# Patient Record
Sex: MALE | Race: WHITE | NOT HISPANIC OR LATINO | Employment: FULL TIME | URBAN - METROPOLITAN AREA
[De-identification: names, ages, dates, MRNs, and addresses within clinical notes are randomized per-mention and may not be internally consistent; named-entity substitution may affect disease eponyms.]

---

## 2017-03-01 ENCOUNTER — OFFICE VISIT (OUTPATIENT)
Dept: INTERNAL MEDICINE | Facility: CLINIC | Age: 62
End: 2017-03-01
Payer: COMMERCIAL

## 2017-03-01 ENCOUNTER — HOSPITAL ENCOUNTER (OUTPATIENT)
Dept: RADIOLOGY | Facility: HOSPITAL | Age: 62
Discharge: HOME | End: 2017-03-01
Attending: INTERNAL MEDICINE
Payer: COMMERCIAL

## 2017-03-01 VITALS
BODY MASS INDEX: 38.3 KG/M2 | DIASTOLIC BLOOD PRESSURE: 74 MMHG | HEIGHT: 73 IN | SYSTOLIC BLOOD PRESSURE: 130 MMHG | WEIGHT: 289 LBS | HEART RATE: 94 BPM

## 2017-03-01 DIAGNOSIS — T14.8XXA MUSCLE STRAIN: ICD-10-CM

## 2017-03-01 DIAGNOSIS — M25.471 RIGHT ANKLE SWELLING: ICD-10-CM

## 2017-03-01 DIAGNOSIS — M54.9 UPPER BACK PAIN: Primary | ICD-10-CM

## 2017-03-01 DIAGNOSIS — M25.571 ACUTE RIGHT ANKLE PAIN: ICD-10-CM

## 2017-03-01 PROCEDURE — 3075F SYST BP GE 130 - 139MM HG: CPT | Mod: S$GLB,,, | Performed by: INTERNAL MEDICINE

## 2017-03-01 PROCEDURE — 1160F RVW MEDS BY RX/DR IN RCRD: CPT | Mod: S$GLB,,, | Performed by: INTERNAL MEDICINE

## 2017-03-01 PROCEDURE — 3078F DIAST BP <80 MM HG: CPT | Mod: S$GLB,,, | Performed by: INTERNAL MEDICINE

## 2017-03-01 PROCEDURE — 99999 PR PBB SHADOW E&M-EST. PATIENT-LVL IV: CPT | Mod: PBBFAC,,, | Performed by: INTERNAL MEDICINE

## 2017-03-01 PROCEDURE — 73610 X-RAY EXAM OF ANKLE: CPT | Mod: TC,RT

## 2017-03-01 PROCEDURE — 99214 OFFICE O/P EST MOD 30 MIN: CPT | Mod: S$GLB,,, | Performed by: INTERNAL MEDICINE

## 2017-03-01 PROCEDURE — 73610 X-RAY EXAM OF ANKLE: CPT | Mod: 26,RT,, | Performed by: RADIOLOGY

## 2017-03-01 RX ORDER — HYDROCODONE BITARTRATE AND ACETAMINOPHEN 5; 325 MG/1; MG/1
1 TABLET ORAL EVERY 8 HOURS PRN
Qty: 30 TABLET | Refills: 0 | Status: SHIPPED | OUTPATIENT
Start: 2017-03-01 | End: 2017-04-03

## 2017-03-01 RX ORDER — MODAFINIL 200 MG/1
200 TABLET ORAL DAILY
Qty: 400 TABLET | Refills: 3 | Status: SHIPPED | OUTPATIENT
Start: 2017-03-01 | End: 2017-03-03 | Stop reason: SDUPTHER

## 2017-03-01 NOTE — PROGRESS NOTES
Subjective:       Patient ID: Rajan Cooper III is a 61 y.o. male.    Chief Complaint: Back Pain (x 4 days)    HPI Comments: History of present illness: Patient complains pain in the upper back and right ankle.  He is not sure if he injured himself at work but remembers pushing a heavy gate.  He then states the following day his upper back and lower neck was sore and then the following day the right ankle was swollen stiff and tender to bear weight.  He has significant multiple sclerosis and therefore doesn't always feel injuries.  He has no numbness or tingling in the upper extremities no weakness in either the upper or lower extremities but does have some discomfort when standing at the right ankle and tenderness across the upper back and lower neck especially with backward extension of the neck.  He takes baclofen, has used heat and anti-inflammatories.  He was wondering about a pain med for nighttime use.     Back Pain   Associated symptoms include weakness ( multiple sclerosis). Pertinent negatives include no abdominal pain, chest pain, fever or headaches.     Review of Systems   Constitutional: Negative for chills, fatigue, fever and unexpected weight change.   HENT: Negative for trouble swallowing.    Eyes: Negative for visual disturbance.   Respiratory: Negative for cough, shortness of breath and wheezing.    Cardiovascular: Negative for chest pain and palpitations.   Gastrointestinal: Negative for abdominal pain, constipation, diarrhea, nausea and vomiting.   Genitourinary: Negative for difficulty urinating.   Musculoskeletal: Positive for back pain, gait problem and joint swelling (right ankle). Negative for neck pain.   Skin: Negative for rash.   Neurological: Positive for weakness ( multiple sclerosis). Negative for dizziness and headaches.       Objective:      Physical Exam   Constitutional: He is oriented to person, place, and time. He appears well-developed and well-nourished. No distress.   HENT:    Head: Normocephalic and atraumatic.   Right Ear: External ear normal.   Left Ear: External ear normal.   Nose: Nose normal.   Mouth/Throat: Oropharynx is clear and moist. No oropharyngeal exudate.   TM's clear, pharynx clear   Eyes: Conjunctivae and EOM are normal. Pupils are equal, round, and reactive to light. No scleral icterus.   Neck: Normal range of motion. No thyromegaly present.   No supraclavicular nodes palpated.  Mild neck stiffness which may be chronic   Cardiovascular: Normal rate, regular rhythm and normal heart sounds.    No murmur heard.  Pulmonary/Chest: Effort normal and breath sounds normal. He has no wheezes.   Abdominal: Soft. Bowel sounds are normal. He exhibits no mass. There is no tenderness.   Musculoskeletal: He exhibits tenderness (mild muscular tenderness with palpation of the upper thoracic and lower cervical muscles.  Mild tenderness with backward extension of the neck but no radiation to the arms). He exhibits no edema.   Also swelling with lateral tenderness of the right ankle.  Mild decreased range of motion with some tenderness with bearing weight   Lymphadenopathy:     He has no cervical adenopathy.   Neurological: He is alert and oriented to person, place, and time.   Skin: No pallor.   Psychiatric: He has a normal mood and affect.       Assessment:       1. Upper back pain    2. Muscle strain    3. Acute right ankle pain    4. Right ankle swelling        Plan:       Rajan was seen today for back pain.    Diagnoses and all orders for this visit:    Upper back pain    Muscle strain    Acute right ankle pain  -     X-Ray Ankle Complete Right; Future  -     X-Ray Ankle Complete Right; Future    Right ankle swelling  -     X-Ray Ankle Complete Right; Future  -     X-Ray Ankle Complete Right; Future    Other orders  -     hydrocodone-acetaminophen 5-325mg (NORCO) 5-325 mg per tablet; Take 1 tablet by mouth every 8 (eight) hours as needed for Pain.        review x-ray when available.   Symptomatic treatment of muscle soreness with heat anti-inflammatory muscle relaxer and sparingly of pain medicine.  Side effects discussed.  Call if not improving    Addendum: No fracture on xray. Symptomatic treatment as we discussed. Let me know if not improving.

## 2017-03-01 NOTE — MR AVS SNAPSHOT
[facility]  [address]  [facility] [address]  Phone: [phone]  Fax: [phone]                  Rajan Cooper III   3/1/2017 10:45 AM   Office Visit    Description:  Male : 1955   Provider:  Tom Garcia MD   Department:  [facility]           Reason for Visit     Back Pain           Diagnoses this Visit        Comments    Upper back pain    -  Primary     Muscle strain         Acute right ankle pain         Right ankle swelling                To Do List           Future Appointments        Provider Department Dept Phone    3/1/2017 11:45 AM [facility] [id number] [facility] [phone]      Goals (5 Years of Data)     None       These Medications        Disp Refills Start End    hydrocodone-acetaminophen 5-325mg (NORCO) 5-325 mg per tablet 30 tablet 0 3/1/2017     Take 1 tablet by mouth every 8 (eight) hours as needed for Pain. - Oral    Pharmacy: [facility]/pharmacy #[id number] - [place] - [address] Ph #: [phone]         [facility]     [facility] Care [facility] -  Assistance  Registered nurses in the [facility] provide clinical advisement, health education, appointment booking, and other advisory services.  Call for this free service at [phone].             Medications           Message regarding Medications     Verify the changes and/or additions to your medication regime listed below are the same as discussed with your clinician today.  If any of these changes or additions are incorrect, please notify your healthcare provider.        START taking these NEW medications        Refills    hydrocodone-acetaminophen 5-325mg (NORCO) 5-325 mg per tablet 0    Sig: Take 1 tablet by mouth every 8 (eight) hours as needed for Pain.    Class: Print    Route: Oral           Verify that the below list of medications is an accurate representation of the medications you are currently taking.  If  none reported, the list may be blank. If incorrect, please contact your healthcare provider. Carry this list with you in case of emergency.           Current Medications     amlodipine (NORVASC) 10 MG tablet TAKE 1 TABLET BY MOUTH ONCE DAILY    AVONEX 30 mcg/0.5 mL injection INJECT 30 MCG (1 INJECTION) INTRAMUSCULARLY ONCE WEEKLY    baclofen (LIORESAL) 10 MG tablet TAKE 1 TABLET BY MOUTH 3 TIMES A DAY    baclofen (LIORESAL) 10 MG tablet TAKE 1 TABLET BY MOUTH 3 TIMES A DAY    furosemide (LASIX) 40 MG tablet TAKE 1 TABLET BY MOUTH 2 TIMES A DAY    INTERFERON BETA-1A (AVONEX IM) Inject into the muscle.      losartan (COZAAR) 25 MG tablet TAKE 1 TABLET BY MOUTH ONCE DAILY    modafinil (PROVIGIL) 200 MG Tab Take 1 tablet (200 mg total) by mouth once daily.    triamcinolone acetonide 0.1% (KENALOG) 0.1 % cream     hydrocodone-acetaminophen 5-325mg (NORCO) 5-325 mg per tablet Take 1 tablet by mouth every 8 (eight) hours as needed for Pain.    sildenafil (VIAGRA) 100 MG tablet Take 1 tablet (100 mg total) by mouth daily as needed for Erectile Dysfunction.    VASCULERA 630 mg Tab Take 1 tablet by mouth once daily.           Clinical Reference Information           Your Vitals Were     BP                   130/74 (BP Location: Right arm, Patient Position: Sitting, BP Method: Manual)           Blood Pressure          Most Recent Value    BP  130/74      Allergies as of 3/1/2017     No Known Allergies      Immunizations Administered on Date of Encounter - 3/1/2017     None      Orders Placed During Today's Visit     Future Labs/Procedures Expected by Expires    X-Ray Ankle Complete Right  3/1/2017 3/1/2018    X-Ray Ankle Complete Right  3/1/2017 3/1/2018      [facility] Sign-Up     Activating your [facility] account is as easy as 1-2-3!     1) Visit [email or web address], select Sign Up Now, enter this activation code and your date of birth, then select Next.  [email or web address]  Expires: 4/15/2017 11:40 AM      2) Create a username and  password to use when you visit [facility] in the future and select a security question in case you lose your password and select Next.    3) Enter your e-mail address and click Sign Up!    Additional Information  If you have questions, please e-mail [email or web address] or call [phone] to talk to our [employer] staff. Remember, [facility] is NOT to be used for urgent needs. For medical emergencies, dial 911.         Language Assistance Services     ATTENTION: Language assistance services are available, free of charge. Please call [phone].      ATENCIÓN: Si habla español, tiene a wang disposición servicios gratuitos de asistencia lingüística. Llame al [phone].     CHÚ Ý: N?u b?n nói Ti?ng Vi?t, có các d?ch v? h? tr? ngôn ng? mi?n phí dành cho b?n. G?i s? [phone].         [facility] complies with applicable Federal civil rights laws and does not discriminate on the basis of race, color, national origin, age, disability, or sex.

## 2017-03-02 ENCOUNTER — HOSPITAL ENCOUNTER (OUTPATIENT)
Dept: RADIOLOGY | Facility: HOSPITAL | Age: 62
Discharge: HOME | End: 2017-03-02
Attending: INTERNAL MEDICINE
Payer: COMMERCIAL

## 2017-03-02 ENCOUNTER — NURSE TRIAGE (OUTPATIENT)
Dept: ADMINISTRATIVE | Facility: CLINIC | Age: 62
End: 2017-03-02

## 2017-03-02 ENCOUNTER — OFFICE VISIT (OUTPATIENT)
Dept: INTERNAL MEDICINE | Facility: CLINIC | Age: 62
End: 2017-03-02
Payer: COMMERCIAL

## 2017-03-02 VITALS
BODY MASS INDEX: 40.06 KG/M2 | WEIGHT: 302.25 LBS | DIASTOLIC BLOOD PRESSURE: 82 MMHG | HEIGHT: 73 IN | SYSTOLIC BLOOD PRESSURE: 136 MMHG | HEART RATE: 72 BPM

## 2017-03-02 DIAGNOSIS — M25.532 WRIST PAIN, LEFT: Primary | ICD-10-CM

## 2017-03-02 DIAGNOSIS — M25.532 WRIST PAIN, LEFT: ICD-10-CM

## 2017-03-02 PROCEDURE — 99999 PR PBB SHADOW E&M-EST. PATIENT-LVL III: CPT | Mod: PBBFAC,,, | Performed by: INTERNAL MEDICINE

## 2017-03-02 PROCEDURE — 99213 OFFICE O/P EST LOW 20 MIN: CPT | Mod: S$GLB,,, | Performed by: INTERNAL MEDICINE

## 2017-03-02 PROCEDURE — 73110 X-RAY EXAM OF WRIST: CPT | Mod: 26,LT,, | Performed by: RADIOLOGY

## 2017-03-02 PROCEDURE — 3079F DIAST BP 80-89 MM HG: CPT | Mod: S$GLB,,, | Performed by: INTERNAL MEDICINE

## 2017-03-02 PROCEDURE — 1160F RVW MEDS BY RX/DR IN RCRD: CPT | Mod: S$GLB,,, | Performed by: INTERNAL MEDICINE

## 2017-03-02 PROCEDURE — 3075F SYST BP GE 130 - 139MM HG: CPT | Mod: S$GLB,,, | Performed by: INTERNAL MEDICINE

## 2017-03-02 PROCEDURE — 73110 X-RAY EXAM OF WRIST: CPT | Mod: TC,LT

## 2017-03-02 NOTE — TELEPHONE ENCOUNTER
Reason for Disposition   [1] Swallowing difficulty AND [2] cause unknown (Exception: difficulty swallowing is a chronic symptom)    Protocols used:  [facility]    Patients wife stating Rajan was prescribed Norco and last night after he took it he felt like his throat was tight and he was having a hard time swallowing. He did not take any more of the medication today and he states all of his symptoms are resolved. He is swallowing well, breathing normally, and not feeling weak dizzy. He does not have any facial or tongue swelling. His wife would like to see if something else could be prescribed for pain instead. She plans on discarding the Norco. She is asking for a call back from office today please. Please contact caller with any further care advice.

## 2017-03-02 NOTE — TELEPHONE ENCOUNTER
Spoke to pt, he agreed with below message, I have added Norco to his allergy list. He states his ankle is actually getting better, adv rest, head, symptomatic treatment as discussed yesterday.

## 2017-03-02 NOTE — PROGRESS NOTES
"Clinic Note  3/2/2017      Subjective:       Patient ID:  Rajan is a 61 y.o. male being seen for an established visit.    Chief Complaint: Joint Swelling (lt)    HPI Comments: New onset L wrist pain.  Worse over last 2 days. Has had small cyst on wrist but is larger today and more pain now on palmar side of wrist.        ROS    Medication List with Changes/Refills   New Medications    ARM BRACE (WRIST BRACE MEDIUM) MISC    1 each by Misc.(Non-Drug; Combo Route) route once.   Current Medications    AMLODIPINE (NORVASC) 10 MG TABLET    TAKE 1 TABLET BY MOUTH ONCE DAILY    AVONEX 30 MCG/0.5 ML INJECTION    INJECT 30 MCG (1 INJECTION) INTRAMUSCULARLY ONCE WEEKLY    BACLOFEN (LIORESAL) 10 MG TABLET    TAKE 1 TABLET BY MOUTH 3 TIMES A DAY    FUROSEMIDE (LASIX) 40 MG TABLET    TAKE 1 TABLET BY MOUTH 2 TIMES A DAY    HYDROCODONE-ACETAMINOPHEN 5-325MG (NORCO) 5-325 MG PER TABLET    Take 1 tablet by mouth every 8 (eight) hours as needed for Pain.    INTERFERON BETA-1A (AVONEX IM)    Inject into the muscle.      LOSARTAN (COZAAR) 25 MG TABLET    TAKE 1 TABLET BY MOUTH ONCE DAILY    MODAFINIL (PROVIGIL) 200 MG TAB    Take 1 tablet (200 mg total) by mouth once daily.    SILDENAFIL (VIAGRA) 100 MG TABLET    Take 1 tablet (100 mg total) by mouth daily as needed for Erectile Dysfunction.    TRIAMCINOLONE ACETONIDE 0.1% (KENALOG) 0.1 % CREAM        VASCULERA 630 MG TAB    Take 1 tablet by mouth once daily.   Discontinued Medications    BACLOFEN (LIORESAL) 10 MG TABLET    TAKE 1 TABLET BY MOUTH 3 TIMES A DAY       Patient Active Problem List   Diagnosis    MS (multiple sclerosis)    HTN (hypertension)    Gait disorder    Muscle cramps           Objective:      /82 (BP Location: Right arm, Patient Position: Sitting, BP Method: Manual)  Pulse 72  Ht 6' 1" (1.854 m)  Wt (!) 137.1 kg (302 lb 4 oz)  BMI 39.88 kg/m2  Estimated body mass index is 39.88 kg/(m^2) as calculated from the following:    Height as of this encounter: " "6' 1" (1.854 m).    Weight as of this encounter: 137.1 kg (302 lb 4 oz).  Physical Exam   Musculoskeletal:        Arms:        Assessment and Plan:         Problem List Items Addressed This Visit     None      Visit Diagnoses     Wrist pain, left    -  Primary    Relevant Orders    X-Ray Wrist Complete 3 views Left - flims with ? AVN in scaphoid bone.  Will send brace and make referral to ortho to consider MRI.          Follow Up:   No Follow-up on file.    Other Orders Placed This Visit:  Orders Placed This Encounter   Procedures    X-Ray Wrist Complete 3 views Left    Ambulatory consult to Orthopedics         Dom Olivia    "

## 2017-03-02 NOTE — LETTER
March 2, 2017        Tom Garcia MD  [address]  [facility] [address]             [facility]  [address]  [facility] [address]  Phone: [phone]  Fax: [phone]   Patient: Rajan Cooper III   MR Number: [id number]   YOB: 1955   Date of Visit: 3/2/2017       Dear Dr. Garcia:    Thank you for referring Rajan Cooper to me for evaluation. Below are the relevant portions of my assessment and plan of care.          Problem List Items Addressed This Visit     None      Visit Diagnoses     Wrist pain, left    -  Primary    Relevant Orders    X-Ray Wrist Complete 3 views Left - flims with ? AVN in scaphoid bone.  Will send brace and make referral to ortho to consider MRI.          Follow Up:   No Follow-up on file.    Other Orders Placed This Visit:  Orders Placed This Encounter   Procedures    X-Ray Wrist Complete 3 views Left    Ambulatory consult to Orthopedics         If you have questions, please do not hesitate to call me. I look forward to following Rajan along with you.    Sincerely,      Dom Olivia MD           CC  No Recipients

## 2017-03-02 NOTE — TELEPHONE ENCOUNTER
I am concerned that he should not take another narcotic at this time since he had a reaction causing throat closure. It is possible that all other similar narcotics may cause the same reaction and this close together it could be worse. Unless he thinks it is from something else we really should list Norco as a significant allergic reaction. Let me know his thoughts.

## 2017-03-03 RX ORDER — MODAFINIL 200 MG/1
200 TABLET ORAL DAILY
Qty: 400 TABLET | Refills: 3 | Status: SHIPPED | OUTPATIENT
Start: 2017-03-03 | End: 2017-03-16 | Stop reason: SDUPTHER

## 2017-03-04 ENCOUNTER — ANESTHESIA (OUTPATIENT)
Dept: SURGERY | Facility: HOSPITAL | Age: 62
DRG: 853 | End: 2017-03-04
Payer: COMMERCIAL

## 2017-03-04 ENCOUNTER — ANESTHESIA EVENT (OUTPATIENT)
Dept: SURGERY | Facility: HOSPITAL | Age: 62
DRG: 853 | End: 2017-03-04
Payer: COMMERCIAL

## 2017-03-04 ENCOUNTER — HOSPITAL ENCOUNTER (OUTPATIENT)
Dept: RADIOLOGY | Facility: HOSPITAL | Age: 62
Discharge: HOME | DRG: 853 | End: 2017-03-04
Payer: COMMERCIAL

## 2017-03-04 ENCOUNTER — HOSPITAL ENCOUNTER (INPATIENT)
Facility: HOSPITAL | Age: 62
LOS: 8 days | Discharge: HOME HEALTH | DRG: 853 | End: 2017-03-14
Attending: EMERGENCY MEDICINE | Admitting: ORTHOPAEDIC SURGERY
Payer: COMMERCIAL

## 2017-03-04 DIAGNOSIS — G35 MS (MULTIPLE SCLEROSIS): Primary | ICD-10-CM

## 2017-03-04 DIAGNOSIS — A41.01 STAPHYLOCOCCUS AUREUS SEPSIS: ICD-10-CM

## 2017-03-04 DIAGNOSIS — M00.9 PYOGENIC ARTHRITIS OF LEFT WRIST, DUE TO UNSPECIFIED ORGANISM: ICD-10-CM

## 2017-03-04 DIAGNOSIS — M00.032 STAPHYLOCOCCAL ARTHRITIS OF LEFT WRIST: ICD-10-CM

## 2017-03-04 DIAGNOSIS — L02.512 ABSCESS OF LEFT HAND: ICD-10-CM

## 2017-03-04 LAB
ALBUMIN SERPL BCP-MCNC: 2.2 G/DL
ALP SERPL-CCNC: 219 U/L
ALT SERPL W/O P-5'-P-CCNC: 44 U/L
ANION GAP SERPL CALC-SCNC: 15 MMOL/L
ANISOCYTOSIS BLD QL SMEAR: SLIGHT
APPEARANCE FLD: NORMAL
APTT BLDCRRT: 26.8 SEC
AST SERPL-CCNC: 26 U/L
BACTERIA #/AREA URNS AUTO: ABNORMAL /HPF
BASOPHILS # BLD AUTO: ABNORMAL K/UL
BASOPHILS NFR BLD: 0 %
BILIRUB SERPL-MCNC: 2.4 MG/DL
BILIRUB UR QL STRIP: ABNORMAL
BODY FLD TYPE: NORMAL
BODY FLUID COMMENTS: NORMAL
BUN SERPL-MCNC: 15 MG/DL
CALCIUM SERPL-MCNC: 9.6 MG/DL
CHLORIDE SERPL-SCNC: 104 MMOL/L
CLARITY UR REFRACT.AUTO: ABNORMAL
CO2 SERPL-SCNC: 24 MMOL/L
COLOR FLD: NORMAL
COLOR UR AUTO: ABNORMAL
CREAT SERPL-MCNC: 0.8 MG/DL
CRP SERPL-MCNC: 451.1 MG/L
DIFFERENTIAL METHOD: ABNORMAL
EOSINOPHIL # BLD AUTO: ABNORMAL K/UL
EOSINOPHIL NFR BLD: 4 %
ERYTHROCYTE [DISTWIDTH] IN BLOOD BY AUTOMATED COUNT: 14.7 %
ERYTHROCYTE [SEDIMENTATION RATE] IN BLOOD BY WESTERGREN METHOD: 118 MM/HR
EST. GFR  (AFRICAN AMERICAN): >60 ML/MIN/1.73 M^2
EST. GFR  (NON AFRICAN AMERICAN): >60 ML/MIN/1.73 M^2
GLUCOSE SERPL-MCNC: 99 MG/DL
GLUCOSE UR QL STRIP: NEGATIVE
GRAM STN SPEC: NORMAL
HCT VFR BLD AUTO: 37 %
HGB BLD-MCNC: 12.6 G/DL
HGB UR QL STRIP: ABNORMAL
HYALINE CASTS UR QL AUTO: 0 /LPF
INR PPP: 1.1
KETONES UR QL STRIP: ABNORMAL
LEUKOCYTE ESTERASE UR QL STRIP: ABNORMAL
LYMPHOCYTES # BLD AUTO: ABNORMAL K/UL
LYMPHOCYTES NFR BLD: 8 %
LYMPHOCYTES NFR FLD MANUAL: 5 %
MCH RBC QN AUTO: 30.4 PG
MCHC RBC AUTO-ENTMCNC: 34.1 %
MCV RBC AUTO: 89 FL
MICROSCOPIC COMMENT: ABNORMAL
MONOCYTES # BLD AUTO: ABNORMAL K/UL
MONOCYTES NFR BLD: 0 %
MONOS+MACROS NFR FLD MANUAL: 16 %
NEUTROPHILS NFR BLD: 2 %
NEUTROPHILS NFR FLD MANUAL: 79 %
NEUTS BAND NFR BLD MANUAL: 86 %
NITRITE UR QL STRIP: POSITIVE
PH UR STRIP: 7 [PH] (ref 5–8)
PLATELET # BLD AUTO: 254 K/UL
PLATELET BLD QL SMEAR: ABNORMAL
PMV BLD AUTO: 10.7 FL
POTASSIUM SERPL-SCNC: 3.5 MMOL/L
PREALB SERPL-MCNC: 4 MG/DL
PROCALCITONIN SERPL IA-MCNC: 1.08 NG/ML
PROT SERPL-MCNC: 7.4 G/DL
PROT UR QL STRIP: ABNORMAL
PROTHROMBIN TIME: 11.2 SEC
RBC # BLD AUTO: 4.15 M/UL
RBC #/AREA URNS AUTO: >100 /HPF (ref 0–4)
SODIUM SERPL-SCNC: 143 MMOL/L
SP GR UR STRIP: 1.02 (ref 1–1.03)
TRANSFERRIN SERPL-MCNC: 114 MG/DL
URATE SERPL-MCNC: 4.8 MG/DL
URN SPEC COLLECT METH UR: ABNORMAL
UROBILINOGEN UR STRIP-ACNC: >=8 EU/DL
WBC # BLD AUTO: 22.54 K/UL
WBC # FLD: NORMAL /CU MM
WBC #/AREA URNS AUTO: 31 /HPF (ref 0–5)

## 2017-03-04 PROCEDURE — D9220A PRA ANESTHESIA: Mod: ANES,,, | Performed by: ANESTHESIOLOGY

## 2017-03-04 PROCEDURE — 87102 FUNGUS ISOLATION CULTURE: CPT | Mod: 59

## 2017-03-04 PROCEDURE — 84134 ASSAY OF PREALBUMIN: CPT

## 2017-03-04 PROCEDURE — 63600175 PHARM REV CODE 636 W HCPCS: Performed by: EMERGENCY MEDICINE

## 2017-03-04 PROCEDURE — 25000003 PHARM REV CODE 250: Performed by: STUDENT IN AN ORGANIZED HEALTH CARE EDUCATION/TRAINING PROGRAM

## 2017-03-04 PROCEDURE — 87205 SMEAR GRAM STAIN: CPT | Mod: 91

## 2017-03-04 PROCEDURE — 99291 CRITICAL CARE FIRST HOUR: CPT | Mod: 25

## 2017-03-04 PROCEDURE — 37000009 HC ANESTHESIA EA ADD 15 MINS: Performed by: ANESTHESIOLOGY

## 2017-03-04 PROCEDURE — 87075 CULTR BACTERIA EXCEPT BLOOD: CPT | Mod: 59

## 2017-03-04 PROCEDURE — 96361 HYDRATE IV INFUSION ADD-ON: CPT

## 2017-03-04 PROCEDURE — 81001 URINALYSIS AUTO W/SCOPE: CPT

## 2017-03-04 PROCEDURE — 93010 ELECTROCARDIOGRAM REPORT: CPT | Mod: ,,, | Performed by: INTERNAL MEDICINE

## 2017-03-04 PROCEDURE — 87116 MYCOBACTERIA CULTURE: CPT | Mod: 59

## 2017-03-04 PROCEDURE — 71000039 HC RECOVERY, EACH ADD'L HOUR: Performed by: ORTHOPAEDIC SURGERY

## 2017-03-04 PROCEDURE — 85730 THROMBOPLASTIN TIME PARTIAL: CPT

## 2017-03-04 PROCEDURE — 86140 C-REACTIVE PROTEIN: CPT

## 2017-03-04 PROCEDURE — 37000009 HC ANESTHESIA EA ADD 15 MINS: Performed by: ORTHOPAEDIC SURGERY

## 2017-03-04 PROCEDURE — G0378 HOSPITAL OBSERVATION PER HR: HCPCS

## 2017-03-04 PROCEDURE — 11000001 HC ACUTE MED/SURG PRIVATE ROOM

## 2017-03-04 PROCEDURE — 20605 DRAIN/INJ JOINT/BURSA W/O US: CPT | Mod: 59,RT,, | Performed by: ORTHOPAEDIC SURGERY

## 2017-03-04 PROCEDURE — 63600175 PHARM REV CODE 636 W HCPCS: Performed by: NURSE ANESTHETIST, CERTIFIED REGISTERED

## 2017-03-04 PROCEDURE — 36000705 HC OR TIME LEV I EA ADD 15 MIN: Performed by: ORTHOPAEDIC SURGERY

## 2017-03-04 PROCEDURE — 63600175 PHARM REV CODE 636 W HCPCS: Performed by: STUDENT IN AN ORGANIZED HEALTH CARE EDUCATION/TRAINING PROGRAM

## 2017-03-04 PROCEDURE — 71010 XR CHEST 1 VIEW PRE-OP: CPT | Mod: 26,,, | Performed by: RADIOLOGY

## 2017-03-04 PROCEDURE — 93005 ELECTROCARDIOGRAM TRACING: CPT

## 2017-03-04 PROCEDURE — 85007 BL SMEAR W/DIFF WBC COUNT: CPT

## 2017-03-04 PROCEDURE — 99291 CRITICAL CARE FIRST HOUR: CPT | Mod: ,,, | Performed by: EMERGENCY MEDICINE

## 2017-03-04 PROCEDURE — 63600175 PHARM REV CODE 636 W HCPCS: Performed by: PAIN MEDICINE

## 2017-03-04 PROCEDURE — 85027 COMPLETE CBC AUTOMATED: CPT

## 2017-03-04 PROCEDURE — 84466 ASSAY OF TRANSFERRIN: CPT

## 2017-03-04 PROCEDURE — 0S9F3ZX DRAINAGE OF RIGHT ANKLE JOINT, PERCUTANEOUS APPROACH, DIAGNOSTIC: ICD-10-PCS | Performed by: ORTHOPAEDIC SURGERY

## 2017-03-04 PROCEDURE — 71010 XR CHEST 1 VIEW PRE-OP: CPT | Mod: TC

## 2017-03-04 PROCEDURE — 0R9P3ZZ DRAINAGE OF LEFT WRIST JOINT, PERCUTANEOUS APPROACH: ICD-10-PCS | Performed by: ORTHOPAEDIC SURGERY

## 2017-03-04 PROCEDURE — 89051 BODY FLUID CELL COUNT: CPT

## 2017-03-04 PROCEDURE — 85610 PROTHROMBIN TIME: CPT

## 2017-03-04 PROCEDURE — 87015 SPECIMEN INFECT AGNT CONCNTJ: CPT | Mod: 91

## 2017-03-04 PROCEDURE — 37000008 HC ANESTHESIA 1ST 15 MINUTES: Performed by: ANESTHESIOLOGY

## 2017-03-04 PROCEDURE — 85651 RBC SED RATE NONAUTOMATED: CPT

## 2017-03-04 PROCEDURE — 96374 THER/PROPH/DIAG INJ IV PUSH: CPT

## 2017-03-04 PROCEDURE — 25101 EXPLORE/TREAT WRIST JOINT: CPT | Mod: 51,LT,, | Performed by: ORTHOPAEDIC SURGERY

## 2017-03-04 PROCEDURE — 87186 SC STD MICRODIL/AGAR DIL: CPT

## 2017-03-04 PROCEDURE — 87070 CULTURE OTHR SPECIMN AEROBIC: CPT | Mod: 59

## 2017-03-04 PROCEDURE — D9220A PRA ANESTHESIA: Mod: CRNA,,, | Performed by: NURSE ANESTHETIST, CERTIFIED REGISTERED

## 2017-03-04 PROCEDURE — 96375 TX/PRO/DX INJ NEW DRUG ADDON: CPT

## 2017-03-04 PROCEDURE — 87176 TISSUE HOMOGENIZATION CULTR: CPT

## 2017-03-04 PROCEDURE — 84145 PROCALCITONIN (PCT): CPT

## 2017-03-04 PROCEDURE — 25000003 PHARM REV CODE 250: Performed by: NURSE ANESTHETIST, CERTIFIED REGISTERED

## 2017-03-04 PROCEDURE — 37000008 HC ANESTHESIA 1ST 15 MINUTES: Performed by: ORTHOPAEDIC SURGERY

## 2017-03-04 PROCEDURE — 36000704 HC OR TIME LEV I 1ST 15 MIN: Performed by: ORTHOPAEDIC SURGERY

## 2017-03-04 PROCEDURE — 87075 CULTR BACTERIA EXCEPT BLOOD: CPT

## 2017-03-04 PROCEDURE — 87077 CULTURE AEROBIC IDENTIFY: CPT | Mod: 59

## 2017-03-04 PROCEDURE — 87070 CULTURE OTHR SPECIMN AEROBIC: CPT

## 2017-03-04 PROCEDURE — 25028 I&D F/ARM&/WRST DP ABSC/HMTM: CPT | Mod: LT,,, | Performed by: ORTHOPAEDIC SURGERY

## 2017-03-04 PROCEDURE — 71000033 HC RECOVERY, INTIAL HOUR: Performed by: ORTHOPAEDIC SURGERY

## 2017-03-04 PROCEDURE — 96372 THER/PROPH/DIAG INJ SC/IM: CPT

## 2017-03-04 PROCEDURE — 84550 ASSAY OF BLOOD/URIC ACID: CPT

## 2017-03-04 PROCEDURE — 80053 COMPREHEN METABOLIC PANEL: CPT

## 2017-03-04 PROCEDURE — 87088 URINE BACTERIA CULTURE: CPT

## 2017-03-04 PROCEDURE — 87086 URINE CULTURE/COLONY COUNT: CPT

## 2017-03-04 PROCEDURE — 0R9P00Z DRAINAGE OF LEFT WRIST JOINT WITH DRAINAGE DEVICE, OPEN APPROACH: ICD-10-PCS | Performed by: ORTHOPAEDIC SURGERY

## 2017-03-04 RX ORDER — SODIUM CHLORIDE 9 MG/ML
INJECTION, SOLUTION INTRAVENOUS CONTINUOUS
Status: DISCONTINUED | OUTPATIENT
Start: 2017-03-04 | End: 2017-03-14 | Stop reason: HOSPADM

## 2017-03-04 RX ORDER — ONDANSETRON 2 MG/ML
INJECTION INTRAMUSCULAR; INTRAVENOUS
Status: DISCONTINUED | OUTPATIENT
Start: 2017-03-04 | End: 2017-03-04

## 2017-03-04 RX ORDER — CEFAZOLIN SODIUM 1 G/3ML
INJECTION, POWDER, FOR SOLUTION INTRAMUSCULAR; INTRAVENOUS
Status: DISCONTINUED | OUTPATIENT
Start: 2017-03-04 | End: 2017-03-04

## 2017-03-04 RX ORDER — HYDROMORPHONE HYDROCHLORIDE 1 MG/ML
0.5 INJECTION, SOLUTION INTRAMUSCULAR; INTRAVENOUS; SUBCUTANEOUS ONCE
Status: COMPLETED | OUTPATIENT
Start: 2017-03-04 | End: 2017-03-04

## 2017-03-04 RX ORDER — FENTANYL CITRATE 50 UG/ML
25 INJECTION, SOLUTION INTRAMUSCULAR; INTRAVENOUS EVERY 5 MIN PRN
Status: DISCONTINUED | OUTPATIENT
Start: 2017-03-04 | End: 2017-03-10 | Stop reason: HOSPADM

## 2017-03-04 RX ORDER — FAMOTIDINE 10 MG/ML
INJECTION INTRAVENOUS
Status: DISCONTINUED | OUTPATIENT
Start: 2017-03-04 | End: 2017-03-04

## 2017-03-04 RX ORDER — HYDROMORPHONE HYDROCHLORIDE 1 MG/ML
1 INJECTION, SOLUTION INTRAMUSCULAR; INTRAVENOUS; SUBCUTANEOUS
Status: DISCONTINUED | OUTPATIENT
Start: 2017-03-04 | End: 2017-03-14 | Stop reason: HOSPADM

## 2017-03-04 RX ORDER — ONDANSETRON 2 MG/ML
4 INJECTION INTRAMUSCULAR; INTRAVENOUS EVERY 12 HOURS PRN
Status: DISCONTINUED | OUTPATIENT
Start: 2017-03-04 | End: 2017-03-14 | Stop reason: HOSPADM

## 2017-03-04 RX ORDER — OXYCODONE HYDROCHLORIDE 5 MG/1
15 TABLET ORAL EVERY 4 HOURS PRN
Status: DISCONTINUED | OUTPATIENT
Start: 2017-03-04 | End: 2017-03-14 | Stop reason: HOSPADM

## 2017-03-04 RX ORDER — RAMELTEON 8 MG/1
8 TABLET ORAL NIGHTLY PRN
Status: DISCONTINUED | OUTPATIENT
Start: 2017-03-04 | End: 2017-03-14 | Stop reason: HOSPADM

## 2017-03-04 RX ORDER — PROPOFOL 10 MG/ML
VIAL (ML) INTRAVENOUS
Status: DISCONTINUED | OUTPATIENT
Start: 2017-03-04 | End: 2017-03-04

## 2017-03-04 RX ORDER — SODIUM CHLORIDE 0.9 % (FLUSH) 0.9 %
3 SYRINGE (ML) INJECTION EVERY 8 HOURS
Status: DISCONTINUED | OUTPATIENT
Start: 2017-03-04 | End: 2017-03-14 | Stop reason: HOSPADM

## 2017-03-04 RX ORDER — OXYCODONE HYDROCHLORIDE 5 MG/1
10 TABLET ORAL EVERY 4 HOURS PRN
Status: DISCONTINUED | OUTPATIENT
Start: 2017-03-04 | End: 2017-03-14 | Stop reason: HOSPADM

## 2017-03-04 RX ORDER — SODIUM CHLORIDE 0.9 % (FLUSH) 0.9 %
3 SYRINGE (ML) INJECTION
Status: DISCONTINUED | OUTPATIENT
Start: 2017-03-04 | End: 2017-03-14 | Stop reason: HOSPADM

## 2017-03-04 RX ORDER — DIPHENHYDRAMINE HYDROCHLORIDE 50 MG/ML
25 INJECTION INTRAMUSCULAR; INTRAVENOUS EVERY 4 HOURS PRN
Status: DISCONTINUED | OUTPATIENT
Start: 2017-03-04 | End: 2017-03-14 | Stop reason: HOSPADM

## 2017-03-04 RX ORDER — MIDAZOLAM HYDROCHLORIDE 1 MG/ML
INJECTION, SOLUTION INTRAMUSCULAR; INTRAVENOUS
Status: DISCONTINUED | OUTPATIENT
Start: 2017-03-04 | End: 2017-03-04

## 2017-03-04 RX ORDER — DEXAMETHASONE SODIUM PHOSPHATE 4 MG/ML
INJECTION, SOLUTION INTRA-ARTICULAR; INTRALESIONAL; INTRAMUSCULAR; INTRAVENOUS; SOFT TISSUE
Status: DISCONTINUED | OUTPATIENT
Start: 2017-03-04 | End: 2017-03-04

## 2017-03-04 RX ORDER — KETOROLAC TROMETHAMINE 30 MG/ML
15 INJECTION, SOLUTION INTRAMUSCULAR; INTRAVENOUS
Status: COMPLETED | OUTPATIENT
Start: 2017-03-04 | End: 2017-03-04

## 2017-03-04 RX ORDER — OXYCODONE HYDROCHLORIDE 5 MG/1
5 TABLET ORAL EVERY 4 HOURS PRN
Status: DISCONTINUED | OUTPATIENT
Start: 2017-03-04 | End: 2017-03-14 | Stop reason: HOSPADM

## 2017-03-04 RX ORDER — ALBUTEROL SULFATE 90 UG/1
AEROSOL, METERED RESPIRATORY (INHALATION)
Status: DISCONTINUED | OUTPATIENT
Start: 2017-03-04 | End: 2017-03-04

## 2017-03-04 RX ORDER — FENTANYL CITRATE 50 UG/ML
INJECTION, SOLUTION INTRAMUSCULAR; INTRAVENOUS
Status: DISCONTINUED | OUTPATIENT
Start: 2017-03-04 | End: 2017-03-04

## 2017-03-04 RX ORDER — LIDOCAINE HCL/PF 100 MG/5ML
SYRINGE (ML) INTRAVENOUS
Status: DISCONTINUED | OUTPATIENT
Start: 2017-03-04 | End: 2017-03-04

## 2017-03-04 RX ORDER — ONDANSETRON 2 MG/ML
4 INJECTION INTRAMUSCULAR; INTRAVENOUS DAILY PRN
Status: DISCONTINUED | OUTPATIENT
Start: 2017-03-04 | End: 2017-03-10 | Stop reason: HOSPADM

## 2017-03-04 RX ADMIN — CEFAZOLIN 2 G: 1 INJECTION, POWDER, FOR SOLUTION INTRAVENOUS at 07:03

## 2017-03-04 RX ADMIN — LIDOCAINE HYDROCHLORIDE 100 MG: 20 INJECTION, SOLUTION INTRAVENOUS at 07:03

## 2017-03-04 RX ADMIN — HYDROMORPHONE HYDROCHLORIDE 1 MG: 1 INJECTION, SOLUTION INTRAMUSCULAR; INTRAVENOUS; SUBCUTANEOUS at 06:03

## 2017-03-04 RX ADMIN — SODIUM CHLORIDE, SODIUM GLUCONATE, SODIUM ACETATE, POTASSIUM CHLORIDE, MAGNESIUM CHLORIDE, SODIUM PHOSPHATE, DIBASIC, AND POTASSIUM PHOSPHATE: .53; .5; .37; .037; .03; .012; .00082 INJECTION, SOLUTION INTRAVENOUS at 07:03

## 2017-03-04 RX ADMIN — ALBUTEROL SULFATE 6 PUFF: 90 AEROSOL, METERED RESPIRATORY (INHALATION) at 08:03

## 2017-03-04 RX ADMIN — FENTANYL CITRATE 50 MCG: 50 INJECTION, SOLUTION INTRAMUSCULAR; INTRAVENOUS at 08:03

## 2017-03-04 RX ADMIN — OXYCODONE HYDROCHLORIDE 15 MG: 5 TABLET ORAL at 03:03

## 2017-03-04 RX ADMIN — CEFTRIAXONE 1 G: 1 INJECTION, SOLUTION INTRAVENOUS at 08:03

## 2017-03-04 RX ADMIN — MIDAZOLAM HYDROCHLORIDE 2 MG: 1 INJECTION, SOLUTION INTRAMUSCULAR; INTRAVENOUS at 07:03

## 2017-03-04 RX ADMIN — PROPOFOL 110 MG: 10 INJECTION, EMULSION INTRAVENOUS at 07:03

## 2017-03-04 RX ADMIN — ONDANSETRON 4 MG: 2 INJECTION INTRAMUSCULAR; INTRAVENOUS at 07:03

## 2017-03-04 RX ADMIN — FENTANYL CITRATE 50 MCG: 50 INJECTION, SOLUTION INTRAMUSCULAR; INTRAVENOUS at 07:03

## 2017-03-04 RX ADMIN — KETOROLAC TROMETHAMINE 15 MG: 30 INJECTION, SOLUTION INTRAMUSCULAR; INTRAVENOUS at 08:03

## 2017-03-04 RX ADMIN — Medication 3 ML: at 02:03

## 2017-03-04 RX ADMIN — SODIUM CHLORIDE: 0.9 INJECTION, SOLUTION INTRAVENOUS at 01:03

## 2017-03-04 RX ADMIN — DEXAMETHASONE SODIUM PHOSPHATE 4 MG: 4 INJECTION, SOLUTION INTRAMUSCULAR; INTRAVENOUS at 08:03

## 2017-03-04 RX ADMIN — Medication 3 ML: at 10:03

## 2017-03-04 RX ADMIN — FAMOTIDINE 20 MG: 10 INJECTION, SOLUTION INTRAVENOUS at 08:03

## 2017-03-04 RX ADMIN — HYDROMORPHONE HYDROCHLORIDE 0.5 MG: 1 INJECTION, SOLUTION INTRAMUSCULAR; INTRAVENOUS; SUBCUTANEOUS at 01:03

## 2017-03-04 NOTE — PROGRESS NOTES
Pt arrived to unit via wheelchair. AA+O times 4. Follows commands. Rates pain as  9/10. . Denies nausea. Denies numbness and tingling. Oriented to room. Call bell in reach. Advised pt and family to call with any concerns. Will continue to monitor.

## 2017-03-04 NOTE — ED TRIAGE NOTES
Increased swelling in rt knee , lower leg is swollen   And left wrist is tight and swollen. Has been decreasing his lasix b/c it makes him urinate too frequently and  he is having pain in knees to walk.   Left hand is swollen.  Unable to sleep b/c of the pain . Taking aleve for pain . Has throat tightening  After taking Norco. Has similar  Swelling when he had a blood infection.

## 2017-03-04 NOTE — IP AVS SNAPSHOT
[facility]  [address]  [facility] [address]  Phone: [phone]           Patient Discharge Instructions     Our goal is to set you up for success. This packet includes information on your condition, medications, and your home care. It will help you to care for yourself so you don't get sicker and need to go back to the hospital.     Please ask your nurse if you have any questions.        There are many details to remember when preparing to leave the hospital. Here is what you will need to do:    1. Take your medicine. If you are prescribed medications, review your Medication List in the following pages. You may have new medications to  at the pharmacy and others that you'll need to stop taking. Review the instructions for how and when to take your medications. Talk with your doctor or nurses if you are unsure of what to do.     2. Go to your follow-up appointments. Specific follow-up information is listed in the following pages. Your may be contacted by a transition nurse or clinical provider about future appointments. Be sure we have all of the phone numbers to reach you, if needed. Please contact your provider's office if you are unable to make an appointment.     3. Watch for warning signs. Your doctor or nurse will give you detailed warning signs to watch for and when to call for assistance. These instructions may also include educational information about your condition. If you experience any of warning signs to your health, call your doctor.               [facility]  Unless otherwise directed by your provider, please contact [facility], our nurse care line that is available for 24/7 assistance.     [phone] (toll-free)    Registered nurses in the [facility] provide clinical advisement, health education, appointment booking, and other advisory services.                    ** Verify the list of medication(s) below is accurate and up  to date. Carry this with you in case of emergency. If your medications have changed, please notify your healthcare provider.             Medication List      START taking these medications        Additional Info                      albuterol 90 mcg/actuation inhaler   Quantity:  1 Inhaler   Refills:  12   Dose:  2 puff    Last time this was given:  6 puffs on 3/4/2017  8:39 PM   Instructions:  Inhale 2 puffs into the lungs every 8 (eight) hours.     Begin Date    AM    Noon    PM    Bedtime       dextrose 5 % SolP 500 mL with ceFAZolin 1 gram SolR 6 g   Refills:  0   Dose:  6 g   Indications:  Bone/Joint    Last time this was given:  6,000 mg on 3/13/2017  4:23 PM   Instructions:  Inject 6 g into the vein continuous.     Begin Date    AM    Noon    PM    Bedtime       senna 8.6 mg tablet   Commonly known as:  SENOKOT   Refills:  0   Dose:  1 tablet    Last time this was given:  8.6 mg on 3/14/2017 10:23 AM   Instructions:  Take 1 tablet by mouth daily as needed for Constipation.     Begin Date    AM    Noon    PM    Bedtime         CONTINUE taking these medications        Additional Info                      amlodipine 10 MG tablet   Commonly known as:  NORVASC   Quantity:  90 tablet   Refills:  3    Last time this was given:  10 mg on 3/14/2017 10:23 AM   Instructions:  TAKE 1 TABLET BY MOUTH ONCE DAILY     Begin Date    AM    Noon    PM    Bedtime       AVONEX IM   Refills:  0    Instructions:  Inject into the muscle.     Begin Date    AM    Noon    PM    Bedtime       baclofen 10 MG tablet   Commonly known as:  LIORESAL   Quantity:  90 tablet   Refills:  5    Last time this was given:  10 mg on 3/14/2017  5:20 AM   Instructions:  TAKE 1 TABLET BY MOUTH 3 TIMES A DAY     Begin Date    AM    Noon    PM    Bedtime       furosemide 40 MG tablet   Commonly known as:  LASIX   Quantity:  180 tablet   Refills:  3    Last time this was given:  40 mg on 3/6/2017 10:25 AM   Instructions:  TAKE 1 TABLET BY MOUTH 2 TIMES A  DAY     Begin Date    AM    Noon    PM    Bedtime       hydrocodone-acetaminophen 5-325mg 5-325 mg per tablet   Commonly known as:  NORCO   Quantity:  30 tablet   Refills:  0   Dose:  1 tablet    Instructions:  Take 1 tablet by mouth every 8 (eight) hours as needed for Pain.     Begin Date    AM    Noon    PM    Bedtime       losartan 25 MG tablet   Commonly known as:  COZAAR   Quantity:  90 tablet   Refills:  3    Last time this was given:  25 mg on 3/14/2017  9:00 AM   Instructions:  TAKE 1 TABLET BY MOUTH ONCE DAILY     Begin Date    AM    Noon    PM    Bedtime       modafinil 200 MG Tab   Commonly known as:  PROVIGIL   Quantity:  400 tablet   Refills:  3   Dose:  200 mg    Last time this was given:  200 mg on 3/14/2017 10:23 AM   Instructions:  Take 1 tablet (200 mg total) by mouth once daily.     Begin Date    AM    Noon    PM    Bedtime       sildenafil 100 MG tablet   Commonly known as:  VIAGRA   Quantity:  10 tablet   Refills:  11   Dose:  100 mg    Instructions:  Take 1 tablet (100 mg total) by mouth daily as needed for Erectile Dysfunction.     Begin Date    AM    Noon    PM    Bedtime       triamcinolone acetonide 0.1% 0.1 % cream   Commonly known as:  KENALOG   Refills:  0      Begin Date    AM    Noon    PM    Bedtime         STOP taking these medications     AVONEX 30 mcg/0.5 mL injection   Generic drug:  interferon beta-1a       VASCULERA 630 mg Tab   Generic drug:  diosmin complex no.1            Where to Get Your Medications      These medications were sent to [id number] - [place] [facility]ai - [address]  [address] [address]     Phone:  [phone]     albuterol 90 mcg/actuation inhaler         You can get these medications from any pharmacy     You don't need a prescription for these medications     senna 8.6 mg tablet         Information about where to get these medications is not yet available     ! Ask your nurse or doctor about these medications     dextrose 5 % SolP 500  mL with ceFAZolin 1 gram SolR 6 g                  Please bring to all follow up appointments:    1. A copy of your discharge instructions.  2. All medicines you are currently taking in their original bottles.  3. Identification and insurance card.    Please arrive 15 minutes ahead of scheduled appointment time.    Please call 24 hours in advance if you must reschedule your appointment and/or time.        Your Scheduled Appointments     Mar 20, 2017 10:30 AM CDT   Post OP with MARIA DOLORES Espinosa - Orthopedics ([facility] )    [address]  [place] [id number] [phone]            Mar 22, 2017  1:00 PM CDT   Established Patient Visit with TRUONG Stock, ANP   [facility] - Infectious Diseases ([facility] )    [address]  [place] [address] [phone]              Follow-up Information     Follow up with Tom Garcia MD.    Specialty:  Internal Medicine    Contact information:    [address]  [place] [address]  [phone]          Discharge Instructions     Future Orders    Activity as tolerated     Call MD for:  difficulty breathing or increased cough     Call MD for:  increased confusion or weakness     Call MD for:  persistent dizziness, light-headedness, or visual disturbances     Call MD for:  persistent nausea and vomiting or diarrhea     Call MD for:  redness, tenderness, or signs of infection (pain, swelling, redness, odor or green/yellow discharge around incision site)     Call MD for:  severe persistent headache     Call MD for:  severe uncontrolled pain     Call MD for:  temperature >100.4     Call MD for:  worsening rash     Diet general     Questions:    Total calories:      Fat restriction, if any:      Protein restriction, if any:      Na restriction, if any:      Fluid restriction:      Additional restrictions:      HOSPITAL BED FOR HOME USE     Questions:    Type:  Semi-electric    Length of need (1-99 months):  99    Does  "patient have medical equipment at home?:  wheelchair Comment - powerchair / powerchair / powerchair / powerchair    walker, rolling    shower chair    bedside commode    Height:  6' 1" (1.854 m)    Weight:  136.1 kg (300 lb)    Accessories:      Please check all that apply:  Patient requires positioning of the body in ways not feasible in an ordinary bed due to a medical condition which is expected to last at least one month.    WALKER FOR HOME USE     Comments:    Left platform rolling walker    Questions:    Type of Walker:  Adult (5'4"-6'6")    With wheels?:  Yes    Height:  6' 1" (1.854 m)    Weight:  136.1 kg (300 lb)    Length of need (1-99 months):  99    Platform attachment:      Accessories/Other:      Assistance needed:      Does patient have medical equipment at home?:  wheelchair Comment - powerchair / powerchair / powerchair / powerchair    walker, rolling    shower chair    bedside commode    Please check all that apply:  Patient's condition impairs ambulation.        Primary Diagnosis     Your primary diagnosis was:  Infection In Bloodstream      Admission Information     Date & Time Provider Department CSN    3/4/2017  7:32 AM Cira Wade MD [facility] [id number]      Care Providers     Provider Role Specialty Primary office phone    Cira Wade MD Attending Provider Hospitalist [phone]    Kunal Shipley MD Surgeon  Orthopedic Surgery [phone]    Daniel Gutierrez MD Team Attending  Hospitalist [phone]    Cira Wade MD Consulting Physician  Hospitalist  [phone]      Your Vitals Were     BP Pulse Temp Resp Height Weight    134/73 (BP Location: Left arm, Patient Position: Lying, BP Method: Automatic) 91 97.7 °F (36.5 °C) (Oral) 18 6' 1" (1.854 m) 136.1 kg (300 lb)    SpO2 BMI             95% 39.58 kg/m2         Recent Lab Values        12/22/2014                           9:40 AM           A1C 6.1                       Pending Labs     Order " Current Status    AFB Culture & Smear Preliminary result    AFB Culture & Smear Preliminary result    AFB Culture & Smear Preliminary result    AFB Culture & Smear Preliminary result    Fungus culture Preliminary result    Fungus culture Preliminary result    Fungus culture Preliminary result    Fungus culture Preliminary result      Allergies as of 3/14/2017        Reactions    Norco [Hydrocodone-acetaminophen] Anaphylaxis      Advance Directives     An advance directive is a document which, in the event you are no longer able to make decisions for yourself, tells your healthcare team what kind of treatment you do or do not want to receive, or who you would like to make those decisions for you.  If you do not currently have an advance directive, [facility] encourages you to create one.  For more information call:  [phone] ([phone]),  or log on to [email or web address].        Language Assistance Services     ATTENTION: Language assistance services are available, free of charge. Please call [phone].      ATENCIÓN: Si habla español, tiene a wang disposición servicios gratuitos de asistencia lingüística. Llame al [phone].     [facility] Ý: N?u b?n nói Ti?ng Vi?t, có các d?ch v? h? tr? ngôn ng? mi?n phí dành cho b?n. G?i s? [phone].         [facility] complies with applicable Federal civil rights laws and does not discriminate on the basis of race, color, national origin, age, disability, or sex.

## 2017-03-04 NOTE — ED PROVIDER NOTES
Encounter Date: 3/4/2017       History     Chief Complaint   Patient presents with    Joint Swelling     left wrist and right ankle pain/swelling began 2 days ago and has worsened/ denies injury---- states had x-rays on both hand and ankle this past week     Review of patient's allergies indicates:   Allergen Reactions    Norco [hydrocodone-acetaminophen] Anaphylaxis     The history is provided by the patient.      The pt is a 62 yo male who presents to the ED with complaints of left wrist swelling and pain and right ankle swelling and pain. The left wrist is more bothersome to him. This all started on February 26 when the pt was moving a heavy gate at work. He has a hx of MS (on avonex) and accordingly does not feel trauma or injuries. His PMHx is otherwise significant for HTN. The pt presented to his PCP x 2 last week with these same symptoms and had plain film imaging performed. L wrist xray with evidence of AVN, DJD, and soft tissue swelling and R ankle xray with evidence of soft tissue swelling but no fracture. PCP was preparing pt to be evaluated by orthopaedics as an outpt. The pt presents today as his left wrist pain has become worse. The L wrist is now more erythematous, swollen, tender, and his pain is a 10/10. He is currently taking aleve for pain control and discontinued norco as it caused swallowing abnormalities. Pt denies fever, chills, n/v, shortness of breath, chest pain, diarrhea, constipation.     Of note, no new trauma since last xray imaging ordered.     Past Medical History:   Diagnosis Date    Hypertension     MS (multiple sclerosis)      History reviewed. No pertinent surgical history.  Family History   Problem Relation Age of Onset    Diabetes Mother     Cancer Father     Hypertension Neg Hx      Social History   Substance Use Topics    Smoking status: Never Smoker    Smokeless tobacco: None    Alcohol use No     Review of Systems   Constitutional: Negative for activity change,  appetite change, chills, diaphoresis, fatigue and fever.   HENT: Positive for trouble swallowing. Negative for congestion, dental problem, postnasal drip, rhinorrhea, sneezing, sore throat and tinnitus.    Eyes: Negative for discharge and itching.   Respiratory: Negative for apnea, cough, choking, chest tightness, shortness of breath, wheezing and stridor.    Cardiovascular: Positive for leg swelling. Negative for chest pain and palpitations.   Gastrointestinal: Negative for abdominal distention, abdominal pain, anal bleeding, blood in stool, constipation, diarrhea, nausea and vomiting.   Endocrine: Negative for cold intolerance and heat intolerance.   Genitourinary: Negative for difficulty urinating, dysuria and flank pain.   Musculoskeletal: Positive for arthralgias, gait problem, joint swelling, myalgias and neck pain. Negative for back pain and neck stiffness.        Left wrist swelling, redness, tender to palpation, decreased ROM    Skin: Positive for color change, rash and wound. Negative for pallor.   Allergic/Immunologic: Negative for environmental allergies.   Neurological: Negative for dizziness, seizures, facial asymmetry, speech difficulty, light-headedness, numbness and headaches.   Hematological: Negative for adenopathy.   Psychiatric/Behavioral: Negative for agitation and behavioral problems.       Physical Exam   Initial Vitals   BP Pulse Resp Temp SpO2   03/04/17 0730 03/04/17 0730 03/04/17 0730 03/04/17 0730 03/04/17 0730   150/77 89 18 98.6 °F (37 °C) 98 %     Physical Exam    Nursing note and vitals reviewed.  Constitutional: He appears well-developed and well-nourished. He is not diaphoretic. No distress.   HENT:   Head: Normocephalic and atraumatic.   Right Ear: External ear normal.   Left Ear: External ear normal.   Mouth/Throat: Oropharynx is clear and moist. No oropharyngeal exudate.   Eyes: EOM are normal. Pupils are equal, round, and reactive to light. Right eye exhibits no discharge. Left  eye exhibits no discharge.   Neck: Normal range of motion. No thyromegaly present. No tracheal deviation present. No JVD present.   Cardiovascular: Normal rate, regular rhythm, normal heart sounds and intact distal pulses. Exam reveals no gallop and no friction rub.    No murmur heard.  Pulmonary/Chest: Breath sounds normal. No stridor. No respiratory distress. He has no wheezes. He has no rhonchi. He has no rales. He exhibits no tenderness.   Abdominal: Soft. Bowel sounds are normal. He exhibits no distension and no mass. There is no tenderness. There is no rebound and no guarding.   Musculoskeletal: He exhibits edema and tenderness.   Left wrist erythematous, swollen, tender to palpation,  Decreased ROM. R ankle swollen, decreased ROM.   Lymphadenopathy:     He has no cervical adenopathy.   Neurological: He is alert and oriented to person, place, and time. He displays normal reflexes. A sensory deficit is present. No cranial nerve deficit.   Decreased sensation to touch on bilateral lower extremities    Skin: Skin is warm and dry. No rash and no abscess noted. There is erythema.   Psychiatric: He has a normal mood and affect.         ED Course   Procedures  Labs Reviewed - No data to display       X-Rays:   Independently Interpreted Readings:   Other Readings:  L xray wrist 3 views   There is lunate sclerosis suggesting AVN with areas of DJD most severe at the radiocarpal joint.  There is soft tissue swelling.  3/2/17    R ankle xray  3 views of the right ankle were obtained, with AP, lateral, and oblique projections submitted.  Soft tissue swelling is identified about the ankle, particularly about the lateral malleolus.  The visualized osseous structures, however, appear intact, with no definite evidence of recent fracture or other significant abnormality identified.  3/1/17     Medical Decision Making:   Initial Assessment:   The pt is a 60 yo male who presents with left wrist and right ankle joint swelling. He  presented to PCP last week with similar symptoms and had plain films ordered. Pt presents to ED today as his symptoms have become worse and not improved significantly with aleve.   Differential Diagnosis:   Septic joint, inflammatory arthritis, degenerative joint disease, fracture ruled out on recent imaging and pt denies trauma since imaging performed.   Clinical Tests:   Lab Tests: Ordered  Radiological Study: Ordered  Medical Tests: Ordered       APC / Resident Notes:   8:37 AM  Case discussed with Dr. Florence. Ordered CBC, CMP, ESR, CRP. No new plain films ordered as plain films of joints ordered 3/1 and 3/2 and no new trauma since previous imaging. Ketorolac x 1 for pain control. Will f/u labs and pending results consult orthopaedic surgery for possible intra-articular aspirate.   Frank Bach MD     9:50 AM  ESR elevated at 118. CRP elevated at 451.1. WBC 22.5 k. Case discussed with orthopaedic consult service who will evaluate pt.   Frank Bach MD     11:25 AM  MRI upper extremity ordered per orthopaedics. Will f/u. No joint aspiration performed. Uric acid WNL. Procalcitonin 1.08.   Frank Bach MD     12:43 PM  Pt to go to OR for washout with ortho per resident. Ordered hydromorphone .5 mg IV x 1 given pain following manipulation. Case discussed with Dr. Florence.   Frank Bach MD               ED Course     Clinical Impression:   The encounter diagnosis was MS (multiple sclerosis).     possible joint/wrist infection      ATTENDING PHYSICIAN ATTESTATION  I have repeated the key portions of the resident's history and physical, reviewed and agree with the resident medical documentation, and supervised and managed the medical care of the patient.  Additionally, I was present for the critical portion of any procedure(s) performed.    Per ortho will need to go to OR    Praful Florence MD, [facility]  Department of Emergency Medicine    All systems were reviewed and were negative except as noted in the  HPI.    Medical Decision Making:    I reviewed and interpreted the ECG and any monitoring strips.  I reviewed radiology personally along with interpretations.  I reviewed and interpreted the laboratory results.    Praful Florence MD, WALKER, CPE, FACEP  Department of Emergency Medicine  , [facility]      Critical Care Time    Critical care time was provided for 35 minutes exclusive of other billable procedures and teaching time for the support of musculoskeletal/joint infection organ system where the potential for death, shock, or further decline was possible.  Critical care time can include documentation, discussion with consultants, developing a care plan, as well as direct patient care.    MD Jose Rivers MD  03/05/17 [id number]

## 2017-03-04 NOTE — ED NOTES
LOC: The patient is awake and alert; oriented x 3 and speaking appropriately.  APPEARANCE: Patient resting comfortably, patient is clean and well groomed  SKIN: warm and dry, normal skin turgor & moist mucus membranes, skin intact, no breakdown noted.swollen rt knee and left hand. Abrasion to rt great toe.   MUSCULOSKELETAL: Patient moving all extremities- decreased ROM to legs  with pain , swelling noted to legs and left hand.  RESPIRATORY: Airway is open and patent, breath sounds clear throughout all lung fields; respirations are spontaneous, normal effort and rate  CARDIAC: Patient has a normal rate, no peripheral edema noted, capillary refill < 3 seconds; No complaints of chest pain   ABDOMEN: Soft and non tender to palpation, no distention noted. Bowel sounds present x 4

## 2017-03-04 NOTE — H&P
Orthopaedic Surgery  Consult Note    Rajan Cooper III  03/04/2017    HPI:    CC: L wrist, R ankle pain    Patient is a 61 y.o. male with PMHx significant for MS on avonex presents with worsening L wrist > R ankle pain of 5 days duration.  Denies trauma or other precipitating event, h/o septic joint, h/o gout, fevers, chills, recent infection, CP, SOB, dysuria, new numbness/tingling/weakness.  Able to bear weight but painful.  Usually ambulates without assistive device at baseline.    Past Medical History:   Diagnosis Date    Hypertension     MS (multiple sclerosis)      History reviewed. No pertinent surgical history.  Family History   Problem Relation Age of Onset    Diabetes Mother     Cancer Father     Hypertension Neg Hx      Social History     Social History    Marital status:      Spouse name: N/A    Number of children: N/A    Years of education: N/A     Occupational History    Not on file.     Social History Main Topics    Smoking status: Never Smoker    Smokeless tobacco: Not on file    Alcohol use No    Drug use: No    Sexual activity: Not on file     Other Topics Concern    Not on file     Social History Narrative     No current facility-administered medications on file prior to encounter.      Current Outpatient Prescriptions on File Prior to Encounter   Medication Sig    amlodipine (NORVASC) 10 MG tablet TAKE 1 TABLET BY MOUTH ONCE DAILY    AVONEX 30 mcg/0.5 mL injection INJECT 30 MCG (1 INJECTION) INTRAMUSCULARLY ONCE WEEKLY    baclofen (LIORESAL) 10 MG tablet TAKE 1 TABLET BY MOUTH 3 TIMES A DAY    furosemide (LASIX) 40 MG tablet TAKE 1 TABLET BY MOUTH 2 TIMES A DAY    INTERFERON BETA-1A (AVONEX IM) Inject into the muscle.      losartan (COZAAR) 25 MG tablet TAKE 1 TABLET BY MOUTH ONCE DAILY    modafinil (PROVIGIL) 200 MG Tab Take 1 tablet (200 mg total) by mouth once daily.    hydrocodone-acetaminophen 5-325mg (NORCO) 5-325 mg per tablet Take 1 tablet by mouth every 8  (eight) hours as needed for Pain.    sildenafil (VIAGRA) 100 MG tablet Take 1 tablet (100 mg total) by mouth daily as needed for Erectile Dysfunction.    triamcinolone acetonide 0.1% (KENALOG) 0.1 % cream     VASCULERA 630 mg Tab Take 1 tablet by mouth once daily.       Review of Systems:    Patient denies constitutional symptoms, cardiac symptoms, respiratory symptoms, GI symptoms, psychiatric symptoms, endocrine related symptoms.  The remainder of the musculoskeletal ROS is included in the HPI.    Physical Exam:    Temp:  [97.4 °F (36.3 °C)-98.6 °F (37 °C)] 97.4 °F (36.3 °C)  Pulse:  [85-89] 85  Resp:  [18-20] 20  SpO2:  [97 %-98 %] 97 %  BP: (131-150)/(71-77) 131/71    PE:    AA&O x 4.  NAD  HEENT:  NCAT, sclera nonicteric  Lungs:  Respirations are equal and unlabored.  CV:  2+ bilateral upper and lower extremity pulses.  Skin:  Intact throughout.    MSK:  LUE: Skin intact, swelling/erythema distal forearm to fingertips, dorsal>volar, fluctuance over dorsal joint line over 3/4 dorsal compartments; TTP around wrist joint line/fluctuance; SILT M/R/U; grossly motor intact AIN/PIN/U; brisk cap refill, warm well perfused    RLE: Skin intact, BLE swelling and venous stasis skin changes; erythema and TTP around lateral ankle; No pain with passive ROM ankle; sensation out at baseline B T, sensation decreased at baseline B SP/DP; grossly motor intact SP/DP/T; brisk cap refill, warm well perfused    Diagnostic Results:  WBC 23, , , procalc 1.08    XR R ankle with soft tissue swelling, no fracture or dislocation.  XR L wrist with intercarpal and radiocarpal arthritis, lunate sclerosis    Abscess fluid: WBC 20k, 79% segs, gram stain and cultures pending    A/P:  61 y.o. male with L wrist abscess, R ankle pain  -admit to ortho  -pain control  -NPO, IVF  -hold abx  -SCDs  -f/u cultures  -currently able to range R ankle with no pain.  Given chronic BLE edema, no concern for septic R ankle at this time.  Will  continue to evaluate closely and aspirate if clinically indicated.    -Surgical and nonsurgical options discussed with patient.  Recommended surgery to eliminate source of infection.  Risks and benefits discussed.  Patient understands and wishes to proceed. To OR today for R wrist abscess I&D, R wrist arthrotomy with irrigation and debridement    PROCEDURE NOTE:  After verbal consent was obtained, patient's wrist prepped with chlorhexidine.  An 18 gauge needle was used to aspirate the abscess.  Approximately 1 mL of bloody, cloudy fluid was aspirated.  Fluid was sent to the lab for analysis.  Area was cleansed and dressed.  Patient tolerated procedure with no complications and minimal blood loss.    Attg Note:  Patient seen and examined.  I agree with the above assessment and plan.  Left dorsal wrist abscess.  H/O right ankle pain but not painful at present.  Serum WBC count 22K.  .  Afebrile.  To OR for I&D of left arm and re-evaluation of right ankle, aspiration if warranted.  The risks, benefits and alternatives to surgery were discussed with the patient at great length.  These include bleeding, infection, vessel/nerve damage, pain, numbness, tingling, complex regional pain syndrome, hardware/surgical failure, need for further surgery, continued/recurrent infection, DVT, PE, arthritis and death.  Patient states an understanding and wishes to proceed with surgery.   All questions were answered.  No guarantees were implied or stated.  Informed consent was obtained.      Kunal Shipley MD

## 2017-03-04 NOTE — ANESTHESIA PREPROCEDURE EVALUATION
03/04/2017     Pre-operative evaluation for Procedure(s) (LRB):  INCISION AND DRAINAGE (I&D) left wrist abscess, left wrist arthrotomy with irrigation and debridement, supine, hand table, hand set 1 and 2 (Left)    Rajan Cooper III is a 61 y.o. male with PMH of controlled/stable MS and moderately controlled HTN who is being evaluated for the above procedure for left wrist infection.      LDA: RAC 20G     Prev airway:  None on file     Drips:  None     Patient Active Problem List   Diagnosis    MS (multiple sclerosis)    HTN (hypertension)    Gait disorder    Muscle cramps       Review of patient's allergies indicates:   Allergen Reactions    Norco [hydrocodone-acetaminophen] Anaphylaxis        No current facility-administered medications on file prior to encounter.      Current Outpatient Prescriptions on File Prior to Encounter   Medication Sig Dispense Refill    amlodipine (NORVASC) 10 MG tablet TAKE 1 TABLET BY MOUTH ONCE DAILY 90 tablet 3    AVONEX 30 mcg/0.5 mL injection INJECT 30 MCG (1 INJECTION) INTRAMUSCULARLY ONCE WEEKLY 12 kit 11    baclofen (LIORESAL) 10 MG tablet TAKE 1 TABLET BY MOUTH 3 TIMES A DAY 90 tablet 5    furosemide (LASIX) 40 MG tablet TAKE 1 TABLET BY MOUTH 2 TIMES A  tablet 3    INTERFERON BETA-1A (AVONEX IM) Inject into the muscle.        losartan (COZAAR) 25 MG tablet TAKE 1 TABLET BY MOUTH ONCE DAILY 90 tablet 3    modafinil (PROVIGIL) 200 MG Tab Take 1 tablet (200 mg total) by mouth once daily. 400 tablet 3    hydrocodone-acetaminophen 5-325mg (NORCO) 5-325 mg per tablet Take 1 tablet by mouth every 8 (eight) hours as needed for Pain. 30 tablet 0    sildenafil (VIAGRA) 100 MG tablet Take 1 tablet (100 mg total) by mouth daily as needed for Erectile Dysfunction. 10 tablet 11    triamcinolone acetonide 0.1% (KENALOG) 0.1 % cream   0    VASCULERA 630 mg Tab  Take 1 tablet by mouth once daily.  4       History reviewed. No pertinent surgical history.    Social History     Social History    Marital status:      Spouse name: N/A    Number of children: N/A    Years of education: N/A     Occupational History    Not on file.     Social History Main Topics    Smoking status: Never Smoker    Smokeless tobacco: Not on file    Alcohol use No    Drug use: No    Sexual activity: Not on file     Other Topics Concern    Not on file     Social History Narrative         Vital Signs Range (Last 24H):  Temp:  [36.3 °C (97.4 °F)-37 °C (98.6 °F)]   Pulse:  [85-89]   Resp:  [18-20]   BP: (131-150)/(71-77)   SpO2:  [97 %-98 %]       CBC:   Recent Labs      03/04/17   0829   WBC  22.54*   RBC  4.15*   HGB  12.6*   HCT  37.0*   PLT  254   MCV  89   MCH  30.4   MCHC  34.1       CMP:   Recent Labs      03/04/17   0829   NA  143   K  3.5   CL  104   CO2  24   BUN  15   CREATININE  0.8   GLU  99   CALCIUM  9.6   ALBUMIN  2.2*   PROT  7.4   ALKPHOS  219*   ALT  44   AST  26   BILITOT  2.4*       INR  No results for input(s): INR, PROTIME, APTT in the last 72 hours.    Invalid input(s): PT        Diagnostic Studies:      EKG: none recent, last was 2012   Vent. Rate : 073 BPM     Atrial Rate : 073 BPM     P-R Int : 168 ms          QRS Dur : 084 ms      QT Int : 384 ms       P-R-T Axes : 048 038 041 degrees     QTc Int : 423 ms    Normal sinus rhythm  ST abnormality, possible digitalis effect  Abnormal ECG  No previous ECGs available  Confirmed by JOCE KNAPP MD ([id number]) on 11/30/2012 12:45:41 PM      Stress echo 2012:   Images taken at peak infusion showed left ventricular function augmenting. Left ventricular end systolic volume decreases.     No dobutamine induced wall motion abnormalities were identified.       CONCLUSIONS     1 - Normal left ventricular function (EF 65%).     2 - Mild left atrial enlargement.     3 - Diastolic dysfunction.     No evidence of stress induced  myocardial ischemia.     This document has been electronically    SIGNED BY: Asha Mosley M.D. On: 12/10/2012 15:16        [facility] Anesthesia Evaluation    I have reviewed the Patient Summary Reports.     I have reviewed the Medications.     Review of Systems  Anesthesia Hx:  No previous Anesthesia  Neg history of prior surgery. Denies Family Hx of Anesthesia complications.    Social:  Non-Smoker    Cardiovascular:   Hypertension, well controlled Denies MI.      Pulmonary:   Denies COPD.  Denies Asthma.    Renal/:   Denies Chronic Renal Disease.     Hepatic/GI:   Denies GERD.    Neurological:   Denies CVA.    Endocrine:   Denies Diabetes.        Physical Exam  General:  Obesity    Airway/Jaw/Neck:  Airway Findings: Mouth Opening: Normal Tongue: Normal  General Airway Assessment: Adult  Mallampati: II  Improves to II with phonation.  TM Distance: Normal, at least 6 cm  Jaw/Neck Findings:  Neck ROM: Extension Decreased, Mod.      Dental:  Dental Findings: In tact   Chest/Lungs:  Chest/Lungs Findings: Clear to auscultation, Normal Respiratory Rate     Heart/Vascular:  Heart Findings: Rate: Normal  Rhythm: Regular Rhythm  Sounds: Normal             Anesthesia Plan  Type of Anesthesia, risks & benefits discussed:  Anesthesia Type:  MAC, regional, general  Patient's Preference:   Intra-op Monitoring Plan: standard ASA monitors  Intra-op Monitoring Plan Comments:   Post Op Pain Control Plan:   Post Op Pain Control Plan Comments:   Induction:   IV  Beta Blocker:  Patient is not currently on a Beta-Blocker (No further documentation required).       Informed Consent: Patient understands risks and agrees with Anesthesia plan.  Questions answered. Anesthesia consent signed with patient.  ASA Score: 2     Day of Surgery Review of History & Physical:    H&P update referred to the surgeon.         Ready For Surgery From Anesthesia Perspective.

## 2017-03-05 ENCOUNTER — PATIENT MESSAGE (OUTPATIENT)
Dept: INTERNAL MEDICINE | Facility: CLINIC | Age: 62
End: 2017-03-05

## 2017-03-05 PROBLEM — M00.9 SEPTIC ARTHRITIS OF WRIST, LEFT: Status: ACTIVE | Noted: 2017-03-05

## 2017-03-05 LAB
ANION GAP SERPL CALC-SCNC: 13 MMOL/L
BUN SERPL-MCNC: 16 MG/DL
CALCIUM SERPL-MCNC: 9.2 MG/DL
CHLORIDE SERPL-SCNC: 105 MMOL/L
CO2 SERPL-SCNC: 25 MMOL/L
CREAT SERPL-MCNC: 0.8 MG/DL
EST. GFR  (AFRICAN AMERICAN): >60 ML/MIN/1.73 M^2
EST. GFR  (NON AFRICAN AMERICAN): >60 ML/MIN/1.73 M^2
GLUCOSE SERPL-MCNC: 86 MG/DL
GRAM STN SPEC: NORMAL
POTASSIUM SERPL-SCNC: 3.9 MMOL/L
SODIUM SERPL-SCNC: 143 MMOL/L

## 2017-03-05 PROCEDURE — 63600175 PHARM REV CODE 636 W HCPCS: Performed by: STUDENT IN AN ORGANIZED HEALTH CARE EDUCATION/TRAINING PROGRAM

## 2017-03-05 PROCEDURE — G8979 MOBILITY GOAL STATUS: HCPCS | Mod: CJ

## 2017-03-05 PROCEDURE — G8978 MOBILITY CURRENT STATUS: HCPCS | Mod: CK

## 2017-03-05 PROCEDURE — 97116 GAIT TRAINING THERAPY: CPT

## 2017-03-05 PROCEDURE — 25000003 PHARM REV CODE 250: Performed by: STUDENT IN AN ORGANIZED HEALTH CARE EDUCATION/TRAINING PROGRAM

## 2017-03-05 PROCEDURE — 80048 BASIC METABOLIC PNL TOTAL CA: CPT

## 2017-03-05 PROCEDURE — 97162 PT EVAL MOD COMPLEX 30 MIN: CPT

## 2017-03-05 PROCEDURE — G0378 HOSPITAL OBSERVATION PER HR: HCPCS

## 2017-03-05 PROCEDURE — 97530 THERAPEUTIC ACTIVITIES: CPT

## 2017-03-05 PROCEDURE — 87040 BLOOD CULTURE FOR BACTERIA: CPT | Mod: 59

## 2017-03-05 PROCEDURE — 11000001 HC ACUTE MED/SURG PRIVATE ROOM

## 2017-03-05 PROCEDURE — 36415 COLL VENOUS BLD VENIPUNCTURE: CPT

## 2017-03-05 PROCEDURE — 99254 IP/OBS CNSLTJ NEW/EST MOD 60: CPT | Mod: ,,, | Performed by: INTERNAL MEDICINE

## 2017-03-05 PROCEDURE — 97110 THERAPEUTIC EXERCISES: CPT

## 2017-03-05 RX ORDER — FUROSEMIDE 40 MG/1
40 TABLET ORAL 2 TIMES DAILY
Status: DISCONTINUED | OUTPATIENT
Start: 2017-03-05 | End: 2017-03-05

## 2017-03-05 RX ORDER — LOSARTAN POTASSIUM 25 MG/1
25 TABLET ORAL DAILY
Status: DISCONTINUED | OUTPATIENT
Start: 2017-03-05 | End: 2017-03-14 | Stop reason: HOSPADM

## 2017-03-05 RX ORDER — FUROSEMIDE 40 MG/1
40 TABLET ORAL 2 TIMES DAILY
Status: DISCONTINUED | OUTPATIENT
Start: 2017-03-05 | End: 2017-03-06

## 2017-03-05 RX ORDER — AMLODIPINE BESYLATE 10 MG/1
10 TABLET ORAL DAILY
Status: DISCONTINUED | OUTPATIENT
Start: 2017-03-05 | End: 2017-03-14 | Stop reason: HOSPADM

## 2017-03-05 RX ADMIN — Medication 3 ML: at 12:03

## 2017-03-05 RX ADMIN — OXYCODONE HYDROCHLORIDE 15 MG: 5 TABLET ORAL at 02:03

## 2017-03-05 RX ADMIN — CEFTRIAXONE 1 G: 1 INJECTION, SOLUTION INTRAVENOUS at 08:03

## 2017-03-05 RX ADMIN — VANCOMYCIN HYDROCHLORIDE 1000 MG: 1 INJECTION, POWDER, LYOPHILIZED, FOR SOLUTION INTRAVENOUS at 09:03

## 2017-03-05 RX ADMIN — Medication 3 ML: at 06:03

## 2017-03-05 RX ADMIN — VANCOMYCIN HYDROCHLORIDE 1000 MG: 1 INJECTION, POWDER, LYOPHILIZED, FOR SOLUTION INTRAVENOUS at 10:03

## 2017-03-05 RX ADMIN — AMLODIPINE BESYLATE 10 MG: 10 TABLET ORAL at 12:03

## 2017-03-05 RX ADMIN — Medication 3 ML: at 10:03

## 2017-03-05 RX ADMIN — OXYCODONE HYDROCHLORIDE 15 MG: 5 TABLET ORAL at 09:03

## 2017-03-05 RX ADMIN — LOSARTAN POTASSIUM 25 MG: 25 TABLET, FILM COATED ORAL at 12:03

## 2017-03-05 RX ADMIN — OXYCODONE HYDROCHLORIDE 15 MG: 5 TABLET ORAL at 05:03

## 2017-03-05 NOTE — PT/OT/SLP EVAL
Physical Therapy  Evaluation    Rajan Cooper III   MRN: [id number]   Admitting Diagnosis: multiple sclerosis exacerbation    PT Received On: 17  PT Start Time: 1520     PT Stop Time: 1558    PT Total Time (min): 38 min       Billable Minutes:  Evaluation 10, Gait [id number] and Therapeutic Activity 18, Therapeutic Exercise 10    Diagnosis: multiple sclerosis exacerbation      Past Medical History:   Diagnosis Date    Hypertension     MS (multiple sclerosis)       History reviewed. No pertinent surgical history.    Referring physician: Antoni  Date referred to PT: 3/5/2017      General Precautions: Standard, fall  Orthopedic Precautions: LUE non weight bearing   Braces: N/A       Do you have any cultural, spiritual, Hoahaoism conflicts, given your current situation?: none    Patient History:  Lives With: spouse  Living Arrangements: house  Home Accessibility: stairs to enter home  Home Layout: Able to live on 1st floor  Number of Stairs to Enter Home: 1  Stair Railings at Home: none  Transportation Available: family or friend will provide  Living Environment Comment: Pt was (I) PTA using no DME. Pt has a tub/shower combo with a seat. Pt also owns a rollator and SW. Pt has assistance from wife  upon d/c.  Equipment Currently Used at Home: none  DME owned (not currently used):     Previous Level of Function:  Ambulation Skills: independent  Transfer Skills: independent  ADL Skills: independent  Work/Leisure Activity: independent    Subjective:  Communicated with nsg prior to session.  -Pt agreeable to PT session  Chief Complaint: impaired functional mobility  Patient goals: return home to [facility]    Pain Ratin/10   Location - Side: Bilateral  Location - Orientation: generalized  Location: leg     Pain Rating Post-Intervention: 9/10    Objective:   Patient found with:  (no lines)     Cognitive Exam:  Oriented to: Person, Place, Time and Situation    Follows Commands/attention: Follows multistep   commands  Communication: clear/fluent  Safety awareness/insight to disability: intact    Physical Exam:  Postural examination/scapula alignment: Rounded shoulder    Skin integrity: Visible skin intact  Edema: Moderate (B) LE    Sensation:   Impaired  light/touch (B) LE    Lower Extremity Range of Motion:  Right Lower Extremity: WFL  Left Lower Extremity: WFL    Lower Extremity Strength:  Right Lower Extremity: Deficits: 3+/5 grossly  Left Lower Extremity: Deficits: 3+/5 grossly      Functional Mobility:  Bed Mobility:  Scooting/Bridging: Contact Guard Assistance  Supine to Sit: Minimum Assistance    Transfers:  Sit <> Stand Assistance: Minimum Assistance x3 trials from EOB  Sit <> Stand Assistive Device: Hemiwalker    -Pt stood for ~2 minutes on each trial prior to performing ambulation. Pt practiced shifting weight and moving (B) LE to get COG over [place].    Gait:   Gait Distance: 2 steps fwd/back x2 trials, 3 steps fwd/back x1 trial  Assistance 1: Moderate assistance  Gait Assistive Device: Lucas Walker  Gait Pattern: 3-point gait  Gait Deviation(s): decreased zahra, increased time in double stance, decreased velocity of limb motion, decreased step length, decreased stride length, decreased toe-to-floor clearance, decreased weight-shifting ability, foot flat, decreased swing-to-stance ratio     -Pt given frequent cueing for gait sequence when using lucas-walker. Pt given tactile cueing to advance (L) LE during ambulation and to maintain upright posture and not look down at feet.     Balance:   Static Sit: FAIR+: Able to take MINIMAL challenges from all directions  Dynamic Sit: FAIR+: Maintains balance through MINIMAL excursions of active trunk motion  Static Stand: FAIR+: Takes MINIMAL challenges from all directions  Dynamic stand: POOR: N/A    Therapeutic Activities and Exercises:  -Pt performed sitting exercises x20 reps of:  RYANNE KLINE    -Pt educated on:  A. PT POC and role of PT  B. Importance of OOB  activity to improve functional outcomes  C. NWB status of (L) UE  D. DME mgmt  E. Gait sequence    AM-PAC 6 CLICK MOBILITY  How much help from another person does this patient currently need?   1 = Unable, Total/Dependent Assistance  2 = A lot, Maximum/Moderate Assistance  3 = A little, Minimum/Contact Guard/Supervision  4 = None, Modified [place]/Independent    Turning over in bed (including adjusting bedclothes, sheets and blankets)?: 3  Sitting down on and standing up from a chair with arms (e.g., wheelchair, bedside commode, etc.): 3  Moving from lying on back to sitting on the side of the bed?: 3  Moving to and from a bed to a chair (including a wheelchair)?: 3  Need to walk in hospital room?: 2  Climbing 3-5 steps with a railing?: 1  Total Score: 15     AM-PAC Raw Score CMS G-Code Modifier Level of Impairment Assistance   6 % Total / Unable   7 - 9 CM 80 - 100% Maximal Assist   10 - 14 CL 60 - 80% Moderate Assist   15 - 19 CK 40 - 60% Moderate Assist   20 - 22 CJ 20 - 40% Minimal Assist   23 CI 1-20% SBA / CGA   24 CH 0% Independent/ Mod I     Patient left sitting EOB with all lines intact, call button in reach and spouse present.    Assessment:   Rajan Cooper III is a 61 y.o. male with a medical diagnosis of multiple sclerosis exacerbation and presents with impaired functional mobility. Pt tolerated evaluation well today but was limited by pain and fatigue. Pt able to perform sit<>stand with min (A) today. Pt ambulated x3 trials today with assistance from PT for sequencing and (L) foot advancement. Pt with instability during ambulation which limited his distance. Pt will cont to benefit from skilled therapy services and will likely be appropriate for Rehab placement 2/2 increased assistance required for mobility at this time. Pt may be HHPT appropriate pending further mobility progression.    Rehab identified problem list/impairments: Rehab identified problem list/impairments: weakness, impaired  endurance, impaired self care skills, impaired functional mobilty, impaired sensation, gait instability, impaired balance, pain, decreased coordination, decreased safety awareness, decreased lower extremity function, decreased ROM, decreased upper extremity function, impaired joint extensibility, orthopedic precautions    Rehab potential is good.    Activity tolerance: Good    Discharge recommendations: Discharge Facility/Level Of Care Needs: IP Rehab    Barriers to discharge:  none    Equipment recommendations: Equipment Needed After Discharge:  (TBD pending further mobility, possibly hemiwalker)     GOALS:   Physical Therapy Goals        Problem: Physical Therapy Goal    Goal Priority Disciplines Outcome Goal Variances Interventions   Physical Therapy Goal     PT/OT, PT Ongoing (interventions implemented as appropriate)     Description:  Goals to be met by: 3/12/17     Patient will increase functional independence with mobility by performin. Supine to sit with Set-up [place]  2. Sit to supine with Set-up [place]  3. Sit to stand transfer with CGA using appropriate AD  4. Bed to chair transfer with Minimal Assistance using appropriate AD  5. Gait  x 20 feet with Minimal Assistance using appropriate AD.   6. Lower extremity exercise program x20-30 reps per handout, with independence                PLAN:    Patient to be seen daily to address the above listed problems via gait training, therapeutic activities, therapeutic exercises, neuromuscular re-education  Plan of Care expires: 17  Plan of Care reviewed with: patient          Sagar Mcleod, PT  2017

## 2017-03-05 NOTE — PROGRESS NOTES
In route to surgery at present. Patient was asleep at this time, but easily arousable. No complaints of discomfort at present. Wife at bedside. Questions encouraged. In route per stretcher. No distress noted at present.

## 2017-03-05 NOTE — TRANSFER OF CARE
"Anesthesia Transfer of Care Note    Patient: Rajan Cooper III    Procedure(s) Performed: Procedure(s) (LRB):  INCISION AND DRAINAGE (I&D) left wrist abscess, left wrist arthrotomy with irrigation and debridement, supine, hand table, hand set 1 and 2 (Left)    Patient location: PACU    Anesthesia Type: general    Transport from OR: Transported from OR on 6-10 L/min O2 by face mask with adequate spontaneous ventilation    Post pain: adequate analgesia    Post assessment: no apparent anesthetic complications and tolerated procedure well    Post vital signs: stable    Level of consciousness: responds to stimulation    Nausea/Vomiting: no nausea/vomiting    Complications: none          Last vitals:   Visit Vitals    BP (!) 128/1 (BP Location: Right arm, Patient Position: Lying, BP Method: Automatic)    Pulse 87    Temp 35.8 °C (96.4 °F) (Axillary)    Resp 14    Ht 6' 1" (1.854 m)    Wt 136.1 kg (300 lb)    SpO2 96%    BMI 39.58 kg/m2     "

## 2017-03-05 NOTE — PROGRESS NOTES
Called Dr. Lux to come and assess pt.  Pt has been very drowsy and confused since entering PACU.  MD to come to bedside.  Will continue to monitor pt closely.

## 2017-03-05 NOTE — ASSESSMENT & PLAN NOTE
61 year old male with history of multiple sclerosis admitted with left septic wrist and right ankle pain now s/p left wrist washout and debridement and ankle arthrocentesis on 3/4/17.   - Blood cx NGTD  - wrist aspiration revealed 20K WBCs, 79% segs, cultures pending  - Intra-op cultures pending, gram stain + GPCs  - Patient started on Vancomycin and Ceftriaxone post-operatively  - , .1  - Patient afebrile with a leukocytosis (22.5), VSS     Plan  - Continue empiric Vancomycin and Ceftriaxone  - Vanc trough before 4th dose  - Recommend obtaining MRI of right ankle as it is very swollen, hot and tender  - Will make final recommendations pending culture results   - Anticipate minimum 4-6 weeks of IV abx therapy  - Patient seen and discussed with staff. ID will follow.

## 2017-03-05 NOTE — BRIEF OP NOTE
Preop Dx: Left dorsal wrist abscess with possible septic wrist arthritis    Right ankle pain    Postop Dx: Left dorsal wrist abscess with possible septic wrist arthritis    Right ankle pain    Procedure: 1.  Arthrocentesis right ankle    2.  Incision, drainage and irrigation of left dorsal wrist abscess    3.  Left wrist arthrotomy with irrigation    Surgeon: Kunal Shipley M.D.    Asst:  Bernardo Glasgow M.D, William Corrales MD    Anesthesia: GLMA    EBL:  10cc    IVF:  500cc crystalloid    Specimens: Cultures right ankle    Cultures left dorsal forearm    Findings: Pus in dorsal wrist.  Inflamed dorsal wrist capsule    Dispo:  To PACU awake/stable    Dict#  [id number]

## 2017-03-05 NOTE — CONSULTS
[facility]  Infectious Disease  Consult Note    Patient Name: Rajan Cooper III  MRN: [id number]  Admission Date: 3/4/2017  Hospital Length of Stay: 0 days  Attending Physician: No att. providers found  Primary Care Provider: Tom Garcia MD     Isolation Status: No active isolations    Patient information was obtained from patient and past medical records.      Inpatient consult to Infectious Diseases  Consult performed by: LARISSA PATTON  Consult ordered by: KARRI SIERRA        Assessment/Plan:     Septic arthritis of wrist, left  61 year old male with history of multiple sclerosis admitted with left septic wrist and right ankle pain now s/p left wrist washout and debridement and ankle arthrocentesis on 3/4/17.   - Blood cx NGTD  - wrist aspiration revealed 20K WBCs, 79% segs, cultures pending  - Intra-op cultures pending, gram stain + GPCs  - Patient started on Vancomycin and Ceftriaxone post-operatively  - , .1  - Patient afebrile with a leukocytosis (22.5), VSS     Plan  - Continue empiric Vancomycin and Ceftriaxone  - Vanc trough before 4th dose  - Recommend obtaining MRI of right ankle as it is very swollen, hot and tender  - Will make final recommendations pending culture results   - Anticipate minimum 4-6 weeks of IV abx therapy  - Patient seen and discussed with staff. ID will follow.     Thank you for the consult. Please call for any questions.  Larissa Patton PA-C  Phone: [phone]  Pager: [phone]    Subjective:     HPI: Mr. Cooper is a pleasant 61 y.o. male with a PMHx of MS on avonex presenting with worsening left wrist and right ankle pain and swelling of 5 days duration. Patient reports he was moving a heavy gate a work late February and twisted his ankle. He was evaluated by his PCP and left wrist xray revealed  evidence of AVN, DJD, and soft tissue swelling and right ankle xray showed evidence of soft tissue swelling but no fracture. He was suppose to  follow up in orthopedic clinic but came to the ED as his pain acutely worsened. Patient denies break in his skin. Denies fevers, chills, sweats, recent infection, CP, SOB, N/V, diarrhea. Labs upon admit were significant for ESR elevated at 118, CRP elevated at 451.1 and a leukocytosis of 22.5k. Orthopedic surgery performed an aspiration of his left wrist  Revealing 20K WBCs and 79% segs. He is now s/p left wrist washout and debridement on 3/4 along with ankle arthrocentesis. Cultures are pending. GS + GPCs. He was started on empiric Vanc/Ceftriaxone post operatively.     Left wrist x-ray: There is lunate sclerosis suggesting AVN with areas of DJD most severe at the radiocarpal joint. There is soft tissue swelling.     Right ankle x-ray: Soft tissue swelling is identified about the ankle, particularly about the lateral malleolus.  The visualized osseous structures, however, appear intact, with no definite evidence of recent fracture or other significant abnormality identified.         Past Medical History:   Diagnosis Date    Hypertension     MS (multiple sclerosis)        History reviewed. No pertinent surgical history.    Review of patient's allergies indicates:   Allergen Reactions    Norco [hydrocodone-acetaminophen] Anaphylaxis       Medications:  Prescriptions Prior to Admission   Medication Sig    amlodipine (NORVASC) 10 MG tablet TAKE 1 TABLET BY MOUTH ONCE DAILY    AVONEX 30 mcg/0.5 mL injection INJECT 30 MCG (1 INJECTION) INTRAMUSCULARLY ONCE WEEKLY    baclofen (LIORESAL) 10 MG tablet TAKE 1 TABLET BY MOUTH 3 TIMES A DAY    furosemide (LASIX) 40 MG tablet TAKE 1 TABLET BY MOUTH 2 TIMES A DAY    INTERFERON BETA-1A (AVONEX IM) Inject into the muscle.      losartan (COZAAR) 25 MG tablet TAKE 1 TABLET BY MOUTH ONCE DAILY    modafinil (PROVIGIL) 200 MG Tab Take 1 tablet (200 mg total) by mouth once daily.    hydrocodone-acetaminophen 5-325mg (NORCO) 5-325 mg per tablet Take 1 tablet by mouth every 8  (eight) hours as needed for Pain.    sildenafil (VIAGRA) 100 MG tablet Take 1 tablet (100 mg total) by mouth daily as needed for Erectile Dysfunction.    triamcinolone acetonide 0.1% (KENALOG) 0.1 % cream     VASCULERA 630 mg Tab Take 1 tablet by mouth once daily.     Antibiotics     Start     Stop Route Frequency Ordered    03/04/17 2230  vancomycin 1 g in dextrose 5 % 250 mL IVPB (ready to mix system)  (Vancomycin IVPB with levels panel)      -- IV Every 12 hours (non-standard times) 03/04/17 2105 03/05/17 0800  cefTRIAXone (ROCEPHIN) 1 g in dextrose 5 % 50 mL IVPB      -- IV Every 12 hours (non-standard times) 03/04/17 2105        Antifungals     None        Antivirals     None             There is no immunization history on file for this patient.    Family History     Problem Relation (Age of Onset)    Cancer Father    Diabetes Mother        Social History     Social History    Marital status:      Spouse name: N/A    Number of children: N/A    Years of education: N/A     Social History Main Topics    Smoking status: Never Smoker    Smokeless tobacco: None    Alcohol use No    Drug use: No    Sexual activity: Not Asked     Other Topics Concern    None     Social History Narrative     Review of Systems   Constitutional: Negative for chills, diaphoresis, fatigue and fever.   HENT: Negative for congestion, mouth sores, rhinorrhea and sore throat.    Eyes: Negative for pain, discharge and visual disturbance.   Respiratory: Negative for cough, chest tightness, shortness of breath and wheezing.    Cardiovascular: Negative for chest pain, palpitations and leg swelling.   Gastrointestinal: Negative for abdominal distention, abdominal pain, constipation, diarrhea, nausea and vomiting.   Genitourinary: Negative for difficulty urinating, dysuria, flank pain, frequency and hematuria.   Musculoskeletal: Positive for back pain. Negative for arthralgias, joint swelling, myalgias and neck pain.        Left  wrist pain, swelling, redness  Right ankle pain, redness, erythema   Skin: Negative for color change, pallor, rash and wound.   Neurological: Negative for dizziness, seizures, weakness, numbness and headaches.   Hematological: Negative for adenopathy. Does not bruise/bleed easily.   Psychiatric/Behavioral: Negative for confusion and sleep disturbance. The patient is not nervous/anxious.      Objective:     Vital Signs (Most Recent):  Temp: 98.1 °F (36.7 °C) (03/05/17 0730)  Pulse: 86 (03/05/17 0730)  Resp: 15 (03/05/17 0730)  BP: (!) 143/75 (03/05/17 0730)  SpO2: (!) 92 % (03/05/17 0730) Vital Signs (24h Range):  Temp:  [96.2 °F (35.7 °C)-98.1 °F (36.7 °C)] 98.1 °F (36.7 °C)  Pulse:  [69-95] 86  Resp:  [10-20] 15  SpO2:  [92 %-100 %] 92 %  BP: (105-168)/(1-82) 143/75     Weight: 136.1 kg (300 lb)  Body mass index is 39.58 kg/(m^2).    Estimated Creatinine Clearance: 142.2 mL/min (based on Cr of 0.8).    Physical Exam   Constitutional: He is oriented to person, place, and time. He appears well-developed and well-nourished. No distress.   HENT:   Head: Normocephalic and atraumatic.   Mouth/Throat: No oropharyngeal exudate.   Eyes: Conjunctivae and EOM are normal. Pupils are equal, round, and reactive to light. No scleral icterus.   Neck: Normal range of motion. Neck supple.   Cardiovascular: Normal rate, regular rhythm, normal heart sounds and intact distal pulses.    No murmur heard.  Pulmonary/Chest: Effort normal and breath sounds normal. No respiratory distress. He has no wheezes.   Abdominal: Soft. Bowel sounds are normal. He exhibits no distension. There is no tenderness. There is no guarding.   Musculoskeletal: Normal range of motion. He exhibits no deformity.   Left wrist edematous, erythematous and TTP. Incinsion dressed. Dressing c/d/i. Drain with serosanguinous output.     Lymphadenopathy:     He has no cervical adenopathy.   Neurological: He is alert and oriented to person, place, and time. No cranial  nerve deficit.   Skin: Skin is warm and dry. No rash noted. He is not diaphoretic.   Right ankle edema, erythema and tenderness. Decreased ROM.   Chronic venous stasis changes to bilateral lower extremities.    Psychiatric: He has a normal mood and affect. His behavior is normal. Judgment and thought content normal.   Vitals reviewed.      Significant Labs:   Blood Culture:   Recent Labs  Lab 03/05/17  0107   LABBLOO No Growth to date  No Growth to date     CBC:   Recent Labs  Lab 03/04/17  0829   WBC 22.54*   HGB 12.6*   HCT 37.0*        CMP:   Recent Labs  Lab 03/04/17  0829 03/05/17  0039    143   K 3.5 3.9    105   CO2 24 25   GLU 99 86   BUN 15 16   CREATININE 0.8 0.8   CALCIUM 9.6 9.2   PROT 7.4  --    ALBUMIN 2.2*  --    BILITOT 2.4*  --    ALKPHOS 219*  --    AST 26  --    ALT 44  --    ANIONGAP 15 13   EGFRNONAA >60.0 >60.0     Procalcitonin:   Recent Labs  Lab 03/04/17  0829   PROCAL 1.08*     Urine Culture: No results for input(s): LABURIN in the last 4320 hours.  Urine Studies:   Recent Labs  Lab 03/04/17  1548   COLORU Lizbeth   APPEARANCEUA Hazy*   PHUR 7.0   SPECGRAV 1.020   PROTEINUA 2+*   GLUCUA Negative   KETONESU 2+*   BILIRUBINUA Trace   OCCULTUA 3+*   NITRITE Positive*   UROBILINOGEN >=8.0*   LEUKOCYTESUR 1+*   RBCUA >100*   WBCUA 31*   BACTERIA Occasional   HYALINECASTS 0       Significant Imaging: I have reviewed all pertinent imaging results/findings within the past 24 hours.

## 2017-03-05 NOTE — ANESTHESIA RELEASE NOTE
"Anesthesia Release from PACU Note    Patient: Rajna Cooper III    Procedure(s) Performed: Procedure(s) (LRB):  INCISION AND DRAINAGE (I&D) left wrist abscess, left wrist arthrotomy with irrigation and debridement, supine, hand table, hand set 1 and 2 (Left)    Anesthesia type: general    Post pain: Adequate analgesia    Post assessment: no apparent anesthetic complications    Last Vitals:   Visit Vitals    /69    Pulse 74    Temp 36.1 °C (96.9 °F) (Axillary)    Resp 20    Ht 6' 1" (1.854 m)    Wt 136.1 kg (300 lb)    SpO2 95%    BMI 39.58 kg/m2       Post vital signs: stable    Level of consciousness: awake    Nausea/Vomiting: no nausea/no vomiting    Complications: none    Airway Patency: patent    Respiratory: unassisted    Cardiovascular: stable and blood pressure at baseline    Hydration: euvolemic  "

## 2017-03-05 NOTE — PROGRESS NOTES
Patient arrived via stretcher to floor from recovery. Patient is awake, alert, and oriented. Wife at bedside. Skin is warm and dry. Pulses are present in all extremities. Neurovascularly intact. Some crackles noted in lung fields. Ace wrap noted to hand dry, clean, and intact. Abdomen is large, round, with hypoactive bowel sounds. No immediate complaints of discomfort. Will continue to monitor.

## 2017-03-05 NOTE — ANESTHESIA POSTPROCEDURE EVALUATION
"Anesthesia Post Evaluation    Patient: Rajan Cooper III    Procedure(s) Performed: Procedure(s) (LRB):  INCISION AND DRAINAGE (I&D) left wrist abscess, left wrist arthrotomy with irrigation and debridement, supine, hand table, hand set 1 and 2 (Left)    Final Anesthesia Type: general  Patient location during evaluation: PACU  Patient participation: Yes- Able to Participate  Level of consciousness: confused and awake (Pt is oriented to self and time only, looks confused , VSS, ? baseline, could be drug related, infection, post op cognitive decline.)  Post-procedure vital signs: reviewed and stable  Pain management: adequate  Airway patency: patent  PONV status at discharge: No PONV  Anesthetic complications: no      Cardiovascular status: blood pressure returned to baseline  Respiratory status: unassisted  Hydration status: euvolemic          Visit Vitals    /69    Pulse 74    Temp 36.1 °C (96.9 °F) (Axillary)    Resp 20    Ht 6' 1" (1.854 m)    Wt 136.1 kg (300 lb)    SpO2 95%    BMI 39.58 kg/m2       Pain/Selene Score: Pain Assessment Performed: Yes (3/5/2017 12:00 AM)  Presence of Pain: non-verbal indicators absent (3/5/2017 12:00 AM)  Pain Rating Prior to Med Admin: 0 (3/5/2017 12:00 AM)  Selene Score: 9 (3/5/2017 12:00 AM)      "

## 2017-03-05 NOTE — PROGRESS NOTES
ORTHO PROGRESS NOTE:    Rajan Cooper III is a 61 y.o. male admitted on 3/4/2017  Hospital Day: 0  Post Op Day: 1 Day Post-Op      The patient was seen and examined this morning at the bedside. No acute issues overnight and adequate control of pain on current regimen.     PHYSICAL EXAM:  Awake/alert/oriented x 3  No acute distress  AFVSS  Good inspiratory effort with unlabored breathing  Dressings c/d/i  NVI distally    Vitals:    03/05/17 0000 03/05/17 0015 03/05/17 0230 03/05/17 0430   BP: 106/68 129/69 (!) 157/82 130/70   BP Location:    Right arm   Patient Position:    Lying   BP Method:    Automatic   Pulse: 74 74 95 86   Resp: 20 20 16 16   Temp:   97.1 °F (36.2 °C)    TempSrc:   Oral    SpO2: 95% 95% 95% 96%   Weight:       Height:         I/O last 3 completed shifts:  In: 970 [P.O.:320; I.V.:650]  Out: -     Recent Labs  Lab 03/04/17  0829 03/05/17  0039   CALCIUM 9.6 9.2   PROT 7.4  --     143   K 3.5 3.9   CO2 24 25    105   BUN 15 16   CREATININE 0.8 0.8       Recent Labs  Lab 03/04/17  0829   WBC 22.54*   RBC 4.15*   HGB 12.6*   HCT 37.0*          Recent Labs  Lab 03/04/17  1408   INR 1.1   APTT 26.8     GS with GPCs, cultures pending    A/P: 61 y.o. male 1 Day Post-Op s/p L wrist arthrotomy with irrigation and synovectomy, I&D abscess  -- Pain control  -- PT/OT: RUDDY CHO  -- DVT prophylaxis: SCDs  -- Antibiotics: vanc and rocephin; f/u ID recs and cultures    -- Dispo: f/u ID recs    Attg Note:  I agree with the above assessment and plan.    Kunal Shipley MD

## 2017-03-05 NOTE — PLAN OF CARE
Problem: Physical Therapy Goal  Goal: Physical Therapy Goal  Goals to be met by: 3/12/17     Patient will increase functional independence with mobility by performin. Supine to sit with Set-up Proctor  2. Sit to supine with Set-up Proctor  3. Sit to stand transfer with CGA using appropriate AD  4. Bed to chair transfer with Minimal Assistance using appropriate AD  5. Gait x 20 feet with Minimal Assistance using appropriate AD.   6. Lower extremity exercise program x20-30 reps per handout, with independence  Outcome: Ongoing (interventions implemented as appropriate)  Pt tolerated evaluation well today but was limited by pain and fatigue. Pt able to perform sit<>stand with min (A) today. Pt ambulated x3 trials today with assistance from PT for sequencing and (L) foot advancement. Pt with instability during ambulation which limited his distance. Pt will cont to benefit from skilled therapy services and will likely be appropriate for SNF placement 2/2 increased assistance required for mobility at this time. Pt may be HHPT appropriate pending further mobility progression.

## 2017-03-05 NOTE — OP NOTE
DATE OF PROCEDURE:  03/04/2017    PREOPERATIVE DIAGNOSES:  1.  Left dorsal wrist abscess with possible septic wrist arthritis.  2.  Right ankle pain.    POSTOPERATIVE DIAGNOSES:  1.  Left dorsal wrist abscess with possible septic wrist arthritis.  2.  Right ankle pain.    PROCEDURE PERFORMED:  1.  Arthrocentesis, right ankle.  2.  Incision, drainage and irrigation of left dorsal wrist abscess.  3.  Left wrist arthrotomy with irrigation.    SURGEON:  Kunal Shipley M.D.    ASSISTANTS:  Bernardo Glasgow M.D. (RES) and William Palumbo M.D. (RES).    ANESTHESIA:  General laryngeal mask.    ESTIMATED BLOOD LOSS:  10 mL.    IV FLUIDS:  500 mL crystalloid.    SPECIMENS:  Cultures, right ankle; and cultures, left dorsal wrist.    INDICATIONS FOR PROCEDURE:  The patient is a 61-year-old male who presented with   left wrist pain with swelling dorsally, as well as a history of right ankle   pain which was subsiding.  The patient also had a serum white blood cell count   of 22,000 and a CRP of 425.  The patient agreed and was brought to the Operating   Room for incision and drainage of his dorsal left wrist with possible   arthrotomy, as well as arthrocentesis and, if necessary, arthrotomy of his right   ankle.  The risks, benefits and alternatives of surgery were discussed with the   patient prior to going to the Operating Room.  Informed consent was obtained.    PROCEDURE IN DETAIL:  The patient was identified in the preoperative holding   area and the site was marked.  The patient was wheeled into the Operating Room   and placed on operating table in supine position.  General laryngeal mask   anesthesia was induced and timeout was undertaken to confirm the patient, site,   surgery and surgeon.  All agreed and we proceeded.    The right ankle was prepped sterile and needle was introduced into the joint,   returning a very little fluid without any purulence.  We had enough to send and   we sent this for culture.  There was not  enough for cell count, but my suspicion   for septic arthritis is extremely low at this point.    At this point, the left upper extremity was prepped and draped in sterile   fashion.  The patient had swelling of the dorsal aspect of the left wrist, right   around the area of the third and fourth dorsal compartments.  We incised over   the top of this and immediately encountered purulence.  This was milked from   within this incision.  The patient also had some significant dorsal hand   swelling, and we made a counterincision further up on the hand.  We did not like   pus from this area, but we were able to get a lot of watery inflammatory fluid.    After continuing to milk out pus from the dorsal left wrist incision, we   introduced a stat and look for loculations and found none.  At this point, there   was a rent in the dorsal retinaculum and some very inflamed dorsal wrist   capsule.  After thorough irrigation of the superficial abscess, we then made an   arthrotomy through the dorsal wrist capsule.  This was not purulent, but was   kind of cloudy joint fluid and we thoroughly irrigated the wrist joint.  At this   point, we closed the dorsal capsule with some 3-0 Monocryl suture, again   irrigated the superficial wound and then placed a TLS suction drain deep, closed   this with 3-0 Monocryl suture in the subcutaneous tissue and 3-0 nylon suture   in the skin.  Sterile dressings were applied.    All instrument and sponge counts were reported correct at the end of the case.    There were no complications.  The patient was awakened and taken to Recovery   Room in stable condition.    PLAN FOR THE PATIENT:  We will follow up cultures and treat him with antibiotics   appropriately.      AHSAN  dd: 03/04/2017 20:31:11 (CST)  td: 03/04/2017 20:51:27 (CST)  Doc ID   #[id number]  Job ID #[id number]    CC:

## 2017-03-05 NOTE — SUBJECTIVE & OBJECTIVE
Past Medical History:   Diagnosis Date    Hypertension     MS (multiple sclerosis)        History reviewed. No pertinent surgical history.    Review of patient's allergies indicates:   Allergen Reactions    Norco [hydrocodone-acetaminophen] Anaphylaxis       Medications:  Prescriptions Prior to Admission   Medication Sig    amlodipine (NORVASC) 10 MG tablet TAKE 1 TABLET BY MOUTH ONCE DAILY    AVONEX 30 mcg/0.5 mL injection INJECT 30 MCG (1 INJECTION) INTRAMUSCULARLY ONCE WEEKLY    baclofen (LIORESAL) 10 MG tablet TAKE 1 TABLET BY MOUTH 3 TIMES A DAY    furosemide (LASIX) 40 MG tablet TAKE 1 TABLET BY MOUTH 2 TIMES A DAY    INTERFERON BETA-1A (AVONEX IM) Inject into the muscle.      losartan (COZAAR) 25 MG tablet TAKE 1 TABLET BY MOUTH ONCE DAILY    modafinil (PROVIGIL) 200 MG Tab Take 1 tablet (200 mg total) by mouth once daily.    hydrocodone-acetaminophen 5-325mg (NORCO) 5-325 mg per tablet Take 1 tablet by mouth every 8 (eight) hours as needed for Pain.    sildenafil (VIAGRA) 100 MG tablet Take 1 tablet (100 mg total) by mouth daily as needed for Erectile Dysfunction.    triamcinolone acetonide 0.1% (KENALOG) 0.1 % cream     VASCULERA 630 mg Tab Take 1 tablet by mouth once daily.     Antibiotics     Start     Stop Route Frequency Ordered    03/04/17 2230  vancomycin 1 g in dextrose 5 % 250 mL IVPB (ready to mix system)  (Vancomycin IVPB with levels panel)      -- IV Every 12 hours (non-standard times) 03/04/17 2105 03/05/17 0800  cefTRIAXone (ROCEPHIN) 1 g in dextrose 5 % 50 mL IVPB      -- IV Every 12 hours (non-standard times) 03/04/17 2105        Antifungals     None        Antivirals     None             There is no immunization history on file for this patient.    Family History     Problem Relation (Age of Onset)    Cancer Father    Diabetes Mother        Social History     Social History    Marital status:      Spouse name: N/A    Number of children: N/A    Years of education:  N/A     Social History Main Topics    Smoking status: Never Smoker    Smokeless tobacco: None    Alcohol use No    Drug use: No    Sexual activity: Not Asked     Other Topics Concern    None     Social History Narrative     Review of Systems   Constitutional: Negative for chills, diaphoresis, fatigue and fever.   HENT: Negative for congestion, mouth sores, rhinorrhea and sore throat.    Eyes: Negative for pain, discharge and visual disturbance.   Respiratory: Negative for cough, chest tightness, shortness of breath and wheezing.    Cardiovascular: Negative for chest pain, palpitations and leg swelling.   Gastrointestinal: Negative for abdominal distention, abdominal pain, constipation, diarrhea, nausea and vomiting.   Genitourinary: Negative for difficulty urinating, dysuria, flank pain, frequency and hematuria.   Musculoskeletal: Positive for back pain. Negative for arthralgias, joint swelling, myalgias and neck pain.        Left wrist pain, swelling, redness  Right ankle pain, redness, erythema   Skin: Negative for color change, pallor, rash and wound.   Neurological: Negative for dizziness, seizures, weakness, numbness and headaches.   Hematological: Negative for adenopathy. Does not bruise/bleed easily.   Psychiatric/Behavioral: Negative for confusion and sleep disturbance. The patient is not nervous/anxious.      Objective:     Vital Signs (Most Recent):  Temp: 98.1 °F (36.7 °C) (03/05/17 0730)  Pulse: 86 (03/05/17 0730)  Resp: 15 (03/05/17 0730)  BP: (!) 143/75 (03/05/17 0730)  SpO2: (!) 92 % (03/05/17 0730) Vital Signs (24h Range):  Temp:  [96.2 °F (35.7 °C)-98.1 °F (36.7 °C)] 98.1 °F (36.7 °C)  Pulse:  [69-95] 86  Resp:  [10-20] 15  SpO2:  [92 %-100 %] 92 %  BP: (105-168)/(1-82) 143/75     Weight: 136.1 kg (300 lb)  Body mass index is 39.58 kg/(m^2).    Estimated Creatinine Clearance: 142.2 mL/min (based on Cr of 0.8).    Physical Exam   Constitutional: He is oriented to person, place, and time. He  appears well-developed and well-nourished. No distress.   HENT:   Head: Normocephalic and atraumatic.   Mouth/Throat: No oropharyngeal exudate.   Eyes: Conjunctivae and EOM are normal. Pupils are equal, round, and reactive to light. No scleral icterus.   Neck: Normal range of motion. Neck supple.   Cardiovascular: Normal rate, regular rhythm, normal heart sounds and intact distal pulses.    No murmur heard.  Pulmonary/Chest: Effort normal and breath sounds normal. No respiratory distress. He has no wheezes.   Abdominal: Soft. Bowel sounds are normal. He exhibits no distension. There is no tenderness. There is no guarding.   Musculoskeletal: Normal range of motion. He exhibits no deformity.   Left wrist edematous, erythematous and TTP. Incinsion dressed. Dressing c/d/i. Drain with serosanguinous output.     Lymphadenopathy:     He has no cervical adenopathy.   Neurological: He is alert and oriented to person, place, and time. No cranial nerve deficit.   Skin: Skin is warm and dry. No rash noted. He is not diaphoretic.   Right ankle edema, erythema and tenderness. Decreased ROM.   Chronic venous stasis changes to bilateral lower extremities.    Psychiatric: He has a normal mood and affect. His behavior is normal. Judgment and thought content normal.   Vitals reviewed.      Significant Labs:   Blood Culture:   Recent Labs  Lab 03/05/17  0107   LABBLOO No Growth to date  No Growth to date     CBC:   Recent Labs  Lab 03/04/17  0829   WBC 22.54*   HGB 12.6*   HCT 37.0*        CMP:   Recent Labs  Lab 03/04/17  0829 03/05/17  0039    143   K 3.5 3.9    105   CO2 24 25   GLU 99 86   BUN 15 16   CREATININE 0.8 0.8   CALCIUM 9.6 9.2   PROT 7.4  --    ALBUMIN 2.2*  --    BILITOT 2.4*  --    ALKPHOS 219*  --    AST 26  --    ALT 44  --    ANIONGAP 15 13   EGFRNONAA >60.0 >60.0     Procalcitonin:   Recent Labs  Lab 03/04/17  0829   PROCAL 1.08*     Urine Culture: No results for input(s): LABURIN in the last  4320 hours.  Urine Studies:   Recent Labs  Lab 03/04/17  1548   COLORU Lizbeth   APPEARANCEUA Hazy*   PHUR 7.0   SPECGRAV 1.020   PROTEINUA 2+*   GLUCUA Negative   KETONESU 2+*   BILIRUBINUA Trace   OCCULTUA 3+*   NITRITE Positive*   UROBILINOGEN >=8.0*   LEUKOCYTESUR 1+*   RBCUA >100*   WBCUA 31*   BACTERIA Occasional   HYALINECASTS 0       Significant Imaging: I have reviewed all pertinent imaging results/findings within the past 24 hours.

## 2017-03-06 PROBLEM — A41.01 STAPHYLOCOCCUS AUREUS SEPSIS: Status: ACTIVE | Noted: 2017-03-06

## 2017-03-06 PROBLEM — M00.9 SEPTIC ARTHRITIS OF WRIST, LEFT: Status: ACTIVE | Noted: 2017-03-06

## 2017-03-06 PROBLEM — J96.01 ACUTE RESPIRATORY FAILURE WITH HYPOXEMIA: Status: ACTIVE | Noted: 2017-03-06

## 2017-03-06 LAB
ALBUMIN SERPL BCP-MCNC: 1.8 G/DL
ALP SERPL-CCNC: 223 U/L
ALT SERPL W/O P-5'-P-CCNC: 37 U/L
ANION GAP SERPL CALC-SCNC: 11 MMOL/L
ANISOCYTOSIS BLD QL SMEAR: SLIGHT
AST SERPL-CCNC: 33 U/L
BACTERIA SPEC AEROBE CULT: NORMAL
BACTERIA SPEC AEROBE CULT: NORMAL
BASOPHILS # BLD AUTO: ABNORMAL K/UL
BASOPHILS NFR BLD: 0 %
BILIRUB SERPL-MCNC: 1.6 MG/DL
BNP SERPL-MCNC: 39 PG/ML
BUN SERPL-MCNC: 21 MG/DL
CALCIUM SERPL-MCNC: 9.2 MG/DL
CHLORIDE SERPL-SCNC: 101 MMOL/L
CO2 SERPL-SCNC: 27 MMOL/L
CREAT SERPL-MCNC: 0.9 MG/DL
DIFFERENTIAL METHOD: ABNORMAL
EOSINOPHIL # BLD AUTO: ABNORMAL K/UL
EOSINOPHIL NFR BLD: 0 %
ERYTHROCYTE [DISTWIDTH] IN BLOOD BY AUTOMATED COUNT: 15.6 %
EST. GFR  (AFRICAN AMERICAN): >60 ML/MIN/1.73 M^2
EST. GFR  (NON AFRICAN AMERICAN): >60 ML/MIN/1.73 M^2
GLUCOSE SERPL-MCNC: 94 MG/DL
HCT VFR BLD AUTO: 35.8 %
HGB BLD-MCNC: 11.8 G/DL
LACTATE SERPL-SCNC: 1.7 MMOL/L
LYMPHOCYTES # BLD AUTO: ABNORMAL K/UL
LYMPHOCYTES NFR BLD: 5 %
MCH RBC QN AUTO: 29.9 PG
MCHC RBC AUTO-ENTMCNC: 33 %
MCV RBC AUTO: 91 FL
METAMYELOCYTES NFR BLD MANUAL: 4 %
MONOCYTES # BLD AUTO: ABNORMAL K/UL
MONOCYTES NFR BLD: 4 %
MYELOCYTES NFR BLD MANUAL: 2 %
NEUTROPHILS NFR BLD: 78 %
NEUTS BAND NFR BLD MANUAL: 7 %
NRBC BLD-RTO: ABNORMAL /100 WBC
OVALOCYTES BLD QL SMEAR: ABNORMAL
PLATELET # BLD AUTO: 296 K/UL
PMV BLD AUTO: 11.1 FL
POIKILOCYTOSIS BLD QL SMEAR: SLIGHT
POLYCHROMASIA BLD QL SMEAR: ABNORMAL
POTASSIUM SERPL-SCNC: 4.4 MMOL/L
PROT SERPL-MCNC: 7.5 G/DL
RBC # BLD AUTO: 3.95 M/UL
SODIUM SERPL-SCNC: 139 MMOL/L
WBC # BLD AUTO: 23.12 K/UL

## 2017-03-06 PROCEDURE — 25000003 PHARM REV CODE 250: Performed by: STUDENT IN AN ORGANIZED HEALTH CARE EDUCATION/TRAINING PROGRAM

## 2017-03-06 PROCEDURE — 83605 ASSAY OF LACTIC ACID: CPT

## 2017-03-06 PROCEDURE — 87040 BLOOD CULTURE FOR BACTERIA: CPT | Mod: 59

## 2017-03-06 PROCEDURE — 93005 ELECTROCARDIOGRAM TRACING: CPT

## 2017-03-06 PROCEDURE — 83880 ASSAY OF NATRIURETIC PEPTIDE: CPT

## 2017-03-06 PROCEDURE — 97166 OT EVAL MOD COMPLEX 45 MIN: CPT

## 2017-03-06 PROCEDURE — G8987 SELF CARE CURRENT STATUS: HCPCS | Mod: CL

## 2017-03-06 PROCEDURE — 87186 SC STD MICRODIL/AGAR DIL: CPT

## 2017-03-06 PROCEDURE — 93010 ELECTROCARDIOGRAM REPORT: CPT | Mod: ,,, | Performed by: INTERNAL MEDICINE

## 2017-03-06 PROCEDURE — 97535 SELF CARE MNGMENT TRAINING: CPT

## 2017-03-06 PROCEDURE — 85027 COMPLETE CBC AUTOMATED: CPT

## 2017-03-06 PROCEDURE — 97116 GAIT TRAINING THERAPY: CPT

## 2017-03-06 PROCEDURE — 25000003 PHARM REV CODE 250: Performed by: INTERNAL MEDICINE

## 2017-03-06 PROCEDURE — 11000001 HC ACUTE MED/SURG PRIVATE ROOM

## 2017-03-06 PROCEDURE — 80053 COMPREHEN METABOLIC PANEL: CPT

## 2017-03-06 PROCEDURE — G8988 SELF CARE GOAL STATUS: HCPCS | Mod: CH

## 2017-03-06 PROCEDURE — 25000003 PHARM REV CODE 250: Performed by: NURSE PRACTITIONER

## 2017-03-06 PROCEDURE — 97110 THERAPEUTIC EXERCISES: CPT

## 2017-03-06 PROCEDURE — 63600175 PHARM REV CODE 636 W HCPCS: Performed by: STUDENT IN AN ORGANIZED HEALTH CARE EDUCATION/TRAINING PROGRAM

## 2017-03-06 PROCEDURE — 87077 CULTURE AEROBIC IDENTIFY: CPT

## 2017-03-06 PROCEDURE — 85007 BL SMEAR W/DIFF WBC COUNT: CPT

## 2017-03-06 PROCEDURE — 97530 THERAPEUTIC ACTIVITIES: CPT

## 2017-03-06 PROCEDURE — 63600175 PHARM REV CODE 636 W HCPCS: Performed by: NURSE PRACTITIONER

## 2017-03-06 PROCEDURE — 36415 COLL VENOUS BLD VENIPUNCTURE: CPT

## 2017-03-06 PROCEDURE — 99233 SBSQ HOSP IP/OBS HIGH 50: CPT | Mod: ,,, | Performed by: INTERNAL MEDICINE

## 2017-03-06 PROCEDURE — 99253 IP/OBS CNSLTJ NEW/EST LOW 45: CPT | Mod: ,,, | Performed by: HOSPITALIST

## 2017-03-06 PROCEDURE — G8989 SELF CARE D/C STATUS: HCPCS | Mod: CL

## 2017-03-06 RX ORDER — POLYETHYLENE GLYCOL 3350 17 G/17G
17 POWDER, FOR SOLUTION ORAL DAILY
Status: DISCONTINUED | OUTPATIENT
Start: 2017-03-07 | End: 2017-03-14 | Stop reason: HOSPADM

## 2017-03-06 RX ORDER — IBUPROFEN 400 MG/1
400 TABLET ORAL EVERY 6 HOURS PRN
Status: DISCONTINUED | OUTPATIENT
Start: 2017-03-06 | End: 2017-03-14 | Stop reason: HOSPADM

## 2017-03-06 RX ORDER — IPRATROPIUM BROMIDE AND ALBUTEROL SULFATE 2.5; .5 MG/3ML; MG/3ML
3 SOLUTION RESPIRATORY (INHALATION) EVERY 4 HOURS PRN
Status: DISCONTINUED | OUTPATIENT
Start: 2017-03-06 | End: 2017-03-14 | Stop reason: HOSPADM

## 2017-03-06 RX ORDER — SENNOSIDES 8.6 MG/1
8.6 TABLET ORAL DAILY PRN
Status: DISCONTINUED | OUTPATIENT
Start: 2017-03-06 | End: 2017-03-14 | Stop reason: HOSPADM

## 2017-03-06 RX ADMIN — IBUPROFEN 400 MG: 400 TABLET, FILM COATED ORAL at 09:03

## 2017-03-06 RX ADMIN — VANCOMYCIN HYDROCHLORIDE 1000 MG: 1 INJECTION, POWDER, LYOPHILIZED, FOR SOLUTION INTRAVENOUS at 10:03

## 2017-03-06 RX ADMIN — AMLODIPINE BESYLATE 10 MG: 10 TABLET ORAL at 10:03

## 2017-03-06 RX ADMIN — CEFTRIAXONE 1 G: 1 INJECTION, SOLUTION INTRAVENOUS at 10:03

## 2017-03-06 RX ADMIN — DEXTROSE 6 G: 5 SOLUTION INTRAVENOUS at 02:03

## 2017-03-06 RX ADMIN — Medication 3 ML: at 06:03

## 2017-03-06 RX ADMIN — Medication 3 ML: at 10:03

## 2017-03-06 RX ADMIN — FUROSEMIDE 40 MG: 40 TABLET ORAL at 10:03

## 2017-03-06 RX ADMIN — Medication 3 ML: at 02:03

## 2017-03-06 RX ADMIN — LOSARTAN POTASSIUM 25 MG: 25 TABLET, FILM COATED ORAL at 10:03

## 2017-03-06 RX ADMIN — SENNOSIDES 8.6 MG: 8.6 TABLET, FILM COATED ORAL at 09:03

## 2017-03-06 RX ADMIN — OXYCODONE HYDROCHLORIDE 15 MG: 5 TABLET ORAL at 06:03

## 2017-03-06 NOTE — PROGRESS NOTES
[facility]  Infectious Disease  Progress Note    Patient Name: Rajan Cooper III  MRN: [id number]  Admission Date: 3/4/2017  Length of Stay: 0 days  Attending Physician: Kunal Shipley MD  Primary Care Provider: Tom Garcia MD    Isolation Status: No active isolations  Assessment/Plan:      Septic arthritis of wrist, left  61 year old male with history of multiple sclerosis admitted with left septic wrist and right ankle pain now s/p left wrist washout and debridement and ankle arthrocentesis on 3/4/17.   Fluid cultures from wrist showing MSSA, tissue culture from ankle showing S. Aureus (sensitivities pending), likely MSSA.  Urine culture reportedly showing a Staph Species, likely MSSA.  Blood cultures of 3/5 NGTD.   Given that staph is in urine and in two different joints, suspect he had a transient bacteremia that seeded joints.  Source unclear, though he reports history of psoriasis and lower extremity cellulitis in past.  No stigmata of endocarditis.  Physiologic murmur on exam.       He had recurrent fever and chills today, with some dyspnea.  Persistent leukocytosis.   CXR shows atelectasis right lung base.  No cough and lungs are clear to auscultation.   Has been on IV vancomycin and ceftriaxone since surgery.      Plan  - Discontinue vancomycin and ceftriaxone (done)  - Start cefazolin 6 grams IV q 24 hours by continuous infusion for MSSA.   Recommend minimum of 4 weeks of IV antibiotics for suspected complicated bacteremia with septic arthritis of wrist and ankle.   - Blood cultures repeated this morning with fevers, chills.   - If repeat blood cultures positive, would recommend KEIKO.    - Will continue to follow with you.   - Patient seen and discussed with staff. ID will follow.     Staphylococcus aureus sepsis  See assessment and plan noted above.   Continue IV cefazolin continuous infusion.     Thank you.   Please call for any questions or concerns.  TRUONG Horner,  "ANP-C  [phone]    Subjective:     Principal Problem:<principal problem not specified>    Interval History:  Spiked temp this morning with some associated chills and SOB.  CXR shows atelectasis.  Denies cough.   Pain controlled.   Operative cultures showing MSSA.  Urine showing Staph "species", though likely MSSA.  Suspect he had a transient bacteremia that seeded joints.     HPI:  Mr. Cooper is a pleasant 61 y.o. male with a PMHx of MS on avonex presenting with worsening left wrist and right ankle pain and swelling of 5 days duration. Patient reports he was moving a heavy gate a work late February and twisted his ankle. He was evaluated by his PCP and left wrist xray revealed  evidence of AVN, DJD, and soft tissue swelling and right ankle xray showed evidence of soft tissue swelling but no fracture. He was suppose to follow up in orthopedic clinic but came to the ED as his pain acutely worsened. Patient denies break in his skin. Denies fevers, chills, sweats, recent infection, CP, SOB, N/V, diarrhea. Labs upon admit were significant for ESR elevated at 118, CRP elevated at 451.1 and a leukocytosis of 22.5k. Orthopedic surgery performed an aspiration of his left wrist  Revealing 20K WBCs and 79% segs. He is now s/p left wrist washout and debridement on 3/4 along with ankle arthrocentesis. Cultures are pending. GS + GPCs. He was started on empiric Vanc/Ceftriaxone post operatively.     Left wrist x-ray: There is lunate sclerosis suggesting AVN with areas of DJD most severe at the radiocarpal joint. There is soft tissue swelling.     Right ankle x-ray: Soft tissue swelling is identified about the ankle, particularly about the lateral malleolus.  The visualized osseous structures, however, appear intact, with no definite evidence of recent fracture or other significant abnormality identified.         Review of Systems   Constitutional: Positive for chills and fever. Negative for diaphoresis and fatigue.   HENT: Negative " for congestion, mouth sores, rhinorrhea and sore throat.    Eyes: Negative for pain, discharge and visual disturbance.   Respiratory: Negative for cough, chest tightness, shortness of breath and wheezing.    Cardiovascular: Negative for chest pain, palpitations and leg swelling.   Gastrointestinal: Negative for abdominal distention, abdominal pain, constipation, diarrhea, nausea and vomiting.   Genitourinary: Negative for difficulty urinating, dysuria, flank pain, frequency and hematuria.   Musculoskeletal: Positive for back pain (reports chronic back pain ). Negative for arthralgias, joint swelling, myalgias and neck pain.        Left wrist pain, swelling, redness  Right ankle pain, redness, erythema   Skin: Negative for color change, pallor, rash and wound.   Neurological: Negative for dizziness, seizures, weakness, numbness and headaches.   Hematological: Negative for adenopathy. Does not bruise/bleed easily.   Psychiatric/Behavioral: Negative for confusion and sleep disturbance. The patient is not nervous/anxious.      Objective:     Vital Signs (Most Recent):  Temp: 99.1 °F (37.3 °C) (03/06/17 0800)  Pulse: 93 (03/06/17 0800)  Resp: 16 (03/06/17 0800)  BP: 137/84 (03/06/17 0800)  SpO2: 99 % (03/06/17 0800) Vital Signs (24h Range):  Temp:  [96.4 °F (35.8 °C)-99.5 °F (37.5 °C)] 99.1 °F (37.3 °C)  Pulse:  [] 93  Resp:  [15-16] 16  SpO2:  [94 %-99 %] 99 %  BP: (137-155)/(80-85) 137/84     Weight: 136.1 kg (300 lb)  Body mass index is 39.58 kg/(m^2).    Estimated Creatinine Clearance: 142.2 mL/min (based on Cr of 0.8).    Physical Exam   Constitutional: He is oriented to person, place, and time. He appears well-developed and well-nourished. No distress.   HENT:   Head: Normocephalic and atraumatic.   Mouth/Throat: No oropharyngeal exudate.   Eyes: Conjunctivae and EOM are normal. Pupils are equal, round, and reactive to light. No scleral icterus.   Neck: Normal range of motion. Neck supple.   Cardiovascular:  Regular rhythm and intact distal pulses.  Tachycardia present.    Murmur (? physiologic murmur) heard.  Pulmonary/Chest: Effort normal and breath sounds normal. No respiratory distress. He has no wheezes.   Abdominal: Soft. Bowel sounds are normal. He exhibits no distension. There is no tenderness. There is no guarding.   Musculoskeletal: Normal range of motion. He exhibits no deformity.   Left wrist edematous, erythematous and mildly TTP.  Operative dressings with old serosanguinous drainage.       Right ankle edema, erythema and tenderness.  Per patient, some improvement.   Decreased ROM.    Lymphadenopathy:     He has no cervical adenopathy.   Neurological: He is alert and oriented to person, place, and time. No cranial nerve deficit.   Skin: Skin is warm and dry. No rash noted. He is not diaphoretic.   Chronic skin changes to bilateral lower extremities.    Psychiatric: He has a normal mood and affect. His behavior is normal. Judgment and thought content normal.   Vitals reviewed.      Significant Labs:   Blood Culture:   Recent Labs  Lab 03/05/17  0107   LABBLOO No Growth to date  No Growth to date  No Growth to date  No Growth to date     BMP:   Recent Labs  Lab 03/05/17  0039   GLU 86      K 3.9      CO2 25   BUN 16   CREATININE 0.8   CALCIUM 9.2     CBC: No results for input(s): WBC, HGB, HCT, PLT in the last 48 hours.  CMP:   Recent Labs  Lab 03/05/17  0039      K 3.9      CO2 25   GLU 86   BUN 16   CREATININE 0.8   CALCIUM 9.2   ANIONGAP 13   EGFRNONAA >60.0     Procalcitonin: No results for input(s): PROCAL in the last 48 hours.  Urine Culture:   Recent Labs  Lab 03/04/17  1548   LABURIN STAPHYLOCOCCUS SPECIES> 100,000 cfu/mlIdentification and susceptibility pending     Urine Studies:   Recent Labs  Lab 03/04/17  1548   COLORU Lizbeth   APPEARANCEUA Hazy*   PHUR 7.0   SPECGRAV 1.020   PROTEINUA 2+*   GLUCUA Negative   KETONESU 2+*   BILIRUBINUA Trace   OCCULTUA 3+*   NITRITE  Positive*   UROBILINOGEN >=8.0*   LEUKOCYTESUR 1+*   RBCUA >100*   WBCUA 31*   BACTERIA Occasional   HYALINECASTS 0       Significant Imaging: I have reviewed all pertinent imaging results/findings within the past 24 hours.

## 2017-03-06 NOTE — PLAN OF CARE
Problem: Physical Therapy Goal  Goal: Physical Therapy Goal  Goals to be met by: 3/12/17     Patient will increase functional independence with mobility by performin. Supine to sit with Set-up [place]  2. Sit to supine with Set-up [place]  3. Sit to stand transfer with CGA using appropriate AD  4. Bed to chair transfer with Minimal Assistance using appropriate AD  5. Gait x 20 feet with Minimal Assistance using appropriate AD.   6. Lower extremity exercise program x20-30 reps per handout, with independence   Patient goals remain appropriate.  Patient will continue to benefit from further PT services.  Chaim Etienne, PTA

## 2017-03-06 NOTE — PLAN OF CARE
Problem: Occupational Therapy Goal  Goal: Occupational Therapy Goal  Goals to be met by: 3/20/17     Patient will increase functional independence with ADLs by performing:    UE Dressing with Centre.  LE Dressing with Centre.  Grooming while standing at sink with Modified [place].  Toileting from toilet with Centre for hygiene and clothing management.   Bathing from edge of bed with Centre.  Toilet transfer to toilet with Centre.  Increased functional strength to WFL for L UE.  Upper extremity exercise program x15 reps per handout, with independence.  POC initiated.

## 2017-03-06 NOTE — ASSESSMENT & PLAN NOTE
· CXR and BNP unremarkable. Pulmonary edema or PNA unlikely. Instructed on using incentive spirometry.

## 2017-03-06 NOTE — PROGRESS NOTES
Pt s pulse ox 85%-88%on ra air. Pulse ox 88% on 2lnc, 90% -92% on 3lnc, labored bramatt, Dr. Reynoso notified , continue to monitor, while orders are being placed.

## 2017-03-06 NOTE — SUBJECTIVE & OBJECTIVE
Interval History:   Noted above in hospital course.     Review of Systems   Constitutional: Positive for fatigue and fever. Negative for chills and diaphoresis.   HENT: Negative for congestion.    Eyes: Negative for visual disturbance.   Respiratory: Negative for shortness of breath.    Cardiovascular: Positive for leg swelling. Negative for chest pain and palpitations.   Gastrointestinal: Negative for abdominal pain and diarrhea.   Genitourinary: Negative for dysuria.   Musculoskeletal: Positive for arthralgias. Negative for myalgias.   Skin: Positive for rash and wound.   Neurological: Negative for dizziness, weakness and headaches.   Psychiatric/Behavioral: Positive for confusion.     Objective:     Vital Signs (Most Recent):  Temp: 100 °F (37.8 °C) (03/06/17 1252)  Pulse: (!) 113 (03/06/17 1252)  Resp: 18 (03/06/17 1252)  BP: (!) 161/86 (03/06/17 1252)  SpO2: (!) 92 % (03/06/17 1100) Vital Signs (24h Range):  Temp:  [96.4 °F (35.8 °C)-101.2 °F (38.4 °C)] 100 °F (37.8 °C)  Pulse:  [] 113  Resp:  [15-18] 18  SpO2:  [92 %-99 %] 92 %  BP: (137-161)/(80-97) 161/86     Weight: 136.1 kg (300 lb)  Body mass index is 39.58 kg/(m^2).    Intake/Output Summary (Last 24 hours) at 03/06/17 1442  Last data filed at 03/06/17 1400   Gross per 24 hour   Intake             1090 ml   Output                0 ml   Net             1090 ml      Physical Exam   Constitutional: He is oriented to person, place, and time. He appears well-developed and well-nourished. No distress.   HENT:   Head: Normocephalic and atraumatic.   Eyes: EOM are normal. Pupils are equal, round, and reactive to light.   Neck: Normal range of motion. Neck supple.   Cardiovascular: Intact distal pulses.  Tachycardia present.  Exam reveals no gallop and no friction rub.    No murmur heard.  Pulmonary/Chest: Effort normal and breath sounds normal. No respiratory distress.   Abdominal: Soft. Bowel sounds are normal.   Musculoskeletal: Normal range of motion. He  exhibits edema and tenderness.   Neurological: He is alert and oriented to person, place, and time.   Skin: Rash noted. He is not diaphoretic. There is erythema.       Significant Labs:   CBC:   Recent Labs  Lab 03/06/17  1203   WBC 23.12*   HGB 11.8*   HCT 35.8*        CMP:   Recent Labs  Lab 03/05/17  0039 03/06/17  1203    139   K 3.9 4.4    101   CO2 25 27   GLU 86 94   BUN 16 21*   CREATININE 0.8 0.9   CALCIUM 9.2 9.2   PROT  --  7.5   ALBUMIN  --  1.8*   BILITOT  --  1.6*   ALKPHOS  --  223*   AST  --  33   ALT  --  37   ANIONGAP 13 11   EGFRNONAA >60.0 >60.0     Cardiac Markers:   Recent Labs  Lab 03/06/17  1203   BNP 39     Lactic Acid:   Recent Labs  Lab 03/06/17  1203   LACTATE 1.7     Urine Studies:   Recent Labs  Lab 03/04/17  1548   COLORU Lizbeth   APPEARANCEUA Hazy*   PHUR 7.0   SPECGRAV 1.020   PROTEINUA 2+*   GLUCUA Negative   KETONESU 2+*   BILIRUBINUA Trace   OCCULTUA 3+*   NITRITE Positive*   UROBILINOGEN >=8.0*   LEUKOCYTESUR 1+*   RBCUA >100*   WBCUA 31*   BACTERIA Occasional   HYALINECASTS 0       Significant Imaging: CXR: I have reviewed all pertinent results/findings within the past 24 hours and my personal findings are:  hemidiaphram with possible plueral effusion on the left.

## 2017-03-06 NOTE — ASSESSMENT & PLAN NOTE
· SIRS (4/4) 2/2 to MSSA.   · Follow-up with ID about changes with antibiotics. Currently on ceftriaxone and vancomycin. Awaiting blood culture results. Patient likely intermittently bacteriemic given positive urine cultures for staph aureus. If patient remains septic despite antibiotics, need to further investigate source control.

## 2017-03-06 NOTE — PLAN OF CARE
JUAN LUIS paged MD to discuss pt's d/c disposition. MD stated pt likely to d/c home with home health and home IV infusions. SW to follow up with home infusion company.    Addendum 10:38 AM   JUAN LUIS spoke with Melvi at [facility] ([phone]) to provide referral. JUAN LUIS added [facility] to pt's treatment team. Per Melvi, [facility] will coordinate home health.    Elmira Ivey LMSW  [phone]

## 2017-03-06 NOTE — CONSULTS
[facility] Hospital Medicine  Consult    Patient Name: Rajan Cooper III  MRN: [id number]  Patient Class: OP- Observation   Admission Date: 3/4/2017  Length of Stay: 0 days  Attending Physician: Kunal Shipley MD  Primary Care Provider: Tom Garcia MD    [facility] Medicine Team: Networked reference to record PCT  Mike Mancuso DO    Subjective:     Principal Problem:<principal problem not specified>    HPI:  Rajan Cooper III is a 61-year-old male w/ past medical history significant for multiple sclerosis and HTN who presented to the ED on 3/4/17 w/ worsening L wrist > R ankle pain of 5 days duration.  Denied trauma or other precipitating event, h/o septic joint, h/o gout, fevers, chills, recent infection, CP, SOB, dysuria, or new numbness/tingling/weakness. WBC 22K w/ a CRP of 425. Concern for left dorsal wrist abscess with possible septic wrist and right ankle arthritis.    Able to bear weight but painful.  Usually ambulates without assistive device at baseline. Takes baclofen and Avonex for MS.       Hospital Course:  3/4/17 - Arthrocentesis of the right ankle, incision, drainage and irrigation of left dorsal wrist abscess and left wrist arthrotomy with irrigation. GPC noted on gram stain along with 20K WBCS w/ 79% segs. Patient has remained on ceftriaxone and vancomycin.   3/7/17 - medicine consulted. Patient has developed worsening tachycardia with SOB required 3 liter of 02. Patient denies any SOB, but complains of pain. New fever at 101.2 F noted. MSSA grown from abscess of the left wrist and staph noted in the urine culture. Blood culture still pending.       Interval History:   Noted above in hospital course.     Review of Systems   Constitutional: Positive for fatigue and fever. Negative for chills and diaphoresis.   HENT: Negative for congestion.    Eyes: Negative for visual disturbance.   Respiratory: Negative for shortness of breath.    Cardiovascular: Positive for leg  swelling. Negative for chest pain and palpitations.   Gastrointestinal: Negative for abdominal pain and diarrhea.   Genitourinary: Negative for dysuria.   Musculoskeletal: Positive for arthralgias. Negative for myalgias.   Skin: Positive for rash and wound.   Neurological: Negative for dizziness, weakness and headaches.   Psychiatric/Behavioral: Positive for confusion.     Objective:     Vital Signs (Most Recent):  Temp: 100 °F (37.8 °C) (03/06/17 1252)  Pulse: (!) 113 (03/06/17 1252)  Resp: 18 (03/06/17 1252)  BP: (!) 161/86 (03/06/17 1252)  SpO2: (!) 92 % (03/06/17 1100) Vital Signs (24h Range):  Temp:  [96.4 °F (35.8 °C)-101.2 °F (38.4 °C)] 100 °F (37.8 °C)  Pulse:  [] 113  Resp:  [15-18] 18  SpO2:  [92 %-99 %] 92 %  BP: (137-161)/(80-97) 161/86     Weight: 136.1 kg (300 lb)  Body mass index is 39.58 kg/(m^2).    Intake/Output Summary (Last 24 hours) at 03/06/17 1442  Last data filed at 03/06/17 1400   Gross per 24 hour   Intake             1090 ml   Output                0 ml   Net             1090 ml      Physical Exam   Constitutional: He is oriented to person, place, and time. He appears well-developed and well-nourished. No distress.   HENT:   Head: Normocephalic and atraumatic.   Eyes: EOM are normal. Pupils are equal, round, and reactive to light.   Neck: Normal range of motion. Neck supple.   Cardiovascular: Intact distal pulses.  Tachycardia present.  Exam reveals no gallop and no friction rub.    No murmur heard.  Pulmonary/Chest: Effort normal and breath sounds normal. No respiratory distress.   Abdominal: Soft. Bowel sounds are normal.   Musculoskeletal: Normal range of motion. He exhibits edema and tenderness.   Neurological: He is alert and oriented to person, place, and time.   Skin: Rash noted. He is not diaphoretic. There is erythema.       Significant Labs:   CBC:   Recent Labs  Lab 03/06/17  1203   WBC 23.12*   HGB 11.8*   HCT 35.8*        CMP:   Recent Labs  Lab 03/05/17  0039  03/06/17  1203    139   K 3.9 4.4    101   CO2 25 27   GLU 86 94   BUN 16 21*   CREATININE 0.8 0.9   CALCIUM 9.2 9.2   PROT  --  7.5   ALBUMIN  --  1.8*   BILITOT  --  1.6*   ALKPHOS  --  223*   AST  --  33   ALT  --  37   ANIONGAP 13 11   EGFRNONAA >60.0 >60.0     Cardiac Markers:   Recent Labs  Lab 03/06/17  1203   BNP 39     Lactic Acid:   Recent Labs  Lab 03/06/17  1203   LACTATE 1.7     Urine Studies:   Recent Labs  Lab 03/04/17  1548   COLORU Lizbeth   APPEARANCEUA Hazy*   PHUR 7.0   SPECGRAV 1.020   PROTEINUA 2+*   GLUCUA Negative   KETONESU 2+*   BILIRUBINUA Trace   OCCULTUA 3+*   NITRITE Positive*   UROBILINOGEN >=8.0*   LEUKOCYTESUR 1+*   RBCUA >100*   WBCUA 31*   BACTERIA Occasional   HYALINECASTS 0       Significant Imaging: CXR: I have reviewed all pertinent results/findings within the past 24 hours and my personal findings are:  hemidiaphram with possible plueral effusion on the left.     Assessment/Plan:      MS (multiple sclerosis)  · Okay to restart baclofen 10 mg TID.     HTN (hypertension)  · Continue losartan 25 mg and amlodipine 10 mg daily.       Staphylococcus aureus sepsis  · SIRS (4/4) 2/2 to MSSA.   · Follow-up with ID about changes with antibiotics. Currently on ceftriaxone and vancomycin. Awaiting blood culture results. Patient likely intermittently bacteriemic given positive urine cultures for staph aureus. If patient remains septic despite antibiotics, need to further investigate source control.     Acute respiratory failure with hypoxemia  · CXR and BNP unremarkable. Pulmonary edema or PNA unlikely. Instructed on using incentive spirometry.       VTE Risk Mitigation         Ordered     Medium Risk of VTE  Once      03/04/17 1346     Place sequential compression device  Until discontinued      03/04/17 1346          Mike Mancuso DO  Department of Hospital Medicine   [facility]

## 2017-03-06 NOTE — PT/OT/SLP EVAL
Occupational Therapy  Evaluation    Rajan Cooper III   MRN: [id number]   Admitting Diagnosis: <principal problem not specified>    OT Date of Treatment: 17   OT Start Time: 1045  OT Stop Time: 1125  OT Total Time (min): 40 min    Billable Minutes:  Evaluation 20  Self Care/Home Management 10  Therapeutic Exercise 10    Diagnosis: <principal problem not specified>       Past Medical History:   Diagnosis Date    Hypertension     MS (multiple sclerosis)       History reviewed. No pertinent surgical history.        General Precautions: Standard, fall  Orthopedic Precautions: LUE non weight bearing  Braces: N/A    Do you have any cultural, spiritual, Lutheran conflicts, given your current situation?: None     Patient History:  Living Environment  Lives With: spouse  Living Arrangements: house  Living Environment Comment: Pt lives in a one story house c one SUSAN and has a tub/shower combo.  Equipment Currently Used at Home: cane, straight, bedside commode, rollator (scooter)    Prior level of function:   Bed Mobility/Transfers: independent  Grooming: independent  Bathing: independent  Upper Body Dressing: independent  Lower Body Dressing: independent  Toileting: independent  Home Management Skills: independent  Homemaking Responsibilities: No         Subjective:  Communicated with RN prior to session.    Chief Complaint: Pt is L wrist arthrotomy and I&D and R ankle I&D.  Patient/Family stated goals: To get better.    Pain Ratin/10              Pain Rating Post-Intervention: 0/10    Objective:  Patient found with: oxygen, peripheral IV    Cognitive Exam:  Oriented to: Person, Place, Time and Situation  Follows Commands/attention: Follows multistep  commands  Communication: clear/fluent  Memory:  No Deficits noted  Safety awareness/insight to disability: intact  Coping skills/emotional control: Appropriate to situation    Visual/perceptual:  Intact    Physical Exam:  Postural examination/scapula alignment: No  postural abnormalities identified  Skin integrity: Visible skin intact  Edema: Mild L hand    Sensation:   Impaired  light/touch L hand c numbness.    Upper Extremity Range of Motion:  Right Upper Extremity: WFL  Left Upper Extremity: Deficits: Pt has approx. 20* of AROM WE, 30* of AROM WF, 70* of AROM IP flexion, 20* of AROM IP abduction, 70* of AROM MCP flexion, 20* of AROM PIP flexion.  Rest of L UE is WFL.    Upper Extremity Strength:  Right Upper Extremity: WFL  Left Upper Extremity: Deficits: 2+/5 wrist and hand and 3-/5 rest of L UE.   Strength: 2+/5 L hand and R hand is WFL.    Fine motor coordination:   Impaired  Left hand thumb/finger opposition skills Poor    Gross motor coordination: WFL    Functional Mobility:  Bed Mobility:  Supine to Sit: Minimum Assistance (Pt's O2 was noted to be as low as 85 while sitting up and as high as 92 with oxygen applied.  Pt's HR was 118.  RN aware and MD notified.)             Activities of Daily Living:     UE Dressing Level of Assistance:  (Pt was dressed upon arrival and stated that he needed assistance to don clothes.)      Balance:   Static Sit: GOOD: Takes MODERATE challenges from all directions  Dynamic Sit: GOOD: Maintains balance through MODERATE excursions of active trunk movement      Therapeutic Activities and Exercises:  Pt was able to perform AAROM WF/WE 1x15 while sitting up on EOB and AROM six pack hand exercises 1x15 while sitting up on EOB.    AM-PAC 6 CLICK ADL  How much help from another person does this patient currently need?  1 = Unable, Total/Dependent Assistance  2 = A lot, Maximum/Moderate Assistance  3 = A little, Minimum/Contact Guard/Supervision  4 = None, Modified [place]/Independent    Putting on and taking off regular lower body clothing? : 2  Bathing (including washing, rinsing, drying)?: 2  Toileting, which includes using toilet, bedpan, or urinal? : 2  Putting on and taking off regular upper body clothing?: 2  Taking care of  "personal grooming such as brushing teeth?: 3  Eating meals?: 3  Total Score: 14    AM-PAC Raw Score CMS "G-Code Modifier Level of Impairment Assistance   6 % Total / Unable   7 - 9 CM 80 - 100% Maximal Assist   10 - 14 CL 60 - 80% Moderate Assist   15 - 19 CK 40 - 60% Moderate Assist   20 - 22 CJ 20 - 40% Minimal Assist   23 CI 1-20% SBA / CGA   24 CH 0% Independent/ Mod I       Patient left EOB with all lines intact, call button in reach, RN notified and family present    Assessment:  Rajan Cooper III is a 61 y.o. male with a medical diagnosis of <principal problem not specified> and presents with deficits in ADL's, functional T/F's, L UE weakness, and impaired sensation in L hand.  Pt was able to perform supine/sit T/F c min A.  Has 2+/5 strength in L hand and wrist c noted edema, redness, and numbness.  Pt was noted to have decrased O2 SATS and increased HR while sitting up on EOB.    Rehab identified problem list/impairments: Rehab identified problem list/impairments: weakness, impaired endurance, impaired self care skills, impaired functional mobilty, decreased upper extremity function, decreased coordination, impaired fine motor, orthopedic precautions, edema, decreased ROM    Rehab potential is good.    Activity tolerance: Good    Discharge recommendations: Discharge Facility/Level Of Care Needs: nursing facility, skilled     Barriers to discharge: Barriers to Discharge: Inaccessible home environment    Equipment recommendations:       GOALS:   Occupational Therapy Goals        Problem: Occupational Therapy Goal    Goal Priority Disciplines Outcome Interventions   Occupational Therapy Goal     OT, PT/OT     Description:  Goals to be met by: 3/20/17     Patient will increase functional independence with ADLs by performing:    UE Dressing with [place].  LE Dressing with [place].  Grooming while standing at sink with Modified [place].  Toileting from toilet with [place] for hygiene and " clothing management.   Bathing from  edge of bed with [place].  Toilet transfer to toilet with [place].  Increased functional strength to WFL for L UE.  Upper extremity exercise program x15 reps per handout, with independence.                PLAN:  Patient to be seen 6 x/week to address the above listed problems via self-care/home management  Plan of Care expires: 03/20/17  Plan of Care reviewed with: patient, spouse         ANGELINA Murray  03/06/2017

## 2017-03-06 NOTE — PT/OT/SLP PROGRESS
Physical Therapy  Treatment    Rajan Cooper III   MRN: [id number]   Admitting Diagnosis: <principal problem not specified>    PT Received On: 17  PT Start Time: 1450     PT Stop Time: 1530    PT Total Time (min): 40 min       Billable Minutes:  Gait [id number], Therapeutic Activity 20 and Therapeutic Exercise 10    Treatment Type: Treatment  PT/PTA: PTA     PTA Visit Number: 1       General Precautions: Standard, fall  Orthopedic Precautions: LUE non weight bearing   Braces:      Do you have any cultural, spiritual, Religion conflicts, given your current situation?: none    Subjective:  Communicated with NSG prior to session.  Patient agreeable to therapy.    Pain Ratin/10                   Objective:   Patient found with: oxygen, peripheral IV    Functional Mobility:  Bed Mobility:   Rolling/Turning to Left: Supervision  Rolling/Turning Right: Supervision  Supine to Sit: Stand by Assistance  Sit to Supine: Stand by Assistance    Transfers:  Sit <> Stand Assistance: Moderate Assistance  Sit <> Stand Assistive Device: Platform, Rolling Walker (L PRW)  Bed <> Chair Technique: Stand Pivot  Bed <> Chair Assistance: Minimum Assistance  Bed <> Chair Assistive Device: Rolling Walker, Platform    Gait:   Gait Distance: 20 feet  Gait Assistive Device: Platform walker left  Gait Pattern: 3-point gait  Gait Deviation(s): decreased zahra, increased time in double stance, decreased velocity of limb motion, decreased step length, decreased weight-shifting ability, decreased toe-to-floor clearance, forward lean, decreased stride length (no LOB, Vc's for walker mgmnt and sequence)          Balance:   Static Sit: GOOD+: Takes MAXIMAL challenges from all directions.    Dynamic Sit: GOOD-: Maintains balance through MODERATE excursions of active trunk movement,     Static Stand: POOR+: Needs MINIMAL assist to maintain  Dynamic stand: POOR: N/A     Therapeutic Activities and Exercises:  Patient performed seated exercises  GS,AP,LAQ,hip flexion and hip ABD/ADD x15-20 reps       AM-PAC 6 CLICK MOBILITY  How much help from another person does this patient currently need?   1 = Unable, Total/Dependent Assistance  2 = A lot, Maximum/Moderate Assistance  3 = A little, Minimum/Contact Guard/Supervision  4 = None, Modified [place]/Independent    Turning over in bed (including adjusting bedclothes, sheets and blankets)?: 4  Sitting down on and standing up from a chair with arms (e.g., wheelchair, bedside commode, etc.): 2  Moving from lying on back to sitting on the side of the bed?: 4  Moving to and from a bed to a chair (including a wheelchair)?: 3  Need to walk in hospital room?: 3  Climbing 3-5 steps with a railing?: 1  Total Score: 17    AM-PAC Raw Score CMS G-Code Modifier Level of Impairment Assistance   6 % Total / Unable   7 - 9 CM 80 - 100% Maximal Assist   10 - 14 CL 60 - 80% Moderate Assist   15 - 19 CK 40 - 60% Moderate Assist   20 - 22 CJ 20 - 40% Minimal Assist   23 CI 1-20% SBA / CGA   24 CH 0% Independent/ Mod I     Patient left up in chair with all lines intact, call button in reach and wife present.    Assessment:  Rajan Cooper III is a 61 y.o. male with a medical diagnosis of <principal problem not specified> and presents with decrease strength, mobility, balance and endurance.  Patient tolerated increase distance with gait training well. Patient required less assistance using Platform RW.  Noted decrease strength and coordination with B LE's. Patient progressing well toward goals.  Patient would benefit from further IP therapy.      Rehab identified problem list/impairments: Rehab identified problem list/impairments: weakness, impaired endurance, impaired self care skills, impaired functional mobilty, gait instability, impaired balance, decreased ROM, orthopedic precautions, decreased lower extremity function, decreased upper extremity function    Rehab potential is good.    Activity tolerance:  Good    Discharge recommendations: Discharge Facility/Level Of Care Needs: nursing facility, skilled     Barriers to discharge: Barriers to Discharge: Inaccessible home environment    Equipment recommendations: Equipment Needed After Discharge:  (TBD)     GOALS:   Physical Therapy Goals        Problem: Physical Therapy Goal    Goal Priority Disciplines Outcome Goal Variances Interventions   Physical Therapy Goal     PT/OT, PT Ongoing (interventions implemented as appropriate)     Description:  Goals to be met by: 3/12/17     Patient will increase functional independence with mobility by performin. Supine to sit with Set-up [place]  2. Sit to supine with Set-up [place]  3. Sit to stand transfer with CGA using appropriate AD  4. Bed to chair transfer with Minimal Assistance using appropriate AD  5. Gait  x 20 feet with Minimal Assistance using appropriate AD.   6. Lower extremity exercise program x20-30 reps per handout, with independence                PLAN:    Patient to be seen daily  to address the above listed problems via gait training, therapeutic activities, therapeutic exercises, neuromuscular re-education  Plan of Care expires: 17  Plan of Care reviewed with: patient, spouse         Chaim Etienne II, PTA  2017

## 2017-03-06 NOTE — PROGRESS NOTES
ORTHO PROGRESS NOTE:    Rajan Cooper III is a 61 y.o. male admitted on 3/4/2017  Hospital Day: 0  Post Op Day: 2 Day Post-Op      The patient was seen and examined this morning at the bedside. No acute issues overnight. Dressing removed at bedside, drain pulled. Still with right ankle swelling and pitting edema. Worked with PT yesterday, 2 steps forward/backward     PHYSICAL EXAM:  Awake/alert/oriented x 3  No acute distress  AFVSS  Good inspiratory effort with unlabored breathing  Dressings c/d/i  NVI distally  Able to range left wrist passively  Pitting edema to right ankle with minimal warmth and erythema     Vitals:    03/05/17 1100 03/05/17 1500 03/05/17 1930 03/05/17 2330   BP: (!) 147/81 (!) 141/84 (!) 155/85 (!) 140/80   BP Location: Right arm Right arm Right arm Right arm   Patient Position: Lying Lying Lying Lying   BP Method: Automatic Automatic Automatic Automatic   Pulse: 91 95 100 92   Resp: 16 15 16 16   Temp: 96.7 °F (35.9 °C) 99.5 °F (37.5 °C) 96.6 °F (35.9 °C) 97.2 °F (36.2 °C)   TempSrc: Oral Oral Oral Oral   SpO2: (!) 94% 95% 96% 95%   Weight:       Height:         I/O last 3 completed shifts:  In: 1750 [P.O.:800; I.V.:950]  Out: -     Recent Labs  Lab 03/04/17  0829 03/05/17  0039   CALCIUM 9.6 9.2   PROT 7.4  --     143   K 3.5 3.9   CO2 24 25    105   BUN 15 16   CREATININE 0.8 0.8       Recent Labs  Lab 03/04/17  0829   WBC 22.54*   RBC 4.15*   HGB 12.6*   HCT 37.0*          Recent Labs  Lab 03/04/17  1408   INR 1.1   APTT 26.8     GS with GPCs, cultures pending    A/P: 61 y.o. male 2 Day Post-Op s/p L wrist arthrotomy with irrigation and synovectomy, I&D abscess  -- Pain control  -- PT/OT: RUDDY CHO. ROM left wrist  -- DVT prophylaxis: SCDs  -- Antibiotics: vanc and rocephin; f/u ID recs and cultures. Cx growing staph aureus. susc pending     -- Dispo: f/u ID recs. Will likely need PICC and home IV abx     Attg Note:  I agree with the above assessment and plan.  WBC 22K at  presentation.  Likely sepsis or at least bacteremia.  Staph from cultures at wrist and urine.  Continue abx.  Some respiratory issues this morning with 1-2 SaO2 measurements in the low 90s.  Resp therapy.      Kunal Shipley MD

## 2017-03-06 NOTE — ASSESSMENT & PLAN NOTE
61 year old male with history of multiple sclerosis admitted with left septic wrist and right ankle pain now s/p left wrist washout and debridement and ankle arthrocentesis on 3/4/17.   Fluid cultures from wrist showing MSSA, tissue culture from ankle showing S. Aureus (sensitivities pending), likely MSSA.  Urine culture reportedly showing a Staph Species, likely MSSA.  Blood cultures of 3/5 NGTD.   Given that staph is in urine and in two different joints, suspect he had a transient bacteremia that seeded joints.  Source unclear, though he reports history of psoriasis and lower extremity cellulitis in past.  No stigmata of endocarditis.  Physiologic murmur on exam.       He had recurrent fever and chills today, with some dyspnea.  Persistent leukocytosis.   CXR shows atelectasis right lung base.  No cough and lungs are clear to auscultation.   Has been on IV vancomycin and ceftriaxone since surgery.      Plan  - Discontinue vancomycin and ceftriaxone (done)  - Start cefazolin 6 grams IV q 24 hours by continuous infusion for MSSA.   Recommend minimum of 4 weeks of IV antibiotics for suspected complicated bacteremia with septic arthritis of wrist and ankle.   - Blood cultures repeated this morning with fevers, chills.   - If repeat blood cultures positive, would recommend KEIKO.    - Will continue to follow with you.   - Patient seen and discussed with staff. ID will follow.

## 2017-03-06 NOTE — SUBJECTIVE & OBJECTIVE
"Interval History:  Spiked temp this morning with some associated chills and SOB.  CXR shows atelectasis.  Denies cough.   Pain controlled.   Operative cultures showing MSSA.  Urine showing Staph "species", though likely MSSA.  Suspect he had a transient bacteremia that seeded joints.     HPI:  Mr. Cooper is a pleasant 61 y.o. male with a PMHx of MS on avonex presenting with worsening left wrist and right ankle pain and swelling of 5 days duration. Patient reports he was moving a heavy gate a work late February and twisted his ankle. He was evaluated by his PCP and left wrist xray revealed  evidence of AVN, DJD, and soft tissue swelling and right ankle xray showed evidence of soft tissue swelling but no fracture. He was suppose to follow up in orthopedic clinic but came to the ED as his pain acutely worsened. Patient denies break in his skin. Denies fevers, chills, sweats, recent infection, CP, SOB, N/V, diarrhea. Labs upon admit were significant for ESR elevated at 118, CRP elevated at 451.1 and a leukocytosis of 22.5k. Orthopedic surgery performed an aspiration of his left wrist  Revealing 20K WBCs and 79% segs. He is now s/p left wrist washout and debridement on 3/4 along with ankle arthrocentesis. Cultures are pending. GS + GPCs. He was started on empiric Vanc/Ceftriaxone post operatively.     Left wrist x-ray: There is lunate sclerosis suggesting AVN with areas of DJD most severe at the radiocarpal joint. There is soft tissue swelling.     Right ankle x-ray: Soft tissue swelling is identified about the ankle, particularly about the lateral malleolus.  The visualized osseous structures, however, appear intact, with no definite evidence of recent fracture or other significant abnormality identified.         Review of Systems   Constitutional: Positive for chills and fever. Negative for diaphoresis and fatigue.   HENT: Negative for congestion, mouth sores, rhinorrhea and sore throat.    Eyes: Negative for pain, " discharge and visual disturbance.   Respiratory: Negative for cough, chest tightness, shortness of breath and wheezing.    Cardiovascular: Negative for chest pain, palpitations and leg swelling.   Gastrointestinal: Negative for abdominal distention, abdominal pain, constipation, diarrhea, nausea and vomiting.   Genitourinary: Negative for difficulty urinating, dysuria, flank pain, frequency and hematuria.   Musculoskeletal: Positive for back pain (reports chronic back pain ). Negative for arthralgias, joint swelling, myalgias and neck pain.        Left wrist pain, swelling, redness  Right ankle pain, redness, erythema   Skin: Negative for color change, pallor, rash and wound.   Neurological: Negative for dizziness, seizures, weakness, numbness and headaches.   Hematological: Negative for adenopathy. Does not bruise/bleed easily.   Psychiatric/Behavioral: Negative for confusion and sleep disturbance. The patient is not nervous/anxious.      Objective:     Vital Signs (Most Recent):  Temp: 99.1 °F (37.3 °C) (03/06/17 0800)  Pulse: 93 (03/06/17 0800)  Resp: 16 (03/06/17 0800)  BP: 137/84 (03/06/17 0800)  SpO2: 99 % (03/06/17 0800) Vital Signs (24h Range):  Temp:  [96.4 °F (35.8 °C)-99.5 °F (37.5 °C)] 99.1 °F (37.3 °C)  Pulse:  [] 93  Resp:  [15-16] 16  SpO2:  [94 %-99 %] 99 %  BP: (137-155)/(80-85) 137/84     Weight: 136.1 kg (300 lb)  Body mass index is 39.58 kg/(m^2).    Estimated Creatinine Clearance: 142.2 mL/min (based on Cr of 0.8).    Physical Exam   Constitutional: He is oriented to person, place, and time. He appears well-developed and well-nourished. No distress.   HENT:   Head: Normocephalic and atraumatic.   Mouth/Throat: No oropharyngeal exudate.   Eyes: Conjunctivae and EOM are normal. Pupils are equal, round, and reactive to light. No scleral icterus.   Neck: Normal range of motion. Neck supple.   Cardiovascular: Regular rhythm and intact distal pulses.  Tachycardia present.    Murmur (?  physiologic murmur) heard.  Pulmonary/Chest: Effort normal and breath sounds normal. No respiratory distress. He has no wheezes.   Abdominal: Soft. Bowel sounds are normal. He exhibits no distension. There is no tenderness. There is no guarding.   Musculoskeletal: Normal range of motion. He exhibits no deformity.   Left wrist edematous, erythematous and mildly TTP.  Operative dressings with old serosanguinous drainage.       Right ankle edema, erythema and tenderness.  Per patient, some improvement.   Decreased ROM.    Lymphadenopathy:     He has no cervical adenopathy.   Neurological: He is alert and oriented to person, place, and time. No cranial nerve deficit.   Skin: Skin is warm and dry. No rash noted. He is not diaphoretic.   Chronic skin changes to bilateral lower extremities.    Psychiatric: He has a normal mood and affect. His behavior is normal. Judgment and thought content normal.   Vitals reviewed.      Significant Labs:   Blood Culture:   Recent Labs  Lab 03/05/17  0107   LABBLOO No Growth to date  No Growth to date  No Growth to date  No Growth to date     BMP:   Recent Labs  Lab 03/05/17  0039   GLU 86      K 3.9      CO2 25   BUN 16   CREATININE 0.8   CALCIUM 9.2     CBC: No results for input(s): WBC, HGB, HCT, PLT in the last 48 hours.  CMP:   Recent Labs  Lab 03/05/17  0039      K 3.9      CO2 25   GLU 86   BUN 16   CREATININE 0.8   CALCIUM 9.2   ANIONGAP 13   EGFRNONAA >60.0     Procalcitonin: No results for input(s): PROCAL in the last 48 hours.  Urine Culture:   Recent Labs  Lab 03/04/17  1548   LABURIN STAPHYLOCOCCUS SPECIES> 100,000 cfu/mlIdentification and susceptibility pending     Urine Studies:   Recent Labs  Lab 03/04/17  1548   COLORU Lizbeth   APPEARANCEUA Hazy*   PHUR 7.0   SPECGRAV 1.020   PROTEINUA 2+*   GLUCUA Negative   KETONESU 2+*   BILIRUBINUA Trace   OCCULTUA 3+*   NITRITE Positive*   UROBILINOGEN >=8.0*   LEUKOCYTESUR 1+*   RBCUA >100*   WBCUA 31*    BACTERIA Occasional   HYALINECASTS 0       Significant Imaging: I have reviewed all pertinent imaging results/findings within the past 24 hours.

## 2017-03-06 NOTE — PLAN OF CARE
Tom Garcia MD   [phone]       [id number] - [place] - [address] [address]  Phone: [phone] Fax: [phone]    [address]  [address] [facility] [address]  Phone: [phone] Fax: [phone]      Extended Emergency Contact Information  Primary Emergency Contact: Dorothy Cooper  Address: [address] [place] [address]           [place] [address] [facility]  Home Phone: [phone]  Mobile Phone: [phone]  Relation: Spouse     Payor: BLUE CROSS BLUE SHIELD / Plan: BCBS ALL OUT OF STATE / Product Type: PPO /         03/06/17 1447   Discharge Assessment   Assessment Type Discharge Planning Assessment   Confirmed/corrected address and phone number on facesheet? Yes   Assessment information obtained from? Patient   Expected Length of Stay (days) 4   Communicated expected length of stay with patient/caregiver yes   Prior to hospitilization cognitive status: Alert/Oriented   Prior to hospitalization functional status: Assistive Equipment   Current cognitive status: Alert/Oriented   Current Functional Status: Assistive Equipment   Arrived From home or self-care   Lives With spouse   Able to Return to Prior Arrangements yes   Is patient able to care for self after discharge? Yes   How many people do you have in your home that can help with your care after discharge? 1   Who are your caregiver(s) and their phone number(s)? Dorothy (spouse) [phone]   Patient's perception of discharge disposition home or selfcare   Readmission Within The Last 30 Days no previous admission in last 30 days   Patient currently being followed by outpatient case management? No   Patient currently receives home health services? No   Does the patient currently use HME? Yes   Patient currently receives private duty nursing? No   Patient currently receives any other outside agency services? No   Equipment Currently Used at  Home wheelchair;walker, rolling;shower chair;bedside commode  (powerchair)   Do you have any problems affording any of your prescribed medications? No   Is the patient taking medications as prescribed? yes   Do you have any financial concerns preventing you from receiving the healthcare you need? No   Does the patient have transportation to healthcare appointments? Yes   Transportation Available family or friend will provide   On Dialysis? No   Does the patient receive services at the Coumadin Clinic? No   Are there any open cases? No   Discharge Plan A Home with family;Home Health   Discharge Plan B Rehab   Patient/Family In Agreement With Plan yes

## 2017-03-07 ENCOUNTER — CLINICAL SUPPORT (OUTPATIENT)
Dept: CARDIOLOGY | Facility: CLINIC | Age: 62
DRG: 853 | End: 2017-03-07
Payer: COMMERCIAL

## 2017-03-07 PROBLEM — M00.032: Status: ACTIVE | Noted: 2017-03-07

## 2017-03-07 LAB
ALBUMIN SERPL BCP-MCNC: 1.6 G/DL
ALP SERPL-CCNC: 285 U/L
ALT SERPL W/O P-5'-P-CCNC: 53 U/L
ANION GAP SERPL CALC-SCNC: 9 MMOL/L
ANISOCYTOSIS BLD QL SMEAR: SLIGHT
AST SERPL-CCNC: 61 U/L
BACTERIA SPEC AEROBE CULT: NORMAL
BACTERIA SPEC AEROBE CULT: NORMAL
BACTERIA UR CULT: NORMAL
BASOPHILS # BLD AUTO: ABNORMAL K/UL
BASOPHILS NFR BLD: 0 %
BILIRUB SERPL-MCNC: 1.8 MG/DL
BUN SERPL-MCNC: 15 MG/DL
CALCIUM SERPL-MCNC: 8.9 MG/DL
CHLORIDE SERPL-SCNC: 103 MMOL/L
CO2 SERPL-SCNC: 27 MMOL/L
CREAT SERPL-MCNC: 0.7 MG/DL
DIASTOLIC DYSFUNCTION: NO
DIFFERENTIAL METHOD: ABNORMAL
EOSINOPHIL # BLD AUTO: ABNORMAL K/UL
EOSINOPHIL NFR BLD: 0 %
ERYTHROCYTE [DISTWIDTH] IN BLOOD BY AUTOMATED COUNT: 15.4 %
EST. GFR  (AFRICAN AMERICAN): >60 ML/MIN/1.73 M^2
EST. GFR  (NON AFRICAN AMERICAN): >60 ML/MIN/1.73 M^2
GLOBAL PERICARDIAL EFFUSION: NORMAL
GLUCOSE SERPL-MCNC: 93 MG/DL
HCT VFR BLD AUTO: 34.3 %
HGB BLD-MCNC: 11.4 G/DL
LYMPHOCYTES # BLD AUTO: ABNORMAL K/UL
LYMPHOCYTES NFR BLD: 5 %
MCH RBC QN AUTO: 29.7 PG
MCHC RBC AUTO-ENTMCNC: 33.2 %
MCV RBC AUTO: 89 FL
MITRAL VALVE MOBILITY: NORMAL
MONOCYTES # BLD AUTO: ABNORMAL K/UL
MONOCYTES NFR BLD: 2 %
MYELOCYTES NFR BLD MANUAL: 2 %
NEUTROPHILS NFR BLD: 89 %
NEUTS BAND NFR BLD MANUAL: 2 %
OVALOCYTES BLD QL SMEAR: ABNORMAL
PLATELET # BLD AUTO: 282 K/UL
PLATELET BLD QL SMEAR: ABNORMAL
PMV BLD AUTO: 10.9 FL
POIKILOCYTOSIS BLD QL SMEAR: SLIGHT
POLYCHROMASIA BLD QL SMEAR: ABNORMAL
POTASSIUM SERPL-SCNC: 4.2 MMOL/L
PROT SERPL-MCNC: 7 G/DL
RBC # BLD AUTO: 3.84 M/UL
RETIRED EF AND QEF - SEE NOTES: 63 (ref 55–65)
SODIUM SERPL-SCNC: 139 MMOL/L
TRICUSPID VALVE REGURGITATION: NORMAL
WBC # BLD AUTO: 21.04 K/UL

## 2017-03-07 PROCEDURE — 25000003 PHARM REV CODE 250: Performed by: INTERNAL MEDICINE

## 2017-03-07 PROCEDURE — C8929 TTE W OR WO FOL WCON,DOPPLER: HCPCS

## 2017-03-07 PROCEDURE — 97110 THERAPEUTIC EXERCISES: CPT

## 2017-03-07 PROCEDURE — 25000003 PHARM REV CODE 250: Performed by: NURSE PRACTITIONER

## 2017-03-07 PROCEDURE — 87040 BLOOD CULTURE FOR BACTERIA: CPT | Mod: 59

## 2017-03-07 PROCEDURE — 36415 COLL VENOUS BLD VENIPUNCTURE: CPT

## 2017-03-07 PROCEDURE — 99233 SBSQ HOSP IP/OBS HIGH 50: CPT | Mod: ,,, | Performed by: PHYSICIAN ASSISTANT

## 2017-03-07 PROCEDURE — 93306 TTE W/DOPPLER COMPLETE: CPT | Mod: 26,,, | Performed by: INTERNAL MEDICINE

## 2017-03-07 PROCEDURE — 87040 BLOOD CULTURE FOR BACTERIA: CPT

## 2017-03-07 PROCEDURE — 97530 THERAPEUTIC ACTIVITIES: CPT

## 2017-03-07 PROCEDURE — 99233 SBSQ HOSP IP/OBS HIGH 50: CPT | Mod: ,,, | Performed by: HOSPITALIST

## 2017-03-07 PROCEDURE — 25000003 PHARM REV CODE 250: Performed by: STUDENT IN AN ORGANIZED HEALTH CARE EDUCATION/TRAINING PROGRAM

## 2017-03-07 PROCEDURE — 25000003 PHARM REV CODE 250: Performed by: HOSPITALIST

## 2017-03-07 PROCEDURE — 80053 COMPREHEN METABOLIC PANEL: CPT

## 2017-03-07 PROCEDURE — 85027 COMPLETE CBC AUTOMATED: CPT

## 2017-03-07 PROCEDURE — 97535 SELF CARE MNGMENT TRAINING: CPT

## 2017-03-07 PROCEDURE — 25000003 PHARM REV CODE 250: Performed by: PHYSICIAN ASSISTANT

## 2017-03-07 PROCEDURE — 63600175 PHARM REV CODE 636 W HCPCS: Performed by: NURSE PRACTITIONER

## 2017-03-07 PROCEDURE — 85007 BL SMEAR W/DIFF WBC COUNT: CPT

## 2017-03-07 PROCEDURE — 97116 GAIT TRAINING THERAPY: CPT

## 2017-03-07 PROCEDURE — 11000001 HC ACUTE MED/SURG PRIVATE ROOM

## 2017-03-07 RX ORDER — MODAFINIL 100 MG/1
200 TABLET ORAL DAILY
Status: DISCONTINUED | OUTPATIENT
Start: 2017-03-07 | End: 2017-03-14 | Stop reason: HOSPADM

## 2017-03-07 RX ORDER — BACLOFEN 10 MG/1
10 TABLET ORAL 3 TIMES DAILY
Status: DISCONTINUED | OUTPATIENT
Start: 2017-03-07 | End: 2017-03-14 | Stop reason: HOSPADM

## 2017-03-07 RX ADMIN — Medication 3 ML: at 02:03

## 2017-03-07 RX ADMIN — POLYETHYLENE GLYCOL 3350 17 G: 17 POWDER, FOR SOLUTION ORAL at 09:03

## 2017-03-07 RX ADMIN — SODIUM CHLORIDE: 0.9 INJECTION, SOLUTION INTRAVENOUS at 09:03

## 2017-03-07 RX ADMIN — DEXTROSE 6 G: 5 SOLUTION INTRAVENOUS at 01:03

## 2017-03-07 RX ADMIN — Medication 3 ML: at 06:03

## 2017-03-07 RX ADMIN — BACLOFEN 10 MG: 10 TABLET ORAL at 03:03

## 2017-03-07 RX ADMIN — MODAFINIL 200 MG: 100 TABLET ORAL at 09:03

## 2017-03-07 RX ADMIN — BACLOFEN 10 MG: 10 TABLET ORAL at 10:03

## 2017-03-07 RX ADMIN — AMLODIPINE BESYLATE 10 MG: 10 TABLET ORAL at 09:03

## 2017-03-07 RX ADMIN — OXYCODONE HYDROCHLORIDE 15 MG: 5 TABLET ORAL at 10:03

## 2017-03-07 RX ADMIN — Medication 3 ML: at 10:03

## 2017-03-07 RX ADMIN — LOSARTAN POTASSIUM 25 MG: 25 TABLET, FILM COATED ORAL at 09:03

## 2017-03-07 NOTE — PT/OT/SLP PROGRESS
Physical Therapy  Treatment    Rajan Cooper III   MRN: [id number]   Admitting Diagnosis: <principal problem not specified>    PT Received On: 17  PT Start Time: 1145     PT Stop Time: 1215    PT Total Time (min): 30 min       Billable Minutes:  Gait [id number], Therapeutic Activity 10 and Therapeutic Exercise 10    Treatment Type: Treatment  PT/PTA: PTA     PTA Visit Number: 2       General Precautions: Standard, fall  Orthopedic Precautions: LUE non weight bearing   Braces:      Do you have any cultural, spiritual, Tenriism conflicts, given your current situation?: none    Subjective:  Communicated with NSG prior to session.  Patient agreeable to therapy.    Pain Ratin/10  Location - Side: Bilateral     Location: foot          Objective:   Patient found with: oxygen    Functional Mobility:  Bed Mobility:   Supine to Sit: Minimum Assistance    Transfers:  Sit <> Stand Assistance: Minimum Assistance  Sit <> Stand Assistive Device: Platform, Rolling Walker  Bed <> Chair Technique: Stand Pivot  Bed <> Chair Assistance: Contact Guard Assistance  Bed <> Chair Assistive Device: Rolling Walker, Platform    Gait:   Gait Distance: 25 feet and 20 feet  Assistance 1: Minimum assistance  Gait Assistive Device: Platform walker left  Gait Pattern: 3-point gait  Gait Deviation(s): decreased zahra, decreased step length, forward lean, decreased stride length, increased time in double stance, decreased velocity of limb motion, decreased weight-shifting ability        Balance:   Static Sit: GOOD+: Takes MAXIMAL challenges from all directions.    Dynamic Sit: GOOD: Maintains balance through MODERATE excursions of active trunk movement  Static Stand: POOR+: Needs MINIMAL assist to maintain  Dynamic stand: POOR: N/A     Therapeutic Activities and Exercises:  Patient performed supine exercises AP,GS,QS, and HS x20 reps; seated exercises LAQ,hip flexion and hip ADD with pillow     AM-PAC 6 CLICK MOBILITY  How much help from  another person does this patient currently need?   1 = Unable, Total/Dependent Assistance  2 = A lot, Maximum/Moderate Assistance  3 = A little, Minimum/Contact Guard/Supervision  4 = None, Modified [place]/Independent    Turning over in bed (including adjusting bedclothes, sheets and blankets)?: 3  Sitting down on and standing up from a chair with arms (e.g., wheelchair, bedside commode, etc.): 3  Moving from lying on back to sitting on the side of the bed?: 3  Moving to and from a bed to a chair (including a wheelchair)?: 3  Need to walk in hospital room?: 3  Climbing 3-5 steps with a railing?: 1  Total Score: 16    AM-PAC Raw Score CMS G-Code Modifier Level of Impairment Assistance   6 % Total / Unable   7 - 9 CM 80 - 100% Maximal Assist   10 - 14 CL 60 - 80% Moderate Assist   15 - 19 CK 40 - 60% Moderate Assist   20 - 22 CJ 20 - 40% Minimal Assist   23 CI 1-20% SBA / CGA   24 CH 0% Independent/ Mod I     Patient left up in chair with all lines intact, call button in reach and wife present.    Assessment:  Rajan Cooper III is a 61 y.o. male with a medical diagnosis of <principal problem not specified> and presents with decrease strength, mobility, balance and endurance.  Patient required less assistance with mobility and transfers.  Continue to note decrease strength and ROM with B LE's. Patient would benefit from further IP therapy.    Rehab identified problem list/impairments: Rehab identified problem list/impairments: weakness, impaired endurance, impaired self care skills, impaired functional mobilty, decreased coordination, gait instability, impaired balance, decreased ROM, pain, decreased lower extremity function, decreased upper extremity function, orthopedic precautions    Rehab potential is good.    Activity tolerance: Good    Discharge recommendations: Discharge Facility/Level Of Care Needs: nursing facility, skilled     Barriers to discharge: Barriers to Discharge: Inaccessible home  environment    Equipment recommendations: Equipment Needed After Discharge: bedside commode, bath bench (Left Platform RW)     GOALS:   Physical Therapy Goals        Problem: Physical Therapy Goal    Goal Priority Disciplines Outcome Goal Variances Interventions   Physical Therapy Goal     PT/OT, PT Ongoing (interventions implemented as appropriate)     Description:  Goals to be met by: 3/12/17     Patient will increase functional independence with mobility by performin. Supine to sit with Set-up [place]  2. Sit to supine with Set-up [place]  3. Sit to stand transfer with CGA using appropriate AD  4. Bed to chair transfer with Minimal Assistance using appropriate AD  5. Gait  x 20 feet with Minimal Assistance using appropriate AD.   6. Lower extremity exercise program x20-30 reps per handout, with independence                PLAN:    Patient to be seen daily  to address the above listed problems via gait training, therapeutic activities, therapeutic exercises, neuromuscular re-education  Plan of Care expires: 17  Plan of Care reviewed with: patient         Chaim Etienne II, PTA  2017

## 2017-03-07 NOTE — PLAN OF CARE
Problem: Occupational Therapy Goal  Goal: Occupational Therapy Goal  Goals to be met by: 3/20/17     Patient will increase functional independence with ADLs by performing:    UE Dressing with [place].  LE Dressing with [place].  Grooming while standing at sink with Modified [place].  Toileting from toilet with [place] for hygiene and clothing management.   Bathing from edge of bed with [place].  Toilet transfer to toilet with [place].  Increased functional strength to WFL for L UE.  Upper extremity exercise program x15 reps per handout, with independence.   Continue POC.

## 2017-03-07 NOTE — PLAN OF CARE
Problem: Infection, Risk/Actual (Adult)  Goal: Infection Prevention/Resolution  Patient will demonstrate the desired outcomes by discharge/transition of care.   Outcome: Ongoing (interventions implemented as appropriate)  abx  Changed to appropriate theraphy

## 2017-03-07 NOTE — PROGRESS NOTES
ORTHO PROGRESS NOTE:    Rajan Cooper III is a 61 y.o. male admitted on 3/4/2017  Hospital Day: 3   Post Op Day: 3 Day Post-Op      The patient was seen and examined this morning at the bedside. Low O2 sats yesterday. CXR showed no acute lung process, atelectasis. Also spiked fever to 101 despite being on Abx. Urine cx +.     PHYSICAL EXAM:  Awake/alert/oriented x 3, ill appearing   AFVSS  Good inspiratory effort with unlabored breathing. Nasal cannula   Dressings changed at bedside.   NVI distally  Able to range left wrist passively  Pitting edema to right ankle with minimal warmth and erythema     Vitals:    03/06/17 1621 03/06/17 1930 03/06/17 2345 03/07/17 0345   BP: (!) 143/97 (!) 158/88 (!) 149/79 (!) 154/82   BP Location: Right arm Right arm Right arm Right arm   Patient Position: Sitting Lying Lying Lying   BP Method: Automatic Automatic Automatic Automatic   Pulse: (!) 117 103 89 82   Resp: 18 18 18 18   Temp: 99 °F (37.2 °C) 100 °F (37.8 °C) 99.7 °F (37.6 °C) 97.2 °F (36.2 °C)   TempSrc: Oral Oral Oral Oral   SpO2: 97% 96% 96% (!) 94%   Weight:       Height:         I/O last 3 completed shifts:  In: 1870 [P.O.:1220; I.V.:300; IV Piggyback:350]  Out: -     Recent Labs  Lab 03/04/17  0829 03/05/17  0039 03/06/17  1203   CALCIUM 9.6 9.2 9.2   PROT 7.4  --  7.5    143 139   K 3.5 3.9 4.4   CO2 24 25 27    105 101   BUN 15 16 21*   CREATININE 0.8 0.8 0.9       Recent Labs  Lab 03/04/17  0829 03/06/17  1203   WBC 22.54* 23.12*   RBC 4.15* 3.95*   HGB 12.6* 11.8*   HCT 37.0* 35.8*    296       Recent Labs  Lab 03/04/17  1408   INR 1.1   APTT 26.8         A/P: 61 y.o. male 3 Day Post-Op s/p L wrist arthrotomy with irrigation and synovectomy, I&D abscess  -- Pain control  -- PT/OT: RUDDY CHO. ROM left wrist  -- DVT prophylaxis: SCDs  -- Antibiotics: Ancef. Blood cx neg. Urine Cx Staph. susc pending. ID on board    -- Dispo: transfer to medicine service today due to WBC 22, spiking fevers despite  Abx.       Attg Note:  I agree with the above assessment and plan.  Wrist looks good.  WBC count down mildly.  Tmax 101.      Kunal Shipley MD

## 2017-03-07 NOTE — PLAN OF CARE
Problem: Physical Therapy Goal  Goal: Physical Therapy Goal  Goals to be met by: 3/12/17     Patient will increase functional independence with mobility by performin. Supine to sit with Set-up [place]  2. Sit to supine with Set-up [place]  3. Sit to stand transfer with CGA using appropriate AD  4. Bed to chair transfer with Minimal Assistance using appropriate AD  5. Gait x 20 feet with Minimal Assistance using appropriate AD.   6. Lower extremity exercise program x20-30 reps per handout, with independence   Patient goals remain appropriate.  Patient will continue to benefit from further PT services.  Chaim Etienne, PTA

## 2017-03-07 NOTE — PLAN OF CARE
CHRISTINE called [facility] customer number for list of SNFs in the [address] zip code:  Spoke with Denny who will have someone fax a list to the office or they will call me back with names of facilities covered under their plan.    Customer Service [phone]    Christine spoke with wife and pt; they want home health instead.

## 2017-03-07 NOTE — SUBJECTIVE & OBJECTIVE
Interval History:  NAEON. Patient still having chills and weakness. Tmax 100 overnight. Blood cultures obtained returned positive for staph, ID pending (1 set).  Operative cultures showing MSSA.  Urine cx growing MSSA as well.  Suspect he had a transient bacteremia that seeded joints. Otherwise, without new complaints.     HPI:  Mr. Cooper is a pleasant 61 y.o. male with a PMHx of MS on avonex presenting with worsening left wrist and right ankle pain and swelling of 5 days duration. Patient reports he was moving a heavy gate a work late February and twisted his ankle. He was evaluated by his PCP and left wrist xray revealed  evidence of AVN, DJD, and soft tissue swelling and right ankle xray showed evidence of soft tissue swelling but no fracture. He was suppose to follow up in orthopedic clinic but came to the ED as his pain acutely worsened. Patient denies break in his skin. Denies fevers, chills, sweats, recent infection, CP, SOB, N/V, diarrhea. Labs upon admit were significant for ESR elevated at 118, CRP elevated at 451.1 and a leukocytosis of 22.5k. Orthopedic surgery performed an aspiration of his left wrist  Revealing 20K WBCs and 79% segs. He is now s/p left wrist washout and debridement on 3/4 along with ankle arthrocentesis. Cultures are pending. GS + GPCs. He was started on empiric Vanc/Ceftriaxone post operatively.     Left wrist x-ray: There is lunate sclerosis suggesting AVN with areas of DJD most severe at the radiocarpal joint. There is soft tissue swelling.     Right ankle x-ray: Soft tissue swelling is identified about the ankle, particularly about the lateral malleolus.  The visualized osseous structures, however, appear intact, with no definite evidence of recent fracture or other significant abnormality identified.         Review of Systems   Constitutional: Positive for chills and fever. Negative for diaphoresis and fatigue.   HENT: Negative for congestion, mouth sores, rhinorrhea and sore  throat.    Eyes: Negative for pain, discharge and visual disturbance.   Respiratory: Negative for cough, chest tightness, shortness of breath and wheezing.    Cardiovascular: Negative for chest pain, palpitations and leg swelling.   Gastrointestinal: Negative for abdominal distention, abdominal pain, constipation, diarrhea, nausea and vomiting.   Genitourinary: Negative for difficulty urinating, dysuria, flank pain, frequency and hematuria.   Musculoskeletal: Positive for back pain (reports chronic back pain ). Negative for arthralgias, joint swelling, myalgias and neck pain.        Left wrist pain, swelling, redness  Right ankle pain, redness, erythema   Skin: Negative for color change, pallor, rash and wound.   Neurological: Negative for dizziness, seizures, weakness, numbness and headaches.   Hematological: Negative for adenopathy. Does not bruise/bleed easily.   Psychiatric/Behavioral: Negative for confusion and sleep disturbance. The patient is not nervous/anxious.      Objective:     Vital Signs (Most Recent):  Temp: 97.3 °F (36.3 °C) (03/07/17 0800)  Pulse: 97 (03/07/17 0800)  Resp: 18 (03/07/17 0800)  BP: (!) 143/85 (03/07/17 0800)  SpO2: 97 % (03/07/17 0800) Vital Signs (24h Range):  Temp:  [97.2 °F (36.2 °C)-100 °F (37.8 °C)] 97.3 °F (36.3 °C)  Pulse:  [] 97  Resp:  [18] 18  SpO2:  [94 %-97 %] 97 %  BP: (143-161)/(79-97) 143/85     Weight: 136.1 kg (300 lb)  Body mass index is 39.58 kg/(m^2).    Estimated Creatinine Clearance: 162.5 mL/min (based on Cr of 0.7).    Physical Exam   Constitutional: He is oriented to person, place, and time. He appears well-developed and well-nourished. No distress.   HENT:   Head: Normocephalic and atraumatic.   Mouth/Throat: No oropharyngeal exudate.   Eyes: Conjunctivae and EOM are normal. Pupils are equal, round, and reactive to light. No scleral icterus.   Neck: Normal range of motion. Neck supple.   Cardiovascular: Regular rhythm and intact distal pulses.   Tachycardia present.    Murmur (? physiologic murmur) heard.  Pulmonary/Chest: Effort normal and breath sounds normal. No respiratory distress. He has no wheezes.   Abdominal: Soft. Bowel sounds are normal. He exhibits no distension. There is no tenderness. There is no guarding.   Musculoskeletal: Normal range of motion. He exhibits no deformity.   Left wrist edematous, erythematous and mildly TTP. Dressed. Dressing c/d/i.   Right ankle warmth, edema, erythema and tenderness.    Lymphadenopathy:     He has no cervical adenopathy.   Neurological: He is alert and oriented to person, place, and time. No cranial nerve deficit.   Skin: Skin is warm and dry. No rash noted. He is not diaphoretic.   Chronic skin changes to bilateral lower extremities.    Psychiatric: He has a normal mood and affect. His behavior is normal. Judgment and thought content normal.   Vitals reviewed.      Significant Labs:   Blood Culture:     Recent Labs  Lab 03/05/17  0107 03/06/17  1202 03/06/17  1203 03/06/17  1403   LABBLOO No Growth to date  No Growth to date  No Growth to date  No Growth to date  No Growth to date  No Growth to date Gram stain cristiano bottle: Gram positive cocci in clusters resembling Staph   Results called to and read back by: Angelica Hurtado RN  03/07/2017    09:17 No Growth to date No Growth to date  No Growth to date     BMP:     Recent Labs  Lab 03/07/17  0904   GLU 93      K 4.2      CO2 27   BUN 15   CREATININE 0.7   CALCIUM 8.9     CBC:     Recent Labs  Lab 03/06/17  1203 03/07/17  0904   WBC 23.12* 21.04*   HGB 11.8* 11.4*   HCT 35.8* 34.3*    282     CMP:     Recent Labs  Lab 03/06/17  1203 03/07/17  0904    139   K 4.4 4.2    103   CO2 27 27   GLU 94 93   BUN 21* 15   CREATININE 0.9 0.7   CALCIUM 9.2 8.9   PROT 7.5 7.0   ALBUMIN 1.8* 1.6*   BILITOT 1.6* 1.8*   ALKPHOS 223* 285*   AST 33 61*   ALT 37 53*   ANIONGAP 11 9   EGFRNONAA >60.0 >60.0     Procalcitonin: No results for  input(s): PROCAL in the last 48 hours.  Urine Culture:     Recent Labs  Lab 03/04/17  1548   LABURIN STAPHYLOCOCCUS AUREUS> 100,000 cfu/ml     Urine Studies:     Recent Labs  Lab 03/04/17  1548   COLORU Lizbeth   APPEARANCEUA Hazy*   PHUR 7.0   SPECGRAV 1.020   PROTEINUA 2+*   GLUCUA Negative   KETONESU 2+*   BILIRUBINUA Trace   OCCULTUA 3+*   NITRITE Positive*   UROBILINOGEN >=8.0*   LEUKOCYTESUR 1+*   RBCUA >100*   WBCUA 31*   BACTERIA Occasional   HYALINECASTS 0       Significant Imaging: I have reviewed all pertinent imaging results/findings within the past 24 hours.

## 2017-03-07 NOTE — ASSESSMENT & PLAN NOTE
CXR and BNP unremarkable. Pulmonary edema or PNA unlikely. Instructed on using incentive spirometry.

## 2017-03-07 NOTE — PLAN OF CARE
Problem: Patient Care Overview  Goal: Plan of Care Review  Outcome: Ongoing (interventions implemented as appropriate)  No acute events throughout day, VS and assessment per flow sheet, patient progressing towards goals as tolerated, plan of care reviewed with Rajan Cooper III and family, all concerns addressed, will continue to monitor.

## 2017-03-07 NOTE — ASSESSMENT & PLAN NOTE
SIRS (4/4) 2/2 to MSSA.   On Ancef IV continuous infusion   Blood cs revealed staph   TTE ordered  MRI to rule out Osteomyelitis  .1

## 2017-03-07 NOTE — NURSING
MD Ellington was notified of temp of 100 and the pt hasn't had a bowel movement in a week. MD placed new orders.

## 2017-03-07 NOTE — PT/OT/SLP PROGRESS
Occupational Therapy  Treatment    Rajan Cooper III   MRN: [id number]   Admitting Diagnosis: Staphylococcus aureus sepsis    OT Date of Treatment: 17   OT Start Time: 1316  OT Stop Time: 1340  OT Total Time (min): 24 min    Billable Minutes:  Self Care/Home Management 8 and Therapeutic Activity 16    General Precautions: Standard, fall  Orthopedic Precautions: LUE non weight bearing  Braces: N/A    Do you have any cultural, spiritual, Oriental orthodox conflicts, given your current situation?: None    Subjective:  Communicated with RN prior to session.      Pain Ratin/10  Location - Side: Left     Location: hand  Pain Addressed: Distraction, Cessation of Activity, Reposition  Pain Rating Post-Intervention:  (Did not rate)    Objective:  Patient found with: oxygen, peripheral IV     Functional Mobility:  Bed Mobility:  Supine to Sit:  (Did not perform; Pt found seated in bedside chair. )    Transfers:   Sit <> Stand Assistance: Minimum Assistance (from bedside chair)  Sit <> Stand Assistive Device: Platform    Functional Ambulation: Pt walked to from bedside chair to stretcher in hallway for transport using platform walker and CGA.    Activities of Daily Living:           Toileting Where Assessed:  (Urinal while standing with platform walker for support)  Toileting Level of Assistance: Minimum assistance (pt required assistance to hold urinal and increased time to pull up pants)          Balance:   Static Sit: GOOD+: Takes MAXIMAL challenges from all directions.    Dynamic Sit: GOOD-: Maintains balance through MODERATE excursions of active trunk movement,     Static Stand: FAIR  Dynamic stand: FAIR: Needs CONTACT GUARD during gait    Therapeutic Activities and Exercises:  Pt educated on:  - AROM hand exercises; Pt given handout and able to demonstrate understanding of 6/6 exercies.   - Pt/family educated on importance of performing LUE AROM exercises twice a day.   - how to brad resting hand splint and wearing  schedule.    Pt performed 6 pack hand exercsies 1x10 reps while seated in bedside chair.     AM-PAC 6 CLICK ADL   How much help from another person does this patient currently need?   1 = Unable, Total/Dependent Assistance  2 = A lot, Maximum/Moderate Assistance  3 = A little, Minimum/Contact Guard/Supervision  4 = None, Modified [place]/Independent    Putting on and taking off regular lower body clothing? : 2  Bathing (including washing, rinsing, drying)?: 2  Toileting, which includes using toilet, bedpan, or urinal? : 3  Putting on and taking off regular upper body clothing?: 2  Taking care of personal grooming such as brushing teeth?: 2  Eating meals?: 3  Total Score: 14     AM-PAC Raw Score CMS G-Code Modifier Level of Impairment Assistance   6 % Total / Unable   7 - 9 CM 80 - 100% Maximal Assist   10 - 14 CL 60 - 80% Moderate Assist   15 - 19 CK 40 - 60% Moderate Assist   20 - 22 CJ 20 - 40% Minimal Assist   23 CI 1-20% SBA / CGA   24 CH 0% Independent/ Mod I     Patient left supine in stretcher to be transported to procedure following treatment session with all lines in tact and RN notified.      ASSESSMENT:  Rajan Cooper III is a 61 y.o. male with a medical diagnosis of Staphylococcus aureus sepsis and presents with decreased strength, LUE ROM, endurance, self care skills, and functional mobility. Pt tolerated treatment well this date. Pt educated on RUE AROM HEP, how to brad resting hand splint, and wearing schedule for splint and was able to verbalize and demonstrate understanding. Pt completed sit <> stand from bedside chair with min A and platform walker for support. He performed toileting using urinal while standing with min A to complete. Pt would benefit from continued skilled OT to address deficits and maximize return to PLOF. Pt appropriate for SNF however spouse states she wants him home with home health upon d/c.     Rehab identified problem list/impairments: Rehab identified problem  list/impairments: weakness, impaired endurance, impaired self care skills, impaired functional mobilty, pain, orthopedic precautions, edema, decreased ROM, decreased upper extremity function    Rehab potential is good.    Activity tolerance: Good    Discharge recommendations: Discharge Facility/Level Of Care Needs: nursing facility, skilled (Wife reports she wants to take care of him at home with home health)     Barriers to discharge: Barriers to Discharge: Inaccessible home environment    Equipment recommendations: bedside commode, bath bench     GOALS:   Occupational Therapy Goals        Problem: Occupational Therapy Goal    Goal Priority Disciplines Outcome Interventions   Occupational Therapy Goal     OT, PT/OT     Description:  Goals to be met by: 3/20/17     Patient will increase functional independence with ADLs by performing:    UE Dressing with [place].  LE Dressing with [place].  Grooming while standing at sink with Modified [place].  Toileting from toilet with [place] for hygiene and clothing management.   Bathing from  edge of bed with [place].  Toilet transfer to toilet with [place].  Increased functional strength to WFL for L UE.  Upper extremity exercise program x15 reps per handout, with independence.                Plan:  Patient to be seen 6 x/week to address the above listed problems via therapeutic activities, therapeutic exercises  Plan of Care expires: 03/20/17  Plan of Care reviewed with: patient, spouse         ESTEFANI Slater  03/07/2017

## 2017-03-07 NOTE — PROGRESS NOTES
[facility]  Infectious Disease  Progress Note    Patient Name: Rajan Cooper III  MRN: [id number]  Admission Date: 3/4/2017  Length of Stay: 1 days  Attending Physician: Baldev Gorman MD  Primary Care Provider: Tom Garcia MD    Isolation Status: No active isolations  Assessment/Plan:      Septic arthritis of wrist, left  61 year old male with history of multiple sclerosis admitted with left septic wrist and right ankle pain now s/p left wrist washout and debridement and ankle arthrocentesis on 3/4/17. Wrist and ankles cultures showing MSSA and urine culture grew MSSA.  Blood cultures of 3/5 NGTD.  Given that staph is in urine and in two different joints, suspect he had a transient bacteremia that seeded joints.  Source unclear, though he reports history of psoriasis and lower extremity cellulitis in past.  No stigmata of endocarditis.  Physiologic murmur heard on exam.       Patient had recurrent fever and chills yesterday with a persistent leukocytosis.  Blood cultures obtained 3/6 are now positive for GPC resembling staph (1 bottle, ID pending).  Persistent shaking chills today. Concerned there is an underlying source control issue.    Plan  - Continue cefazolin 6 grams IV q 24 hours by continuous infusion for MSSA.  Recommend minimum of 4 weeks of IV antibiotics for suspected complicated bacteremia with septic arthritis of wrist and ankle.   - Blood culture repeated today.  - If positive blood culture grows staph aureus, would recommend KEIKO.    - Discussed with staff. ID will continue to follow with you.     Please call for any questions. Thank you.  Larissa Patton PA-C  Phone: [phone]  Pager: [phone]    Subjective:     Interval History:  NAEON. Patient still having chills and weakness. Tmax 100 overnight. Blood cultures obtained returned positive for staph, ID pending (1 set).  Operative cultures showing MSSA.  Urine cx growing MSSA as well.  Suspect he had a transient bacteremia that  seeded joints. Otherwise, without new complaints.     HPI:  Mr. Cooper is a pleasant 61 y.o. male with a PMHx of MS on avonex presenting with worsening left wrist and right ankle pain and swelling of 5 days duration. Patient reports he was moving a heavy gate a work late February and twisted his ankle. He was evaluated by his PCP and left wrist xray revealed  evidence of AVN, DJD, and soft tissue swelling and right ankle xray showed evidence of soft tissue swelling but no fracture. He was suppose to follow up in orthopedic clinic but came to the ED as his pain acutely worsened. Patient denies break in his skin. Denies fevers, chills, sweats, recent infection, CP, SOB, N/V, diarrhea. Labs upon admit were significant for ESR elevated at 118, CRP elevated at 451.1 and a leukocytosis of 22.5k. Orthopedic surgery performed an aspiration of his left wrist  Revealing 20K WBCs and 79% segs. He is now s/p left wrist washout and debridement on 3/4 along with ankle arthrocentesis. Cultures are pending. GS + GPCs. He was started on empiric Vanc/Ceftriaxone post operatively.     Left wrist x-ray: There is lunate sclerosis suggesting AVN with areas of DJD most severe at the radiocarpal joint. There is soft tissue swelling.     Right ankle x-ray: Soft tissue swelling is identified about the ankle, particularly about the lateral malleolus.  The visualized osseous structures, however, appear intact, with no definite evidence of recent fracture or other significant abnormality identified.         Review of Systems   Constitutional: Positive for chills and fever. Negative for diaphoresis and fatigue.   HENT: Negative for congestion, mouth sores, rhinorrhea and sore throat.    Eyes: Negative for pain, discharge and visual disturbance.   Respiratory: Negative for cough, chest tightness, shortness of breath and wheezing.    Cardiovascular: Negative for chest pain, palpitations and leg swelling.   Gastrointestinal: Negative for abdominal  distention, abdominal pain, constipation, diarrhea, nausea and vomiting.   Genitourinary: Negative for difficulty urinating, dysuria, flank pain, frequency and hematuria.   Musculoskeletal: Positive for back pain (reports chronic back pain ). Negative for arthralgias, joint swelling, myalgias and neck pain.        Left wrist pain, swelling, redness  Right ankle pain, redness, erythema   Skin: Negative for color change, pallor, rash and wound.   Neurological: Negative for dizziness, seizures, weakness, numbness and headaches.   Hematological: Negative for adenopathy. Does not bruise/bleed easily.   Psychiatric/Behavioral: Negative for confusion and sleep disturbance. The patient is not nervous/anxious.      Objective:     Vital Signs (Most Recent):  Temp: 97.3 °F (36.3 °C) (03/07/17 0800)  Pulse: 97 (03/07/17 0800)  Resp: 18 (03/07/17 0800)  BP: (!) 143/85 (03/07/17 0800)  SpO2: 97 % (03/07/17 0800) Vital Signs (24h Range):  Temp:  [97.2 °F (36.2 °C)-100 °F (37.8 °C)] 97.3 °F (36.3 °C)  Pulse:  [] 97  Resp:  [18] 18  SpO2:  [94 %-97 %] 97 %  BP: (143-161)/(79-97) 143/85     Weight: 136.1 kg (300 lb)  Body mass index is 39.58 kg/(m^2).    Estimated Creatinine Clearance: 162.5 mL/min (based on Cr of 0.7).    Physical Exam   Constitutional: He is oriented to person, place, and time. He appears well-developed and well-nourished. No distress.   HENT:   Head: Normocephalic and atraumatic.   Mouth/Throat: No oropharyngeal exudate.   Eyes: Conjunctivae and EOM are normal. Pupils are equal, round, and reactive to light. No scleral icterus.   Neck: Normal range of motion. Neck supple.   Cardiovascular: Regular rhythm and intact distal pulses.  Tachycardia present.    Murmur (? physiologic murmur) heard.  Pulmonary/Chest: Effort normal and breath sounds normal. No respiratory distress. He has no wheezes.   Abdominal: Soft. Bowel sounds are normal. He exhibits no distension. There is no tenderness. There is no guarding.    Musculoskeletal: Normal range of motion. He exhibits no deformity.   Left wrist edematous, erythematous and mildly TTP. Dressed. Dressing c/d/i.   Right ankle warmth, edema, erythema and tenderness.    Lymphadenopathy:     He has no cervical adenopathy.   Neurological: He is alert and oriented to person, place, and time. No cranial nerve deficit.   Skin: Skin is warm and dry. No rash noted. He is not diaphoretic.   Chronic skin changes to bilateral lower extremities.    Psychiatric: He has a normal mood and affect. His behavior is normal. Judgment and thought content normal.   Vitals reviewed.      Significant Labs:   Blood Culture:     Recent Labs  Lab 03/05/17  0107 03/06/17  1202 03/06/17  1203 03/06/17  1403   LABBLOO No Growth to date  No Growth to date  No Growth to date  No Growth to date  No Growth to date  No Growth to date Gram stain cristiano bottle: Gram positive cocci in clusters resembling Staph   Results called to and read back by: Angelica Hurtado RN  03/07/2017    09:17 No Growth to date No Growth to date  No Growth to date     BMP:     Recent Labs  Lab 03/07/17  0904   GLU 93      K 4.2      CO2 27   BUN 15   CREATININE 0.7   CALCIUM 8.9     CBC:     Recent Labs  Lab 03/06/17  1203 03/07/17  0904   WBC 23.12* 21.04*   HGB 11.8* 11.4*   HCT 35.8* 34.3*    282     CMP:     Recent Labs  Lab 03/06/17  1203 03/07/17  0904    139   K 4.4 4.2    103   CO2 27 27   GLU 94 93   BUN 21* 15   CREATININE 0.9 0.7   CALCIUM 9.2 8.9   PROT 7.5 7.0   ALBUMIN 1.8* 1.6*   BILITOT 1.6* 1.8*   ALKPHOS 223* 285*   AST 33 61*   ALT 37 53*   ANIONGAP 11 9   EGFRNONAA >60.0 >60.0     Procalcitonin: No results for input(s): PROCAL in the last 48 hours.  Urine Culture:     Recent Labs  Lab 03/04/17  1548   LABURIN STAPHYLOCOCCUS AUREUS> 100,000 cfu/ml     Urine Studies:     Recent Labs  Lab 03/04/17  1548   COLORU Lizbeth   APPEARANCEUA Hazy*   PHUR 7.0   SPECGRAV 1.020   PROTEINUA 2+*    GLUCUA Negative   KETONESU 2+*   BILIRUBINUA Trace   OCCULTUA 3+*   NITRITE Positive*   UROBILINOGEN >=8.0*   LEUKOCYTESUR 1+*   RBCUA >100*   WBCUA 31*   BACTERIA Occasional   HYALINECASTS 0       Significant Imaging: I have reviewed all pertinent imaging results/findings within the past 24 hours.

## 2017-03-07 NOTE — SUBJECTIVE & OBJECTIVE
Interval History:   Patient seen and examined at bedside. Positive blood culture for staph aureus. TTE and MRI ordered.     Review of Systems   Constitutional: Positive for fatigue and fever. Negative for chills and diaphoresis.   HENT: Negative for congestion.    Eyes: Negative for visual disturbance.   Respiratory: Negative for shortness of breath.    Cardiovascular: Positive for leg swelling. Negative for chest pain and palpitations.   Gastrointestinal: Negative for abdominal pain and diarrhea.   Genitourinary: Negative for dysuria.   Musculoskeletal: Positive for arthralgias. Negative for myalgias.   Skin: Positive for rash and wound.   Neurological: Negative for dizziness, weakness and headaches.   Psychiatric/Behavioral: Positive for confusion.     Objective:     Vital Signs (Most Recent):  Temp: 97 °F (36.1 °C) (03/07/17 1300)  Pulse: 97 (03/07/17 1300)  Resp: 18 (03/07/17 1300)  BP: (!) 150/70 (03/07/17 1300)  SpO2: 95 % (03/07/17 1300) Vital Signs (24h Range):  Temp:  [97 °F (36.1 °C)-100 °F (37.8 °C)] 97 °F (36.1 °C)  Pulse:  [] 97  Resp:  [18] 18  SpO2:  [94 %-97 %] 95 %  BP: (143-158)/(70-97) 150/70     Weight: 136.1 kg (300 lb)  Body mass index is 39.58 kg/(m^2).    Intake/Output Summary (Last 24 hours) at 03/07/17 1547  Last data filed at 03/07/17 1316   Gross per 24 hour   Intake          1081.25 ml   Output              600 ml   Net           481.25 ml      Physical Exam   Constitutional: He is oriented to person, place, and time. He appears well-developed and well-nourished. No distress.   HENT:   Head: Normocephalic and atraumatic.   Eyes: EOM are normal. Pupils are equal, round, and reactive to light.   Neck: Normal range of motion. Neck supple.   Cardiovascular: Intact distal pulses.  Tachycardia present.  Exam reveals no gallop and no friction rub.    No murmur heard.  Pulmonary/Chest: Effort normal and breath sounds normal. No respiratory distress.   Abdominal: Soft. Bowel sounds are  normal.   Musculoskeletal: Normal range of motion. He exhibits edema and tenderness.   Neurological: He is alert and oriented to person, place, and time.   Skin: Rash noted. He is not diaphoretic. There is erythema.       Significant Labs:   CBC:     Recent Labs  Lab 03/06/17  1203 03/07/17  0904   WBC 23.12* 21.04*   HGB 11.8* 11.4*   HCT 35.8* 34.3*    282     CMP:     Recent Labs  Lab 03/06/17  1203 03/07/17  0904    139   K 4.4 4.2    103   CO2 27 27   GLU 94 93   BUN 21* 15   CREATININE 0.9 0.7   CALCIUM 9.2 8.9   PROT 7.5 7.0   ALBUMIN 1.8* 1.6*   BILITOT 1.6* 1.8*   ALKPHOS 223* 285*   AST 33 61*   ALT 37 53*   ANIONGAP 11 9   EGFRNONAA >60.0 >60.0     Cardiac Markers:     Recent Labs  Lab 03/06/17  1203   BNP 39     Lactic Acid:     Recent Labs  Lab 03/06/17  1203   LACTATE 1.7     Urine Studies: No results for input(s): COLORU, APPEARANCEUA, PHUR, SPECGRAV, PROTEINUA, GLUCUA, KETONESU, BILIRUBINUA, OCCULTUA, NITRITE, UROBILINOGEN, LEUKOCYTESUR, RBCUA, WBCUA, BACTERIA, SQUAMEPITHEL, HYALINECASTS in the last 48 hours.    Invalid input(s): WRIGHTSUR    Significant Imaging: CXR: I have reviewed all pertinent results/findings within the past 24 hours and my personal findings are:  hemidiaphram with possible plueral effusion on the left.

## 2017-03-07 NOTE — PROGRESS NOTES
Pt consented for contrast administration. No contraindications exist. 20g IV flsuhed in right AC. Optison administered. Pt tolerated well. IV flushed. Echo complete.

## 2017-03-07 NOTE — PROGRESS NOTES
[facility] Hospital Medicine  Progress Note    Patient Name: Rajan Cooper III  MRN: [id number]  Patient Class: IP- Inpatient   Admission Date: 3/4/2017  Length of Stay: 1 days  Attending Physician: Trenton Gore MD  Primary Care Provider: Tom Garcia MD    [facility] Medicine Team: Networked reference to record PCT  Trenton Brian MD    Subjective:     Principal Problem:<principal problem not specified>    HPI:  Rajan Cooper III is a 61-year-old male w/ past medical history significant for multiple sclerosis and HTN who presented to the ED on 3/4/17 w/ worsening L wrist > R ankle pain of 5 days duration.  Denied trauma or other precipitating event, h/o septic joint, h/o gout, fevers, chills, recent infection, CP, SOB, dysuria, or new numbness/tingling/weakness. WBC 22K w/ a CRP of 425. Concern for left dorsal wrist abscess with possible septic wrist and right ankle arthritis.    Able to bear weight but painful.  Usually ambulates without assistive device at baseline. Takes baclofen and Avonex for MS.       Hospital Course:  3/4/17 - Arthrocentesis of the right ankle, incision, drainage and irrigation of left dorsal wrist abscess and left wrist arthrotomy with irrigation. GPC noted on gram stain along with 20K WBCS w/ 79% segs. Patient has remained on ceftriaxone and vancomycin.   3/7/17 - medicine consulted. Patient has developed worsening tachycardia with SOB required 3 liter of 02. Patient denies any SOB, but complains of pain. New fever at 101.2 F noted. MSSA grown from abscess of the left wrist and staph noted in the urine culture. Blood culture positive for staph aureus. TTE ordered to rule out endocarditis and MRI ordered to rule out osteomyelitis. .       Interval History:   Patient seen and examined at bedside. Positive blood culture for staph aureus. TTE and MRI ordered.     Review of Systems   Constitutional: Positive for fatigue and fever. Negative for chills and diaphoresis.    HENT: Negative for congestion.    Eyes: Negative for visual disturbance.   Respiratory: Negative for shortness of breath.    Cardiovascular: Positive for leg swelling. Negative for chest pain and palpitations.   Gastrointestinal: Negative for abdominal pain and diarrhea.   Genitourinary: Negative for dysuria.   Musculoskeletal: Positive for arthralgias. Negative for myalgias.   Skin: Positive for rash and wound.   Neurological: Negative for dizziness, weakness and headaches.   Psychiatric/Behavioral: Positive for confusion.     Objective:     Vital Signs (Most Recent):  Temp: 97 °F (36.1 °C) (03/07/17 1300)  Pulse: 97 (03/07/17 1300)  Resp: 18 (03/07/17 1300)  BP: (!) 150/70 (03/07/17 1300)  SpO2: 95 % (03/07/17 1300) Vital Signs (24h Range):  Temp:  [97 °F (36.1 °C)-100 °F (37.8 °C)] 97 °F (36.1 °C)  Pulse:  [] 97  Resp:  [18] 18  SpO2:  [94 %-97 %] 95 %  BP: (143-158)/(70-97) 150/70     Weight: 136.1 kg (300 lb)  Body mass index is 39.58 kg/(m^2).    Intake/Output Summary (Last 24 hours) at 03/07/17 1547  Last data filed at 03/07/17 1316   Gross per 24 hour   Intake          1081.25 ml   Output              600 ml   Net           481.25 ml      Physical Exam   Constitutional: He is oriented to person, place, and time. He appears well-developed and well-nourished. No distress.   HENT:   Head: Normocephalic and atraumatic.   Eyes: EOM are normal. Pupils are equal, round, and reactive to light.   Neck: Normal range of motion. Neck supple.   Cardiovascular: Intact distal pulses.  Tachycardia present.  Exam reveals no gallop and no friction rub.    No murmur heard.  Pulmonary/Chest: Effort normal and breath sounds normal. No respiratory distress.   Abdominal: Soft. Bowel sounds are normal.   Musculoskeletal: Normal range of motion. He exhibits edema and tenderness.   Neurological: He is alert and oriented to person, place, and time.   Skin: Rash noted. He is not diaphoretic. There is erythema.       Significant  Labs:   CBC:     Recent Labs  Lab 03/06/17  1203 03/07/17  0904   WBC 23.12* 21.04*   HGB 11.8* 11.4*   HCT 35.8* 34.3*    282     CMP:     Recent Labs  Lab 03/06/17  1203 03/07/17  0904    139   K 4.4 4.2    103   CO2 27 27   GLU 94 93   BUN 21* 15   CREATININE 0.9 0.7   CALCIUM 9.2 8.9   PROT 7.5 7.0   ALBUMIN 1.8* 1.6*   BILITOT 1.6* 1.8*   ALKPHOS 223* 285*   AST 33 61*   ALT 37 53*   ANIONGAP 11 9   EGFRNONAA >60.0 >60.0     Cardiac Markers:     Recent Labs  Lab 03/06/17  1203   BNP 39     Lactic Acid:     Recent Labs  Lab 03/06/17  1203   LACTATE 1.7     Urine Studies: No results for input(s): COLORU, APPEARANCEUA, PHUR, SPECGRAV, PROTEINUA, GLUCUA, KETONESU, BILIRUBINUA, OCCULTUA, NITRITE, UROBILINOGEN, LEUKOCYTESUR, RBCUA, WBCUA, BACTERIA, SQUAMEPITHEL, HYALINECASTS in the last 48 hours.    Invalid input(s): WRIGHTSUR    Significant Imaging: CXR: I have reviewed all pertinent results/findings within the past 24 hours and my personal findings are:  hemidiaphram with possible plueral effusion on the left.     Assessment/Plan:      MS (multiple sclerosis)  Okay to restart baclofen 10 mg TID.     HTN (hypertension)  Continue losartan 25 mg and amlodipine 10 mg daily.       Staphylococcus aureus sepsis  SIRS (4/4) 2/2 to MSSA.   On Ancef IV continuous infusion   Blood cs revealed staph   TTE ordered  MRI to rule out Osteomyelitis  .1    Acute respiratory failure with hypoxemia  CXR and BNP unremarkable. Pulmonary edema or PNA unlikely. Instructed on using incentive spirometry.       Staphylococcal arthritis of left wrist  Blood cs: positive for Staph aureus  On Ancef IVPB  Synovial fluid positive for pansensitive Staph Aureus,       VTE Risk Mitigation         Ordered     Medium Risk of VTE  Once      03/04/17 1346     Place sequential compression device  Until discontinued      03/04/17 1346          Trenton Brian MD  Department of Hospital Medicine   [facility]

## 2017-03-07 NOTE — ASSESSMENT & PLAN NOTE
Blood cs: positive for Staph aureus  On Ancef IVPB  Synovial fluid positive for pansensitive Staph Aureus,

## 2017-03-08 LAB
ALBUMIN SERPL BCP-MCNC: 1.6 G/DL
ALP SERPL-CCNC: 405 U/L
ALT SERPL W/O P-5'-P-CCNC: 83 U/L
ANION GAP SERPL CALC-SCNC: 4 MMOL/L
ANISOCYTOSIS BLD QL SMEAR: SLIGHT
AST SERPL-CCNC: 89 U/L
BASOPHILS NFR BLD: 0 %
BILIRUB SERPL-MCNC: 1.2 MG/DL
BUN SERPL-MCNC: 16 MG/DL
CALCIUM SERPL-MCNC: 8.3 MG/DL
CHLORIDE SERPL-SCNC: 104 MMOL/L
CO2 SERPL-SCNC: 27 MMOL/L
CREAT SERPL-MCNC: 0.8 MG/DL
CRP SERPL-MCNC: 246.1 MG/L
DIFFERENTIAL METHOD: ABNORMAL
EOSINOPHIL NFR BLD: 0 %
ERYTHROCYTE [DISTWIDTH] IN BLOOD BY AUTOMATED COUNT: 15.4 %
ERYTHROCYTE [SEDIMENTATION RATE] IN BLOOD BY WESTERGREN METHOD: 134 MM/HR
EST. GFR  (AFRICAN AMERICAN): >60 ML/MIN/1.73 M^2
EST. GFR  (NON AFRICAN AMERICAN): >60 ML/MIN/1.73 M^2
GLUCOSE SERPL-MCNC: 96 MG/DL
HCT VFR BLD AUTO: 32 %
HGB BLD-MCNC: 10.5 G/DL
HYPOCHROMIA BLD QL SMEAR: ABNORMAL
LYMPHOCYTES NFR BLD: 9 %
MCH RBC QN AUTO: 30 PG
MCHC RBC AUTO-ENTMCNC: 32.8 %
MCV RBC AUTO: 91 FL
MONOCYTES NFR BLD: 4 %
NEUTROPHILS NFR BLD: 87 %
OVALOCYTES BLD QL SMEAR: ABNORMAL
PLATELET # BLD AUTO: 304 K/UL
PLATELET BLD QL SMEAR: ABNORMAL
PMV BLD AUTO: 11.4 FL
POIKILOCYTOSIS BLD QL SMEAR: SLIGHT
POLYCHROMASIA BLD QL SMEAR: ABNORMAL
POTASSIUM SERPL-SCNC: 4 MMOL/L
PROT SERPL-MCNC: 7 G/DL
RBC # BLD AUTO: 3.5 M/UL
SODIUM SERPL-SCNC: 135 MMOL/L
WBC # BLD AUTO: 18.5 K/UL

## 2017-03-08 PROCEDURE — 25000003 PHARM REV CODE 250: Performed by: STUDENT IN AN ORGANIZED HEALTH CARE EDUCATION/TRAINING PROGRAM

## 2017-03-08 PROCEDURE — 97535 SELF CARE MNGMENT TRAINING: CPT

## 2017-03-08 PROCEDURE — 97110 THERAPEUTIC EXERCISES: CPT

## 2017-03-08 PROCEDURE — 85651 RBC SED RATE NONAUTOMATED: CPT

## 2017-03-08 PROCEDURE — 25000003 PHARM REV CODE 250: Performed by: NURSE PRACTITIONER

## 2017-03-08 PROCEDURE — 80053 COMPREHEN METABOLIC PANEL: CPT

## 2017-03-08 PROCEDURE — 97530 THERAPEUTIC ACTIVITIES: CPT

## 2017-03-08 PROCEDURE — 36415 COLL VENOUS BLD VENIPUNCTURE: CPT

## 2017-03-08 PROCEDURE — 85027 COMPLETE CBC AUTOMATED: CPT

## 2017-03-08 PROCEDURE — 97116 GAIT TRAINING THERAPY: CPT

## 2017-03-08 PROCEDURE — 85007 BL SMEAR W/DIFF WBC COUNT: CPT

## 2017-03-08 PROCEDURE — 25000003 PHARM REV CODE 250: Performed by: PHYSICIAN ASSISTANT

## 2017-03-08 PROCEDURE — 63600175 PHARM REV CODE 636 W HCPCS: Performed by: NURSE PRACTITIONER

## 2017-03-08 PROCEDURE — 86140 C-REACTIVE PROTEIN: CPT

## 2017-03-08 PROCEDURE — 11000001 HC ACUTE MED/SURG PRIVATE ROOM

## 2017-03-08 PROCEDURE — 99233 SBSQ HOSP IP/OBS HIGH 50: CPT | Mod: ,,, | Performed by: PHYSICIAN ASSISTANT

## 2017-03-08 PROCEDURE — 99233 SBSQ HOSP IP/OBS HIGH 50: CPT | Mod: ,,, | Performed by: HOSPITALIST

## 2017-03-08 RX ADMIN — AMLODIPINE BESYLATE 10 MG: 10 TABLET ORAL at 08:03

## 2017-03-08 RX ADMIN — MODAFINIL 200 MG: 100 TABLET ORAL at 08:03

## 2017-03-08 RX ADMIN — OXYCODONE HYDROCHLORIDE 5 MG: 5 TABLET ORAL at 11:03

## 2017-03-08 RX ADMIN — DEXTROSE 6 G: 5 SOLUTION INTRAVENOUS at 01:03

## 2017-03-08 RX ADMIN — BACLOFEN 10 MG: 10 TABLET ORAL at 06:03

## 2017-03-08 RX ADMIN — Medication 3 ML: at 06:03

## 2017-03-08 RX ADMIN — LOSARTAN POTASSIUM 25 MG: 25 TABLET, FILM COATED ORAL at 08:03

## 2017-03-08 RX ADMIN — BACLOFEN 10 MG: 10 TABLET ORAL at 09:03

## 2017-03-08 RX ADMIN — BACLOFEN 10 MG: 10 TABLET ORAL at 01:03

## 2017-03-08 NOTE — PLAN OF CARE
Wife has used [employer] in the past and would like to use this company so Cm initiated referral to make sure they can accept pt's insurance since it's bcbs of roxanna Ogden from [place] here to teach pt.     03/08/17 8859   Right Care Assessment   Can the patient answer the patient profile reliably? Yes, cognitively intact   How often would a person be available to care for the patient? Whenever needed   Describe the patient's ability to walk at the present time. Walks with the help of equipment   How does the patient rate their overall health at the present time? Good   Number of comorbid conditions (as recorded on the chart) One   During the past month, has the patient often been bothered by feeling down, depressed or hopeless? No   During the past month, has the patient often been bothered by little interest or pleasure in doing things? No

## 2017-03-08 NOTE — PROGRESS NOTES
ORTHO PROGRESS NOTE:    Rajan Cooper III is a 61 y.o. male admitted on 3/4/2017  Hospital Day: 3   Post Op Day: 4 Day Post-Op      The patient was seen and examined this morning at the bedside.Feels better. Afebrile overnight. Medicine ordered MRI right ankle yesterday.     PHYSICAL EXAM:  Awake/alert/oriented x 3, ill appearing   AFVSS  Good inspiratory effort with unlabored breathing. Nasal cannula   Dressings changed at bedside.   NVI distally  Able to range left wrist passively  Pitting edema to right ankle with minimal warmth and erythema     Vitals:    03/07/17 1300 03/07/17 1633 03/07/17 1930 03/07/17 2330   BP: (!) 150/70 (!) 155/79 (!) 164/86 (!) 150/80   BP Location: Right arm  Right arm Right arm   Patient Position: Sitting  Lying Lying   BP Method: Automatic  Automatic Automatic   Pulse: 97 98 99 98   Resp: 18 18 18 18   Temp: 97 °F (36.1 °C) 98 °F (36.7 °C) 98.6 °F (37 °C) 97.9 °F (36.6 °C)   TempSrc: Oral Oral Oral Oral   SpO2: 95% 96% 97% 98%   Weight:       Height:         I/O last 3 completed shifts:  In: 1381.3 [P.O.:600; I.V.:781.3]  Out: 600 [Urine:600]    Recent Labs  Lab 03/06/17  1203 03/07/17  0904   CALCIUM 9.2 8.9   PROT 7.5 7.0    139   K 4.4 4.2   CO2 27 27    103   BUN 21* 15   CREATININE 0.9 0.7       Recent Labs  Lab 03/06/17  1203 03/07/17  0904   WBC 23.12* 21.04*   RBC 3.95* 3.84*   HGB 11.8* 11.4*   HCT 35.8* 34.3*    282     No results for input(s): INR, APTT in the last 72 hours.    Invalid input(s): PT      A/P: 61 y.o. male 4 Day Post-Op s/p L wrist arthrotomy with irrigation and synovectomy, I&D abscess  -- Pain control  -- PT/OT: [facility]B MARQUIS. ROM left wrist  -- DVT prophylaxis: SCDs  -- Antibiotics: Ancef. Blood cx GS Gram + cocci in clusters. Urine Cx Staph (pan sensitive) . ID on board  --f/u mri ankle     Franklin Reynoso MD  Orthopedic Surgery, PGY3  [phone] (p)       Attg Note:  I agree with the above assessment and plan.    Kunal Shipley MD

## 2017-03-08 NOTE — PT/OT/SLP PROGRESS
Physical Therapy  Treatment    Rajan Cooper III   MRN: [id number]   Admitting Diagnosis: Staphylococcus aureus sepsis    PT Received On: 17  PT Start Time: 1413     PT Stop Time: 1457    PT Total Time (min): 44 min       Billable Minutes:  Gait [id number], Therapeutic Activity 15 and Therapeutic Exercise 14    Treatment Type: Treatment  PT/PTA: PTA     PTA Visit Number: 3       General Precautions: Standard, fall  Orthopedic Precautions: LUE non weight bearing   Braces:  (splint on L hand/wrist)    Do you have any cultural, spiritual, Advent conflicts, given your current situation?: none    Subjective:  Communicated with NSg prior to session.  I will try    Pain Ratin/10  Location - Side: Bilateral     Location:  (feet)          Objective:   Patient found with: peripheral IV    Functional Mobility:  Bed Mobility:   Supine to Sit: Minimum Assistance (using HR)    Transfers:  Sit <> Stand Assistance: Minimum Assistance  Sit <> Stand Assistive Device: Rolling Walker, Platform (Left platform RW)  Bed <> Chair Technique: Stand Pivot  Bed <> Chair Assistance: Contact Guard Assistance  Bed <> Chair Assistive Device: Rolling Walker, Platform (Left platform RW)    Gait:   Gait Distance: 18 feet and 15 feet ( 1st trial limited secondary to patient needed to urinate and 2nd trial limited secondary to burning pain in B LE's)  Assistance 1: Minimum assistance  Gait Assistive Device: Platform walker left  Gait Pattern: 3-point gait  Gait Deviation(s): decreased zahra, increased time in double stance, decreased velocity of limb motion, decreased step length, decreased stride length, foot flat, decreased toe-to-floor clearance, decreased weight-shifting ability, forward lean (no LOB, knee flex gait with R LE,decrease stance time on R LE,, increase trunk flexion, and decrease heel strike with B LE's.  VC's for proper sequence, posture,increase right knee extension, sequence and walker mgmnt.)          Balance:   Static  Sit: GOOD+: Takes MAXIMAL challenges from all directions.    Dynamic Sit: GOOD-: Maintains balance through MODERATE excursions of active trunk movement,     Static Stand: FAIR: Maintains without assist but unable to take challenges  Dynamic stand: POOR: N/A     Therapeutic Activities and Exercises:  Patient performed supine exercises AP,GS,QS,hip ABD/ADD,HS,SLR and SAQ x20 reps with AROM > min assistance  Patient tolerated static standing 1-2 min with CGA using L PRW to urinate     AM-PAC 6 CLICK MOBILITY  How much help from another person does this patient currently need?   1 = Unable, Total/Dependent Assistance  2 = A lot, Maximum/Moderate Assistance  3 = A little, Minimum/Contact Guard/Supervision  4 = None, Modified [place]/Independent    Turning over in bed (including adjusting bedclothes, sheets and blankets)?: 3  Sitting down on and standing up from a chair with arms (e.g., wheelchair, bedside commode, etc.): 3  Moving from lying on back to sitting on the side of the bed?: 3  Moving to and from a bed to a chair (including a wheelchair)?: 3  Need to walk in hospital room?: 3  Climbing 3-5 steps with a railing?: 1  Total Score: 16    AM-PAC Raw Score CMS G-Code Modifier Level of Impairment Assistance   6 % Total / Unable   7 - 9 CM 80 - 100% Maximal Assist   10 - 14 CL 60 - 80% Moderate Assist   15 - 19 CK 40 - 60% Moderate Assist   20 - 22 CJ 20 - 40% Minimal Assist   23 CI 1-20% SBA / CGA   24 CH 0% Independent/ Mod I     Patient left up in chair with all lines intact, call button in reach and wife present.    Assessment:  Rajan Cooper III is a 61 y.o. male with a medical diagnosis of Staphylococcus aureus sepsis and presents with decrease strength, mobility, balance and endurance. Patient limited with gait secondary to pain.  Patient requires assistance with all mobility and transfers.  Continue to note decrease strength and ROM with B LE's.  Patient progressing toward goals.  Patient would  benefit from further IP therapy. .    Rehab identified problem list/impairments: Rehab identified problem list/impairments: weakness, impaired endurance, gait instability, impaired functional mobilty, impaired self care skills, impaired balance, decreased lower extremity function, decreased upper extremity function, pain, edema, orthopedic precautions, decreased ROM, impaired skin, impaired sensation    Rehab potential is good.    Activity tolerance: Good    Discharge recommendations: Discharge Facility/Level Of Care Needs: nursing facility, skilled     Barriers to discharge: Barriers to Discharge: Inaccessible home environment    Equipment recommendations: Equipment Needed After Discharge: bedside commode, bath bench, wheelchair (L platform RW)     GOALS:   Physical Therapy Goals        Problem: Physical Therapy Goal    Goal Priority Disciplines Outcome Goal Variances Interventions   Physical Therapy Goal     PT/OT, PT Ongoing (interventions implemented as appropriate)     Description:  Goals to be met by: 3/12/17     Patient will increase functional independence with mobility by performin. Supine to sit with Set-up [place]  2. Sit to supine with Set-up [place]  3. Sit to stand transfer with CGA using appropriate AD  4. Bed to chair transfer with Minimal Assistance using appropriate AD  5. Gait  x 20 feet with Minimal Assistance using appropriate AD.   6. Lower extremity exercise program x20-30 reps per handout, with independence                PLAN:    Patient to be seen daily  to address the above listed problems via gait training, therapeutic activities, therapeutic exercises, neuromuscular re-education  Plan of Care expires: 17  Plan of Care reviewed with: patient, spouse         Chaim Etienne II, PTA  2017

## 2017-03-08 NOTE — PROGRESS NOTES
[facility]  Infectious Disease  Progress Note    Patient Name: Rajan Cooper III  MRN: [id number]  Admission Date: 3/4/2017  Length of Stay: 2 days  Attending Physician: Trenton Brian MD  Primary Care Provider: Tom Garcia MD    Isolation Status: No active isolations  Assessment/Plan:      Pyogenic arthritis of left wrist  61 year old male with history of multiple sclerosis admitted with left septic wrist and right septic ankle with MSSA now s/p left wrist washout and debridement and ankle arthrocentesis on 3/4/17.   - Wrist and ankles cultures 3/4 + MSSA   - Urine culture 3/4 + MSSA  - Blood cultures 3/5 and 3/6 + Staph aureus (1 bottle).  Blood cultures 3/7 NGTD.   - 2D echo negative for endocarditis. MRI RLE pending.  - Patient afebrile over past 48 hours. Chills have resolved. Leukocytosis and inflammatory markers trending downward.  - Given that staph is in urine and in two different joints, very likely he had a transient bacteremia that seeded joints.  Source unclear, though he reports history of psoriasis and lower extremity cellulitis in past.     Plan  - Continue cefazolin 6 grams IV q 24 hours by continuous infusion for MSSA  - Recommend minimum of 6 weeks of IV antibiotics for complicated bacteremia with septic arthritis of wrist and ankle from day blood cultures clear  - Will follow up MRI to r/o RLE osteomyelitis  - Please hold off on PICC line placement until blood cultures are negative for 48 hours  - If possible, would obtain KEIKO  - Discussed with staff. ID will continue to follow with you.       Please call for any questions. Thank you.  Larissa Patton PA-C  Phone: [phone]  Pager: [phone]    Subjective:     Principal Problem:Staphylococcus aureus sepsis    Interval History:  NAEON. Patient reports feeling much better today. He has more energy and his chills have resolved. His right ankle swelling and erythema have much improved. He still cannot weight bear on right ankle.  Otherwise, without new complaints. Afebrile overnight. 2D echo negative. MRI RLE pending. Blood cultures obtained 2/5 and 2/6 returned positive for staph, ID pending (1 bottle). ID pending.     Wrist cx 3/4 + MSSA  Ankle cx 3/4 + MSSA  Urine cx 3/4 + MSSA  Blood cx 3/5 + GPCs (1 bottle)  Blood cx 3/6 + GPCs (1 bottle)  Blood cx 3/7 NGTD     HPI:  Mr. Cooper is a pleasant 61 y.o. male with a PMHx of MS on avonex presenting with worsening left wrist and right ankle pain and swelling of 5 days duration. Patient reports he was moving a heavy gate a work late February and twisted his ankle. He was evaluated by his PCP and left wrist xray revealed  evidence of AVN, DJD, and soft tissue swelling and right ankle xray showed evidence of soft tissue swelling but no fracture. He was suppose to follow up in orthopedic clinic but came to the ED as his pain acutely worsened. Patient denies break in his skin. Denies fevers, chills, sweats, recent infection, CP, SOB, N/V, diarrhea. Labs upon admit were significant for ESR elevated at 118, CRP elevated at 451.1 and a leukocytosis of 22.5k. Orthopedic surgery performed an aspiration of his left wrist  Revealing 20K WBCs and 79% segs. He is now s/p left wrist washout and debridement on 3/4 along with ankle arthrocentesis. Cultures are pending. GS + GPCs. He was started on empiric Vanc/Ceftriaxone post operatively.     Left wrist x-ray: There is lunate sclerosis suggesting AVN with areas of DJD most severe at the radiocarpal joint. There is soft tissue swelling.     Right ankle x-ray: Soft tissue swelling is identified about the ankle, particularly about the lateral malleolus.  The visualized osseous structures, however, appear intact, with no definite evidence of recent fracture or other significant abnormality identified.         Review of Systems   Constitutional: Negative for chills, diaphoresis, fatigue and fever.   HENT: Negative for congestion, mouth sores, rhinorrhea and sore  throat.    Eyes: Negative for pain, discharge and visual disturbance.   Respiratory: Negative for cough, chest tightness, shortness of breath and wheezing.    Cardiovascular: Negative for chest pain, palpitations and leg swelling.   Gastrointestinal: Negative for abdominal distention, abdominal pain, constipation, diarrhea, nausea and vomiting.   Genitourinary: Negative for difficulty urinating, dysuria, flank pain, frequency and hematuria.   Musculoskeletal: Positive for back pain (reports chronic back pain ). Negative for arthralgias, joint swelling, myalgias and neck pain.        Left wrist pain, swelling, redness  Right ankle pain, redness, erythema (improved), difficulty ambulating   Skin: Negative for color change, pallor, rash and wound.   Neurological: Negative for dizziness, seizures, weakness, numbness and headaches.   Hematological: Negative for adenopathy. Does not bruise/bleed easily.   Psychiatric/Behavioral: Negative for confusion and sleep disturbance. The patient is not nervous/anxious.      Objective:     Vital Signs (Most Recent):  Temp: 99 °F (37.2 °C) (03/08/17 0726)  Pulse: 95 (03/08/17 0726)  Resp: 17 (03/08/17 0726)  BP: (!) 147/82 (03/08/17 0726)  SpO2: 97 % (03/08/17 0726) Vital Signs (24h Range):  Temp:  [97 °F (36.1 °C)-99 °F (37.2 °C)] 99 °F (37.2 °C)  Pulse:  [95-99] 95  Resp:  [17-18] 17  SpO2:  [95 %-98 %] 97 %  BP: (147-164)/(70-86) 147/82     Weight: 136.1 kg (300 lb)  Body mass index is 39.58 kg/(m^2).    Estimated Creatinine Clearance: 142.2 mL/min (based on Cr of 0.8).    Physical Exam   Constitutional: He is oriented to person, place, and time. He appears well-developed and well-nourished. No distress.   HENT:   Head: Normocephalic and atraumatic.   Mouth/Throat: No oropharyngeal exudate.   Eyes: Conjunctivae and EOM are normal. Pupils are equal, round, and reactive to light. No scleral icterus.   Cardiovascular: Normal rate, regular rhythm and intact distal pulses.    Murmur (?  physiologic murmur) heard.  Pulmonary/Chest: Effort normal and breath sounds normal. No respiratory distress. He has no wheezes.   Abdominal: Soft. He exhibits no distension. There is no tenderness. There is no guarding.   Musculoskeletal: Normal range of motion. He exhibits no deformity.   Left wrist dressed. Dressing c/d/i. Good ROM. Erythema and swelling still to left thumb.   Right ankle/LE warmth, edema and erythema are much improved. + TTP.   Neurological: He is alert and oriented to person, place, and time. No cranial nerve deficit.   Skin: Skin is warm and dry. No rash noted. He is not diaphoretic.   Chronic skin changes to bilateral lower extremities.    Psychiatric: He has a normal mood and affect. His behavior is normal. Judgment and thought content normal.   Vitals reviewed.      Significant Labs:   Blood Culture:     Recent Labs  Lab 03/05/17  0107 03/06/17  1202 03/06/17  1203 03/06/17  1403 03/07/17  1251   LABBLOO No Growth to date  No Growth to date  No Growth to date  No Growth to date  Gram stain aer bottle: Gram positive cocci in clusters resembling Staph  Results called to and read back by:Angelica Lima RN 03/08/2017  04:57 Gram stain cristiano bottle: Gram positive cocci in clusters resembling Staph   Results called to and read back by: Angelica Hurtado RN  03/07/2017    09:17 No Growth to date  No Growth to date No Growth to date  No Growth to date  No Growth to date  No Growth to date No Growth to date  No Growth to date     BMP:     Recent Labs  Lab 03/08/17  0515   GLU 96   *   K 4.0      CO2 27   BUN 16   CREATININE 0.8   CALCIUM 8.3*     CBC:     Recent Labs  Lab 03/06/17  1203 03/07/17  0904 03/08/17  0515   WBC 23.12* 21.04* 18.50*   HGB 11.8* 11.4* 10.5*   HCT 35.8* 34.3* 32.0*    282 304     CMP:     Recent Labs  Lab 03/06/17  1203 03/07/17  0904 03/08/17  0515    139 135*   K 4.4 4.2 4.0    103 104   CO2 27 27 27   GLU 94 93 96   BUN 21* 15 16    CREATININE 0.9 0.7 0.8   CALCIUM 9.2 8.9 8.3*   PROT 7.5 7.0 7.0   ALBUMIN 1.8* 1.6* 1.6*   BILITOT 1.6* 1.8* 1.2*   ALKPHOS 223* 285* 405*   AST 33 61* 89*   ALT 37 53* 83*   ANIONGAP 11 9 4*   EGFRNONAA >60.0 >60.0 >60.0     Procalcitonin: No results for input(s): PROCAL in the last 48 hours.  Urine Culture:     Recent Labs  Lab 03/04/17  1548   LABURIN STAPHYLOCOCCUS AUREUS> 100,000 cfu/ml     Urine Studies:     Recent Labs  Lab 03/04/17  1548   COLORU Lizbeth   APPEARANCEUA Hazy*   PHUR 7.0   SPECGRAV 1.020   PROTEINUA 2+*   GLUCUA Negative   KETONESU 2+*   BILIRUBINUA Trace   OCCULTUA 3+*   NITRITE Positive*   UROBILINOGEN >=8.0*   LEUKOCYTESUR 1+*   RBCUA >100*   WBCUA 31*   BACTERIA Occasional   HYALINECASTS 0       Significant Imaging: I have reviewed all pertinent imaging results/findings within the past 24 hours.

## 2017-03-08 NOTE — SUBJECTIVE & OBJECTIVE
Interval History:  NAEON. Patient reports feeling much better today. He has more energy and his chills have resolved. His right ankle swelling and erythema have much improved. He still cannot weight bear on right ankle. Otherwise, without new complaints. Afebrile overnight. 2D echo negative. MRI RLE pending. Blood cultures obtained 2/5 and 2/6 returned positive for staph, ID pending (1 bottle). ID pending.     Wrist cx 3/4 + MSSA  Ankle cx 3/4 + MSSA  Urine cx 3/4 + MSSA  Blood cx 3/5 + GPCs (1 bottle)  Blood cx 3/6 + GPCs (1 bottle)  Blood cx 3/7 NGTD     HPI:  Mr. Cooper is a pleasant 61 y.o. male with a PMHx of MS on avonex presenting with worsening left wrist and right ankle pain and swelling of 5 days duration. Patient reports he was moving a heavy gate a work late February and twisted his ankle. He was evaluated by his PCP and left wrist xray revealed  evidence of AVN, DJD, and soft tissue swelling and right ankle xray showed evidence of soft tissue swelling but no fracture. He was suppose to follow up in orthopedic clinic but came to the ED as his pain acutely worsened. Patient denies break in his skin. Denies fevers, chills, sweats, recent infection, CP, SOB, N/V, diarrhea. Labs upon admit were significant for ESR elevated at 118, CRP elevated at 451.1 and a leukocytosis of 22.5k. Orthopedic surgery performed an aspiration of his left wrist  Revealing 20K WBCs and 79% segs. He is now s/p left wrist washout and debridement on 3/4 along with ankle arthrocentesis. Cultures are pending. GS + GPCs. He was started on empiric Vanc/Ceftriaxone post operatively.     Left wrist x-ray: There is lunate sclerosis suggesting AVN with areas of DJD most severe at the radiocarpal joint. There is soft tissue swelling.     Right ankle x-ray: Soft tissue swelling is identified about the ankle, particularly about the lateral malleolus.  The visualized osseous structures, however, appear intact, with no definite evidence of recent  fracture or other significant abnormality identified.         Review of Systems   Constitutional: Negative for chills, diaphoresis, fatigue and fever.   HENT: Negative for congestion, mouth sores, rhinorrhea and sore throat.    Eyes: Negative for pain, discharge and visual disturbance.   Respiratory: Negative for cough, chest tightness, shortness of breath and wheezing.    Cardiovascular: Negative for chest pain, palpitations and leg swelling.   Gastrointestinal: Negative for abdominal distention, abdominal pain, constipation, diarrhea, nausea and vomiting.   Genitourinary: Negative for difficulty urinating, dysuria, flank pain, frequency and hematuria.   Musculoskeletal: Positive for back pain (reports chronic back pain ). Negative for arthralgias, joint swelling, myalgias and neck pain.        Left wrist pain, swelling, redness  Right ankle pain, redness, erythema (improved), difficulty ambulating   Skin: Negative for color change, pallor, rash and wound.   Neurological: Negative for dizziness, seizures, weakness, numbness and headaches.   Hematological: Negative for adenopathy. Does not bruise/bleed easily.   Psychiatric/Behavioral: Negative for confusion and sleep disturbance. The patient is not nervous/anxious.      Objective:     Vital Signs (Most Recent):  Temp: 99 °F (37.2 °C) (03/08/17 0726)  Pulse: 95 (03/08/17 0726)  Resp: 17 (03/08/17 0726)  BP: (!) 147/82 (03/08/17 0726)  SpO2: 97 % (03/08/17 0726) Vital Signs (24h Range):  Temp:  [97 °F (36.1 °C)-99 °F (37.2 °C)] 99 °F (37.2 °C)  Pulse:  [95-99] 95  Resp:  [17-18] 17  SpO2:  [95 %-98 %] 97 %  BP: (147-164)/(70-86) 147/82     Weight: 136.1 kg (300 lb)  Body mass index is 39.58 kg/(m^2).    Estimated Creatinine Clearance: 142.2 mL/min (based on Cr of 0.8).    Physical Exam   Constitutional: He is oriented to person, place, and time. He appears well-developed and well-nourished. No distress.   HENT:   Head: Normocephalic and atraumatic.   Mouth/Throat: No  oropharyngeal exudate.   Eyes: Conjunctivae and EOM are normal. Pupils are equal, round, and reactive to light. No scleral icterus.   Cardiovascular: Normal rate, regular rhythm and intact distal pulses.    Murmur (? physiologic murmur) heard.  Pulmonary/Chest: Effort normal and breath sounds normal. No respiratory distress. He has no wheezes.   Abdominal: Soft. He exhibits no distension. There is no tenderness. There is no guarding.   Musculoskeletal: Normal range of motion. He exhibits no deformity.   Left wrist dressed. Dressing c/d/i. Good ROM. Erythema and swelling still to left thumb.   Right ankle/LE warmth, edema and erythema are much improved. + TTP.   Neurological: He is alert and oriented to person, place, and time. No cranial nerve deficit.   Skin: Skin is warm and dry. No rash noted. He is not diaphoretic.   Chronic skin changes to bilateral lower extremities.    Psychiatric: He has a normal mood and affect. His behavior is normal. Judgment and thought content normal.   Vitals reviewed.      Significant Labs:   Blood Culture:     Recent Labs  Lab 03/05/17  0107 03/06/17  1202 03/06/17  1203 03/06/17  1403 03/07/17  1251   LABBLOO No Growth to date  No Growth to date  No Growth to date  No Growth to date  Gram stain aer bottle: Gram positive cocci in clusters resembling Staph  Results called to and read back by:Angelica Lima RN 03/08/2017  04:57 Gram stain cristiano bottle: Gram positive cocci in clusters resembling Staph   Results called to and read back by: Angelica Hurtado RN  03/07/2017    09:17 No Growth to date  No Growth to date No Growth to date  No Growth to date  No Growth to date  No Growth to date No Growth to date  No Growth to date     BMP:     Recent Labs  Lab 03/08/17  0515   GLU 96   *   K 4.0      CO2 27   BUN 16   CREATININE 0.8   CALCIUM 8.3*     CBC:     Recent Labs  Lab 03/06/17  1203 03/07/17  0904 03/08/17  0515   WBC 23.12* 21.04* 18.50*   HGB 11.8* 11.4* 10.5*    HCT 35.8* 34.3* 32.0*    282 304     CMP:     Recent Labs  Lab 03/06/17  1203 03/07/17  0904 03/08/17  0515    139 135*   K 4.4 4.2 4.0    103 104   CO2 27 27 27   GLU 94 93 96   BUN 21* 15 16   CREATININE 0.9 0.7 0.8   CALCIUM 9.2 8.9 8.3*   PROT 7.5 7.0 7.0   ALBUMIN 1.8* 1.6* 1.6*   BILITOT 1.6* 1.8* 1.2*   ALKPHOS 223* 285* 405*   AST 33 61* 89*   ALT 37 53* 83*   ANIONGAP 11 9 4*   EGFRNONAA >60.0 >60.0 >60.0     Procalcitonin: No results for input(s): PROCAL in the last 48 hours.  Urine Culture:     Recent Labs  Lab 03/04/17  1548   LABURIN STAPHYLOCOCCUS AUREUS> 100,000 cfu/ml     Urine Studies:     Recent Labs  Lab 03/04/17  1548   COLORU Lizbeth   APPEARANCEUA Hazy*   PHUR 7.0   SPECGRAV 1.020   PROTEINUA 2+*   GLUCUA Negative   KETONESU 2+*   BILIRUBINUA Trace   OCCULTUA 3+*   NITRITE Positive*   UROBILINOGEN >=8.0*   LEUKOCYTESUR 1+*   RBCUA >100*   WBCUA 31*   BACTERIA Occasional   HYALINECASTS 0       Significant Imaging: I have reviewed all pertinent imaging results/findings within the past 24 hours.

## 2017-03-08 NOTE — ASSESSMENT & PLAN NOTE
61 year old male with history of multiple sclerosis admitted with left septic wrist and right septic ankle with MSSA now s/p left wrist washout and debridement and ankle arthrocentesis on 3/4/17.   - Wrist and ankles cultures 3/4 + MSSA   - Urine culture 3/4 + MSSA  - Blood cultures 3/5 and 3/6 + Staph aureus (1 bottle).  Blood cultures 3/7 NGTD.   - 2D echo negative for endocarditis. MRI RLE pending.  - Patient afebrile over past 48 hours. Chills have resolved. Leukocytosis and inflammatory markers trending downward.  - Given that staph is in urine and in two different joints, very likely he had a transient bacteremia that seeded joints.  Source unclear, though he reports history of psoriasis and lower extremity cellulitis in past.     Plan  - Continue cefazolin 6 grams IV q 24 hours by continuous infusion for MSSA  - Recommend minimum of 6 weeks of IV antibiotics for complicated bacteremia with septic arthritis of wrist and ankle from day blood cultures clear  - Will follow up MRI to r/o RLE osteomyelitis  - Please hold off on PICC line placement until blood cultures are negative for 48 hours  - If possible, would obtain KEIKO  - Discussed with staff. ID will continue to follow with you.

## 2017-03-08 NOTE — PLAN OF CARE
Problem: Occupational Therapy Goal  Goal: Occupational Therapy Goal  Goals to be met by: 3/20/17     Patient will increase functional independence with ADLs by performing:    UE Dressing with [place].  LE Dressing with [place].  Grooming while standing at sink with Modified [place].  Toileting from toilet with [place] for hygiene and clothing management.   Bathing from edge of bed with [place].  Toilet transfer to toilet with [place].  Increased functional strength to WFL for L UE.  Upper extremity exercise program x15 reps per handout, with independence. MET 3/8/17   Continue with POC.

## 2017-03-08 NOTE — PT/OT/SLP PROGRESS
Occupational Therapy  Treatment    Rajan Cooper III   MRN: [id number]   Admitting Diagnosis: Staphylococcus aureus sepsis    OT Date of Treatment: 03/08/17   OT Start Time: 1050  OT Stop Time: 1123  OT Total Time (min): 33 min    Billable Minutes:  Self Care/Home Management 11 and Therapeutic Exercise 22    General Precautions: Standard, fall  Orthopedic Precautions: LUE non weight bearing  Braces: N/A    Do you have any cultural, spiritual, Adventist conflicts, given your current situation?: None    Subjective:  Communicated with RN prior to session.    Pain Rating: 3/10  Location - Side: Left     Location: hand  Pain Addressed: Cessation of Activity, Distraction  Pain Rating Post-Intervention:  (Did not rate)    Objective:  Patient found with: peripheral IV, oxygen     Functional Mobility:  Bed Mobility:  Supine to Sit: Modified Independent  Sit to Supine:  (Did not perform; pt left seated up in bedside chair)    Transfers:   Sit <> Stand Assistance: Contact Guard Assistance (from EOB)  Sit <> Stand Assistive Device: Platform  Bed <> Chair Technique: Stand Pivot  Bed <> Chair Transfer Assistance: Contact Guard Assistance    Functional Ambulation: Pt walked from EOB to bathroom to perform grooming task and returned to bedside chair using platform walker and CGA.    Activities of Daily Living:     Grooming Position: Standing at sink   Grooming Level of Assistance: Stand by assistance with platform walker for support (to brush teeth)         Balance:   Static Sit: GOOD: Takes MODERATE challenges from all directions  Dynamic Sit: GOOD-: Maintains balance through MODERATE excursions of active trunk movement,     Static Stand: POOR+: Needs MINIMAL assist to maintain  Dynamic stand: FAIR: Needs CONTACT GUARD during gait    Therapeutic Activities and Exercises:  Pt peroformed L wrist AROM exercises including 1x15 reps: radial/ulnar deviation, flexion/extension, and supination/pronation    L AROM hand 6 pack exercises 1x15  reps  Pt/family educated on three L wrist AROM exercises and demonstrated understanding.  Pt family re-educated on how to properly brad resting hand splint.       AM-PAC 6 CLICK ADL   How much help from another person does this patient currently need?   1 = Unable, Total/Dependent Assistance  2 = A lot, Maximum/Moderate Assistance  3 = A little, Minimum/Contact Guard/Supervision  4 = None, Modified [place]/Independent    Putting on and taking off regular lower body clothing? : 3  Bathing (including washing, rinsing, drying)?: 3  Toileting, which includes using toilet, bedpan, or urinal? : 3  Putting on and taking off regular upper body clothing?: 3  Taking care of personal grooming such as brushing teeth?: 3  Eating meals?: 4  Total Score: 19     AM-PAC Raw Score CMS G-Code Modifier Level of Impairment Assistance   6 % Total / Unable   7 - 9 CM 80 - 100% Maximal Assist   10 - 14 CL 60 - 80% Moderate Assist   15 - 19 CK 40 - 60% Moderate Assist   20 - 22 CJ 20 - 40% Minimal Assist   23 CI 1-20% SBA / CGA   24 CH 0% Independent/ Mod I     Patient left up in chair with all lines intact, call button in reach and RN notified    ASSESSMENT:  Rajan Cooper III is a 61 y.o. male with a medical diagnosis of Staphylococcus aureus sepsis and presents with decreased UE function, self care skills, and overall functional mobility. Pt tolerated treatment session well this date. He completed multiple LUE AROM exercises while seated on EOB with good performance. Pt demonstrated increased AROM of L hand while performing exercises. He participated in functional mobility to bathroom with CGA and platform walker for support and completed grooming tasks with SBA while standing at sink. Pt would benefit from continued skilled OT to address deficits and maximize return to PLOF.     Rehab identified problem list/impairments: Rehab identified problem list/impairments: impaired self care skills, weakness, impaired endurance,  impaired functional mobilty, pain, edema, decreased upper extremity function, decreased ROM, impaired fine motor, decreased coordination, orthopedic precautions    Rehab potential is good.    Activity tolerance: Good    Discharge recommendations: Discharge Facility/Level Of Care Needs: nursing facility, skilled (wife states she would like home with HH)     Barriers to discharge: Barriers to Discharge: Inaccessible home environment    Equipment recommendations: bedside commode, bath bench     GOALS:   Occupational Therapy Goals        Problem: Occupational Therapy Goal    Goal Priority Disciplines Outcome Interventions   Occupational Therapy Goal     OT, PT/OT     Description:  Goals to be met by: 3/20/17     Patient will increase functional independence with ADLs by performing:    UE Dressing with [place].  LE Dressing with [place].  Grooming while standing at sink with Modified [place].  Toileting from toilet with [place] for hygiene and clothing management.   Bathing from  edge of bed with [place].  Toilet transfer to toilet with [place].  Increased functional strength to WFL for L UE.  Upper extremity exercise program x15 reps per handout, with independence. MET 3/8/17                 Plan:  Patient to be seen 6 x/week to address the above listed problems via therapeutic activities, therapeutic exercises, self-care/home management  Plan of Care expires: 03/20/17  Plan of Care reviewed with: patient         ESTEFANI Slater  03/08/2017

## 2017-03-09 ENCOUNTER — ANESTHESIA EVENT (OUTPATIENT)
Dept: SURGERY | Facility: HOSPITAL | Age: 62
DRG: 853 | End: 2017-03-09
Payer: COMMERCIAL

## 2017-03-09 LAB
ALBUMIN SERPL BCP-MCNC: 1.5 G/DL
ALP SERPL-CCNC: 389 U/L
ALT SERPL W/O P-5'-P-CCNC: 78 U/L
ANION GAP SERPL CALC-SCNC: 9 MMOL/L
AST SERPL-CCNC: 65 U/L
BACTERIA BLD CULT: NORMAL
BACTERIA SPEC ANAEROBE CULT: NORMAL
BASOPHILS # BLD AUTO: ABNORMAL K/UL
BASOPHILS NFR BLD: 0 %
BILIRUB SERPL-MCNC: 1.3 MG/DL
BUN SERPL-MCNC: 14 MG/DL
CALCIUM SERPL-MCNC: 8.3 MG/DL
CHLORIDE SERPL-SCNC: 104 MMOL/L
CO2 SERPL-SCNC: 25 MMOL/L
CREAT SERPL-MCNC: 0.7 MG/DL
DIFFERENTIAL METHOD: ABNORMAL
EOSINOPHIL # BLD AUTO: ABNORMAL K/UL
EOSINOPHIL NFR BLD: 0 %
ERYTHROCYTE [DISTWIDTH] IN BLOOD BY AUTOMATED COUNT: 15.3 %
EST. GFR  (AFRICAN AMERICAN): >60 ML/MIN/1.73 M^2
EST. GFR  (NON AFRICAN AMERICAN): >60 ML/MIN/1.73 M^2
GLUCOSE SERPL-MCNC: 115 MG/DL
HCT VFR BLD AUTO: 32.4 %
HGB BLD-MCNC: 10.8 G/DL
LYMPHOCYTES # BLD AUTO: ABNORMAL K/UL
LYMPHOCYTES NFR BLD: 5 %
MCH RBC QN AUTO: 29.6 PG
MCHC RBC AUTO-ENTMCNC: 33.3 %
MCV RBC AUTO: 89 FL
MONOCYTES # BLD AUTO: ABNORMAL K/UL
MONOCYTES NFR BLD: 7 %
NEUTROPHILS NFR BLD: 86 %
NEUTS BAND NFR BLD MANUAL: 2 %
PLATELET # BLD AUTO: 356 K/UL
PLATELET BLD QL SMEAR: ABNORMAL
PMV BLD AUTO: 11.2 FL
POTASSIUM SERPL-SCNC: 4 MMOL/L
PROT SERPL-MCNC: 7.1 G/DL
RBC # BLD AUTO: 3.65 M/UL
SODIUM SERPL-SCNC: 138 MMOL/L
WBC # BLD AUTO: 17.43 K/UL

## 2017-03-09 PROCEDURE — 99233 SBSQ HOSP IP/OBS HIGH 50: CPT | Mod: ,,, | Performed by: PHYSICIAN ASSISTANT

## 2017-03-09 PROCEDURE — 25000003 PHARM REV CODE 250: Performed by: INTERNAL MEDICINE

## 2017-03-09 PROCEDURE — 85007 BL SMEAR W/DIFF WBC COUNT: CPT

## 2017-03-09 PROCEDURE — 63600175 PHARM REV CODE 636 W HCPCS: Performed by: NURSE PRACTITIONER

## 2017-03-09 PROCEDURE — 25000003 PHARM REV CODE 250: Performed by: PHYSICIAN ASSISTANT

## 2017-03-09 PROCEDURE — 11000001 HC ACUTE MED/SURG PRIVATE ROOM

## 2017-03-09 PROCEDURE — 36415 COLL VENOUS BLD VENIPUNCTURE: CPT

## 2017-03-09 PROCEDURE — 25000003 PHARM REV CODE 250: Performed by: NURSE PRACTITIONER

## 2017-03-09 PROCEDURE — 85027 COMPLETE CBC AUTOMATED: CPT

## 2017-03-09 PROCEDURE — 80053 COMPREHEN METABOLIC PANEL: CPT

## 2017-03-09 PROCEDURE — 25000003 PHARM REV CODE 250: Performed by: STUDENT IN AN ORGANIZED HEALTH CARE EDUCATION/TRAINING PROGRAM

## 2017-03-09 RX ADMIN — Medication 3 ML: at 09:03

## 2017-03-09 RX ADMIN — LOSARTAN POTASSIUM 25 MG: 25 TABLET, FILM COATED ORAL at 09:03

## 2017-03-09 RX ADMIN — MODAFINIL 200 MG: 100 TABLET ORAL at 12:03

## 2017-03-09 RX ADMIN — DEXTROSE 6 G: 5 SOLUTION INTRAVENOUS at 01:03

## 2017-03-09 RX ADMIN — BACLOFEN 10 MG: 10 TABLET ORAL at 01:03

## 2017-03-09 RX ADMIN — Medication 3 ML: at 05:03

## 2017-03-09 RX ADMIN — Medication 3 ML: at 02:03

## 2017-03-09 RX ADMIN — BACLOFEN 10 MG: 10 TABLET ORAL at 09:03

## 2017-03-09 RX ADMIN — AMLODIPINE BESYLATE 10 MG: 10 TABLET ORAL at 09:03

## 2017-03-09 RX ADMIN — POLYETHYLENE GLYCOL 3350 17 G: 17 POWDER, FOR SOLUTION ORAL at 09:03

## 2017-03-09 RX ADMIN — BACLOFEN 10 MG: 10 TABLET ORAL at 05:03

## 2017-03-09 NOTE — SUBJECTIVE & OBJECTIVE
Interval History:   Patient seen and examined at bedside. WBC and CRP improving. MRI performed to confirm osteomyelitis.     Review of Systems   Constitutional: Positive for fatigue and fever. Negative for chills and diaphoresis.   HENT: Negative for congestion.    Eyes: Negative for visual disturbance.   Respiratory: Negative for shortness of breath.    Cardiovascular: Positive for leg swelling. Negative for chest pain and palpitations.   Gastrointestinal: Negative for abdominal pain and diarrhea.   Genitourinary: Negative for dysuria.   Musculoskeletal: Positive for arthralgias. Negative for myalgias.   Skin: Positive for rash and wound.   Neurological: Negative for dizziness, weakness and headaches.   Psychiatric/Behavioral: Positive for confusion.     Objective:     Vital Signs (Most Recent):  Temp: 97.8 °F (36.6 °C) (03/08/17 1545)  Pulse: 85 (03/08/17 1545)  Resp: 18 (03/08/17 1545)  BP: (!) 147/75 (03/08/17 1545)  SpO2: (!) 94 % (03/08/17 1545) Vital Signs (24h Range):  Temp:  [97.8 °F (36.6 °C)-99 °F (37.2 °C)] 97.8 °F (36.6 °C)  Pulse:  [77-99] 85  Resp:  [17-18] 18  SpO2:  [94 %-98 %] 94 %  BP: (147-164)/(72-86) 147/75     Weight: 136.1 kg (300 lb)  Body mass index is 39.58 kg/(m^2).    Intake/Output Summary (Last 24 hours) at 03/08/17 1830  Last data filed at 03/08/17 0600   Gross per 24 hour   Intake             1340 ml   Output              350 ml   Net              990 ml      Physical Exam   Constitutional: He is oriented to person, place, and time. He appears well-developed and well-nourished. No distress.   HENT:   Head: Normocephalic and atraumatic.   Eyes: EOM are normal. Pupils are equal, round, and reactive to light.   Neck: Normal range of motion. Neck supple.   Cardiovascular: Intact distal pulses.  Tachycardia present.  Exam reveals no gallop and no friction rub.    No murmur heard.  Pulmonary/Chest: Effort normal and breath sounds normal. No respiratory distress.   Abdominal: Soft. Bowel  sounds are normal.   Musculoskeletal: Normal range of motion. He exhibits edema and tenderness.   Neurological: He is alert and oriented to person, place, and time.   Skin: Rash noted. He is not diaphoretic. There is erythema.       Significant Labs:   CBC:     Recent Labs  Lab 03/07/17  0904 03/08/17  0515   WBC 21.04* 18.50*   HGB 11.4* 10.5*   HCT 34.3* 32.0*    304     CMP:     Recent Labs  Lab 03/07/17  0904 03/08/17 0515    135*   K 4.2 4.0    104   CO2 27 27   GLU 93 96   BUN 15 16   CREATININE 0.7 0.8   CALCIUM 8.9 8.3*   PROT 7.0 7.0   ALBUMIN 1.6* 1.6*   BILITOT 1.8* 1.2*   ALKPHOS 285* 405*   AST 61* 89*   ALT 53* 83*   ANIONGAP 9 4*   EGFRNONAA >60.0 >60.0     Cardiac Markers:   No results for input(s): CKMB, MYOGLOBIN, BNP, TROPISTAT in the last 48 hours.  Lactic Acid:   No results for input(s): LACTATE in the last 48 hours.  Urine Studies: No results for input(s): COLORU, APPEARANCEUA, PHUR, SPECGRAV, PROTEINUA, GLUCUA, KETONESU, BILIRUBINUA, OCCULTUA, NITRITE, UROBILINOGEN, LEUKOCYTESUR, RBCUA, WBCUA, BACTERIA, SQUAMEPITHEL, HYALINECASTS in the last 48 hours.    Invalid input(s): WRIGHTSUR    Significant Imaging: CXR: I have reviewed all pertinent results/findings within the past 24 hours and my personal findings are:  hemidiaphram with possible plueral effusion on the left.

## 2017-03-09 NOTE — PROGRESS NOTES
[facility] Hospital Medicine  Progress Note    Patient Name: Rajan Cooper III  MRN: [id number]  Patient Class: IP- Inpatient   Admission Date: 3/4/2017  Length of Stay: 2 days  Attending Physician: Trenton Brian MD  Primary Care Provider: Tom Garcia MD    [facility] Medicine Team: [facility] Trenton Brian MD    Subjective:     Principal Problem:Staphylococcus aureus sepsis    HPI:  Rajan Cooper III is a 61-year-old male w/ past medical history significant for multiple sclerosis and HTN who presented to the ED on 3/4/17 w/ worsening L wrist > R ankle pain of 5 days duration.  Denied trauma or other precipitating event, h/o septic joint, h/o gout, fevers, chills, recent infection, CP, SOB, dysuria, or new numbness/tingling/weakness. WBC 22K w/ a CRP of 425. Concern for left dorsal wrist abscess with possible septic wrist and right ankle arthritis.    Able to bear weight but painful.  Usually ambulates without assistive device at baseline. Takes baclofen and Avonex for MS.       Hospital Course:  3/4/17 - Arthrocentesis of the right ankle, incision, drainage and irrigation of left dorsal wrist abscess and left wrist arthrotomy with irrigation. GPC noted on gram stain along with 20K WBCS w/ 79% segs. Patient has remained on ceftriaxone and vancomycin.   3/7/17 - medicine consulted. Patient has developed worsening tachycardia with SOB required 3 liter of 02. Patient denies any SOB, but complains of pain. New fever at 101.2 F noted. MSSA grown from abscess of the left wrist and staph noted in the urine culture. Blood culture positive for staph aureus. TTE negative for endocarditis. MRI of the right ankle performed       Interval History:   Patient seen and examined at bedside. WBC and CRP improving. MRI performed to confirm osteomyelitis.     Review of Systems   Constitutional: Positive for fatigue and fever. Negative for chills and diaphoresis.   HENT: Negative for congestion.    Eyes:  Negative for visual disturbance.   Respiratory: Negative for shortness of breath.    Cardiovascular: Positive for leg swelling. Negative for chest pain and palpitations.   Gastrointestinal: Negative for abdominal pain and diarrhea.   Genitourinary: Negative for dysuria.   Musculoskeletal: Positive for arthralgias. Negative for myalgias.   Skin: Positive for rash and wound.   Neurological: Negative for dizziness, weakness and headaches.   Psychiatric/Behavioral: Positive for confusion.     Objective:     Vital Signs (Most Recent):  Temp: 97.8 °F (36.6 °C) (03/08/17 1545)  Pulse: 85 (03/08/17 1545)  Resp: 18 (03/08/17 1545)  BP: (!) 147/75 (03/08/17 1545)  SpO2: (!) 94 % (03/08/17 1545) Vital Signs (24h Range):  Temp:  [97.8 °F (36.6 °C)-99 °F (37.2 °C)] 97.8 °F (36.6 °C)  Pulse:  [77-99] 85  Resp:  [17-18] 18  SpO2:  [94 %-98 %] 94 %  BP: (147-164)/(72-86) 147/75     Weight: 136.1 kg (300 lb)  Body mass index is 39.58 kg/(m^2).    Intake/Output Summary (Last 24 hours) at 03/08/17 1830  Last data filed at 03/08/17 0600   Gross per 24 hour   Intake             1340 ml   Output              350 ml   Net              990 ml      Physical Exam   Constitutional: He is oriented to person, place, and time. He appears well-developed and well-nourished. No distress.   HENT:   Head: Normocephalic and atraumatic.   Eyes: EOM are normal. Pupils are equal, round, and reactive to light.   Neck: Normal range of motion. Neck supple.   Cardiovascular: Intact distal pulses.  Tachycardia present.  Exam reveals no gallop and no friction rub.    No murmur heard.  Pulmonary/Chest: Effort normal and breath sounds normal. No respiratory distress.   Abdominal: Soft. Bowel sounds are normal.   Musculoskeletal: Normal range of motion. He exhibits edema and tenderness.   Neurological: He is alert and oriented to person, place, and time.   Skin: Rash noted. He is not diaphoretic. There is erythema.       Significant Labs:   CBC:     Recent  Labs  Lab 03/07/17  0904 03/08/17  0515   WBC 21.04* 18.50*   HGB 11.4* 10.5*   HCT 34.3* 32.0*    304     CMP:     Recent Labs  Lab 03/07/17  0904 03/08/17  0515    135*   K 4.2 4.0    104   CO2 27 27   GLU 93 96   BUN 15 16   CREATININE 0.7 0.8   CALCIUM 8.9 8.3*   PROT 7.0 7.0   ALBUMIN 1.6* 1.6*   BILITOT 1.8* 1.2*   ALKPHOS 285* 405*   AST 61* 89*   ALT 53* 83*   ANIONGAP 9 4*   EGFRNONAA >60.0 >60.0     Cardiac Markers:   No results for input(s): CKMB, MYOGLOBIN, BNP, TROPISTAT in the last 48 hours.  Lactic Acid:   No results for input(s): LACTATE in the last 48 hours.  Urine Studies: No results for input(s): COLORU, APPEARANCEUA, PHUR, SPECGRAV, PROTEINUA, GLUCUA, KETONESU, BILIRUBINUA, OCCULTUA, NITRITE, UROBILINOGEN, LEUKOCYTESUR, RBCUA, WBCUA, BACTERIA, SQUAMEPITHEL, HYALINECASTS in the last 48 hours.    Invalid input(s): WRIGHTSUR    Significant Imaging: CXR: I have reviewed all pertinent results/findings within the past 24 hours and my personal findings are:  hemidiaphram with possible plueral effusion on the left.     Assessment/Plan:      * Staphylococcus aureus sepsis  SIRS (4/4) 2/2 to MSSA.   On Ancef IV continuous infusion   Blood cs revealed staph   TTE: -ve for endocarditis   MRI to rule out Osteomyelitis  .1-->244.1    MS (multiple sclerosis)  Okay to restart baclofen 10 mg TID.     HTN (hypertension)  Continue losartan 25 mg and amlodipine 10 mg daily.       Acute respiratory failure with hypoxemia  CXR and BNP unremarkable. Pulmonary edema or PNA unlikely. Instructed on using incentive spirometry.       Staphylococcal arthritis of left wrist  Blood cs 03/06/17: positive for Staph aureus  Blood cS 03/07/17: NGTD  On Ancef IVPB  Synovial fluid positive for pansensitive Staph Aureus        VTE Risk Mitigation         Ordered     Medium Risk of VTE  Once      03/04/17 1346     Place sequential compression device  Until discontinued      03/04/17 1346          Trenton  MD Roc  Department of Hospital Medicine   [facility]

## 2017-03-09 NOTE — SUBJECTIVE & OBJECTIVE
Interval History:  NAEON. Patient feeling better today. He has more energy and his chills have resolved. His right ankle swelling and erythema have much improved although he still cannot weight bear on right ankle. Undressed right wrist and right hand is very edematous and red. Incision draining serious fluid, however was able to expel a small amount of pus. Will touch base with ortho. Otherwise, without new complaints. Afebrile overnight. 2D echo negative. MRI RLE pending.     Wrist cx 3/4 + MSSA  Ankle cx 3/4 + MSSA  Urine cx 3/4 + MSSA  Blood cx 3/5 + MSSA  Blood cx 3/6 + MSSA  Blood cx 3/7 NGTD     HPI:  Mr. Cooper is a pleasant 61 y.o. male with a PMHx of MS on avonex presenting with worsening left wrist and right ankle pain and swelling of 5 days duration. Patient reports he was moving a heavy gate a work late February and twisted his ankle. He was evaluated by his PCP and left wrist xray revealed  evidence of AVN, DJD, and soft tissue swelling and right ankle xray showed evidence of soft tissue swelling but no fracture. He was suppose to follow up in orthopedic clinic but came to the ED as his pain acutely worsened. Patient denies break in his skin. Denies fevers, chills, sweats, recent infection, CP, SOB, N/V, diarrhea. Labs upon admit were significant for ESR elevated at 118, CRP elevated at 451.1 and a leukocytosis of 22.5k. Orthopedic surgery performed an aspiration of his left wrist  Revealing 20K WBCs and 79% segs. He is now s/p left wrist washout and debridement on 3/4 along with ankle arthrocentesis. Cultures are pending. GS + GPCs. He was started on empiric Vanc/Ceftriaxone post operatively.     Left wrist x-ray: There is lunate sclerosis suggesting AVN with areas of DJD most severe at the radiocarpal joint. There is soft tissue swelling.     Right ankle x-ray: Soft tissue swelling is identified about the ankle, particularly about the lateral malleolus.  The visualized osseous structures, however,  appear intact, with no definite evidence of recent fracture or other significant abnormality identified.         Review of Systems   Constitutional: Negative for chills, diaphoresis, fatigue and fever.   HENT: Negative for congestion, mouth sores, rhinorrhea and sore throat.    Eyes: Negative for pain, discharge and visual disturbance.   Respiratory: Negative for cough, chest tightness, shortness of breath and wheezing.    Cardiovascular: Negative for chest pain, palpitations and leg swelling.   Gastrointestinal: Negative for abdominal distention, abdominal pain, constipation, diarrhea, nausea and vomiting.   Genitourinary: Negative for difficulty urinating, dysuria, flank pain, frequency and hematuria.   Musculoskeletal: Positive for back pain (reports chronic back pain ). Negative for arthralgias, joint swelling, myalgias and neck pain.        Left wrist pain, swelling, redness  Right ankle pain, redness, erythema (improved), difficulty ambulating   Skin: Negative for color change, pallor, rash and wound.   Neurological: Negative for dizziness, seizures, weakness, numbness and headaches.   Hematological: Negative for adenopathy. Does not bruise/bleed easily.   Psychiatric/Behavioral: Negative for confusion and sleep disturbance. The patient is not nervous/anxious.      Objective:     Vital Signs (Most Recent):  Temp: 97.1 °F (36.2 °C) (03/09/17 0350)  Pulse: 89 (03/09/17 0350)  Resp: 18 (03/09/17 0350)  BP: (!) 144/81 (03/09/17 0350)  SpO2: (!) 93 % (03/09/17 0350) Vital Signs (24h Range):  Temp:  [97.1 °F (36.2 °C)-99 °F (37.2 °C)] 97.1 °F (36.2 °C)  Pulse:  [77-97] 89  Resp:  [18] 18  SpO2:  [93 %-95 %] 93 %  BP: (144-154)/(74-81) 144/81     Weight: 136.1 kg (300 lb)  Body mass index is 39.58 kg/(m^2).    Estimated Creatinine Clearance: 142.2 mL/min (based on Cr of 0.8).    Physical Exam   Constitutional: He is oriented to person, place, and time. He appears well-developed and well-nourished. No distress.    HENT:   Head: Normocephalic and atraumatic.   Mouth/Throat: No oropharyngeal exudate.   Eyes: Conjunctivae and EOM are normal. Pupils are equal, round, and reactive to light. No scleral icterus.   Cardiovascular: Normal rate, regular rhythm and intact distal pulses.    Murmur (physiologic murmur) heard.  Pulmonary/Chest: Effort normal and breath sounds normal. No respiratory distress. He has no wheezes.   Abdominal: Soft. He exhibits no distension. There is no tenderness. There is no guarding.   Musculoskeletal: Normal range of motion. He exhibits no deformity.   Left wrist and thumb very edematous and erythematous, incision draining serous fluid, able to expel small amount of pus.  Right ankle/LE warmth, edema and erythema (improving), + TTP.   Neurological: He is alert and oriented to person, place, and time. No cranial nerve deficit.   Skin: Skin is warm and dry. No rash noted. He is not diaphoretic.   Chronic skin changes to bilateral lower extremities.    Psychiatric: He has a normal mood and affect. His behavior is normal. Judgment and thought content normal.   Vitals reviewed.      Significant Labs:   Blood Culture:     Recent Labs  Lab 03/05/17  0107 03/06/17  1202 03/06/17  1203 03/06/17  1403 03/07/17  1251   LABBLOO Gram stain aer bottle: Gram positive cocci in clusters resembling Staph  Results called to and read back by:Angelica Lima RN 03/08/2017  04:57  STAPHYLOCOCCUS AUREUSFor susceptibility see order #[id number]  Gram stain aer bottle: Gram positive cocci in clusters resembling Staph   Results called to and read back by: Serena Mederos RN 03/09/2017    08:24 Gram stain cristiano bottle: Gram positive cocci in clusters resembling Staph   Results called to and read back by: Angelica Hurtado RN  03/07/2017    09:17  STAPHYLOCOCCUS AUREUS No Growth to date  No Growth to date  No Growth to date No Growth to date  No Growth to date  No Growth to date  No Growth to date  No Growth to date  No  Growth to date No Growth to date  No Growth to date  No Growth to date  No Growth to date     BMP:     Recent Labs  Lab 03/08/17  0515   GLU 96   *   K 4.0      CO2 27   BUN 16   CREATININE 0.8   CALCIUM 8.3*     CBC:     Recent Labs  Lab 03/08/17  0515 03/09/17  0922   WBC 18.50*  --    HGB 10.5* 10.8*   HCT 32.0* 32.4*    356*     CMP:     Recent Labs  Lab 03/08/17  0515   *   K 4.0      CO2 27   GLU 96   BUN 16   CREATININE 0.8   CALCIUM 8.3*   PROT 7.0   ALBUMIN 1.6*   BILITOT 1.2*   ALKPHOS 405*   AST 89*   ALT 83*   ANIONGAP 4*   EGFRNONAA >60.0     Procalcitonin: No results for input(s): PROCAL in the last 48 hours.  Urine Culture:     Recent Labs  Lab 03/04/17  1548   LABURIN STAPHYLOCOCCUS AUREUS> 100,000 cfu/ml     Urine Studies:     Recent Labs  Lab 03/04/17  1548   COLORU Lizbeth   APPEARANCEUA Hazy*   PHUR 7.0   SPECGRAV 1.020   PROTEINUA 2+*   GLUCUA Negative   KETONESU 2+*   BILIRUBINUA Trace   OCCULTUA 3+*   NITRITE Positive*   UROBILINOGEN >=8.0*   LEUKOCYTESUR 1+*   RBCUA >100*   WBCUA 31*   BACTERIA Occasional   HYALINECASTS 0       Significant Imaging: I have reviewed all pertinent imaging results/findings within the past 24 hours.

## 2017-03-09 NOTE — ASSESSMENT & PLAN NOTE
SIRS (4/4) 2/2 to MSSA.   On Ancef IV continuous infusion   Blood cs revealed Staph Aureus 03/05, 03/06, NGTD on 03/07   TTE: -ve for endocarditis   MRI positive for anterolateral abscess  .1-->244.1  NPO After MN for OR abscess drainage

## 2017-03-09 NOTE — PLAN OF CARE
Problem: Patient Care Overview  Goal: Plan of Care Review  Outcome: Ongoing (interventions implemented as appropriate)  Patient AAOX3, VSS. Family at bedside. Two side rails up, bed locked, call light within reach. No falls noted as these precautions remain. Patient is free of skin breakdown as patient moves well independently.. Pain controlled well with PRN meds. Hourly rounds made and no complaints at this time noted. Will resume with plan of care.

## 2017-03-09 NOTE — PROGRESS NOTES
ORTHO PROGRESS NOTE:    Rajan Cooper III is a 61 y.o. male admitted on 3/4/2017  Hospital Day: 3   Post Op Day: 5 Day Post-Op      The patient was seen and examined this morning at the bedside. Overall feels better. Still having pain in right leg but swelling much improved with elevation. Left arm feels better. Afebrile.     PHYSICAL EXAM:  Awake/alert/oriented x 3, ill appearing   AFVSS  Good inspiratory effort with unlabored breathing. Nasal cannula   Dressings changed at bedside. Serous fluid able to be expressed from proximal wound with some purulent material.   NVI distally  Able to range left wrist passively  Pitting edema to right ankle with minimal warmth and erythema     Vitals:    03/08/17 2345 03/09/17 0350 03/09/17 0805 03/09/17 1159   BP: (!) 144/79 (!) 144/81 (!) 148/78 (!) 152/80   BP Location: Right arm Right arm     Patient Position: Lying Lying     BP Method: Automatic Automatic     Pulse: 94 89 80 84   Resp: 18 18 17 18   Temp: 98.2 °F (36.8 °C) 97.1 °F (36.2 °C) 97.1 °F (36.2 °C) 98.3 °F (36.8 °C)   TempSrc: Oral Oral Oral Oral   SpO2: (!) 94% (!) 93% (!) 93% (!) 93%   Weight:       Height:         I/O last 3 completed shifts:  In: 1340 [P.O.:440; I.V.:900]  Out: 350 [Urine:350]    Recent Labs  Lab 03/07/17  0904 03/08/17 0515 03/09/17 0922   CALCIUM 8.9 8.3* 8.3*   PROT 7.0 7.0 7.1    135* 138   K 4.2 4.0 4.0   CO2 27 27 25    104 104   BUN 15 16 14   CREATININE 0.7 0.8 0.7       Recent Labs  Lab 03/07/17  0904 03/08/17  0515 03/09/17  0922   WBC 21.04* 18.50* 17.43*   RBC 3.84* 3.50* 3.65*   HGB 11.4* 10.5* 10.8*   HCT 34.3* 32.0* 32.4*    304 356*     No results for input(s): INR, APTT in the last 72 hours.    Invalid input(s): PT      A/P: 61 y.o. male 5 Day Post-Op s/p L wrist arthrotomy with irrigation and synovectomy, I&D abscess  -- Pain control  -- PT/OT: RUDDY CHO. ROM left wrist  -- DVT prophylaxis: SCDs  -- Antibiotics: Ancef. Blood cx Staph Aureus pan sensitive.  Urine Cx Staph (pan sensitive) . ID on board  --CRP trending down 451 --> 246  --WBC trending down 21 --> 17.4     MRI personally reviewed although official read not back. Concerning for posteromedial abscess. willl discuss with radiology. NPO at midnight.     Franklin Reynoso MD  Orthopedic Surgery, PGY3  [phone] (p)     Attg Note:  I agree with the above assessment and plan.  Fluid collection posteromedial could be small area of abscess.  OR tomorrow for exploration.      Kunal Shipley MD

## 2017-03-09 NOTE — ASSESSMENT & PLAN NOTE
SIRS (4/4) 2/2 to MSSA.   On Ancef IV continuous infusion   Blood cs revealed staph   TTE: -ve for endocarditis   MRI to rule out Osteomyelitis  .1-->244.1

## 2017-03-09 NOTE — ANESTHESIA PREPROCEDURE EVALUATION
Pre-operative evaluation for IRRIGATION AND DEBRIDEMENT LOWER EXTREMITY (Right)    Rajan Cooper III is a 61 y.o., male c PmHx for multiple sclerosis and HTN presented initially with left septic wrist s/p I&D  and right septic ankle with MSSA. Plan for procedure above.     Patient is numb from waist down but can ambulate with cane.      LDA:  - 20g PIV R AC    Drips:   sodium chloride 0.9% 75 mL/hr at 03/07/17 0948         Previous Airway:  Present Prior to Hospital Arrival?: No; Placement Date: 03/04/17; Placement Time: 1932; Method of Intubation: Other (Comment) (LMA); Inserted by: CRNA; Airway Device: LMA; Mask Ventilation: Not Attempted; Intubated: Postinduction; Blade: Other (see comments) (NA); Airway Device Size: 5.0; Inflation Amount: 25; Placement Verified By: Capnometry; Grade:  (na); Complicating Factors: None; Intubation Findings: Positive EtCO2, Atraumatic/Condition of teeth unchanged; Securment: Lips; Complications: None; Insertion Attempts: 2; Removal Date: 03/04/17;  Removal Time: 2047    History reviewed. No pertinent surgical history.      Vital Signs Range (Last 24H):  Temp:  [36.2 °C (97.1 °F)-37.2 °C (99 °F)]   Pulse:  [80-97]   Resp:  [17-18]   BP: (144-154)/(75-81)   SpO2:  [93 %-94 %]       CBC:     Recent Labs  Lab 03/04/17  0829 03/06/17  1203 03/07/17  0904 03/08/17  0515 03/09/17  0922   WBC 22.54* 23.12* 21.04* 18.50* 17.43*   RBC 4.15* 3.95* 3.84* 3.50* 3.65*   HGB 12.6* 11.8* 11.4* 10.5* 10.8*   HCT 37.0* 35.8* 34.3* 32.0* 32.4*    296 282 304 356*   MCV 89 91 89 91 89   MCH 30.4 29.9 29.7 30.0 29.6   MCHC 34.1 33.0 33.2 32.8 33.3       CMP:   Recent Labs  Lab 03/04/17  0829 03/05/17  0039 03/06/17  1203 03/07/17  0904 03/08/17  0515 03/09/17  0922    143 139 139 135* 138   K 3.5 3.9 4.4 4.2 4.0 4.0    105 101 103 104 104   CO2 24 25 27 27 27 25   BUN 15 16 21* 15 16 14   CREATININE 0.8 0.8 0.9 0.7 0.8 0.7   GLU 99 86 94 93 96 115*   CALCIUM 9.6 9.2 9.2 8.9 8.3* 8.3*    ALBUMIN 2.2*  --  1.8* 1.6* 1.6* 1.5*   PROT 7.4  --  7.5 7.0 7.0 7.1   ALKPHOS 219*  --  223* 285* 405* 389*   ALT 44  --  37 53* 83* 78*   AST 26  --  33 61* 89* 65*   BILITOT 2.4*  --  1.6* 1.8* 1.2* 1.3*       INR:    Recent Labs  Lab 03/04/17  1408   INR 1.1   APTT 26.8         [facility] Anesthesia Evaluation    I have reviewed the Patient Summary Reports.     I have reviewed the Medications.     Review of Systems  Anesthesia Hx:  No previous Anesthesia  History of prior surgery of interest to airway management or planning: Denies Family Hx of Anesthesia complications.   Denies Personal Hx of Anesthesia complications.   Social:  Non-Smoker    Cardiovascular:   Hypertension, well controlled Denies MI.      Pulmonary:   Denies COPD.  Denies Asthma.    Renal/:   Denies Chronic Renal Disease.     Hepatic/GI:   Denies GERD.    Neurological:   Denies CVA.    Endocrine:   Denies Diabetes.        Physical Exam  General:  Obesity    Airway/Jaw/Neck:  Airway Findings: Mouth Opening: Small, but > 3cm Tongue: Normal  General Airway Assessment: Adult, Possible difficult intubation  Mallampati: III  Improves to II with phonation.  TM Distance: Normal, at least 6 cm  Jaw/Neck Findings:     Neck ROM: Extension Decreased, Mod.      Dental:  Dental Findings: In tact   Chest/Lungs:  Chest/Lungs Findings: Clear to auscultation, Normal Respiratory Rate     Heart/Vascular:  Heart Findings: Rate: Normal  Rhythm: Regular Rhythm  Sounds: Normal             Anesthesia Plan  Type of Anesthesia, risks & benefits discussed:  Anesthesia Type:  general  Patient's Preference:   Intra-op Monitoring Plan: standard ASA monitors  Intra-op Monitoring Plan Comments:   Post Op Pain Control Plan: single-shot nerve block  Post Op Pain Control Plan Comments:   Induction:   IV  Beta Blocker:  Patient is not currently on a Beta-Blocker (No further documentation required).       Informed Consent: Patient understands risks and agrees with Anesthesia plan.   Questions answered. Anesthesia consent signed with patient.  ASA Score: 3     Day of Surgery Review of History & Physical:    H&P update referred to the surgeon.     Anesthesia Plan Notes: Risk of peripheral nerve block and MS discussed.  Patient understands theoretical risk and would like to proceed with nerve block.        Ready For Surgery From Anesthesia Perspective.

## 2017-03-09 NOTE — PROGRESS NOTES
[facility]  Infectious Disease  Progress Note    Patient Name: Rajan Cooper III  MRN: [id number]  Admission Date: 3/4/2017  Length of Stay: 3 days  Attending Physician: Trenton Brian MD  Primary Care Provider: Tom Garcia MD    Isolation Status: No active isolations  Assessment/Plan:      Pyogenic arthritis of left wrist  61 year old male with history of multiple sclerosis admitted with left septic wrist and right septic ankle with MSSA now s/p left wrist washout and debridement and ankle arthrocentesis on 3/4/17.   - Wrist and ankles cultures 3/4 + MSSA   - Urine culture 3/4 + MSSA  - Blood cultures 3/5 and 3/6 + MSSA. Blood cultures 3/7 NGTD.   - 2D echo negative for endocarditis. MRI RLE concerning for abscess.   - Patient afebrile over past 72 hours. Chills have resolved. Leukocytosis and inflammatory markers trending downward.    Plan  - RLE I&D scheduled for tomorrow with ortho surgery  - Continue cefazolin 6 grams IV q 24 hours by continuous infusion for MSSA  - Recommend minimum of 6 weeks of IV antibiotics for complicated bacteremia with septic arthritis of wrist and ankle from day blood cultures clear vs day of debridement   - Please hold off on PICC line placement until blood cultures are clear for 48 hours. If remain negative today, ok to place PICC tomorrow.   - Discussed with staff. ID will continue to follow with you.     Please call for any questions. Thank you.  Larissa Patton PA-C  Phone: [phone]  Pager: [phone]    Subjective:     Principal Problem:Staphylococcus aureus sepsis    Interval History:  NAEON. Patient feeling better today. He has more energy and his chills have resolved. His right ankle swelling and erythema have much improved although he still cannot weight bear on right ankle. Undressed right wrist and right hand is very edematous and red. Incision draining serious fluid, however was able to expel a small amount of pus. Will touch base with ortho. Otherwise,  without new complaints. Afebrile overnight. 2D echo negative. MRI RLE pending.     Wrist cx 3/4 + MSSA  Ankle cx 3/4 + MSSA  Urine cx 3/4 + MSSA  Blood cx 3/5 + MSSA  Blood cx 3/6 + MSSA  Blood cx 3/7 NGTD     HPI:  Mr. Cooper is a pleasant 61 y.o. male with a PMHx of MS on avonex presenting with worsening left wrist and right ankle pain and swelling of 5 days duration. Patient reports he was moving a heavy gate a work late February and twisted his ankle. He was evaluated by his PCP and left wrist xray revealed  evidence of AVN, DJD, and soft tissue swelling and right ankle xray showed evidence of soft tissue swelling but no fracture. He was suppose to follow up in orthopedic clinic but came to the ED as his pain acutely worsened. Patient denies break in his skin. Denies fevers, chills, sweats, recent infection, CP, SOB, N/V, diarrhea. Labs upon admit were significant for ESR elevated at 118, CRP elevated at 451.1 and a leukocytosis of 22.5k. Orthopedic surgery performed an aspiration of his left wrist  Revealing 20K WBCs and 79% segs. He is now s/p left wrist washout and debridement on 3/4 along with ankle arthrocentesis. Cultures are pending. GS + GPCs. He was started on empiric Vanc/Ceftriaxone post operatively.     Left wrist x-ray: There is lunate sclerosis suggesting AVN with areas of DJD most severe at the radiocarpal joint. There is soft tissue swelling.     Right ankle x-ray: Soft tissue swelling is identified about the ankle, particularly about the lateral malleolus.  The visualized osseous structures, however, appear intact, with no definite evidence of recent fracture or other significant abnormality identified.         Review of Systems   Constitutional: Negative for chills, diaphoresis, fatigue and fever.   HENT: Negative for congestion, mouth sores, rhinorrhea and sore throat.    Eyes: Negative for pain, discharge and visual disturbance.   Respiratory: Negative for cough, chest tightness, shortness of  breath and wheezing.    Cardiovascular: Negative for chest pain, palpitations and leg swelling.   Gastrointestinal: Negative for abdominal distention, abdominal pain, constipation, diarrhea, nausea and vomiting.   Genitourinary: Negative for difficulty urinating, dysuria, flank pain, frequency and hematuria.   Musculoskeletal: Positive for back pain (reports chronic back pain ). Negative for arthralgias, joint swelling, myalgias and neck pain.        Left wrist pain, swelling, redness  Right ankle pain, redness, erythema (improved), difficulty ambulating   Skin: Negative for color change, pallor, rash and wound.   Neurological: Negative for dizziness, seizures, weakness, numbness and headaches.   Hematological: Negative for adenopathy. Does not bruise/bleed easily.   Psychiatric/Behavioral: Negative for confusion and sleep disturbance. The patient is not nervous/anxious.      Objective:     Vital Signs (Most Recent):  Temp: 97.1 °F (36.2 °C) (03/09/17 0350)  Pulse: 89 (03/09/17 0350)  Resp: 18 (03/09/17 0350)  BP: (!) 144/81 (03/09/17 0350)  SpO2: (!) 93 % (03/09/17 0350) Vital Signs (24h Range):  Temp:  [97.1 °F (36.2 °C)-99 °F (37.2 °C)] 97.1 °F (36.2 °C)  Pulse:  [77-97] 89  Resp:  [18] 18  SpO2:  [93 %-95 %] 93 %  BP: (144-154)/(74-81) 144/81     Weight: 136.1 kg (300 lb)  Body mass index is 39.58 kg/(m^2).    Estimated Creatinine Clearance: 142.2 mL/min (based on Cr of 0.8).    Physical Exam   Constitutional: He is oriented to person, place, and time. He appears well-developed and well-nourished. No distress.   HENT:   Head: Normocephalic and atraumatic.   Mouth/Throat: No oropharyngeal exudate.   Eyes: Conjunctivae and EOM are normal. Pupils are equal, round, and reactive to light. No scleral icterus.   Cardiovascular: Normal rate, regular rhythm and intact distal pulses.    Murmur (physiologic murmur) heard.  Pulmonary/Chest: Effort normal and breath sounds normal. No respiratory distress. He has no wheezes.    Abdominal: Soft. He exhibits no distension. There is no tenderness. There is no guarding.   Musculoskeletal: Normal range of motion. He exhibits no deformity.   Left wrist and thumb very edematous and erythematous, incision draining serous fluid, able to expel small amount of pus.  Right ankle/LE warmth, edema and erythema (improving), + TTP.   Neurological: He is alert and oriented to person, place, and time. No cranial nerve deficit.   Skin: Skin is warm and dry. No rash noted. He is not diaphoretic.   Chronic skin changes to bilateral lower extremities.    Psychiatric: He has a normal mood and affect. His behavior is normal. Judgment and thought content normal.   Vitals reviewed.      Significant Labs:   Blood Culture:     Recent Labs  Lab 03/05/17  0107 03/06/17  1202 03/06/17  1203 03/06/17  1403 03/07/17  1251   LABBLOO Gram stain aer bottle: Gram positive cocci in clusters resembling Staph  Results called to and read back by:Angelica Lima RN 03/08/2017  04:57  STAPHYLOCOCCUS AUREUSFor susceptibility see order #[id number]  Gram stain aer bottle: Gram positive cocci in clusters resembling Staph   Results called to and read back by: Serena Mederos RN 03/09/2017    08:24 Gram stain cristiano bottle: Gram positive cocci in clusters resembling Staph   Results called to and read back by: Angelica Hurtado RN  03/07/2017    09:17  STAPHYLOCOCCUS AUREUS No Growth to date  No Growth to date  No Growth to date No Growth to date  No Growth to date  No Growth to date  No Growth to date  No Growth to date  No Growth to date No Growth to date  No Growth to date  No Growth to date  No Growth to date     BMP:     Recent Labs  Lab 03/08/17  0515   GLU 96   *   K 4.0      CO2 27   BUN 16   CREATININE 0.8   CALCIUM 8.3*     CBC:     Recent Labs  Lab 03/08/17 0515 03/09/17  0922   WBC 18.50*  --    HGB 10.5* 10.8*   HCT 32.0* 32.4*    356*     CMP:     Recent Labs  Lab 03/08/17 0515   *    K 4.0      CO2 27   GLU 96   BUN 16   CREATININE 0.8   CALCIUM 8.3*   PROT 7.0   ALBUMIN 1.6*   BILITOT 1.2*   ALKPHOS 405*   AST 89*   ALT 83*   ANIONGAP 4*   EGFRNONAA >60.0     Procalcitonin: No results for input(s): PROCAL in the last 48 hours.  Urine Culture:     Recent Labs  Lab 03/04/17  1548   LABURIN STAPHYLOCOCCUS AUREUS> 100,000 cfu/ml     Urine Studies:     Recent Labs  Lab 03/04/17  1548   COLORU Lizbeth   APPEARANCEUA Hazy*   PHUR 7.0   SPECGRAV 1.020   PROTEINUA 2+*   GLUCUA Negative   KETONESU 2+*   BILIRUBINUA Trace   OCCULTUA 3+*   NITRITE Positive*   UROBILINOGEN >=8.0*   LEUKOCYTESUR 1+*   RBCUA >100*   WBCUA 31*   BACTERIA Occasional   HYALINECASTS 0       Significant Imaging: I have reviewed all pertinent imaging results/findings within the past 24 hours.

## 2017-03-09 NOTE — ASSESSMENT & PLAN NOTE
Blood cs 03/06/17: positive for Staph aureus  Blood cS 03/07/17: NGTD  On Ancef IVPB  Synovial fluid positive for pansensitive Staph Aureus

## 2017-03-09 NOTE — ASSESSMENT & PLAN NOTE
61 year old male with history of multiple sclerosis admitted with left septic wrist and right septic ankle with MSSA now s/p left wrist washout and debridement and ankle arthrocentesis on 3/4/17.   - Wrist and ankles cultures 3/4 + MSSA   - Urine culture 3/4 + MSSA  - Blood cultures 3/5 and 3/6 + MSSA. Blood cultures 3/7 NGTD.   - 2D echo negative for endocarditis. MRI RLE concerning for abscess.   - Patient afebrile over past 72 hours. Chills have resolved. Leukocytosis and inflammatory markers trending downward.    Plan  - RLE I&D scheduled for tomorrow with ortho surgery  - Continue cefazolin 6 grams IV q 24 hours by continuous infusion for MSSA  - Recommend minimum of 6 weeks of IV antibiotics for complicated bacteremia with septic arthritis of wrist and ankle from day blood cultures clear vs day of debridement   - Please hold off on PICC line placement until blood cultures are clear for 48 hours. If remain negative today, ok to place PICC tomorrow.   - Discussed with staff. ID will continue to follow with you.

## 2017-03-09 NOTE — NURSING TRANSFER
Nursing Transfer Note      3/8/2017     Transfer To: 504 from [facility]    Transfer via stretcher    Transfer with none    Transported by [facility] RN x2    Medicines sent: nonne    Chart send with patient: Yes    Notified: spouse    Patient reassessed at: 0100 3/5/2017

## 2017-03-09 NOTE — SUBJECTIVE & OBJECTIVE
Interval History:   Patient seen and examined at bedside. MRI of ankle revealed possible ankle abscess, to OR tomorrow.      Review of Systems   Constitutional: Positive for fatigue and fever. Negative for chills and diaphoresis.   HENT: Negative for congestion.    Eyes: Negative for visual disturbance.   Respiratory: Negative for shortness of breath.    Cardiovascular: Positive for leg swelling. Negative for chest pain and palpitations.   Gastrointestinal: Negative for abdominal pain and diarrhea.   Genitourinary: Negative for dysuria.   Musculoskeletal: Positive for arthralgias. Negative for myalgias.   Skin: Positive for rash and wound.   Neurological: Negative for dizziness, weakness and headaches.   Psychiatric/Behavioral: Positive for confusion.     Objective:     Vital Signs (Most Recent):  Temp: 97.8 °F (36.6 °C) (03/09/17 1602)  Pulse: 82 (03/09/17 1602)  Resp: 16 (03/09/17 1602)  BP: (!) 141/74 (03/09/17 1602)  SpO2: (!) 94 % (03/09/17 1602) Vital Signs (24h Range):  Temp:  [97.1 °F (36.2 °C)-99 °F (37.2 °C)] 97.8 °F (36.6 °C)  Pulse:  [80-97] 82  Resp:  [16-18] 16  SpO2:  [93 %-94 %] 94 %  BP: (141-154)/(74-81) 141/74     Weight: 136.1 kg (300 lb)  Body mass index is 39.58 kg/(m^2).  No intake or output data in the 24 hours ending 03/09/17 1750   Physical Exam   Constitutional: He is oriented to person, place, and time. He appears well-developed and well-nourished. No distress.   HENT:   Head: Normocephalic and atraumatic.   Eyes: EOM are normal. Pupils are equal, round, and reactive to light.   Neck: Normal range of motion. Neck supple.   Cardiovascular: Intact distal pulses.  Tachycardia present.  Exam reveals no gallop and no friction rub.    No murmur heard.  Pulmonary/Chest: Effort normal and breath sounds normal. No respiratory distress.   Abdominal: Soft. Bowel sounds are normal.   Musculoskeletal: Normal range of motion. He exhibits edema and tenderness.   Neurological: He is alert and oriented to  person, place, and time.   Skin: Rash noted. He is not diaphoretic. There is erythema.       Significant Labs:   CBC:     Recent Labs  Lab 03/08/17  0515 03/09/17  0922   WBC 18.50* 17.43*   HGB 10.5* 10.8*   HCT 32.0* 32.4*    356*     CMP:     Recent Labs  Lab 03/08/17  0515 03/09/17  0922   * 138   K 4.0 4.0    104   CO2 27 25   GLU 96 115*   BUN 16 14   CREATININE 0.8 0.7   CALCIUM 8.3* 8.3*   PROT 7.0 7.1   ALBUMIN 1.6* 1.5*   BILITOT 1.2* 1.3*   ALKPHOS 405* 389*   AST 89* 65*   ALT 83* 78*   ANIONGAP 4* 9   EGFRNONAA >60.0 >60.0     Cardiac Markers:   No results for input(s): CKMB, MYOGLOBIN, BNP, TROPISTAT in the last 48 hours.  Lactic Acid:   No results for input(s): LACTATE in the last 48 hours.  Urine Studies: No results for input(s): COLORU, APPEARANCEUA, PHUR, SPECGRAV, PROTEINUA, GLUCUA, KETONESU, BILIRUBINUA, OCCULTUA, NITRITE, UROBILINOGEN, LEUKOCYTESUR, RBCUA, WBCUA, BACTERIA, SQUAMEPITHEL, HYALINECASTS in the last 48 hours.    Invalid input(s): WRIGHTSUR    Significant Imaging: CXR: I have reviewed all pertinent results/findings within the past 24 hours and my personal findings are:  hemidiaphram with possible plueral effusion on the left.

## 2017-03-10 ENCOUNTER — ANESTHESIA (OUTPATIENT)
Dept: SURGERY | Facility: HOSPITAL | Age: 62
DRG: 853 | End: 2017-03-10
Payer: COMMERCIAL

## 2017-03-10 PROBLEM — J96.01 ACUTE RESPIRATORY FAILURE WITH HYPOXEMIA: Status: RESOLVED | Noted: 2017-03-06 | Resolved: 2017-03-10

## 2017-03-10 LAB
ALBUMIN SERPL BCP-MCNC: 1.6 G/DL
ALP SERPL-CCNC: 387 U/L
ALT SERPL W/O P-5'-P-CCNC: 90 U/L
ANION GAP SERPL CALC-SCNC: 9 MMOL/L
ANISOCYTOSIS BLD QL SMEAR: SLIGHT
AST SERPL-CCNC: 85 U/L
BACTERIA BLD CULT: NORMAL
BASOPHILS # BLD AUTO: ABNORMAL K/UL
BASOPHILS NFR BLD: 0 %
BILIRUB SERPL-MCNC: 1 MG/DL
BILIRUB UR QL STRIP: NEGATIVE
BUN SERPL-MCNC: 15 MG/DL
CALCIUM SERPL-MCNC: 8.4 MG/DL
CHLORIDE SERPL-SCNC: 104 MMOL/L
CLARITY UR REFRACT.AUTO: CLEAR
CO2 SERPL-SCNC: 23 MMOL/L
COLOR UR AUTO: YELLOW
CREAT SERPL-MCNC: 0.7 MG/DL
DIFFERENTIAL METHOD: ABNORMAL
EOSINOPHIL # BLD AUTO: ABNORMAL K/UL
EOSINOPHIL NFR BLD: 1 %
ERYTHROCYTE [DISTWIDTH] IN BLOOD BY AUTOMATED COUNT: 15.2 %
EST. GFR  (AFRICAN AMERICAN): >60 ML/MIN/1.73 M^2
EST. GFR  (NON AFRICAN AMERICAN): >60 ML/MIN/1.73 M^2
GIANT PLATELETS BLD QL SMEAR: PRESENT
GLUCOSE SERPL-MCNC: 98 MG/DL
GLUCOSE UR QL STRIP: NEGATIVE
GRAM STN SPEC: NORMAL
HCT VFR BLD AUTO: 32.1 %
HGB BLD-MCNC: 10.8 G/DL
HGB UR QL STRIP: NEGATIVE
KETONES UR QL STRIP: NEGATIVE
LEUKOCYTE ESTERASE UR QL STRIP: NEGATIVE
LYMPHOCYTES # BLD AUTO: ABNORMAL K/UL
LYMPHOCYTES NFR BLD: 8 %
MAGNESIUM SERPL-MCNC: 2.1 MG/DL
MCH RBC QN AUTO: 29.7 PG
MCHC RBC AUTO-ENTMCNC: 33.6 %
MCV RBC AUTO: 88 FL
METAMYELOCYTES NFR BLD MANUAL: 2 %
MONOCYTES # BLD AUTO: ABNORMAL K/UL
MONOCYTES NFR BLD: 7 %
MYELOCYTES NFR BLD MANUAL: 1 %
NEUTROPHILS NFR BLD: 78 %
NEUTS BAND NFR BLD MANUAL: 3 %
NITRITE UR QL STRIP: NEGATIVE
OVALOCYTES BLD QL SMEAR: ABNORMAL
PH UR STRIP: 7 [PH] (ref 5–8)
PHOSPHATE SERPL-MCNC: 4 MG/DL
PLATELET # BLD AUTO: 366 K/UL
PMV BLD AUTO: 10.9 FL
POIKILOCYTOSIS BLD QL SMEAR: SLIGHT
POLYCHROMASIA BLD QL SMEAR: ABNORMAL
POTASSIUM SERPL-SCNC: 4.6 MMOL/L
PROT SERPL-MCNC: 7.2 G/DL
PROT UR QL STRIP: NEGATIVE
RBC # BLD AUTO: 3.64 M/UL
SODIUM SERPL-SCNC: 136 MMOL/L
SP GR UR STRIP: 1.01 (ref 1–1.03)
URN SPEC COLLECT METH UR: NORMAL
UROBILINOGEN UR STRIP-ACNC: NEGATIVE EU/DL
WBC # BLD AUTO: 15.3 K/UL

## 2017-03-10 PROCEDURE — 36000708 HC OR TIME LEV III 1ST 15 MIN: Performed by: ORTHOPAEDIC SURGERY

## 2017-03-10 PROCEDURE — 81003 URINALYSIS AUTO W/O SCOPE: CPT

## 2017-03-10 PROCEDURE — 97530 THERAPEUTIC ACTIVITIES: CPT

## 2017-03-10 PROCEDURE — 97535 SELF CARE MNGMENT TRAINING: CPT

## 2017-03-10 PROCEDURE — 25000003 PHARM REV CODE 250: Performed by: STUDENT IN AN ORGANIZED HEALTH CARE EDUCATION/TRAINING PROGRAM

## 2017-03-10 PROCEDURE — 87077 CULTURE AEROBIC IDENTIFY: CPT

## 2017-03-10 PROCEDURE — D9220A PRA ANESTHESIA: Mod: CRNA,,, | Performed by: NURSE ANESTHETIST, CERTIFIED REGISTERED

## 2017-03-10 PROCEDURE — 83735 ASSAY OF MAGNESIUM: CPT

## 2017-03-10 PROCEDURE — 27610 EXPLORE/TREAT ANKLE JOINT: CPT | Mod: 58,RT,, | Performed by: ORTHOPAEDIC SURGERY

## 2017-03-10 PROCEDURE — 37000008 HC ANESTHESIA 1ST 15 MINUTES: Performed by: ORTHOPAEDIC SURGERY

## 2017-03-10 PROCEDURE — 87075 CULTR BACTERIA EXCEPT BLOOD: CPT

## 2017-03-10 PROCEDURE — 64445 NJX AA&/STRD SCIATIC NRV IMG: CPT | Mod: 59,RT,, | Performed by: ANESTHESIOLOGY

## 2017-03-10 PROCEDURE — 97110 THERAPEUTIC EXERCISES: CPT

## 2017-03-10 PROCEDURE — 0S9F0ZZ DRAINAGE OF RIGHT ANKLE JOINT, OPEN APPROACH: ICD-10-PCS | Performed by: ORTHOPAEDIC SURGERY

## 2017-03-10 PROCEDURE — 84100 ASSAY OF PHOSPHORUS: CPT

## 2017-03-10 PROCEDURE — 63600175 PHARM REV CODE 636 W HCPCS: Performed by: NURSE ANESTHETIST, CERTIFIED REGISTERED

## 2017-03-10 PROCEDURE — D9220A PRA ANESTHESIA: Mod: ANES,,, | Performed by: ANESTHESIOLOGY

## 2017-03-10 PROCEDURE — 87070 CULTURE OTHR SPECIMN AEROBIC: CPT

## 2017-03-10 PROCEDURE — 36415 COLL VENOUS BLD VENIPUNCTURE: CPT

## 2017-03-10 PROCEDURE — 63600175 PHARM REV CODE 636 W HCPCS: Performed by: NURSE PRACTITIONER

## 2017-03-10 PROCEDURE — 87205 SMEAR GRAM STAIN: CPT

## 2017-03-10 PROCEDURE — 64447 NJX AA&/STRD FEMORAL NRV IMG: CPT | Mod: 59,RT,, | Performed by: ANESTHESIOLOGY

## 2017-03-10 PROCEDURE — 99233 SBSQ HOSP IP/OBS HIGH 50: CPT | Mod: ,,, | Performed by: HOSPITALIST

## 2017-03-10 PROCEDURE — 87205 SMEAR GRAM STAIN: CPT | Mod: 91

## 2017-03-10 PROCEDURE — 63600175 PHARM REV CODE 636 W HCPCS: Performed by: HOSPITALIST

## 2017-03-10 PROCEDURE — 71000044 HC DOSC ROUTINE RECOVERY FIRST HOUR: Performed by: ORTHOPAEDIC SURGERY

## 2017-03-10 PROCEDURE — 87116 MYCOBACTERIA CULTURE: CPT

## 2017-03-10 PROCEDURE — 85027 COMPLETE CBC AUTOMATED: CPT

## 2017-03-10 PROCEDURE — 63600175 PHARM REV CODE 636 W HCPCS: Performed by: ANESTHESIOLOGY

## 2017-03-10 PROCEDURE — 87102 FUNGUS ISOLATION CULTURE: CPT

## 2017-03-10 PROCEDURE — 27603 DRAIN LOWER LEG LESION: CPT | Mod: 58,51,RT, | Performed by: ORTHOPAEDIC SURGERY

## 2017-03-10 PROCEDURE — 11000001 HC ACUTE MED/SURG PRIVATE ROOM

## 2017-03-10 PROCEDURE — 80053 COMPREHEN METABOLIC PANEL: CPT

## 2017-03-10 PROCEDURE — 36000709 HC OR TIME LEV III EA ADD 15 MIN: Performed by: ORTHOPAEDIC SURGERY

## 2017-03-10 PROCEDURE — 71000015 HC POSTOP RECOV 1ST HR: Performed by: ORTHOPAEDIC SURGERY

## 2017-03-10 PROCEDURE — 85007 BL SMEAR W/DIFF WBC COUNT: CPT

## 2017-03-10 PROCEDURE — 87015 SPECIMEN INFECT AGNT CONCNTJ: CPT

## 2017-03-10 PROCEDURE — 97116 GAIT TRAINING THERAPY: CPT

## 2017-03-10 PROCEDURE — 25000003 PHARM REV CODE 250: Performed by: NURSE PRACTITIONER

## 2017-03-10 PROCEDURE — 76942 ECHO GUIDE FOR BIOPSY: CPT | Performed by: ANESTHESIOLOGY

## 2017-03-10 PROCEDURE — 87186 SC STD MICRODIL/AGAR DIL: CPT

## 2017-03-10 PROCEDURE — 25000003 PHARM REV CODE 250: Performed by: PHYSICIAN ASSISTANT

## 2017-03-10 PROCEDURE — 37000009 HC ANESTHESIA EA ADD 15 MINS: Performed by: ORTHOPAEDIC SURGERY

## 2017-03-10 PROCEDURE — 87086 URINE CULTURE/COLONY COUNT: CPT

## 2017-03-10 PROCEDURE — 25000003 PHARM REV CODE 250: Performed by: NURSE ANESTHETIST, CERTIFIED REGISTERED

## 2017-03-10 PROCEDURE — 27200671 HC STIMUCATH NEEDLE/ CATHETER: Performed by: ANESTHESIOLOGY

## 2017-03-10 RX ORDER — FENTANYL CITRATE 50 UG/ML
100 INJECTION, SOLUTION INTRAMUSCULAR; INTRAVENOUS
Status: DISCONTINUED | OUTPATIENT
Start: 2017-03-10 | End: 2017-03-10 | Stop reason: HOSPADM

## 2017-03-10 RX ORDER — LIDOCAINE HCL/PF 100 MG/5ML
SYRINGE (ML) INTRAVENOUS
Status: DISCONTINUED | OUTPATIENT
Start: 2017-03-10 | End: 2017-03-10

## 2017-03-10 RX ORDER — ENOXAPARIN SODIUM 100 MG/ML
40 INJECTION SUBCUTANEOUS EVERY 24 HOURS
Status: DISCONTINUED | OUTPATIENT
Start: 2017-03-10 | End: 2017-03-14 | Stop reason: HOSPADM

## 2017-03-10 RX ORDER — MIDAZOLAM HYDROCHLORIDE 1 MG/ML
2 INJECTION INTRAMUSCULAR; INTRAVENOUS
Status: DISCONTINUED | OUTPATIENT
Start: 2017-03-10 | End: 2017-03-14 | Stop reason: HOSPADM

## 2017-03-10 RX ORDER — PROPOFOL 10 MG/ML
VIAL (ML) INTRAVENOUS CONTINUOUS PRN
Status: DISCONTINUED | OUTPATIENT
Start: 2017-03-10 | End: 2017-03-10

## 2017-03-10 RX ORDER — SODIUM CHLORIDE 9 MG/ML
INJECTION, SOLUTION INTRAVENOUS CONTINUOUS PRN
Status: DISCONTINUED | OUTPATIENT
Start: 2017-03-10 | End: 2017-03-10

## 2017-03-10 RX ORDER — FENTANYL CITRATE 50 UG/ML
INJECTION, SOLUTION INTRAMUSCULAR; INTRAVENOUS
Status: DISCONTINUED | OUTPATIENT
Start: 2017-03-10 | End: 2017-03-10

## 2017-03-10 RX ORDER — MIDAZOLAM HYDROCHLORIDE 1 MG/ML
INJECTION, SOLUTION INTRAMUSCULAR; INTRAVENOUS
Status: DISCONTINUED | OUTPATIENT
Start: 2017-03-10 | End: 2017-03-10

## 2017-03-10 RX ADMIN — SODIUM CHLORIDE: 0.9 INJECTION, SOLUTION INTRAVENOUS at 02:03

## 2017-03-10 RX ADMIN — MODAFINIL 200 MG: 100 TABLET ORAL at 08:03

## 2017-03-10 RX ADMIN — BACLOFEN 10 MG: 10 TABLET ORAL at 05:03

## 2017-03-10 RX ADMIN — Medication 3 ML: at 10:03

## 2017-03-10 RX ADMIN — AMLODIPINE BESYLATE 10 MG: 10 TABLET ORAL at 08:03

## 2017-03-10 RX ADMIN — ENOXAPARIN SODIUM 40 MG: 100 INJECTION SUBCUTANEOUS at 06:03

## 2017-03-10 RX ADMIN — DEXTROSE 6 G: 5 SOLUTION INTRAVENOUS at 02:03

## 2017-03-10 RX ADMIN — CEFTRIAXONE 2 G: 2 INJECTION, SOLUTION INTRAVENOUS at 05:03

## 2017-03-10 RX ADMIN — LOSARTAN POTASSIUM 25 MG: 25 TABLET, FILM COATED ORAL at 08:03

## 2017-03-10 RX ADMIN — FENTANYL CITRATE 25 MCG: 50 INJECTION, SOLUTION INTRAMUSCULAR; INTRAVENOUS at 03:03

## 2017-03-10 RX ADMIN — PROPOFOL 100 MCG/KG/MIN: 10 INJECTION, EMULSION INTRAVENOUS at 02:03

## 2017-03-10 RX ADMIN — MIDAZOLAM HYDROCHLORIDE 2 MG: 1 INJECTION, SOLUTION INTRAMUSCULAR; INTRAVENOUS at 01:03

## 2017-03-10 RX ADMIN — MIDAZOLAM HYDROCHLORIDE 2 MG: 1 INJECTION, SOLUTION INTRAMUSCULAR; INTRAVENOUS at 02:03

## 2017-03-10 RX ADMIN — LIDOCAINE HYDROCHLORIDE 50 MG: 20 INJECTION, SOLUTION INTRAVENOUS at 02:03

## 2017-03-10 RX ADMIN — FENTANYL CITRATE 50 MCG: 50 INJECTION, SOLUTION INTRAMUSCULAR; INTRAVENOUS at 02:03

## 2017-03-10 RX ADMIN — BACLOFEN 10 MG: 10 TABLET ORAL at 10:03

## 2017-03-10 RX ADMIN — Medication 3 ML: at 06:03

## 2017-03-10 RX ADMIN — FENTANYL CITRATE 25 MCG: 50 INJECTION, SOLUTION INTRAMUSCULAR; INTRAVENOUS at 02:03

## 2017-03-10 NOTE — ANESTHESIA RELEASE NOTE
"Anesthesia Release from PACU Note    Patient: Rajan Cooper III    Procedure(s) Performed: Procedure(s) (LRB):  INCISION AND DRAINAGE (I&D), ABSCESS (Right)  ARTHROTOMY-ANKLE (Right)    Anesthesia type: general    Post pain: Adequate analgesia    Post assessment: no apparent anesthetic complications, tolerated procedure well and no evidence of recall    Last Vitals:   Visit Vitals    /77    Pulse 81    Temp 36.6 °C (97.9 °F) (Temporal)    Resp 18    Ht 6' 1" (1.854 m)    Wt 136.1 kg (300 lb)    SpO2 96%    BMI 39.58 kg/m2       Post vital signs: stable    Level of consciousness: awake, alert  and oriented    Nausea/Vomiting: no nausea/no vomiting    Complications: none    Airway Patency: patent    Respiratory: unassisted, spontaneous ventilation, room air    Cardiovascular: stable and blood pressure at baseline    Hydration: euvolemic  "

## 2017-03-10 NOTE — PROGRESS NOTES
Dr. Reynoso with ortho paged via . Notified that patient needs site rain to left wrist. MD to come and rain patient.

## 2017-03-10 NOTE — ANESTHESIA PROCEDURE NOTES
Adductor Canal Single Shot Block    Patient location during procedure: pre-op   Block not for primary anesthetic.  Reason for block: at surgeon's request and post-op pain management   Post-op Pain Location: right ankle pain  Start time: 3/10/2017 2:00 PM  Timeout: 3/10/2017 1:49 PM   End time: 3/10/2017 2:05 PM  Staffing  Anesthesiologist: ELVIRA NAIR  Resident/CRNA: MOY CONNORS  Performed by: resident/CRNA   Preanesthetic Checklist  Completed: patient identified, site marked, surgical consent, pre-op evaluation, timeout performed, IV checked, risks and benefits discussed and monitors and equipment checked  Peripheral Block  Patient position: supine  Prep: ChloraPrep  Patient monitoring: heart rate, cardiac monitor, continuous pulse ox, continuous capnometry and frequent blood pressure checks  Block type: adductor canal  Laterality: right  Injection technique: single shot  Needle  Needle type: Stimuplex   Needle gauge: 21 G  Needle length: 4 in  Needle localization: anatomical landmarks and ultrasound guidance   -ultrasound image captured on disc.  Assessment  Injection assessment: negative aspiration, negative parasthesia and local visualized surrounding nerve  Paresthesia pain: none  Heart rate change: no  Slow fractionated injection: yes  Medications:  Bolus administered: 15 mL of 0.25 ropivacaine  Epinephrine added: 3.75 mcg/mL (1/300,000)  Additional Notes  VSS.  [facility] RN monitoring vitals throughout procedure.  Patient tolerated procedure well.

## 2017-03-10 NOTE — PLAN OF CARE
Problem: Physical Therapy Goal  Goal: Physical Therapy Goal  Goals to be met by: 3/12/17     Patient will increase functional independence with mobility by performin. Supine to sit with Set-up [place]  2. Sit to supine with Set-up [place]  3. Sit to stand transfer with CGA using appropriate AD  4. Bed to chair transfer with Minimal Assistance using appropriate AD  5. Gait x 20 feet with Minimal Assistance using appropriate AD.   6. Lower extremity exercise program x20-30 reps per handout, with independence   Outcome: Ongoing (interventions implemented as appropriate)  Goals in progress

## 2017-03-10 NOTE — PLAN OF CARE
Problem: Occupational Therapy Goal  Goal: Occupational Therapy Goal  Goals to be met by: 3/20/17     Patient will increase functional independence with ADLs by performing:    UE Dressing with [place].  LE Dressing with [place].  Grooming while standing at sink with Modified [place].  Toileting from toilet with [place] for hygiene and clothing management.   Bathing from edge of bed with [place].  Toilet transfer to toilet with [place].  Increased functional strength to WFL for L UE.  Upper extremity exercise program x15 reps per handout, with independence. MET 3/8/17   Continue with POC.

## 2017-03-10 NOTE — PROGRESS NOTES
[facility] Hospital Medicine  Progress Note    Patient Name: Rajan Cooper III  MRN: [id number]  Patient Class: IP- Inpatient   Admission Date: 3/4/2017  Length of Stay: 4 days  Attending Physician: Trenton Brian MD  Primary Care Provider: Tom Garcia MD    [facility] Medicine Team: [facility] HOSP MED S Trenton Brian MD    Subjective:     Principal Problem:Staphylococcus aureus sepsis    HPI:  Rajan Cooper III is a 61-year-old male w/ past medical history significant for multiple sclerosis and HTN who presented to the ED on 3/4/17 w/ worsening L wrist > R ankle pain of 5 days duration.  Denied trauma or other precipitating event, h/o septic joint, h/o gout, fevers, chills, recent infection, CP, SOB, dysuria, or new numbness/tingling/weakness. WBC 22K w/ a CRP of 425. Concern for left dorsal wrist abscess with possible septic wrist and right ankle arthritis.    Able to bear weight but painful.  Usually ambulates without assistive device at baseline. Takes baclofen and Avonex for MS.       Hospital Course:  3/4/17 - Arthrocentesis of the right ankle, incision, drainage and irrigation of left dorsal wrist abscess and left wrist arthrotomy with irrigation. GPC noted on gram stain along with 20K WBCS w/ 79% segs. Patient has remained on ceftriaxone and vancomycin.   3/7/17 - medicine consulted. Patient has developed worsening tachycardia with SOB required 3 liter of 02. Patient denies any SOB, but complains of pain. New fever at 101.2 F noted. MSSA grown from abscess of the left wrist and staph noted in the urine culture. Blood culture positive for staph aureus. TTE negative for endocarditis. MRI of the right ankle performed, revealed possible anterolateral abscess. OR scheduled for 03/10/17 for abscess drainage.       Interval History:   Patient went to the OR today for I+D of the ankle. No complications, WBC trending down.     Review of Systems   Constitutional: Positive for fatigue and fever.  Negative for chills and diaphoresis.   HENT: Negative for congestion.    Eyes: Negative for visual disturbance.   Respiratory: Negative for shortness of breath.    Cardiovascular: Positive for leg swelling. Negative for chest pain and palpitations.   Gastrointestinal: Negative for abdominal pain and diarrhea.   Genitourinary: Negative for dysuria.   Musculoskeletal: Positive for arthralgias. Negative for myalgias.   Skin: Positive for rash and wound.   Neurological: Negative for dizziness, weakness and headaches.   Psychiatric/Behavioral: Positive for confusion.     Objective:     Vital Signs (Most Recent):  Temp: 98.5 °F (36.9 °C) (03/10/17 1744)  Pulse: 85 (03/10/17 1744)  Resp: 18 (03/10/17 1744)  BP: (!) 140/80 (03/10/17 1744)  SpO2: 98 % (03/10/17 1744) Vital Signs (24h Range):  Temp:  [97.9 °F (36.6 °C)-99.6 °F (37.6 °C)] 98.5 °F (36.9 °C)  Pulse:  [77-97] 85  Resp:  [16-18] 18  SpO2:  [94 %-100 %] 98 %  BP: (117-143)/(70-85) 140/80     Weight: 136.1 kg (300 lb)  Body mass index is 39.58 kg/(m^2).    Intake/Output Summary (Last 24 hours) at 03/10/17 1745  Last data filed at 03/10/17 1513   Gross per 24 hour   Intake              200 ml   Output                0 ml   Net              200 ml      Physical Exam   Constitutional: He is oriented to person, place, and time. He appears well-developed and well-nourished. No distress.   HENT:   Head: Normocephalic and atraumatic.   Eyes: EOM are normal. Pupils are equal, round, and reactive to light.   Neck: Normal range of motion. Neck supple.   Cardiovascular: Intact distal pulses.  Tachycardia present.  Exam reveals no gallop and no friction rub.    No murmur heard.  Pulmonary/Chest: Effort normal and breath sounds normal. No respiratory distress.   Abdominal: Soft. Bowel sounds are normal.   Musculoskeletal: Normal range of motion. He exhibits edema and tenderness.   Neurological: He is alert and oriented to person, place, and time.   Skin: Rash noted. He is not  diaphoretic. There is erythema.       Significant Labs:   CBC:     Recent Labs  Lab 03/09/17  0922 03/10/17  0400   WBC 17.43* 15.30*   HGB 10.8* 10.8*   HCT 32.4* 32.1*   * 366*     CMP:     Recent Labs  Lab 03/09/17  0922 03/10/17  0400    136   K 4.0 4.6    104   CO2 25 23   * 98   BUN 14 15   CREATININE 0.7 0.7   CALCIUM 8.3* 8.4*   PROT 7.1 7.2   ALBUMIN 1.5* 1.6*   BILITOT 1.3* 1.0   ALKPHOS 389* 387*   AST 65* 85*   ALT 78* 90*   ANIONGAP 9 9   EGFRNONAA >60.0 >60.0     Cardiac Markers:   No results for input(s): CKMB, MYOGLOBIN, BNP, TROPISTAT in the last 48 hours.  Lactic Acid:   No results for input(s): LACTATE in the last 48 hours.  Urine Studies:     Recent Labs  Lab 03/10/17  0442   COLORU Yellow   APPEARANCEUA Clear   PHUR 7.0   SPECGRAV 1.010   PROTEINUA Negative   GLUCUA Negative   KETONESU Negative   BILIRUBINUA Negative   OCCULTUA Negative   NITRITE Negative   UROBILINOGEN Negative   LEUKOCYTESUR Negative       Significant Imaging: CXR: I have reviewed all pertinent results/findings within the past 24 hours and my personal findings are:  hemidiaphram with possible plueral effusion on the left.     Assessment/Plan:      * Staphylococcus aureus sepsis  SIRS (4/4) 2/2 to MSSA.   On Ancef IV continuous infusion   Blood cs revealed Staph Aureus 03/05, 03/06, NGTD on 03/07   TTE: -ve for endocarditis   MRI positive for anterolateral abscess  .1-->244.1  Ankle I and D on 03/10/17    MS (multiple sclerosis)  Okay to restart baclofen 10 mg TID.     HTN (hypertension)  Continue losartan 25 mg and amlodipine 10 mg daily.       Abscess of left hand  S/p I and D  IV Ancef  Synovial fluid: MSSA           Staphylococcal arthritis of left wrist  Blood cs 03/06/17: positive for Staph aureus  Blood cS 03/07/17: NGTD  On Ancef IVPB  Synovial fluid positive for pansensitive Staph Aureus        Acute respiratory failure with hypoxemia, resolved as of 3/10/2017  Resolved       VTE Risk  Mitigation         Ordered     Medium Risk of VTE  Once      03/04/17 1346     Place sequential compression device  Until discontinued      03/04/17 1346          Trenton Brian MD  Department of Hospital Medicine   [facility]

## 2017-03-10 NOTE — ASSESSMENT & PLAN NOTE
SIRS (4/4) 2/2 to MSSA.   On Ancef IV continuous infusion   Blood cs revealed Staph Aureus 03/05, 03/06, NGTD on 03/07   TTE: -ve for endocarditis   MRI positive for anterolateral abscess  .1-->244.1  Ankle I and D on 03/10/17

## 2017-03-10 NOTE — PROGRESS NOTES
[facility] Hospital Medicine  Progress Note    Patient Name: Rajan Cooper III  MRN: [id number]  Patient Class: IP- Inpatient   Admission Date: 3/4/2017  Length of Stay: 3 days  Attending Physician: Trenton Brian MD  Primary Care Provider: Tom Garcia MD    [facility] Medicine Team: [facility] [place] [facility]OSP MED S Trenton Brian MD    Subjective:     Principal Problem:Staphylococcus aureus sepsis    HPI:  Rajan Cooper III is a 61-year-old male w/ past medical history significant for multiple sclerosis and HTN who presented to the ED on 3/4/17 w/ worsening L wrist > R ankle pain of 5 days duration.  Denied trauma or other precipitating event, h/o septic joint, h/o gout, fevers, chills, recent infection, CP, SOB, dysuria, or new numbness/tingling/weakness. WBC 22K w/ a CRP of 425. Concern for left dorsal wrist abscess with possible septic wrist and right ankle arthritis.    Able to bear weight but painful.  Usually ambulates without assistive device at baseline. Takes baclofen and Avonex for MS.       Hospital Course:  3/4/17 - Arthrocentesis of the right ankle, incision, drainage and irrigation of left dorsal wrist abscess and left wrist arthrotomy with irrigation. GPC noted on gram stain along with 20K WBCS w/ 79% segs. Patient has remained on ceftriaxone and vancomycin.   3/7/17 - medicine consulted. Patient has developed worsening tachycardia with SOB required 3 liter of 02. Patient denies any SOB, but complains of pain. New fever at 101.2 F noted. MSSA grown from abscess of the left wrist and staph noted in the urine culture. Blood culture positive for staph aureus. TTE negative for endocarditis. MRI of the right ankle performed, revealed possible anterolateral abscess. OR scheduled for 03/10/17 for abscess drainage.       Interval History:   Patient seen and examined at bedside. MRI of ankle revealed possible ankle abscess, to OR tomorrow.      Review of Systems   Constitutional: Positive for fatigue and  fever. Negative for chills and diaphoresis.   HENT: Negative for congestion.    Eyes: Negative for visual disturbance.   Respiratory: Negative for shortness of breath.    Cardiovascular: Positive for leg swelling. Negative for chest pain and palpitations.   Gastrointestinal: Negative for abdominal pain and diarrhea.   Genitourinary: Negative for dysuria.   Musculoskeletal: Positive for arthralgias. Negative for myalgias.   Skin: Positive for rash and wound.   Neurological: Negative for dizziness, weakness and headaches.   Psychiatric/Behavioral: Positive for confusion.     Objective:     Vital Signs (Most Recent):  Temp: 97.8 °F (36.6 °C) (03/09/17 1602)  Pulse: 82 (03/09/17 1602)  Resp: 16 (03/09/17 1602)  BP: (!) 141/74 (03/09/17 1602)  SpO2: (!) 94 % (03/09/17 1602) Vital Signs (24h Range):  Temp:  [97.1 °F (36.2 °C)-99 °F (37.2 °C)] 97.8 °F (36.6 °C)  Pulse:  [80-97] 82  Resp:  [16-18] 16  SpO2:  [93 %-94 %] 94 %  BP: (141-154)/(74-81) 141/74     Weight: 136.1 kg (300 lb)  Body mass index is 39.58 kg/(m^2).  No intake or output data in the 24 hours ending 03/09/17 1750   Physical Exam   Constitutional: He is oriented to person, place, and time. He appears well-developed and well-nourished. No distress.   HENT:   Head: Normocephalic and atraumatic.   Eyes: EOM are normal. Pupils are equal, round, and reactive to light.   Neck: Normal range of motion. Neck supple.   Cardiovascular: Intact distal pulses.  Tachycardia present.  Exam reveals no gallop and no friction rub.    No murmur heard.  Pulmonary/Chest: Effort normal and breath sounds normal. No respiratory distress.   Abdominal: Soft. Bowel sounds are normal.   Musculoskeletal: Normal range of motion. He exhibits edema and tenderness.   Neurological: He is alert and oriented to person, place, and time.   Skin: Rash noted. He is not diaphoretic. There is erythema.       Significant Labs:   CBC:     Recent Labs  Lab 03/08/17  0515 03/09/17  0922   WBC 18.50*  17.43*   HGB 10.5* 10.8*   HCT 32.0* 32.4*    356*     CMP:     Recent Labs  Lab 03/08/17  0515 03/09/17  0922   * 138   K 4.0 4.0    104   CO2 27 25   GLU 96 115*   BUN 16 14   CREATININE 0.8 0.7   CALCIUM 8.3* 8.3*   PROT 7.0 7.1   ALBUMIN 1.6* 1.5*   BILITOT 1.2* 1.3*   ALKPHOS 405* 389*   AST 89* 65*   ALT 83* 78*   ANIONGAP 4* 9   EGFRNONAA >60.0 >60.0     Cardiac Markers:   No results for input(s): CKMB, MYOGLOBIN, BNP, TROPISTAT in the last 48 hours.  Lactic Acid:   No results for input(s): LACTATE in the last 48 hours.  Urine Studies: No results for input(s): COLORU, APPEARANCEUA, PHUR, SPECGRAV, PROTEINUA, GLUCUA, KETONESU, BILIRUBINUA, OCCULTUA, NITRITE, UROBILINOGEN, LEUKOCYTESUR, RBCUA, WBCUA, BACTERIA, SQUAMEPITHEL, HYALINECASTS in the last 48 hours.    Invalid input(s): WRIGHTSUR    Significant Imaging: CXR: I have reviewed all pertinent results/findings within the past 24 hours and my personal findings are:  hemidiaphram with possible plueral effusion on the left.     Assessment/Plan:      * Staphylococcus aureus sepsis  SIRS (4/4) 2/2 to MSSA.   On Ancef IV continuous infusion   Blood cs revealed Staph Aureus 03/05, 03/06, NGTD on 03/07   TTE: -ve for endocarditis   MRI positive for anterolateral abscess  .1-->244.1  NPO After MN for OR abscess drainage    MS (multiple sclerosis)  Okay to restart baclofen 10 mg TID.     HTN (hypertension)  Continue losartan 25 mg and amlodipine 10 mg daily.       Acute respiratory failure with hypoxemia  CXR and BNP unremarkable. Pulmonary edema or PNA unlikely. Instructed on using incentive spirometry.       Staphylococcal arthritis of left wrist  Blood cs 03/06/17: positive for Staph aureus  Blood cS 03/07/17: NGTD  On Ancef IVPB  Synovial fluid positive for pansensitive Staph Aureus        VTE Risk Mitigation         Ordered     Medium Risk of VTE  Once      03/04/17 1346     Place sequential compression device  Until discontinued       03/04/17 1346          Trneton Brian MD  Department of Hospital Medicine   [facility]

## 2017-03-10 NOTE — PROGRESS NOTES
[facility]  Infectious Disease  Progress Note    Patient Name: Rajan Cooper III  MRN: [id number]  Admission Date: 3/4/2017  Length of Stay: 4 days  Attending Physician: Trenton Brian MD  Primary Care Provider: Tom Garcia MD    Isolation Status: No active isolations  Assessment/Plan:      MSSA bacteremia with pyogenic arthritis of left wrist, right ankle     61 year old male with history of multiple sclerosis who was admitted with left septic wrist and right septic ankle, now POD # 6  left wrist washout and debridement and ankle arthrocentesis.  Operative, blood and urine cultures all + for MSSA.  Last positive blood culture 3/6.  Blood cultures 3/7 NGTD.    2D echo negative for endocarditis.  3/7 MRI of RLE concerning for abscess and he is going OR today for right ankle I&D and exploration   Currently afebrile  Leukocytosis continues to trend down.  Complaining of dysuria.  U/A negative.     Plan  - Continue cefazolin 6 grams IV q 24 hours by continuous infusion for MSSA  - Recommend minimum of 6 weeks of IV antibiotics for complicated bacteremia with septic arthritis of wrist and ankle from date of first negative blood cultures or date of last I&D, whichever is later.   - Blood cultures have been clear 72 hours.  Okay to place PICC  -  Will follow new cultures.   -  Urinalysis negative.   If culture negative and pain and burning with urination persist, may possibly be drug effect of cefazolin.  Consider urinary analgesic such as pyridium.   -  Discussed with ID staff.     For any questions or concerns over the weekend, please page ID staff on call for our service, Dr. Peñaloza    Thank you.   Please call for any questions or concerns.  TRUONG Horner, ANP-C  [phone]    Subjective:     Principal Problem:Staphylococcus aureus sepsis    Interval History:   Afebrile.  WBC continues to trend down.  Blood cultures of 3/7 NGTD. Primary complaint was burning and hesitancy with urination  last night. Primary team gave dose of Ceftriaxone for suspected UTI.  U/A is negative.  Culture pending.  OR today for right ankle I&D and exploration.         HPI:  Mr. Cooper is a pleasant 61 y.o. male with a PMHx of MS on avonex presenting with worsening left wrist and right ankle pain and swelling of 5 days duration. Patient reports he was moving a heavy gate a work late February and twisted his ankle. He was evaluated by his PCP and left wrist xray revealed  evidence of AVN, DJD, and soft tissue swelling and right ankle xray showed evidence of soft tissue swelling but no fracture. He was suppose to follow up in orthopedic clinic but came to the ED as his pain acutely worsened. Patient denies break in his skin. Denies fevers, chills, sweats, recent infection, CP, SOB, N/V, diarrhea. Labs upon admit were significant for ESR elevated at 118, CRP elevated at 451.1 and a leukocytosis of 22.5k. Orthopedic surgery performed an aspiration of his left wrist  Revealing 20K WBCs and 79% segs. He is now s/p left wrist washout and debridement on 3/4 along with ankle arthrocentesis. Cultures are pending. GS + GPCs. He was started on empiric Vanc/Ceftriaxone post operatively.     Left wrist x-ray: There is lunate sclerosis suggesting AVN with areas of DJD most severe at the radiocarpal joint. There is soft tissue swelling.     Right ankle x-ray: Soft tissue swelling is identified about the ankle, particularly about the lateral malleolus.  The visualized osseous structures, however, appear intact, with no definite evidence of recent fracture or other significant abnormality identified.         Review of Systems   Constitutional: Negative for chills, diaphoresis, fatigue and fever.   HENT: Negative for congestion, mouth sores, rhinorrhea and sore throat.    Eyes: Negative for pain, discharge and visual disturbance.   Respiratory: Negative for cough, chest tightness, shortness of breath and wheezing.    Cardiovascular: Negative  for chest pain, palpitations and leg swelling.   Gastrointestinal: Negative for abdominal distention, abdominal pain, constipation, diarrhea, nausea and vomiting.   Genitourinary: Negative for difficulty urinating, dysuria, flank pain, frequency and hematuria.   Musculoskeletal: Positive for back pain (reports chronic back pain ). Negative for arthralgias, joint swelling, myalgias and neck pain.        Left wrist pain, swelling, redness  Right ankle pain, redness, erythema (improved), difficulty ambulating   Skin: Negative for color change, pallor, rash and wound.   Neurological: Negative for dizziness, seizures, weakness, numbness and headaches.   Hematological: Negative for adenopathy. Does not bruise/bleed easily.   Psychiatric/Behavioral: Negative for confusion and sleep disturbance. The patient is not nervous/anxious.      Objective:     Vital Signs (Most Recent):  Temp: 98.4 °F (36.9 °C) (03/10/17 0745)  Pulse: 90 (03/10/17 0745)  Resp: 18 (03/10/17 0745)  BP: 135/78 (03/10/17 0745)  SpO2: 95 % (03/10/17 0745) Vital Signs (24h Range):  Temp:  [97.8 °F (36.6 °C)-99.6 °F (37.6 °C)] 98.4 °F (36.9 °C)  Pulse:  [82-97] 90  Resp:  [16-18] 18  SpO2:  [93 %-97 %] 95 %  BP: (133-152)/(73-83) 135/78     Weight: 136.1 kg (300 lb)  Body mass index is 39.58 kg/(m^2).    Estimated Creatinine Clearance: 162.5 mL/min (based on Cr of 0.7).    Physical Exam   Constitutional: He is oriented to person, place, and time. He appears well-developed and well-nourished. No distress.   HENT:   Head: Normocephalic and atraumatic.   Mouth/Throat: No oropharyngeal exudate.   Eyes: Conjunctivae and EOM are normal. No scleral icterus.   Cardiovascular: Normal rate, regular rhythm and intact distal pulses.    Murmur (physiologic murmur) heard.  Pulmonary/Chest: Effort normal and breath sounds normal. No respiratory distress. He has no wheezes.   Abdominal: Soft. He exhibits no distension. There is no tenderness. There is no guarding.    Musculoskeletal: Normal range of motion. He exhibits no deformity.   Left wrist dressing c/d/i.  No ascending erythema.  Thumb edema appears to be improving.      Right ankle/LE warmth, edema and erythema, + TTP.   Neurological: He is alert and oriented to person, place, and time. No cranial nerve deficit.   Skin: Skin is warm and dry. No rash noted. He is not diaphoretic.   Chronic skin changes to bilateral lower extremities.    Psychiatric: He has a normal mood and affect. His behavior is normal.   Vitals reviewed.      Significant Labs:   Blood Culture:   Recent Labs  Lab 03/05/17  0107 03/06/17  1202 03/06/17  1203 03/06/17  1403 03/07/17  1251   LABBLOO Gram stain aer bottle: Gram positive cocci in clusters resembling Staph  Results called to and read back by:Angelica Lima RN 03/08/2017  04:57  STAPHYLOCOCCUS AUREUSFor susceptibility see order #[id number]  Gram stain aer bottle: Gram positive cocci in clusters resembling Staph   Results called to and read back by: Serena Mederos RN 03/09/2017    08:24 Gram stain cristiano bottle: Gram positive cocci in clusters resembling Staph   Results called to and read back by: Angelica Hurtado RN  03/07/2017    09:17  STAPHYLOCOCCUS AUREUS No Growth to date  No Growth to date  No Growth to date  No Growth to date No Growth to date  No Growth to date  No Growth to date  No Growth to date  No Growth to date  No Growth to date  No Growth to date  No Growth to date No Growth to date  No Growth to date  No Growth to date  No Growth to date  No Growth to date  No Growth to date     CBC:   Recent Labs  Lab 03/09/17 0922 03/10/17  0400   WBC 17.43* 15.30*   HGB 10.8* 10.8*   HCT 32.4* 32.1*   * 366*     CMP:   Recent Labs  Lab 03/09/17 0922 03/10/17  0400    136   K 4.0 4.6    104   CO2 25 23   * 98   BUN 14 15   CREATININE 0.7 0.7   CALCIUM 8.3* 8.4*   PROT 7.1 7.2   ALBUMIN 1.5* 1.6*   BILITOT 1.3* 1.0   ALKPHOS 389* 387*   AST  65* 85*   ALT 78* 90*   ANIONGAP 9 9   EGFRNONAA >60.0 >60.0     Urine Culture:   Recent Labs  Lab 03/04/17  1548   LABURIN STAPHYLOCOCCUS AUREUS> 100,000 cfu/ml     Urine Studies:   Recent Labs  Lab 03/04/17  1548 03/10/17  0442   COLORU Lizbeth Yellow   APPEARANCEUA Hazy* Clear   PHUR 7.0 7.0   SPECGRAV 1.020 1.010   PROTEINUA 2+* Negative   GLUCUA Negative Negative   KETONESU 2+* Negative   BILIRUBINUA Trace Negative   OCCULTUA 3+* Negative   NITRITE Positive* Negative   UROBILINOGEN >=8.0* Negative   LEUKOCYTESUR 1+* Negative   RBCUA >100*  --    WBCUA 31*  --    BACTERIA Occasional  --    HYALINECASTS 0  --        Significant Imaging: I have reviewed all pertinent imaging results/findings within the past 24 hours.

## 2017-03-10 NOTE — BRIEF OP NOTE
Preop Dx: Probable right ankle abscess    Probable right ankle septic arthritis    Postop Dx: Right ankle abscess    Right ankle septic arthritis    Procedure: Incision and drainage of right ankle abscess    Right ankle arthrotomy with irrigation for septic arthritis    Surgeon: Kunal Shipley M.D.    Asst:  Franklin Reynoso M.D    Anesthesia: MAC plus regional    EBL:  5cc    IVF:  300cc crystalloid    Specimens: Culture abscess    Culture ankle arthrotomy    Findings: Posteromedial fluid collection from MRI unimpressive, serous.  Anterolateral with some purulence.  Joint with murky fluid.  Irrigated    Dispo:  To PACU awake/stable.    Dict#  [id number]

## 2017-03-10 NOTE — TRANSFER OF CARE
"Anesthesia Transfer of Care Note    Patient: Rajan Cooper III    Procedure(s) Performed: Procedure(s) (LRB):  INCISION AND DRAINAGE (I&D), ABSCESS (Right)  ARTHROTOMY-ANKLE (Right)    Patient location: [facility]    Anesthesia Type: general    Transport from OR: Transported from OR on room air with adequate spontaneous ventilation    Post pain: adequate analgesia    Post assessment: no apparent anesthetic complications    Post vital signs: stable    Level of consciousness: awake, alert and oriented    Complications: none          Last vitals:   Visit Vitals    /70 (BP Location: Left arm, Patient Position: Lying, BP Method: Automatic)    Pulse 95    Temp 36.8 °C (98.3 °F) (Oral)    Resp 18    Ht 6' 1" (1.854 m)    Wt 136.1 kg (300 lb)    SpO2 100%    BMI 39.58 kg/m2     "

## 2017-03-10 NOTE — ANESTHESIA PROCEDURE NOTES
Popliteal Sciatic Single Shot Block    Patient location during procedure: pre-op   Block not for primary anesthetic.  Reason for block: at surgeon's request and post-op pain management   Post-op Pain Location: right ankle pain  Start time: 3/10/2017 1:50 PM  Timeout: 3/10/2017 1:49 PM   End time: 3/10/2017 2:00 PM  Staffing  Anesthesiologist: ELVIRA NAIR  Resident/CRNA: MOY CONNORS  Performed by: resident/CRNA   Preanesthetic Checklist  Completed: patient identified, site marked, surgical consent, pre-op evaluation, timeout performed, IV checked, risks and benefits discussed and monitors and equipment checked  Peripheral Block  Patient position: supine  Prep: ChloraPrep  Patient monitoring: heart rate, cardiac monitor, continuous pulse ox, continuous capnometry and frequent blood pressure checks  Block type: popliteal  Laterality: right  Injection technique: single shot  Needle  Needle type: Stimuplex   Needle gauge: 21 G  Needle length: 4 in  Needle localization: anatomical landmarks and ultrasound guidance   -ultrasound image captured on disc.  Assessment  Injection assessment: negative aspiration, negative parasthesia and local visualized surrounding nerve  Paresthesia pain: none  Heart rate change: no  Slow fractionated injection: yes  Medications:  Bolus administered: 25 mL of 0.25 ropivacaine  Epinephrine added: 3.75 mcg/mL (1/300,000)  Additional Notes  VSS.  [facility] RN monitoring vitals throughout procedure.  Patient tolerated procedure well.

## 2017-03-10 NOTE — SUBJECTIVE & OBJECTIVE
Interval History:   Patient went to the OR today for I+D of the ankle. No complications, WBC trending down.     Review of Systems   Constitutional: Positive for fatigue and fever. Negative for chills and diaphoresis.   HENT: Negative for congestion.    Eyes: Negative for visual disturbance.   Respiratory: Negative for shortness of breath.    Cardiovascular: Positive for leg swelling. Negative for chest pain and palpitations.   Gastrointestinal: Negative for abdominal pain and diarrhea.   Genitourinary: Negative for dysuria.   Musculoskeletal: Positive for arthralgias. Negative for myalgias.   Skin: Positive for rash and wound.   Neurological: Negative for dizziness, weakness and headaches.   Psychiatric/Behavioral: Positive for confusion.     Objective:     Vital Signs (Most Recent):  Temp: 98.5 °F (36.9 °C) (03/10/17 1744)  Pulse: 85 (03/10/17 1744)  Resp: 18 (03/10/17 1744)  BP: (!) 140/80 (03/10/17 1744)  SpO2: 98 % (03/10/17 1744) Vital Signs (24h Range):  Temp:  [97.9 °F (36.6 °C)-99.6 °F (37.6 °C)] 98.5 °F (36.9 °C)  Pulse:  [77-97] 85  Resp:  [16-18] 18  SpO2:  [94 %-100 %] 98 %  BP: (117-143)/(70-85) 140/80     Weight: 136.1 kg (300 lb)  Body mass index is 39.58 kg/(m^2).    Intake/Output Summary (Last 24 hours) at 03/10/17 1745  Last data filed at 03/10/17 1513   Gross per 24 hour   Intake              200 ml   Output                0 ml   Net              200 ml      Physical Exam   Constitutional: He is oriented to person, place, and time. He appears well-developed and well-nourished. No distress.   HENT:   Head: Normocephalic and atraumatic.   Eyes: EOM are normal. Pupils are equal, round, and reactive to light.   Neck: Normal range of motion. Neck supple.   Cardiovascular: Intact distal pulses.  Tachycardia present.  Exam reveals no gallop and no friction rub.    No murmur heard.  Pulmonary/Chest: Effort normal and breath sounds normal. No respiratory distress.   Abdominal: Soft. Bowel sounds are  normal.   Musculoskeletal: Normal range of motion. He exhibits edema and tenderness.   Neurological: He is alert and oriented to person, place, and time.   Skin: Rash noted. He is not diaphoretic. There is erythema.       Significant Labs:   CBC:     Recent Labs  Lab 03/09/17  0922 03/10/17  0400   WBC 17.43* 15.30*   HGB 10.8* 10.8*   HCT 32.4* 32.1*   * 366*     CMP:     Recent Labs  Lab 03/09/17  0922 03/10/17  0400    136   K 4.0 4.6    104   CO2 25 23   * 98   BUN 14 15   CREATININE 0.7 0.7   CALCIUM 8.3* 8.4*   PROT 7.1 7.2   ALBUMIN 1.5* 1.6*   BILITOT 1.3* 1.0   ALKPHOS 389* 387*   AST 65* 85*   ALT 78* 90*   ANIONGAP 9 9   EGFRNONAA >60.0 >60.0     Cardiac Markers:   No results for input(s): CKMB, MYOGLOBIN, BNP, TROPISTAT in the last 48 hours.  Lactic Acid:   No results for input(s): LACTATE in the last 48 hours.  Urine Studies:     Recent Labs  Lab 03/10/17  0442   COLORU Yellow   APPEARANCEUA Clear   PHUR 7.0   SPECGRAV 1.010   PROTEINUA Negative   GLUCUA Negative   KETONESU Negative   BILIRUBINUA Negative   OCCULTUA Negative   NITRITE Negative   UROBILINOGEN Negative   LEUKOCYTESUR Negative       Significant Imaging: CXR: I have reviewed all pertinent results/findings within the past 24 hours and my personal findings are:  hemidiaphram with possible plueral effusion on the left.

## 2017-03-10 NOTE — ASSESSMENT & PLAN NOTE
61 year old male with history of multiple sclerosis who was admitted with left septic wrist and right septic ankle with MSSA,  POD # 6  left wrist washout and debridement and ankle arthrocentesis.  Blood and urine cultures also + for MSSA.  Last positive blood culture 3/6.  Blood cultures 3/7 NGTD.    2D echo negative for endocarditis. MRI 3/9  RLE concerning for abscess and he is going OR today for right ankle I&D and exploration  Currently afebrile  Leukocytosis continues to trend down.    Plan  - Continue cefazolin 6 grams IV q 24 hours by continuous infusion for MSSA  - Recommend minimum of 6 weeks of IV antibiotics for complicated bacteremia with septic arthritis of wrist and ankle from date of first negative blood cultures or date of last I&D, whichever is later.   - Blood cultures have been clear 72 hours.  Okay to place PICC  -  Will follow new cultures  - Discussed with staff.     For any questions or concerns over the weekend, please page ID staff on call for our service, Dr. Peñaloza

## 2017-03-10 NOTE — NURSING TRANSFER
Nursing Transfer Note      3/10/2017     Transfer To: 504A From: [facility] 21     Transfer via bed    Transfer with IV pump    Transported by escort    Medicines sent: Ancef IV drip    Chart send with patient: Yes    Notified: Message left for wife on number provided/ Report called to SELINA Benoit ([id number])    Patient reassessed at: 03/10/17 3272 (date, time)    Upon arrival to floor: patient oriented to room, call bell in reach and bed in lowest position

## 2017-03-10 NOTE — PROGRESS NOTES
ORTHO PROGRESS NOTE:    Rajan Cooper III is a 61 y.o. male admitted on 3/4/2017  Hospital Day: 3   Post Op Day: 6 Day Post-Op, day of surgery     The patient was seen and examined this morning at the bedside. No changes since last night. NPO since midnight.     PHYSICAL EXAM:  Awake/alert/oriented x 3, ill appearing   AFVSS  Good inspiratory effort with unlabored breathing. Nasal cannula   Dressings changed at bedside. Serous fluid able to be expressed from proximal wound with some purulent material.   NVI distally  Able to range left wrist passively  Pitting edema to right ankle with minimal warmth and erythema   Area of possible fluctuance over lateral malleolus  Neuro exam difficult due to hx of MS     Vitals:    03/09/17 1159 03/09/17 1602 03/09/17 1945 03/10/17 0004   BP: (!) 152/80 (!) 141/74 136/77 133/73   BP Location:   Right arm Right arm   Patient Position:   Lying Lying   BP Method:   Automatic Automatic   Pulse: 84 82 96 97   Resp: 18 16 18 18   Temp: 98.3 °F (36.8 °C) 97.8 °F (36.6 °C) 99.1 °F (37.3 °C) 99 °F (37.2 °C)   TempSrc: Oral Oral Oral Oral   SpO2: (!) 93% (!) 94% 96% 97%   Weight:       Height:              Recent Labs  Lab 03/08/17  0515 03/09/17  0922 03/10/17  0400   CALCIUM 8.3* 8.3* 8.4*   PROT 7.0 7.1 7.2   * 138 136   K 4.0 4.0 4.6   CO2 27 25 23    104 104   BUN 16 14 15   CREATININE 0.8 0.7 0.7       Recent Labs  Lab 03/07/17  0904 03/08/17  0515 03/09/17  0922 03/10/17  0400   WBC 21.04* 18.50* 17.43*  --    RBC 3.84* 3.50* 3.65* 3.64*   HGB 11.4* 10.5* 10.8* 10.8*   HCT 34.3* 32.0* 32.4* 32.1*    304 356* 366*     No results for input(s): INR, APTT in the last 72 hours.    Invalid input(s): PT      A/P: 61 y.o. male 6 Day Post-Op s/p L wrist arthrotomy with irrigation and synovectomy, I&D abscess  -- Pain control  -- PT/OT: RUDDY CHO. ROM left wrist  -- DVT prophylaxis: SCDs  -- Antibiotics: Ancef. Blood cx Staph Aureus pan sensitive. Urine Cx Staph (pan sensitive)  . ID on board  --CRP trending down 451 --> 246  --WBC pending  --to OR today for right ankle I&D with exploration   --NPO     Franklin Reynoso MD  Orthopedic Surgery, PGY3  [phone] (p)       Attg Note:  I agree with the above assessment and plan.  Patient's exam is rather benign and WBC count trending down.  The is a fluid collection posteromedial and I cannot preform needle aspiration in that area safely around the posterior tibial artery, so we will explore operatively.  The risks, benefits and alternatives to surgery were discussed with the patient at great length.  These include bleeding, infection, vessel/nerve damage, pain, numbness, tingling, complex regional pain syndrome, surgical failure, need for further surgery, DVT, PE, arthritis and death.  Patient states an understanding and wishes to proceed with surgery.   All questions were answered.  No guarantees were implied or stated.  Informed consent was obtained.      Kunal Shipley MD

## 2017-03-10 NOTE — PT/OT/SLP PROGRESS
Physical Therapy  Treatment    Rajan Cooper III   MRN: [id number]   Admitting Diagnosis: Staphylococcus aureus sepsis    PT Received On: 17  PT Start Time: 1300     PT Stop Time: 1355    PT Total Time (min): 55 min       Billable Minutes:  Gait [id number], Therapeutic Activity 20 and Therapeutic Exercise 25    Treatment Type: Treatment  PT/PTA: PT     PTA Visit Number: 3       General Precautions: Standard, fall  Orthopedic Precautions: LUE non weight bearing   Braces:      Do you have any cultural, spiritual, Sabianist conflicts, given your current situation?: none    Subjective:  Communicated with nsg prior to session.  Pt agreeable to PT session    Pain Ratin/10  Location - Side: Bilateral     Location: foot ((R>L))          Objective:   Patient found with: peripheral IV    Functional Mobility:  Bed Mobility:   Supine to Sit: Minimum Assistance    Transfers:  Sit <> Stand Assistance: Moderate Assistance  Sit <> Stand Assistive Device: Platform, Rolling Walker    Gait:   Gait Distance: 20 ft  Assistance 1: Minimum assistance  Gait Assistive Device: Platform walker left  Gait Pattern: 3-point gait  Gait Deviation(s): decreased zahra, increased time in double stance, decreased velocity of limb motion, decreased step length, increased stride width, decreased swing-to-stance ratio, decreased toe-to-floor clearance, forward lean, lateral lean      Balance:   Static Sit: GOOD: Takes MODERATE challenges from all directions  Dynamic Sit: FAIR+: Maintains balance through MINIMAL excursions of active trunk motion  Static Stand: FAIR: Maintains without assist but unable to take challenges  Dynamic stand: POOR: N/A     Therapeutic Activities and Exercises:  White board updated    Supine therex: 20x each  AP  QS  GS  Hip ABD/ADD  SLR (AAROM)  Heel slides  Left wrist AROM (Flexion/extension) within pain free ROM 10x  Left hand tendon glides 2x10    Pt educated on importance of OOB mobility for improving strength and  endurance    Pt with urinary incontinent episode upon sitting in bedside chair.  PT performed underwear change, sunny-care, and room sanitation.    AM-PAC 6 CLICK MOBILITY  How much help from another person does this patient currently need?   1 = Unable, Total/Dependent Assistance  2 = A lot, Maximum/Moderate Assistance  3 = A little, Minimum/Contact Guard/Supervision  4 = None, Modified [place]/Independent    Turning over in bed (including adjusting bedclothes, sheets and blankets)?: 3  Sitting down on and standing up from a chair with arms (e.g., wheelchair, bedside commode, etc.): 3  Moving from lying on back to sitting on the side of the bed?: 3  Moving to and from a bed to a chair (including a wheelchair)?: 3  Need to walk in hospital room?: 2  Climbing 3-5 steps with a railing?: 1  Total Score: 15    AM-PAC Raw Score CMS G-Code Modifier Level of Impairment Assistance   6 % Total / Unable   7 - 9 CM 80 - 100% Maximal Assist   10 - 14 CL 60 - 80% Moderate Assist   15 - 19 CK 40 - 60% Moderate Assist   20 - 22 CJ 20 - 40% Minimal Assist   23 CI 1-20% SBA / CGA   24 CH 0% Independent/ Mod I     Patient left up in chair with all lines intact, call button in reach and nsg notified.    Assessment:  Rajan Cooper III is a 61 y.o. male with a medical diagnosis of Staphylococcus aureus sepsis.  Pt tolerated tx session well and was able to complete all ex's.  Pt with erratic foot placement during gait 2/2 decreased BLE proprioception due to sensory deficits.  Pt with Bilateral hip flexor tightness and glute weakness with subsequent difficulty maintaining an upright posture during standing.  Pt is a good candidate to benefit from skilled PT at this time.    Rehab identified problem list/impairments: Rehab identified problem list/impairments: weakness, impaired endurance, impaired self care skills, impaired functional mobilty, gait instability, impaired balance, impaired sensation, pain, impaired skin,  orthopedic precautions, edema, decreased ROM, impaired joint extensibility, impaired fine motor    Rehab potential is good.    Activity tolerance: Good    Discharge recommendations: Discharge Facility/Level Of Care Needs: nursing facility, skilled     Barriers to discharge: Barriers to Discharge: Inaccessible home environment    Equipment recommendations: Equipment Needed After Discharge: bedside commode, bath bench, wheelchair, walker, rolling     GOALS:   Physical Therapy Goals        Problem: Physical Therapy Goal    Goal Priority Disciplines Outcome Goal Variances Interventions   Physical Therapy Goal     PT/OT, PT Ongoing (interventions implemented as appropriate)     Description:  Goals to be met by: 3/12/17     Patient will increase functional independence with mobility by performin. Supine to sit with Set-up [place]  2. Sit to supine with Set-up [place]  3. Sit to stand transfer with CGA using appropriate AD  4. Bed to chair transfer with Minimal Assistance using appropriate AD  5. Gait  x 20 feet with Minimal Assistance using appropriate AD.   6. Lower extremity exercise program x20-30 reps per handout, with independence                PLAN:    Patient to be seen  (Discharge from PT)  to address the above listed problems via gait training, therapeutic activities, therapeutic exercises, neuromuscular re-education  Plan of Care expires: 17  Plan of Care reviewed with: patient         Ceasar Rodriguez, PT  2017

## 2017-03-10 NOTE — ANESTHESIA POSTPROCEDURE EVALUATION
"Anesthesia Post Evaluation    Patient: Rajan Cooper III    Procedure(s) Performed: Procedure(s) (LRB):  INCISION AND DRAINAGE (I&D), ABSCESS (Right)  ARTHROTOMY-ANKLE (Right)    Final Anesthesia Type: general  Patient location during evaluation: PACU  Patient participation: Yes- Able to Participate  Level of consciousness: awake and alert, awake and oriented  Post-procedure vital signs: reviewed and stable  Pain management: adequate  Airway patency: patent  PONV status at discharge: No PONV  Anesthetic complications: no      Cardiovascular status: blood pressure returned to baseline and hemodynamically stable  Respiratory status: unassisted, spontaneous ventilation and room air  Hydration status: euvolemic  Follow-up not needed.        Visit Vitals    /77    Pulse 81    Temp 36.6 °C (97.9 °F) (Temporal)    Resp 18    Ht 6' 1" (1.854 m)    Wt 136.1 kg (300 lb)    SpO2 96%    BMI 39.58 kg/m2       Pain/Selene Score: Pain Assessment Performed: Yes (3/10/2017  4:09 PM)  Presence of Pain: denies (3/10/2017  4:09 PM)  Selene Score: 10 (3/10/2017  4:09 PM)  Modified Selene Score: 19 (3/10/2017  8:30 AM)      "

## 2017-03-10 NOTE — NURSING TRANSFER
Nursing Transfer Note      3/10/2017     [facility]    [facility]    Transfer with iv pole    Transported by staff    Medicines sent: none    Chart send with patient: yes    Notified:family     Patient reassessed at: 3/10/17 at 1217

## 2017-03-10 NOTE — PLAN OF CARE
"Per ortho, "to OR today for right ankle I&D with exploration."  Possible d c next week (MON) with [place]     03/10/17 1121   Right Care Assessment   Can the patient answer the patient profile reliably? Yes, cognitively intact   How often would a person be available to care for the patient? Whenever needed   Describe the patient's ability to walk at the present time. Walks with the help of equipment   How does the patient rate their overall health at the present time? Good   Number of comorbid conditions (as recorded on the chart) One   During the past month, has the patient often been bothered by feeling down, depressed or hopeless? No   During the past month, has the patient often been bothered by little interest or pleasure in doing things? No     "

## 2017-03-10 NOTE — SUBJECTIVE & OBJECTIVE
Interval History:   Afebrile.  WBC continues to trend down.  Blood cultures of 3/7 NGTD. Primary complaint was burning and hesitancy with urination last night. Primary team gave dose of Ceftriaxone for suspected UTI.  U/A is negative.  Culture pending.  OR today for right ankle I&D and exploration.         HPI:  Mr. Cooper is a pleasant 61 y.o. male with a PMHx of MS on avonex presenting with worsening left wrist and right ankle pain and swelling of 5 days duration. Patient reports he was moving a heavy gate a work late February and twisted his ankle. He was evaluated by his PCP and left wrist xray revealed  evidence of AVN, DJD, and soft tissue swelling and right ankle xray showed evidence of soft tissue swelling but no fracture. He was suppose to follow up in orthopedic clinic but came to the ED as his pain acutely worsened. Patient denies break in his skin. Denies fevers, chills, sweats, recent infection, CP, SOB, N/V, diarrhea. Labs upon admit were significant for ESR elevated at 118, CRP elevated at 451.1 and a leukocytosis of 22.5k. Orthopedic surgery performed an aspiration of his left wrist  Revealing 20K WBCs and 79% segs. He is now s/p left wrist washout and debridement on 3/4 along with ankle arthrocentesis. Cultures are pending. GS + GPCs. He was started on empiric Vanc/Ceftriaxone post operatively.     Left wrist x-ray: There is lunate sclerosis suggesting AVN with areas of DJD most severe at the radiocarpal joint. There is soft tissue swelling.     Right ankle x-ray: Soft tissue swelling is identified about the ankle, particularly about the lateral malleolus.  The visualized osseous structures, however, appear intact, with no definite evidence of recent fracture or other significant abnormality identified.         Review of Systems   Constitutional: Negative for chills, diaphoresis, fatigue and fever.   HENT: Negative for congestion, mouth sores, rhinorrhea and sore throat.    Eyes: Negative for pain,  discharge and visual disturbance.   Respiratory: Negative for cough, chest tightness, shortness of breath and wheezing.    Cardiovascular: Negative for chest pain, palpitations and leg swelling.   Gastrointestinal: Negative for abdominal distention, abdominal pain, constipation, diarrhea, nausea and vomiting.   Genitourinary: Negative for difficulty urinating, dysuria, flank pain, frequency and hematuria.   Musculoskeletal: Positive for back pain (reports chronic back pain ). Negative for arthralgias, joint swelling, myalgias and neck pain.        Left wrist pain, swelling, redness  Right ankle pain, redness, erythema (improved), difficulty ambulating   Skin: Negative for color change, pallor, rash and wound.   Neurological: Negative for dizziness, seizures, weakness, numbness and headaches.   Hematological: Negative for adenopathy. Does not bruise/bleed easily.   Psychiatric/Behavioral: Negative for confusion and sleep disturbance. The patient is not nervous/anxious.      Objective:     Vital Signs (Most Recent):  Temp: 98.4 °F (36.9 °C) (03/10/17 0745)  Pulse: 90 (03/10/17 0745)  Resp: 18 (03/10/17 0745)  BP: 135/78 (03/10/17 0745)  SpO2: 95 % (03/10/17 0745) Vital Signs (24h Range):  Temp:  [97.8 °F (36.6 °C)-99.6 °F (37.6 °C)] 98.4 °F (36.9 °C)  Pulse:  [82-97] 90  Resp:  [16-18] 18  SpO2:  [93 %-97 %] 95 %  BP: (133-152)/(73-83) 135/78     Weight: 136.1 kg (300 lb)  Body mass index is 39.58 kg/(m^2).    Estimated Creatinine Clearance: 162.5 mL/min (based on Cr of 0.7).    Physical Exam   Constitutional: He is oriented to person, place, and time. He appears well-developed and well-nourished. No distress.   HENT:   Head: Normocephalic and atraumatic.   Mouth/Throat: No oropharyngeal exudate.   Eyes: Conjunctivae and EOM are normal. No scleral icterus.   Cardiovascular: Normal rate, regular rhythm and intact distal pulses.    Murmur (physiologic murmur) heard.  Pulmonary/Chest: Effort normal and breath sounds  normal. No respiratory distress. He has no wheezes.   Abdominal: Soft. He exhibits no distension. There is no tenderness. There is no guarding.   Musculoskeletal: Normal range of motion. He exhibits no deformity.   Left wrist dressing c/d/i.  No ascending erythema.  Thumb edema appears to be improving.      Right ankle/LE warmth, edema and erythema, + TTP.   Neurological: He is alert and oriented to person, place, and time. No cranial nerve deficit.   Skin: Skin is warm and dry. No rash noted. He is not diaphoretic.   Chronic skin changes to bilateral lower extremities.    Psychiatric: He has a normal mood and affect. His behavior is normal.   Vitals reviewed.      Significant Labs:   Blood Culture:   Recent Labs  Lab 03/05/17  0107 03/06/17  1202 03/06/17  1203 03/06/17  1403 03/07/17  1251   LABBLOO Gram stain aer bottle: Gram positive cocci in clusters resembling Staph  Results called to and read back by:Angelica Lima RN 03/08/2017  04:57  STAPHYLOCOCCUS AUREUSFor susceptibility see order #[id number]  Gram stain aer bottle: Gram positive cocci in clusters resembling Staph   Results called to and read back by: Serena Mederos RN 03/09/2017    08:24 Gram stain cristiano bottle: Gram positive cocci in clusters resembling Staph   Results called to and read back by: Angelica Hurtado RN  03/07/2017    09:17  STAPHYLOCOCCUS AUREUS No Growth to date  No Growth to date  No Growth to date  No Growth to date No Growth to date  No Growth to date  No Growth to date  No Growth to date  No Growth to date  No Growth to date  No Growth to date  No Growth to date No Growth to date  No Growth to date  No Growth to date  No Growth to date  No Growth to date  No Growth to date     CBC:   Recent Labs  Lab 03/09/17 0922 03/10/17  0400   WBC 17.43* 15.30*   HGB 10.8* 10.8*   HCT 32.4* 32.1*   * 366*     CMP:   Recent Labs  Lab 03/09/17  0922 03/10/17  0400    136   K 4.0 4.6    104   CO2 25 23   GLU  115* 98   BUN 14 15   CREATININE 0.7 0.7   CALCIUM 8.3* 8.4*   PROT 7.1 7.2   ALBUMIN 1.5* 1.6*   BILITOT 1.3* 1.0   ALKPHOS 389* 387*   AST 65* 85*   ALT 78* 90*   ANIONGAP 9 9   EGFRNONAA >60.0 >60.0     Urine Culture:   Recent Labs  Lab 03/04/17  1548   LABURIN STAPHYLOCOCCUS AUREUS> 100,000 cfu/ml     Urine Studies:   Recent Labs  Lab 03/04/17  1548 03/10/17  0442   COLORU [place] Yellow   APPEARANCEUA Hazy* Clear   PHUR 7.0 7.0   SPECGRAV 1.020 1.010   PROTEINUA 2+* Negative   GLUCUA Negative Negative   KETONESU 2+* Negative   BILIRUBINUA Trace Negative   OCCULTUA 3+* Negative   NITRITE Positive* Negative   UROBILINOGEN >=8.0* Negative   LEUKOCYTESUR 1+* Negative   RBCUA >100*  --    WBCUA 31*  --    BACTERIA Occasional  --    HYALINECASTS 0  --        Significant Imaging: I have reviewed all pertinent imaging results/findings within the past 24 hours.

## 2017-03-10 NOTE — PT/OT/SLP PROGRESS
Occupational Therapy  Treatment    Rajan Cooper III   MRN: [id number]   Admitting Diagnosis: Staphylococcus aureus sepsis    OT Date of Treatment: 03/10/17   OT Start Time: 1045  OT Stop Time: 1115  OT Total Time (min): 30 min    Billable Minutes:  Self Care/Home Management 10 and Therapeutic Exercise 20    General Precautions: Standard, fall, NPO  Orthopedic Precautions: LUE non weight bearing  Braces:  (resting hand splint on L hand/wrist (night time wear))    Do you have any cultural, spiritual, Mu-ism conflicts, given your current situation?: None    Subjective:  Communicated with RN prior to session.   Pt agreeable to participate in today's treatment session.     Pain Ratin10  Location - Side: Left  Location - Orientation: generalized  Location: hand  Pain Addressed: Cessation of Activity  Pain Rating Post-Intervention: 2/10    Objective:  Patient found with: peripheral IV     Functional Mobility:  Bed Mobility:  Supine to Sit: Stand by Assistance  Sit to Supine: Minimum Assistance (Assist LLE)      Activities of Daily Living:          Toileting Where Assessed: Bed level (in supine)  Toileting Level of Assistance: Moderate assistance (required assistance to hold urinal )       Balance:   Static Sit: GOOD+: Takes MAXIMAL challenges from all directions.    Dynamic Sit: GOOD: Maintains balance through MODERATE excursions of active trunk movement    Therapeutic Activities and Exercises:  Pt completed therapeutic LUE AROM exercises including: 1x15 reps 6 pack hand exercises; wrist flexion/extension, radial/ulnar deviation, and supination/pronation; prayer stretch. All exercises performed seated on EOB with LUE supported by table.       AM-PAC 6 CLICK ADL   How much help from another person does this patient currently need?   1 = Unable, Total/Dependent Assistance  2 = A lot, Maximum/Moderate Assistance  3 = A little, Minimum/Contact Guard/Supervision  4 = None, Modified [place]/Independent    Putting on and  taking off regular lower body clothing? : 3  Bathing (including washing, rinsing, drying)?: 3  Toileting, which includes using toilet, bedpan, or urinal? : 2  Putting on and taking off regular upper body clothing?: 3  Taking care of personal grooming such as brushing teeth?: 3  Eating meals?: 4  Total Score: 18     AM-PAC Raw Score CMS G-Code Modifier Level of Impairment Assistance   6 % Total / Unable   7 - 9 CM 80 - 100% Maximal Assist   10 - 14 CL 60 - 80% Moderate Assist   15 - 19 CK 40 - 60% Moderate Assist   20 - 22 CJ 20 - 40% Minimal Assist   23 CI 1-20% SBA / CGA   24 CH 0% Independent/ Mod I     Patient left supine with all lines intact, call button in reach and RN notified    ASSESSMENT:  Rajan Cooper III is a 61 y.o. male with a medical diagnosis of Staphylococcus aureus sepsis and presents with decreased strength, endurance, LUE functional, independence in self care skills and functional mobility. Pt tolerated treatment session well this date. Pt performed toileting supine in bed with min A to hold urinal in position. He participated in multiple LUE therapeutic exercises while seated on EOB with good tolerance, requiring min rest breaks between exercises. Pt would benefit from continued skilled OT to address goals and maximize return to PLOF.     Rehab identified problem list/impairments: Rehab identified problem list/impairments: weakness, impaired endurance, impaired self care skills, impaired functional mobilty, pain, orthopedic precautions, impaired fine motor, decreased upper extremity function, decreased coordination, edema    Rehab potential is good.    Activity tolerance: Good    Discharge recommendations: Discharge Facility/Level Of Care Needs: nursing facility, skilled     Barriers to discharge: Barriers to Discharge: Inaccessible home environment    Equipment recommendations: bedside commode, bath bench, wheelchair, walker, rolling     GOALS:   Occupational Therapy Goals         Problem: Occupational Therapy Goal    Goal Priority Disciplines Outcome Interventions   Occupational Therapy Goal     OT, PT/OT     Description:  Goals to be met by: 3/20/17     Patient will increase functional independence with ADLs by performing:    UE Dressing with [place].  LE Dressing with [place].  Grooming while standing at sink with Modified [place].  Toileting from toilet with [place] for hygiene and clothing management.   Bathing from  edge of bed with [place].  Toilet transfer to toilet with [place].  Increased functional strength to WFL for L UE.  Upper extremity exercise program x15 reps per handout, with independence. MET 3/8/17                 Plan:  Patient to be seen 6 x/week to address the above listed problems via self-care/home management, therapeutic activities, therapeutic exercises  Plan of Care expires: 03/20/17  Plan of Care reviewed with: patient         ESTEFANI Slater  03/10/2017

## 2017-03-11 LAB
ALBUMIN SERPL BCP-MCNC: 1.6 G/DL
ALP SERPL-CCNC: 385 U/L
ALT SERPL W/O P-5'-P-CCNC: 71 U/L
ANION GAP SERPL CALC-SCNC: 10 MMOL/L
ANISOCYTOSIS BLD QL SMEAR: SLIGHT
AST SERPL-CCNC: 60 U/L
BACTERIA BLD CULT: NORMAL
BACTERIA UR CULT: NO GROWTH
BASOPHILS # BLD AUTO: ABNORMAL K/UL
BASOPHILS NFR BLD: 0 %
BILIRUB SERPL-MCNC: 1.1 MG/DL
BILIRUB UR QL STRIP: NEGATIVE
BUN SERPL-MCNC: 16 MG/DL
CALCIUM SERPL-MCNC: 8.7 MG/DL
CHLORIDE SERPL-SCNC: 102 MMOL/L
CLARITY UR REFRACT.AUTO: CLEAR
CO2 SERPL-SCNC: 24 MMOL/L
COLOR UR AUTO: YELLOW
CREAT SERPL-MCNC: 0.8 MG/DL
DIFFERENTIAL METHOD: ABNORMAL
EOSINOPHIL # BLD AUTO: ABNORMAL K/UL
EOSINOPHIL NFR BLD: 0 %
ERYTHROCYTE [DISTWIDTH] IN BLOOD BY AUTOMATED COUNT: 15.2 %
EST. GFR  (AFRICAN AMERICAN): >60 ML/MIN/1.73 M^2
EST. GFR  (NON AFRICAN AMERICAN): >60 ML/MIN/1.73 M^2
GLUCOSE SERPL-MCNC: 99 MG/DL
GLUCOSE UR QL STRIP: NEGATIVE
HCT VFR BLD AUTO: 34.4 %
HGB BLD-MCNC: 11.1 G/DL
HGB UR QL STRIP: NEGATIVE
HYPOCHROMIA BLD QL SMEAR: ABNORMAL
KETONES UR QL STRIP: NEGATIVE
LEUKOCYTE ESTERASE UR QL STRIP: NEGATIVE
LYMPHOCYTES # BLD AUTO: ABNORMAL K/UL
LYMPHOCYTES NFR BLD: 8 %
MAGNESIUM SERPL-MCNC: 2 MG/DL
MCH RBC QN AUTO: 29.5 PG
MCHC RBC AUTO-ENTMCNC: 32.3 %
MCV RBC AUTO: 92 FL
METAMYELOCYTES NFR BLD MANUAL: 1 %
MONOCYTES # BLD AUTO: ABNORMAL K/UL
MONOCYTES NFR BLD: 1 %
MYELOCYTES NFR BLD MANUAL: 2 %
NEUTROPHILS NFR BLD: 88 %
NITRITE UR QL STRIP: NEGATIVE
OVALOCYTES BLD QL SMEAR: ABNORMAL
PH UR STRIP: 6 [PH] (ref 5–8)
PHOSPHATE SERPL-MCNC: 4.4 MG/DL
PLATELET # BLD AUTO: 418 K/UL
PMV BLD AUTO: 11.1 FL
POIKILOCYTOSIS BLD QL SMEAR: SLIGHT
POLYCHROMASIA BLD QL SMEAR: ABNORMAL
POTASSIUM SERPL-SCNC: 4.5 MMOL/L
PROT SERPL-MCNC: 7.5 G/DL
PROT UR QL STRIP: NEGATIVE
RBC # BLD AUTO: 3.76 M/UL
SODIUM SERPL-SCNC: 136 MMOL/L
SP GR UR STRIP: 1.01 (ref 1–1.03)
URN SPEC COLLECT METH UR: NORMAL
UROBILINOGEN UR STRIP-ACNC: NEGATIVE EU/DL
WBC # BLD AUTO: 13.07 K/UL

## 2017-03-11 PROCEDURE — 84100 ASSAY OF PHOSPHORUS: CPT

## 2017-03-11 PROCEDURE — 81003 URINALYSIS AUTO W/O SCOPE: CPT

## 2017-03-11 PROCEDURE — 25000003 PHARM REV CODE 250: Performed by: STUDENT IN AN ORGANIZED HEALTH CARE EDUCATION/TRAINING PROGRAM

## 2017-03-11 PROCEDURE — 97530 THERAPEUTIC ACTIVITIES: CPT

## 2017-03-11 PROCEDURE — 11000001 HC ACUTE MED/SURG PRIVATE ROOM

## 2017-03-11 PROCEDURE — 85027 COMPLETE CBC AUTOMATED: CPT

## 2017-03-11 PROCEDURE — 83735 ASSAY OF MAGNESIUM: CPT

## 2017-03-11 PROCEDURE — 97110 THERAPEUTIC EXERCISES: CPT

## 2017-03-11 PROCEDURE — 99233 SBSQ HOSP IP/OBS HIGH 50: CPT | Mod: ,,, | Performed by: HOSPITALIST

## 2017-03-11 PROCEDURE — 63600175 PHARM REV CODE 636 W HCPCS: Performed by: NURSE PRACTITIONER

## 2017-03-11 PROCEDURE — 80053 COMPREHEN METABOLIC PANEL: CPT

## 2017-03-11 PROCEDURE — 25000003 PHARM REV CODE 250: Performed by: PHYSICIAN ASSISTANT

## 2017-03-11 PROCEDURE — 87086 URINE CULTURE/COLONY COUNT: CPT

## 2017-03-11 PROCEDURE — 25000003 PHARM REV CODE 250: Performed by: NURSE PRACTITIONER

## 2017-03-11 PROCEDURE — 85007 BL SMEAR W/DIFF WBC COUNT: CPT

## 2017-03-11 PROCEDURE — 36415 COLL VENOUS BLD VENIPUNCTURE: CPT

## 2017-03-11 PROCEDURE — 63600175 PHARM REV CODE 636 W HCPCS: Performed by: HOSPITALIST

## 2017-03-11 PROCEDURE — 97164 PT RE-EVAL EST PLAN CARE: CPT

## 2017-03-11 RX ORDER — PYRAZINAMIDE TABLET 500 MG/1
1500 TABLET ORAL DAILY
Status: DISCONTINUED | OUTPATIENT
Start: 2017-03-11 | End: 2017-03-12

## 2017-03-11 RX ADMIN — MODAFINIL 200 MG: 100 TABLET ORAL at 09:03

## 2017-03-11 RX ADMIN — Medication 3 ML: at 02:03

## 2017-03-11 RX ADMIN — PYRAZINAMIDE 1500 MG: 500 TABLET ORAL at 09:03

## 2017-03-11 RX ADMIN — AMLODIPINE BESYLATE 10 MG: 10 TABLET ORAL at 09:03

## 2017-03-11 RX ADMIN — Medication 3 ML: at 09:03

## 2017-03-11 RX ADMIN — LOSARTAN POTASSIUM 25 MG: 25 TABLET, FILM COATED ORAL at 09:03

## 2017-03-11 RX ADMIN — DEXTROSE 6 G: 5 SOLUTION INTRAVENOUS at 02:03

## 2017-03-11 RX ADMIN — ENOXAPARIN SODIUM 40 MG: 100 INJECTION SUBCUTANEOUS at 12:03

## 2017-03-11 RX ADMIN — BACLOFEN 10 MG: 10 TABLET ORAL at 05:03

## 2017-03-11 RX ADMIN — Medication 3 ML: at 05:03

## 2017-03-11 RX ADMIN — BACLOFEN 10 MG: 10 TABLET ORAL at 09:03

## 2017-03-11 RX ADMIN — BACLOFEN 10 MG: 10 TABLET ORAL at 02:03

## 2017-03-11 NOTE — PROGRESS NOTES
ORTHO PROGRESS NOTE:    Rajan Cooper III is a 61 y.o. male admitted on 3/4/2017  Hospital Day: 3   Post Op Day: 1/7 Day Post-Op,     The patient was seen and examined this morning at the bedside. Pain controlled this am, some urinary pain this am.    PHYSICAL EXAM:  Awake/alert/oriented x 3,   AFVSS  Good inspiratory effort with unlabored breathing. Nasal cannula   Dressings c/d/i  NVI distally LUE, RLE  Able to range left wrist passively  Neuro exam difficult due to hx of MS     Vitals:    03/10/17 1700 03/10/17 1744 03/10/17 2155 03/11/17 0535   BP: 135/84 (!) 140/80 (!) 143/81 (!) 143/85   BP Location:  Left arm     Patient Position:  Lying     BP Method:  Automatic     Pulse: 82 85 101 95   Resp: 18 18 18 18   Temp: 98.8 °F (37.1 °C) 97.8 °F (36.6 °C) 98.2 °F (36.8 °C) 99.8 °F (37.7 °C)   TempSrc:  Oral Oral Oral   SpO2: 97% 98% (!) 92%    Weight:       Height:         I/O last 3 completed shifts:  In: 200 [I.V.:200]  Out: 350 [Urine:350]    Recent Labs  Lab 03/09/17  0922 03/10/17  0400 03/11/17  0515   CALCIUM 8.3* 8.4* 8.7   PROT 7.1 7.2 7.5    136 136   K 4.0 4.6 4.5   CO2 25 23 24    104 102   BUN 14 15 16   CREATININE 0.7 0.7 0.8       Recent Labs  Lab 03/09/17  0922 03/10/17  0400 03/11/17  0515   WBC 17.43* 15.30*  --    RBC 3.65* 3.64* 3.76*   HGB 10.8* 10.8* 11.1*   HCT 32.4* 32.1* 34.4*   * 366* 418*     No results for input(s): INR, APTT in the last 72 hours.    Invalid input(s): PT      A/P: 61 y.o. male 1/7 Day Post-Op s/pright ankle I7D and L wrist arthrotomy with irrigation and synovectomy, I&D abscess  -- Pain control  -- PT/OT: PACHECO HAZEL. ROM left wrist  -- DVT prophylaxis: SCDs  -- Antibiotics: Ancef. Blood cx Staph Aureus pan sensitive. Urine Cx Staph (pan sensitive) . ID on board  --WBC pending

## 2017-03-11 NOTE — PROGRESS NOTES
[facility] Hospital Medicine  Progress Note    Patient Name: Rajan Cooper III  MRN: [id number]  Patient Class: IP- Inpatient   Admission Date: 3/4/2017  Length of Stay: 5 days  Attending Physician: Trenton Brian MD  Primary Care Provider: Tom Garcia MD    [place] [facility] Medicine Team: [facility]P MED S Trenton Brian MD    Subjective:     Principal Problem:Staphylococcus aureus sepsis    HPI:  Rajan Cooper III is a 61-year-old male w/ past medical history significant for multiple sclerosis and HTN who presented to the ED on 3/4/17 w/ worsening L wrist > R ankle pain of 5 days duration.  Denied trauma or other precipitating event, h/o septic joint, h/o gout, fevers, chills, recent infection, CP, SOB, dysuria, or new numbness/tingling/weakness. WBC 22K w/ a CRP of 425. Concern for left dorsal wrist abscess with possible septic wrist and right ankle arthritis.    Able to bear weight but painful.  Usually ambulates without assistive device at baseline. Takes baclofen and Avonex for MS.       Hospital Course:  3/4/17 - Arthrocentesis of the right ankle, incision, drainage and irrigation of left dorsal wrist abscess and left wrist arthrotomy with irrigation. GPC noted on gram stain along with 20K WBCS w/ 79% segs. Patient has remained on ceftriaxone and vancomycin.   3/7/17 - medicine consulted. Patient has developed worsening tachycardia with SOB required 3 liter of 02. Patient denies any SOB, but complains of pain. New fever at 101.2 F noted. MSSA grown from abscess of the left wrist and staph noted in the urine culture. Blood culture positive for staph aureus. TTE negative for endocarditis. MRI of the right ankle performed, revealed possible anterolateral abscess. OR scheduled for 03/10/17 for abscess drainage.       Interval History: Patient seen and examined at bedside. In good spirits, tolerated surgery well, will need SNF placement prior to discharge.     Review of Systems   Constitutional:  Positive for fatigue and fever. Negative for chills and diaphoresis.   HENT: Negative for congestion.    Eyes: Negative for visual disturbance.   Respiratory: Negative for shortness of breath.    Cardiovascular: Positive for leg swelling. Negative for chest pain and palpitations.   Gastrointestinal: Negative for abdominal pain and diarrhea.   Genitourinary: Negative for dysuria.   Musculoskeletal: Positive for arthralgias. Negative for myalgias.   Skin: Positive for rash and wound.   Neurological: Negative for dizziness, weakness and headaches.   Psychiatric/Behavioral: Positive for confusion.     Objective:     Vital Signs (Most Recent):  Temp: 98 °F (36.7 °C) (03/11/17 1100)  Pulse: 91 (03/11/17 1100)  Resp: 18 (03/11/17 0730)  BP: 139/82 (03/11/17 1100)  SpO2: (!) 94 % (03/11/17 1100) Vital Signs (24h Range):  Temp:  [97.8 °F (36.6 °C)-99.8 °F (37.7 °C)] 98 °F (36.7 °C)  Pulse:  [] 91  Resp:  [18] 18  SpO2:  [92 %-100 %] 94 %  BP: (117-143)/(77-85) 139/82     Weight: 136.1 kg (300 lb)  Body mass index is 39.58 kg/(m^2).    Intake/Output Summary (Last 24 hours) at 03/11/17 1607  Last data filed at 03/11/17 0500   Gross per 24 hour   Intake              640 ml   Output              350 ml   Net              290 ml      Physical Exam   Constitutional: He is oriented to person, place, and time. He appears well-developed and well-nourished. No distress.   HENT:   Head: Normocephalic and atraumatic.   Eyes: EOM are normal. Pupils are equal, round, and reactive to light.   Neck: Normal range of motion. Neck supple.   Cardiovascular: Intact distal pulses.  Tachycardia present.  Exam reveals no gallop and no friction rub.    No murmur heard.  Pulmonary/Chest: Effort normal and breath sounds normal. No respiratory distress.   Abdominal: Soft. Bowel sounds are normal.   Musculoskeletal: Normal range of motion. He exhibits edema and tenderness.   Neurological: He is alert and oriented to person, place, and time.    Skin: Rash noted. He is not diaphoretic. There is erythema.       Significant Labs:   CBC:   Recent Labs  Lab 03/10/17  0400 03/11/17  0515   WBC 15.30* 13.07*   HGB 10.8* 11.1*   HCT 32.1* 34.4*   * 418*     CMP:   Recent Labs  Lab 03/10/17  0400 03/11/17  0515    136   K 4.6 4.5    102   CO2 23 24   GLU 98 99   BUN 15 16   CREATININE 0.7 0.8   CALCIUM 8.4* 8.7   PROT 7.2 7.5   ALBUMIN 1.6* 1.6*   BILITOT 1.0 1.1*   ALKPHOS 387* 385*   AST 85* 60*   ALT 90* 71*   ANIONGAP 9 10   EGFRNONAA >60.0 >60.0     Coagulation: No results for input(s): INR, APTT in the last 48 hours.    Invalid input(s): PT    Significant Imaging: I have reviewed and interpreted all pertinent imaging results/findings within the past 24 hours.    Assessment/Plan:      * Staphylococcus aureus sepsis  SIRS (4/4) 2/2 to MSSA.   On Ancef IV continuous infusion   Blood cs revealed Staph Aureus 03/05, 03/06, NGTD on 03/07   TTE: -ve for endocarditis   MRI positive for anterolateral abscess  .1-->244.1  Ankle I and D on 03/10/17    MS (multiple sclerosis)  Okay to restart baclofen 10 mg TID.     HTN (hypertension)  Continue losartan 25 mg and amlodipine 10 mg daily.       Abscess of left hand  S/p I and D  IV Ancef  Synovial fluid: MSSA           Staphylococcal arthritis of left wrist  Blood cs 03/06/17: positive for Staph aureus  Blood cS 03/07/17: NGTD  On Ancef IVPB  Synovial fluid positive for pansensitive Staph Aureus        VTE Risk Mitigation         Ordered     enoxaparin injection 40 mg  Daily     Route:  Subcutaneous        03/10/17 1755     Medium Risk of VTE  Once      03/04/17 1346     Place sequential compression device  Until discontinued      03/04/17 1346          Trenton Brian MD  Department of Hospital Medicine   [facility]

## 2017-03-11 NOTE — SUBJECTIVE & OBJECTIVE
Interval History: Patient seen and examined at bedside. In good spirits, tolerated surgery well, will need SNF placement prior to discharge.     Review of Systems   Constitutional: Positive for fatigue and fever. Negative for chills and diaphoresis.   HENT: Negative for congestion.    Eyes: Negative for visual disturbance.   Respiratory: Negative for shortness of breath.    Cardiovascular: Positive for leg swelling. Negative for chest pain and palpitations.   Gastrointestinal: Negative for abdominal pain and diarrhea.   Genitourinary: Negative for dysuria.   Musculoskeletal: Positive for arthralgias. Negative for myalgias.   Skin: Positive for rash and wound.   Neurological: Negative for dizziness, weakness and headaches.   Psychiatric/Behavioral: Positive for confusion.     Objective:     Vital Signs (Most Recent):  Temp: 98 °F (36.7 °C) (03/11/17 1100)  Pulse: 91 (03/11/17 1100)  Resp: 18 (03/11/17 0730)  BP: 139/82 (03/11/17 1100)  SpO2: (!) 94 % (03/11/17 1100) Vital Signs (24h Range):  Temp:  [97.8 °F (36.6 °C)-99.8 °F (37.7 °C)] 98 °F (36.7 °C)  Pulse:  [] 91  Resp:  [18] 18  SpO2:  [92 %-100 %] 94 %  BP: (117-143)/(77-85) 139/82     Weight: 136.1 kg (300 lb)  Body mass index is 39.58 kg/(m^2).    Intake/Output Summary (Last 24 hours) at 03/11/17 1607  Last data filed at 03/11/17 0500   Gross per 24 hour   Intake              640 ml   Output              350 ml   Net              290 ml      Physical Exam   Constitutional: He is oriented to person, place, and time. He appears well-developed and well-nourished. No distress.   HENT:   Head: Normocephalic and atraumatic.   Eyes: EOM are normal. Pupils are equal, round, and reactive to light.   Neck: Normal range of motion. Neck supple.   Cardiovascular: Intact distal pulses.  Tachycardia present.  Exam reveals no gallop and no friction rub.    No murmur heard.  Pulmonary/Chest: Effort normal and breath sounds normal. No respiratory distress.   Abdominal:  Soft. Bowel sounds are normal.   Musculoskeletal: Normal range of motion. He exhibits edema and tenderness.   Neurological: He is alert and oriented to person, place, and time.   Skin: Rash noted. He is not diaphoretic. There is erythema.       Significant Labs:   CBC:   Recent Labs  Lab 03/10/17  0400 03/11/17  0515   WBC 15.30* 13.07*   HGB 10.8* 11.1*   HCT 32.1* 34.4*   * 418*     CMP:   Recent Labs  Lab 03/10/17  0400 03/11/17  0515    136   K 4.6 4.5    102   CO2 23 24   GLU 98 99   BUN 15 16   CREATININE 0.7 0.8   CALCIUM 8.4* 8.7   PROT 7.2 7.5   ALBUMIN 1.6* 1.6*   BILITOT 1.0 1.1*   ALKPHOS 387* 385*   AST 85* 60*   ALT 90* 71*   ANIONGAP 9 10   EGFRNONAA >60.0 >60.0     Coagulation: No results for input(s): INR, APTT in the last 48 hours.    Invalid input(s): PT    Significant Imaging: I have reviewed and interpreted all pertinent imaging results/findings within the past 24 hours.

## 2017-03-11 NOTE — ADDENDUM NOTE
Addendum  created 03/11/17 0825 by Jalil Chakraborty MD    Anesthesia Event edited, Procedure Event Log accessed, Visit Navigator SmartForm Flowsheet section accepted

## 2017-03-11 NOTE — PLAN OF CARE
Problem: Patient Care Overview  Goal: Plan of Care Review  Outcome: Ongoing (interventions implemented as appropriate)  Patient progressing towards goals. Pain controlled  With  medsNeurovascularly  Intact. Family at bedside. Bed in low position and call bell in reach.

## 2017-03-11 NOTE — OP NOTE
DATE OF PROCEDURE:  03/10/2017    PREOPERATIVE DIAGNOSES:  1.  Probable right ankle abscesses.  2.  Probable right ankle septic arthritis.    POSTOPERATIVE DIAGNOSES:  1. Right ankle abscess.  2. Right ankle septic arthritis.    PROCEDURE PERFORMED:  1. Incision and drainage, right ankle abscess.  2. Right ankle arthrotomy with irrigation of septic arthritis.    SURGEON:  Kunal Shipley M.D.    ASSISTANT:  LEIGH ANN ANNE M.D. (RES)    ANESTHESIA:  Monitored anesthesia care plus regional.    ESTIMATED BLOOD LOSS:  5 mL    IV FLUIDS:  300 mL crystalloid.    SPECIMENS:  Cultures of the abscess and culture of the ankle arthrotomy.    INDICATIONS FOR PROCEDURE:  The patient is a 61-year-old male who we had taken   back to the Operating Room prior for left wrist incision and drainage of abscess   and left wrist arthrotomy.  The patient subsequently grown out bacteria and has   been treated for this.  He had a white count of 22,000 when he entered the   hospital.  At his prior surgery, we aspirated all around the ankle.  At that   point did not find any significant fluid.  We were able to get some fluid from   the joint, which appeared to be synovial fluid in small amount given his   preexisting ankle arthritis.  He subsequently developed some more swelling on   the anterolateral aspect of his ankle with continued pain and an MRI does show   fluid collection largely in the posteromedial aspect of the ankle and a little   bit on the anterolateral aspect where there was a small joint effusion.  At this   point, we are going to take him back to the Operating Room to explore this.    The risks, benefits, and alternatives of surgery were discussed with the patient   prior to going to the Operating Room.  Informed consent was obtained.    PROCEDURE IN DETAIL:  The patient was identified in the preoperative holding   area and the site was marked.  The patient was wheeled into the Operating Room,   placed on the operating table  in supine position.  Monitored anesthesia care was   induced and regional anesthesia had been performed prior we rolled him to the   operating room.  The right lower extremity was placed in a nonsterile tourniquet   and prepped and draped in sterile fashion.  Timeout was undertaken to confirm   patient, site, surgeon.  All agreed and we proceeded.    The leg was elevated, but not exsanguinated and tourniquet was raised.  We   marked out a posteromedial incision, which is where the bulk of the fluid   collection on MRI was and then an anterolateral incision.  We began   anterolaterally and made a small incision overlying the area just to the skin   and subcutaneous tissue being careful to protect the superficial peroneal nerve.    We then entered with a stat and were able to liberate a few mL of purulent   fluid.  We explored the area for loculations and there were none.  We irrigated   this copiously.  I then brought in the incision about 1 cm on either side and we   entered the joint capsule.  At this point, we could see the dome of the talus.    The patient did have some murky fluid within his joint and we irrigated this as   well.    Moving around the posteromedial aspect I made a posteromedial incision went down   about the tibialis posterior tendon, which was where a lot of the fluid was on   the MRI and going back to the [facility], I incised the tissue in the posteromedial   aspect of the tibia in this area and did not really find anything impressive to   match the patient's MRI.  He had a little bit of serous fluid back here, but no   pus.  We irrigated this copiously as well and then the joint again.  The wounds   were closed then with 3-0 nylon suture in interrupted fashion on the   posteromedial border of the tibia and then on the anterolateral aspect, leaving   a small hole open, in case there was any further drainage.  Sterile dressings   were applied throughout.    At this point, I just removed one of the  sutures from the dorsal aspect of the   wrist to see if there was any more drainage, which we wanted to come from this   area after the suction drain was removed and he had some watery edematous fluid,   but no pus in this area.  Sterile dressing was again applied on this.    All instrument and sponge counts were reported correct at the end of the case.    There were no complications.  The patient was turned to supine position on the   hospital bed, awakened and taken to Recovery Room in stable condition.    PLAN FOR THE PATIENT:  His white blood cell count has been draining down and he   is on appropriate antibiotics.  We will follow his new cultures as well.      NATALIYA/CASSI  dd: 03/10/2017 15:49:48 (CST)  td: 03/10/2017 23:06:19 (CST)  Doc ID   #[id number]  Job ID #[id number]    CC:

## 2017-03-11 NOTE — PLAN OF CARE
Problem: Patient Care Overview  Goal: Plan of Care Review  Outcome: Ongoing (interventions implemented as appropriate)  Plan of care reviewed and updated. Pt AA+O. Pt's pain is managed with the medication ordered at this time. Pt's VS are as charted.  No falls this shift. Pt is oriented to room and call system. Will continue to [facility].

## 2017-03-11 NOTE — PLAN OF CARE
Problem: Patient Care Overview  Goal: Plan of Care Review  Rajan Cooper III is a 62 yo male admitted to [facility] on 3/4 for (L) wrist I&D 2* staph. Was being followed by PT but discharged on 3/10 when patient returned to OR for (R) ankle I&D, arthrotomy. Spoke with MD Casale this AM and clarified that patient is WBAT on (L)UE and (R)LE. Pt motivated for therapy session. Requires mod (A) to stand pivot to/from BS commode. Demos urinary incontinence, pt stating due to previous MS diagnosis. Gave HEP working on (R) ankle and (L) wrist AROM as well as additional LE HEP. Will need SNF admit upon discharge. Will progress mobility as tolerated.     Venkat Varela, PT  3/11/2017

## 2017-03-12 LAB
ALBUMIN SERPL BCP-MCNC: 1.6 G/DL
ALP SERPL-CCNC: 347 U/L
ALT SERPL W/O P-5'-P-CCNC: 55 U/L
ANION GAP SERPL CALC-SCNC: 13 MMOL/L
ANISOCYTOSIS BLD QL SMEAR: SLIGHT
AST SERPL-CCNC: 45 U/L
BACTERIA BLD CULT: NORMAL
BACTERIA BLD CULT: NORMAL
BACTERIA UR CULT: NO GROWTH
BASOPHILS # BLD AUTO: ABNORMAL K/UL
BASOPHILS NFR BLD: 1 %
BILIRUB SERPL-MCNC: 1 MG/DL
BUN SERPL-MCNC: 17 MG/DL
CALCIUM SERPL-MCNC: 8.8 MG/DL
CHLORIDE SERPL-SCNC: 100 MMOL/L
CO2 SERPL-SCNC: 22 MMOL/L
CREAT SERPL-MCNC: 0.8 MG/DL
DIFFERENTIAL METHOD: ABNORMAL
EOSINOPHIL # BLD AUTO: ABNORMAL K/UL
EOSINOPHIL NFR BLD: 0 %
ERYTHROCYTE [DISTWIDTH] IN BLOOD BY AUTOMATED COUNT: 15 %
EST. GFR  (AFRICAN AMERICAN): >60 ML/MIN/1.73 M^2
EST. GFR  (NON AFRICAN AMERICAN): >60 ML/MIN/1.73 M^2
GLUCOSE SERPL-MCNC: 88 MG/DL
HCT VFR BLD AUTO: 33.3 %
HGB BLD-MCNC: 10.8 G/DL
HYPOCHROMIA BLD QL SMEAR: ABNORMAL
LYMPHOCYTES # BLD AUTO: ABNORMAL K/UL
LYMPHOCYTES NFR BLD: 11 %
MAGNESIUM SERPL-MCNC: 2 MG/DL
MCH RBC QN AUTO: 29.3 PG
MCHC RBC AUTO-ENTMCNC: 32.4 %
MCV RBC AUTO: 91 FL
MONOCYTES # BLD AUTO: ABNORMAL K/UL
MONOCYTES NFR BLD: 3 %
MYELOCYTES NFR BLD MANUAL: 1 %
NEUTROPHILS NFR BLD: 84 %
OVALOCYTES BLD QL SMEAR: ABNORMAL
PHOSPHATE SERPL-MCNC: 4.7 MG/DL
PLATELET # BLD AUTO: 511 K/UL
PMV BLD AUTO: 10.6 FL
POIKILOCYTOSIS BLD QL SMEAR: SLIGHT
POLYCHROMASIA BLD QL SMEAR: ABNORMAL
POTASSIUM SERPL-SCNC: 4.7 MMOL/L
PROT SERPL-MCNC: 7.5 G/DL
RBC # BLD AUTO: 3.68 M/UL
SODIUM SERPL-SCNC: 135 MMOL/L
WBC # BLD AUTO: 12.43 K/UL

## 2017-03-12 PROCEDURE — 97110 THERAPEUTIC EXERCISES: CPT

## 2017-03-12 PROCEDURE — 97168 OT RE-EVAL EST PLAN CARE: CPT

## 2017-03-12 PROCEDURE — 80053 COMPREHEN METABOLIC PANEL: CPT

## 2017-03-12 PROCEDURE — 63600175 PHARM REV CODE 636 W HCPCS: Performed by: HOSPITALIST

## 2017-03-12 PROCEDURE — 97530 THERAPEUTIC ACTIVITIES: CPT

## 2017-03-12 PROCEDURE — 63600175 PHARM REV CODE 636 W HCPCS: Performed by: NURSE PRACTITIONER

## 2017-03-12 PROCEDURE — 83735 ASSAY OF MAGNESIUM: CPT

## 2017-03-12 PROCEDURE — 85027 COMPLETE CBC AUTOMATED: CPT

## 2017-03-12 PROCEDURE — 25000003 PHARM REV CODE 250: Performed by: STUDENT IN AN ORGANIZED HEALTH CARE EDUCATION/TRAINING PROGRAM

## 2017-03-12 PROCEDURE — 25000003 PHARM REV CODE 250: Performed by: NURSE PRACTITIONER

## 2017-03-12 PROCEDURE — 97116 GAIT TRAINING THERAPY: CPT

## 2017-03-12 PROCEDURE — 36415 COLL VENOUS BLD VENIPUNCTURE: CPT

## 2017-03-12 PROCEDURE — 84100 ASSAY OF PHOSPHORUS: CPT

## 2017-03-12 PROCEDURE — 25000003 PHARM REV CODE 250: Performed by: PHYSICIAN ASSISTANT

## 2017-03-12 PROCEDURE — 11000001 HC ACUTE MED/SURG PRIVATE ROOM

## 2017-03-12 PROCEDURE — 85007 BL SMEAR W/DIFF WBC COUNT: CPT

## 2017-03-12 PROCEDURE — 99233 SBSQ HOSP IP/OBS HIGH 50: CPT | Mod: ,,, | Performed by: HOSPITALIST

## 2017-03-12 RX ADMIN — DEXTROSE 6 G: 5 SOLUTION INTRAVENOUS at 02:03

## 2017-03-12 RX ADMIN — Medication 3 ML: at 02:03

## 2017-03-12 RX ADMIN — AMLODIPINE BESYLATE 10 MG: 10 TABLET ORAL at 09:03

## 2017-03-12 RX ADMIN — OXYCODONE HYDROCHLORIDE 10 MG: 5 TABLET ORAL at 05:03

## 2017-03-12 RX ADMIN — Medication 3 ML: at 05:03

## 2017-03-12 RX ADMIN — MODAFINIL 200 MG: 100 TABLET ORAL at 09:03

## 2017-03-12 RX ADMIN — Medication 3 ML: at 09:03

## 2017-03-12 RX ADMIN — OXYCODONE HYDROCHLORIDE 10 MG: 5 TABLET ORAL at 11:03

## 2017-03-12 RX ADMIN — LOSARTAN POTASSIUM 25 MG: 25 TABLET, FILM COATED ORAL at 09:03

## 2017-03-12 RX ADMIN — BACLOFEN 10 MG: 10 TABLET ORAL at 05:03

## 2017-03-12 RX ADMIN — ENOXAPARIN SODIUM 40 MG: 100 INJECTION SUBCUTANEOUS at 11:03

## 2017-03-12 RX ADMIN — BACLOFEN 10 MG: 10 TABLET ORAL at 09:03

## 2017-03-12 RX ADMIN — BACLOFEN 10 MG: 10 TABLET ORAL at 02:03

## 2017-03-12 NOTE — PROGRESS NOTES
[facility] Hospital Medicine  Progress Note    Patient Name: Rajan Cooper III  MRN: [id number]  Patient Class: IP- Inpatient   Admission Date: 3/4/2017  Length of Stay: 6 days  Attending Physician: Trenton Brian MD  Primary Care Provider: Tom Garcia MD    [facility] Medicine Team: [facility] HOSP MED S Trenton Brian MD    Subjective:     Principal Problem:Staphylococcus aureus sepsis    HPI:  Rajan Cooper III is a 61-year-old male w/ past medical history significant for multiple sclerosis and HTN who presented to the ED on 3/4/17 w/ worsening L wrist > R ankle pain of 5 days duration.  Denied trauma or other precipitating event, h/o septic joint, h/o gout, fevers, chills, recent infection, CP, SOB, dysuria, or new numbness/tingling/weakness. WBC 22K w/ a CRP of 425. Concern for left dorsal wrist abscess with possible septic wrist and right ankle arthritis.    Able to bear weight but painful.  Usually ambulates without assistive device at baseline. Takes baclofen and Avonex for MS.       Hospital Course:  3/4/17 - Arthrocentesis of the right ankle, incision, drainage and irrigation of left dorsal wrist abscess and left wrist arthrotomy with irrigation. GPC noted on gram stain along with 20K WBCS w/ 79% segs. Patient has remained on ceftriaxone and vancomycin.   3/7/17 - medicine consulted. Patient has developed worsening tachycardia with SOB required 3 liter of 02. Patient denies any SOB, but complains of pain. New fever at 101.2 F noted. MSSA grown from abscess of the left wrist and staph noted in the urine culture. Blood culture positive for staph aureus. TTE negative for endocarditis. MRI of the right ankle performed, revealed possible anterolateral abscess. OR scheduled for 03/10/17 for abscess drainage.       Interval History:     Review of Systems   Constitutional: Negative for chills and diaphoresis.   HENT: Negative for congestion.    Eyes: Negative for visual disturbance.   Respiratory:  Negative for shortness of breath.    Cardiovascular: Positive for leg swelling. Negative for chest pain and palpitations.   Gastrointestinal: Negative for abdominal pain and diarrhea.   Genitourinary: Negative for dysuria.   Musculoskeletal: Positive for arthralgias. Negative for myalgias.   Skin: Positive for rash and wound.   Neurological: Negative for dizziness, weakness and headaches.     Objective:     Vital Signs (Most Recent):  Temp: 97.8 °F (36.6 °C) (03/12/17 1636)  Pulse: 98 (03/12/17 1636)  Resp: 18 (03/12/17 1636)  BP: 137/76 (03/12/17 1636)  SpO2: 95 % (03/12/17 1100) Vital Signs (24h Range):  Temp:  [97.3 °F (36.3 °C)-99.3 °F (37.4 °C)] 97.8 °F (36.6 °C)  Pulse:  [] 98  Resp:  [18] 18  SpO2:  [92 %-95 %] 95 %  BP: (121-137)/(71-84) 137/76     Weight: 136.1 kg (300 lb)  Body mass index is 39.58 kg/(m^2).  No intake or output data in the 24 hours ending 03/12/17 1644   Physical Exam   Constitutional: He is oriented to person, place, and time. He appears well-developed and well-nourished. No distress.   HENT:   Head: Normocephalic and atraumatic.   Eyes: EOM are normal. Pupils are equal, round, and reactive to light.   Neck: Normal range of motion. Neck supple.   Cardiovascular: Intact distal pulses.  Exam reveals no gallop and no friction rub.    No murmur heard.  Pulmonary/Chest: Effort normal and breath sounds normal. No respiratory distress.   Abdominal: Soft. Bowel sounds are normal.   Musculoskeletal: Normal range of motion. He exhibits edema and tenderness.   Neurological: He is alert and oriented to person, place, and time.   Skin: Rash noted. He is not diaphoretic. There is erythema.       Significant Labs:   CBC:   Recent Labs  Lab 03/11/17  0515 03/12/17  0622   WBC 13.07* 12.43   HGB 11.1* 10.8*   HCT 34.4* 33.3*   * 511*     CMP:   Recent Labs  Lab 03/11/17  0515 03/12/17  0622    135*   K 4.5 4.7    100   CO2 24 22*   GLU 99 88   BUN 16 17   CREATININE 0.8 0.8    CALCIUM 8.7 8.8   PROT 7.5 7.5   ALBUMIN 1.6* 1.6*   BILITOT 1.1* 1.0   ALKPHOS 385* 347*   AST 60* 45*   ALT 71* 55*   ANIONGAP 10 13   EGFRNONAA >60.0 >60.0     Coagulation: No results for input(s): INR, APTT in the last 48 hours.    Invalid input(s): PT    Significant Imaging: I have reviewed and interpreted all pertinent imaging results/findings within the past 24 hours.    Assessment/Plan:      * Staphylococcus aureus sepsis  SIRS (4/4) 2/2 to MSSA.   On Ancef IV continuous infusion   Blood cs revealed Staph Aureus 03/05, 03/06, NGTD on 03/07   TTE: -ve for endocarditis   MRI positive for anterolateral abscess  .1-->244.1  Ankle I and D on 03/10/17    MS (multiple sclerosis)  Okay to restart baclofen 10 mg TID.     HTN (hypertension)  Continue losartan 25 mg and amlodipine 10 mg daily.       Abscess of left hand  S/p I and D  IV Ancef  Synovial fluid: MSSA           Staphylococcal arthritis of left wrist  Blood cs 03/06/17: positive for Staph aureus  Blood cS 03/07/17: NGTD  On Ancef IVPB  Synovial fluid positive for pansensitive Staph Aureus  PICC Line placement since blood cultures negative for >48 hrs        VTE Risk Mitigation         Ordered     enoxaparin injection 40 mg  Daily     Route:  Subcutaneous        03/10/17 1755     Medium Risk of VTE  Once      03/04/17 1346     Place sequential compression device  Until discontinued      03/04/17 1346          Trenton Brian MD  Department of Hospital Medicine   [facility]

## 2017-03-12 NOTE — PLAN OF CARE
Problem: Patient Care Overview  Goal: Plan of Care Review  Outcome: Ongoing (interventions implemented as appropriate)  Plan of care reviewed and updated. Pt AA+O. Pt's pain is managed with the medication ordered at this time. Pt's VS are as charted.  No falls this shift. Pt is oriented to room and call system. Will continue to [facility].

## 2017-03-12 NOTE — PLAN OF CARE
Problem: Occupational Therapy Goal  Goal: Occupational Therapy Goal  Goals to be met by: 3/26/17     Patient will increase functional independence with ADLs by performing:    UE Dressing with Stand-by Assistance.  LE Dressing with Modified [place].  Grooming while seated at sink with Stand-by Assistance.  Toileting from bedside commode with Modified [place] for hygiene and clothing management.   Bathing from edge of bed with Modified [place].  Toilet transfer to bedside commode with Modified [place].  Increased functional strength to 4/5 for L UE.  Upper extremity exercise program x20 reps per handout, with independence.     Goals updated 3/12/17

## 2017-03-12 NOTE — PLAN OF CARE
Problem: Patient Care Overview  Goal: Plan of Care Review  Outcome: Ongoing (interventions implemented as appropriate)  PT is AAOX3, no acute changes noted during shift  pt has on bedrest during shift and is voiding per urinal  VSS. No skin breakdown noted  No falls noted. Fall precautions remain Pain assessed. No pain noted. Pt resting comfortable in bed. Call light in reach. Will continue to monitor.

## 2017-03-12 NOTE — PROGRESS NOTES
ORTHO PROGRESS NOTE:    Rajan Cooper III is a 61 y.o. male admitted on 3/4/2017  Hospital Day: 3   Post Op Day: 2/8 Day Post-Op,     The patient was seen and examined this morning at the bedside. Pain controlled with medication, unchanged from yesterday.    PHYSICAL EXAM:  Awake/alert/oriented x 3,   AFVSS  Good inspiratory effort with unlabored breathing. Nasal cannula   Dressings c/d/i  NVI distally LUE, RLE  Able to range left wrist passively  Neuro exam difficult due to hx of MS     Vitals:    03/11/17 1651 03/11/17 2027 03/12/17 0025 03/12/17 0439   BP: 130/80 131/75 121/71 134/84   BP Location:  Right arm Right arm Right arm   Patient Position:  Lying Lying Lying   BP Method:  Automatic Automatic Automatic   Pulse: 94 85 86 80   Resp: 18 18 18 18   Temp: 97.8 °F (36.6 °C) 99.3 °F (37.4 °C) 98 °F (36.7 °C) 97.3 °F (36.3 °C)   TempSrc: Oral Oral Temporal Temporal   SpO2: (!) 94% (!) 92% (!) 92% (!) 93%   Weight:       Height:         I/O last 3 completed shifts:  In: 640 [P.O.:400; IV Piggyback:240]  Out: -     Recent Labs  Lab 03/09/17  0922 03/10/17  0400 03/11/17  0515   CALCIUM 8.3* 8.4* 8.7   PROT 7.1 7.2 7.5    136 136   K 4.0 4.6 4.5   CO2 25 23 24    104 102   BUN 14 15 16   CREATININE 0.7 0.7 0.8       Recent Labs  Lab 03/09/17  0922 03/10/17  0400 03/11/17  0515   WBC 17.43* 15.30* 13.07*   RBC 3.65* 3.64* 3.76*   HGB 10.8* 10.8* 11.1*   HCT 32.4* 32.1* 34.4*   * 366* 418*     No results for input(s): INR, APTT in the last 72 hours.    Invalid input(s): PT      A/P: 61 y.o. male 2/8 Day Post-Op s/p right ankle I&D and L wrist arthrotomy with irrigation and synovectomy, I&D abscess  -- Pain control  -- PT/OT: PACHECO HAZEL. ROM left wrist  -- DVT prophylaxis: SCDs  -- Antibiotics: Ancef. Blood cx Staph Aureus pan sensitive. Urine Cx Staph (pan sensitive) . ID on board  --WBC improvinf

## 2017-03-12 NOTE — SUBJECTIVE & OBJECTIVE
Interval History:     Review of Systems   Constitutional: Negative for chills and diaphoresis.   HENT: Negative for congestion.    Eyes: Negative for visual disturbance.   Respiratory: Negative for shortness of breath.    Cardiovascular: Positive for leg swelling. Negative for chest pain and palpitations.   Gastrointestinal: Negative for abdominal pain and diarrhea.   Genitourinary: Negative for dysuria.   Musculoskeletal: Positive for arthralgias. Negative for myalgias.   Skin: Positive for rash and wound.   Neurological: Negative for dizziness, weakness and headaches.     Objective:     Vital Signs (Most Recent):  Temp: 97.8 °F (36.6 °C) (03/12/17 1636)  Pulse: 98 (03/12/17 1636)  Resp: 18 (03/12/17 1636)  BP: 137/76 (03/12/17 1636)  SpO2: 95 % (03/12/17 1100) Vital Signs (24h Range):  Temp:  [97.3 °F (36.3 °C)-99.3 °F (37.4 °C)] 97.8 °F (36.6 °C)  Pulse:  [] 98  Resp:  [18] 18  SpO2:  [92 %-95 %] 95 %  BP: (121-137)/(71-84) 137/76     Weight: 136.1 kg (300 lb)  Body mass index is 39.58 kg/(m^2).  No intake or output data in the 24 hours ending 03/12/17 1644   Physical Exam   Constitutional: He is oriented to person, place, and time. He appears well-developed and well-nourished. No distress.   HENT:   Head: Normocephalic and atraumatic.   Eyes: EOM are normal. Pupils are equal, round, and reactive to light.   Neck: Normal range of motion. Neck supple.   Cardiovascular: Intact distal pulses.  Exam reveals no gallop and no friction rub.    No murmur heard.  Pulmonary/Chest: Effort normal and breath sounds normal. No respiratory distress.   Abdominal: Soft. Bowel sounds are normal.   Musculoskeletal: Normal range of motion. He exhibits edema and tenderness.   Neurological: He is alert and oriented to person, place, and time.   Skin: Rash noted. He is not diaphoretic. There is erythema.       Significant Labs:   CBC:   Recent Labs  Lab 03/11/17  0515 03/12/17  0622   WBC 13.07* 12.43   HGB 11.1* 10.8*   HCT  34.4* 33.3*   * 511*     CMP:   Recent Labs  Lab 03/11/17  0515 03/12/17  0622    135*   K 4.5 4.7    100   CO2 24 22*   GLU 99 88   BUN 16 17   CREATININE 0.8 0.8   CALCIUM 8.7 8.8   PROT 7.5 7.5   ALBUMIN 1.6* 1.6*   BILITOT 1.1* 1.0   ALKPHOS 385* 347*   AST 60* 45*   ALT 71* 55*   ANIONGAP 10 13   EGFRNONAA >60.0 >60.0     Coagulation: No results for input(s): INR, APTT in the last 48 hours.    Invalid input(s): PT    Significant Imaging: I have reviewed and interpreted all pertinent imaging results/findings within the past 24 hours.

## 2017-03-12 NOTE — PLAN OF CARE
Problem: Physical Therapy Goal  Goal: Physical Therapy Goal  Goals to be met by: 3/18/17     Patient will increase functional independence with mobility by performin. Supine to sit with SBA - Not met  2. Sit to supine with SBA - Not met  3. Sit to stand transfer with CGA using appropriate AD - Not met  4. Bed to chair transfer with Minimal Assistance using appropriate AD - Not met  5. Gait x 20 feet with Minimal Assistance using appropriate AD -met  5a. Gait x 50 using platform walker with min (A)-not met  6. Lower extremity exercise program x20-30 reps per handout, with independence - Not met   Outcome: Ongoing (interventions implemented as appropriate)  Pt tolerated session well today with improved activity tolerance. Pt performed ambulation x2 trials today of 20' and 35' respectively. Pt continues to demonstrate forward flexed trunk during ambulation with short steps. Pt with no LOB during ambulation and transfers using platform rolling walker. Pt will cont to benefit from skilled therapy services and remains appropriate for SNF placement upon d/c.

## 2017-03-12 NOTE — ASSESSMENT & PLAN NOTE
Blood cs 03/06/17: positive for Staph aureus  Blood cS 03/07/17: NGTD  On Ancef IVPB  Synovial fluid positive for pansensitive Staph Aureus  PICC Line placement since blood cultures negative for >48 hrs

## 2017-03-12 NOTE — PT/OT/SLP RE-EVAL
Occupational Therapy  Re-evaluation    Rajan Cooper III   MRN: [id number]   Admitting Diagnosis: Staphylococcus aureus sepsis    OT Date of Treatment: 03/12/17   OT Start Time: 1041  OT Stop Time: 1111  OT Total Time (min): 30 min    Billable Minutes:  Re-eval 15  Therapeutic Exercise 15    Diagnosis: Staphylococcus aureus sepsis       Past Medical History:   Diagnosis Date    Hypertension     MS (multiple sclerosis)       History reviewed. No pertinent surgical history.        General Precautions: Standard, fall  Orthopedic Precautions: RLE weight bearing as tolerated, LUE weight bearing as tolerated  Braces:  (Pt has a resting hand splint for night use.)    Do you have any cultural, spiritual, Restoration conflicts, given your current situation?: None     Patient History:  Living Environment  Lives With: spouse  Living Arrangements: house  Living Environment Comment: Pt lives in a one story house c one SUSAN and has a tub/shower combo.  Equipment Currently Used at Home: cane, straight, bedside commode, rollator (scooter)    Prior level of function:   Bed Mobility/Transfers:  (See initial eval.)  Grooming: independent  Bathing: independent  Upper Body Dressing: independent  Lower Body Dressing: independent  Toileting: independent  Home Management Skills: independent  Homemaking Responsibilities: No     Dominant hand: right    Subjective:  Communicated with RN prior to session.    Chief Complaint: Pt is s/p I&D and arthrotomy of L wrist and I&D of R ankle.  Pt is WBAT R LE and L UE.  Patient/Family stated goals: To get better.                        Objective:  Patient found with: peripheral IV    Cognitive Exam:  Oriented to: Person, Place, Time and Situation  Follows Commands/attention: Follows multistep  commands  Communication: clear/fluent  Memory:  No Deficits noted  Safety awareness/insight to disability: intact  Coping skills/emotional control: Appropriate to situation    Visual/perceptual:  Intact    Physical  Exam:  Postural examination/scapula alignment: No postural abnormalities identified  Skin integrity: Visible skin intact  Edema: Mild L hand    Sensation:   Impaired  light/touch L hand    Upper Extremity Range of Motion:  Right Upper Extremity: WFL except R shoulder which is approx. 100* and at baseline for pt.  Left Upper Extremity: WFL except L wrist and hand.  Pt has 45* of AROM pro/sup, 300* of IP flex and 0* of IP extension, and 30* of WE and 30* of WF.    Upper Extremity Strength:  Right Upper Extremity: WFL except R shoulder which is 3-/5   Left Upper Extremity: WFL except wrist which is 3-/5   Strength: 2+/5    Fine motor coordination:   Impaired  Left hand thumb/finger opposition skills poor    Gross motor coordination: WFL    Functional Mobility:  Bed Mobility:  Supine to Sit: Stand by Assistance    Transfers:  Sit <> Stand Assistance: Minimum Assistance  Sit <> Stand Assistive Device: Rolling Walker  Bed <> Chair Technique: Stand Pivot  Bed <> Chair Transfer Assistance: Minimum Assistance  Bed <> Chair Assistive Device: Platform, Rolling Walker        Activities of Daily Living:       LE Dressing Level of Assistance: Minimum assistance (To don/doff sock to L LE.  Pt and wife state that he requires assistance to don pants.)      Balance:   Static Sit: GOOD: Takes MODERATE challenges from all directions  Dynamic Sit: GOOD: Maintains balance through MODERATE excursions of active trunk movement  Static Stand: POOR+: Needs MINIMAL assist to maintain  Dynamic stand: POOR: N/A    Therapeutic Activities and Exercises:  Pt was able to perform L AROM exercises for pro/sup, WF/WE, and six pack hand exercises while sitting up on EOB.      AM-PAC 6 CLICK ADL  How much help from another person does this patient currently need?  1 = Unable, Total/Dependent Assistance  2 = A lot, Maximum/Moderate Assistance  3 = A little, Minimum/Contact Guard/Supervision  4 = None, Modified [place]/Independent    Putting on  "and taking off regular lower body clothing? : 2  Bathing (including washing, rinsing, drying)?: 3  Toileting, which includes using toilet, bedpan, or urinal? : 3  Putting on and taking off regular upper body clothing?: 3  Taking care of personal grooming such as brushing teeth?: 3  Eating meals?: 4  Total Score: 18    AM-PAC Raw Score CMS "G-Code Modifier Level of Impairment Assistance   6 % Total / Unable   7 - 9 CM 80 - 100% Maximal Assist   10 - 14 CL 60 - 80% Moderate Assist   15 - 19 CK 40 - 60% Moderate Assist   20 - 22 CJ 20 - 40% Minimal Assist   23 CI 1-20% SBA / CGA   24 CH 0% Independent/ Mod I       Patient left up in chair with all lines intact, call button in reach, RN notified and family present    Assessment:  Rajan Cooper III is a 61 y.o. male with a medical diagnosis of Staphylococcus aureus sepsis and presents with deficits in ADL's, T/F's, and L UE weakness.  Was able to perform supine/sit T/F c SBA and sit/stand and bed/chair T/F c min A and platform walker.  Has 3-/5 WF/WE and 2+/5 L  strength.  Able to perform LB dressing c min A and set-up and family states that pt requires a lot of assistance to don pants.  Pt is I in splint wear/care schedule.    Rehab identified problem list/impairments: Rehab identified problem list/impairments: impaired self care skills, impaired endurance, impaired functional mobilty, weakness, decreased upper extremity function, orthopedic precautions, decreased ROM, impaired fine motor, edema    Rehab potential is good.    Activity tolerance: Good    Discharge recommendations: Discharge Facility/Level Of Care Needs: nursing facility, skilled (Family wishes to D/C home c HH.)     Barriers to discharge: Barriers to Discharge: None    Equipment recommendations: bedside commode, walker, rolling, bath bench (Wife and pt requesting hospital bed for home.)     GOALS:   Occupational Therapy Goals        Problem: Occupational Therapy Goal    Goal Priority " Disciplines Outcome Interventions   Occupational Therapy Goal     OT, PT/OT     Description:  Goals to be met by: 3/26/17     Patient will increase functional independence with ADLs by performing:    UE Dressing with Stand-by Assistance.  LE Dressing with Modified [place].  Grooming while seated at sink with Stand-by Assistance.  Toileting from bedside commode with Modified [place] for hygiene and clothing management.   Bathing from  edge of bed with Modified [place].  Toilet transfer to bedside commode with Modified [place].  Increased functional strength to 4/5 for L UE.  Upper extremity exercise program x20 reps per handout, with independence.                    PLAN:  Patient to be seen 6 x/week to address the above listed problems via self-care/home management, therapeutic activities, therapeutic exercises, splinting  Plan of Care expires: 03/26/17  Plan of Care reviewed with: patient, spouse         ANGELINA Murray  03/12/2017

## 2017-03-12 NOTE — PT/OT/SLP PROGRESS
Physical Therapy  Treatment    Rajan Cooper III   MRN: [id number]   Admitting Diagnosis: Staphylococcus aureus sepsis    PT Received On: 17  PT Start Time: 1508     PT Stop Time: 1546    PT Total Time (min): 38 min       Billable Minutes:  Gait [id number], Therapeutic Activity 11 and Therapeutic Exercise 12    Treatment Type: Treatment  PT/PTA: PT     PTA Visit Number: 3       General Precautions: Standard, fall  Orthopedic Precautions: RLE weight bearing as tolerated, LUE weight bearing as tolerated   Braces: N/A    Do you have any cultural, spiritual, Yarsani conflicts, given your current situation?: none    Subjective:  Communicated with nsg prior to session.  -Pt agreeable to PT session    Pain Ratin/10  Location - Side: Right  Location - Orientation: generalized  Location: ankle  Pain Addressed: Reposition, Distraction  Pain Rating Post-Intervention: 2/10    Objective:   Patient found with: peripheral IV    Functional Mobility:  Bed Mobility:        Transfers:  Sit <> Stand Assistance: Moderate Assistance x2 trials from chair  Sit <> Stand Assistive Device: Platform, Rolling Walker    -Pt given lifting assistance and steadying assistance to place hand on walker .    Gait:   Gait Distance: 35' x1 trial, 20' x1 trial  Assistance 1: Minimum assistance  Gait Assistive Device: Platform walker left  Gait Pattern: 3-point gait  Gait Deviation(s): decreased zahra, increased time in double stance, decreased velocity of limb motion, decreased step length, decreased stride length, decreased toe-to-floor clearance, decreased weight-shifting ability, foot flat, forward lean    Stairs: not appropriate at this time    Balance:   Static Sit: FAIR+: Able to take MINIMAL challenges from all directions  Dynamic Sit: FAIR+: Maintains balance through MINIMAL excursions of active trunk motion  Static Stand: FAIR+: Takes MINIMAL challenges from all directions  Dynamic stand: FAIR: Needs CONTACT GUARD during gait      Therapeutic Activities and Exercises:  -Pt performed sitting exercises x30 reps of:  A. GS  B. QS  C. SAQ  D. Heel slides with towel for resistance  E. SLR- 15 reps with AAROM    -Pt educated on:  A. DME mgmt  B. Stretches/exercises for anterior/posterior neck musculature   C. Importance of OOB activity to improve functional outcomes  D. WBing status of (R) LE and (L) UE  E. Performing HEP to reduce risk of blood clots       AM-PAC 6 CLICK MOBILITY  How much help from another person does this patient currently need?   1 = Unable, Total/Dependent Assistance  2 = A lot, Maximum/Moderate Assistance  3 = A little, Minimum/Contact Guard/Supervision  4 = None, Modified [place]/Independent    Turning over in bed (including adjusting bedclothes, sheets and blankets)?: 3  Sitting down on and standing up from a chair with arms (e.g., wheelchair, bedside commode, etc.): 2  Moving from lying on back to sitting on the side of the bed?: 2  Moving to and from a bed to a chair (including a wheelchair)?: 2  Need to walk in hospital room?: 2  Climbing 3-5 steps with a railing?: 1  Total Score: 12    AM-PAC Raw Score CMS G-Code Modifier Level of Impairment Assistance   6 % Total / Unable   7 - 9 CM 80 - 100% Maximal Assist   10 - 14 CL 60 - 80% Moderate Assist   15 - 19 CK 40 - 60% Moderate Assist   20 - 22 CJ 20 - 40% Minimal Assist   23 CI 1-20% SBA / CGA   24 CH 0% Independent/ Mod I     Patient left up in chair with all lines intact, call button in reach and nsg notified.    Assessment:  Rajan Cooper III is a 61 y.o. male with a medical diagnosis of Staphylococcus aureus sepsis and presents with improved activity tolerance. Pt tolerated session well today with improved activity tolerance. Pt performed ambulation x2 trials today of 20' and 35' respectively. Pt continues to demonstrate forward flexed trunk during ambulation with short steps. Pt with no LOB during ambulation and transfers using platform rolling  walker. Pt will cont to benefit from skilled therapy services and remains appropriate for SNF placement upon d/c.    Rehab identified problem list/impairments: Rehab identified problem list/impairments: weakness, impaired endurance, impaired self care skills, impaired functional mobilty, gait instability, impaired balance, decreased lower extremity function, decreased upper extremity function, decreased ROM, impaired joint extensibility, impaired muscle length, edema, pain    Rehab potential is good.    Activity tolerance: Good    Discharge recommendations: Discharge Facility/Level Of Care Needs: nursing facility, skilled     Barriers to discharge:  increased assistance required for mobility    Equipment recommendations: Equipment Needed After Discharge: bedside commode, walker, rolling, bath bench (L platform)     GOALS:   Physical Therapy Goals        Problem: Physical Therapy Goal    Goal Priority Disciplines Outcome Goal Variances Interventions   Physical Therapy Goal     PT/OT, PT Ongoing (interventions implemented as appropriate)     Description:  Goals to be met by: 3/18/17     Patient will increase functional independence with mobility by performin. Supine to sit with SBA - Not met  2. Sit to supine with SBA - Not met  3. Sit to stand transfer with CGA using appropriate AD - Not met  4. Bed to chair transfer with Minimal Assistance using appropriate AD - Not met  5. Gait  x 20 feet with Minimal Assistance using appropriate AD -met   5a. Gait x 50' using platform walker with min (A)-not met  6. Lower extremity exercise program x20-30 reps per handout, with independence - Not met                  PLAN:    Patient to be seen daily  to address the above listed problems via gait training, therapeutic activities, therapeutic exercises, neuromuscular re-education  Plan of Care expires: 04/10/17  Plan of Care reviewed with: patient, spouse         Sagar Mcleod, PT  2017

## 2017-03-13 LAB
ALBUMIN SERPL BCP-MCNC: 1.7 G/DL
ALP SERPL-CCNC: 328 U/L
ALT SERPL W/O P-5'-P-CCNC: 45 U/L
ANION GAP SERPL CALC-SCNC: 10 MMOL/L
ANISOCYTOSIS BLD QL SMEAR: SLIGHT
AST SERPL-CCNC: 46 U/L
BASOPHILS NFR BLD: 1 %
BILIRUB SERPL-MCNC: 0.9 MG/DL
BUN SERPL-MCNC: 22 MG/DL
CALCIUM SERPL-MCNC: 9 MG/DL
CHLORIDE SERPL-SCNC: 98 MMOL/L
CO2 SERPL-SCNC: 25 MMOL/L
CREAT SERPL-MCNC: 0.8 MG/DL
DIFFERENTIAL METHOD: ABNORMAL
EOSINOPHIL NFR BLD: 0 %
ERYTHROCYTE [DISTWIDTH] IN BLOOD BY AUTOMATED COUNT: 15 %
EST. GFR  (AFRICAN AMERICAN): >60 ML/MIN/1.73 M^2
EST. GFR  (NON AFRICAN AMERICAN): >60 ML/MIN/1.73 M^2
GLUCOSE SERPL-MCNC: 95 MG/DL
HCT VFR BLD AUTO: 31.5 %
HGB BLD-MCNC: 10.5 G/DL
HYPOCHROMIA BLD QL SMEAR: ABNORMAL
LYMPHOCYTES NFR BLD: 7 %
MAGNESIUM SERPL-MCNC: 1.8 MG/DL
MCH RBC QN AUTO: 29.7 PG
MCHC RBC AUTO-ENTMCNC: 33.3 %
MCV RBC AUTO: 89 FL
MONOCYTES NFR BLD: 5 %
NEUTROPHILS NFR BLD: 87 %
OVALOCYTES BLD QL SMEAR: ABNORMAL
PHOSPHATE SERPL-MCNC: 4.7 MG/DL
PLATELET # BLD AUTO: 560 K/UL
PMV BLD AUTO: 10.5 FL
POIKILOCYTOSIS BLD QL SMEAR: SLIGHT
POLYCHROMASIA BLD QL SMEAR: ABNORMAL
POTASSIUM SERPL-SCNC: 4.3 MMOL/L
PROT SERPL-MCNC: 7.8 G/DL
RBC # BLD AUTO: 3.54 M/UL
SODIUM SERPL-SCNC: 133 MMOL/L
WBC # BLD AUTO: 11.88 K/UL

## 2017-03-13 PROCEDURE — 63600175 PHARM REV CODE 636 W HCPCS: Performed by: HOSPITALIST

## 2017-03-13 PROCEDURE — 80053 COMPREHEN METABOLIC PANEL: CPT

## 2017-03-13 PROCEDURE — 25000003 PHARM REV CODE 250: Performed by: HOSPITALIST

## 2017-03-13 PROCEDURE — 97530 THERAPEUTIC ACTIVITIES: CPT

## 2017-03-13 PROCEDURE — 02HV33Z INSERTION OF INFUSION DEVICE INTO SUPERIOR VENA CAVA, PERCUTANEOUS APPROACH: ICD-10-PCS | Performed by: ORTHOPAEDIC SURGERY

## 2017-03-13 PROCEDURE — 99233 SBSQ HOSP IP/OBS HIGH 50: CPT | Mod: ,,, | Performed by: HOSPITALIST

## 2017-03-13 PROCEDURE — 85007 BL SMEAR W/DIFF WBC COUNT: CPT

## 2017-03-13 PROCEDURE — 97116 GAIT TRAINING THERAPY: CPT

## 2017-03-13 PROCEDURE — 25000003 PHARM REV CODE 250: Performed by: STUDENT IN AN ORGANIZED HEALTH CARE EDUCATION/TRAINING PROGRAM

## 2017-03-13 PROCEDURE — 85027 COMPLETE CBC AUTOMATED: CPT

## 2017-03-13 PROCEDURE — 84100 ASSAY OF PHOSPHORUS: CPT

## 2017-03-13 PROCEDURE — 25000003 PHARM REV CODE 250: Performed by: PHYSICIAN ASSISTANT

## 2017-03-13 PROCEDURE — 36569 INSJ PICC 5 YR+ W/O IMAGING: CPT

## 2017-03-13 PROCEDURE — 36415 COLL VENOUS BLD VENIPUNCTURE: CPT

## 2017-03-13 PROCEDURE — 76937 US GUIDE VASCULAR ACCESS: CPT

## 2017-03-13 PROCEDURE — 97110 THERAPEUTIC EXERCISES: CPT

## 2017-03-13 PROCEDURE — 83735 ASSAY OF MAGNESIUM: CPT

## 2017-03-13 PROCEDURE — C1751 CATH, INF, PER/CENT/MIDLINE: HCPCS

## 2017-03-13 PROCEDURE — 11000001 HC ACUTE MED/SURG PRIVATE ROOM

## 2017-03-13 PROCEDURE — 25000003 PHARM REV CODE 250: Performed by: NURSE PRACTITIONER

## 2017-03-13 PROCEDURE — 63600175 PHARM REV CODE 636 W HCPCS: Performed by: NURSE PRACTITIONER

## 2017-03-13 RX ORDER — SENNOSIDES 8.6 MG/1
1 TABLET ORAL DAILY PRN
COMMUNITY
Start: 2017-03-13 | End: 2017-04-03

## 2017-03-13 RX ORDER — SODIUM CHLORIDE 0.9 % (FLUSH) 0.9 %
10 SYRINGE (ML) INJECTION
Status: DISCONTINUED | OUTPATIENT
Start: 2017-03-13 | End: 2017-03-14 | Stop reason: HOSPADM

## 2017-03-13 RX ORDER — ALBUTEROL SULFATE 90 UG/1
2 AEROSOL, METERED RESPIRATORY (INHALATION) EVERY 8 HOURS
Qty: 1 INHALER | Refills: 12 | Status: SHIPPED | OUTPATIENT
Start: 2017-03-13 | End: 2017-07-12

## 2017-03-13 RX ORDER — SODIUM CHLORIDE 0.9 % (FLUSH) 0.9 %
10 SYRINGE (ML) INJECTION EVERY 6 HOURS
Status: DISCONTINUED | OUTPATIENT
Start: 2017-03-13 | End: 2017-03-14 | Stop reason: HOSPADM

## 2017-03-13 RX ADMIN — BACLOFEN 10 MG: 10 TABLET ORAL at 04:03

## 2017-03-13 RX ADMIN — Medication 3 ML: at 02:03

## 2017-03-13 RX ADMIN — ENOXAPARIN SODIUM 40 MG: 100 INJECTION SUBCUTANEOUS at 12:03

## 2017-03-13 RX ADMIN — BACLOFEN 10 MG: 10 TABLET ORAL at 10:03

## 2017-03-13 RX ADMIN — LOSARTAN POTASSIUM 25 MG: 25 TABLET, FILM COATED ORAL at 08:03

## 2017-03-13 RX ADMIN — SODIUM CHLORIDE: 0.9 INJECTION, SOLUTION INTRAVENOUS at 07:03

## 2017-03-13 RX ADMIN — MODAFINIL 200 MG: 100 TABLET ORAL at 08:03

## 2017-03-13 RX ADMIN — AMLODIPINE BESYLATE 10 MG: 10 TABLET ORAL at 08:03

## 2017-03-13 RX ADMIN — BACLOFEN 10 MG: 10 TABLET ORAL at 05:03

## 2017-03-13 RX ADMIN — Medication 10 ML: at 04:03

## 2017-03-13 RX ADMIN — Medication 3 ML: at 10:03

## 2017-03-13 RX ADMIN — DEXTROSE 6 G: 5 SOLUTION INTRAVENOUS at 04:03

## 2017-03-13 NOTE — PROGRESS NOTES
ORTHO PROGRESS NOTE:    Rajan Cooper III is a 61 y.o. male admitted on 3/4/2017  Hospital Day: 3   Post Op Day: 3/9 Day Post-Op,     The patient was seen and examined this morning at the bedside. Pain controlled. Wrist feels better. R ankle feels better, able to get around better. Afebrile.     PHYSICAL EXAM:  Awake/alert/oriented x 3,   AFVSS  Good inspiratory effort with unlabored breathing. Nasal cannula   Dressings c/d/i  NVI distally LUE, RLE  Able to range left wrist passively  Neuro exam difficult due to hx of MS     Vitals:    03/12/17 1636 03/12/17 2000 03/13/17 0020 03/13/17 0408   BP: 137/76 118/74 127/80 (!) 140/78   BP Location: Right arm Right arm Right arm Right arm   Patient Position: Sitting Sitting Lying Lying   BP Method: Automatic Automatic Automatic Automatic   Pulse: 98 95 86 79   Resp: 18 18 18 18   Temp: 97.8 °F (36.6 °C) 99.5 °F (37.5 °C) 97.5 °F (36.4 °C) 97.5 °F (36.4 °C)   TempSrc: Oral Oral Temporal Temporal   SpO2:  (!) 93% 96% (!) 93%   Weight:       Height:         I/O last 3 completed shifts:  In: 1500 [P.O.:1200; I.V.:300]  Out: -     Recent Labs  Lab 03/11/17 0515 03/12/17 0622 03/13/17  0543   CALCIUM 8.7 8.8 9.0   PROT 7.5 7.5 7.8    135* 133*   K 4.5 4.7 4.3   CO2 24 22* 25    100 98   BUN 16 17 22*   CREATININE 0.8 0.8 0.8       Recent Labs  Lab 03/11/17  0515 03/12/17 0622 03/13/17  0543   WBC 13.07* 12.43  --    RBC 3.76* 3.68* 3.54*   HGB 11.1* 10.8* 10.5*   HCT 34.4* 33.3* 31.5*   * 511* 560*     No results for input(s): INR, APTT in the last 72 hours.    Invalid input(s): PT      A/P: 61 y.o. male POD 3 right ankle I&D, POD 9 left wrist I&D for septic arthritis   -- Pain control  -- PT/OT: PACHECO HAZEL. ROM left wrist  -- DVT prophylaxis: SCDs  -- Antibiotics: Ancef. Blood cx Staph Aureus pan sensitive. Urine Cx Staph (pan sensitive) . ID on board  --WBC improving --> 12     Dispo: needs PICC line and IV Abx     Franklin Reynoso MD  Orthopedic Surgery,  PGY3  [phone] (p)           Attg Note:  I agree with the above assessment and plan.    Kunal Shipley MD

## 2017-03-13 NOTE — PLAN OF CARE
MD to write for hospital bed-[facility]ielectric  [facility] faxed hh orders to [facility] and [facility] and called [facility] to review in anticipation of dc tomorrow, 3/14    Future Appointments  Date Time Provider Department Center   3/20/2017 10:30 AM Estee Kuo PA-C [facility] ORTHO [address]   3/22/2017 1:00 PM TRUONG Stock, ANP [facility] ID [address]          03/13/17 2568   Right Care Assessment   Can the patient answer the patient profile reliably? Yes, cognitively intact   How often would a person be available to care for the patient? Whenever needed   Describe the patient's ability to walk at the present time. Walks with the help of equipment   How does the patient rate their overall health at the present time? Good   Number of comorbid conditions (as recorded on the chart) One   During the past month, has the patient often been bothered by feeling down, depressed or hopeless? No   During the past month, has the patient often been bothered by little interest or pleasure in doing things? No

## 2017-03-13 NOTE — PT/OT/SLP PROGRESS
Occupational Therapy  Treatment    Rajan Cooper III   MRN: [id number]   Admitting Diagnosis: Staphylococcus aureus sepsis    OT Date of Treatment: 03/13/17   OT Start Time: 1318  OT Stop Time: 1356  OT Total Time (min): 38 min    Billable Minutes:  Therapeutic Activity 10 and Therapeutic Exercise 28    General Precautions: Standard, fall  Orthopedic Precautions: RLE weight bearing as tolerated, LUE weight bearing as tolerated  Braces: N/A    Do you have any cultural, spiritual, Orthodoxy conflicts, given your current situation?: None    Subjective:  Communicated with RN prior to session.  Pt agreeable to participate in today's treatment session.    Pain Rating: 3/10  Location - Side: Right  Location - Orientation: generalized  Location: hand  Pain Addressed: Pre-medicate for activity, Cessation of Activity  Pain Rating Post-Intervention: 3/10    Objective:  Patient found with: peripheral IV     Functional Mobility:  Bed Mobility:  Supine to Sit: Stand by Assist  Sit to Supine:  (Did not perform; pt left seated in bedside chair)    Transfers:   Sit <> Stand Assistance: Minimum Assistance (from EOB)  Sit <> Stand Assistive Device: Platform  Bed <> Chair Technique: Stand Pivot  Bed <> Chair Transfer Assistance: Contact Guard Assistance  Bed <> Chair Assistive Device: Platform      Activities of Daily Living:       LE Dressing Level of Assistance: Minimum assistance (to brad shorts alternating from sitting to standing from EOB)    Balance:   Static Sit: NORMAL: No deviations seen in posture held statically  Dynamic Sit: GOOD+: Maintains balance through MAXIMAL excursions of active trunk motion  Static Stand: FAIR: Maintains without assist but unable to take challenges  Dynamic stand: FAIR: Needs CONTACT GUARD during gait    Therapeutic Activities and Exercises:  Pt performed 6 pack hand exercises;wrist flexion/extension, radial/ulnar deviation 1x15 reps while seated on EOB.   Pt educated and performed neck stretches  including: 1x10 reps (5 sec hold) neck forward and side flexion/extension and rotation clockwise/counterclockwise.  Pt educated on importance of performing ADL task independently as possible.     AM-PAC 6 CLICK ADL   How much help from another person does this patient currently need?   1 = Unable, Total/Dependent Assistance  2 = A lot, Maximum/Moderate Assistance  3 = A little, Minimum/Contact Guard/Supervision  4 = None, Modified [place]/Independent    Putting on and taking off regular lower body clothing? : 3  Bathing (including washing, rinsing, drying)?: 3  Toileting, which includes using toilet, bedpan, or urinal? : 3  Putting on and taking off regular upper body clothing?: 3  Taking care of personal grooming such as brushing teeth?: 3  Eating meals?: 4  Total Score: 19     AM-PAC Raw Score CMS G-Code Modifier Level of Impairment Assistance   6 % Total / Unable   7 - 9 CM 80 - 100% Maximal Assist   10 - 14 CL 60 - 80% Moderate Assist   15 - 19 CK 40 - 60% Moderate Assist   20 - 22 CJ 20 - 40% Minimal Assist   23 CI 1-20% SBA / CGA   24 CH 0% Independent/ Mod I     Patient left up in chair with all lines intact, call button in reach and Rn notified    ASSESSMENT:  Rajan Cooper III is a 61 y.o. male with a medical diagnosis of Staphylococcus aureus sepsis and presents with decreased LUE function, self care skills, and functional mobility. He completed bed mobility with SBA and sit <> stand with min A and platform walker for support. Pt completed multiple LUE AROM hand/wrist exercises and neck stretches while seated on EOB. Pt educated on HEP and demonstrated/verbalized understanding. Pt performed t/f to bedside chair with with CGA and no LOB. Pt would benefit from continued skilled OT to address deficits and maximize return to PLOF.     Rehab identified problem list/impairments: Rehab identified problem list/impairments: weakness, impaired endurance, impaired self care skills, impaired functional  mobilty, pain, impaired fine motor, decreased ROM, gait instability, decreased upper extremity function    Rehab potential is good.    Activity tolerance: Good    Discharge recommendations: Discharge Facility/Level Of Care Needs: nursing facility, skilled     Barriers to discharge: Barriers to Discharge: None    Equipment recommendations: bedside commode, walker, rolling, bath bench     GOALS:   Occupational Therapy Goals        Problem: Occupational Therapy Goal    Goal Priority Disciplines Outcome Interventions   Occupational Therapy Goal     OT, PT/OT     Description:  Goals to be met by: 3/26/17     Patient will increase functional independence with ADLs by performing:    UE Dressing with Stand-by Assistance.  LE Dressing with Modified [place].  Grooming while seated at sink with Stand-by Assistance.  Toileting from bedside commode with Modified [place] for hygiene and clothing management.   Bathing from  edge of bed with Modified [place].  Toilet transfer to bedside commode with Modified [place].  Increased functional strength to 4/5 for L UE.  Upper extremity exercise program x20 reps per handout, with independence.                    Plan:  Patient to be seen 6 x/week to address the above listed problems via self-care/home management, therapeutic activities, therapeutic exercises  Plan of Care expires: 03/26/17  Plan of Care reviewed with: patient, spouse, family         ESTEFANI Slater  03/13/2017

## 2017-03-13 NOTE — PROGRESS NOTES
This morning I had a discussion with Mr. Cooper and his wife regarding a medication error.  Yesterday pt was complaining of burning with urination.  I intended to order pyridium for the patient's symptoms, but instead pyrazinamide was ordered and at 0957 pt received 1500mg of this medication.  This morning the error was noticed by myself and the drug was discontinued without any further doses being administered.  I had a discussion with the pt regarding the error and he understood what had occurred.  Pt asked if he could continue the medication as he felt it had made his symptoms better.  I discussed with the pt that the relief was likely coincidental and that the medication is not indicated for his current symptoms and diagnoses.

## 2017-03-13 NOTE — PROCEDURES
"Rajan Cooper III is a 61 y.o. male patient.    Temp: 98.4 °F (36.9 °C) (03/13/17 0848)  Pulse: 86 (03/13/17 0848)  Resp: 18 (03/13/17 0848)  BP: 136/80 (03/13/17 0848)  SpO2: (!) 86 % (03/13/17 0848)  Weight: 136.1 kg (300 lb) (03/04/17 0730)  Height: 6' 1" (185.4 cm) (03/04/17 0730)    PICC  Date/Time: 3/13/2017 11:05 AM  Performed by: SRI NIXON  Consent Done: Yes  Time out: Immediately prior to procedure a time out was called to verify the correct patient, procedure, equipment, support staff and site/side marked as required  Indications: med administration and vascular access  Anesthesia: local infiltration  Local anesthetic: lidocaine 1% without epinephrine  Anesthetic Total (mL): 3  Preparation: skin prepped with ChloraPrep  Skin prep agent dried: skin prep agent completely dried prior to procedure  Sterile barriers: all five maximum sterile barriers used - cap, mask, sterile gown, sterile gloves, and large sterile sheet  Hand hygiene: hand hygiene performed prior to central venous catheter insertion  Location details: right basilic  Catheter type: double lumen  Catheter size: 5 Fr  Catheter Length: 43cm    Ultrasound guidance: yes  Vessel Caliber: medium and patent, compressibility normal  Needle advanced into vessel with real time Ultrasound guidance.  Guidewire confirmed in vessel.  Image recorded and saved.  Sterile sheath used.  Number of attempts: 1  Post-procedure: blood return through all ports, chlorhexidine patch and sterile dressing applied  Technical procedures used: 3CG  Specimens: No  Implants: No  Assessment: placement verified by x-ray        Carol Casarez  3/13/2017  "

## 2017-03-13 NOTE — CONSULTS
Double lumen PICC placed to RIGHT BASILIC vein.  43cm in length, 34.5cm arm circumference, 1cm exposed.  Lot#[id number].

## 2017-03-13 NOTE — PLAN OF CARE
Problem: Physical Therapy Goal  Goal: Physical Therapy Goal  Goals to be met by: 3/18/17     Patient will increase functional independence with mobility by performin. Supine to sit with SBA - Not met  2. Sit to supine with SBA - Not met  3. Sit to stand transfer with CGA using appropriate AD - Not met  4. Bed to chair transfer with Minimal Assistance using appropriate AD - Not met  5. Gait x 20 feet with Minimal Assistance using appropriate AD -met  5a. Gait x 50 using platform walker with min (A)-not met  6. Lower extremity exercise program x20-30 reps per handout, with independence - Not met   Outcome: Ongoing (interventions implemented as appropriate)  Goals in progress

## 2017-03-13 NOTE — PROGRESS NOTES
[facility]  Infectious Disease  Progress Note    Patient Name: Rajan Cooper III  MRN: [id number]  Admission Date: 3/4/2017  Length of Stay: 7 days  Attending Physician: Trenton Brian MD  Primary Care Provider: Tom Garcia MD    Isolation Status: No active isolations  Assessment/Plan:      Pyogenic arthritis of left wrist   61 year old male with history of multiple sclerosis who was admitted with left septic wrist and right septic ankle with MSSA, blood and urine cultures also + for MSSA.  Now POD # 9  left wrist washout and debridement and ankle arthrocentesis, and POD #3 repeat I&D and exploration of ankle and wrist.    Last positive blood culture 3/6. Two sets blood cultures 3/7 NGTD.    2D echo negative for endocarditis.  Repeat operative cultures showing rare S. Aureus from ankle.    Currently afebrile.   Leukocytosis continues to trend down and is now WNL.  Still having some burning with urination.  On pyridium.       Plan  - Continue cefazolin 6 grams IV q 24 hours by continuous infusion for MSSA  - Recommend minimum of 6 weeks of IV antibiotics for complicated bacteremia with septic arthritis of wrist and ankle from date of  date of last I&D -  Estimated end date 4/15/17.   - Discussed with staff.   - Will sign off.  Please re-consult as needed.   - Discharge recommendations below.           * Staphylococcus aureus sepsis  See assessment and plan noted above.   Continue IV cefazolin continuous infusion.       Discharge recommendations:     1.  Cefazolin 6 grams IV q 24 hours by continuous infusion.   2.  Estimated duration 6 weeks - estimated end date 4/15/17  3.  Weekly cbc, cmp, crp, and esr and fax results to ID at [phone]  4.  ID follow up in 10-14 days.       Thank you.   Please call for any questions or concerns.  Xi Jameson, TRUONG, ANP-C  [phone]  Subjective:     Principal Problem:Staphylococcus aureus sepsis    HPI: Mr. Cooper is a pleasant 61 y.o. male with a PMHx of MS  on avonex presenting with worsening left wrist and right ankle pain and swelling of 5 days duration. Patient reports he was moving a heavy gate a work late February and twisted his ankle. He was evaluated by his PCP and left wrist xray revealed  evidence of AVN, DJD, and soft tissue swelling and right ankle xray showed evidence of soft tissue swelling but no fracture. He was suppose to follow up in orthopedic clinic but came to the ED as his pain acutely worsened. Patient denies break in his skin. Denies fevers, chills, sweats, recent infection, CP, SOB, N/V, diarrhea. Labs upon admit were significant for ESR elevated at 118, CRP elevated at 451.1 and a leukocytosis of 22.5k. Orthopedic surgery performed an aspiration of his left wrist  Revealing 20K WBCs and 79% segs. He is now s/p left wrist washout and debridement on 3/4 along with ankle arthrocentesis. Cultures are pending. GS + GPCs. He was started on empiric Vanc/Ceftriaxone post operatively.     Left wrist x-ray: There is lunate sclerosis suggesting AVN with areas of DJD most severe at the radiocarpal joint. There is soft tissue swelling.     Right ankle x-ray: Soft tissue swelling is identified about the ankle, particularly about the lateral malleolus.  The visualized osseous structures, however, appear intact, with no definite evidence of recent fracture or other significant abnormality identified.       Interval History:  No acute events overnight. Afebrile.  Leukocytosis resolved.   Blood cultures cleared.  PICC placed.  For possible discharge today.   No complaints.     Review of Systems   Constitutional: Negative for chills, diaphoresis, fatigue and fever.   HENT: Negative for congestion, mouth sores, rhinorrhea and sore throat.    Eyes: Negative for pain, discharge and visual disturbance.   Respiratory: Negative for cough, chest tightness, shortness of breath and wheezing.    Cardiovascular: Negative for chest pain, palpitations and leg swelling.    Gastrointestinal: Negative for abdominal distention, abdominal pain, constipation, diarrhea, nausea and vomiting.   Genitourinary: Negative for difficulty urinating, dysuria, flank pain, frequency and hematuria.   Musculoskeletal: Positive for back pain (reports chronic back pain ). Negative for arthralgias, joint swelling, myalgias and neck pain.        Left wrist pain, swelling, redness - improving   Right ankle pain, redness, erythema (improved), difficulty ambulating - overall improved.  Still mild warmth   Skin: Negative for color change, pallor, rash and wound.   Neurological: Negative for dizziness, seizures, weakness, numbness and headaches.   Hematological: Negative for adenopathy. Does not bruise/bleed easily.   Psychiatric/Behavioral: Negative for confusion and sleep disturbance. The patient is not nervous/anxious.      Objective:     Vital Signs (Most Recent):  Temp: 98.4 °F (36.9 °C) (03/13/17 0848)  Pulse: 86 (03/13/17 0848)  Resp: 18 (03/13/17 0848)  BP: 136/80 (03/13/17 0848)  SpO2: (!) 86 % (03/13/17 0848) Vital Signs (24h Range):  Temp:  [97.5 °F (36.4 °C)-99.5 °F (37.5 °C)] 98.4 °F (36.9 °C)  Pulse:  [] 86  Resp:  [18] 18  SpO2:  [86 %-96 %] 86 %  BP: (118-140)/(74-84) 136/80     Weight: 136.1 kg (300 lb)  Body mass index is 39.58 kg/(m^2).    Estimated Creatinine Clearance: 142.2 mL/min (based on Cr of 0.8).    Physical Exam   Constitutional: He is oriented to person, place, and time. He appears well-developed and well-nourished. No distress.   HENT:   Head: Normocephalic and atraumatic.   Mouth/Throat: No oropharyngeal exudate.   Eyes: Conjunctivae and EOM are normal. No scleral icterus.   Cardiovascular: Normal rate, regular rhythm and intact distal pulses.    Murmur (physiologic murmur) heard.  Pulmonary/Chest: Effort normal and breath sounds normal. No respiratory distress. He has no wheezes.   Abdominal: Soft. He exhibits no distension. There is no tenderness. There is no guarding.    Musculoskeletal: Normal range of motion. He exhibits no deformity.   Left wrist dressing c/d/i.  No ascending erythema.  Thumb edema appears improved    Right ankle/LE warmth, edema and erythema.  Dressing in place, c/d/i.  Edema improving.    Neurological: He is alert and oriented to person, place, and time. No cranial nerve deficit.   Skin: Skin is warm and dry. No rash noted. He is not diaphoretic.   Chronic skin changes to bilateral lower extremities.    Psychiatric: He has a normal mood and affect. His behavior is normal.   Vitals reviewed.      Significant Labs:   Blood Culture:   Recent Labs  Lab 03/05/17  0107 03/06/17  1202 03/06/17  1203 03/06/17  1403 03/07/17  1251   LABBLOO Gram stain aer bottle: Gram positive cocci in clusters resembling Staph   Results called to and read back by: Serena Mederos RN 03/09/2017    08:24  STAPHYLOCOCCUS AUREUSFor susceptibility see order #[id number]  Gram stain aer bottle: Gram positive cocci in clusters resembling Staph  Results called to and read back by:Angelica Lima RN 03/08/2017  04:57  STAPHYLOCOCCUS AUREUSFor susceptibility see order #[id number] Gram stain cristiano bottle: Gram positive cocci in clusters resembling Staph   Results called to and read back by: Angelica Hurtado RN  03/07/2017    09:17  STAPHYLOCOCCUS AUREUS No growth after 5 days. No growth after 5 days.  No growth after 5 days. No growth after 5 days.  No growth after 5 days.     CBC:   Recent Labs  Lab 03/12/17  0622 03/13/17  0543   WBC 12.43 11.88   HGB 10.8* 10.5*   HCT 33.3* 31.5*   * 560*     CMP:   Recent Labs  Lab 03/12/17  0622 03/13/17  0543   * 133*   K 4.7 4.3    98   CO2 22* 25   GLU 88 95   BUN 17 22*   CREATININE 0.8 0.8   CALCIUM 8.8 9.0   PROT 7.5 7.8   ALBUMIN 1.6* 1.7*   BILITOT 1.0 0.9   ALKPHOS 347* 328*   AST 45* 46*   ALT 55* 45*   ANIONGAP 13 10   EGFRNONAA >60.0 >60.0       Significant Imaging: I have reviewed all pertinent imaging  results/findings within the past 24 hours.

## 2017-03-13 NOTE — PLAN OF CARE
[facility]    HOME HEALTH ORDERS  FACE TO FACE ENCOUNTER    Patient Name: Rajan Cooper III  YOB: 1955    PCP: Tom Garcia MD   PCP Address: [address] / [place] [address]  PCP Phone Number: [phone]  PCP Fax: [phone]    Encounter Date: 03/13/2017    Admit to Home Health    Diagnoses:  Active Hospital Problems    Diagnosis  POA    *Staphylococcus aureus sepsis [A41.01]  Yes    Staphylococcal arthritis of left wrist [M00.032]  Yes    Abscess of left hand [L02.512]  Yes    HTN (hypertension) [I10]  Yes    MS (multiple sclerosis) [G35]  Yes      Resolved Hospital Problems    Diagnosis Date Resolved POA    Acute respiratory failure with hypoxemia [J96.01] 03/10/2017 No       Future Appointments  Date Time Provider Department Center   3/20/2017 10:30 AM Estee Kuo PA-C [facility]     Follow-up Information     Follow up with Tom Garcia MD.    Specialty:  Internal Medicine    Contact information:    [address]  [facility] [id number]  [phone]              I have seen and examined this patient face to face today. My clinical findings that support the need for the home health skilled services and home bound status are the following:  Weakness/numbness causing balance and gait disturbance due to Infection making it taxing to leave home.    Allergies:  Review of patient's allergies indicates:   Allergen Reactions    Norco [hydrocodone-acetaminophen] Anaphylaxis       Diet: regular diet    Activities: activity as tolerated    Nursing:   SN to complete comprehensive assessment including routine vital signs. Instruct on disease process and s/s of complications to report to MD. Review/verify medication list sent home with the patient at time of discharge  and instruct patient/caregiver as needed. Frequency may be adjusted depending on start of care date.    Notify MD if SBP > 160 or < 90; DBP > 90 or < 50; HR > 120 or < 50; Temp >  101;      CONSULTS:    Physical Therapy to evaluate and treat. Evaluate for home safety and equipment needs; Establish/upgrade home exercise program. Perform / instruct on therapeutic exercises, gait training, transfer training, and Range of Motion.    MISCELLANEOUS CARE:  Home Infusion Therapy:   SN to perform Infusion Therapy/Central Line Care.  Review Central Line Care & Central Line Flush with patient.    Administer (drug and dose): Ancef 6g continuous infusion       Last dose given: 03/13/17                       Home dose due: 03/14/17    Scrub the Hub: Prior to accessing the line, always perform a 30 second alcohol scrub  Each lumen of the central line is to be flushed at least daily with 10 mL Normal Saline and 3 mL Heparin flush (100 units/mL)  Skilled Nurse (SN) may draw blood from IV access  Blood Draw Procedure:   - Aspirate at least 5 mL of blood   - Discard   - Obtain specimen   - Change posiflow cap   - Flush with 20 mL Normal Saline followed by a                 3-5 mL Heparin flush (100 units/mL)  Central :   - Sterile dressing changes are done weekly and as needed.   - Use chlor-hexadine scrub to cleanse site, apply Biopatch to insertion site,       apply securement device dressing   - Posi-flow caps are changed weekly and after EVERY lab draw.   - If sterile gauze is under dressing to control oozing,                 dressing change must be performed every 24 hours until gauze is not needed.    LABS: Please send CBC, CMP, CRP and ESR to [phone] [facility] Stephan Jameson    WOUND CARE ORDERS  n/a      Medications: Review discharge medications with patient and family and provide education.      Current Discharge Medication List      START taking these medications    Details   albuterol 90 mcg/actuation inhaler Inhale 2 puffs into the lungs every 8 (eight) hours.  Qty: 1 Inhaler, Refills: 12      dextrose 5 % SolP 500 mL with ceFAZolin 1 gram SolR 6 g Inject 6 g into the vein continuous.       senna (SENOKOT) 8.6 mg tablet Take 1 tablet by mouth daily as needed for Constipation.         CONTINUE these medications which have NOT CHANGED    Details   amlodipine (NORVASC) 10 MG tablet TAKE 1 TABLET BY MOUTH ONCE DAILY  Qty: 90 tablet, Refills: 3      baclofen (LIORESAL) 10 MG tablet TAKE 1 TABLET BY MOUTH 3 TIMES A DAY  Qty: 90 tablet, Refills: 5      furosemide (LASIX) 40 MG tablet TAKE 1 TABLET BY MOUTH 2 TIMES A DAY  Qty: 180 tablet, Refills: 3      INTERFERON BETA-1A (AVONEX IM) Inject into the muscle.        losartan (COZAAR) 25 MG tablet TAKE 1 TABLET BY MOUTH ONCE DAILY  Qty: 90 tablet, Refills: 3      modafinil (PROVIGIL) 200 MG Tab Take 1 tablet (200 mg total) by mouth once daily.  Qty: 400 tablet, Refills: 3      hydrocodone-acetaminophen 5-325mg (NORCO) 5-325 mg per tablet Take 1 tablet by mouth every 8 (eight) hours as needed for Pain.  Qty: 30 tablet, Refills: 0      sildenafil (VIAGRA) 100 MG tablet Take 1 tablet (100 mg total) by mouth daily as needed for Erectile Dysfunction.  Qty: 10 tablet, Refills: 11      triamcinolone acetonide 0.1% (KENALOG) 0.1 % cream Refills: 0         STOP taking these medications       VASCULERA 630 mg Tab Comments:   Reason for Stopping:               I certify that this patient is confined to his home and needs intermittent skilled nursing care, physical therapy and occupational therapy.

## 2017-03-13 NOTE — PLAN OF CARE
Problem: Patient Care Overview  Goal: Plan of Care Review  Outcome: Ongoing (interventions implemented as appropriate)  PT is AAOX3, no acute changes noted during shift pt is up ad abraham but has been on bedrest most of shift and is voiding per urinal.  VSS. No skin breakdown noted No falls noted. Fall precautions remain Pain assessed. No pain noted. Pt resting comfortable in bed. Call light in reach. Will continue to monitor.

## 2017-03-13 NOTE — SUBJECTIVE & OBJECTIVE
Interval History:  No acute events overnight. Afebrile.  Leukocytosis resolved.   Blood cultures cleared.  PICC placed.  For possible discharge today.   No complaints.     Review of Systems   Constitutional: Negative for chills, diaphoresis, fatigue and fever.   HENT: Negative for congestion, mouth sores, rhinorrhea and sore throat.    Eyes: Negative for pain, discharge and visual disturbance.   Respiratory: Negative for cough, chest tightness, shortness of breath and wheezing.    Cardiovascular: Negative for chest pain, palpitations and leg swelling.   Gastrointestinal: Negative for abdominal distention, abdominal pain, constipation, diarrhea, nausea and vomiting.   Genitourinary: Negative for difficulty urinating, dysuria, flank pain, frequency and hematuria.   Musculoskeletal: Positive for back pain (reports chronic back pain ). Negative for arthralgias, joint swelling, myalgias and neck pain.        Left wrist pain, swelling, redness - improving   Right ankle pain, redness, erythema (improved), difficulty ambulating - overall improved.  Still mild warmth   Skin: Negative for color change, pallor, rash and wound.   Neurological: Negative for dizziness, seizures, weakness, numbness and headaches.   Hematological: Negative for adenopathy. Does not bruise/bleed easily.   Psychiatric/Behavioral: Negative for confusion and sleep disturbance. The patient is not nervous/anxious.      Objective:     Vital Signs (Most Recent):  Temp: 98.4 °F (36.9 °C) (03/13/17 0848)  Pulse: 86 (03/13/17 0848)  Resp: 18 (03/13/17 0848)  BP: 136/80 (03/13/17 0848)  SpO2: (!) 86 % (03/13/17 0848) Vital Signs (24h Range):  Temp:  [97.5 °F (36.4 °C)-99.5 °F (37.5 °C)] 98.4 °F (36.9 °C)  Pulse:  [] 86  Resp:  [18] 18  SpO2:  [86 %-96 %] 86 %  BP: (118-140)/(74-84) 136/80     Weight: 136.1 kg (300 lb)  Body mass index is 39.58 kg/(m^2).    Estimated Creatinine Clearance: 142.2 mL/min (based on Cr of 0.8).    Physical Exam    Constitutional: He is oriented to person, place, and time. He appears well-developed and well-nourished. No distress.   HENT:   Head: Normocephalic and atraumatic.   Mouth/Throat: No oropharyngeal exudate.   Eyes: Conjunctivae and EOM are normal. No scleral icterus.   Cardiovascular: Normal rate, regular rhythm and intact distal pulses.    Murmur (physiologic murmur) heard.  Pulmonary/Chest: Effort normal and breath sounds normal. No respiratory distress. He has no wheezes.   Abdominal: Soft. He exhibits no distension. There is no tenderness. There is no guarding.   Musculoskeletal: Normal range of motion. He exhibits no deformity.   Left wrist dressing c/d/i.  No ascending erythema.  Thumb edema appears improved    Right ankle/LE warmth, edema and erythema.  Dressing in place, c/d/i.  Edema improving.    Neurological: He is alert and oriented to person, place, and time. No cranial nerve deficit.   Skin: Skin is warm and dry. No rash noted. He is not diaphoretic.   Chronic skin changes to bilateral lower extremities.    Psychiatric: He has a normal mood and affect. His behavior is normal.   Vitals reviewed.      Significant Labs:   Blood Culture:   Recent Labs  Lab 03/05/17  0107 03/06/17  1202 03/06/17  1203 03/06/17  1403 03/07/17  1251   LABBLOO Gram stain aer bottle: Gram positive cocci in clusters resembling Staph   Results called to and read back by: Serena Mederos RN 03/09/2017    08:24  STAPHYLOCOCCUS AUREUSFor susceptibility see order #[id number]  Gram stain aer bottle: Gram positive cocci in clusters resembling Staph  Results called to and read back by:Angelica Lima RN 03/08/2017  04:57  STAPHYLOCOCCUS AUREUSFor susceptibility see order #[id number] Gram stain cristiano bottle: Gram positive cocci in clusters resembling Staph   Results called to and read back by: Angelica Hurtado RN  03/07/2017    09:17  STAPHYLOCOCCUS AUREUS No growth after 5 days. No growth after 5 days.  No growth after 5 days.  No growth after 5 days.  No growth after 5 days.     CBC:   Recent Labs  Lab 03/12/17 0622 03/13/17  0543   WBC 12.43 11.88   HGB 10.8* 10.5*   HCT 33.3* 31.5*   * 560*     CMP:   Recent Labs  Lab 03/12/17 0622 03/13/17  0543   * 133*   K 4.7 4.3    98   CO2 22* 25   GLU 88 95   BUN 17 22*   CREATININE 0.8 0.8   CALCIUM 8.8 9.0   PROT 7.5 7.8   ALBUMIN 1.6* 1.7*   BILITOT 1.0 0.9   ALKPHOS 347* 328*   AST 45* 46*   ALT 55* 45*   ANIONGAP 13 10   EGFRNONAA >60.0 >60.0       Significant Imaging: I have reviewed all pertinent imaging results/findings within the past 24 hours.

## 2017-03-13 NOTE — SUBJECTIVE & OBJECTIVE
Interval History: Patient seen and examined at bedside, reports much improvement in ambulating. PICC line placed today and confirmed, possible discharge tomorrow.     Review of Systems   Constitutional: Negative for chills and diaphoresis.   HENT: Negative for congestion.    Eyes: Negative for visual disturbance.   Respiratory: Negative for shortness of breath.    Cardiovascular: Positive for leg swelling. Negative for chest pain and palpitations.   Gastrointestinal: Negative for abdominal pain and diarrhea.   Genitourinary: Negative for dysuria.   Musculoskeletal: Positive for arthralgias. Negative for myalgias.   Skin: Positive for rash and wound.   Neurological: Negative for dizziness, weakness and headaches.     Objective:     Vital Signs (Most Recent):  Temp: 97.1 °F (36.2 °C) (03/13/17 1224)  Pulse: 84 (03/13/17 1224)  Resp: 18 (03/13/17 1224)  BP: 132/75 (03/13/17 1224)  SpO2: 97 % (03/13/17 1224) Vital Signs (24h Range):  Temp:  [97.1 °F (36.2 °C)-99.5 °F (37.5 °C)] 97.1 °F (36.2 °C)  Pulse:  [79-98] 84  Resp:  [18] 18  SpO2:  [86 %-97 %] 97 %  BP: (118-140)/(74-80) 132/75     Weight: 136.1 kg (300 lb)  Body mass index is 39.58 kg/(m^2).    Intake/Output Summary (Last 24 hours) at 03/13/17 1614  Last data filed at 03/12/17 1714   Gross per 24 hour   Intake             1500 ml   Output                0 ml   Net             1500 ml      Physical Exam   Constitutional: He is oriented to person, place, and time. He appears well-developed and well-nourished. No distress.   HENT:   Head: Normocephalic and atraumatic.   Eyes: EOM are normal. Pupils are equal, round, and reactive to light.   Neck: Normal range of motion. Neck supple.   Cardiovascular: Intact distal pulses.  Exam reveals no gallop and no friction rub.    No murmur heard.  Pulmonary/Chest: Effort normal and breath sounds normal. No respiratory distress.   Abdominal: Soft. Bowel sounds are normal.   Musculoskeletal: Normal range of motion. He exhibits  edema and tenderness.   Neurological: He is alert and oriented to person, place, and time.   Skin: Rash noted. He is not diaphoretic. There is erythema.       Significant Labs:   CBC:     Recent Labs  Lab 03/12/17 0622 03/13/17  0543   WBC 12.43 11.88   HGB 10.8* 10.5*   HCT 33.3* 31.5*   * 560*     CMP:     Recent Labs  Lab 03/12/17 0622 03/13/17  0543   * 133*   K 4.7 4.3    98   CO2 22* 25   GLU 88 95   BUN 17 22*   CREATININE 0.8 0.8   CALCIUM 8.8 9.0   PROT 7.5 7.8   ALBUMIN 1.6* 1.7*   BILITOT 1.0 0.9   ALKPHOS 347* 328*   AST 45* 46*   ALT 55* 45*   ANIONGAP 13 10   EGFRNONAA >60.0 >60.0     Coagulation: No results for input(s): INR, APTT in the last 48 hours.    Invalid input(s): PT    Significant Imaging: I have reviewed and interpreted all pertinent imaging results/findings within the past 24 hours.   CXR: PICC Line in the SVC

## 2017-03-13 NOTE — PT/OT/SLP PROGRESS
Physical Therapy  Treatment    Rajan Cooper III   MRN: [id number]   Admitting Diagnosis: Staphylococcus aureus sepsis    PT Received On: 03/13/17  PT Start Time: 1520     PT Stop Time: 1558    PT Total Time (min): 38 min       Billable Minutes:  Gait [id number], Therapeutic Activity 15 and Therapeutic Exercise 13    Treatment Type: Treatment  PT/PTA: PT     PTA Visit Number: 3       General Precautions: Standard, fall  Orthopedic Precautions: RLE weight bearing as tolerated, LUE weight bearing as tolerated   Braces:      Do you have any cultural, spiritual, Denominational conflicts, given your current situation?: none    Subjective:  Communicated with nsg prior to session.  Pt agreeable to PT session    Pain Rating: 3/10  Location - Side: Right     Location: hand (Right foot)          Objective:   Patient found with: PICC line    Functional Mobility:  Bed Mobility:   Sit to Supine: Minimum Assistance, With leg lift    Transfers:  Sit <> Stand Assistance: Minimum Assistance  Sit <> Stand Assistive Device: Platform, Rolling Walker    Gait:   Gait Distance: 35 ft  Assistance 1: Minimum assistance  Gait Assistive Device: Platform walker left  Gait Pattern: 3-point gait  Gait Deviation(s): decreased zahra, increased time in double stance, decreased velocity of limb motion, decreased stride length, increased stride width, decreased swing-to-stance ratio, decreased toe-to-floor clearance, decreased weight-shifting ability, forward lean      Balance:   Static Sit: GOOD: Takes MODERATE challenges from all directions  Dynamic Sit: GOOD-: Maintains balance through MODERATE excursions of active trunk movement,     Static Stand: FAIR: Maintains without assist but unable to take challenges  Dynamic stand: FAIR: Needs CONTACT GUARD during gait     Therapeutic Activities and Exercises:  White board updated    Therex, 15x each  · Ankle pumps  · Quad sets  · Glute squeezes  · Heel slides  · Supine hip abduction    Pt educated on importance  of OOB mobility for improving strength and endurance         AM-PAC 6 CLICK MOBILITY  How much help from another person does this patient currently need?   1 = Unable, Total/Dependent Assistance  2 = A lot, Maximum/Moderate Assistance  3 = A little, Minimum/Contact Guard/Supervision  4 = None, Modified [place]/Independent    Turning over in bed (including adjusting bedclothes, sheets and blankets)?: 3  Sitting down on and standing up from a chair with arms (e.g., wheelchair, bedside commode, etc.): 3  Moving from lying on back to sitting on the side of the bed?: 3  Moving to and from a bed to a chair (including a wheelchair)?: 3  Need to walk in hospital room?: 3  Climbing 3-5 steps with a railing?: 2  Total Score: 17    AM-PAC Raw Score CMS G-Code Modifier Level of Impairment Assistance   6 % Total / Unable   7 - 9 CM 80 - 100% Maximal Assist   10 - 14 CL 60 - 80% Moderate Assist   15 - 19 CK 40 - 60% Moderate Assist   20 - 22 CJ 20 - 40% Minimal Assist   23 CI 1-20% SBA / CGA   24 CH 0% Independent/ Mod I     Patient left supine with all lines intact, call button in reach and nsg notified.    Assessment:  Rajan Cooper III is a 61 y.o. male with a medical diagnosis of Staphylococcus aureus sepsis.  Pt tolerated tx session well and was able to complete all ex's.  Pt is progressing according to plan at this time.    Rehab identified problem list/impairments: Rehab identified problem list/impairments: weakness, impaired endurance, impaired self care skills, impaired functional mobilty, gait instability, impaired balance, pain, impaired skin, orthopedic precautions, edema, decreased ROM    Rehab potential is good.    Activity tolerance: Good    Discharge recommendations: Discharge Facility/Level Of Care Needs: nursing facility, skilled     Barriers to discharge: Barriers to Discharge: None    Equipment recommendations: Equipment Needed After Discharge: bedside commode, walker, rolling, bath bench      GOALS:   Physical Therapy Goals        Problem: Physical Therapy Goal    Goal Priority Disciplines Outcome Goal Variances Interventions   Physical Therapy Goal     PT/OT, PT Ongoing (interventions implemented as appropriate)     Description:  Goals to be met by: 3/18/17     Patient will increase functional independence with mobility by performin. Supine to sit with SBA - Not met  2. Sit to supine with SBA - Not met  3. Sit to stand transfer with CGA using appropriate AD - Not met  4. Bed to chair transfer with Minimal Assistance using appropriate AD - Not met  5. Gait  x 20 feet with Minimal Assistance using appropriate AD -met   5a. Gait x 50' using platform walker with min (A)-not met  6. Lower extremity exercise program x20-30 reps per handout, with independence - Not met                  PLAN:    Patient to be seen daily  to address the above listed problems via gait training, therapeutic activities, therapeutic exercises, neuromuscular re-education  Plan of Care expires: 04/10/17  Plan of Care reviewed with: patient, spouse         Ceasar Rodriguez, PT  2017

## 2017-03-13 NOTE — PLAN OF CARE
Problem: Occupational Therapy Goal  Goal: Occupational Therapy Goal  Goals to be met by: 3/26/17     Patient will increase functional independence with ADLs by performing:    UE Dressing with Stand-by Assistance.  LE Dressing with Modified [place].  Grooming while seated at sink with Stand-by Assistance.  Toileting from bedside commode with Modified [place] for hygiene and clothing management.   Bathing from edge of bed with Modified [place].  Toilet transfer to bedside commode with Modified [place].  Increased functional strength to 4/5 for L UE.  Upper extremity exercise program x20 reps per handout, with independence.     Continue POC.

## 2017-03-13 NOTE — ASSESSMENT & PLAN NOTE
61 year old male with history of multiple sclerosis who was admitted with left septic wrist and right septic ankle with MSSA, blood and urine cultures also + for MSSA.  Now POD # 9  left wrist washout and debridement and ankle arthrocentesis, and POD #3 repeat I&D and exploration of ankle and wrist.    Last positive blood culture 3/6. Two sets blood cultures 3/7 NGTD.    2D echo negative for endocarditis.  Repeat operative cultures showing rare S. Aureus from ankle.    Currently afebrile.   Leukocytosis continues to trend down and is now WNL.  Still having some burning with urination.  On pyridium.       Plan  - Continue cefazolin 6 grams IV q 24 hours by continuous infusion for MSSA  - Recommend minimum of 6 weeks of IV antibiotics for complicated bacteremia with septic arthritis of wrist and ankle from date of  date of last I&D -  Estimated end date 4/15/17.   - Discussed with staff.   - Will sign off.  Please re-consult as needed.   - Discharge recommendations below.

## 2017-03-13 NOTE — PROGRESS NOTES
[facility] Hospital Medicine  Progress Note    Patient Name: Rajan Cooper III  MRN: [id number]  Patient Class: IP- Inpatient   Admission Date: 3/4/2017  Length of Stay: 7 days  Attending Physician: Trenton Brian MD  Primary Care Provider: Tom Garcia MD    [facility] Medicine Team: [facility] HOSP MED S Trenton Brian MD    Subjective:     Principal Problem:Staphylococcus aureus sepsis    HPI:  Rajan Cooper III is a 61-year-old male w/ past medical history significant for multiple sclerosis and HTN who presented to the ED on 3/4/17 w/ worsening L wrist > R ankle pain of 5 days duration.  Denied trauma or other precipitating event, h/o septic joint, h/o gout, fevers, chills, recent infection, CP, SOB, dysuria, or new numbness/tingling/weakness. WBC 22K w/ a CRP of 425. Concern for left dorsal wrist abscess with possible septic wrist and right ankle arthritis.    Able to bear weight but painful.  Usually ambulates without assistive device at baseline. Takes baclofen and Avonex for MS.       Hospital Course:  3/4/17 - Arthrocentesis of the right ankle, incision, drainage and irrigation of left dorsal wrist abscess and left wrist arthrotomy with irrigation. GPC noted on gram stain along with 20K WBCS w/ 79% segs. Patient has remained on ceftriaxone and vancomycin.   3/7/17 - medicine consulted. Patient has developed worsening tachycardia with SOB required 3 liter of 02. Patient denies any SOB, but complains of pain. New fever at 101.2 F noted. MSSA grown from abscess of the left wrist and staph noted in the urine culture. Blood culture positive for staph aureus. TTE negative for endocarditis. MRI of the right ankle performed, revealed possible anterolateral abscess. OR scheduled for 03/10/17 for abscess drainage. WBC normalized subsequently, PICC Line placed and confirmed.       Interval History: Patient seen and examined at bedside, reports much improvement in ambulating. PICC line placed today and  confirmed, possible discharge tomorrow.     Review of Systems   Constitutional: Negative for chills and diaphoresis.   HENT: Negative for congestion.    Eyes: Negative for visual disturbance.   Respiratory: Negative for shortness of breath.    Cardiovascular: Positive for leg swelling. Negative for chest pain and palpitations.   Gastrointestinal: Negative for abdominal pain and diarrhea.   Genitourinary: Negative for dysuria.   Musculoskeletal: Positive for arthralgias. Negative for myalgias.   Skin: Positive for rash and wound.   Neurological: Negative for dizziness, weakness and headaches.     Objective:     Vital Signs (Most Recent):  Temp: 97.1 °F (36.2 °C) (03/13/17 1224)  Pulse: 84 (03/13/17 1224)  Resp: 18 (03/13/17 1224)  BP: 132/75 (03/13/17 1224)  SpO2: 97 % (03/13/17 1224) Vital Signs (24h Range):  Temp:  [97.1 °F (36.2 °C)-99.5 °F (37.5 °C)] 97.1 °F (36.2 °C)  Pulse:  [79-98] 84  Resp:  [18] 18  SpO2:  [86 %-97 %] 97 %  BP: (118-140)/(74-80) 132/75     Weight: 136.1 kg (300 lb)  Body mass index is 39.58 kg/(m^2).    Intake/Output Summary (Last 24 hours) at 03/13/17 1614  Last data filed at 03/12/17 1714   Gross per 24 hour   Intake             1500 ml   Output                0 ml   Net             1500 ml      Physical Exam   Constitutional: He is oriented to person, place, and time. He appears well-developed and well-nourished. No distress.   HENT:   Head: Normocephalic and atraumatic.   Eyes: EOM are normal. Pupils are equal, round, and reactive to light.   Neck: Normal range of motion. Neck supple.   Cardiovascular: Intact distal pulses.  Exam reveals no gallop and no friction rub.    No murmur heard.  Pulmonary/Chest: Effort normal and breath sounds normal. No respiratory distress.   Abdominal: Soft. Bowel sounds are normal.   Musculoskeletal: Normal range of motion. He exhibits edema and tenderness.   Neurological: He is alert and oriented to person, place, and time.   Skin: Rash noted. He is not  diaphoretic. There is erythema.       Significant Labs:   CBC:     Recent Labs  Lab 03/12/17  0622 03/13/17  0543   WBC 12.43 11.88   HGB 10.8* 10.5*   HCT 33.3* 31.5*   * 560*     CMP:     Recent Labs  Lab 03/12/17  0622 03/13/17  0543   * 133*   K 4.7 4.3    98   CO2 22* 25   GLU 88 95   BUN 17 22*   CREATININE 0.8 0.8   CALCIUM 8.8 9.0   PROT 7.5 7.8   ALBUMIN 1.6* 1.7*   BILITOT 1.0 0.9   ALKPHOS 347* 328*   AST 45* 46*   ALT 55* 45*   ANIONGAP 13 10   EGFRNONAA >60.0 >60.0     Coagulation: No results for input(s): INR, APTT in the last 48 hours.    Invalid input(s): PT    Significant Imaging: I have reviewed and interpreted all pertinent imaging results/findings within the past 24 hours.   CXR: PICC Line in the SVC     Assessment/Plan:      * Staphylococcus aureus sepsis  SIRS (4/4) 2/2 to MSSA.   On Ancef IV continuous infusion   Blood cs revealed Staph Aureus 03/05, 03/06, NGTD on 03/07   TTE: -ve for endocarditis   MRI positive for anterolateral abscess  .1-->244.1  Ankle I and D on 03/10/17    MS (multiple sclerosis)  Cont to hold interferon given active infection  Ok to use baclofen     HTN (hypertension)  Continue losartan 25 mg and amlodipine 10 mg daily.       Abscess of left hand  S/p I and D  IV Ancef  Synovial fluid: MSSA           Staphylococcal arthritis of left wrist  Blood cs 03/06/17: positive for Staph aureus  Blood cS 03/07/17: NGTD  On Ancef IVPB  Synovial fluid positive for pansensitive Staph Aureus  PICC Line placement since blood cultures negative for >48 hrs        VTE Risk Mitigation         Ordered     enoxaparin injection 40 mg  Daily     Route:  Subcutaneous        03/10/17 5295     Medium Risk of VTE  Once      03/04/17 1346     Place sequential compression device  Until discontinued      03/04/17 1346          Trenton Brian MD  Department of Hospital Medicine   [facility]

## 2017-03-14 VITALS
RESPIRATION RATE: 18 BRPM | SYSTOLIC BLOOD PRESSURE: 134 MMHG | BODY MASS INDEX: 39.76 KG/M2 | HEART RATE: 91 BPM | DIASTOLIC BLOOD PRESSURE: 73 MMHG | OXYGEN SATURATION: 95 % | HEIGHT: 73 IN | TEMPERATURE: 98 F | WEIGHT: 300 LBS

## 2017-03-14 PROBLEM — L02.512 ABSCESS OF LEFT HAND: Status: RESOLVED | Noted: 2017-03-04 | Resolved: 2017-03-14

## 2017-03-14 PROBLEM — A41.01 STAPHYLOCOCCUS AUREUS SEPSIS: Status: RESOLVED | Noted: 2017-03-06 | Resolved: 2017-03-14

## 2017-03-14 LAB
ALBUMIN SERPL BCP-MCNC: 1.7 G/DL
ALP SERPL-CCNC: 309 U/L
ALT SERPL W/O P-5'-P-CCNC: 41 U/L
ANION GAP SERPL CALC-SCNC: 9 MMOL/L
AST SERPL-CCNC: 51 U/L
BACTERIA SPEC AEROBE CULT: NORMAL
BACTERIA SPEC ANAEROBE CULT: NORMAL
BASOPHILS # BLD AUTO: 0.01 K/UL
BASOPHILS NFR BLD: 0.1 %
BILIRUB SERPL-MCNC: 0.6 MG/DL
BUN SERPL-MCNC: 20 MG/DL
CALCIUM SERPL-MCNC: 8.5 MG/DL
CHLORIDE SERPL-SCNC: 100 MMOL/L
CO2 SERPL-SCNC: 25 MMOL/L
CREAT SERPL-MCNC: 0.7 MG/DL
DIFFERENTIAL METHOD: ABNORMAL
EOSINOPHIL # BLD AUTO: 0.1 K/UL
EOSINOPHIL NFR BLD: 0.5 %
ERYTHROCYTE [DISTWIDTH] IN BLOOD BY AUTOMATED COUNT: 14.9 %
EST. GFR  (AFRICAN AMERICAN): >60 ML/MIN/1.73 M^2
EST. GFR  (NON AFRICAN AMERICAN): >60 ML/MIN/1.73 M^2
GLUCOSE SERPL-MCNC: 96 MG/DL
HCT VFR BLD AUTO: 31.2 %
HGB BLD-MCNC: 10.1 G/DL
LYMPHOCYTES # BLD AUTO: 1.5 K/UL
LYMPHOCYTES NFR BLD: 16.4 %
MAGNESIUM SERPL-MCNC: 1.8 MG/DL
MCH RBC QN AUTO: 29.1 PG
MCHC RBC AUTO-ENTMCNC: 32.4 %
MCV RBC AUTO: 90 FL
MONOCYTES # BLD AUTO: 1 K/UL
MONOCYTES NFR BLD: 10.8 %
NEUTROPHILS # BLD AUTO: 6.5 K/UL
NEUTROPHILS NFR BLD: 70.6 %
PHOSPHATE SERPL-MCNC: 3.9 MG/DL
PLATELET # BLD AUTO: 544 K/UL
PMV BLD AUTO: 10.3 FL
POTASSIUM SERPL-SCNC: 4.4 MMOL/L
PROT SERPL-MCNC: 7.4 G/DL
RBC # BLD AUTO: 3.47 M/UL
SODIUM SERPL-SCNC: 134 MMOL/L
WBC # BLD AUTO: 9.15 K/UL

## 2017-03-14 PROCEDURE — 25000003 PHARM REV CODE 250: Performed by: INTERNAL MEDICINE

## 2017-03-14 PROCEDURE — 97116 GAIT TRAINING THERAPY: CPT

## 2017-03-14 PROCEDURE — 25000003 PHARM REV CODE 250: Performed by: PHYSICIAN ASSISTANT

## 2017-03-14 PROCEDURE — 83735 ASSAY OF MAGNESIUM: CPT

## 2017-03-14 PROCEDURE — 25000003 PHARM REV CODE 250: Performed by: STUDENT IN AN ORGANIZED HEALTH CARE EDUCATION/TRAINING PROGRAM

## 2017-03-14 PROCEDURE — 99239 HOSP IP/OBS DSCHRG MGMT >30: CPT | Mod: ,,, | Performed by: HOSPITALIST

## 2017-03-14 PROCEDURE — 85025 COMPLETE CBC W/AUTO DIFF WBC: CPT

## 2017-03-14 PROCEDURE — 80053 COMPREHEN METABOLIC PANEL: CPT

## 2017-03-14 PROCEDURE — 97530 THERAPEUTIC ACTIVITIES: CPT

## 2017-03-14 PROCEDURE — 36415 COLL VENOUS BLD VENIPUNCTURE: CPT

## 2017-03-14 PROCEDURE — 25000003 PHARM REV CODE 250: Performed by: HOSPITALIST

## 2017-03-14 PROCEDURE — 84100 ASSAY OF PHOSPHORUS: CPT

## 2017-03-14 RX ADMIN — MODAFINIL 200 MG: 100 TABLET ORAL at 10:03

## 2017-03-14 RX ADMIN — Medication 10 ML: at 06:03

## 2017-03-14 RX ADMIN — LOSARTAN POTASSIUM 25 MG: 25 TABLET, FILM COATED ORAL at 09:03

## 2017-03-14 RX ADMIN — BACLOFEN 10 MG: 10 TABLET ORAL at 05:03

## 2017-03-14 RX ADMIN — Medication 10 ML: at 05:03

## 2017-03-14 RX ADMIN — SENNOSIDES 8.6 MG: 8.6 TABLET, FILM COATED ORAL at 10:03

## 2017-03-14 RX ADMIN — POLYETHYLENE GLYCOL 3350 17 G: 17 POWDER, FOR SOLUTION ORAL at 10:03

## 2017-03-14 RX ADMIN — AMLODIPINE BESYLATE 10 MG: 10 TABLET ORAL at 10:03

## 2017-03-14 RX ADMIN — Medication 3 ML: at 05:03

## 2017-03-14 NOTE — PT/OT/SLP PROGRESS
Physical Therapy  Treatment    Rajan Cooper III   MRN: [id number]   Admitting Diagnosis: Staphylococcus aureus sepsis    PT Received On: 17  PT Start Time: 1150     PT Stop Time: 1222    PT Total Time (min): 32 min       Billable Minutes:  Gait [id number], Therapeutic Activity 10 and Therapeutic Exercise 10    Treatment Type: Treatment  PT/PTA: PTA     PTA Visit Number: 1       General Precautions: Standard, fall  Orthopedic Precautions: RLE weight bearing as tolerated, LUE weight bearing as tolerated   Braces:      Do you have any cultural, spiritual, Islam conflicts, given your current situation?: none    Subjective:  Communicated with NSG prior to session.  Patient stated he was supposed to be discharged home today    Pain Ratin/10  Location - Side: Right     Location: foot          Objective:   Patient found with: PICC line    Functional Mobility:  Bed Mobility:   Supine to Sit: Minimum Assistance (using HR)    Transfers:  Sit <> Stand Assistance: Minimum Assistance  Sit <> Stand Assistive Device: Rolling Walker, Platform (2 trials with RW and 1 trial with L PRW)  Bed <> Chair Technique: Stand Pivot  Bed <> Chair Assistance: Contact Guard Assistance  Bed <> Chair Assistive Device: Rolling Walker    Gait:   Gait Distance: 11 feet with RW and 20 feet with L PRW  Assistance 1: Minimum assistance  Gait Assistive Device: Platform walker left, Rolling walker  Gait Pattern: 3-point gait  Gait Deviation(s): decreased zahra, increased time in double stance, decreased velocity of limb motion, decreased step length, decreased stride length, forward lean (noted better balance and increase step length with L PRW) . Patient had difficulty gripping and WB'ing through L UE using RW.            Balance:   Static Sit: NORMAL: No deviations seen in posture held statically  Dynamic Sit: GOOD: Maintains balance through MODERATE excursions of active trunk movement  Static Stand: FAIR: Maintains without assist but unable  to take challenges  Dynamic stand: POOR: N/A     Therapeutic Activities and Exercises:  Patient performed supine exercises AP, GS,QS,hip ABD/ADD and HS x15 reps  Patient and patient's Daughter educated on car transfers  Patient educated on using w/c to enter home.     AM-PAC 6 CLICK MOBILITY  How much help from another person does this patient currently need?   1 = Unable, Total/Dependent Assistance  2 = A lot, Maximum/Moderate Assistance  3 = A little, Minimum/Contact Guard/Supervision  4 = None, Modified [place]/Independent    Turning over in bed (including adjusting bedclothes, sheets and blankets)?: 3  Sitting down on and standing up from a chair with arms (e.g., wheelchair, bedside commode, etc.): 3  Moving from lying on back to sitting on the side of the bed?: 3  Moving to and from a bed to a chair (including a wheelchair)?: 3  Need to walk in hospital room?: 3  Climbing 3-5 steps with a railing?: 1  Total Score: 16    AM-PAC Raw Score CMS G-Code Modifier Level of Impairment Assistance   6 % Total / Unable   7 - 9 CM 80 - 100% Maximal Assist   10 - 14 CL 60 - 80% Moderate Assist   15 - 19 CK 40 - 60% Moderate Assist   20 - 22 CJ 20 - 40% Minimal Assist   23 CI 1-20% SBA / CGA   24 CH 0% Independent/ Mod I     Patient left up in chair with all lines intact, call button in reach and daughter present.    Assessment:  Rajan Cooper III is a 61 y.o. male with a medical diagnosis of Staphylococcus aureus sepsis and presents with decrease strength, mobility, balance and ROM.  Patient demonstrated better balance and control using L PRW.  Patient requires assistance with all mobility and transfers.  Continue to note decrease strength and ROM with B LE's.  Patient will require 24 hour assistance upon discharge from hospital.    Rehab identified problem list/impairments: Rehab identified problem list/impairments: weakness, impaired endurance, gait instability, impaired functional mobilty, impaired self care  skills, impaired balance, pain, decreased lower extremity function, decreased upper extremity function, orthopedic precautions, edema, decreased ROM    Rehab potential is good.    Activity tolerance: Good    Discharge recommendations: Discharge Facility/Level Of Care Needs: nursing facility, skilled     Barriers to discharge: Barriers to Discharge: None    Equipment recommendations: Equipment Needed After Discharge: bedside commode (L platform RW)     GOALS:   Physical Therapy Goals        Problem: Physical Therapy Goal    Goal Priority Disciplines Outcome Goal Variances Interventions   Physical Therapy Goal     PT/OT, PT Ongoing (interventions implemented as appropriate)     Description:  Goals to be met by: 3/18/17     Patient will increase functional independence with mobility by performin. Supine to sit with SBA - Not met  2. Sit to supine with SBA - Not met  3. Sit to stand transfer with CGA using appropriate AD - Not met  4. Bed to chair transfer with Minimal Assistance using appropriate AD - Not met  5. Gait  x 20 feet with Minimal Assistance using appropriate AD -met   5a. Gait x 50' using platform walker with min (A)-not met  6. Lower extremity exercise program x20-30 reps per handout, with independence - Not met                  PLAN:    Patient to be seen daily  to address the above listed problems via gait training, therapeutic activities, therapeutic exercises, neuromuscular re-education  Plan of Care expires: 04/10/17  Plan of Care reviewed with: patient, daughter         Chaim Etienne MUSHTAQ, PTA  2017

## 2017-03-14 NOTE — PLAN OF CARE
Aurora Singh on way to hook up pt to iv abx.  See final note  Left msg for Ami with [facility] to coordinate delivery of l platform walker w hospital bed.  Provided wife Dorothy's phone number [phone]  Future Appointments  Date Time Provider Department Center   3/20/2017 10:30 AM Estee Kuo PA-C [facility]   3/22/2017 1:00 PM TRUONG Stock, ANP [facility]

## 2017-03-14 NOTE — PLAN OF CARE
Problem: Fall Risk (Adult)  Goal: Identify Related Risk Factors and Signs and Symptoms  Related risk factors and signs and symptoms are identified upon initiation of [employer] Clinical Practice Guideline (CPG)   Patient progressing towards goals. Patient progressing towards goals. Patient progressing towards goals. Patient progressing towards goals. Patient progressing towards goals. Patient progressing towards goals. Patient progressing towards goals. Patient progressing towards goals.

## 2017-03-14 NOTE — PLAN OF CARE
Revised home health orders w ancef dosage faxed to [facility]. Left msg for Sarah at [facility] to review.  CM spoke with Lucia at [facility] and she stated that she has revised  orders and everything is good on her end.   Spoke with therapist Chaim and he requests dme : Left platform rolling walker  MD wrote order.

## 2017-03-14 NOTE — PLAN OF CARE
Dc home today. Final hh orders faxed to [facility]. Wife and pt in agreement.  MD ordered hospital bed at 1530 yesterday. CM called Zuly/Ami with [facility] at [phone] to provide wife's name/contact number for delivery of bed to be coordinated.  Dorothy can be reached at [phone].   Cm called [facility] and informed them that pt is dc today     03/14/17 0823   Final Note   Assessment Type Final Discharge Note   Discharge Disposition Home-Health   Discharge planning education complete? Yes   Hospital Follow Up  Appt(s) scheduled? Yes   Discharge plans and expectations educations in teach back method with documentation complete? Yes   Offered [facility] -- Bedside Delivery? n/a   Discharge/Hospital Encounter Summary to ([facility]) PCP n/a   Referral to Outpatient Case Management complete? n/a   Referral to / orders for Home Health Complete? Yes   30 day supply of medicines given at discharge, if documented non-compliance / non-adherence? No   Any social issues identified prior to discharge? No   Did you assess the readiness or willingness of the family or caregiver to support self management of care? Yes   Right Care Referral Info   Post Acute Recommendation Home-care     Future Appointments  Date Time Provider Department Center   3/20/2017 10:30 AM Estee Kuo PA-C [facility]   3/22/2017 1:00 PM TRUONG Stock, ANP [facility]

## 2017-03-14 NOTE — NURSING
Discharged to home. Condition stable. IV removed. No redness or swelling noted to site. Verbalizes understanding of discharge instrutions

## 2017-03-14 NOTE — PROGRESS NOTES
ORTHO PROGRESS NOTE:    Rajan Cooper III is a 61 y.o. male admitted on 3/4/2017  Hospital Day: 3      The patient was seen and examined this morning at the bedside. No acute events overnight.  Pain to right wrist and left ankle is improved.  Ready for discharge to home today.    PHYSICAL EXAM:  Awake/alert/oriented x 3,   AFVSS  Good inspiratory effort with unlabored breathing.   Dressings c/d/i  NVI distally LUE, RLE  Able to range left wrist passively  Neuro exam difficult due to hx of MS     Vitals:    03/13/17 1224 03/13/17 1600 03/13/17 2030 03/14/17 0202   BP: 132/75 138/78 137/78 122/64   BP Location: Right arm Right arm Left arm Left arm   Patient Position: Lying Lying Lying    BP Method: Automatic Automatic Automatic    Pulse: 84 84 91 92   Resp: 18 18 18 18   Temp: 97.1 °F (36.2 °C) 98.3 °F (36.8 °C) 98.3 °F (36.8 °C) 98.8 °F (37.1 °C)   TempSrc: Oral Oral Oral Oral   SpO2: 97% 96% (!) 92% (!) 92%   Weight:       Height:         I/O last 3 completed shifts:  In: 1500 [P.O.:1200; I.V.:300]  Out: -     Recent Labs  Lab 03/12/17  0622 03/13/17  0543 03/14/17  0430   CALCIUM 8.8 9.0 8.5*   PROT 7.5 7.8 7.4   * 133* 134*   K 4.7 4.3 4.4   CO2 22* 25 25    98 100   BUN 17 22* 20   CREATININE 0.8 0.8 0.7       Recent Labs  Lab 03/12/17  0622 03/13/17  0543 03/14/17  0430   WBC 12.43 11.88 9.15   RBC 3.68* 3.54* 3.47*   HGB 10.8* 10.5* 10.1*   HCT 33.3* 31.5* 31.2*   * 560* 544*     No results for input(s): INR, APTT in the last 72 hours.    Invalid input(s): PT      A/P: 61 y.o. male POD 4 right ankle I&D, POD 10 left wrist I&D for septic arthritis   -- Pain control  -- PT/OT: PACHECO HAZEL. ROM left wrist  -- DVT prophylaxis: SCDs  -- Antibiotics: Ancef. Blood cx Staph Aureus pan sensitive. Urine Cx Staph (pan sensitive) . ID on board  --WBC improving --> 9       Michael J. Casale, MD PGY-3  Department of Orthopaedic Surgery    Attg Note:  I agree with the above assessment and plan.    Kunal  MD [place]

## 2017-03-14 NOTE — PLAN OF CARE
Problem: Physical Therapy Goal  Goal: Physical Therapy Goal  Goals to be met by: 3/18/17     Patient will increase functional independence with mobility by performin. Supine to sit with SBA - Not met  2. Sit to supine with SBA - Not met  3. Sit to stand transfer with CGA using appropriate AD - Not met  4. Bed to chair transfer with Minimal Assistance using appropriate AD - Not met  5. Gait x 20 feet with Minimal Assistance using appropriate AD -met  5a. Gait x 50 using platform walker with min (A)-not met  6. Lower extremity exercise program x20-30 reps per handout, with independence - Not met   Patient goals remain appropriate.  Patient will continue to benefit from further PT services.  Chaim Etienne, PTA

## 2017-03-16 ENCOUNTER — TELEPHONE (OUTPATIENT)
Dept: INFECTIOUS DISEASES | Facility: CLINIC | Age: 62
End: 2017-03-16

## 2017-03-16 ENCOUNTER — PATIENT OUTREACH (OUTPATIENT)
Dept: ADMINISTRATIVE | Facility: CLINIC | Age: 62
End: 2017-03-16
Payer: COMMERCIAL

## 2017-03-16 DIAGNOSIS — B95.8 STAPH INFECTION: Primary | ICD-10-CM

## 2017-03-16 DIAGNOSIS — T82.898A OCCLUDED PICC LINE, INITIAL ENCOUNTER: Primary | ICD-10-CM

## 2017-03-16 NOTE — PATIENT INSTRUCTIONS
Abscess (Incision & Drainage)  An abscess (sometimes called a boil) occurs when bacteria get trapped under the skin and start to grow. Pus forms inside the abscess as the body responds to the bacteria. An abscess can happen with an insect bite, ingrown hair, blocked oil gland, pimple, cyst, or puncture wound.  Your healthcare provider has drained the pus from your abscess. If the abscess pocket was large, your healthcare provider may have inserted gauze packing. Your provider will need to remove and possibly replace it on your next visit. You may not need antibiotics to treat a simple abscess, unless the infection is spreading into the skin around the wound (cellulitis).  Healing of the wound will take about 1 to 2 weeks, depending on the size of the abscess. Healthy tissue will grow from the bottom and sides of the opening until it seals over.  Home care  These tips can help your wound heal:  · The wound may drain for the first 2 days. Cover the wound with a clean dry dressing. If the dressing becomes soaked with blood or pus, change it.  · If a gauze packing was placed inside the abscess cavity, you may be told to remove it yourself. You may do this in the shower. Once the packing is removed, you should wash the area in the shower or bath 3 to 4 times a day, until the skin opening has closed. Make sure you wash your hands after changing the packing or cleaning the wound.  · If you were prescribed antibiotics, take them as directed until they are all gone.  · You may use acetaminophen or ibuprofen to control pain, unless another pain medicine was prescribed. If you have liver disease or ever had a stomach ulcer, talk with your doctor before using these medicines.  Follow-up care  Follow up with your healthcare provider, or as advised. If a gauze packing was inserted in your wound, it should be removed in 1 to 2 days. Check your wound every day for the signs of worsening infection listed below.  When to seek  medical advice  Call your healthcare provider right away if any of these occur:  · Increasing redness or swelling  · Red streaks in the skin leading away from the wound  · Increasing local pain or swelling  · Continued pus draining from the wound 2 days after treatment  · Fever of 100.4ºF (38ºC) or higher, or as directed by your healthcare provider  · Boil returns when you are at home  Date Last Reviewed: 9/1/2016  © 6728-6097 [employer], [employer]. [address] [place] [address]. All rights reserved. This information is not intended as a substitute for professional medical care. Always follow your healthcare professional's instructions.

## 2017-03-16 NOTE — TELEPHONE ENCOUNTER
Per [facility] patients PICC line still is his arm but when tried to flush it came out another port. The PICC is faulty Call [phone].

## 2017-03-17 ENCOUNTER — HOSPITAL ENCOUNTER (OUTPATIENT)
Dept: INTERVENTIONAL RADIOLOGY/VASCULAR | Facility: HOSPITAL | Age: 62
Discharge: HOME | End: 2017-03-17
Attending: INTERNAL MEDICINE
Payer: COMMERCIAL

## 2017-03-17 ENCOUNTER — TELEPHONE (OUTPATIENT)
Dept: INFECTIOUS DISEASES | Facility: CLINIC | Age: 62
End: 2017-03-17

## 2017-03-17 VITALS
SYSTOLIC BLOOD PRESSURE: 137 MMHG | OXYGEN SATURATION: 97 % | DIASTOLIC BLOOD PRESSURE: 82 MMHG | RESPIRATION RATE: 20 BRPM | HEART RATE: 76 BPM

## 2017-03-17 DIAGNOSIS — B95.8 STAPH INFECTION: ICD-10-CM

## 2017-03-17 PROCEDURE — 36584 COMPL RPLCMT PICC RS&I: CPT

## 2017-03-17 PROCEDURE — 36584 COMPL RPLCMT PICC RS&I: CPT | Mod: ,,, | Performed by: FAMILY MEDICINE

## 2017-03-17 PROCEDURE — 77001 FLUOROGUIDE FOR VEIN DEVICE: CPT | Mod: 26,,, | Performed by: FAMILY MEDICINE

## 2017-03-17 PROCEDURE — 77001 FLUOROGUIDE FOR VEIN DEVICE: CPT

## 2017-03-17 NOTE — TELEPHONE ENCOUNTER
----- Message from Blossom Butler LPN sent at 3/16/2017  3:10 PM CDT -----  Contact: Xi Taylor  and Boris Berman pt   Done will come tomorrow to intervental it's leaking  ----- Message -----     From: TRUONG Stock, ANP     Sent: 3/16/2017   3:05 PM       To: Blossom Butler LPN        ----- Message -----     From: Blossom Butler LPN     Sent: 3/16/2017   2:49 PM       To: TRUONG Stock, ANP        ----- Message -----     From: Alexa Toledo     Sent: 3/16/2017  12:36 PM       To: ESTRELLITA Galdamez  and Boris Berman pt --           [facility]    Ph [phone]    Problem  Pik line defective   And leaking       Please call    Thanks

## 2017-03-17 NOTE — PROGRESS NOTES
Pt transported via wheelchair to IR room 190 for PICC line exchange (PICC partially pulled out). Pt awake, alert, and oriented. Dr. Berumen currently at bedside for consent.

## 2017-03-17 NOTE — PROCEDURES
"Rajan Cooper III is a 61 y.o. male patient.    Pulse: 76 (03/17/17 1045)  Resp: 20 (03/17/17 1045)  BP: 137/82 (03/17/17 1045)  SpO2: 97 % (03/17/17 1045)       Central Line  Date/Time: 3/17/2017 11:15 AM  Performed by: AURORA FOWLER  Supervising provider: Danilo Bueno MD  Consent Done: Yes  Time out: Immediately prior to procedure a "time out" was called to verify the correct patient, procedure, equipment, support staff and site/side marked as required.  Indications: med administration  Anesthesia: local infiltration    Anesthesia:  Anesthesia: local infiltration  Local Anesthetic: lidocaine 1% without epinephrine   Preparation: skin prepped with ChloraPrep  Location details: right basilic  Number of attempts: 1  Assessment: verified by fluoroscopy  Complications: none  Post-procedure: chlorhexidine patch,  sterile dressing applied and blood return through all ports          Aurora Fowler  3/17/2017    "

## 2017-03-17 NOTE — IP AVS SNAPSHOT
[facility]  [address]  [facility] [address]  Phone: [phone]           Patient Discharge Instructions     Our goal is to set you up for success. This packet includes information on your condition, medications, and your home care. It will help you to care for yourself so you don't get sicker and need to go back to the hospital.     Please ask your nurse if you have any questions.        There are many details to remember when preparing to leave the hospital. Here is what you will need to do:    1. Take your medicine. If you are prescribed medications, review your Medication List in the following pages. You may have new medications to  at the pharmacy and others that you'll need to stop taking. Review the instructions for how and when to take your medications. Talk with your doctor or nurses if you are unsure of what to do.     2. Go to your follow-up appointments. Specific follow-up information is listed in the following pages. Your may be contacted by a transition nurse or clinical provider about future appointments. Be sure we have all of the phone numbers to reach you, if needed. Please contact your provider's office if you are unable to make an appointment.     3. Watch for warning signs. Your doctor or nurse will give you detailed warning signs to watch for and when to call for assistance. These instructions may also include educational information about your condition. If you experience any of warning signs to your health, call your doctor.               [facility]  Unless otherwise directed by your provider, please contact [facility], our nurse care line that is available for 24/7 assistance.     [phone] (toll-free)    Registered nurses in the [facility] provide clinical advisement, health education, appointment booking, and other advisory services.                    ** Verify the list of medication(s) below is accurate and up  to date. Carry this with you in case of emergency. If your medications have changed, please notify your healthcare provider.             Medication List      TAKE these medications        Additional Info                      albuterol 90 mcg/actuation inhaler   Quantity:  1 Inhaler   Refills:  12   Dose:  2 puff    Instructions:  Inhale 2 puffs into the lungs every 8 (eight) hours.     Begin Date    AM    Noon    PM    Bedtime       amlodipine 10 MG tablet   Commonly known as:  NORVASC   Quantity:  90 tablet   Refills:  3    Instructions:  TAKE 1 TABLET BY MOUTH ONCE DAILY     Begin Date    AM    Noon    PM    Bedtime       AVONEX IM   Refills:  0    Instructions:  Inject into the muscle.     Begin Date    AM    Noon    PM    Bedtime       baclofen 10 MG tablet   Commonly known as:  LIORESAL   Quantity:  90 tablet   Refills:  5    Instructions:  TAKE 1 TABLET BY MOUTH 3 TIMES A DAY     Begin Date    AM    Noon    PM    Bedtime       dextrose 5 % SolP 500 mL with ceFAZolin 1 gram SolR 6 g   Refills:  0   Dose:  6 g   Indications:  Bone/Joint    Instructions:  Inject 6 g into the vein continuous.     Begin Date    AM    Noon    PM    Bedtime       furosemide 40 MG tablet   Commonly known as:  LASIX   Quantity:  180 tablet   Refills:  3    Instructions:  TAKE 1 TABLET BY MOUTH 2 TIMES A DAY     Begin Date    AM    Noon    PM    Bedtime       hydrocodone-acetaminophen 5-325mg 5-325 mg per tablet   Commonly known as:  NORCO   Quantity:  30 tablet   Refills:  0   Dose:  1 tablet    Instructions:  Take 1 tablet by mouth every 8 (eight) hours as needed for Pain.     Begin Date    AM    Noon    PM    Bedtime       losartan 25 MG tablet   Commonly known as:  COZAAR   Quantity:  90 tablet   Refills:  3    Instructions:  TAKE 1 TABLET BY MOUTH ONCE DAILY     Begin Date    AM    Noon    PM    Bedtime       modafinil 200 MG Tab   Commonly known as:  PROVIGIL   Quantity:  400 tablet   Refills:  3   Dose:  200 mg    Instructions:   Take 1 tablet (200 mg total) by mouth once daily.     Begin Date    AM    Noon    PM    Bedtime       senna 8.6 mg tablet   Commonly known as:  SENOKOT   Refills:  0   Dose:  1 tablet    Instructions:  Take 1 tablet by mouth daily as needed for Constipation.     Begin Date    AM    Noon    PM    Bedtime       sildenafil 100 MG tablet   Commonly known as:  VIAGRA   Quantity:  10 tablet   Refills:  11   Dose:  100 mg    Instructions:  Take 1 tablet (100 mg total) by mouth daily as needed for Erectile Dysfunction.     Begin Date    AM    Noon    PM    Bedtime       triamcinolone acetonide 0.1% 0.1 % cream   Commonly known as:  KENALOG   Refills:  0      Begin Date    AM    Noon    PM    Bedtime                  Please bring to all follow up appointments:    1. A copy of your discharge instructions.  2. All medicines you are currently taking in their original bottles.  3. Identification and insurance card.    Please arrive 15 minutes ahead of scheduled appointment time.    Please call 24 hours in advance if you must reschedule your appointment and/or time.        Your Scheduled Appointments     Mar 20, 2017 10:30 AM CDT   Post OP with MARIA DOLORES Espinosa - Orthopedics ([facility] )    [address]  [place] [id number] [phone]            Mar 22, 2017  1:00 PM CDT   Established Patient Visit with MD Brandon Lees - Infectious Diseases ([facility] )    [address]  [place] [id number] [phone]                Discharge References/Attachments     CARING FOR YOUR PERIPHERALLY INSERTED CENTRAL CATHETER (PICC), DISCHARGE INSTRUCTIONS FOR  (ENGLISH)        Admission Information     Date & Time Provider Department CSN    3/17/2017  9:00 AM Olaf Berman MD [facility] [id number]      Care Providers     Provider Role Specialty Primary office phone    Olaf Berman MD Attending Provider Infectious Diseases [phone]      Your Vitals  Were     BP Pulse Resp SpO2          137/82 (BP Location: Left arm, Patient Position: Lying, BP Method: Automatic) 76 20 97%        Recent Lab Values        12/22/2014                           9:40 AM           A1C 6.1                       Allergies as of 3/17/2017        Reactions    Norco [Hydrocodone-acetaminophen] Anaphylaxis      Advance Directives     An advance directive is a document which, in the event you are no longer able to make decisions for yourself, tells your healthcare team what kind of treatment you do or do not want to receive, or who you would like to make those decisions for you.  If you do not currently have an advance directive, [facility] encourages you to create one.  For more information call:  [phone]), [phone] ([phone]),  or log on to [email or web address].        Language Assistance Services     ATTENTION: Language assistance services are available, free of charge. Please call [phone].      ATENCIÓN: Si habla español, tiene a wang disposición servicios gratuitos de asistencia lingüística. Llame al [phone].     TUCKER Ý: N?u b?n nói Ti?ng Vi?t, có các d?ch v? h? tr? ngôn ng? mi?n phí dành cho b?n. G?i s? [phone].         [facility] complies with applicable Federal civil rights laws and does not discriminate on the basis of race, color, national origin, age, disability, or sex.

## 2017-03-17 NOTE — H&P
Radiology History & Physical      SUBJECTIVE:     Chief Complaint:   Partially pulled and leaking PICC.     History of Present Illness:  Rajan Cooper III is a 61 y.o. male who presents for image guided PICC check and possible replacement.  Past Medical History:   Diagnosis Date    Hypertension     MS (multiple sclerosis)      No past surgical history on file.    Home Meds:   Prior to Admission medications    Medication Sig Start Date End Date Taking? Authorizing Provider   albuterol 90 mcg/actuation inhaler Inhale 2 puffs into the lungs every 8 (eight) hours. 3/13/17 4/12/17  Trenton Brian MD   amlodipine (NORVASC) 10 MG tablet TAKE 1 TABLET BY MOUTH ONCE DAILY 2/10/16   Tom Garcia MD   baclofen (LIORESAL) 10 MG tablet TAKE 1 TABLET BY MOUTH 3 TIMES A DAY 1/19/15   Jose Shi MD   dextrose 5 % SolP 500 mL with ceFAZolin 1 gram SolR 6 g Inject 6 g into the vein continuous. 3/13/17 4/15/17  Trenton Brian MD   furosemide (LASIX) 40 MG tablet TAKE 1 TABLET BY MOUTH 2 TIMES A DAY 2/10/16   Tom Garcia MD   hydrocodone-acetaminophen 5-325mg (NORCO) 5-325 mg per tablet Take 1 tablet by mouth every 8 (eight) hours as needed for Pain. 3/1/17   Tom Garcia MD   INTERFERON BETA-1A (AVONEX IM) Inject into the muscle.      Historical Provider, MD   losartan (COZAAR) 25 MG tablet TAKE 1 TABLET BY MOUTH ONCE DAILY 2/10/16   Tom Garcia MD   modafinil (PROVIGIL) 200 MG Tab Take 1 tablet (200 mg total) by mouth once daily. 3/3/17   Jose Shi MD   senna (SENOKOT) 8.6 mg tablet Take 1 tablet by mouth daily as needed for Constipation. 3/13/17   Trenton Brian MD   sildenafil (VIAGRA) 100 MG tablet Take 1 tablet (100 mg total) by mouth daily as needed for Erectile Dysfunction. 1/20/14   Tom Garcia MD   triamcinolone acetonide 0.1% (KENALOG) 0.1 % cream  3/5/15   Historical Provider, MD     Anticoagulants/Antiplatelets: no anticoagulation    Allergies:   Review of  patient's allergies indicates:   Allergen Reactions    Norco [hydrocodone-acetaminophen] Anaphylaxis     Sedation History:  no adverse reactions    Review of Systems:   Hematological: no known coagulopathies  Respiratory: no shortness of breath  Cardiovascular: no chest pain  Gastrointestinal: no abdominal pain  Genito-Urinary: no dysuria  Musculoskeletal: negative  Neurological: no TIA or stroke symptoms         OBJECTIVE:     Vital Signs (Most Recent)       Physical Exam:  General: no acute distress  Mental Status: alert and oriented to person, place and time  HEENT: normocephalic, atraumatic  Chest: unlabored breathing  Heart: regular heart rate  Abdomen: nondistended  Extremity: moves all extremities    Laboratory  Lab Results   Component Value Date    INR 1.1 03/04/2017       Lab Results   Component Value Date    WBC 9.15 03/14/2017    HGB 10.1 (L) 03/14/2017    HCT 31.2 (L) 03/14/2017    MCV 90 03/14/2017     (H) 03/14/2017      Lab Results   Component Value Date    GLU 96 03/14/2017     (L) 03/14/2017    K 4.4 03/14/2017     03/14/2017    CO2 25 03/14/2017    BUN 20 03/14/2017    CREATININE 0.7 03/14/2017    CALCIUM 8.5 (L) 03/14/2017    MG 1.8 03/14/2017    ALT 41 03/14/2017    AST 51 (H) 03/14/2017    ALBUMIN 1.7 (L) 03/14/2017    BILITOT 0.6 03/14/2017       ASSESSMENT/PLAN:     Sedation Plan: Local.  Patient will undergo image guided PICC check and possible replacement.    Pavel Berumen  Radiology  PGY-3

## 2017-03-17 NOTE — PROGRESS NOTES
Right upper arm PICC exchange completed. Pt tolerated well. Discharge instructions reviewed with pt. Handout given for s/p PICC line care. Pt has home health set up for dressing changes. Pt verbalized understanding of instructions. Pt to d/c home via wheelchair with spouse at side.

## 2017-03-17 NOTE — TELEPHONE ENCOUNTER
Late entry:  Notified of PICC malfunction yesterday. Arranged to have replaced in IR today, which was done.

## 2017-03-19 RX ORDER — AMLODIPINE BESYLATE 10 MG/1
TABLET ORAL
Qty: 90 TABLET | Refills: 3 | Status: SHIPPED | OUTPATIENT
Start: 2017-03-19 | End: 2018-03-27 | Stop reason: SDUPTHER

## 2017-03-20 ENCOUNTER — CLINICAL SUPPORT (OUTPATIENT)
Dept: CARDIOLOGY | Facility: CLINIC | Age: 62
End: 2017-03-20
Payer: COMMERCIAL

## 2017-03-20 ENCOUNTER — HOSPITAL ENCOUNTER (EMERGENCY)
Facility: HOSPITAL | Age: 62
Discharge: HOME | End: 2017-03-20
Attending: EMERGENCY MEDICINE
Payer: COMMERCIAL

## 2017-03-20 ENCOUNTER — OFFICE VISIT (OUTPATIENT)
Dept: ORTHOPEDICS | Facility: CLINIC | Age: 62
End: 2017-03-20
Payer: COMMERCIAL

## 2017-03-20 VITALS
WEIGHT: 290 LBS | TEMPERATURE: 98 F | SYSTOLIC BLOOD PRESSURE: 134 MMHG | DIASTOLIC BLOOD PRESSURE: 87 MMHG | HEIGHT: 73 IN | HEART RATE: 68 BPM | BODY MASS INDEX: 38.43 KG/M2 | OXYGEN SATURATION: 99 % | RESPIRATION RATE: 16 BRPM

## 2017-03-20 VITALS
SYSTOLIC BLOOD PRESSURE: 124 MMHG | DIASTOLIC BLOOD PRESSURE: 76 MMHG | BODY MASS INDEX: 39.77 KG/M2 | WEIGHT: 300.06 LBS | HEART RATE: 84 BPM | RESPIRATION RATE: 16 BRPM | HEIGHT: 73 IN | TEMPERATURE: 97 F

## 2017-03-20 DIAGNOSIS — R22.40 LOCALIZED SWELLING OF LOWER LEG: Primary | ICD-10-CM

## 2017-03-20 DIAGNOSIS — I82.4Y1 ACUTE DEEP VEIN THROMBOSIS (DVT) OF PROXIMAL VEIN OF RIGHT LOWER EXTREMITY: Primary | ICD-10-CM

## 2017-03-20 DIAGNOSIS — R22.40 LOCALIZED SWELLING OF LOWER LEG: ICD-10-CM

## 2017-03-20 PROCEDURE — 99283 EMERGENCY DEPT VISIT LOW MDM: CPT

## 2017-03-20 PROCEDURE — 93971 EXTREMITY STUDY: CPT | Mod: S$GLB,,, | Performed by: INTERNAL MEDICINE

## 2017-03-20 PROCEDURE — 99284 EMERGENCY DEPT VISIT MOD MDM: CPT | Mod: ,,, | Performed by: PHYSICIAN ASSISTANT

## 2017-03-20 PROCEDURE — 99024 POSTOP FOLLOW-UP VISIT: CPT | Mod: S$GLB,,, | Performed by: PHYSICIAN ASSISTANT

## 2017-03-20 PROCEDURE — 99999 PR PBB SHADOW E&M-EST. PATIENT-LVL IV: CPT | Mod: PBBFAC,,, | Performed by: PHYSICIAN ASSISTANT

## 2017-03-20 RX ORDER — MODAFINIL 200 MG/1
200 TABLET ORAL DAILY
Qty: 400 TABLET | Refills: 3 | Status: SHIPPED | OUTPATIENT
Start: 2017-03-20 | End: 2019-11-18 | Stop reason: SDUPTHER

## 2017-03-20 NOTE — DISCHARGE INSTRUCTIONS
Deep Vein Thrombosis (DVT)    Deep vein thrombosis (DVT) occurs when a blood clot (thrombus) forms in a deep vein. This happens most often in the leg. It can also happen in the arms or other parts of the body. A part of the clot called an embolus can break off and travel to the lungs. When this happens, its called a pulmonary embolism (PE). PE is a medical emergency. It can cut off blood flow and lead to death. Both DVT and PE are closely related. Together, they are often referred to by the term venous thromboembolism (VTE).  Risk factors for DVT  Anything that slows blood flow, injures the lining of a vein, or increases blood clotting can make you more prone to having DVT. This includes the following:  · Long periods without movement (such as when sitting for many hours at a time or when recovering from major surgery or illness)  · Estrogen (female hormone) therapy, such as hormone replacement therapy (HRT) or oral contraceptives  · Fractured hip or leg  · Major surgery or joint replacement  · Major trauma or spinal cord injury  · Cancer  · Family history  · Excess weight or obesity  · Smoking  · Older age  Symptoms  DVT does not always cause symptoms. When symptoms do occur, they may appear around the site of the DVT, such as in the leg. Possible symptoms include:  · Swelling  · Pain  · Warmth  · Redness  · Tenderness  Home care  · You were likely prescribed blood thinners (anticoagulants). They may be given as pills (oral) or shots (injections). Follow all instructions when using these medicines. Note: Do not take blood thinners with other medicines, herbal remedies, or supplements without talking to your provider first. Certain medicines or products can affect how blood thinners work.  · Follow your providers instructions about activity and rest.  · If support or compression stockings are prescribed, wear them as directed. These may help improve blood flow in the legs.  · When sitting or lying down, move  your ankles, toes and knees often. This may also help improve blood flow in the legs.  Follow-up care  Follow up with your healthcare provider, or as advised. If imaging tests were done, they may need further review by a doctor. You will be told of any new findings that may affect your care.  When to seek medical advice  Call your healthcare provider right away if any of these occur:  · New or increased swelling, pain, tenderness, warmth, or redness, in the leg, arm, or other area  · Blood in the urine  · Bleeding with bowel movements  Call 911  Call 911 right away if any of these occur:  · Bleeding from the nose, gums, a cut, or vagina  · Heavy or uncontrolled bleeding  · Trouble breathing  · Chest pain or discomfort that worsens with deep breathing or coughing  · Coughing (may cough up blood)  · Fast heartbeat  · Sweating  · Anxiety  · Lightheadedness, dizziness, or fainting  Date Last Reviewed: 9/21/2015  © 7365-0334 [employer], [employer]. [address] [place] [address], [place] [address]. All rights reserved. This information is not intended as a substitute for professional medical care. Always follow your healthcare professional's instructions.

## 2017-03-20 NOTE — ED AVS SNAPSHOT
[facility]  [address]  [facility] [address]               Rajan Cooper III   3/20/2017  5:49 PM   ED    Description:  Male : 1955   Department:  [facility]           Your Care was Coordinated By:     Provider Role From To    Uriel Arevalo MD Attending Provider [phone] --    Ele Meyer PA-C Physician Assistant [phone] --      Reason for Visit     Leg Swelling           Diagnoses this Visit        Comments    Acute deep vein thrombosis (DVT) of proximal vein of right lower extremity    -  Primary       ED Disposition     None           To Do List           Follow-up Information     Follow up with Tom Garcia MD. Go in 1 week.    Specialty:  Internal Medicine    Contact information:    [address]  [facility] [address]  [phone]        [facility]     [facility] -  Assistance  Registered nurses in the [facility] provide clinical advisement, health education, appointment booking, and other advisory services.  Call for this free service at [phone].             Medications           Message regarding Medications     Verify the changes and/or additions to your medication regime listed below are the same as discussed with your clinician today.  If any of these changes or additions are incorrect, please notify your healthcare provider.             Verify that the below list of medications is an accurate representation of the medications you are currently taking.  If none reported, the list may be blank. If incorrect, please contact your healthcare provider. Carry this list with you in case of emergency.           Current Medications     albuterol 90 mcg/actuation inhaler Inhale 2 puffs into the lungs every 8 (eight) hours.    amlodipine (NORVASC) 10 MG tablet TAKE 1 TABLET BY MOUTH ONCE DAILY    baclofen (LIORESAL) 10 MG tablet TAKE 1 TABLET BY MOUTH 3 TIMES A DAY     "dextrose 5 % SolP 500 mL with ceFAZolin 1 gram SolR 6 g Inject 6 g into the vein continuous.    furosemide (LASIX) 40 MG tablet TAKE 1 TABLET BY MOUTH 2 TIMES A DAY    losartan (COZAAR) 25 MG tablet TAKE 1 TABLET BY MOUTH ONCE DAILY    modafinil (PROVIGIL) 200 MG Tab Take 1 tablet (200 mg total) by mouth once daily.    hydrocodone-acetaminophen 5-325mg (NORCO) 5-325 mg per tablet Take 1 tablet by mouth every 8 (eight) hours as needed for Pain.    INTERFERON BETA-1A (AVONEX IM) Inject into the muscle.      senna (SENOKOT) 8.6 mg tablet Take 1 tablet by mouth daily as needed for Constipation.    sildenafil (VIAGRA) 100 MG tablet Take 1 tablet (100 mg total) by mouth daily as needed for Erectile Dysfunction.    triamcinolone acetonide 0.1% (KENALOG) 0.1 % cream            Clinical Reference Information           Your Vitals Were     BP Pulse Temp Resp Height Weight    134/87 (BP Location: Right arm, Patient Position: Sitting, BP Method: Automatic) 93 98.2 °F (36.8 °C) (Oral) 16 6' 1" (1.854 m) 131.5 kg (290 lb)    SpO2 BMI             98% 38.26 kg/m2         Allergies as of 3/20/2017        Reactions    Norco [Hydrocodone-acetaminophen] Anaphylaxis      Immunizations Administered on Date of Encounter - 3/20/2017     None      ED Micro, Lab, POCT     None      ED Imaging Orders     None        Discharge Instructions         Deep Vein Thrombosis (DVT)    Deep vein thrombosis (DVT) occurs when a blood clot (thrombus) forms in a deep vein. This happens most often in the leg. It can also happen in the arms or other parts of the body. A part of the clot called an embolus can break off and travel to the lungs. When this happens, its called a pulmonary embolism (PE). PE is a medical emergency. It can cut off blood flow and lead to death. Both DVT and PE are closely related. Together, they are often referred to by the term venous thromboembolism (VTE).  Risk factors for DVT  Anything that slows blood flow, injures the lining " of a vein, or increases blood clotting can make you more prone to having DVT. This includes the following:  · Long periods without movement (such as when sitting for many hours at a time or when recovering from major surgery or illness)  · Estrogen (female hormone) therapy, such as hormone replacement therapy (HRT) or oral contraceptives  · Fractured hip or leg  · Major surgery or joint replacement  · Major trauma or spinal cord injury  · Cancer  · Family history  · Excess weight or obesity  · Smoking  · Older age  Symptoms  DVT does not always cause symptoms. When symptoms do occur, they may appear around the site of the DVT, such as in the leg. Possible symptoms include:  · Swelling  · Pain  · Warmth  · Redness  · Tenderness  Home care  · You were likely prescribed blood thinners (anticoagulants). They may be given as pills (oral) or shots (injections). Follow all instructions when using these medicines. Note: Do not take blood thinners with other medicines, herbal remedies, or supplements without talking to your provider first. Certain medicines or products can affect how blood thinners work.  · Follow your providers instructions about activity and rest.  · If support or compression stockings are prescribed, wear them as directed. These may help improve blood flow in the legs.  · When sitting or lying down, move your ankles, toes and knees often. This may also help improve blood flow in the legs.  Follow-up care  Follow up with your healthcare provider, or as advised. If imaging tests were done, they may need further review by a doctor. You will be told of any new findings that may affect your care.  When to seek medical advice  Call your healthcare provider right away if any of these occur:  · New or increased swelling, pain, tenderness, warmth, or redness, in the leg, arm, or other area  · Blood in the urine  · Bleeding with bowel movements  Call 911  Call 911 right away if any of these occur:  · Bleeding from  the nose, gums, a cut, or vagina  · Heavy or uncontrolled bleeding  · Trouble breathing  · Chest pain or discomfort that worsens with deep breathing or coughing  · Coughing (may cough up blood)  · Fast heartbeat  · Sweating  · Anxiety  · Lightheadedness, dizziness, or fainting  Date Last Reviewed: 9/21/2015 © 2000-2016 [employer]. [address] [place] [address]. All rights reserved. This information is not intended as a substitute for professional medical care. Always follow your healthcare professional's instructions.          Your Scheduled Appointments     Mar 22, 2017  1:00 PM CDT   Established Patient Visit with MD Brandon Lees shannon - Infectious Diseases ([facility] )    [address]  [place] [id number] [phone]            Mar 27, 2017 10:30 AM CDT   Post OP with MARIA DOLORES Espinosa - Orthopedics ([facility] )    [address]  [place] [id number] [phone]               [facility] complies with applicable Federal civil rights laws and does not discriminate on the basis of race, color, national origin, age, disability, or sex.        Language Assistance Services     ATTENTION: Language assistance services are available, free of charge. Please call [phone].      ATENCIÓN: Si habla español, tiene a wang disposición servicios gratuitos de asistencia lingüística. Llame al [phone].     CHÚ Ý: N?u b?n nói Ti?ng Vi?t, có các d?ch v? h? tr? ngôn ng? mi?n phí dành cho b?n. G?i s? [phone].

## 2017-03-20 NOTE — ED NOTES
"Patient went to [facility] for [facility] follow, ortho sent patient to ultrasound because of swelling in leg, called to come to ER after u/s revealed blood clot in right leg. Patient states he has MS and doesn't feel "a whole lot below waist."  Denies SOB, denies CP. Denies fevers. Patient had recent discharge from hospital last Tuesday.   "

## 2017-03-20 NOTE — ED NOTES
Patient identifiers verified and correct for Rajan Cooper III.    LOC: The patient is awake, alert and aware of environment with an appropriate affect, the patient is oriented x 3 and speaking appropriately.  APPEARANCE: Patient resting comfortably and in no acute distress, patient is clean and well groomed, patient's clothing is properly fastened.  SKIN: The skin is warm and dry, color consistent with ethnicity, patient has normal skin turgor and moist mucus membranes, PICC in place to right upper arm with antibiotics infusing, blanchable redness to right 4th toe.   MUSCULOSKELETAL: Patient with MS, limited ROM to BLE, no obvious swelling or deformities noted.  RESPIRATORY: Airway is open and patent, respirations are spontaneous, patient has a normal effort and rate, no accessory muscle use noted, bilateral breath sounds clear.  CARDIAC: Patient has a normal rate and regular rhythm, 2+ pitting edema to right leg noted, capillary refill < 3 seconds.  ABDOMEN: Soft and non tender to palpation, no distention noted, normoactive bowel sounds present in all four quadrants.  NEUROLOGIC: PERRL, 3mm bilaterally, eyes open spontaneously, behavior appropriate to situation, follows commands, facial expression symmetrical, bilateral hand grasp equal and even, purposeful motor response noted, normal sensation in all extremities when touched with a finger.

## 2017-03-20 NOTE — ED PROVIDER NOTES
Encounter Date: 3/20/2017       History     Chief Complaint   Patient presents with    Leg Swelling     sent here after ultrasound has blood clot in R leg     Review of patient's allergies indicates:   Allergen Reactions    Norco [hydrocodone-acetaminophen] Anaphylaxis     HPI Comments: Patient is a 61-year-old male with past medical history of multiple sclerosis, hypertension, and recent hospitalization for bacteremia who presents to the emergency department due to DVT found on outpatient ultrasound.  Patient states that he went orthopedics this morning to get sutures removed and was noted to have an enlarged right calf.  Orthopedics sent the patient for an outpatient lower extremity ultrasound which showed acute DVT of the Anterior Tibial and Posterior Tibial Vein.  Patient is asymptomatic and denies any pain in the right lower extremity.  Patient denies any shortness of breath, no chest pain, no fevers, no chills, or any other complaints at time of exam.  Due to DVT patient did present to the emergency department.    The history is provided by the patient.     Past Medical History:   Diagnosis Date    Hypertension     MS (multiple sclerosis)      No past surgical history on file.  Family History   Problem Relation Age of Onset    Diabetes Mother     Cancer Father     Hypertension Neg Hx      Social History   Substance Use Topics    Smoking status: Never Smoker    Smokeless tobacco: Not on file    Alcohol use No     Review of Systems   Constitutional: Negative for activity change, appetite change, chills, fatigue, fever and unexpected weight change.   HENT: Negative for congestion, dental problem, ear discharge, facial swelling, nosebleeds, rhinorrhea and sneezing.    Eyes: Negative for pain, discharge, redness and visual disturbance.   Respiratory: Negative for apnea, cough, chest tightness, shortness of breath and wheezing.    Cardiovascular: Negative for chest pain and palpitations.    Gastrointestinal: Negative for abdominal distention, anal bleeding, constipation, diarrhea, nausea and vomiting.   Endocrine: Negative for cold intolerance, polydipsia and polyuria.   Genitourinary: Negative for difficulty urinating, enuresis, frequency, hematuria and penile swelling.   Musculoskeletal: Negative for arthralgias, gait problem, myalgias and neck stiffness.   Allergic/Immunologic: Negative for environmental allergies.   Neurological: Negative for dizziness, numbness and headaches.   Hematological: Negative for adenopathy.   Psychiatric/Behavioral: Negative for agitation.       Physical Exam   Initial Vitals   BP Pulse Resp Temp SpO2   03/20/17 1549 03/20/17 1549 03/20/17 1549 03/20/17 1549 03/20/17 1549   131/67 88 18 98.6 °F (37 °C) 98 %     Physical Exam    Nursing note and vitals reviewed.  Constitutional: He appears well-developed and well-nourished.   HENT:   Head: Normocephalic and atraumatic.   Eyes: EOM are normal. Pupils are equal, round, and reactive to light.   Neck: Normal range of motion. Neck supple. No thyromegaly present. No tracheal deviation present. No JVD present.   Cardiovascular: Normal rate and regular rhythm. Exam reveals no gallop and no friction rub.    No murmur heard.  Pulmonary/Chest: Breath sounds normal. No stridor. No respiratory distress. He has no wheezes. He has no rhonchi. He has no rales. He exhibits no tenderness.   Abdominal: Soft. Bowel sounds are normal. He exhibits no distension and no mass. There is no tenderness. There is no rebound and no guarding.   Musculoskeletal: Normal range of motion. He exhibits edema.   Right calf swelling   Lymphadenopathy:     He has no cervical adenopathy.   Neurological: He is alert and oriented to person, place, and time.   Skin:   PICC line in place on right upper extremity          ED Course   Procedures  Labs Reviewed - No data to display          Medical Decision Making:   History:   Old Medical Records: I decided to  obtain old medical records.       APC / Resident Notes:   Patient is a 61-year-old male with past medical history of multiple sclerosis, hypertension, and recent hospitalization for bacteremia who presents to the emergency department due to DVT found on outpatient ultrasound.  Patient is asymptomatic and has no acute complaints.  Patient will be given a dose of Eliquis and will be discharged on Eliquis BID.  Patient is to return if symptoms appear.  Plan of treatment discussed with the attending physician and he is agreeable to above plan.              ED Course     Clinical Impression:   The encounter diagnosis was Acute deep vein thrombosis (DVT) of proximal vein of right lower extremity.    Disposition:   Disposition: Discharged  Condition: Stable       Ele Meyer PA-C  03/20/17 1954

## 2017-03-22 ENCOUNTER — TELEPHONE (OUTPATIENT)
Dept: INFECTIOUS DISEASES | Facility: CLINIC | Age: 62
End: 2017-03-22

## 2017-03-22 ENCOUNTER — OFFICE VISIT (OUTPATIENT)
Dept: INFECTIOUS DISEASES | Facility: CLINIC | Age: 62
End: 2017-03-22
Payer: COMMERCIAL

## 2017-03-22 VITALS
DIASTOLIC BLOOD PRESSURE: 74 MMHG | WEIGHT: 271.63 LBS | BODY MASS INDEX: 36 KG/M2 | HEIGHT: 73 IN | TEMPERATURE: 98 F | HEART RATE: 84 BPM | SYSTOLIC BLOOD PRESSURE: 124 MMHG

## 2017-03-22 DIAGNOSIS — G35 MS (MULTIPLE SCLEROSIS): ICD-10-CM

## 2017-03-22 DIAGNOSIS — M00.032 STAPHYLOCOCCAL ARTHRITIS OF LEFT WRIST: Primary | ICD-10-CM

## 2017-03-22 PROCEDURE — 99999 PR PBB SHADOW E&M-EST. PATIENT-LVL IV: CPT | Mod: PBBFAC,,, | Performed by: INTERNAL MEDICINE

## 2017-03-22 PROCEDURE — 99214 OFFICE O/P EST MOD 30 MIN: CPT | Mod: S$GLB,,, | Performed by: INTERNAL MEDICINE

## 2017-03-22 PROCEDURE — 1160F RVW MEDS BY RX/DR IN RCRD: CPT | Mod: S$GLB,,, | Performed by: INTERNAL MEDICINE

## 2017-03-22 PROCEDURE — 3078F DIAST BP <80 MM HG: CPT | Mod: S$GLB,,, | Performed by: INTERNAL MEDICINE

## 2017-03-22 PROCEDURE — 3074F SYST BP LT 130 MM HG: CPT | Mod: S$GLB,,, | Performed by: INTERNAL MEDICINE

## 2017-03-22 NOTE — PROGRESS NOTES
Mr. Cooper  is  here today for a post-operative visit after a   1. Incision and drainage, right ankle abscess.  2. Right ankle arthrotomy with irrigation of septic arthritis.   by  on 03/10/2017  AND  1. Arthrocentesis, right ankle.  2. Incision, drainage and irrigation of left dorsal wrist abscess.  3. Left wrist arthrotomy with irrigation.  by Dr. Shipley  on 03/04/2017.    he reports that he is doing ok.  Pain is controlled.  he is not taking pain medication.  He is at home. He is recieving home health.   Reports swelling in right ankle since surgery. He reports that if he elevates the swelling will decrease but some always stays around.    - culture 03/04/2017 grew:STAPHYLOCOCCUS AUREUS treating with Ancef is followed by infectious disease    he denies fever, chills, and sweats since the time of the surgery.     Physical exam:  Post op dressing taken down.  Incision is clean, dry and intact.  Sutures removed without difficulty from wrist only. ankle wound appears macerated, moderate swelling to right lower extremity, mild calf pain with palpation, negative Heidi    Assessment:  Post-op visit ( 2 weeks)    Plan:  -  Keep area covered.   - work on range of motion.   - continue antibiotic as prescribed by infectious disease.   - FOR WRIST ONLY: The patient was advised to keep the incision clean and dry for the next 24 hours after which he may wash the area with antibacterial soap in the shower. Will not submerge until the incision is completely healed  - return to clinic in 1 week at time consider removal of remaining sutures.     - Order placed for DVT ultrasound. If positive he is to got to ED

## 2017-03-22 NOTE — PROGRESS NOTES
Infectious Diseases Clinic Note    Subjective:       Patient ID: Rajan Cooper III is a 61 y.o. male.    Chief Complaint: Hospital Follow Up    HPI    62 y/o M h/o MS admitted 3/4 with L septic wrist and R septic ankle both with MSSA (also in blood and urine cultures) s/p L wrist washout and debridemnt and ankle arthrocentsis cleared bcx 3/7, TTE negative sent donald with cefazolin x 6 weeks from last I&D with estimated end date 4/15/17 with PICC malfunction 3/17 replaced by IR also recently found to have DVT on apixaban    ESR >130, .5  WBC 7.2, Cr 0.6      Past Medical History:   Diagnosis Date    Hypertension     MS (multiple sclerosis)        Social History     Social History    Marital status:      Spouse name: N/A    Number of children: N/A    Years of education: N/A     Occupational History    Not on file.     Social History Main Topics    Smoking status: Never Smoker    Smokeless tobacco: Not on file    Alcohol use No    Drug use: No    Sexual activity: Not on file     Other Topics Concern    Not on file     Social History Narrative         Current Outpatient Prescriptions:     albuterol 90 mcg/actuation inhaler, Inhale 2 puffs into the lungs every 8 (eight) hours., Disp: 1 Inhaler, Rfl: 12    amlodipine (NORVASC) 10 MG tablet, TAKE 1 TABLET BY MOUTH ONCE DAILY, Disp: 90 tablet, Rfl: 3    apixaban 5 mg Tab, Take 1 tablet (5 mg total) by mouth 2 (two) times daily., Disp: 30 tablet, Rfl: 0    baclofen (LIORESAL) 10 MG tablet, TAKE 1 TABLET BY MOUTH 3 TIMES A DAY, Disp: 90 tablet, Rfl: 5    dextrose 5 % SolP 500 mL with ceFAZolin 1 gram SolR 6 g, Inject 6 g into the vein continuous., Disp: , Rfl:     furosemide (LASIX) 40 MG tablet, TAKE 1 TABLET BY MOUTH 2 TIMES A DAY, Disp: 180 tablet, Rfl: 3    hydrocodone-acetaminophen 5-325mg (NORCO) 5-325 mg per tablet, Take 1 tablet by mouth every 8 (eight) hours as needed for Pain., Disp: 30 tablet, Rfl: 0    INTERFERON BETA-1A (AVONEX IM),  Inject into the muscle.  , Disp: , Rfl:     losartan (COZAAR) 25 MG tablet, TAKE 1 TABLET BY MOUTH ONCE DAILY, Disp: 90 tablet, Rfl: 3    modafinil (PROVIGIL) 200 MG Tab, Take 1 tablet (200 mg total) by mouth once daily., Disp: 400 tablet, Rfl: 3    senna (SENOKOT) 8.6 mg tablet, Take 1 tablet by mouth daily as needed for Constipation., Disp: , Rfl:     sildenafil (VIAGRA) 100 MG tablet, Take 1 tablet (100 mg total) by mouth daily as needed for Erectile Dysfunction., Disp: 10 tablet, Rfl: 11    triamcinolone acetonide 0.1% (KENALOG) 0.1 % cream, , Disp: , Rfl: 0    Review of Systems   Constitutional: Negative for activity change, appetite change, chills, fatigue and fever.   HENT: Negative for congestion, dental problem, mouth sores and sinus pressure.    Eyes: Negative for pain, redness and visual disturbance.   Respiratory: Negative for cough, shortness of breath and wheezing.    Cardiovascular: Negative for chest pain and leg swelling.   Gastrointestinal: Negative for abdominal distention, abdominal pain, diarrhea, nausea and vomiting.   Endocrine: Negative for polyuria.   Genitourinary: Negative for decreased urine volume, dysuria and frequency.   Musculoskeletal: Negative for joint swelling.   Skin: Negative for color change.   Allergic/Immunologic: Negative for food allergies.   Neurological: Negative for dizziness, weakness and headaches.   Hematological: Negative for adenopathy.   Psychiatric/Behavioral: Negative for agitation and confusion. The patient is not nervous/anxious.            Objective:      Vitals:    03/22/17 1242   BP: 124/74   Pulse: 84   Temp: 98.3 °F (36.8 °C)     Physical Exam   Constitutional: He is oriented to person, place, and time. He appears well-developed and well-nourished.   HENT:   Head: Normocephalic and atraumatic.   Mouth/Throat: Oropharynx is clear and moist.   Eyes: Conjunctivae are normal.   Neck: Neck supple.   Cardiovascular: Normal rate, regular rhythm and normal  heart sounds.    No murmur heard.  Pulmonary/Chest: Effort normal and breath sounds normal. No respiratory distress. He has no wheezes.   Abdominal: Soft. Bowel sounds are normal. He exhibits no distension. There is no tenderness.   Musculoskeletal: Normal range of motion. He exhibits no edema or tenderness.   L wrist well healed scar with peeling skin, mild edema   Lymphadenopathy:     He has no cervical adenopathy.   Neurological: He is alert and oriented to person, place, and time. Coordination normal.   Skin: Skin is warm and dry. No rash noted.   Psychiatric: He has a normal mood and affect. His behavior is normal.     dressing in place RLE      Assessment/Plan:       No diagnosis found.    60 y/o M h/o MS admitted 3/4 with L septic wrist and R septic ankle both with MSSA (also in blood and urine cultures) s/p L wrist washout and debridemnt and ankle arthrocentsis cleared bcx 3/7, TTE negative sent donald with cefazolin x 6 weeks from last I&D with estimated end date 4/15/17 with PICC malfunction 3/17 replaced by IR also recently found to have DVT on apixaban still with very high inflammatory markers  - for now continue cefazolin as wounds appear to be healing well  - trend inflammatory markers  -RTC around 4/15

## 2017-03-26 NOTE — DISCHARGE SUMMARY
[facility] Hospital Medicine  Discharge Summary      Patient Name: Rajan Cooper III  MRN: [id number]  Admission Date: 3/4/2017  Hospital Length of Stay: 8 days  Discharge Date and Time: 3/14/2017  4:49 PM  Attending Physician: No att. providers found   Discharging Provider: Cira Wade MD  Primary Care Provider: Tom Garcia MD    [facility] Medicine Team: [facility] MED S Cira Wade MD    Principal Problem:Staphylococcus aureus sepsis     HPI:  Rajan Cooper III is a 61-year-old male w/ past medical history significant for multiple sclerosis and HTN who presented to the ED on 3/4/17 w/ worsening L wrist > R ankle pain of 5 days duration. Denied trauma or other precipitating event, h/o septic joint, h/o gout, fevers, chills, recent infection, CP, SOB, dysuria, or new numbness/tingling/weakness. WBC 22K w/ a CRP of 425. Concern for left dorsal wrist abscess with possible septic wrist and right ankle arthritis.     Able to bear weight but painful. Usually ambulates without assistive device at baseline. Takes baclofen and Avonex for MS.     Procedure(s) (LRB):  INCISION AND DRAINAGE (I&D), ABSCESS (Right)  ARTHROTOMY-ANKLE (Right)      Indwelling Lines/Drains at time of discharge:   Lines/Drains/Airways     Peripherally Inserted Central Catheter Line                 PICC Double Lumen 03/17/17 1112 right basilic 9 days          Epidural Line                 Perineural Analgesia/Anesthesia Assessment (using dermatomes) Epidural 03/10/17 1400 16 days              Hospital Course:     MSSA  Bacteremia and septic arthritis   Pt was originally admitted to the Orthopedic service. He had an Arthrocentesis of the right ankle, incision and drainage w/irrigation of left dorsal wrist abscess, and left wrist arthrotomy with irrigation on 3/4/17. Patient was started on abx with ceftriaxone and vancomycin post operatively. ID was consulted for management recs.    On 3/7/17 - pt was transferred to medicine  as patient developed worsening tachycardia and fever t 101.2 F.  MSSA grew from left wrist cultures, urine culture, and blood cultures.  Pt was switched to continuos infusion of Ancef per ID recs.  TTE was done and was negative for endocarditis. MRI of the right ankle was done to r/o osteo as pt had continued tenderness and warmth on exam. Septic arthritis, tenosynovitis, and possible anterolateral abscess was noted. Pt subsequently underwent abscess drainage on 03/10/17 of the right ankle by ortho. Pt subsequently improved as WBC normalized and symptoms improved. Blood cultures cleared. PICC line was placed and pt was discharged to complete 6 weeks of IV abx (ancef). Pt to f/u with ortho and ID upon discharge.      MS (multiple sclerosis)  Pts interferon was held interferon given active infection. Pt was put on baclofen. Restarted upon dc.     Consults:   Consults         Status Ordering Provider     Inpatient consult to Hospital Medicine-Ortho Comanagement Only  Once     Provider:  (Not yet assigned)    Completed LEIGH ANN ANNE     Inpatient consult to Infectious Diseases  Once     Provider:  (Not yet assigned)    Completed KARRI SIERRA     Inpatient consult to Orthopedic Surgery  Once     Provider:  (Not yet assigned)    Completed MEDARDO RAGSDALE     Inpatient consult to [facility] team ([facility])  Once     Provider:  (Not yet assigned)    Completed KARRI SIERRA     Inpatient consult to PICC team ([facility])  Once     Provider:  (Not yet assigned)    Completed LEIGH ANN ANNE     Inpatient consult to PICC team ([facility])  Once     Provider:  (Not yet assigned)    Completed CORIE GORDILLO          Significant Diagnostic Studies: Labs: CBC No results for input(s): WBC, HGB, HCT, PLT in the last 48 hours.  Microbiology:   Blood Culture   Microbiology Results (last 7 days)     Procedure Component Value Units Date/Time    Aerobic culture [[id number]]  (Susceptibility) Collected:  03/10/17 1522    Order Status:   Completed Specimen:  Body Fluid from Ankle, Right Updated:  03/14/17 1104     Aerobic Bacterial Culture --     STAPHYLOCOCCUS AUREUS  Rare      Culture, Anaerobe [[id number]] Collected:  03/10/17 1522    Order Status:  Completed Specimen:  Body Fluid from Ankle, Right Updated:  03/14/17 1058     Anaerobic Culture No anaerobes isolated    Fungus culture [[id number]] Collected:  03/10/17 1522    Order Status:  Completed Specimen:  Body Fluid from Ankle, Right Updated:  03/14/17 0757     Fungus (Mycology) Culture Culture in progress    AFB Culture & Smear [[id number]] Collected:  03/10/17 1522    Order Status:  Completed Specimen:  Body Fluid from Ankle, Right Updated:  03/13/17 1454     AFB Culture & Smear Culture in progress     AFB CULTURE STAIN No acid fast bacilli seen.    Urine culture [[id number]] Collected:  03/11/17 1240    Order Status:  Completed Specimen:  Urine from Urine, Clean Catch Updated:  03/12/17 2213     Urine Culture, Routine No growth    Blood culture [[id number]] Collected:  03/07/17 1251    Order Status:  Completed Specimen:  Blood Updated:  03/12/17 1612     Blood Culture, Routine No growth after 5 days.    Blood culture [[id number]] Collected:  03/07/17 1251    Order Status:  Completed Specimen:  Blood Updated:  03/12/17 1612     Blood Culture, Routine No growth after 5 days.    Blood culture [[id number]] Collected:  03/06/17 1403    Order Status:  Completed Specimen:  Blood Updated:  03/11/17 1812     Blood Culture, Routine No growth after 5 days.    Narrative:       From 2 different sites 30 minutes apart    Blood culture [[id number]] Collected:  03/06/17 1403    Order Status:  Completed Specimen:  Blood Updated:  03/11/17 1812     Blood Culture, Routine No growth after 5 days.    Narrative:       From 2 different sites 30 minutes apart    Blood culture [[id number]] Collected:  03/06/17 1203    Order Status:  Completed Specimen:  Blood Updated:  03/11/17 1412     Blood Culture, Routine No growth  after 5 days.    Urine culture [[id number]] Collected:  03/10/17 0442    Order Status:  Completed Specimen:  Urine from Urine, Clean Catch Updated:  03/11/17 1243     Urine Culture, Routine No growth    Blood culture [[id number]] Collected:  03/05/17 0107    Order Status:  Completed Specimen:  Blood Updated:  03/11/17 0824     Blood Culture, Routine Gram stain aer bottle: Gram positive cocci in clusters resembling Staph      Blood Culture, Routine Results called to and read back by: Serena Mederos RN 03/09/2017       Blood Culture, Routine 08:24     Blood Culture, Routine --     STAPHYLOCOCCUS AUREUS  For susceptibility see order #[id number]      Narrative:       Collection has been rescheduled by [facility] at 3/5/2017 01:07 Reason: pt   still in recovery. per nurse Angelica  Collection has been rescheduled by [facility] at 3/5/2017 01:07 Reason: pt   still in recovery. per nurse Angelica    Gram stain [[id number]] Collected:  03/10/17 1522    Order Status:  Completed Specimen:  Body Fluid from Ankle, Right Updated:  03/10/17 1958     Gram Stain Result Rare WBC's      No organisms seen    Blood culture [[id number]] Collected:  03/05/17 0107    Order Status:  Completed Specimen:  Blood from Wrist, Right Updated:  03/10/17 0945     Blood Culture, Routine Gram stain aer bottle: Gram positive cocci in clusters resembling Staph     Blood Culture, Routine Results called to and read back by:Angelica Lima RN 03/08/2017  04:57     Blood Culture, Routine --     STAPHYLOCOCCUS AUREUS  For susceptibility see order #[id number]      Narrative:       Collection has been rescheduled by [facility] at 3/5/2017 01:07 Reason: pt   still in recovery. per nurse Angelica  Collection has been rescheduled by F at 3/5/2017 01:07 Reason: pt   still in recovery. per nurse Angelica    Gram stain [[id number]] Collected:  03/10/17 0442    Order Status:  Completed Specimen:  Urine from Urine Updated:  03/10/17 0937     Gram Stain Result No WBC's      No organisms seen     Culture, Anaerobe [[id number]] Collected:  03/04/17 2032    Order Status:  Completed Specimen:  Tissue from Wrist, Left Updated:  03/09/17 1250     Anaerobic Culture No anaerobes isolated    Culture, Anaerobe [[id number]] Collected:  03/04/17 2032    Order Status:  Completed Specimen:  Tissue from Ankle, Right Updated:  03/09/17 1250     Anaerobic Culture No anaerobes isolated    Culture, Anaerobe [[id number]] Collected:  03/04/17 1303    Order Status:  Completed Specimen:  Abscess from Wrist, Left Updated:  03/09/17 1110     Anaerobic Culture No anaerobes isolated    Narrative:       Second set    Culture, Anaerobe [[id number]] Collected:  03/04/17 1259    Order Status:  Completed Specimen:  Abscess from Wrist, Left Updated:  03/09/17 1110     Anaerobic Culture No anaerobes isolated    Blood culture [[id number]]  (Susceptibility) Collected:  03/06/17 1202    Order Status:  Completed Specimen:  Blood Updated:  03/09/17 0840     Blood Culture, Routine Gram stain cristiano bottle: Gram positive cocci in clusters resembling Staph      Blood Culture, Routine Results called to and read back by: Angelica Hurtado RN  03/07/2017       Blood Culture, Routine 09:17     Blood Culture, Routine STAPHYLOCOCCUS AUREUS          Radiology: MRI 3/7/2017      R ankle and midfoot.   Impression     1.  Large joint effusion with complex appearing fluid and synovitis.  There are associated extensive degenerative changes of the tibiotalar joint with loss of articular cartilage and septic arthritis cannot be excluded.  Clinical correlation with aspiration of fluid is recommended.  2.  Extensive tenosynovitis of the flexor hallucis longus tendon, posterior tibialis tendon, and peroneal tendons.  3.  Multiple small serpiginous linear oriented low T1 and high T2 signal geographic foci within the distal tibial metaphysis which can be seen in the setting of chronic osteomyelitis, Sher's abscess, septic arthritis, or AVN.  4.  Diffuse subcutaneous/soft  "tissue edema throughout the visualized hindfoot and midfoot with a focal fluid collection within the anterolateral soft tissues at the level of the lateral malleolus which may represent abscess; however, evaluation is limited given lack of IV contrast.               Cardiac Graphics: Echocardiogram:   2D echo with color flow doppler:   Results for orders placed or performed during the hospital encounter of 03/04/17   2D echo with color flow doppler   Result Value Ref Range    EF 63 55 - 65    Diastolic Dysfunction No     Pericardial Effusion NONE     Mitral Valve Mobility NORMAL     Tricuspid Valve Regurgitation TRIVIAL        Pending Diagnostic Studies:     None        Final Active Diagnoses:    Diagnosis Date Noted POA    Staphylococcal arthritis of left wrist [M00.032] 03/07/2017 Yes    HTN (hypertension) [I10] 11/30/2012 Yes    MS (multiple sclerosis) [G35]  Yes      Problems Resolved During this Admission:    Diagnosis Date Noted Date Resolved POA    PRINCIPAL PROBLEM:  Staphylococcus aureus sepsis [A41.01] 03/06/2017 03/14/2017 Yes    Acute respiratory failure with hypoxemia [J96.01] 03/06/2017 03/10/2017 No    Abscess of left hand [L02.512] 03/04/2017 03/14/2017 Yes      Discharged Condition: good    Disposition: Home or Self Care    Follow Up:  Follow-up Information     Follow up with Tom Garcia MD.    Specialty:  Internal Medicine    Contact information:    [address]  [place] [id number] [phone]          Patient Instructions:     HOSPITAL BED FOR HOME USE   Order Specific Question Answer Comments   Type: Semi-electric    Length of need (1-99 months): 99    Does patient have medical equipment at home? wheelchair powerchair / powerchair / powerchair / powerchair   Does patient have medical equipment at home? walker, rolling    Does patient have medical equipment at home? shower chair    Does patient have medical equipment at home? bedside commode    Height: 6' 1" (1.854 m)  " "  Weight: 136.1 kg (300 lb)    Please check all that apply: Patient requires positioning of the body in ways not feasible in an ordinary bed due to a medical condition which is expected to last at least one month.    Vendor: [facility]    Expected Date of Delivery: 3/14/2017      WALKER FOR HOME USE   Order Comments: Left platform rolling walker   Order Specific Question Answer Comments   Type of Walker: Adult (5'4"-6'6")    With wheels? Yes    Height: 6' 1" (1.854 m)    Weight: 136.1 kg (300 lb)    Length of need (1-99 months): 99    Does patient have medical equipment at home? wheelchair powerchair / powerchair / powerchair / powerchair   Does patient have medical equipment at home? walker, rolling    Does patient have medical equipment at home? shower chair    Does patient have medical equipment at home? bedside commode    Please check all that apply: Patient's condition impairs ambulation.    Vendor: [facility]    Expected Date of Delivery: 3/14/2017      Diet general     Activity as tolerated     Call MD for:  temperature >100.4     Call MD for:  severe uncontrolled pain     Call MD for:  persistent nausea and vomiting or diarrhea     Call MD for:  redness, tenderness, or signs of infection (pain, swelling, redness, odor or green/yellow discharge around incision site)     Call MD for:  difficulty breathing or increased cough     Call MD for:  severe persistent headache     Call MD for:  worsening rash     Call MD for:  persistent dizziness, light-headedness, or visual disturbances     Call MD for:  increased confusion or weakness       Medications:  Reconciled Home Medications:   Discharge Medication List as of 3/14/2017 12:26 PM      START taking these medications    Details   albuterol 90 mcg/actuation inhaler Inhale 2 puffs into the lungs every 8 (eight) hours., Starting 3/13/2017, Until Wed 4/12/17, Normal      dextrose 5 % SolP 500 mL with ceFAZolin 1 gram SolR 6 g Inject 6 g into the vein continuous., " Starting 3/13/2017, Until Sat 4/15/17, No Print      senna (SENOKOT) 8.6 mg tablet Take 1 tablet by mouth daily as needed for Constipation., Starting 3/13/2017, Until Discontinued, OTC         CONTINUE these medications which have NOT CHANGED    Details   baclofen (LIORESAL) 10 MG tablet TAKE 1 TABLET BY MOUTH 3 TIMES A DAY, Normal      furosemide (LASIX) 40 MG tablet TAKE 1 TABLET BY MOUTH 2 TIMES A DAY, Normal      hydrocodone-acetaminophen 5-325mg (NORCO) 5-325 mg per tablet Take 1 tablet by mouth every 8 (eight) hours as needed for Pain., Starting 3/1/2017, Until Discontinued, Print      INTERFERON BETA-1A (AVONEX IM) Inject into the muscle.  , Until Discontinued, Historical Med      losartan (COZAAR) 25 MG tablet TAKE 1 TABLET BY MOUTH ONCE DAILY, Normal      sildenafil (VIAGRA) 100 MG tablet Take 1 tablet (100 mg total) by mouth daily as needed for Erectile Dysfunction., Starting 1/20/2014, Until Discontinued, Normal      triamcinolone acetonide 0.1% (KENALOG) 0.1 % cream Starting 3/5/2015, Until Discontinued, Historical Med      amlodipine (NORVASC) 10 MG tablet TAKE 1 TABLET BY MOUTH ONCE DAILY, Normal      modafinil (PROVIGIL) 200 MG Tab Take 1 tablet (200 mg total) by mouth once daily., Starting 3/3/2017, Until Discontinued, Print         STOP taking these medications       VASCULERA 630 mg Tab Comments:   Reason for Stopping:             Time spent on the discharge of patient: >30 minutes    Cira Wade MD  Department of Hospital Medicine  [facility]

## 2017-03-27 ENCOUNTER — OFFICE VISIT (OUTPATIENT)
Dept: ORTHOPEDICS | Facility: CLINIC | Age: 62
End: 2017-03-27
Payer: COMMERCIAL

## 2017-03-27 VITALS — WEIGHT: 271.63 LBS | HEIGHT: 73 IN | BODY MASS INDEX: 36 KG/M2

## 2017-03-27 DIAGNOSIS — M00.9 INFECTION OF JOINT OF ANKLE: ICD-10-CM

## 2017-03-27 DIAGNOSIS — T80.219D PICC LINE INFECTION, SUBSEQUENT ENCOUNTER: Primary | ICD-10-CM

## 2017-03-27 DIAGNOSIS — M00.032 STAPHYLOCOCCAL ARTHRITIS OF LEFT WRIST: Primary | ICD-10-CM

## 2017-03-27 DIAGNOSIS — H59.89 LEAKING OF CONJUNCTIVAL DRAINAGE BLEB: Primary | ICD-10-CM

## 2017-03-27 DIAGNOSIS — Z09 S/P ORTHOPEDIC SURGERY, FOLLOW-UP EXAM: ICD-10-CM

## 2017-03-27 PROCEDURE — 99024 POSTOP FOLLOW-UP VISIT: CPT | Mod: S$GLB,,, | Performed by: PHYSICIAN ASSISTANT

## 2017-03-27 PROCEDURE — 99999 PR PBB SHADOW E&M-EST. PATIENT-LVL III: CPT | Mod: PBBFAC,,, | Performed by: PHYSICIAN ASSISTANT

## 2017-03-28 ENCOUNTER — TELEPHONE (OUTPATIENT)
Dept: ORTHOPEDICS | Facility: CLINIC | Age: 62
End: 2017-03-28

## 2017-03-28 ENCOUNTER — HOSPITAL ENCOUNTER (OUTPATIENT)
Dept: INTERVENTIONAL RADIOLOGY/VASCULAR | Facility: HOSPITAL | Age: 62
Discharge: HOME | End: 2017-03-28
Attending: INTERNAL MEDICINE
Payer: COMMERCIAL

## 2017-03-28 DIAGNOSIS — H59.89 LEAKING OF CONJUNCTIVAL DRAINAGE BLEB: ICD-10-CM

## 2017-03-28 DIAGNOSIS — T80.219D PICC LINE INFECTION, SUBSEQUENT ENCOUNTER: ICD-10-CM

## 2017-03-28 PROCEDURE — 76937 US GUIDE VASCULAR ACCESS: CPT | Mod: 26,,, | Performed by: RADIOLOGY

## 2017-03-28 PROCEDURE — 77001 FLUOROGUIDE FOR VEIN DEVICE: CPT | Mod: 26,,, | Performed by: RADIOLOGY

## 2017-03-28 PROCEDURE — 36569 INSJ PICC 5 YR+ W/O IMAGING: CPT | Mod: LT,,, | Performed by: RADIOLOGY

## 2017-03-28 PROCEDURE — 36561 INSERT TUNNELED CV CATH: CPT

## 2017-03-28 NOTE — PROGRESS NOTES
Mr. Cooper  is  here today for a post-operative visit after a   1. Incision and drainage, right ankle abscess.  2. Right ankle arthrotomy with irrigation of septic arthritis.   by  on 03/10/2017  AND  1. Arthrocentesis, right ankle.  2. Incision, drainage and irrigation of left dorsal wrist abscess.  3. Left wrist arthrotomy with irrigation.  by Dr. Shipley  on 03/04/2017.    he reports that he is doing ok.  Pain is controlled.  he is not taking pain medication.  He is at home. He is recieving home health.   Reports swelling in right ankle since surgery. Which has decreased since treatment for DVT in leg started after last visit. Patient does not have follow up for DVT.     - culture 03/04/2017 grew:STAPHYLOCOCCUS AUREUS treating with Ancef is followed by infectious disease. He reports that his PICC line has been leaking a clear sticky fluid. He stated that it will leave his shirt wet and a puddle in his bed.     he denies fever, chills, and sweats since the time of the surgery.     Physical exam:  dressing taken down.  WRIST: decreased range of motion in all planes, mild increased warmth and erythema  ANKLE:  Incision is clean, dry and intact.  Sutures in place, mild swelling to right lower extremity, decreased sensation.     Assessment:  Post-op visit ( 3/2 weeks)    Plan:  -  Keep area covered.   - work on range of motion.   - continue antibiotic as prescribed by infectious disease., he is to go to ID for review of PICC line.    - FOR WRIST ONLY: The patient was advised to keep the incision clean and dry for the next 24 hours after which he may wash the area with antibacterial soap in the shower. Will not submerge until the incision is completely healed  - return to clinic in 1 week at time consider removal of remaining sutures.

## 2017-03-28 NOTE — H&P
Radiology History & Physical      SUBJECTIVE:     Chief Complaint: right arm PICC leaking    History of Present Illness:  Rajan Cooper III is a 61 y.o. male who presents for right arm PICC removal and left arm PICC placement. Has had problems with persistent leaking around the PICC despite exchange. Has cellulitis in right hand but upper arm appears clear of any redness/inflammation.     Past Medical History:   Diagnosis Date    Hypertension     MS (multiple sclerosis)      No past surgical history on file.    Home Meds:   Prior to Admission medications    Medication Sig Start Date End Date Taking? Authorizing Provider   albuterol 90 mcg/actuation inhaler Inhale 2 puffs into the lungs every 8 (eight) hours. 3/13/17 4/12/17  Trenton Brian MD   amlodipine (NORVASC) 10 MG tablet TAKE 1 TABLET BY MOUTH ONCE DAILY 3/19/17   Tom Garcia MD   apixaban 5 mg Tab Take 1 tablet (5 mg total) by mouth 2 (two) times daily. 3/20/17   Ele Meyer PA-C   baclofen (LIORESAL) 10 MG tablet TAKE 1 TABLET BY MOUTH 3 TIMES A DAY 1/19/15   Jose Shi MD   dextrose 5 % SolP 500 mL with ceFAZolin 1 gram SolR 6 g Inject 6 g into the vein continuous. 3/13/17 4/15/17  Trenton Brian MD   furosemide (LASIX) 40 MG tablet TAKE 1 TABLET BY MOUTH 2 TIMES A DAY 2/10/16   Tom Garcia MD   hydrocodone-acetaminophen 5-325mg (NORCO) 5-325 mg per tablet Take 1 tablet by mouth every 8 (eight) hours as needed for Pain. 3/1/17   Tom Garcia MD   INTERFERON BETA-1A (AVONEX IM) Inject into the muscle.      Historical Provider, MD   losartan (COZAAR) 25 MG tablet TAKE 1 TABLET BY MOUTH ONCE DAILY 2/10/16   Tom Garcia MD   modafinil (PROVIGIL) 200 MG Tab Take 1 tablet (200 mg total) by mouth once daily. 3/20/17   Jose Shi MD   senna (SENOKOT) 8.6 mg tablet Take 1 tablet by mouth daily as needed for Constipation. 3/13/17   Trenton Brian MD   sildenafil (VIAGRA) 100 MG tablet Take 1 tablet (100 mg  total) by mouth daily as needed for Erectile Dysfunction. 1/20/14   Tom Garcia MD   triamcinolone acetonide 0.1% (KENALOG) 0.1 % cream  3/5/15   Historical Provider, MD     Anticoagulants/Antiplatelets: no anticoagulation    Allergies:   Review of patient's allergies indicates:   Allergen Reactions    Norco [hydrocodone-acetaminophen] Anaphylaxis     Sedation History:  no adverse reactions    Review of Systems:   Hematological: no known coagulopathies  Respiratory: no shortness of breath  Cardiovascular: no chest pain  Gastrointestinal: no abdominal pain  Genito-Urinary: no dysuria  Musculoskeletal: negative  Neurological: no TIA or stroke symptoms         OBJECTIVE:     Vital Signs (Most Recent)       Physical Exam:  ASA: NA  Mallampati: NA    General: no acute distress  Mental Status: alert and oriented to person, place and time  HEENT: normocephalic, atraumatic  Chest: unlabored breathing  Heart: regular heart rate  Abdomen: nondistended  Extremity: moves all extremities. Left hand redness/edema.    Laboratory  Lab Results   Component Value Date    INR 1.1 03/04/2017       Lab Results   Component Value Date    WBC 9.15 03/14/2017    HGB 10.1 (L) 03/14/2017    HCT 31.2 (L) 03/14/2017    MCV 90 03/14/2017     (H) 03/14/2017      Lab Results   Component Value Date    GLU 96 03/14/2017     (L) 03/14/2017    K 4.4 03/14/2017     03/14/2017    CO2 25 03/14/2017    BUN 20 03/14/2017    CREATININE 0.7 03/14/2017    CALCIUM 8.5 (L) 03/14/2017    MG 1.8 03/14/2017    ALT 41 03/14/2017    AST 51 (H) 03/14/2017    ALBUMIN 1.7 (L) 03/14/2017    BILITOT 0.6 03/14/2017       ASSESSMENT/PLAN:     Sedation Plan: local anesthesia only  Patient will undergo right arm PICC removal; left arm PICC placement.    Alena Figueroa MD   PGY-5, Radiology   [phone]

## 2017-03-28 NOTE — IP AVS SNAPSHOT
[facility]  [address]  [facility] [address]  Phone: [phone]           Patient Discharge Instructions   Our goal is to set you up for success. This packet includes information on your condition, medications, and your home care.  It will help you care for yourself to prevent having to return to the hospital.     Please ask your nurse if you have any questions.      There are many details to remember when preparing to leave the hospital. Here is what you will need to do:    1. Take your medicine. If you are prescribed medications, review your Medication List on the following pages. You may have new medications to  at the pharmacy and others that you'll need to stop taking. Review the instructions for how and when to take your medications. Talk with your doctor or nurses if you are unsure of what to do.     2. Go to your follow-up appointments. Specific follow-up information is listed in the following pages. Your may be contacted by a nurse or clinical provider about future appointments. Be sure we have all of the phone numbers to reach you. Please contact your provider's office if you are unable to make an appointment.     3. Watch for warning signs. Your doctor or nurse will give you detailed warning signs to watch for and when to call for assistance. These instructions may also include educational information about your condition. If you experience any of warning signs to your health, call your doctor.               [facility]  Unless otherwise directed by your provider, please contact [facility], our nurse care line that is available for 24/7 assistance.     [phone] (toll-free)    Registered nurses in the [facility] provide clinical advisement, health education, appointment booking, and other advisory services.                    ** Verify the list of medication(s) below is accurate and up to date. Carry this with you in case of  emergency. If your medications have changed, please notify your healthcare provider.             Medication List      TAKE these medications        Additional Info                      albuterol 90 mcg/actuation inhaler   Quantity:  1 Inhaler   Refills:  12   Dose:  2 puff    Instructions:  Inhale 2 puffs into the lungs every 8 (eight) hours.     Begin Date    AM    Noon    PM    Bedtime       amlodipine 10 MG tablet   Commonly known as:  NORVASC   Quantity:  90 tablet   Refills:  3    Instructions:  TAKE 1 TABLET BY MOUTH ONCE DAILY     Begin Date    AM    Noon    PM    Bedtime       apixaban 5 mg Tab   Quantity:  30 tablet   Refills:  0   Dose:  5 mg    Instructions:  Take 1 tablet (5 mg total) by mouth 2 (two) times daily.     Begin Date    AM    Noon    PM    Bedtime       AVONEX IM   Refills:  0    Instructions:  Inject into the muscle.     Begin Date    AM    Noon    PM    Bedtime       baclofen 10 MG tablet   Commonly known as:  LIORESAL   Quantity:  90 tablet   Refills:  5    Instructions:  TAKE 1 TABLET BY MOUTH 3 TIMES A DAY     Begin Date    AM    Noon    PM    Bedtime       dextrose 5 % SolP 500 mL with ceFAZolin 1 gram SolR 6 g   Refills:  0   Dose:  6 g   Indications:  Bone/Joint    Instructions:  Inject 6 g into the vein continuous.     Begin Date    AM    Noon    PM    Bedtime       furosemide 40 MG tablet   Commonly known as:  LASIX   Quantity:  180 tablet   Refills:  3    Instructions:  TAKE 1 TABLET BY MOUTH 2 TIMES A DAY     Begin Date    AM    Noon    PM    Bedtime       hydrocodone-acetaminophen 5-325mg 5-325 mg per tablet   Commonly known as:  NORCO   Quantity:  30 tablet   Refills:  0   Dose:  1 tablet    Instructions:  Take 1 tablet by mouth every 8 (eight) hours as needed for Pain.     Begin Date    AM    Noon    PM    Bedtime       losartan 25 MG tablet   Commonly known as:  COZAAR   Quantity:  90 tablet   Refills:  3    Instructions:  TAKE 1 TABLET BY MOUTH ONCE DAILY     Begin Date    AM     Noon    PM    Bedtime       modafinil 200 MG Tab   Commonly known as:  PROVIGIL   Quantity:  400 tablet   Refills:  3   Dose:  200 mg    Instructions:  Take 1 tablet (200 mg total) by mouth once daily.     Begin Date    AM    Noon    PM    Bedtime       senna 8.6 mg tablet   Commonly known as:  SENOKOT   Refills:  0   Dose:  1 tablet    Instructions:  Take 1 tablet by mouth daily as needed for Constipation.     Begin Date    AM    Noon    PM    Bedtime       sildenafil 100 MG tablet   Commonly known as:  VIAGRA   Quantity:  10 tablet   Refills:  11   Dose:  100 mg    Instructions:  Take 1 tablet (100 mg total) by mouth daily as needed for Erectile Dysfunction.     Begin Date    AM    Noon    PM    Bedtime       triamcinolone acetonide 0.1% 0.1 % cream   Commonly known as:  KENALOG   Refills:  0      Begin Date    AM    Noon    PM    Bedtime                  Please bring to all follow up appointments:    1. A copy of your discharge instructions.  2. All medicines you are currently taking in their original bottles.  3. Identification and insurance card.    Please arrive 15 minutes ahead of scheduled appointment time.    Please call 24 hours in advance if you must reschedule your appointment and/or time.        Your Scheduled Appointments     Apr 17, 2017  1:00 PM CDT   Established Patient Visit with Olaf Berman MD   [facility] - Infectious Diseases ([facility] )    [address]  [place] [address] [phone]                  Discharge Instructions       For scheduling: Call Peggy at [phone]    For questions or concerns call: ROCU MON-FRI 8 AM- 5PM [phone]. Radiology resident on call [phone].    For immediate concerns that are not emergent, you may call our radiology clinic at: [phone]        Admission Information     Date & Time Provider Department CSN    3/28/2017 10:30 AM Olaf Berman MD [facility] [id number]      Care Providers     Provider Role  Specialty Primary office phone    Olaf Berman MD Attending Provider Infectious Diseases [phone]      Recent Lab Values        12/22/2014                           9:40 AM           A1C 6.1                       Allergies as of 3/28/2017        Reactions    Norco [Hydrocodone-acetaminophen] Anaphylaxis      Advance Directives     An advance directive is a document which, in the event you are no longer able to make decisions for yourself, tells your healthcare team what kind of treatment you do or do not want to receive, or who you would like to make those decisions for you.  If you do not currently have an advance directive, [facility] encourages you to create one.  For more information call:  [phone]), [phone] ([phone]),  or log on to [email or web address].        Language Assistance Services     ATTENTION: Language assistance services are available, free of charge. Please call [phone].      ATENCIÓN: Si habla español, tiene a wang disposición servicios gratuitos de asistencia lingüística. Llame al [phone].     CHÚ Ý: N?u b?n nói Ti?ng Vi?t, có các d?ch v? h? tr? ngôn ng? mi?n phí dành cho b?n. G?i s? [phone].        [employer] [facility] complies with applicable Federal civil rights laws and does not discriminate on the basis of race, color, national origin, age, disability, or sex.

## 2017-03-28 NOTE — PROGRESS NOTES
Right arm PICC removed. New PICC placed to the left arm. PICC ok for use.  Pt left via scooter with wife.

## 2017-03-28 NOTE — DISCHARGE INSTRUCTIONS
For scheduling: Call Peggy at [phone]    For questions or concerns call: LAUREN MON-FRI 8 AM- 5PM [phone]. Radiology resident on call [phone].    For immediate concerns that are not emergent, you may call our radiology clinic at: [phone]

## 2017-03-28 NOTE — PROCEDURES
"Rajan Cooper III is a 61 y.o. male patient.            Central Line  Date/Time: 3/28/2017 11:07 AM  Performed by: RADHA FOWLER  Supervising provider: Bernardo Blood MD  Consent Done: Yes  Time out: Immediately prior to procedure a "time out" was called to verify the correct patient, procedure, equipment, support staff and site/side marked as required.  Indications: med administration  Anesthesia: local infiltration    Anesthesia:  Anesthesia: local infiltration  Local Anesthetic: lidocaine 1% without epinephrine   Preparation: skin prepped with ChloraPrep  Location details: left basilic  Number of attempts: 1  Assessment: verified by fluoroscopy  Complications: none  Post-procedure: chlorhexidine patch,  sterile dressing applied and blood return through all ports          Radha Fowler  3/28/2017    "

## 2017-03-28 NOTE — TELEPHONE ENCOUNTER
----- Message from Celena Pérez sent at 3/28/2017  3:16 PM CDT -----  Contact: Pt's wife   Pt's wife is calling to schedule a post-op appt and can be reached at [phone].  Thank you

## 2017-03-29 NOTE — TELEPHONE ENCOUNTER
----- Message from Sang Hanley MA sent at 3/29/2017  8:56 AM CDT -----  Contact: Dorothy - [phone]   Type: Rx    Name of medication(s):apixaban 5 mg Tab    Is this a refill? New rx? Refill     Who prescribed medication?  [facility]     Pharmacy Name, Phone, & Location: [facility]/pharmacy #[id number] - [place], [place] - [address]    Comments: Does not have enough to last until appt with Dr Garcia on 4/3/17. Please call. Thanks!

## 2017-03-29 NOTE — TELEPHONE ENCOUNTER
Pt was prescribed apaxiban in ED on 3/20/17- he is seeing Dr Garcia on 4/3/17 for hosp f/u but doesn't have enough medication to last until then. Are you able to refill?

## 2017-03-31 ENCOUNTER — TELEPHONE (OUTPATIENT)
Dept: INTERNAL MEDICINE | Facility: CLINIC | Age: 62
End: 2017-03-31

## 2017-04-03 ENCOUNTER — OFFICE VISIT (OUTPATIENT)
Dept: ORTHOPEDICS | Facility: CLINIC | Age: 62
End: 2017-04-03
Payer: COMMERCIAL

## 2017-04-03 ENCOUNTER — OFFICE VISIT (OUTPATIENT)
Dept: INTERNAL MEDICINE | Facility: CLINIC | Age: 62
End: 2017-04-03
Payer: COMMERCIAL

## 2017-04-03 VITALS
RESPIRATION RATE: 16 BRPM | TEMPERATURE: 98 F | HEIGHT: 73 IN | HEART RATE: 90 BPM | BODY MASS INDEX: 36 KG/M2 | WEIGHT: 271.63 LBS | SYSTOLIC BLOOD PRESSURE: 142 MMHG | DIASTOLIC BLOOD PRESSURE: 85 MMHG

## 2017-04-03 VITALS — HEART RATE: 86 BPM | DIASTOLIC BLOOD PRESSURE: 80 MMHG | SYSTOLIC BLOOD PRESSURE: 138 MMHG

## 2017-04-03 DIAGNOSIS — Z09 S/P ORTHOPEDIC SURGERY, FOLLOW-UP EXAM: ICD-10-CM

## 2017-04-03 DIAGNOSIS — M00.032 STAPHYLOCOCCAL ARTHRITIS OF LEFT WRIST: Primary | ICD-10-CM

## 2017-04-03 DIAGNOSIS — M00.9 INFECTION OF JOINT OF ANKLE: ICD-10-CM

## 2017-04-03 DIAGNOSIS — I10 ESSENTIAL HYPERTENSION: ICD-10-CM

## 2017-04-03 DIAGNOSIS — R29.898 SHOULDER WEAKNESS: ICD-10-CM

## 2017-04-03 DIAGNOSIS — S91.109A OPEN TOE WOUND, INITIAL ENCOUNTER: ICD-10-CM

## 2017-04-03 DIAGNOSIS — I73.9 PVD (PERIPHERAL VASCULAR DISEASE): ICD-10-CM

## 2017-04-03 DIAGNOSIS — I82.491 ACUTE DEEP VEIN THROMBOSIS (DVT) OF OTHER SPECIFIED VEIN OF RIGHT LOWER EXTREMITY: ICD-10-CM

## 2017-04-03 DIAGNOSIS — M75.101 ROTATOR CUFF SYNDROME OF RIGHT SHOULDER: ICD-10-CM

## 2017-04-03 DIAGNOSIS — G35 MS (MULTIPLE SCLEROSIS): ICD-10-CM

## 2017-04-03 LAB
FUNGUS SPEC CULT: NORMAL
FUNGUS SPEC CULT: NORMAL

## 2017-04-03 PROCEDURE — 99024 POSTOP FOLLOW-UP VISIT: CPT | Mod: S$GLB,,, | Performed by: PHYSICIAN ASSISTANT

## 2017-04-03 PROCEDURE — 99999 PR PBB SHADOW E&M-EST. PATIENT-LVL III: CPT | Mod: PBBFAC,,, | Performed by: INTERNAL MEDICINE

## 2017-04-03 PROCEDURE — 99999 PR PBB SHADOW E&M-EST. PATIENT-LVL IV: CPT | Mod: PBBFAC,,, | Performed by: PHYSICIAN ASSISTANT

## 2017-04-03 NOTE — PROGRESS NOTES
Mr. Cooper  is  here today for a post-operative visit after a   1. Incision and drainage, right ankle abscess.  2. Right ankle arthrotomy with irrigation of septic arthritis.   by  on 03/10/2017  AND  1. Arthrocentesis, right ankle.  2. Incision, drainage and irrigation of left dorsal wrist abscess.  3. Left wrist arthrotomy with irrigation.  by Dr. Shipley  on 03/04/2017.    he reports that he is doing ok.  Pain is controlled.  he is not taking pain medication.  He is at home. He is recieving home health.     - culture 03/04/2017 grew:STAPHYLOCOCCUS AUREUS treating with Ancef is followed by infectious disease.     he denies fever, chills, and sweats since the time of the surgery.     Patient stated that he has decreased range of motion of his shoulder since his time in the hospital. Patient reports having trouble with lifting his arm. Patient has been recieving home health therapy.     Physical exam:  dressing taken down.  WRIST: decreased range of motion in all planes, no increased warmth and erythema  ANKLE:  Incision is clean, dry and intact.  Sutures removed without difficulty, mild swelling to right lower extremity, decreased sensation.   SHOULDER, right: flexion 30 degrees, abduction 20 degree, positive drop arm. No TTP.     Assessment:  Post-op visit ( 4/3 weeks)    Plan:  - work on range of motion.   - continue antibiotic as prescribed by infectious disease., he has follow up with ID    -The patient was advised to keep the incision clean and dry for the next 24 hours after which he may wash the area with antibacterial soap in the shower. Will not submerge until the incision is completely healed  - shoulder, consider MRI once infection in his ankle and wrist has cleared.  - DVT; he has follow up today with PCP.    - return to clinic in 2 week for would and range of motion check..

## 2017-04-03 NOTE — PROGRESS NOTES
Subjective:       Patient ID: Rajan Cooper III is a 61 y.o. male.    Chief Complaint: Hospital Follow Up (hosp f/u)    HPI Comments: Transitional Care Note    Family and/or Caretaker present at visit?  Yes.  Diagnostic tests reviewed/disposition: No diagnosic tests pending after this hospitalization.  Disease/illness education: Discussed  Home health/community services discussion/referrals: Patient has home health established at Home.   Establishment or re-establishment of referral orders for community resources: No other necessary community resources.   Discussion with other health care providers: No discussion with other health care providers necessary.     HPI: TCC follow up.  Patient was hospitalized for significant infections in the left wrist, right ankle, DVT and is still receiving intravenous medication through a PICC line as well as Eliquis for DVT.  He also discovered a possible right rotator cuff tear as he Did not actively raise the right shoulder and if it is raised he cannot keep it upward.  He remembers a possible injury pushing a heavy gait prior to these hospitalizations.  His wife says she thinks he was having pain in the hospital but does not recall this because of his illnesses.  There is no pain there now but there was weakness.  He did not get an MRI because of the PICC line.  He was told that he had decreased circulation in the right leg and his right great toe is somewhat darker in color.  It was suggested that he have a vascular evaluation and we will assist with that    Review of Systems   Constitutional: Negative for chills, fatigue, fever and unexpected weight change.   HENT: Negative for trouble swallowing.    Eyes: Negative for visual disturbance.   Respiratory: Negative for cough, shortness of breath and wheezing.    Cardiovascular: Positive for leg swelling. Negative for chest pain and palpitations.   Gastrointestinal: Negative for abdominal pain, constipation, diarrhea, nausea and  vomiting.   Genitourinary: Negative for difficulty urinating.   Musculoskeletal: Positive for arthralgias, gait problem, joint swelling and myalgias. Negative for neck pain.   Skin: Negative for rash.   Neurological: Negative for dizziness and headaches.       Objective:      Physical Exam   Constitutional: He is oriented to person, place, and time. He appears well-developed and well-nourished. No distress.   HENT:   Head: Normocephalic and atraumatic.   Mouth/Throat: No oropharyngeal exudate.   TM's clear, pharynx clear   Eyes: Conjunctivae and EOM are normal. Pupils are equal, round, and reactive to light. No scleral icterus.   Neck: Normal range of motion. Neck supple. No thyromegaly present.   No supraclavicular nodes palpated   Cardiovascular: Normal rate, regular rhythm and normal heart sounds.    No murmur heard.  Pulmonary/Chest: Effort normal and breath sounds normal. He has no wheezes.   Abdominal: Soft. Bowel sounds are normal. He exhibits no mass. There is no tenderness.   Musculoskeletal: He exhibits edema (mild erythema right leg) and tenderness.   Right great toe is somewhat dusky with decreased capillary refill.  Left wrist remains somewhat swollen but nontender.  There is some peripheral edema   Lymphadenopathy:     He has no cervical adenopathy.   Neurological: He is alert and oriented to person, place, and time.   Skin: No pallor.   Psychiatric: He has a normal mood and affect.       Assessment:       1. Staphylococcal arthritis of left wrist    2. MS (multiple sclerosis)    3. Essential hypertension    4. Shoulder weakness    5. PVD (peripheral vascular disease)    6. Open toe wound, initial encounter    7. Acute deep vein thrombosis (DVT) of other specified vein of right lower extremity        Plan:       Rajan was seen today for hospital follow up.    Diagnoses and all orders for this visit:    Staphylococcal arthritis of left wrist    MS (multiple sclerosis)    Essential  hypertension    Shoulder weakness  Comments:  Right shoulder weakness with abduction.     PVD (peripheral vascular disease)  -     Ambulatory referral to Vascular Surgery    Open toe wound, initial encounter  -     Ambulatory referral to Vascular Surgery    Acute deep vein thrombosis (DVT) of other specified vein of right lower extremity  -     Ambulatory referral to Vascular Surgery    Other orders  -     Discontinue: apixaban 5 mg Tab; Take 1 tablet (5 mg total) by mouth 2 (two) times daily.  -     apixaban 5 mg Tab; Take 1 tablet (5 mg total) by mouth 2 (two) times daily.        follow-up in about 6-8 weeks, sooner when necessary

## 2017-04-03 NOTE — MR AVS SNAPSHOT
[facility] - Internal Medicine  [address]  [facility] [address]  Phone: [phone]  Fax: [phone]                  Rajan Cooper III   4/3/2017 1:00 PM   Office Visit    Description:  Male : 1955   Provider:  Tom Garcia MD   Department:  [facility]           Reason for Visit     Hospital Follow Up           Diagnoses this Visit        Comments    Staphylococcal arthritis of left wrist    -  Primary     MS (multiple sclerosis)         Essential hypertension         Shoulder weakness     Right shoulder weakness with abduction.     PVD (peripheral vascular disease)         Open toe wound, initial encounter         Acute deep vein thrombosis (DVT) of other specified vein of right lower extremity                To Do List           Future Appointments        Provider Department Dept Phone    2017 8:30 AM MYRTLE Almanzar III, MD [facility] - Vascular Surgery [phone]    4/10/2017 10:15 AM Estee Kuo PA-C [facility] [phone]    2017 1:00 PM Olaf Berman MD [facility] - Infectious Diseases [phone]    2017 7:30 AM Tom Garcia MD [facility] [phone]      Goals (5 Years of Data)     None      Follow-Up and Disposition     Return in about 2 months (around 6/3/2017) for EP.    Follow-up and Disposition History       These Medications        Disp Refills Start End    apixaban 5 mg Tab 60 tablet 5 4/3/2017     Take 1 tablet (5 mg total) by mouth 2 (two) times daily. - Oral    Pharmacy: [facility]/pharmacy #[id number] - [place] - [address] Ph #: [phone]         [facility]     [facility] On Call Nurse Care Line -  Assistance  Unless otherwise directed by your provider, please contact [facility], our nurse care line that is available for  assistance.     Registered nurses in the [facility] provide: appointment scheduling, clinical advisement, health education, and other  advisory services.  Call: [phone] (toll free)               Medications           Message regarding Medications     Verify the changes and/or additions to your medication regime listed below are the same as discussed with your clinician today.  If any of these changes or additions are incorrect, please notify your healthcare provider.        STOP taking these medications     senna (SENOKOT) 8.6 mg tablet Take 1 tablet by mouth daily as needed for Constipation.    hydrocodone-acetaminophen 5-325mg (NORCO) 5-325 mg per tablet Take 1 tablet by mouth every 8 (eight) hours as needed for Pain.           Verify that the below list of medications is an accurate representation of the medications you are currently taking.  If none reported, the list may be blank. If incorrect, please contact your healthcare provider. Carry this list with you in case of emergency.           Current Medications     albuterol 90 mcg/actuation inhaler Inhale 2 puffs into the lungs every 8 (eight) hours.    amlodipine (NORVASC) 10 MG tablet TAKE 1 TABLET BY MOUTH ONCE DAILY    baclofen (LIORESAL) 10 MG tablet TAKE 1 TABLET BY MOUTH 3 TIMES A DAY    dextrose 5 % SolP 500 mL with ceFAZolin 1 gram SolR 6 g Inject 6 g into the vein continuous.    furosemide (LASIX) 40 MG tablet TAKE 1 TABLET BY MOUTH 2 TIMES A DAY    INTERFERON BETA-1A (AVONEX IM) Inject into the muscle.      losartan (COZAAR) 25 MG tablet TAKE 1 TABLET BY MOUTH ONCE DAILY    modafinil (PROVIGIL) 200 MG Tab Take 1 tablet (200 mg total) by mouth once daily.    sildenafil (VIAGRA) 100 MG tablet Take 1 tablet (100 mg total) by mouth daily as needed for Erectile Dysfunction.    triamcinolone acetonide 0.1% (KENALOG) 0.1 % cream     apixaban 5 mg Tab Take 1 tablet (5 mg total) by mouth 2 (two) times daily.           Clinical Reference Information           Your Vitals Were     BP Pulse                138/80 86          Blood Pressure          Most Recent Value    BP  138/80       Allergies as of 4/3/2017     Norco [Hydrocodone-acetaminophen]      Immunizations Administered on Date of Encounter - 4/3/2017     None      Orders Placed During Today's Visit      Normal Orders This Visit    Ambulatory referral to Vascular Surgery       Language Assistance Services     ATTENTION: Language assistance services are available, free of charge. Please call [phone].      ATENCIÓN: Si habla español, tiene a wang disposición servicios gratuitos de asistencia lingüística. Llame al [phone].     CHÚ Ý: N?u b?n nói Ti?ng Vi?t, có các d?ch v? h? tr? ngôn ng? mi?n phí dành cho b?n. G?i s? [phone].         [facility] complies with applicable Federal civil rights laws and does not discriminate on the basis of race, color, national origin, age, disability, or sex.

## 2017-04-04 ENCOUNTER — INITIAL CONSULT (OUTPATIENT)
Dept: VASCULAR SURGERY | Facility: CLINIC | Age: 62
End: 2017-04-04
Payer: COMMERCIAL

## 2017-04-04 VITALS
WEIGHT: 277 LBS | BODY MASS INDEX: 38.78 KG/M2 | DIASTOLIC BLOOD PRESSURE: 86 MMHG | HEART RATE: 86 BPM | TEMPERATURE: 98 F | SYSTOLIC BLOOD PRESSURE: 147 MMHG | HEIGHT: 71 IN

## 2017-04-04 DIAGNOSIS — I99.8 ISCHEMIA OF TOE: ICD-10-CM

## 2017-04-04 DIAGNOSIS — I73.9 PVD (PERIPHERAL VASCULAR DISEASE): Primary | ICD-10-CM

## 2017-04-04 PROCEDURE — 99999 PR PBB SHADOW E&M-EST. PATIENT-LVL III: CPT | Mod: PBBFAC,,, | Performed by: SURGERY

## 2017-04-04 PROCEDURE — 99245 OFF/OP CONSLTJ NEW/EST HI 55: CPT | Mod: S$GLB,,, | Performed by: SURGERY

## 2017-04-04 NOTE — LETTER
April 4, 2017      Tom Garcia MD  [address]  [facility] [address]           [facility] - Vascular Surgery  [address]  [facility] [address]  Phone: [phone]  Fax: [phone]          Patient: Rajan Cooper III   MR Number: [id number]   YOB: 1955   Date of Visit: 4/4/2017       Dear Dr. Tom Garcia:    Thank you for referring Rajan Cooper to me for evaluation. Attached you will find relevant portions of my assessment and plan of care.    If you have questions, please do not hesitate to call me. I look forward to following Rajan Cooper along with you.    Sincerely,    MYRTLE Almanzar III, MD    Enclosure  CC:  No Recipients    If you would like to receive this communication electronically, please contact [email or web address] or [phone] to request more information on [facility] [employer] [facility] access.    For providers and/or their staff who would like to refer a patient to [facility], please contact us through our one-stop-shop provider referral line, [facility], at [phone].    If you feel you have received this communication in error or would no longer like to receive these types of communications, please e-mail [email or web address]

## 2017-04-04 NOTE — PROGRESS NOTES
REFERRING PHYSICIAN:  Tom Garcia M.D.    HISTORY OF PRESENT ILLNESS:  A 61-year-old male recently discharged from the   hospital after a staph infection of the right ankle requiring an open I and D,   as well as left wrist I and D, complicated by a tibial DVT in the right, who is   referred for evaluation of a blue right first toe.  The patient and wife state   that this has been waxing and waning over the last two weeks, essentially ever   since he was admitted for this event.    He is currently on Eliquis for the DVT.    PAST MEDICAL HISTORY:  1.  Multiple sclerosis.  2.  Hypertension.    PAST SURGICAL HISTORY:  As above.    FAMILY HISTORY:  Positive for diabetes and cancer.    SOCIAL HISTORY:  He is a nonsmoker.    MEDICATIONS:  Include Eliquis.  See Epic for full list.    ALLERGIES:  Norco.    REVIEW OF SYSTEMS:  CARDIOVASCULAR:  Denies postprandial pain or DVT.  All other systems including   eyes, ENT, respiratory, musculoskeletal, breast, psychiatric, lymph, allergy and   immune are negative.    PHYSICAL EXAMINATION:  VITAL SIGNS:  See nursing notes.  GENERAL:  No acute distress.  RESPIRATORY:  Normal effort.  Clear to auscultation.  CARDIOVASCULAR:  Regular rhythm, nondisplaced PMI, no murmur.  VASCULAR:  2+ radial and brachial femoral pulses.  The right femoral pulse is 3+   and feels expanded.  DP pulses are 2+ bilaterally.  EXTREMITIES:  Show Steri-Strips on medial and lateral incisions over the right   ankle.  Right first toe has some slight purple discoloration.  There is no   petechial element and no petechia or livedo reticularis on the foot or other   toes.  Incidentally, the patient's wife shows a picture of the toe earlier today   where there was no discoloration whatsoever.  ABDOMEN:  No masses or tenderness.  No hepatosplenomegaly.  Aorta cannot be   palpated.  EYE:  Normal conjunctivae and lids.  ENT:  Fair dentition.  NECK:  No JVD or thyromegaly.  MUSCULOSKELETAL:  No kyphosis or  scoliosis.  EXTREMITIES:  Without clubbing or cyanosis.  SKIN:  Warm and dry.  NEUROLOGIC:  Alert, oriented x3.  Normal mood and affect.  Midline tongue.  No   speech difficulty or hoarseness, 5/5 motor strength in all extremities.    ASSESSMENT:  1.  Right modesty cyanotic first toe.  On my clinical exam, he has good arterial   flow with an easily palpable DP pulse on this side.  The nature of the toe does   not look really like an embolic event.  2.  Enlarged right femoral pulse.  This potentially is suspicious for aneurysmal   disease and should be evaluated.  If this were aneurysm really, he could have   distal embolization from it, although the right first toe does not look   particularly embolic in etiology.    PLAN:  1.  Continue current medical therapy.  2.  Follow up with aortic ultrasound, ultrasound of the right femoral artery,   both to rule out aneurysms and ABIs.      ALEXANDREA  dd: 04/04/2017 09:08:54 (CDT)  td: 04/04/2017 21:43:29 (CDT)  Doc ID   #[id number]  Job ID #[id number]    CC:

## 2017-04-04 NOTE — MR AVS SNAPSHOT
[facility]  [address]  [facility] [address]  Phone: [phone]  Fax: [phone]                  Rajan Copoer III   2017 8:30 AM   Initial consult    Description:  Male : 1955   Provider:  MYRTLE Almanzar III, MD   Department:  [facility]           Reason for Visit     Consult           Diagnoses this Visit        Comments    Ischemia of toe                To Do List           Future Appointments        Provider Department Dept Phone    4/10/2017 10:15 AM Estee Kuo PA-C [facility] Orth[facility] [phone]    2017 1:00 PM Olaf Berman MD [facility] Infectious [facility] [phone]    2017 7:30 AM Tom Garcia MD [facility] Internal Medicine [phone]      Goals (5 Years of Data)     None      [facility]     [facility] On Call Nurse Care Line -  Assistance  Unless otherwise directed by your provider, please contact [facility], our nurse care line that is available for  assistance.     Registered nurses in the [facility] On Call Center provide: appointment scheduling, clinical advisement, health education, and other advisory services.  Call: [phone] (toll free)               Medications           Message regarding Medications     Verify the changes and/or additions to your medication regime listed below are the same as discussed with your clinician today.  If any of these changes or additions are incorrect, please notify your healthcare provider.             Verify that the below list of medications is an accurate representation of the medications you are currently taking.  If none reported, the list may be blank. If incorrect, please contact your healthcare provider. Carry this list with you in case of emergency.           Current Medications     albuterol 90 mcg/actuation inhaler Inhale 2 puffs into the lungs every 8 (eight) hours.    amlodipine (NORVASC) 10 MG tablet TAKE 1 TABLET BY MOUTH ONCE DAILY  "   apixaban 5 mg Tab Take 1 tablet (5 mg total) by mouth 2 (two) times daily.    baclofen (LIORESAL) 10 MG tablet TAKE 1 TABLET BY MOUTH 3 TIMES A DAY    dextrose 5 % SolP 500 mL with ceFAZolin 1 gram SolR 6 g Inject 6 g into the vein continuous.    furosemide (LASIX) 40 MG tablet TAKE 1 TABLET BY MOUTH 2 TIMES A DAY    INTERFERON BETA-1A (AVONEX IM) Inject into the muscle.      losartan (COZAAR) 25 MG tablet TAKE 1 TABLET BY MOUTH ONCE DAILY    modafinil (PROVIGIL) 200 MG Tab Take 1 tablet (200 mg total) by mouth once daily.    sildenafil (VIAGRA) 100 MG tablet Take 1 tablet (100 mg total) by mouth daily as needed for Erectile Dysfunction.    triamcinolone acetonide 0.1% (KENALOG) 0.1 % cream            Clinical Reference Information           Your Vitals Were     BP Pulse Temp Height Weight BMI    147/86 (BP Location: Right arm, Patient Position: Sitting, BP Method: Automatic) 86 97.6 °F (36.4 °C) (Oral) 5' 11" (1.803 m) 125.6 kg (277 lb) 38.63 kg/m2      Blood Pressure          Most Recent Value    BP  (!)  147/86      Allergies as of 4/4/2017     Norco [Hydrocodone-acetaminophen]      Immunizations Administered on Date of Encounter - 4/4/2017     None      Language Assistance Services     ATTENTION: Language assistance services are available, free of charge. Please call [phone].      ATENCIÓN: Si habla español, tiene a wang disposición servicios gratuitos de asistencia lingüística. Llame al [phone].     CHÚ Ý: N?u b?n nói Ti?ng Vi?t, có các d?ch v? h? tr? ngôn ng? mi?n phí dành cho b?n. G?i s? [phone].         [facility] complies with applicable Federal civil rights laws and does not discriminate on the basis of race, color, national origin, age, disability, or sex.        "

## 2017-04-06 ENCOUNTER — TELEPHONE (OUTPATIENT)
Dept: INFECTIOUS DISEASES | Facility: CLINIC | Age: 62
End: 2017-04-06

## 2017-04-06 RX ORDER — LOSARTAN POTASSIUM 25 MG/1
TABLET ORAL
Qty: 90 TABLET | Refills: 3 | Status: SHIPPED | OUTPATIENT
Start: 2017-04-06 | End: 2017-06-12 | Stop reason: SDUPTHER

## 2017-04-06 NOTE — TELEPHONE ENCOUNTER
----- Message from Valeria Farris sent at 4/6/2017 10:37 AM CDT -----  Contact: pt   [phone]  Hand   -   Patient  Calling to speak with the nurse in regards to papers being fax from [employer] in regards to filing for the pt disability   Thanks,

## 2017-04-06 NOTE — TELEPHONE ENCOUNTER
Called patient back but didn't answer left a message on voicemail to let patient know that the paperwork is handled by medical records and disability will call when they are done with the paperwork. Was told to call back if had any questions to [phone].

## 2017-04-07 LAB — FUNGUS SPEC CULT: NORMAL

## 2017-04-10 ENCOUNTER — HOSPITAL ENCOUNTER (OUTPATIENT)
Dept: VASCULAR SURGERY | Facility: CLINIC | Age: 62
Discharge: HOME | End: 2017-04-10
Attending: SURGERY
Payer: COMMERCIAL

## 2017-04-10 ENCOUNTER — OFFICE VISIT (OUTPATIENT)
Dept: ORTHOPEDICS | Facility: CLINIC | Age: 62
End: 2017-04-10
Payer: COMMERCIAL

## 2017-04-10 DIAGNOSIS — I73.9 PVD (PERIPHERAL VASCULAR DISEASE): ICD-10-CM

## 2017-04-10 DIAGNOSIS — I71.40 AAA (ABDOMINAL AORTIC ANEURYSM): ICD-10-CM

## 2017-04-10 DIAGNOSIS — M00.032 STAPHYLOCOCCAL ARTHRITIS OF LEFT WRIST: Primary | ICD-10-CM

## 2017-04-10 PROCEDURE — 93926 LOWER EXTREMITY STUDY: CPT | Mod: S$GLB,,, | Performed by: SURGERY

## 2017-04-10 PROCEDURE — 99024 POSTOP FOLLOW-UP VISIT: CPT | Mod: S$GLB,,, | Performed by: PHYSICIAN ASSISTANT

## 2017-04-10 PROCEDURE — 99999 PR PBB SHADOW E&M-EST. PATIENT-LVL II: CPT | Mod: PBBFAC,,, | Performed by: PHYSICIAN ASSISTANT

## 2017-04-10 PROCEDURE — 93978 VASCULAR STUDY: CPT | Mod: S$GLB,,, | Performed by: SURGERY

## 2017-04-10 PROCEDURE — 93923 UPR/LXTR ART STDY 3+ LVLS: CPT | Mod: S$GLB,,, | Performed by: SURGERY

## 2017-04-11 LAB — FUNGUS SPEC CULT: NORMAL

## 2017-04-12 PROCEDURE — 99495 TRANSJ CARE MGMT MOD F2F 14D: CPT | Mod: S$GLB,,, | Performed by: INTERNAL MEDICINE

## 2017-04-13 ENCOUNTER — TELEPHONE (OUTPATIENT)
Dept: INTERNAL MEDICINE | Facility: CLINIC | Age: 62
End: 2017-04-13

## 2017-04-13 NOTE — PROGRESS NOTES
Mr. Cooper  is  here today for a post-operative visit/ wound check after a   1. Incision and drainage, right ankle abscess.  2. Right ankle arthrotomy with irrigation of septic arthritis.   by  on 03/10/2017  AND  1. Arthrocentesis, right ankle.  2. Incision, drainage and irrigation of left dorsal wrist abscess.  3. Left wrist arthrotomy with irrigation.  by Dr. Shipley  on 03/04/2017.    he reports that he is doing ok.  Pain is controlled.  he is not taking pain medication.  He is at home. He is recieving home health.     - culture 03/04/2017 grew:STAPHYLOCOCCUS AUREUS treating with Ancef is followed by infectious disease.     he denies fever, chills, and sweats since the time of the surgery.     Physical exam:  dressing taken down.  WRIST: decreased range of motion in all planes, no increased warmth and erythema  ANKLE:  Incision is clean, dry and intact healing well, scabbing along incision,  mild swelling to right lower extremity, decreased sensation. No signes of infection    Assessment:  Post-op visit ( 5/4 weeks)    Plan:  - work on range of motion.   - continue antibiotic as prescribed by infectious disease., he has follow up with ID    -The patient was advised to keep the incision clean and dry for the next 24 hours after which he may wash the area with antibacterial soap in the shower. Will not submerge until the incision is completely healed  - DVT; he has follow up with PCP.    - return to clinic as needed

## 2017-04-13 NOTE — TELEPHONE ENCOUNTER
----- Message from Christian Jensen sent at 4/13/2017 11:43 AM CDT -----  Contact: self [phone]  Patient states he has a rash all over body and would like to know can he take benadryl . He been having rash for two weeks on and off , and he do have a picc line in left arm  .  Please advise , Thanks  !

## 2017-04-17 ENCOUNTER — OFFICE VISIT (OUTPATIENT)
Dept: INFECTIOUS DISEASES | Facility: CLINIC | Age: 62
End: 2017-04-17
Payer: COMMERCIAL

## 2017-04-17 VITALS
HEIGHT: 73 IN | TEMPERATURE: 98 F | WEIGHT: 285.06 LBS | BODY MASS INDEX: 37.78 KG/M2 | HEART RATE: 80 BPM | SYSTOLIC BLOOD PRESSURE: 150 MMHG | DIASTOLIC BLOOD PRESSURE: 95 MMHG

## 2017-04-17 DIAGNOSIS — M00.032 STAPHYLOCOCCAL ARTHRITIS OF LEFT WRIST: Primary | ICD-10-CM

## 2017-04-17 PROCEDURE — 99999 PR PBB SHADOW E&M-EST. PATIENT-LVL III: CPT | Mod: PBBFAC,,, | Performed by: INTERNAL MEDICINE

## 2017-04-17 PROCEDURE — 3077F SYST BP >= 140 MM HG: CPT | Mod: S$GLB,,, | Performed by: INTERNAL MEDICINE

## 2017-04-17 PROCEDURE — 1160F RVW MEDS BY RX/DR IN RCRD: CPT | Mod: S$GLB,,, | Performed by: INTERNAL MEDICINE

## 2017-04-17 PROCEDURE — 3080F DIAST BP >= 90 MM HG: CPT | Mod: S$GLB,,, | Performed by: INTERNAL MEDICINE

## 2017-04-17 PROCEDURE — 99214 OFFICE O/P EST MOD 30 MIN: CPT | Mod: S$GLB,,, | Performed by: INTERNAL MEDICINE

## 2017-04-17 RX ORDER — IBUPROFEN 200 MG
200 TABLET ORAL EVERY 6 HOURS PRN
Status: ON HOLD | COMMUNITY
End: 2019-06-12 | Stop reason: HOSPADM

## 2017-04-17 NOTE — PROGRESS NOTES
Infectious Diseases Clinic Note    Subjective:       Patient ID: Rajan Cooper III is a 61 y.o. male.    Chief Complaint: Follow-up    HPI    Feeling well, wrist doing well tolerating PT activities  RLE wounds healing very well  No issues with PICC after remoed and placed in alternate (L) arm      Past Medical History:   Diagnosis Date    Hypertension     MS (multiple sclerosis)        Social History     Social History    Marital status:      Spouse name: N/A    Number of children: N/A    Years of education: N/A     Occupational History    Not on file.     Social History Main Topics    Smoking status: Never Smoker    Smokeless tobacco: Not on file    Alcohol use No    Drug use: No    Sexual activity: Not on file     Other Topics Concern    Not on file     Social History Narrative         Current Outpatient Prescriptions:     amlodipine (NORVASC) 10 MG tablet, TAKE 1 TABLET BY MOUTH ONCE DAILY, Disp: 90 tablet, Rfl: 3    apixaban 5 mg Tab, Take 1 tablet (5 mg total) by mouth 2 (two) times daily., Disp: 60 tablet, Rfl: 5    baclofen (LIORESAL) 10 MG tablet, TAKE 1 TABLET BY MOUTH 3 TIMES A DAY, Disp: 90 tablet, Rfl: 5    ibuprofen (ADVIL,MOTRIN) 200 MG tablet, Take 200 mg by mouth every 6 (six) hours as needed for Pain., Disp: , Rfl:     INTERFERON BETA-1A (AVONEX IM), Inject into the muscle.  , Disp: , Rfl:     losartan (COZAAR) 25 MG tablet, TAKE 1 TABLET BY MOUTH ONCE DAILY, Disp: 90 tablet, Rfl: 3    modafinil (PROVIGIL) 200 MG Tab, Take 1 tablet (200 mg total) by mouth once daily., Disp: 400 tablet, Rfl: 3    triamcinolone acetonide 0.1% (KENALOG) 0.1 % cream, , Disp: , Rfl: 0    albuterol 90 mcg/actuation inhaler, Inhale 2 puffs into the lungs every 8 (eight) hours., Disp: 1 Inhaler, Rfl: 12    furosemide (LASIX) 40 MG tablet, TAKE 1 TABLET BY MOUTH 2 TIMES A DAY, Disp: 180 tablet, Rfl: 3    sildenafil (VIAGRA) 100 MG tablet, Take 1 tablet (100 mg total) by mouth daily as needed for  Erectile Dysfunction., Disp: 10 tablet, Rfl: 11    Review of Systems   Constitutional: Negative for activity change, appetite change, chills, fatigue and fever.   HENT: Negative for congestion, dental problem, mouth sores and sinus pressure.    Eyes: Negative for pain, redness and visual disturbance.   Respiratory: Negative for cough, shortness of breath and wheezing.    Cardiovascular: Negative for chest pain and leg swelling.   Gastrointestinal: Negative for abdominal distention, abdominal pain, diarrhea, nausea and vomiting.   Endocrine: Negative for polyuria.   Genitourinary: Negative for decreased urine volume, dysuria and frequency.   Musculoskeletal: Negative for joint swelling.   Skin: Negative for color change.   Allergic/Immunologic: Negative for food allergies.   Neurological: Negative for dizziness, weakness and headaches.   Hematological: Negative for adenopathy.   Psychiatric/Behavioral: Negative for agitation and confusion. The patient is not nervous/anxious.            Objective:      Vitals:    04/17/17 1252   BP: (!) 150/95   Pulse: 80   Temp: 98 °F (36.7 °C)     Physical Exam   Constitutional: He is oriented to person, place, and time. He appears well-developed and well-nourished.   HENT:   Head: Normocephalic and atraumatic.   Mouth/Throat: Oropharynx is clear and moist.   Eyes: Conjunctivae are normal.   Neck: Neck supple.   Cardiovascular: Normal rate, regular rhythm and normal heart sounds.    No murmur heard.  Pulmonary/Chest: Effort normal and breath sounds normal. No respiratory distress. He has no wheezes.   Abdominal: Soft. Bowel sounds are normal. He exhibits no distension. There is no tenderness.   Musculoskeletal: Normal range of motion. He exhibits no edema or tenderness.   Lymphadenopathy:     He has no cervical adenopathy.   Neurological: He is alert and oriented to person, place, and time. Coordination normal.   Skin: Skin is warm and dry. No rash noted.   Psychiatric: He has a  normal mood and affect. His behavior is normal.           Assessment/Plan:       No diagnosis found.    62 y/o M h/o MS admitted 3/4 with L septic wrist and R septic ankle both with MSSA (also in blood and urine cultures) s/p L wrist washout and debridemnt and ankle arthrocentsis cleared bcx 3/7, TTE negative sent donald with cefazolin x 6 weeks from last I&D with estimated end date 4/15/17 with PICC malfunction 3/17 replaced by IR then requiring replacement in L arm also recently found to have DVT on apixaban  - pt clinically much improved and inflammatory markers trending down s/p 6 weeks of IV abx  - stop IV abx and pull PICC today  - f/u PRN

## 2017-04-18 ENCOUNTER — TELEPHONE (OUTPATIENT)
Dept: INTERNAL MEDICINE | Facility: CLINIC | Age: 62
End: 2017-04-18

## 2017-04-18 ENCOUNTER — OFFICE VISIT (OUTPATIENT)
Dept: VASCULAR SURGERY | Facility: CLINIC | Age: 62
End: 2017-04-18
Payer: COMMERCIAL

## 2017-04-18 VITALS
HEART RATE: 85 BPM | WEIGHT: 285 LBS | HEIGHT: 73 IN | DIASTOLIC BLOOD PRESSURE: 90 MMHG | SYSTOLIC BLOOD PRESSURE: 147 MMHG | BODY MASS INDEX: 37.77 KG/M2 | TEMPERATURE: 98 F

## 2017-04-18 DIAGNOSIS — I71.40 ABDOMINAL AORTIC ANEURYSM (AAA) WITHOUT RUPTURE: Primary | ICD-10-CM

## 2017-04-18 DIAGNOSIS — I73.9 PVD (PERIPHERAL VASCULAR DISEASE): ICD-10-CM

## 2017-04-18 PROCEDURE — 99999 PR PBB SHADOW E&M-EST. PATIENT-LVL III: CPT | Mod: PBBFAC,,, | Performed by: SURGERY

## 2017-04-18 PROCEDURE — 3077F SYST BP >= 140 MM HG: CPT | Mod: S$GLB,,, | Performed by: SURGERY

## 2017-04-18 PROCEDURE — 1160F RVW MEDS BY RX/DR IN RCRD: CPT | Mod: S$GLB,,, | Performed by: SURGERY

## 2017-04-18 PROCEDURE — 3080F DIAST BP >= 90 MM HG: CPT | Mod: S$GLB,,, | Performed by: SURGERY

## 2017-04-18 PROCEDURE — 99213 OFFICE O/P EST LOW 20 MIN: CPT | Mod: S$GLB,,, | Performed by: SURGERY

## 2017-04-18 NOTE — PROGRESS NOTES
See my prior note; review of systems, family history and social history are   unchanged.    HISTORY OF PRESENT ILLNESS:  A 61-year-old male with recent tibial DVT on the   right.  After being discharged from the hospital after a staph infection of the   right ankle requiring open I and D was originally seen for blue toe.    Clinically, he had excellent arterial flow and this toe did not appear to be   atheroembolic event.    He now returns with a duplex of his femoral artery and ABIs, he states the   femoral vessels felt somewhat enlarged.    MEDICATIONS:  Include Eliquis.    PHYSICAL EXAMINATION:  VITAL SIGNS:  See nursing note.  Physical exam is unchanged from prior.  He   continues to have easily palpable DP pulses, minimal toe cyanosis.    IMAGING:  ABIs are completely normal.  Duplex of his aorta as well as his common   femoral artery showed no evidence of aneurysm.    ASSESSMENT:  No vascular abnormality, stenotic or aneurysmal.      ALEXANDREA  dd: 04/18/2017 09:35:49 (CDT)  td: 04/19/2017 00:33:19 (CDT)  Doc ID   #[id number]  Job ID #[id number]    CC:

## 2017-04-19 ENCOUNTER — TELEPHONE (OUTPATIENT)
Dept: ORTHOPEDICS | Facility: CLINIC | Age: 62
End: 2017-04-19

## 2017-04-19 ENCOUNTER — TELEPHONE (OUTPATIENT)
Dept: INFECTIOUS DISEASES | Facility: CLINIC | Age: 62
End: 2017-04-19

## 2017-04-19 DIAGNOSIS — M75.101 ROTATOR CUFF SYNDROME OF RIGHT SHOULDER: Primary | ICD-10-CM

## 2017-04-19 NOTE — TELEPHONE ENCOUNTER
----- Message from Jeannette Fishman sent at 4/18/2017 10:03 AM CDT -----  Contact: wife  Pt's wife would like a call back regarding possibly being scheduled sooner than 4/26th. Please call [phone]

## 2017-04-19 NOTE — TELEPHONE ENCOUNTER
Spoke with pt's wife.   States ID physician said that pt can have MRI in 2 wks.   Scheduled MRI and follow up appointment with MARCIA Pinto as requested.

## 2017-04-19 NOTE — TELEPHONE ENCOUNTER
----- Message from Thaddeus Sales sent at 4/19/2017 11:01 AM CDT -----  Contact: spouse  Spouse requesting call regarding scheduling the MRI

## 2017-04-19 NOTE — TELEPHONE ENCOUNTER
Received a return call from pt's wife, Luciana.   Advised her that orders for MRI have been placed, however, MARCIA Pinto would like to wait until pt is completely cleared of infection before getting MRI.      Luciana will discuss this time frame with ID and call back to schedule MRI of shoulder

## 2017-04-19 NOTE — TELEPHONE ENCOUNTER
Spoke with Dorothy, pt's wife, yesterday.  States pt is off of IV antibiotics and picc line has been removed.  Pt requesting MRI of shoulder.       Left voice mail this morning for Dorothy to return my call.  Orders have been placed for MRI.   Will schedule pt upon return call.

## 2017-04-27 ENCOUNTER — TELEPHONE (OUTPATIENT)
Dept: ORTHOPEDICS | Facility: CLINIC | Age: 62
End: 2017-04-27

## 2017-04-27 NOTE — TELEPHONE ENCOUNTER
----- Message from Jennifer Jensen sent at 4/27/2017 10:55 AM CDT -----  Contact: Spouse-Dorothy   Spouse ask for a call in regard to a return to work order.

## 2017-05-01 ENCOUNTER — TELEPHONE (OUTPATIENT)
Dept: ORTHOPEDICS | Facility: CLINIC | Age: 62
End: 2017-05-01

## 2017-05-01 NOTE — TELEPHONE ENCOUNTER
----- Message from Thaddeus Sales sent at 5/1/2017  9:57 AM CDT -----  Contact: spouse (Swati)  Swati request a call regarding a return to work release paper that the pt needs signed

## 2017-05-02 ENCOUNTER — TELEPHONE (OUTPATIENT)
Dept: ORTHOPEDICS | Facility: CLINIC | Age: 62
End: 2017-05-02

## 2017-05-02 RX ORDER — INTERFERON BETA-1A 30MCG/.5ML
KIT INTRAMUSCULAR
Qty: 4 KIT | Refills: 11 | Status: SHIPPED | OUTPATIENT
Start: 2017-05-02 | End: 2018-04-01 | Stop reason: SDUPTHER

## 2017-05-02 NOTE — TELEPHONE ENCOUNTER
Left voice mail for pt to return my call.   Will advise pt that MRI appointment has been cxl'd at this time pending approval.

## 2017-05-02 NOTE — TELEPHONE ENCOUNTER
Spoke with Stephanie at [phone].   Provided all clinical info available for reference # [id number].  Stephanie states this will go to physician review and a decision will be made within 48 hours.  Cxl'd pt's appointment for MRI tomorrow.   Will rescheduled after approval/denial is determined.

## 2017-05-04 ENCOUNTER — TELEPHONE (OUTPATIENT)
Dept: ORTHOPEDICS | Facility: CLINIC | Age: 62
End: 2017-05-04

## 2017-05-04 RX ORDER — FUROSEMIDE 40 MG/1
TABLET ORAL
Qty: 180 TABLET | Refills: 3 | Status: SHIPPED | OUTPATIENT
Start: 2017-05-04 | End: 2018-04-28 | Stop reason: SDUPTHER

## 2017-05-04 NOTE — TELEPHONE ENCOUNTER
----- Message from Thaddeus Sales sent at 5/4/2017  9:40 AM CDT -----  Contact: self  Pt is returning call regarding his MRI

## 2017-05-04 NOTE — TELEPHONE ENCOUNTER
Spoke with pt.  Advised that MRI still has not been approved.  Pt will follow up with MARCIA Pinto as scheduled

## 2017-05-05 ENCOUNTER — TELEPHONE (OUTPATIENT)
Dept: ORTHOPEDICS | Facility: CLINIC | Age: 62
End: 2017-05-05

## 2017-05-05 NOTE — TELEPHONE ENCOUNTER
----- Message from Jordy Leiva sent at 5/5/2017  8:07 AM CDT -----  Contact: Ms. Cooper/wife/home  Ms. Cooper called in stating that the pt has an appt scheduled for 5/11 and would like to be seen today or next week if possible.

## 2017-05-05 NOTE — TELEPHONE ENCOUNTER
Spoke with pt's wife.   She had questions about the MRI being denied.   Explained to her that pt will have to see MARCIA Pinto for additional work up before MRII can be approved.

## 2017-05-06 LAB
ACID FAST MOD KINY STN SPEC: NORMAL
ACID FAST MOD KINY STN SPEC: NORMAL
MYCOBACTERIUM SPEC QL CULT: NORMAL
MYCOBACTERIUM SPEC QL CULT: NORMAL

## 2017-05-07 LAB
ACID FAST MOD KINY STN SPEC: NORMAL
MYCOBACTERIUM SPEC QL CULT: NORMAL

## 2017-05-11 ENCOUNTER — DOCUMENTATION ONLY (OUTPATIENT)
Dept: ORTHOPEDICS | Facility: CLINIC | Age: 62
End: 2017-05-11

## 2017-05-11 ENCOUNTER — OFFICE VISIT (OUTPATIENT)
Dept: ORTHOPEDICS | Facility: CLINIC | Age: 62
End: 2017-05-11
Payer: COMMERCIAL

## 2017-05-11 ENCOUNTER — HOSPITAL ENCOUNTER (OUTPATIENT)
Dept: RADIOLOGY | Facility: HOSPITAL | Age: 62
Discharge: HOME | End: 2017-05-11
Attending: ORTHOPAEDIC SURGERY
Payer: COMMERCIAL

## 2017-05-11 DIAGNOSIS — G35 MS (MULTIPLE SCLEROSIS): ICD-10-CM

## 2017-05-11 DIAGNOSIS — M75.101 ROTATOR CUFF SYNDROME OF RIGHT SHOULDER: ICD-10-CM

## 2017-05-11 DIAGNOSIS — M25.511 ACUTE PAIN OF RIGHT SHOULDER: ICD-10-CM

## 2017-05-11 DIAGNOSIS — R26.9 GAIT DISORDER: ICD-10-CM

## 2017-05-11 DIAGNOSIS — M25.511 ACUTE PAIN OF RIGHT SHOULDER: Primary | ICD-10-CM

## 2017-05-11 PROCEDURE — 1160F RVW MEDS BY RX/DR IN RCRD: CPT | Mod: S$GLB,,, | Performed by: PHYSICIAN ASSISTANT

## 2017-05-11 PROCEDURE — 73030 X-RAY EXAM OF SHOULDER: CPT | Mod: 26,RT,, | Performed by: RADIOLOGY

## 2017-05-11 PROCEDURE — 99999 PR PBB SHADOW E&M-EST. PATIENT-LVL III: CPT | Mod: PBBFAC,,, | Performed by: PHYSICIAN ASSISTANT

## 2017-05-11 PROCEDURE — 99213 OFFICE O/P EST LOW 20 MIN: CPT | Mod: 24,S$GLB,, | Performed by: PHYSICIAN ASSISTANT

## 2017-05-11 PROCEDURE — 73030 X-RAY EXAM OF SHOULDER: CPT | Mod: TC,RT

## 2017-05-11 NOTE — PROGRESS NOTES
Subjective:      Patient ID: Rajan Cooper III is a 61 y.o. male.    Chief Complaint: No chief complaint on file.    HPI    Patient is a 61 year old male who presents to clinic for evaluation of decreased function and range of motion of his right shoulder since 03/04/2107.  Patient denied any history of trauma. He pain is 1-2/10. Patient stated that he is unable to reach his arm over his head or out to the side. He has been receiving home health for his ankle wrist and shoulder over past 5-6 weeks without improvement. He is also taking ORC Naproxen 2 in the morning and 2 in the afternoon with some relief of pain. Patient denied numbness or tingling.     Review of Systems   Constitution: Negative for chills and fever.   Cardiovascular: Negative for chest pain.   Respiratory: Negative for cough and shortness of breath.    Skin: Negative for color change, dry skin, itching, nail changes, poor wound healing and rash.   Musculoskeletal:        Right shoulder decreased range of motion.    Neurological: Negative for dizziness.   Psychiatric/Behavioral: Negative for altered mental status. The patient is not nervous/anxious.    All other systems reviewed and are negative.        Objective:      General    Constitutional: He is oriented to person, place, and time. He appears well-developed and well-nourished. No distress.   HENT:   Head: Atraumatic.   Eyes: Conjunctivae are normal.   Cardiovascular: Normal rate.    Pulmonary/Chest: Effort normal.   Neurological: He is alert and oriented to person, place, and time.   Psychiatric: He has a normal mood and affect. His behavior is normal.         Right Shoulder Exam     Inspection/Observation   Swelling: absent  Bruising: absent  Deformity: absent    Tenderness   The patient is experiencing no tenderness.        Range of Motion   Active Abduction:  60 abnormal   Passive Abduction: normal   Forward Flexion:  70 abnormal   Adduction: normal  External Rotation 0 degrees: abnormal    External Rotation 90 degrees: abnormal  Internal Rotation 0 degrees: abnormal   Internal Rotation 90 degrees: abnormal     Tests & Signs   Drop Arm: positive    Comments:  + empty can.     Vascular Exam     Right Pulses      Radial:                    2+      Capillary Refill  Right Hand: normal capillary refill      RADS: no fracture or dislocation, humeral head is ridding a little high which can be concerning for rotator cuff tear.         Assessment:       Encounter Diagnoses   Name Primary?    Acute pain of right shoulder Yes    Rotator cuff syndrome of right shoulder     MS (multiple sclerosis)           Plan:       Discussed treatment options/ plan with patient. Patient has failed conservative treatment. At this time we will place Order for formal Out patient PT as well as MRI. Patient is to call for MRI results. Follow up is pending results.

## 2017-05-11 NOTE — PROGRESS NOTES
Called and Informed patient of appointment on 05/22/17 at 6:15 pm at the [place] location and mailed.  Patient states verbal understanding and has no further questions.

## 2017-05-12 LAB
ACID FAST MOD KINY STN SPEC: NORMAL
MYCOBACTERIUM SPEC QL CULT: NORMAL

## 2017-05-18 ENCOUNTER — TELEPHONE (OUTPATIENT)
Dept: ORTHOPEDICS | Facility: CLINIC | Age: 62
End: 2017-05-18

## 2017-05-18 NOTE — TELEPHONE ENCOUNTER
----- Message from Jordy Leiva sent at 5/16/2017  8:59 AM CDT -----  Contact: self  Pt was returning your call. Pt can be reached at [phone].

## 2017-05-19 ENCOUNTER — TELEPHONE (OUTPATIENT)
Dept: ORTHOPEDICS | Facility: CLINIC | Age: 62
End: 2017-05-19

## 2017-05-19 NOTE — TELEPHONE ENCOUNTER
Spoke with pt.   States he is now using workman's comp for his shoulder injury and must use his work comp doctors.  Pt will no longer be treating with MARCIA Pinto for his shoulder pain/injury.   MRI has been cxl'd.

## 2017-05-19 NOTE — TELEPHONE ENCOUNTER
Left voice mail for pt to return my call.   Will need to have pt contact [facility] Work Comp department to clarify pt now being transferred from private insurance to work comp

## 2017-05-19 NOTE — TELEPHONE ENCOUNTER
----- Message from Iesha Phillips MA sent at 5/19/2017  3:01 PM CDT -----  Contact: pt      ----- Message -----     From: Anh Puri     Sent: 5/19/2017   1:30 PM       To: [facility] Clinical Staff    Pt called and states he has been talking with Anastasia and she schedule him for a test on Monday and he has been using his insurance [employer], but now his company that he work called him and told him from now on he would have to go under workers comp. Pt would like for someone to call him in regards to his appt on Monday. Pt can be reached at [phone].

## 2017-06-12 ENCOUNTER — OFFICE VISIT (OUTPATIENT)
Dept: INTERNAL MEDICINE | Facility: CLINIC | Age: 62
End: 2017-06-12
Payer: COMMERCIAL

## 2017-06-12 ENCOUNTER — LAB VISIT (OUTPATIENT)
Dept: LAB | Facility: HOSPITAL | Age: 62
End: 2017-06-12
Attending: INTERNAL MEDICINE
Payer: COMMERCIAL

## 2017-06-12 VITALS
BODY MASS INDEX: 39.71 KG/M2 | HEART RATE: 65 BPM | OXYGEN SATURATION: 97 % | SYSTOLIC BLOOD PRESSURE: 139 MMHG | HEIGHT: 72 IN | DIASTOLIC BLOOD PRESSURE: 86 MMHG | WEIGHT: 293.19 LBS

## 2017-06-12 DIAGNOSIS — M25.511 CHRONIC RIGHT SHOULDER PAIN: Primary | ICD-10-CM

## 2017-06-12 DIAGNOSIS — R21 RASH: ICD-10-CM

## 2017-06-12 DIAGNOSIS — I10 ESSENTIAL HYPERTENSION: ICD-10-CM

## 2017-06-12 DIAGNOSIS — Z01.00 EYE EXAM, ROUTINE: ICD-10-CM

## 2017-06-12 DIAGNOSIS — I82.541 CHRONIC DEEP VEIN THROMBOSIS (DVT) OF TIBIAL VEIN OF RIGHT LOWER EXTREMITY: ICD-10-CM

## 2017-06-12 DIAGNOSIS — G35 MS (MULTIPLE SCLEROSIS): ICD-10-CM

## 2017-06-12 DIAGNOSIS — G89.29 CHRONIC RIGHT SHOULDER PAIN: Primary | ICD-10-CM

## 2017-06-12 DIAGNOSIS — M00.032 STAPHYLOCOCCAL ARTHRITIS OF LEFT WRIST: ICD-10-CM

## 2017-06-12 LAB
BASOPHILS # BLD AUTO: 0.01 K/UL
BASOPHILS NFR BLD: 0.2 %
DIFFERENTIAL METHOD: ABNORMAL
EOSINOPHIL # BLD AUTO: 0.2 K/UL
EOSINOPHIL NFR BLD: 4 %
ERYTHROCYTE [DISTWIDTH] IN BLOOD BY AUTOMATED COUNT: 15.5 %
ERYTHROCYTE [SEDIMENTATION RATE] IN BLOOD BY WESTERGREN METHOD: 30 MM/HR
HCT VFR BLD AUTO: 39 %
HGB BLD-MCNC: 12.5 G/DL
LYMPHOCYTES # BLD AUTO: 1.6 K/UL
LYMPHOCYTES NFR BLD: 27.6 %
MCH RBC QN AUTO: 30 PG
MCHC RBC AUTO-ENTMCNC: 32.1 %
MCV RBC AUTO: 94 FL
MONOCYTES # BLD AUTO: 0.6 K/UL
MONOCYTES NFR BLD: 9.5 %
NEUTROPHILS # BLD AUTO: 3.4 K/UL
NEUTROPHILS NFR BLD: 58.5 %
PLATELET # BLD AUTO: 196 K/UL
PMV BLD AUTO: 10.7 FL
RBC # BLD AUTO: 4.17 M/UL
WBC # BLD AUTO: 5.79 K/UL

## 2017-06-12 PROCEDURE — 99999 PR PBB SHADOW E&M-EST. PATIENT-LVL IV: CPT | Mod: PBBFAC,,, | Performed by: INTERNAL MEDICINE

## 2017-06-12 PROCEDURE — 99214 OFFICE O/P EST MOD 30 MIN: CPT | Mod: S$GLB,,, | Performed by: INTERNAL MEDICINE

## 2017-06-12 PROCEDURE — 36415 COLL VENOUS BLD VENIPUNCTURE: CPT

## 2017-06-12 PROCEDURE — 85651 RBC SED RATE NONAUTOMATED: CPT

## 2017-06-12 PROCEDURE — 85025 COMPLETE CBC W/AUTO DIFF WBC: CPT

## 2017-06-12 RX ORDER — LOSARTAN POTASSIUM 50 MG/1
50 TABLET ORAL DAILY
Qty: 90 TABLET | Refills: 4 | Status: SHIPPED | OUTPATIENT
Start: 2017-06-12 | End: 2018-06-18 | Stop reason: SDUPTHER

## 2017-06-12 NOTE — PROGRESS NOTES
"Subjective:       Patient ID: Rajan Cooper III is a 61 y.o. male.    Chief Complaint: Hospital Follow Up (ED visit "staph inf")    Hospital follow-up of medical problems and update of some new problems. .  He is still having some right shoulder pain but had to get his scanned through [employer]'s [employer]. so we do not have that here.  He is able to see our providers so he has a follow-up on Thursday.  I asked that the records be forwarded to me for review.  He has no pain presently but says it still feels weak.  He doesn't believe this relates to his multiple sclerosis.  That is slowly improving since the staph wrist infection.  He would like to update some labs because he is having some vision symptoms.  He has not had an eye exam in over a year.  He says the eyes burn a little and that is similar to how they felt when his wrist infection was acting up.  He also has a small rash that he thinks is a ringworm on the left thigh.  He was diagnosed with a right mid leg DVT in March.  He is on a blood thinner.  We will reassess the ultrasound later in the year to determine if he can stop the medicine.       Review of Systems   Constitutional: Negative for chills, fatigue, fever and unexpected weight change.   HENT: Negative for trouble swallowing.    Eyes: Negative for visual disturbance.   Respiratory: Negative for cough, shortness of breath and wheezing.    Cardiovascular: Positive for leg swelling (right leg after DVT). Negative for chest pain and palpitations.        Elevated blood pressure   Gastrointestinal: Negative for abdominal pain, constipation, diarrhea, nausea and vomiting.   Genitourinary: Negative for difficulty urinating.   Musculoskeletal: Positive for arthralgias (right shoulder with decreased range of motion), gait problem (uses a walker secondary to MS) and joint swelling (left wrist but improving). Negative for neck pain.   Skin: Negative for rash.   Neurological: Positive for weakness. Negative for " dizziness and headaches.       Objective:      Physical Exam   Constitutional: He is oriented to person, place, and time. He appears well-developed and well-nourished. No distress.   HENT:   Head: Normocephalic and atraumatic.   Mouth/Throat: No oropharyngeal exudate.   TM's clear, pharynx clear   Eyes: Conjunctivae and EOM are normal. Pupils are equal, round, and reactive to light. No scleral icterus.   No conjunctival irritation   Neck: Normal range of motion. Neck supple. No thyromegaly present.   No supraclavicular nodes palpated   Cardiovascular: Normal rate, regular rhythm and normal heart sounds.    No murmur heard.  Pulmonary/Chest: Effort normal and breath sounds normal. He has no wheezes.   Abdominal: Soft. Bowel sounds are normal. He exhibits no mass. There is no tenderness.   Musculoskeletal: He exhibits edema (right leg secondary to DVT), tenderness (Minimal left wrist) and deformity (left wrist).   Lymphadenopathy:     He has no cervical adenopathy.   Neurological: He is alert and oriented to person, place, and time. Coordination (secondary to MS) abnormal.   Skin: Rash (small nickel size skin lesion with central clearing.  Appears consistent with ringworm) noted. No pallor.   Psychiatric: He has a normal mood and affect.       Assessment:       1. Chronic right shoulder pain    2. MS (multiple sclerosis)    3. Essential hypertension    4. Chronic deep vein thrombosis (DVT) of tibial vein of right lower extremity    5. Eye exam, routine    6. Staphylococcal arthritis of left wrist    7. Rash        Plan:       Rajan was seen today for hospital follow up.    Diagnoses and all orders for this visit:    Chronic right shoulder pain    MS (multiple sclerosis)  -     CBC auto differential; Future  -     Sedimentation rate, manual; Future    Essential hypertension    Chronic deep vein thrombosis (DVT) of tibial vein of right lower extremity  -     CAR Ultrasound doppler venous leg right; Future    Eye exam,  routine  -     Ambulatory referral to Optometry    Staphylococcal arthritis of left wrist  -     CBC auto differential; Future  -     Sedimentation rate, manual; Future    Rash  Comments:  Suspect ring worm. Trial of antifungal.     Other orders  -     losartan (COZAAR) 50 MG tablet; Take 1 tablet (50 mg total) by mouth once daily.

## 2017-06-13 ENCOUNTER — PATIENT MESSAGE (OUTPATIENT)
Dept: INTERNAL MEDICINE | Facility: CLINIC | Age: 62
End: 2017-06-13

## 2017-06-15 ENCOUNTER — INITIAL CONSULT (OUTPATIENT)
Dept: OPTOMETRY | Facility: CLINIC | Age: 62
End: 2017-06-15
Payer: COMMERCIAL

## 2017-06-15 ENCOUNTER — DOCUMENTATION ONLY (OUTPATIENT)
Dept: ORTHOPEDICS | Facility: CLINIC | Age: 62
End: 2017-06-15

## 2017-06-15 DIAGNOSIS — I10 ESSENTIAL HYPERTENSION: ICD-10-CM

## 2017-06-15 DIAGNOSIS — H04.123 BILATERAL DRY EYES: Primary | ICD-10-CM

## 2017-06-15 DIAGNOSIS — H40.013 OAG (OPEN ANGLE GLAUCOMA) SUSPECT, LOW RISK, BILATERAL: ICD-10-CM

## 2017-06-15 DIAGNOSIS — G35 MS (MULTIPLE SCLEROSIS): ICD-10-CM

## 2017-06-15 PROCEDURE — 92004 COMPRE OPH EXAM NEW PT 1/>: CPT | Mod: S$GLB,,, | Performed by: OPTOMETRIST

## 2017-06-15 PROCEDURE — 92250 FUNDUS PHOTOGRAPHY W/I&R: CPT | Mod: S$GLB,,, | Performed by: OPTOMETRIST

## 2017-06-15 PROCEDURE — 99999 PR PBB SHADOW E&M-EST. PATIENT-LVL II: CPT | Mod: PBBFAC,,, | Performed by: OPTOMETRIST

## 2017-06-15 PROCEDURE — 76514 ECHO EXAM OF EYE THICKNESS: CPT | Mod: S$GLB,,, | Performed by: OPTOMETRIST

## 2017-06-15 NOTE — PROGRESS NOTES
Karin Newton   [facility]  [address]  [place] [address]  Work# [phone] Fax# [phone]  [facility] Claim # [id number]

## 2017-06-15 NOTE — LETTER
Renate 15, 2017      Tom Garcia MD  [address]  [facility] [address]           [facility]  [address]  [facility] [address]  Phone: [phone]          Patient: Rajan Cooper III   MR Number: [id number]   YOB: 1955   Date of Visit: 6/15/2017       Dear Dr. Tom Garcia:    Thank you for referring Rajan Cooper to me for evaluation. Attached you will find relevant portions of my assessment and plan of care.    If you have questions, please do not hesitate to call me. I look forward to following Rajan Cooper along with you.    Sincerely,    Alena Moss, OD    Enclosure  CC:  No Recipients    If you would like to receive this communication electronically, please contact [email or web address] or [phone] to request more information on [employer] access.    For providers and/or their staff who would like to refer a patient to [facility], please contact us through our one-stop-shop provider referral line, [facility] , at [phone].    If you feel you have received this communication in error or would no longer like to receive these types of communications, please e-mail [email or web address]

## 2017-06-15 NOTE — PROGRESS NOTES
HPI     Mr. Cooper was referred by Tom Garcia MD for a routine eye exam    Patient complains of burning, tearing, redness, and irritation OU.   Symptoms started during his hospital stay last month. Worse when   air-conditioning in car is blowing in his eyes. Sometimes symptoms last   all day, and some days he is asymptomatic.  He declines refraction today.     (--)drops  (--)pain  (--)flashes  (--)floaters  (--)diplopia    Diabetic no  Hemoglobin A1C       Date                     Value               Ref Range             Status                12/22/2014               6.1                 4.5 - 6.2 %           Final            ----------    OCULAR HISTORY  Last Eye Exam: about 1 year ago at [facility]  (--)eye surgery   (--)eye injury   Pt reports episode of loss of color vision OU (lasted less than 1 day, has   not recurred).  Multiple sclerosis    FAMILY HISTORY  Pt denies family history of glaucoma  (--)Macular Degeneration     Last edited by Alena Moss, OD on 6/15/2017 10:59 AM. (History)            Assessment /Plan     For exam results, see Encounter Report.    Bilateral dry eyes   Start artificial tears TID OU. RTC if symptoms have not improved in 1 month.    OAG (open angle glaucoma) suspect, low risk, bilateral   Low risk based on cupping OU and central corneal thickness thinner than average OU (534/523). Normal IOP (OD 12mmHg, OS 14mmHg), no family history of glaucoma.   Explained nature of glaucoma and potential to progress to permanent loss of peripheral vision if untreated. Recommended HVF 24-2ss, RNFL OCT, and Posterior Pole OCT; pt agrees, scheduled for 06/23/17. Will call pt with results.    Stereo disc photos taken today.    -     Color Fundus Photography - OU - Both Eyes    Essential hypertension   No retinopathy noted OU. Continue management of HTN as directed by PCP. Monitor with DFE in 1 year, or RTC immediately with any vision changes.     MS (multiple sclerosis)   No signs of optic  neuritis OU. Monitor yearly, or RTC sooner with any vision changes.        RTC 06/23/17 for HVF 24-2ss, RNFL OCT, and posterior pole OCT

## 2017-06-15 NOTE — PATIENT INSTRUCTIONS
"DRY EYES     Dry eyes is a common condition. It can be caused by an insufficient volume of tears, or by tears that do not spread evenly over the cornea. An uneven tear film can also cause vision to blur intermittently.   Dry eyes are often associated with burning, stinging, and/or red eyes.As we age, the eyes usually produce fewer tears and result in decreased normal eye lubrication. Paradoxically, dry eye can make eyes water. When they water, that watery production actually makes the dry eye situation worse because the watery tears do not lubricate the eye well at all. Watery tears really serve to flush foreign material out of the eye, not to lubricate. Unfortunately, when the eyes get really dry they become irritated and stimulate the formation of watery tears.   Lubricants, in the form of drops or ointments, are the principal treatment for dry eyes. The following are recommendations for managing your dry eye condition:    1) Use over-the-counter artificial tears or lubricants (such as Systane, Refresh, Blink, or Genteal), 1 drop in each eye 3 to 4 times a day. Please avoid drops that advertise redness relief since these can be unhealthy for your eyes and can cause permanent redness if used long-term.     It is best to take artificial tears on a schedule, like you would for a medication. Taking them routinely at breakfast, lunch, and dinner for example will provide better relief and better use of the tear drop than taking them whenever your eyes "feel" dry.    2) Optional use of over-the-counter lubricating gels (such as Genteal Gel, Systane Gel, or Refresh PM) applied to the inside of the lower lid of both eyes at bedtime. This gel is very thick and may cause blurred vision, so we recommend you use it before bedtime. In the morning you can gently wipe your eyes with a damp washcloth to remove any residual gel.    3) Warm compresses applied to the closed eyelids twice per day, followed with lid massage.    4) " Always drink an adequate amount of water daily (4-6 cups a day).    5) 1000 mg of good quality fish oil (labeled DHA and/or EPA). Flaxseed oil can also be beneficial.    6) Use a humidifier in your bedroom.    7) If the dryness continues there may be additional options of punctal plugs and Restasis. Punctal plugs are medical devices inserted into the puncta or the drain of the eye to block some of your natural tears from draining off the eye. Restasis is a prescription eye drop that, over time, may help your body make more tears naturally.    It is important to maintain this treatment plan until your symptoms have improved. Once improved, you can reduce the frequency of lubricants and warm compresses. If the symptoms recur, then apply as needed for comfort.    ==============================================    GLAUCOMA    Glaucoma is a disease caused by increased intraocular pressure (IOP) resulting either from a malformation or malfunction of the eyes drainage structures.  Left untreated, an elevated IOP causes irreversible damage the optic nerve and retinal fibers resulting in a progressive, permanent loss of vision.  However, early detection and treatment can slow, or even halt the progression of the disease.     Causes of glaucoma:    The eye constantly produces aqueous, the clear fluid that fills the anterior chamber (the space between the cornea and iris).  The aqueous filters out of the anterior chamber through a complex drainage system.  The delicate balance between the production and drainage of aqueous determines the eyes intraocular pressure (IOP). Most peoples IOPs fall between 8 and 21.  However, some eyes can tolerate higher pressures than others. Thats why it may be normal for one person to have a higher pressure than another.     Common types of glaucoma    Open Angle    Open angle (also called chronic open angle or primary open angle) is the most common type of glaucoma.  With this type, even  though the anterior structures of the eye appear normal, aqueous fluid builds within the anterior chamber, causing the IOP to become elevated.  Left untreated, this may result in permanent damage of the optic nerve and retina.  Eye drops are generally prescribed to lower the eye pressure.  In some cases, surgery is performed if the IOP cannot be adequately controlled with medical therapy.      Acute Angle Closure    Only about 10% of the population with glaucoma has this type.  Acute angle closure occurs because of an abnormality of the structures in the front of the eye.  In most of these cases, the space between the iris and cornea is more narrow than normal, leaving a smaller channel for the aqueous to pass through.  If the flow of aqueous becomes completely blocked, the IOP rises sharply, causing a sudden angle closure attack.    While patients with open angle glaucoma don't typically have symptoms, those with angle closure glaucoma may experience severe eye pain accompanied by nausea, blurred vision, rainbows around lights, and a red eye. This problem is an emergency and should be treated immediately. If left untreated, severe and permanent loss of vision will occur in a matter of days.    Secondary Glaucoma    This type occurs as a result of another disease or problem within the eye such as: inflammation, trauma, previous surgery, diabetes, tumor, and certain medications.  For this type, both the glaucoma and the underlying problem must be treated.    Congenital    This is a rare type of glaucoma that is generally seen in infants. In most cases, surgery is required.    Signs and Symptoms    Glaucoma is an insidious disease because it rarely causes symptoms.        Detection and Diagnosis    Because glaucoma does not cause symptoms in most cases, those who are 40 or older should have an annual examination including a measurement of the intraocular pressure.  Those who are glaucoma suspects may need additional  testing.      The glaucoma evaluation has several components. In addition to measuring the intraocular pressure, the doctor will also evaluate the health of the optic nerve (ophthalmoscopy), test the peripheral vision (visual field test), and examine the structures in the front of the eye with a special lens (gonioscopy) before making a diagnosis.       The doctor evaluates the optic nerve and grades its health by noting the cup to disc ratio.  This is simply a comparison of the cup (the depressed area in the center of the nerve) to the entire diameter of the optic nerve.  As glaucoma progresses, the area of cupping, or depression, increases.  Therefore, a patient with a higher ratio has more damage.     The progression of glaucoma is monitored with a visual field test.  This test maps the peripheral vision, allowing the doctor to determine the extent of vision loss from glaucoma and a measure of the effectiveness of the treatment.  The visual field test is periodically repeated to verify that the intraocular pressure is being adequately controlled.    The structures in the front of the eye are normally difficult to see without the help of a special gonioscopy lens.  This special mirrored contact lens allows the doctor to examine the anterior chamber and the eyes drainage system.        Treatment     Most patients with glaucoma require only medication to control the eye pressure.  Sometimes, several medications that complement each other are necessary to reduce the pressure adequately.      Surgery is indicated when medical treatment fails to lower the pressure satisfactorily.  There are several types of procedures, some involve laser and can be done in the office, others must be performed in the operating room.  The objective of any glaucoma operation is to allow fluid to drain from the eye more efficiently.

## 2017-06-21 ENCOUNTER — TELEPHONE (OUTPATIENT)
Dept: SPORTS MEDICINE | Facility: CLINIC | Age: 62
End: 2017-06-21

## 2017-06-21 NOTE — TELEPHONE ENCOUNTER
Spoke to the patient and informed him that none of our physicians would like to see him under WC.    Kendra Duke MA  [facility]

## 2017-06-21 NOTE — TELEPHONE ENCOUNTER
----- Message from Merissa Lane MA sent at 6/15/2017  8:36 AM CDT -----      ----- Message -----  From: Mercy Mora MA  Sent: 6/15/2017   7:49 AM  To: Norma Burger Staff    Wcomp right [facility] 02/25/17-claim# [id number]   Pt is vargas 06/28/17. Pt is understanding; regarding wcomp process. Please contact patient with any changes or route to go. Patient is aware. Thanks

## 2017-06-28 DIAGNOSIS — Z12.11 COLON CANCER SCREENING: ICD-10-CM

## 2017-07-06 ENCOUNTER — TELEPHONE (OUTPATIENT)
Dept: OPTOMETRY | Facility: CLINIC | Age: 62
End: 2017-07-06

## 2017-07-06 DIAGNOSIS — H40.013 OAG (OPEN ANGLE GLAUCOMA) SUSPECT, LOW RISK, BILATERAL: Primary | ICD-10-CM

## 2017-07-06 NOTE — TELEPHONE ENCOUNTER
Spoke to patient regarding cancelled HVF and OCT appointments twice. Stressed importance of these tests for his eye health. Patient states work schedule has been hectic and will call back to reschedule the appointment within the next 2 weeks.

## 2017-07-06 NOTE — TELEPHONE ENCOUNTER
----- Message from Alena Moss OD sent at 7/6/2017 11:31 AM CDT -----  Regarding: call pt to rescheduled testing  Hello,    This patient cancelled his appointment for HVF 24-2ss, RNFL OCT, and Posterior Pole OCT. Please call him to offer to reschedule. I will call him with the results.    Thank you,  Alena Moss OD

## 2017-07-12 ENCOUNTER — OFFICE VISIT (OUTPATIENT)
Dept: NEUROLOGY | Facility: CLINIC | Age: 62
End: 2017-07-12
Payer: COMMERCIAL

## 2017-07-12 ENCOUNTER — LAB VISIT (OUTPATIENT)
Dept: LAB | Facility: HOSPITAL | Age: 62
End: 2017-07-12
Attending: NEUROMUSCULOSKELETAL MEDICINE & OMM
Payer: COMMERCIAL

## 2017-07-12 VITALS
HEIGHT: 72 IN | WEIGHT: 295.63 LBS | BODY MASS INDEX: 40.04 KG/M2 | DIASTOLIC BLOOD PRESSURE: 89 MMHG | HEART RATE: 67 BPM | SYSTOLIC BLOOD PRESSURE: 153 MMHG

## 2017-07-12 DIAGNOSIS — G35 MS (MULTIPLE SCLEROSIS): ICD-10-CM

## 2017-07-12 DIAGNOSIS — G35 MS (MULTIPLE SCLEROSIS): Primary | ICD-10-CM

## 2017-07-12 LAB
CREAT SERPL-MCNC: 0.9 MG/DL
EST. GFR  (AFRICAN AMERICAN): >60 ML/MIN/1.73 M^2
EST. GFR  (NON AFRICAN AMERICAN): >60 ML/MIN/1.73 M^2

## 2017-07-12 PROCEDURE — 82565 ASSAY OF CREATININE: CPT

## 2017-07-12 PROCEDURE — 99214 OFFICE O/P EST MOD 30 MIN: CPT | Mod: S$GLB,,, | Performed by: NEUROMUSCULOSKELETAL MEDICINE & OMM

## 2017-07-12 PROCEDURE — 36415 COLL VENOUS BLD VENIPUNCTURE: CPT | Mod: PO

## 2017-07-12 PROCEDURE — 99999 PR PBB SHADOW E&M-EST. PATIENT-LVL III: CPT | Mod: PBBFAC,,, | Performed by: NEUROMUSCULOSKELETAL MEDICINE & OMM

## 2017-07-12 RX ORDER — LOSARTAN POTASSIUM 25 MG/1
TABLET ORAL
COMMUNITY
Start: 2017-07-07 | End: 2018-04-10

## 2017-07-12 NOTE — PROGRESS NOTES
Progress Notes        History of present illness: Patient presents for follow-up for his multiple sclerosis.  His multiple sclerosis has been quiet however he had a severe bout of staph infection requiring a PICC line for 6 weeks.  He had wrist, ankle, and shoulder problems.  He continues to complain of pain of an aching nature down both legs.  He has been taking baclofen 3 times per day.  He is presently on blood thinners.  He has not had MRI scans in several years.  Previous note: 12-11-15.  Patient presents for follow-up for his multiple sclerosis.  He is not having any significant problems at this time.  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  He continues to take the baclofen one-2 tablets per day for muscle spasms in the leg.  He occasionally takes the tube particularly when he has long days at work.  He has not had an MRI several years.  He continues to take the Avonex.  Previous note: 5-4-15: is wearing stockings for his peripheral edema; he continues with baclofen 10 mg to-3 tablets per day seems to help considerably with the muscle cramps. He occasionally uses a cane or walker for long distances with his unsteady gait.        Neurologic exam:  Cranial nerves: Normal visual acuity at bedside testing. Visual field to confrontation - normal. Pupils are reactive. External ocular movements- full range of motion; no nystagmus or diplopia. Normal corneal reflexes and facial sensations. No facial asymmetry noted and facial movements including smiling were normal. Hearing was unimpaired. Palate movements are normal with normal gag response. Shoulder movements including shrug response was normal. Tongue movements are normal and with no evidence of atrophy or involuntary movements.  Muscle strength: upper- normal lower-normal. Tone was normal stands without hands -with minimal effort   Sensory examination:Pinprick and touch - normal . Vibration- normal 15-20 seconds  at toes  Deep tendon reflexes: upper extremity-2+; lower extremity- KJ-3+ / AJ - 2+ Both plantars- flexor.   Cerebellar exam upper extremities - finger to nose - normal ; rapid alternating movements - gait- unsteady, Tandem gait unsteady. Romberg's - unsteady   Cervical exam: Cervical / trapezius muscle - no tenderness; Lumbar Exam: Low back tenderness-negative sciatic notch tenderness-negative straight leg raising test-negative    Diagnoses: Multiple sclerosis;Leg pain; muscle cramps; weight gain; peripheral edema    Recommendations: MRI brain and cervical spine with and without contrast; increase baclofen 10 mg to 2 tablets twice a day for his muscle spasms in the legs.  Follow-up in 3 months.

## 2017-07-19 ENCOUNTER — HOSPITAL ENCOUNTER (OUTPATIENT)
Dept: RADIOLOGY | Facility: HOSPITAL | Age: 62
Discharge: HOME | End: 2017-07-19
Attending: NEUROMUSCULOSKELETAL MEDICINE & OMM
Payer: COMMERCIAL

## 2017-07-19 DIAGNOSIS — G35 MS (MULTIPLE SCLEROSIS): ICD-10-CM

## 2017-07-19 PROCEDURE — 70553 MRI BRAIN STEM W/O & W/DYE: CPT | Mod: TC

## 2017-07-19 PROCEDURE — 25500020 PHARM REV CODE 255: Performed by: NEUROMUSCULOSKELETAL MEDICINE & OMM

## 2017-07-19 PROCEDURE — 72156 MRI NECK SPINE W/O & W/DYE: CPT | Mod: TC

## 2017-07-19 PROCEDURE — 72156 MRI NECK SPINE W/O & W/DYE: CPT | Mod: 26,,, | Performed by: RADIOLOGY

## 2017-07-19 PROCEDURE — 70553 MRI BRAIN STEM W/O & W/DYE: CPT | Mod: 26,,, | Performed by: RADIOLOGY

## 2017-07-19 PROCEDURE — A9585 GADOBUTROL INJECTION: HCPCS | Performed by: NEUROMUSCULOSKELETAL MEDICINE & OMM

## 2017-07-19 RX ORDER — GADOBUTROL 604.72 MG/ML
10 INJECTION INTRAVENOUS
Status: COMPLETED | OUTPATIENT
Start: 2017-07-19 | End: 2017-07-19

## 2017-07-19 RX ADMIN — GADOBUTROL 10 ML: 604.72 INJECTION INTRAVENOUS at 02:07

## 2017-07-26 RX ORDER — BACLOFEN 10 MG/1
TABLET ORAL
Qty: 90 TABLET | Refills: 5 | Status: SHIPPED | OUTPATIENT
Start: 2017-07-26 | End: 2018-10-30

## 2017-09-12 ENCOUNTER — CLINICAL SUPPORT (OUTPATIENT)
Dept: CARDIOLOGY | Facility: CLINIC | Age: 62
End: 2017-09-12
Payer: COMMERCIAL

## 2017-09-12 DIAGNOSIS — I82.541 CHRONIC DEEP VEIN THROMBOSIS (DVT) OF TIBIAL VEIN OF RIGHT LOWER EXTREMITY: ICD-10-CM

## 2017-09-12 PROCEDURE — 93971 EXTREMITY STUDY: CPT | Mod: S$GLB,,, | Performed by: INTERNAL MEDICINE

## 2017-09-13 ENCOUNTER — OFFICE VISIT (OUTPATIENT)
Dept: NEUROLOGY | Facility: CLINIC | Age: 62
End: 2017-09-13
Payer: COMMERCIAL

## 2017-09-13 VITALS
DIASTOLIC BLOOD PRESSURE: 84 MMHG | BODY MASS INDEX: 40.34 KG/M2 | HEIGHT: 72 IN | WEIGHT: 297.81 LBS | HEART RATE: 69 BPM | SYSTOLIC BLOOD PRESSURE: 130 MMHG

## 2017-09-13 DIAGNOSIS — M79.606 PAIN OF LOWER EXTREMITY, UNSPECIFIED LATERALITY: ICD-10-CM

## 2017-09-13 PROCEDURE — 3079F DIAST BP 80-89 MM HG: CPT | Mod: S$GLB,,, | Performed by: NEUROMUSCULOSKELETAL MEDICINE & OMM

## 2017-09-13 PROCEDURE — 3008F BODY MASS INDEX DOCD: CPT | Mod: S$GLB,,, | Performed by: NEUROMUSCULOSKELETAL MEDICINE & OMM

## 2017-09-13 PROCEDURE — 99214 OFFICE O/P EST MOD 30 MIN: CPT | Mod: S$GLB,,, | Performed by: NEUROMUSCULOSKELETAL MEDICINE & OMM

## 2017-09-13 PROCEDURE — 99999 PR PBB SHADOW E&M-EST. PATIENT-LVL III: CPT | Mod: PBBFAC,,, | Performed by: NEUROMUSCULOSKELETAL MEDICINE & OMM

## 2017-09-13 PROCEDURE — 3075F SYST BP GE 130 - 139MM HG: CPT | Mod: S$GLB,,, | Performed by: NEUROMUSCULOSKELETAL MEDICINE & OMM

## 2017-09-13 RX ORDER — GABAPENTIN 100 MG/1
100 CAPSULE ORAL 3 TIMES DAILY
Qty: 90 CAPSULE | Refills: 11 | Status: SHIPPED | OUTPATIENT
Start: 2017-09-13 | End: 2018-10-30

## 2017-09-14 ENCOUNTER — PATIENT MESSAGE (OUTPATIENT)
Dept: INTERNAL MEDICINE | Facility: CLINIC | Age: 62
End: 2017-09-14

## 2017-09-14 PROBLEM — M79.606 LEG PAIN: Status: ACTIVE | Noted: 2017-09-14

## 2017-09-14 NOTE — PROGRESS NOTES
History of present illness: Patient presents for follow-up for his multiple sclerosis.  He has been complaining of pins and needles and burning pain down the legs from the knees.  In the morning it on awakening it's the worse.  It seems to decrease with walking.  He has are taking baclofen 20 mg twice a day which is not helping anymore.  His last MRI 7-19-17 was stable in the brain.  Cervical showed questionable C4-C5 lesion however no comparison from previous studies.  Patient had a venous leg clot in the right leg in February.  He continues to take Avonex without any problems.  He walks with a walker when he is fatigued.    Previous note: 7-12-17: Patient presents for follow-up for his multiple sclerosis.  His multiple sclerosis has been quiet however he had a severe bout of staph infection requiring a PICC line for 6 weeks.  He had wrist, ankle, and shoulder problems.  He continues to complain of pain of an aching nature down both legs.  He has been taking baclofen 3 times per day.  He is presently on blood thinners.  He has not had MRI scans in several years.       Neurologic exam:  Cranial nerves: Normal visual acuity at bedside testing. Visual field to confrontation - normal. Pupils are reactive. External ocular movements- full range of motion; no nystagmus or diplopia. Normal corneal reflexes and facial sensations. No facial asymmetry noted and facial movements including smiling were normal. Hearing was unimpaired. Palate movements are normal with normal gag response. Shoulder movements including shrug response was normal. Tongue movements are normal and with no evidence of atrophy or involuntary movements.  Muscle strength: upper- normal lower-normal. Tone was normal stands without hands -with minimal effort   Sensory examination:Pinprick and touch - normal . Vibration- normal 15-20 seconds at toes  Deep tendon reflexes: upper extremity-2+; lower extremity- KJ-3+ / AJ - 2+ Both plantars- flexor.    Cerebellar exam upper extremities - finger to nose - normal ; rapid alternating movements - gait- unsteady, Tandem gait unsteady. Romberg's - unsteady   Cervical exam: Cervical / trapezius muscle - no tenderness; Lumbar Exam: Low back tenderness-negative sciatic notch tenderness-negative straight leg raising test-negative    Diagnoses: Multiple sclerosis;Leg pain; muscle cramps; weight gain; peripheral edema    Recommendations: We'll add gabapentin for the leg pains since they are now sounding more neuropathic then spasticity.  He will start gabapentin 100 mg 3 times a day with an option to increase to 2 pills at a time if needed.  Follow-up in 2 months. .

## 2017-10-10 RX ORDER — LEVETIRACETAM 500 MG/1
500 TABLET ORAL NIGHTLY
Qty: 30 TABLET | Refills: 0 | Status: SHIPPED | OUTPATIENT
Start: 2017-10-10 | End: 2017-11-02 | Stop reason: SDUPTHER

## 2017-10-11 ENCOUNTER — PATIENT MESSAGE (OUTPATIENT)
Dept: NEUROLOGY | Facility: CLINIC | Age: 62
End: 2017-10-11

## 2017-10-13 ENCOUNTER — PATIENT MESSAGE (OUTPATIENT)
Dept: NEUROLOGY | Facility: CLINIC | Age: 62
End: 2017-10-13

## 2017-10-17 RX ORDER — APIXABAN 5 MG/1
5 TABLET, FILM COATED ORAL 2 TIMES DAILY
Qty: 60 TABLET | Refills: 5 | Status: SHIPPED | OUTPATIENT
Start: 2017-10-17 | End: 2018-10-30

## 2017-11-03 RX ORDER — LEVETIRACETAM 500 MG/1
500 TABLET ORAL NIGHTLY
Qty: 90 TABLET | Refills: 3 | Status: SHIPPED | OUTPATIENT
Start: 2017-11-03 | End: 2018-10-29 | Stop reason: SDUPTHER

## 2017-12-01 ENCOUNTER — OFFICE VISIT (OUTPATIENT)
Dept: NEUROLOGY | Facility: CLINIC | Age: 62
End: 2017-12-01
Payer: COMMERCIAL

## 2017-12-01 VITALS
BODY MASS INDEX: 40.76 KG/M2 | HEIGHT: 72 IN | DIASTOLIC BLOOD PRESSURE: 93 MMHG | SYSTOLIC BLOOD PRESSURE: 126 MMHG | HEART RATE: 84 BPM | WEIGHT: 300.94 LBS

## 2017-12-01 DIAGNOSIS — M54.2 NECK PAIN: Primary | ICD-10-CM

## 2017-12-01 PROCEDURE — 99999 PR PBB SHADOW E&M-EST. PATIENT-LVL III: CPT | Mod: PBBFAC,,, | Performed by: NEUROMUSCULOSKELETAL MEDICINE & OMM

## 2017-12-01 PROCEDURE — 99214 OFFICE O/P EST MOD 30 MIN: CPT | Mod: S$GLB,,, | Performed by: NEUROMUSCULOSKELETAL MEDICINE & OMM

## 2017-12-04 NOTE — PROGRESS NOTES
History of present illness: Patient presents for follow-up for multiple sclerosis and leg pains.  Pins and needles have been worse and more persistent.  He also has some peripheral edema.  He is presently on Eliquis.  MRI 7-12-17 cervical spine shows T2 C4-5 lesion with myelomalacia .  Patient is still working but during mostly sitting at the job.  He continues to have a wobbly unsteady gait.  The gabapentin 1200 mg per day is not working to help the leg pains as it used to.  He is having no problem with the Avonex this time.  Previous note: 9-13-17: Patient presents for follow-up for his multiple sclerosis.  He has been complaining of pins and needles and burning pain down the legs from the knees.  In the morning it on awakening it's the worse.  It seems to decrease with walking.  He has are taking baclofen 20 mg twice a day which is not helping anymore.  His last MRI 7-19-17 was stable in the brain.  Cervical showed questionable C4-C5 lesion however no comparison from previous studies.  Patient had a venous leg clot in the right leg in February.  He continues to take Avonex without any problems.  He walks with a walker when he is fatigued.     Previous note: 7-12-17: Patient presents for follow-up for his multiple sclerosis.  His multiple sclerosis has been quiet however he had a severe bout of staph infection requiring a PICC line for 6 weeks.  He had wrist, ankle, and shoulder problems.  He continues to complain of pain of an aching nature down both legs.  He has been taking baclofen 3 times per day.  He is presently on blood thinners.  He has not had MRI scans in several years.        Neurologic exam:  Cranial nerves: Normal visual acuity at bedside testing. Visual field to confrontation - normal. Pupils are reactive. External ocular movements- full range of motion; no nystagmus or diplopia. Normal corneal reflexes and facial sensations. No facial asymmetry noted and facial movements including smiling were  normal. Hearing was unimpaired. Palate movements are normal with normal gag response. Shoulder movements including shrug response was normal. Tongue movements are normal and with no evidence of atrophy or involuntary movements.  Muscle strength: upper- normal lower-normal. Tone was normal stands without hands -with minimal effort   Sensory examination:Pinprick and touch - normal . Vibration- normal 15-20 seconds at toes  Deep tendon reflexes: upper extremity-2+; lower extremity- KJ-3+ / AJ - 2+ Both plantars- flexor.   Cerebellar exam upper extremities - finger to nose - normal ; rapid alternating movements - gait- unsteady, Tandem gait unsteady. Romberg's - unsteady   Cervical exam: Cervical / trapezius muscle - no tenderness; Lumbar Exam: Low back tenderness-negative sciatic notch tenderness-negative straight leg raising test-negative    Diagnoses: Multiple sclerosis;Leg pain; muscle cramps; weight gain; peripheral edema    Recommendations: We'll add gabapentin for the leg pains since they are now sounding more neuropathic then spasticity.   we will add Keppra 500 mg half tablet -1 tablet at night to the gabapentin and baclofen for his muscle cramps..  Follow-up in 2 months. .

## 2017-12-15 ENCOUNTER — PATIENT MESSAGE (OUTPATIENT)
Dept: NEUROLOGY | Facility: CLINIC | Age: 62
End: 2017-12-15

## 2017-12-18 ENCOUNTER — TELEPHONE (OUTPATIENT)
Dept: NEUROLOGY | Facility: CLINIC | Age: 62
End: 2017-12-18

## 2017-12-18 NOTE — TELEPHONE ENCOUNTER
----- Message from Drew Miles sent at 12/18/2017 11:06 AM CST -----  Contact: Spouse  Need a call regarding a script for a walker. She states she is at the facility now, and would like a call as soon as possible.     States she would like the script to be faxed as soon as possible.     Call      Fax      I have spoken with this patient's wife and explained to her that  has requested that [place] [facility] [place] 's fax at [phone]

## 2017-12-18 NOTE — TELEPHONE ENCOUNTER
----- Message from Juni GILBERT Maxine sent at 12/18/2017 12:12 PM CST -----  Contact: Kayla cook/ [facility] @ [phone]  Kayla states she's returning a call to the nurse regarding pt's wheelchair prescription. Pls call.    I have called to speak with Kayla in regards to this patient there was no answer I left a voicemail with direct contact for her to call me back

## 2017-12-21 RX ORDER — BACLOFEN 10 MG/1
TABLET ORAL
Qty: 90 TABLET | Refills: 2 | Status: SHIPPED | OUTPATIENT
Start: 2017-12-21 | End: 2018-03-27 | Stop reason: SDUPTHER

## 2017-12-22 ENCOUNTER — TELEPHONE (OUTPATIENT)
Dept: RADIOLOGY | Facility: HOSPITAL | Age: 62
End: 2017-12-22

## 2017-12-22 ENCOUNTER — HOSPITAL ENCOUNTER (OUTPATIENT)
Dept: RADIOLOGY | Facility: HOSPITAL | Age: 62
Discharge: HOME | End: 2017-12-22
Attending: NEUROMUSCULOSKELETAL MEDICINE & OMM
Payer: COMMERCIAL

## 2017-12-22 DIAGNOSIS — M54.2 NECK PAIN: ICD-10-CM

## 2017-12-22 LAB
CREAT SERPL-MCNC: 0.9 MG/DL (ref 0.5–1.4)
SAMPLE: NORMAL

## 2017-12-22 PROCEDURE — 72156 MRI NECK SPINE W/O & W/DYE: CPT | Mod: 26,,, | Performed by: RADIOLOGY

## 2017-12-22 PROCEDURE — 25500020 PHARM REV CODE 255: Performed by: NEUROMUSCULOSKELETAL MEDICINE & OMM

## 2017-12-22 PROCEDURE — A9585 GADOBUTROL INJECTION: HCPCS | Performed by: NEUROMUSCULOSKELETAL MEDICINE & OMM

## 2017-12-22 PROCEDURE — 72156 MRI NECK SPINE W/O & W/DYE: CPT | Mod: TC

## 2017-12-22 RX ORDER — GADOBUTROL 604.72 MG/ML
10 INJECTION INTRAVENOUS
Status: COMPLETED | OUTPATIENT
Start: 2017-12-22 | End: 2017-12-22

## 2017-12-22 RX ADMIN — GADOBUTROL 10 ML: 604.72 INJECTION INTRAVENOUS at 01:12

## 2018-02-19 NOTE — PLAN OF CARE
Problem: Physical Therapy Goal  Goal: Physical Therapy Goal  Goals to be met by: 3/12/17     Patient will increase functional independence with mobility by performin. Supine to sit with Set-up Derry  2. Sit to supine with Set-up Derry  3. Sit to stand transfer with CGA using appropriate AD  4. Bed to chair transfer with Minimal Assistance using appropriate AD  5. Gait x 20 feet with Minimal Assistance using appropriate AD.   6. Lower extremity exercise program x20-30 reps per handout, with independence   Patient goals remain appropriate.  Patient will continue to benefit from further PT services.  Chaim Etienne, PTA           Walk in

## 2018-02-26 ENCOUNTER — TELEPHONE (OUTPATIENT)
Dept: NEUROLOGY | Facility: CLINIC | Age: 63
End: 2018-02-26

## 2018-02-26 NOTE — TELEPHONE ENCOUNTER
Called and talked to wife and informed her Dr. Shi will be back tomorrow and will look into making sure paper work is done.

## 2018-02-26 NOTE — TELEPHONE ENCOUNTER
----- Message from Janet Flores sent at 2/26/2018  9:01 AM CST -----  Contact: Dorothy (wife) @ [phone]  Left paper work last Monday, 2-19-18 so that pt can get his 's license.  Pt needs the paper work before Friday.  Pls call.

## 2018-02-27 NOTE — TELEPHONE ENCOUNTER
----- Message from Gilbert Mack sent at 2/27/2018  1:56 PM CST -----  Contact: Dorothy ( spouse ) @ [phone]  Caller is requesting a return call concerning paperwork for the  license, pls call

## 2018-02-27 NOTE — TELEPHONE ENCOUNTER
Talked to patients wife and she will  paper work in the morning on 02/28/2018. Dr. Hernandez filled out and signed his portion.

## 2018-03-27 RX ORDER — BACLOFEN 10 MG/1
TABLET ORAL
Qty: 90 TABLET | Refills: 2 | Status: SHIPPED | OUTPATIENT
Start: 2018-03-27 | End: 2018-06-26 | Stop reason: SDUPTHER

## 2018-03-27 RX ORDER — AMLODIPINE BESYLATE 10 MG/1
TABLET ORAL
Qty: 90 TABLET | Refills: 3 | Status: SHIPPED | OUTPATIENT
Start: 2018-03-27 | End: 2019-03-26 | Stop reason: SDUPTHER

## 2018-04-02 RX ORDER — INTERFERON BETA-1A 30MCG/.5ML
KIT INTRAMUSCULAR
Qty: 4 KIT | Refills: 11 | Status: SHIPPED | OUTPATIENT
Start: 2018-04-02 | End: 2019-02-07 | Stop reason: SDUPTHER

## 2018-04-10 RX ORDER — LOSARTAN POTASSIUM 25 MG/1
TABLET ORAL
Qty: 90 TABLET | Refills: 3 | Status: SHIPPED | OUTPATIENT
Start: 2018-04-10 | End: 2018-10-30

## 2018-04-28 RX ORDER — FUROSEMIDE 40 MG/1
TABLET ORAL
Qty: 180 TABLET | Refills: 3 | Status: SHIPPED | OUTPATIENT
Start: 2018-04-28 | End: 2019-06-25 | Stop reason: SDUPTHER

## 2018-06-18 RX ORDER — LOSARTAN POTASSIUM 50 MG/1
50 TABLET ORAL DAILY
Qty: 90 TABLET | Refills: 3 | Status: SHIPPED | OUTPATIENT
Start: 2018-06-18 | End: 2019-06-20 | Stop reason: SDUPTHER

## 2018-06-26 RX ORDER — BACLOFEN 10 MG/1
TABLET ORAL
Qty: 90 TABLET | Refills: 2 | Status: SHIPPED | OUTPATIENT
Start: 2018-06-26 | End: 2018-07-10 | Stop reason: SDUPTHER

## 2018-07-03 DIAGNOSIS — Z12.11 COLON CANCER SCREENING: ICD-10-CM

## 2018-07-10 ENCOUNTER — OFFICE VISIT (OUTPATIENT)
Dept: INTERNAL MEDICINE | Facility: CLINIC | Age: 63
End: 2018-07-10
Payer: COMMERCIAL

## 2018-07-10 VITALS
HEART RATE: 74 BPM | WEIGHT: 297 LBS | DIASTOLIC BLOOD PRESSURE: 74 MMHG | OXYGEN SATURATION: 94 % | HEIGHT: 73 IN | TEMPERATURE: 98 F | BODY MASS INDEX: 39.36 KG/M2 | SYSTOLIC BLOOD PRESSURE: 131 MMHG

## 2018-07-10 DIAGNOSIS — N30.01 ACUTE CYSTITIS WITH HEMATURIA: ICD-10-CM

## 2018-07-10 DIAGNOSIS — R39.198 DIFFICULTY URINATING: Primary | ICD-10-CM

## 2018-07-10 LAB
BILIRUB SERPL-MCNC: NEGATIVE MG/DL
BLOOD URINE, POC: ABNORMAL
COLOR, POC UA: ABNORMAL
GLUCOSE UR QL STRIP: NORMAL
KETONES UR QL STRIP: NEGATIVE
LEUKOCYTE ESTERASE URINE, POC: ABNORMAL
NITRITE, POC UA: POSITIVE
PH, POC UA: 6
PROTEIN, POC: 30
SPECIFIC GRAVITY, POC UA: 1.01
UROBILINOGEN, POC UA: NORMAL

## 2018-07-10 PROCEDURE — 3008F BODY MASS INDEX DOCD: CPT | Mod: CPTII,S$GLB,, | Performed by: NURSE PRACTITIONER

## 2018-07-10 PROCEDURE — 3075F SYST BP GE 130 - 139MM HG: CPT | Mod: CPTII,S$GLB,, | Performed by: NURSE PRACTITIONER

## 2018-07-10 PROCEDURE — 96372 THER/PROPH/DIAG INJ SC/IM: CPT | Mod: S$GLB,,, | Performed by: NURSE PRACTITIONER

## 2018-07-10 PROCEDURE — 81002 URINALYSIS NONAUTO W/O SCOPE: CPT | Mod: S$GLB,,, | Performed by: NURSE PRACTITIONER

## 2018-07-10 PROCEDURE — 99214 OFFICE O/P EST MOD 30 MIN: CPT | Mod: 25,S$GLB,, | Performed by: NURSE PRACTITIONER

## 2018-07-10 PROCEDURE — 3078F DIAST BP <80 MM HG: CPT | Mod: CPTII,S$GLB,, | Performed by: NURSE PRACTITIONER

## 2018-07-10 PROCEDURE — 99999 PR PBB SHADOW E&M-EST. PATIENT-LVL V: CPT | Mod: PBBFAC,,, | Performed by: NURSE PRACTITIONER

## 2018-07-10 PROCEDURE — 87088 URINE BACTERIA CULTURE: CPT

## 2018-07-10 PROCEDURE — 87077 CULTURE AEROBIC IDENTIFY: CPT

## 2018-07-10 PROCEDURE — 87186 SC STD MICRODIL/AGAR DIL: CPT

## 2018-07-10 PROCEDURE — 87086 URINE CULTURE/COLONY COUNT: CPT

## 2018-07-10 RX ORDER — CEFTRIAXONE 1 G/1
1 INJECTION, POWDER, FOR SOLUTION INTRAMUSCULAR; INTRAVENOUS ONCE
Status: COMPLETED | OUTPATIENT
Start: 2018-07-10 | End: 2018-07-10

## 2018-07-10 RX ORDER — LIDOCAINE HYDROCHLORIDE 10 MG/ML
1 INJECTION INFILTRATION; PERINEURAL ONCE
Status: COMPLETED | OUTPATIENT
Start: 2018-07-10 | End: 2018-07-10

## 2018-07-10 RX ORDER — CEFDINIR 300 MG/1
300 CAPSULE ORAL 2 TIMES DAILY
Qty: 20 CAPSULE | Refills: 0 | Status: SHIPPED | OUTPATIENT
Start: 2018-07-10 | End: 2018-07-20

## 2018-07-10 RX ADMIN — CEFTRIAXONE 1 G: 1 INJECTION, POWDER, FOR SOLUTION INTRAMUSCULAR; INTRAVENOUS at 10:07

## 2018-07-10 RX ADMIN — LIDOCAINE HYDROCHLORIDE 1 ML: 10 INJECTION INFILTRATION; PERINEURAL at 10:07

## 2018-07-10 NOTE — PROGRESS NOTES
Subjective:       Patient ID: Rajan Cooper III is a 62 y.o. male.    Chief Complaint: Urinary Tract Infection (pt complains of having a possible UTI. symptoms like burning sensation while urinating and difficulty urinating. ) and Fever (pt complains of having high grade fever (99.2, 102.9). pt currently takes Tylenol to help relieve.)    Pt here with wife.  Pt states that he started to experience dysuria Saturday ( 3d ago) [place] tired on Sunday, went to work on Monday and states that ' he just felt awful'.  Last night checked his temp it was 102.9.  Pt then told wife last night when he had a fever how long he had been sick for.  Pt denies n/v/d or flank pain, no hematuria.  States that he couldn't urinate yesterday.  Drinking fluids and able to urinate today.  Temp controlled with Tylenol.        Urinary Tract Infection    Associated symptoms include chills. Pertinent negatives include no flank pain, hematuria, nausea, urgency, vomiting or rash.   Fever    Associated symptoms include urinary pain. Pertinent negatives include no abdominal pain, chest pain, coughing, diarrhea, nausea, rash or vomiting.     Review of Systems   Constitutional: Positive for chills, fatigue and fever.   HENT: Negative for trouble swallowing.    Respiratory: Negative for cough.    Cardiovascular: Negative for chest pain.   Gastrointestinal: Negative for abdominal pain, diarrhea, nausea and vomiting.   Genitourinary: Positive for difficulty urinating and dysuria. Negative for discharge, flank pain, hematuria, penile pain, penile swelling, scrotal swelling, testicular pain and urgency.   Musculoskeletal: Negative for arthralgias and myalgias.   Skin: Negative for rash.   Psychiatric/Behavioral: Negative for sleep disturbance.         Past Medical History:   Diagnosis Date    Hypertension     MS (multiple sclerosis)      History reviewed. No pertinent surgical history.  Social History     Social History Narrative    No narrative on file      Family History   Problem Relation Age of Onset    Diabetes Mother     Cancer Father     No Known Problems Sister     No Known Problems Brother     No Known Problems Maternal Aunt     No Known Problems Maternal Uncle     No Known Problems Paternal Aunt     No Known Problems Paternal Uncle     No Known Problems Maternal Grandmother     No Known Problems Maternal Grandfather     No Known Problems Paternal Grandmother     No Known Problems Paternal Grandfather     Hypertension Neg Hx     Amblyopia Neg Hx     Blindness Neg Hx     Cataracts Neg Hx     Glaucoma Neg Hx     Macular degeneration Neg Hx     Retinal detachment Neg Hx     Strabismus Neg Hx     Stroke Neg Hx     Thyroid disease Neg Hx      Outpatient Encounter Prescriptions as of 7/10/2018   Medication Sig Dispense Refill    amLODIPine (NORVASC) 10 MG tablet TAKE 1 TABLET BY MOUTH ONCE DAILY 90 tablet 3    AVONEX 30 mcg/0.5 mL injection USE 1 INJECTION (30 MCG) INTRAMUSCULARLY ONCE A WEEK 4 kit 11    baclofen (LIORESAL) 10 MG tablet TAKE 1 TABLET BY MOUTH 3 TIMES A DAY 90 tablet 5    furosemide (LASIX) 40 MG tablet TAKE 1 TABLET BY MOUTH 2 TIMES A  tablet 3    ibuprofen (ADVIL,MOTRIN) 200 MG tablet Take 200 mg by mouth every 6 (six) hours as needed for Pain.      INTERFERON BETA-1A (AVONEX IM) Inject into the muscle.        losartan (COZAAR) 25 MG tablet TAKE 1 TABLET BY MOUTH ONCE DAILY 90 tablet 3    losartan (COZAAR) 50 MG tablet TAKE 1 TABLET (50 MG TOTAL) BY MOUTH ONCE DAILY. 90 tablet 3    modafinil (PROVIGIL) 200 MG Tab Take 1 tablet (200 mg total) by mouth once daily. 400 tablet 3    triamcinolone acetonide 0.1% (KENALOG) 0.1 % cream   0    cefdinir (OMNICEF) 300 MG capsule Take 1 capsule (300 mg total) by mouth 2 (two) times daily. for 10 days 20 capsule 0    ELIQUIS 5 mg Tab TAKE 1 TABLET (5 MG TOTAL) BY MOUTH 2 (TWO) TIMES DAILY. 60 tablet 5    gabapentin (NEURONTIN) 100 MG capsule Take 1 capsule (100 mg  "total) by mouth 3 (three) times daily. 90 capsule 11    levETIRAcetam (KEPPRA) 500 MG Tab Take 1 tablet (500 mg total) by mouth nightly. 90 tablet 3    [DISCONTINUED] baclofen (LIORESAL) 10 MG tablet TAKE 1 TABLET BY MOUTH 3 TIMES A DAY 90 tablet 5    [DISCONTINUED] baclofen (LIORESAL) 10 MG tablet TAKE 1 TABLET BY MOUTH 3 TIMES A DAY 90 tablet 2    [DISCONTINUED] sildenafil (VIAGRA) 100 MG tablet Take 1 tablet (100 mg total) by mouth daily as needed for Erectile Dysfunction. 10 tablet 11     Facility-Administered Encounter Medications as of 7/10/2018   Medication Dose Route Frequency Provider Last Rate Last Dose    [COMPLETED] cefTRIAXone injection 1 g  1 g Intramuscular Once Tegan Louie FNP-C   1 g at 07/10/18 1021    [COMPLETED] lidocaine HCL 10 mg/ml (1%) injection 1 mL  1 mL Intramuscular Once Tegan Louie FNP-C   1 mL at 07/10/18 1023     Last 3 sets of Vitals  Vitals - 1 value per visit 9/13/2017 12/1/2017 7/10/2018   SYSTOLIC 130 126 131   DIASTOLIC 84 93 74   PULSE 69 84 74   TEMPERATURE - - 98.2   RESPIRATIONS - - -   SPO2 - - 94   Weight (lb) 297.84 300.93 297   Weight (kg) 135.1 136.5 134.718   HEIGHT 6' 0" 6' 0" 6' 1"   BODY MASS INDEX 40.39 40.81 39.18   VISIT REPORT - - -   Pain Score  6 9 0         Objective:      Physical Exam   Constitutional: He appears well-developed and well-nourished. No distress.   HENT:   Head: Normocephalic and atraumatic.   Cardiovascular: Normal rate and regular rhythm.    Pulmonary/Chest: Effort normal and breath sounds normal.   Abdominal: Soft. Normal appearance. There is no tenderness. There is no CVA tenderness.   Skin: He is not diaphoretic.   Nursing note and vitals reviewed.          Lab Results   Component Value Date    WBC 5.79 06/12/2017    RBC 4.17 (L) 06/12/2017    HGB 12.5 (L) 06/12/2017    HCT 39.0 (L) 06/12/2017    MCV 94 06/12/2017    MCH 30.0 06/12/2017    MCHC 32.1 06/12/2017    RDW 15.5 (H) 06/12/2017     06/12/2017    MPV " 10.7 06/12/2017    GRAN 3.4 06/12/2017    GRAN 58.5 06/12/2017    LYMPH 1.6 06/12/2017    LYMPH 27.6 06/12/2017    MONO 0.6 06/12/2017    MONO 9.5 06/12/2017    EOS 0.2 06/12/2017    BASO 0.01 06/12/2017    EOSINOPHIL 4.0 06/12/2017    BASOPHIL 0.2 06/12/2017     Lab Results   Component Value Date    WBC 5.79 06/12/2017    HGB 12.5 (L) 06/12/2017    HCT 39.0 (L) 06/12/2017     06/12/2017    CHOL 199 12/22/2014    TRIG 65 12/22/2014    HDL 48 12/22/2014    ALT 41 03/14/2017    AST 51 (H) 03/14/2017     (L) 03/14/2017    K 4.4 03/14/2017     03/14/2017    CREATININE 0.9 07/12/2017    BUN 20 03/14/2017    CO2 25 03/14/2017    TSH 3.512 11/30/2012    PSA 0.60 12/22/2014    INR 1.1 03/04/2017    HGBA1C 6.1 12/22/2014       Assessment:       1. Difficulty urinating    2. Acute cystitis with hematuria        Plan:       ER precautions given to pt and wife  Discussed potential rust colored stools with Cefdinir      Rajan was seen today for urinary tract infection and fever.    Diagnoses and all orders for this visit:    Difficulty urinating  -     POCT urine dipstick without microscope  -     Urine culture  -     cefTRIAXone injection 1 g; Inject 1 g into the muscle once.  -     lidocaine HCL 10 mg/ml (1%) injection 1 mL; Inject 1 mL into the muscle once.  -     cefdinir (OMNICEF) 300 MG capsule; Take 1 capsule (300 mg total) by mouth 2 (two) times daily. for 10 days    Acute cystitis with hematuria  -     Urine culture  -     cefTRIAXone injection 1 g; Inject 1 g into the muscle once.  -     lidocaine HCL 10 mg/ml (1%) injection 1 mL; Inject 1 mL into the muscle once.  -     cefdinir (OMNICEF) 300 MG capsule; Take 1 capsule (300 mg total) by mouth 2 (two) times daily. for 10 days      Patient Instructions   Start oral antibiotics today    To ER for any worsening: uncontrolled fever, inability to urinate, vomiting, increased weakness or dizziness    I will send you a message when I get the culture  results back if we need to cahgne your antibiotic

## 2018-07-10 NOTE — PATIENT INSTRUCTIONS
Start oral antibiotics today    To ER for any worsening: uncontrolled fever, inability to urinate, vomiting, increased weakness or dizziness    I will send you a message when I get the culture results back if we need to cahgne your antibiotic

## 2018-07-13 LAB — BACTERIA UR CULT: NORMAL

## 2018-08-02 ENCOUNTER — TELEPHONE (OUTPATIENT)
Dept: INTERNAL MEDICINE | Facility: CLINIC | Age: 63
End: 2018-08-02

## 2018-08-02 NOTE — TELEPHONE ENCOUNTER
Pt's wife called requesting antibiotic cefdinir. She states this medication was given to him by Tegan Louie on 7/10/18. I asked the pt's wife what symptoms her  is having and she states she is unsure because pt says he's just not feeling well. Pt wife did mention that pt is having trouble urinating. At this point I suggested an urgent care visit. Pt's wife declined because they have a visit with you on 8/7/18.

## 2018-08-02 NOTE — TELEPHONE ENCOUNTER
----- Message from Berry Hawk sent at 8/2/2018  8:30 AM CDT -----  Contact: Spouse/Swati [phone]  Is this a refill or new RX:  Refill    RX name and strength: cefdinir (OMNICEF) 300 MG capsule      Pharmacy name and phone # [id number] - [address] [place] [phone] (Phone) [phone] (Fax)     Comments:  He was prescribed this on 7/10/18 and finished it and is still having the same symptoms. He would like a sooner appointment than Tuesday 8/7/18 if possible

## 2018-08-03 ENCOUNTER — OFFICE VISIT (OUTPATIENT)
Dept: INTERNAL MEDICINE | Facility: CLINIC | Age: 63
End: 2018-08-03
Payer: COMMERCIAL

## 2018-08-03 ENCOUNTER — LAB VISIT (OUTPATIENT)
Dept: LAB | Facility: HOSPITAL | Age: 63
End: 2018-08-03
Attending: INTERNAL MEDICINE
Payer: COMMERCIAL

## 2018-08-03 VITALS
BODY MASS INDEX: 39.42 KG/M2 | DIASTOLIC BLOOD PRESSURE: 72 MMHG | OXYGEN SATURATION: 98 % | HEIGHT: 72 IN | TEMPERATURE: 98 F | WEIGHT: 291 LBS | HEART RATE: 84 BPM | SYSTOLIC BLOOD PRESSURE: 131 MMHG

## 2018-08-03 DIAGNOSIS — N39.0 URINARY TRACT INFECTION WITHOUT HEMATURIA, SITE UNSPECIFIED: Primary | ICD-10-CM

## 2018-08-03 DIAGNOSIS — G35 MS (MULTIPLE SCLEROSIS): ICD-10-CM

## 2018-08-03 DIAGNOSIS — K59.00 CONSTIPATION, UNSPECIFIED CONSTIPATION TYPE: ICD-10-CM

## 2018-08-03 DIAGNOSIS — I73.9 PVD (PERIPHERAL VASCULAR DISEASE): ICD-10-CM

## 2018-08-03 DIAGNOSIS — I10 ESSENTIAL HYPERTENSION: ICD-10-CM

## 2018-08-03 DIAGNOSIS — N39.0 URINARY TRACT INFECTION WITHOUT HEMATURIA, SITE UNSPECIFIED: ICD-10-CM

## 2018-08-03 PROCEDURE — 3008F BODY MASS INDEX DOCD: CPT | Mod: CPTII,S$GLB,, | Performed by: INTERNAL MEDICINE

## 2018-08-03 PROCEDURE — 3075F SYST BP GE 130 - 139MM HG: CPT | Mod: CPTII,S$GLB,, | Performed by: INTERNAL MEDICINE

## 2018-08-03 PROCEDURE — 87088 URINE BACTERIA CULTURE: CPT

## 2018-08-03 PROCEDURE — 87186 SC STD MICRODIL/AGAR DIL: CPT

## 2018-08-03 PROCEDURE — 87077 CULTURE AEROBIC IDENTIFY: CPT

## 2018-08-03 PROCEDURE — 3078F DIAST BP <80 MM HG: CPT | Mod: CPTII,S$GLB,, | Performed by: INTERNAL MEDICINE

## 2018-08-03 PROCEDURE — 99999 PR PBB SHADOW E&M-EST. PATIENT-LVL III: CPT | Mod: PBBFAC,,, | Performed by: INTERNAL MEDICINE

## 2018-08-03 PROCEDURE — 87086 URINE CULTURE/COLONY COUNT: CPT

## 2018-08-03 PROCEDURE — 99213 OFFICE O/P EST LOW 20 MIN: CPT | Mod: S$GLB,,, | Performed by: INTERNAL MEDICINE

## 2018-08-03 RX ORDER — CIPROFLOXACIN 500 MG/1
500 TABLET ORAL 2 TIMES DAILY
Qty: 20 TABLET | Refills: 0 | Status: SHIPPED | OUTPATIENT
Start: 2018-08-03 | End: 2018-08-10

## 2018-08-03 NOTE — PROGRESS NOTES
Subjective:       Patient ID: Rajan Cooper III is a 62 y.o. male.    Chief Complaint: Urinary Tract Infection    Patient comes in complaining of just feeling well.  He says about 3 weeks ago he came in and saw 1 of our excellent nurse practitioners who diagnosed him with a UTI.  He was treated with cefdinir and thought he was improving but in the last few days started having strong smelling urine with frequency.  He has multiple sclerosis, that has been stable.  He has hypertension and constipation.  He has no other acute symptoms other than the urine.  He is not sure if he completely cleared versus has a recurrence.  In reviewing the urine culture I noted the cephalosporins not quite as sensitive as other meds like Cipro in treating his past UTI this point we decided to look for other causes but if none were found recheck a culture start him on Cipro and proceed.  He is also having some chronic constipation.  He does not want to take MiraLax.  We talked about taking Colace p.r.n..      Review of Systems   Constitutional: Negative for chills, fatigue, fever and unexpected weight change.   HENT: Negative for nosebleeds and trouble swallowing.    Eyes: Negative for pain and visual disturbance.   Respiratory: Negative for cough, shortness of breath and wheezing.    Cardiovascular: Negative for chest pain and palpitations.   Gastrointestinal: Negative for abdominal pain, constipation, diarrhea, nausea and vomiting.   Genitourinary: Positive for frequency. Negative for difficulty urinating and hematuria.        Strong urine odor.    Musculoskeletal: Positive for arthralgias and gait problem. Negative for neck pain.   Skin: Negative for rash.   Neurological: Positive for weakness. Negative for dizziness and headaches.   Hematological: Does not bruise/bleed easily.   Psychiatric/Behavioral: Negative for dysphoric mood, sleep disturbance and suicidal ideas.       Objective:      Physical Exam   Constitutional: He is  oriented to person, place, and time. He appears well-developed and well-nourished. No distress.   HENT:   Head: Normocephalic and atraumatic.   Right Ear: External ear normal.   Left Ear: External ear normal.   Mouth/Throat: Oropharynx is clear and moist. No oropharyngeal exudate.   TM's clear, pharynx clear   Eyes: Conjunctivae and EOM are normal. Pupils are equal, round, and reactive to light. No scleral icterus.   Neck: Normal range of motion. Neck supple. No thyromegaly present.   No supraclavicular nodes palpated   Cardiovascular: Normal rate, regular rhythm and normal heart sounds.    No murmur heard.  Pulmonary/Chest: Effort normal and breath sounds normal. He has no wheezes.   Abdominal: Soft. Bowel sounds are normal. He exhibits no mass. There is no tenderness.   Musculoskeletal: He exhibits no edema.   Lymphadenopathy:     He has no cervical adenopathy.   Neurological: He is alert and oriented to person, place, and time. He exhibits abnormal muscle tone (MS). Coordination (MS) abnormal.   Skin: No rash noted. No erythema. No pallor.   Psychiatric: He has a normal mood and affect. His behavior is normal.       Assessment:       1. Urinary tract infection without hematuria, site unspecified    2. Constipation, unspecified constipation type    3. Essential hypertension    4. PVD (peripheral vascular disease)    5. MS (multiple sclerosis)        Plan:       Rajan was seen today for urinary tract infection.    Diagnoses and all orders for this visit:    Urinary tract infection without hematuria, site unspecified  -     ciprofloxacin HCl (CIPRO) 500 MG tablet; Take 1 tablet (500 mg total) by mouth 2 (two) times daily. for 7 days  -     Urine culture; Future    Constipation, unspecified constipation type    Essential hypertension    PVD (peripheral vascular disease)    MS (multiple sclerosis)        treat for presumed UTI persistent versus recurrent.  Update symptoms an urine results.

## 2018-08-06 ENCOUNTER — PATIENT MESSAGE (OUTPATIENT)
Dept: INTERNAL MEDICINE | Facility: CLINIC | Age: 63
End: 2018-08-06

## 2018-08-06 DIAGNOSIS — N39.0 URINARY TRACT INFECTION WITHOUT HEMATURIA, SITE UNSPECIFIED: Primary | ICD-10-CM

## 2018-08-06 LAB — BACTERIA UR CULT: NORMAL

## 2018-08-06 NOTE — TELEPHONE ENCOUNTER
SPoke to pt. He is doing well on Cipro. He may wish to try retesting urine in 2-3 weeks to make sure it went away completely. He said he will call us. Urine culture order was placed.

## 2018-08-22 ENCOUNTER — LAB VISIT (OUTPATIENT)
Dept: LAB | Facility: HOSPITAL | Age: 63
End: 2018-08-22
Attending: INTERNAL MEDICINE
Payer: COMMERCIAL

## 2018-08-22 DIAGNOSIS — N39.0 URINARY TRACT INFECTION WITHOUT HEMATURIA, SITE UNSPECIFIED: ICD-10-CM

## 2018-08-22 PROCEDURE — 87086 URINE CULTURE/COLONY COUNT: CPT

## 2018-08-23 LAB — BACTERIA UR CULT: NO GROWTH

## 2018-08-24 ENCOUNTER — PATIENT MESSAGE (OUTPATIENT)
Dept: INTERNAL MEDICINE | Facility: CLINIC | Age: 63
End: 2018-08-24

## 2018-09-10 ENCOUNTER — TELEPHONE (OUTPATIENT)
Dept: INTERNAL MEDICINE | Facility: CLINIC | Age: 63
End: 2018-09-10

## 2018-09-10 RX ORDER — CIPROFLOXACIN 500 MG/1
500 TABLET ORAL 2 TIMES DAILY
Qty: 20 TABLET | Refills: 0 | Status: SHIPPED | OUTPATIENT
Start: 2018-09-10 | End: 2018-11-15 | Stop reason: SDUPTHER

## 2018-09-10 NOTE — TELEPHONE ENCOUNTER
I sent in Cipro again to the local pharmacy. If this continues to recur we should consider a visit to Urology. Let me know if this does not help or if the urine symptoms recur.

## 2018-09-10 NOTE — TELEPHONE ENCOUNTER
Mr. Cooper is experiencing recurring symptoms of an UTI and requesting medication to be called in to his pharmacy. Would you like Mr. Cooper to come in for a visit? Please advise

## 2018-10-02 RX ORDER — BACLOFEN 10 MG/1
TABLET ORAL
Qty: 90 TABLET | Refills: 2 | Status: SHIPPED | OUTPATIENT
Start: 2018-10-02 | End: 2019-01-03 | Stop reason: SDUPTHER

## 2018-10-30 ENCOUNTER — OFFICE VISIT (OUTPATIENT)
Dept: NEUROLOGY | Facility: CLINIC | Age: 63
End: 2018-10-30
Payer: COMMERCIAL

## 2018-10-30 VITALS
BODY MASS INDEX: 40.61 KG/M2 | HEART RATE: 65 BPM | WEIGHT: 299.81 LBS | DIASTOLIC BLOOD PRESSURE: 86 MMHG | HEIGHT: 72 IN | SYSTOLIC BLOOD PRESSURE: 161 MMHG

## 2018-10-30 DIAGNOSIS — G35 MS (MULTIPLE SCLEROSIS): Primary | ICD-10-CM

## 2018-10-30 PROCEDURE — 3079F DIAST BP 80-89 MM HG: CPT | Mod: CPTII,S$GLB,, | Performed by: NEUROMUSCULOSKELETAL MEDICINE & OMM

## 2018-10-30 PROCEDURE — 3077F SYST BP >= 140 MM HG: CPT | Mod: CPTII,S$GLB,, | Performed by: NEUROMUSCULOSKELETAL MEDICINE & OMM

## 2018-10-30 PROCEDURE — 3008F BODY MASS INDEX DOCD: CPT | Mod: CPTII,S$GLB,, | Performed by: NEUROMUSCULOSKELETAL MEDICINE & OMM

## 2018-10-30 PROCEDURE — 99214 OFFICE O/P EST MOD 30 MIN: CPT | Mod: S$GLB,,, | Performed by: NEUROMUSCULOSKELETAL MEDICINE & OMM

## 2018-10-30 PROCEDURE — 99999 PR PBB SHADOW E&M-EST. PATIENT-LVL III: CPT | Mod: PBBFAC,,, | Performed by: NEUROMUSCULOSKELETAL MEDICINE & OMM

## 2018-10-30 RX ORDER — LEVETIRACETAM 500 MG/1
500 TABLET ORAL NIGHTLY
Qty: 90 TABLET | Refills: 3 | Status: SHIPPED | OUTPATIENT
Start: 2018-10-30 | End: 2019-11-18

## 2018-10-30 NOTE — PROGRESS NOTES
Progress Notes        History of present illness:  Patient presents for follow-up for his multiple sclerosis.  She he presently is walking with a walker for long distances such as coming from the parking lot.  Otherwise short distances ease okay without the walker.  He continues to have balance problems.  MRI us on 7-12-17 shows stable brain lesions.  Cervical MRI suggests a C4-5  T2 lesion.  Previous note:  7-12-17   Patient presents for follow-up for multiple sclerosis and leg pains.  Pins and needles have been worse and more persistent.  He also has some peripheral edema.  He is presently on Eliquis.  MRI 7-12-17 cervical spine shows T2 C4-5 lesion with myelomalacia .  Patient is still working but during mostly sitting at the job.  He continues to have a wobbly unsteady gait.  The gabapentin 1200 mg per day is not working to help the leg pains as it used to.  He is having no problem with the Avonex this time.  Previous note: 9-13-17: Patient presents for follow-up for his multiple sclerosis.  He has been complaining of pins and needles and burning pain down the legs from the knees.  In the morning it on awakening it's the worse.  It seems to decrease with walking.  He has are taking baclofen 20 mg twice a day which is not helping anymore.  His last MRI 7-19-17 was stable in the brain.  Cervical showed questionable C4-C5 lesion however no comparison from previous studies.  Patient had a venous leg clot in the right leg in February.  He continues to take Avonex without any problems.  He walks with a walker when he is fatigued.     Previous note: 7-12-17: Patient presents for follow-up for his multiple sclerosis.  His multiple sclerosis has been quiet however he had a severe bout of staph infection requiring a PICC line for 6 weeks.  He had wrist, ankle, and shoulder problems.  He continues to complain of pain of an aching nature down both legs.  He has been taking baclofen 3 times per day.  He is presently on blood  thinners.  He has not had MRI scans in several years.        Neurologic exam:  Cranial nerves: Normal visual acuity at bedside testing. Visual field to confrontation - normal. Pupils are reactive. External ocular movements- full range of motion; no nystagmus or diplopia. Normal corneal reflexes and facial sensations. No facial asymmetry noted and facial movements including smiling were normal. Hearing was unimpaired. Palate movements are normal with normal gag response. Shoulder movements including shrug response was normal. Tongue movements are normal and with no evidence of atrophy or involuntary movements.  Muscle strength: upper- normal lower-normal. Tone was normal stands without hands -with minimal effort   Sensory examination:Pinprick and touch - normal . Vibration- normal 15-20 seconds at toes  Deep tendon reflexes: upper extremity-2+; lower extremity- KJ-3+ / AJ - 2+ Both plantars- flexor.   Cerebellar exam upper extremities - finger to nose - normal ; rapid alternating movements -\  Gait- unsteady, Tandem gait unsteady. Romberg's - unsteady   Cervical exam: Cervical / trapezius muscle - no tenderness; Lumbar Exam: Low back tenderness-negative sciatic notch tenderness-negative straight leg raising test-negative    Diagnoses: Multiple sclerosis;Leg pain; muscle cramps; weight gain; peripheral edema    Recommendations:  Continue baclofen 10 mg 3 pills per day, Provigil, Keppra..  Follow-up in 6 months. .

## 2018-11-15 RX ORDER — CIPROFLOXACIN 500 MG/1
500 TABLET ORAL 2 TIMES DAILY
Qty: 20 TABLET | Refills: 0 | Status: SHIPPED | OUTPATIENT
Start: 2018-11-15 | End: 2018-11-25

## 2019-01-03 RX ORDER — BACLOFEN 10 MG/1
TABLET ORAL
Qty: 90 TABLET | Refills: 2 | Status: SHIPPED | OUTPATIENT
Start: 2019-01-03 | End: 2019-04-21 | Stop reason: SDUPTHER

## 2019-01-24 ENCOUNTER — TELEPHONE (OUTPATIENT)
Dept: NEUROLOGY | Facility: CLINIC | Age: 64
End: 2019-01-24

## 2019-01-24 NOTE — TELEPHONE ENCOUNTER
----- Message from Eric Reyes sent at 1/24/2019  8:40 AM CST -----  Contact: Dorothy (Wife) [phone]  Patient Returning Call from [facility]    Who Left Message for Patient: Catherine     Communication Preference:PHONE     Additional Information:

## 2019-01-31 ENCOUNTER — TELEPHONE (OUTPATIENT)
Dept: INTERNAL MEDICINE | Facility: CLINIC | Age: 64
End: 2019-01-31

## 2019-01-31 ENCOUNTER — OFFICE VISIT (OUTPATIENT)
Dept: INTERNAL MEDICINE | Facility: CLINIC | Age: 64
End: 2019-01-31
Payer: COMMERCIAL

## 2019-01-31 VITALS
HEIGHT: 72 IN | OXYGEN SATURATION: 95 % | SYSTOLIC BLOOD PRESSURE: 138 MMHG | WEIGHT: 296.06 LBS | DIASTOLIC BLOOD PRESSURE: 76 MMHG | BODY MASS INDEX: 40.1 KG/M2 | HEART RATE: 77 BPM

## 2019-01-31 DIAGNOSIS — K52.9 GASTROENTERITIS: Primary | ICD-10-CM

## 2019-01-31 PROCEDURE — 99213 OFFICE O/P EST LOW 20 MIN: CPT | Mod: S$GLB,,, | Performed by: PHYSICIAN ASSISTANT

## 2019-01-31 PROCEDURE — 3078F PR MOST RECENT DIASTOLIC BLOOD PRESSURE < 80 MM HG: ICD-10-PCS | Mod: CPTII,S$GLB,, | Performed by: PHYSICIAN ASSISTANT

## 2019-01-31 PROCEDURE — 3075F PR MOST RECENT SYSTOLIC BLOOD PRESS GE 130-139MM HG: ICD-10-PCS | Mod: CPTII,S$GLB,, | Performed by: PHYSICIAN ASSISTANT

## 2019-01-31 PROCEDURE — 99999 PR PBB SHADOW E&M-EST. PATIENT-LVL IV: CPT | Mod: PBBFAC,,, | Performed by: PHYSICIAN ASSISTANT

## 2019-01-31 PROCEDURE — 3008F PR BODY MASS INDEX (BMI) DOCUMENTED: ICD-10-PCS | Mod: CPTII,S$GLB,, | Performed by: PHYSICIAN ASSISTANT

## 2019-01-31 PROCEDURE — 99213 PR OFFICE/OUTPT VISIT, EST, LEVL III, 20-29 MIN: ICD-10-PCS | Mod: S$GLB,,, | Performed by: PHYSICIAN ASSISTANT

## 2019-01-31 PROCEDURE — 3008F BODY MASS INDEX DOCD: CPT | Mod: CPTII,S$GLB,, | Performed by: PHYSICIAN ASSISTANT

## 2019-01-31 PROCEDURE — 99999 PR PBB SHADOW E&M-EST. PATIENT-LVL IV: ICD-10-PCS | Mod: PBBFAC,,, | Performed by: PHYSICIAN ASSISTANT

## 2019-01-31 PROCEDURE — 3075F SYST BP GE 130 - 139MM HG: CPT | Mod: CPTII,S$GLB,, | Performed by: PHYSICIAN ASSISTANT

## 2019-01-31 PROCEDURE — 3078F DIAST BP <80 MM HG: CPT | Mod: CPTII,S$GLB,, | Performed by: PHYSICIAN ASSISTANT

## 2019-01-31 RX ORDER — ONDANSETRON 4 MG/1
4 TABLET, FILM COATED ORAL EVERY 6 HOURS PRN
Qty: 12 TABLET | Refills: 0 | Status: SHIPPED | OUTPATIENT
Start: 2019-01-31 | End: 2019-11-18

## 2019-01-31 NOTE — TELEPHONE ENCOUNTER
----- Message from Christian Jensen sent at 1/31/2019  8:07 AM CST -----  Contact: [phone]  Patient is calling to discuss getting medication for stomach bug . Symptoms  are diarrhea ,vomiting and stomach cramping , stated she lost 10 pounds   Please call and advise, Thanks

## 2019-01-31 NOTE — PATIENT INSTRUCTIONS
START OVER THE COUNTER IMODIUM    CONTINUE THE BLAND DIET.    PLEASE CALL IF SYMPTOMS WORSEN OR FAIL TO IMPROVE        Noninfectious Gastroenteritis (Ages 6 Years to Adult)    Gastroenteritis can cause nausea, vomiting, diarrhea, and abdominal cramping. This may occur as a result of food sensitivity, inflammation of your gastrointestinal tract, medicines, stress, or other causes not related to infection. Your symptoms will usually last from 1 to 3 days, but can last longer. Antibiotics are not effective, but simple home treatment will be helpful.  Home care  Medicine  · You may use acetaminophen or NSAID medicines like ibuprofen or naproxen to control fever, unless another medicine is prescribed. (Note: If you have chronic liver or kidney disease, or ever had a stomach ulcer or gastrointestinalI bleeding, talk with your healthcare provider before using these medicines.) Aspirin should never be used in anyone under 18 years of age who is ill with a fever. It may cause severe liver damage. Don't increase your NSAID medicines if you are already taking these medicines for another condition (like arthritis). Don't use NSAIDS if you are on aspirin (such as for heart disease, or after a stroke).  · If medicines for diarrhea or vomiting are prescribed, take only as directed.  General care and preventing spread of the illness  · If symptoms are severe, rest at home for the next 24 hours or until you feel better.  · Hand washing with soap and water is the best way to prevent the spread of infection. Wash your hands after touching anyone who is sick.  · Wash your hands after using the toilet and before meals. Clean the toilet after each use.  · Caffeine, tobacco, and alcohol can make your diarrhea, cramping, and pain worse.  Diet  · Water and clear liquids are important so you do not get dehydrated. Drink a small amount at a time.  · Do not force yourself to eat, especially if you have cramps, vomiting, or diarrhea. When you  finally decide to start eating, do not eat large amounts at a time, even if you are hungry.  · If you eat, avoid fatty, greasy, spicy, or fried foods.  · Do not eat dairy products if you have diarrhea; they can make the diarrhea worse.  During the first 24 hours (the first full day), follow the diet below:  · Beverages: Water, clear liquids, soft drinks without caffeine, like ginger ale; mineral water (plain or flavored); decaffeinated tea and coffee.  · Soups: Clear broth, consommé, and bouillon Sports drinks aren't a good choice because they have too much sugar and not enough electrolytes. In this case, commercially available products called oral rehydration solutions are best.  · Desserts: Plain gelatin, popsicles, and fruit juice bars.  During the next 24 hours (the second day), you may add the following to the above if you have improved. If not, continue what you did the first day:  · Hot cereal, plain toast, bread, rolls, crackers  · Plain noodles, rice, mashed potatoes, chicken noodle or rice soup  · Unsweetened canned fruit (avoid pineapple), bananas  · Limit caffeine and chocolate. No spices or seasonings except salt.  During the next 24 hours  · Gradually resume a normal diet, as you feel better and your symptoms improve.  · If at any time your symptoms start getting worse, go back to clear liquids until you feel better.  Food preparation  · If you have diarrhea, you should not prepare food for others. When you  prepare food for yourself, wash your hands before and after.  · Wash your hands after using cutting boards, countertops, and knives that have been in contact with raw food.  · Keep uncooked meats away from cooked and ready-to-eat foods.  Follow-up care  Follow up with your healthcare provider if you are not improving over the next 2 to 3 days, or as advised. If a stool (diarrhea) sample was taken, call for the results as directed.  When to seek medical care  Call your healthcare provider right away  if any of these occur:   · Increasing abdominal pain or constant lower right abdominal pain  · Continued vomiting (unable to keep liquids down)  · Frequent diarrhea (more than 5 times a day)  · Blood in vomit or stool (black or red color)  · Inability to tolerate solid food after a few days.  · Dark urine, reduced urine output  · Weakness, dizziness  · Drowsiness  · Fever of 100.4ºF (38.0ºC) or higher, or as directed by your healthcare provider  · New rash  Call 911  Call 911 if any of these occur:  · Trouble breathing  · Chest pain  · Confusion  · Severe drowsiness or trouble awakening  · Seizure  · Stiff neck  Date Last Reviewed: 11/16/2015  © 3674-4780 [employer]. [address] [place] [address], [place] [address]. All rights reserved. This information is not intended as a substitute for professional medical care. Always follow your healthcare professional's instructions.

## 2019-01-31 NOTE — PROGRESS NOTES
Subjective:       Patient ID: Rajan Cooper III is a 63 y.o. male.    Chief Complaint: Diarrhea; Emesis; and Nausea    C/o diarrhea and nausea, onset about 3 to 4 days ago. Wife with similar symptoms. They both ate BBQ chicken that they believe is the culprit. Tolerating crackers and toast. No vomiting, melena, blood in stool or fevers. He has not tried any relieving factors.       Diarrhea    This is a new problem. The current episode started in the past 7 days. The problem occurs 2 to 4 times per day. The problem has been waxing and waning. The stool consistency is described as watery. The patient states that diarrhea awakens him from sleep. Pertinent negatives include no abdominal pain, arthralgias, chills, coughing, fever, sweats or vomiting. Risk factors include suspect food intake. He has tried nothing for the symptoms. The treatment provided no relief.       Review of patient's allergies indicates:   Allergen Reactions    Norco [hydrocodone-acetaminophen] Anaphylaxis     Past Medical History:   Diagnosis Date    Hypertension     MS (multiple sclerosis)      .  Social History     Tobacco Use    Smoking status: Never Smoker   Substance Use Topics    Alcohol use: No    Drug use: No           Review of Systems   Constitutional: Negative for chills, diaphoresis, fever and unexpected weight change.   Respiratory: Negative for cough and shortness of breath.    Cardiovascular: Negative for chest pain and leg swelling.   Gastrointestinal: Positive for diarrhea and nausea. Negative for abdominal pain, blood in stool, constipation and vomiting.   Musculoskeletal: Negative for arthralgias.       Objective: /76   Pulse 77   Ht 6' (1.829 m)   Wt 134.3 kg (296 lb 1.2 oz)   SpO2 95%   BMI 40.16 kg/m²         Physical Exam   Constitutional: He appears well-developed and well-nourished. No distress.   HENT:   Head: Normocephalic and atraumatic.   Mouth/Throat: Oropharynx is clear and moist.   Cardiovascular:  Normal rate and regular rhythm. Exam reveals no friction rub.   No murmur heard.  Pulmonary/Chest: Effort normal and breath sounds normal. He has no wheezes. He has no rales.   Abdominal: Soft. Bowel sounds are normal. There is no tenderness.   Neurological: He is alert.   Skin: Skin is warm and dry. Capillary refill takes less than 2 seconds. No rash noted.   Psychiatric: He has a normal mood and affect.   Vitals reviewed.      Assessment:       1. Gastroenteritis        Plan:         Rajan was seen today for diarrhea and nausea.    Diagnoses and all orders for this visit:    Gastroenteritis  Start OTC imodium  Zofran PRN  Stay well hydrated  [place] diet  RTC or call if symptoms worsen or fail to improve  -     ondansetron (ZOFRAN) 4 MG tablet; Take 1 tablet (4 mg total) by mouth every 6 (six) hours as needed for Nausea.      Ruby Sloan PA-C

## 2019-02-01 ENCOUNTER — TELEPHONE (OUTPATIENT)
Dept: NEUROLOGY | Facility: CLINIC | Age: 64
End: 2019-02-01

## 2019-02-01 NOTE — TELEPHONE ENCOUNTER
----- Message from Janet Flores sent at 2/1/2019  9:19 AM CST -----  Contact: Dorothy @ [phone]  Pts wife is calling f or the status of pts paper work. For his insurance company.  Pls call.

## 2019-02-07 RX ORDER — INTERFERON BETA-1A 30MCG/.5ML
KIT INTRAMUSCULAR
Qty: 4 SYRINGE | Refills: 11 | Status: SHIPPED | OUTPATIENT
Start: 2019-02-07 | End: 2019-12-11 | Stop reason: SDUPTHER

## 2019-03-04 ENCOUNTER — TELEPHONE (OUTPATIENT)
Dept: NEUROLOGY | Facility: CLINIC | Age: 64
End: 2019-03-04

## 2019-03-04 NOTE — TELEPHONE ENCOUNTER
----- Message from Juni Goodman sent at 3/4/2019 11:52 AM CST -----  Needs Advice    Reason for call: Pt is asking to speak w/ the Catherine regarding insurance forms that were being completed        Communication Preference: [phone] or [phone]    Additional Information:

## 2019-03-07 NOTE — TELEPHONE ENCOUNTER
----- Message from Grace Macias sent at 9/10/2018 10:19 AM CDT -----  Contact: pt [phone]  Pt is showing symptoms of a UTI again and would like to know if something can be called into the pharmacy. Please advise.    Orders signed and faxed to [facility]  Spoke with staff as well

## 2019-03-08 ENCOUNTER — TELEPHONE (OUTPATIENT)
Dept: NEUROLOGY | Facility: CLINIC | Age: 64
End: 2019-03-08

## 2019-03-08 NOTE — TELEPHONE ENCOUNTER
----- Message from Gilbert Mack sent at 3/8/2019 11:57 AM CST -----  Contact: Patient @ [phone]  Patient requesting a return call regarding information from the insurance company,pls call to advise

## 2019-03-26 RX ORDER — AMLODIPINE BESYLATE 10 MG/1
TABLET ORAL
Qty: 90 TABLET | Refills: 3 | Status: SHIPPED | OUTPATIENT
Start: 2019-03-26 | End: 2020-03-21 | Stop reason: SDUPTHER

## 2019-04-21 RX ORDER — BACLOFEN 10 MG/1
TABLET ORAL
Qty: 90 TABLET | Refills: 2 | Status: SHIPPED | OUTPATIENT
Start: 2019-04-21 | End: 2019-08-01 | Stop reason: SDUPTHER

## 2019-05-30 NOTE — ASSESSMENT & PLAN NOTE
[age] year old male with history of multiple sclerosis admitted with left septic wrist and right ankle pain now s/p left wrist washout and debridement and ankle arthrocentesis on 3/4/17. Wrist and ankles cultures showing MSSA and urine culture grew MSSA.  Blood cultures of 3/5 NGTD.  Given that staph is in urine and in two different joints, suspect he had a transient bacteremia that seeded joints.  Source unclear, though he reports history of psoriasis and lower extremity cellulitis in past.  No stigmata of endocarditis.  Physiologic murmur heard on exam.       Patient had recurrent fever and chills yesterday with a persistent leukocytosis.  Blood cultures obtained 3/6 are now positive for GPC resembling staph (1 bottle, ID pending).  Persistent shaking chills today. Concerned there is an underlying source control issue.    Plan  - Continue cefazolin 6 grams IV q 24 hours by continuous infusion for MSSA.  Recommend minimum of 4 weeks of IV antibiotics for suspected complicated bacteremia with septic arthritis of wrist and ankle.   - Blood culture repeated today.  - If positive blood culture grows staph aureus, would recommend KEIKO.    - Discussed with staff. ID will continue to follow with you.

## 2019-06-07 ENCOUNTER — PATIENT MESSAGE (OUTPATIENT)
Dept: INTERNAL MEDICINE | Facility: CLINIC | Age: 64
End: 2019-06-07

## 2019-06-07 ENCOUNTER — OFFICE VISIT (OUTPATIENT)
Dept: INTERNAL MEDICINE | Facility: CLINIC | Age: 64
DRG: 603 | End: 2019-06-07
Payer: COMMERCIAL

## 2019-06-07 VITALS
WEIGHT: 297.63 LBS | SYSTOLIC BLOOD PRESSURE: 136 MMHG | BODY MASS INDEX: 39.44 KG/M2 | HEIGHT: 73 IN | HEART RATE: 75 BPM | DIASTOLIC BLOOD PRESSURE: 80 MMHG | OXYGEN SATURATION: 97 %

## 2019-06-07 DIAGNOSIS — L03.114 CELLULITIS OF FOREARM, LEFT: ICD-10-CM

## 2019-06-07 DIAGNOSIS — Z00.00 ROUTINE PHYSICAL EXAMINATION: ICD-10-CM

## 2019-06-07 DIAGNOSIS — G35 MS (MULTIPLE SCLEROSIS): ICD-10-CM

## 2019-06-07 DIAGNOSIS — I10 ESSENTIAL HYPERTENSION: ICD-10-CM

## 2019-06-07 DIAGNOSIS — M70.22 OLECRANON BURSITIS OF LEFT ELBOW: Primary | ICD-10-CM

## 2019-06-07 DIAGNOSIS — Z12.5 SCREENING FOR PROSTATE CANCER: ICD-10-CM

## 2019-06-07 PROCEDURE — 99214 OFFICE O/P EST MOD 30 MIN: CPT | Mod: S$GLB,,, | Performed by: INTERNAL MEDICINE

## 2019-06-07 PROCEDURE — 99214 PR OFFICE/OUTPT VISIT, EST, LEVL IV, 30-39 MIN: ICD-10-PCS | Mod: S$GLB,,, | Performed by: INTERNAL MEDICINE

## 2019-06-07 PROCEDURE — 3075F PR MOST RECENT SYSTOLIC BLOOD PRESS GE 130-139MM HG: ICD-10-PCS | Mod: CPTII,S$GLB,, | Performed by: INTERNAL MEDICINE

## 2019-06-07 PROCEDURE — 3079F DIAST BP 80-89 MM HG: CPT | Mod: CPTII,S$GLB,, | Performed by: INTERNAL MEDICINE

## 2019-06-07 PROCEDURE — 99999 PR PBB SHADOW E&M-EST. PATIENT-LVL IV: ICD-10-PCS | Mod: PBBFAC,,, | Performed by: INTERNAL MEDICINE

## 2019-06-07 PROCEDURE — 3008F PR BODY MASS INDEX (BMI) DOCUMENTED: ICD-10-PCS | Mod: CPTII,S$GLB,, | Performed by: INTERNAL MEDICINE

## 2019-06-07 PROCEDURE — 3079F PR MOST RECENT DIASTOLIC BLOOD PRESSURE 80-89 MM HG: ICD-10-PCS | Mod: CPTII,S$GLB,, | Performed by: INTERNAL MEDICINE

## 2019-06-07 PROCEDURE — 3075F SYST BP GE 130 - 139MM HG: CPT | Mod: CPTII,S$GLB,, | Performed by: INTERNAL MEDICINE

## 2019-06-07 PROCEDURE — 3008F BODY MASS INDEX DOCD: CPT | Mod: CPTII,S$GLB,, | Performed by: INTERNAL MEDICINE

## 2019-06-07 PROCEDURE — 99999 PR PBB SHADOW E&M-EST. PATIENT-LVL IV: CPT | Mod: PBBFAC,,, | Performed by: INTERNAL MEDICINE

## 2019-06-07 RX ORDER — CEPHALEXIN 500 MG/1
500 CAPSULE ORAL EVERY 8 HOURS
Qty: 30 CAPSULE | Refills: 0 | Status: ON HOLD | OUTPATIENT
Start: 2019-06-07 | End: 2019-06-12 | Stop reason: HOSPADM

## 2019-06-07 NOTE — PROGRESS NOTES
Subjective:       Patient ID: Rajan Cooper III is a 63 y.o. male.    Chief Complaint: Arm Swelling (Left forearm )    HPI:  Patient with history of multiple sclerosis complains left forearm and elbow swelling.  He says he walks with his forearms on a cart for support.  There is been some mild calluses form there.  In the last few days he has noticed some swelling redness and tenderness when he puts his elbow or forearm down and now believes there is some inflammation or swelling.  Two years ago he had a staph infection that involved the left wrist in the right leg.  He was on IV antibiotics long-term.  Presently is not developed any fevers or chills.  His wife thinks his MS maybe acting up slightly which could be a sign of the infection.  We will monitor this in do some labs in probably treat for infection although his may be a elbow bursitis inflammation as well.     Review of Systems   Constitutional: Negative for chills, fatigue, fever and unexpected weight change.   HENT: Negative for nosebleeds and trouble swallowing.    Eyes: Negative for pain and visual disturbance.   Respiratory: Negative for cough, shortness of breath and wheezing.    Cardiovascular: Negative for chest pain and palpitations.   Gastrointestinal: Negative for abdominal pain, constipation, diarrhea, nausea and vomiting.   Genitourinary: Negative for difficulty urinating and hematuria.   Musculoskeletal: Positive for gait problem. Negative for neck pain.   Skin: Negative for rash.        Bilateral forearm calluses with some swelling and redness at the left elbow   Neurological: Positive for weakness. Negative for dizziness and headaches.   Hematological: Does not bruise/bleed easily.   Psychiatric/Behavioral: Negative for dysphoric mood, sleep disturbance and suicidal ideas.       Objective:      Physical Exam   Constitutional: He appears well-developed and well-nourished.   HENT:   Head: Normocephalic and atraumatic.   Cardiovascular: Normal  rate, regular rhythm and intact distal pulses.   Peripheral pulses are intact   Pulmonary/Chest: Effort normal and breath sounds normal.   Musculoskeletal: He exhibits tenderness (Left elbow olecranon bursitis is a little swollen.  There is some surrounding redness and induration.  The looks like a small crust or abrasion at the callus on this arm compared to the right arm.) and deformity.   See elbow photo       Assessment:       1. Olecranon bursitis of left elbow    2. Cellulitis of forearm, left    3. MS (multiple sclerosis)    4. Essential hypertension    5. Routine physical examination    6. Screening for prostate cancer        Plan:       Rajan was seen today for arm swelling.    Diagnoses and all orders for this visit:    Olecranon bursitis of left elbow  -     Hepatic function panel; Future    Cellulitis of forearm, left  -     CBC auto differential; Future  -     Basic metabolic panel; Future  -     cephALEXin (KEFLEX) 500 MG capsule; Take 1 capsule (500 mg total) by mouth every 8 (eight) hours.  -     Hepatic function panel; Future    MS (multiple sclerosis)  -     CBC auto differential; Future  -     Basic metabolic panel; Future  -     Hepatic function panel; Future    Essential hypertension  -     CBC auto differential; Future  -     Basic metabolic panel; Future  -     Lipid panel; Future  -     Hepatic function panel; Future    Routine physical examination  -     CBC auto differential; Future  -     Basic metabolic panel; Future  -     Lipid panel; Future  -     Hepatic function panel; Future    Screening for prostate cancer  -     PSA, Screening; Future  -     Hepatic function panel; Future        school compresses or ice.  Call for any changes or failure to improve

## 2019-06-09 ENCOUNTER — HOSPITAL ENCOUNTER (INPATIENT)
Facility: HOSPITAL | Age: 64
LOS: 3 days | Discharge: HOME | DRG: 603 | End: 2019-06-12
Attending: EMERGENCY MEDICINE | Admitting: HOSPITALIST
Payer: COMMERCIAL

## 2019-06-09 DIAGNOSIS — L03.114 CELLULITIS OF LEFT ARM: ICD-10-CM

## 2019-06-09 PROBLEM — M70.20 OLECRANON BURSITIS: Status: ACTIVE | Noted: 2019-06-09

## 2019-06-09 PROBLEM — M71.122 SEPTIC OLECRANON BURSITIS OF LEFT ELBOW: Status: ACTIVE | Noted: 2019-06-09

## 2019-06-09 LAB
ALBUMIN SERPL BCP-MCNC: 3.2 G/DL (ref 3.5–5.2)
ALP SERPL-CCNC: 100 U/L (ref 55–135)
ALT SERPL W/O P-5'-P-CCNC: 28 U/L (ref 10–44)
ANION GAP SERPL CALC-SCNC: 10 MMOL/L (ref 8–16)
AST SERPL-CCNC: 26 U/L (ref 10–40)
BASOPHILS # BLD AUTO: 0.01 K/UL (ref 0–0.2)
BASOPHILS NFR BLD: 0.1 % (ref 0–1.9)
BILIRUB SERPL-MCNC: 0.7 MG/DL (ref 0.1–1)
BUN SERPL-MCNC: 16 MG/DL (ref 8–23)
CALCIUM SERPL-MCNC: 9.4 MG/DL (ref 8.7–10.5)
CHLORIDE SERPL-SCNC: 105 MMOL/L (ref 95–110)
CO2 SERPL-SCNC: 25 MMOL/L (ref 23–29)
CREAT SERPL-MCNC: 0.9 MG/DL (ref 0.5–1.4)
CRP SERPL-MCNC: 213.8 MG/L (ref 0–8.2)
DIFFERENTIAL METHOD: ABNORMAL
EOSINOPHIL # BLD AUTO: 0.1 K/UL (ref 0–0.5)
EOSINOPHIL NFR BLD: 0.9 % (ref 0–8)
ERYTHROCYTE [DISTWIDTH] IN BLOOD BY AUTOMATED COUNT: 14 % (ref 11.5–14.5)
ERYTHROCYTE [SEDIMENTATION RATE] IN BLOOD BY WESTERGREN METHOD: 50 MM/HR (ref 0–23)
EST. GFR  (AFRICAN AMERICAN): >60 ML/MIN/1.73 M^2
EST. GFR  (NON AFRICAN AMERICAN): >60 ML/MIN/1.73 M^2
GLUCOSE SERPL-MCNC: 102 MG/DL (ref 70–110)
HCT VFR BLD AUTO: 36.2 % (ref 40–54)
HGB BLD-MCNC: 12 G/DL (ref 14–18)
IMM GRANULOCYTES # BLD AUTO: 0.05 K/UL (ref 0–0.04)
IMM GRANULOCYTES NFR BLD AUTO: 0.4 % (ref 0–0.5)
LACTATE SERPL-SCNC: 0.9 MMOL/L (ref 0.5–2.2)
LYMPHOCYTES # BLD AUTO: 1 K/UL (ref 1–4.8)
LYMPHOCYTES NFR BLD: 8 % (ref 18–48)
MCH RBC QN AUTO: 30.9 PG (ref 27–31)
MCHC RBC AUTO-ENTMCNC: 33.1 G/DL (ref 32–36)
MCV RBC AUTO: 93 FL (ref 82–98)
MONOCYTES # BLD AUTO: 1.1 K/UL (ref 0.3–1)
MONOCYTES NFR BLD: 8.7 % (ref 4–15)
NEUTROPHILS # BLD AUTO: 10.2 K/UL (ref 1.8–7.7)
NEUTROPHILS NFR BLD: 81.9 % (ref 38–73)
NRBC BLD-RTO: 0 /100 WBC
PLATELET # BLD AUTO: 207 K/UL (ref 150–350)
PMV BLD AUTO: 11.3 FL (ref 9.2–12.9)
POTASSIUM SERPL-SCNC: 4 MMOL/L (ref 3.5–5.1)
PROCALCITONIN SERPL IA-MCNC: 0.09 NG/ML
PROT SERPL-MCNC: 7.1 G/DL (ref 6–8.4)
RBC # BLD AUTO: 3.88 M/UL (ref 4.6–6.2)
SODIUM SERPL-SCNC: 140 MMOL/L (ref 136–145)
WBC # BLD AUTO: 12.42 K/UL (ref 3.9–12.7)

## 2019-06-09 PROCEDURE — 84145 PROCALCITONIN (PCT): CPT

## 2019-06-09 PROCEDURE — 36415 COLL VENOUS BLD VENIPUNCTURE: CPT

## 2019-06-09 PROCEDURE — 99285 PR EMERGENCY DEPT VISIT,LEVEL V: ICD-10-PCS | Mod: ,,, | Performed by: PHYSICIAN ASSISTANT

## 2019-06-09 PROCEDURE — 83605 ASSAY OF LACTIC ACID: CPT

## 2019-06-09 PROCEDURE — 99223 PR INITIAL HOSPITAL CARE,LEVL III: ICD-10-PCS | Mod: ,,, | Performed by: INTERNAL MEDICINE

## 2019-06-09 PROCEDURE — 11000001 HC ACUTE MED/SURG PRIVATE ROOM

## 2019-06-09 PROCEDURE — 80053 COMPREHEN METABOLIC PANEL: CPT

## 2019-06-09 PROCEDURE — 99285 EMERGENCY DEPT VISIT HI MDM: CPT | Mod: ,,, | Performed by: PHYSICIAN ASSISTANT

## 2019-06-09 PROCEDURE — 63600175 PHARM REV CODE 636 W HCPCS: Performed by: PHYSICIAN ASSISTANT

## 2019-06-09 PROCEDURE — 99285 EMERGENCY DEPT VISIT HI MDM: CPT | Mod: 25

## 2019-06-09 PROCEDURE — 99223 1ST HOSP IP/OBS HIGH 75: CPT | Mod: ,,, | Performed by: INTERNAL MEDICINE

## 2019-06-09 PROCEDURE — 86060 ANTISTREPTOLYSIN O TITER: CPT

## 2019-06-09 PROCEDURE — S0077 INJECTION, CLINDAMYCIN PHOSP: HCPCS | Performed by: INTERNAL MEDICINE

## 2019-06-09 PROCEDURE — 85652 RBC SED RATE AUTOMATED: CPT

## 2019-06-09 PROCEDURE — 25000003 PHARM REV CODE 250: Performed by: PHYSICIAN ASSISTANT

## 2019-06-09 PROCEDURE — 96374 THER/PROPH/DIAG INJ IV PUSH: CPT

## 2019-06-09 PROCEDURE — 25000003 PHARM REV CODE 250: Performed by: INTERNAL MEDICINE

## 2019-06-09 PROCEDURE — 63600175 PHARM REV CODE 636 W HCPCS: Performed by: INTERNAL MEDICINE

## 2019-06-09 PROCEDURE — 87040 BLOOD CULTURE FOR BACTERIA: CPT | Mod: 59

## 2019-06-09 PROCEDURE — 86140 C-REACTIVE PROTEIN: CPT

## 2019-06-09 PROCEDURE — 96372 THER/PROPH/DIAG INJ SC/IM: CPT

## 2019-06-09 PROCEDURE — 85025 COMPLETE CBC W/AUTO DIFF WBC: CPT

## 2019-06-09 PROCEDURE — 51798 US URINE CAPACITY MEASURE: CPT

## 2019-06-09 RX ORDER — OXYCODONE AND ACETAMINOPHEN 5; 325 MG/1; MG/1
1 TABLET ORAL EVERY 4 HOURS PRN
Status: DISCONTINUED | OUTPATIENT
Start: 2019-06-09 | End: 2019-06-12 | Stop reason: HOSPADM

## 2019-06-09 RX ORDER — CLINDAMYCIN PHOSPHATE 900 MG/50ML
900 INJECTION, SOLUTION INTRAVENOUS
Status: DISCONTINUED | OUTPATIENT
Start: 2019-06-09 | End: 2019-06-12 | Stop reason: HOSPADM

## 2019-06-09 RX ORDER — GLUCAGON 1 MG
1 KIT INJECTION
Status: DISCONTINUED | OUTPATIENT
Start: 2019-06-09 | End: 2019-06-12 | Stop reason: HOSPADM

## 2019-06-09 RX ORDER — PROMETHAZINE HYDROCHLORIDE 25 MG/1
25 TABLET ORAL EVERY 6 HOURS PRN
Status: DISCONTINUED | OUTPATIENT
Start: 2019-06-09 | End: 2019-06-12 | Stop reason: HOSPADM

## 2019-06-09 RX ORDER — LOSARTAN POTASSIUM 25 MG/1
50 TABLET ORAL DAILY
Status: DISCONTINUED | OUTPATIENT
Start: 2019-06-10 | End: 2019-06-12 | Stop reason: HOSPADM

## 2019-06-09 RX ORDER — BACLOFEN 10 MG/1
10 TABLET ORAL 3 TIMES DAILY
Status: DISCONTINUED | OUTPATIENT
Start: 2019-06-09 | End: 2019-06-12 | Stop reason: HOSPADM

## 2019-06-09 RX ORDER — ACETAMINOPHEN 325 MG/1
650 TABLET ORAL EVERY 4 HOURS PRN
Status: DISCONTINUED | OUTPATIENT
Start: 2019-06-09 | End: 2019-06-12 | Stop reason: HOSPADM

## 2019-06-09 RX ORDER — IBUPROFEN 200 MG
200 TABLET ORAL EVERY 6 HOURS PRN
Status: DISCONTINUED | OUTPATIENT
Start: 2019-06-09 | End: 2019-06-12 | Stop reason: HOSPADM

## 2019-06-09 RX ORDER — ENOXAPARIN SODIUM 100 MG/ML
40 INJECTION SUBCUTANEOUS EVERY 24 HOURS
Status: DISCONTINUED | OUTPATIENT
Start: 2019-06-09 | End: 2019-06-12 | Stop reason: HOSPADM

## 2019-06-09 RX ORDER — VANCOMYCIN 2 GRAM/500 ML IN 0.9 % SODIUM CHLORIDE INTRAVENOUS
2000
Status: COMPLETED | OUTPATIENT
Start: 2019-06-09 | End: 2019-06-09

## 2019-06-09 RX ORDER — LEVETIRACETAM 500 MG/1
500 TABLET ORAL NIGHTLY
Status: DISCONTINUED | OUTPATIENT
Start: 2019-06-09 | End: 2019-06-12 | Stop reason: HOSPADM

## 2019-06-09 RX ORDER — SODIUM CHLORIDE 0.9 % (FLUSH) 0.9 %
10 SYRINGE (ML) INJECTION
Status: DISCONTINUED | OUTPATIENT
Start: 2019-06-09 | End: 2019-06-12 | Stop reason: HOSPADM

## 2019-06-09 RX ORDER — AMOXICILLIN 250 MG
1 CAPSULE ORAL 2 TIMES DAILY PRN
Status: DISCONTINUED | OUTPATIENT
Start: 2019-06-09 | End: 2019-06-12 | Stop reason: HOSPADM

## 2019-06-09 RX ORDER — MODAFINIL 100 MG/1
200 TABLET ORAL DAILY
Status: DISCONTINUED | OUTPATIENT
Start: 2019-06-10 | End: 2019-06-12 | Stop reason: HOSPADM

## 2019-06-09 RX ORDER — SODIUM CHLORIDE 0.9 % (FLUSH) 0.9 %
10 SYRINGE (ML) INJECTION
Status: DISCONTINUED | OUTPATIENT
Start: 2019-06-09 | End: 2019-06-09

## 2019-06-09 RX ORDER — RAMELTEON 8 MG/1
8 TABLET ORAL NIGHTLY PRN
Status: DISCONTINUED | OUTPATIENT
Start: 2019-06-09 | End: 2019-06-12 | Stop reason: HOSPADM

## 2019-06-09 RX ORDER — VANCOMYCIN 2 GRAM/500 ML IN 0.9 % SODIUM CHLORIDE INTRAVENOUS
2000
Status: DISCONTINUED | OUTPATIENT
Start: 2019-06-10 | End: 2019-06-12 | Stop reason: HOSPADM

## 2019-06-09 RX ORDER — OXYCODONE AND ACETAMINOPHEN 5; 325 MG/1; MG/1
2 TABLET ORAL EVERY 4 HOURS PRN
Status: DISCONTINUED | OUTPATIENT
Start: 2019-06-09 | End: 2019-06-12 | Stop reason: HOSPADM

## 2019-06-09 RX ORDER — IBUPROFEN 200 MG
16 TABLET ORAL
Status: DISCONTINUED | OUTPATIENT
Start: 2019-06-09 | End: 2019-06-12 | Stop reason: HOSPADM

## 2019-06-09 RX ORDER — VANCOMYCIN HCL IN 5 % DEXTROSE 1G/250ML
1000 PLASTIC BAG, INJECTION (ML) INTRAVENOUS
Status: ACTIVE | OUTPATIENT
Start: 2019-06-09 | End: 2019-06-10

## 2019-06-09 RX ORDER — AMLODIPINE BESYLATE 10 MG/1
10 TABLET ORAL DAILY
Status: DISCONTINUED | OUTPATIENT
Start: 2019-06-10 | End: 2019-06-12 | Stop reason: HOSPADM

## 2019-06-09 RX ORDER — IBUPROFEN 200 MG
24 TABLET ORAL
Status: DISCONTINUED | OUTPATIENT
Start: 2019-06-09 | End: 2019-06-12 | Stop reason: HOSPADM

## 2019-06-09 RX ORDER — FUROSEMIDE 40 MG/1
40 TABLET ORAL 2 TIMES DAILY
Status: DISCONTINUED | OUTPATIENT
Start: 2019-06-09 | End: 2019-06-12 | Stop reason: HOSPADM

## 2019-06-09 RX ADMIN — VANCOMYCIN HYDROCHLORIDE 2000 MG: 100 INJECTION, POWDER, LYOPHILIZED, FOR SOLUTION INTRAVENOUS at 05:06

## 2019-06-09 RX ADMIN — CLINDAMYCIN IN 5 PERCENT DEXTROSE 900 MG: 18 INJECTION, SOLUTION INTRAVENOUS at 10:06

## 2019-06-09 RX ADMIN — BACLOFEN 10 MG: 10 TABLET ORAL at 09:06

## 2019-06-09 RX ADMIN — ENOXAPARIN SODIUM 40 MG: 100 INJECTION SUBCUTANEOUS at 07:06

## 2019-06-09 RX ADMIN — FUROSEMIDE 40 MG: 40 TABLET ORAL at 06:06

## 2019-06-09 NOTE — PROGRESS NOTES
Pharmacokinetic Initial Assessment: IV Vancomycin    Assessment/Plan:    Vancomycin 2000 mg IVPB x 1 given in ED.  Administer additional vancomycin 1000 mg IVPB x 1 for 3000 mg total load dose.  Continue with vancomycin 2000 mg IVPB every 12 hours.  Desired empiric serum trough concentration is 10 to 20 mcg/mL.  Draw vancomycin trough level prior to 4th dose on 06/11/2019 at 0530.  Pharmacy will continue to follow and monitor vancomycin.      Please contact pharmacy at extension [phone] with any questions regarding this assessment.     Thank you for the consult,   Grayson Elliott     Patient brief summary:  Rajan Cooper III is a 63 y.o. male initiated on antimicrobial therapy with IV Vancomycin for treatment of suspected skin/soft tissue infection.     Drug Allergies:   Review of patient's allergies indicates:   Allergen Reactions    Norco [hydrocodone-acetaminophen] Anaphylaxis       Actual Body Weight:   131.5 kg    Renal Function:   Estimated Creatinine Clearance: 121 mL/min (based on SCr of 0.9 mg/dL).    CBC (last 72 hours):  Recent Labs   Lab Result Units 06/07/19  1547 06/09/19  1539   WBC K/uL 13.63* 12.42   Hemoglobin g/dL 13.0* 12.0*   Hematocrit % 40.5 36.2*   Platelets K/uL 191 207   Gran% % 80.8* 81.9*   Lymph% % 10.4* 8.0*   Mono% % 8.4 8.7   Eosinophil% % 0.2 0.9   Basophil% % 0.1 0.1   Differential Method  Automated Automated       Metabolic Panel (last 72 hours):  Recent Labs   Lab Result Units 06/07/19  1547 06/09/19  1539   Sodium mmol/L 142 140   Potassium mmol/L 4.0 4.0   Chloride mmol/L 103 105   CO2 mmol/L 29 25   Glucose mg/dL 96 102   BUN, Bld mg/dL 25* 16   Creatinine mg/dL 1.2 0.9   Albumin g/dL 3.8 3.2*   Total Bilirubin mg/dL 1.3* 0.7   Alkaline Phosphatase U/L 75 100   AST U/L 20 26   ALT U/L 19 28       Drug levels (last 3 results):  No results for input(s): VANCOMYCINRA, VANCOMYCINPE, VANCOMYCINTR in the last 72 hours.    Microbiologic Results:  Microbiology Results (last 7 days)      Procedure Component Value Units Date/Time    Blood culture #2 **CANNOT BE ORDERED STAT** [[id number]] Collected:  06/09/19 1547    Order Status:  Sent Specimen:  Blood from Peripheral, Antecubital, Right Updated:  06/09/19 1556    Blood culture #1 **CANNOT BE ORDERED STAT** [[id number]] Collected:  06/09/19 1539    Order Status:  Sent Specimen:  Blood from Peripheral, Hand, Right Updated:  06/09/19 1541

## 2019-06-09 NOTE — ED TRIAGE NOTES
Patient presents to ED for cellulitis to left arm. Swelling, pink color noted. Patient reports low-grade fever; took tylenol before coming to ED.

## 2019-06-09 NOTE — H&P
"[facility] Hospital Medicine  History & Physical    Patient Name: Rajan Cooper III  MRN: [id number]  Admission Date: 6/9/2019  Attending Physician: Trenton Brian MD   Primary Care Provider: Tom Garcia MD    [facility] Medicine Team: [facility] HOSP MED B MYRTLE Fox MD     Patient information was obtained from patient, past medical records and ER records.     Subjective:     Principal Problem:Cellulitis of left arm    Chief Complaint:   Chief Complaint   Patient presents with    Cellulitis     left arm redness and swelling , on Keflex         HPI: Mr. Cooper is a very nice 62 yo man with a history of left wrist abscess, HTN and multiple sclerosis.  In march of 2017 he developed MSSA bacteremia and had septic joints requiring washout to the left wrist and the right ankle.  He recently presented to his PCP's office on 6/7/19 (Friday) complaining of left forearm and elbow swelling.  Due to his MS (he still works!) he has to move around with his forearms resting on a cart to support himself.  He has developed quite prominent calluses on his elbows and post arms due to this.  His PCP, Dr. Garcia, examined him and noted, "Presently is not developed any fevers or chills.  His wife thinks his MS maybe acting up slightly which could be a sign of the infection.  We will monitor this in do some labs in probably treat for infection although his may be a elbow bursitis inflammation as well."  He was ultimately diagnosed with olecranon bursitis and forearm cellulitis and started on cephALEXin (KEFLEX) 500 MG capsule; Take 1 capsule (500 mg total) by mouth every 8 (eight) hours.    He took the Keflex as prescribed, but has seen worsening swelling and redness to the left forearm, no beginning two spread to the distal upper arm.  He denies eloina left wrist or elbow pain with movement to suggest septic arthritis.  He has had no fever or chills.  Today the area around the elbow rapidly enlarged and got red, " prompting him to return to the ED.        Past Medical History:   Diagnosis Date    Hypertension     MS (multiple sclerosis)     Staph aureus infection 2017       Past Surgical History:   Procedure Laterality Date    ARTHROTOMY-ANKLE Right 3/10/2017    Performed by Kunal [facility]ley [facility] at [facility] OR [facility]    INCISION AND DRAINAGE (I&D) left wrist abscess, left wrist arthrotomy with irrigation and debridement, supine, hand table, hand set 1 and 2 Left 3/4/2017    Performed by Kunal Shipley MD at [facility] OR [facility]LR    INCISION AND DRAINAGE (I&D), ABSCESS Right 3/10/2017    Performed by Kunal Shipley MD at [facility] OR [facility]LR       Review of patient's allergies indicates:   Allergen Reactions    Norco [hydrocodone-acetaminophen] Anaphylaxis       No current facility-administered medications on file prior to encounter.      Current Outpatient Medications on File Prior to Encounter   Medication Sig    amLODIPine (NORVASC) 10 MG tablet TAKE 1 TABLET BY MOUTH ONCE DAILY    AVONEX 30 mcg/0.5 mL injection INJECT 30 MCG INTO THE MUSCLE ONCE A WEEK    baclofen (LIORESAL) 10 MG tablet TAKE 1 TABLET BY MOUTH 3 TIMES A DAY    cephALEXin (KEFLEX) 500 MG capsule Take 1 capsule (500 mg total) by mouth every 8 (eight) hours.    furosemide (LASIX) 40 MG tablet TAKE 1 TABLET BY MOUTH 2 TIMES A DAY    ibuprofen (ADVIL,MOTRIN) 200 MG tablet Take 200 mg by mouth every 6 (six) hours as needed for Pain.    losartan (COZAAR) 50 MG tablet TAKE 1 TABLET (50 MG TOTAL) BY MOUTH ONCE DAILY.    modafinil (PROVIGIL) 200 MG Tab Take 1 tablet (200 mg total) by mouth once daily.    ondansetron (ZOFRAN) 4 MG tablet Take 1 tablet (4 mg total) by mouth every 6 (six) hours as needed for Nausea.    levETIRAcetam (KEPPRA) 500 MG Tab TAKE 1 TABLET (500 MG TOTAL) BY MOUTH NIGHTLY.     Family History     Problem Relation (Age of Onset)    Cancer Father    Diabetes Mother    No Known Problems Sister, Brother, Maternal Aunt, Maternal Uncle,  Paternal Aunt, Paternal Uncle, Maternal Grandmother, Maternal Grandfather, Paternal Grandmother, Paternal Grandfather        Tobacco Use    Smoking status: Never Smoker    Smokeless tobacco: Never Used   Substance and Sexual Activity    Alcohol use: No    Drug use: No    Sexual activity: Not on file     Review of Systems   Constitutional: Positive for fatigue. Negative for chills and fever.   HENT: Negative for congestion.    Eyes: Negative for itching.   Respiratory: Negative for cough and shortness of breath.    Cardiovascular: Negative for chest pain.   Gastrointestinal: Negative for abdominal pain, constipation, diarrhea, nausea and vomiting.   Endocrine: Negative for cold intolerance.   Genitourinary: Positive for difficulty urinating.   Musculoskeletal: Positive for gait problem.   Skin: Positive for color change, rash and wound.   Allergic/Immunologic: Positive for immunocompromised state.   Neurological: Positive for weakness and numbness. Negative for dizziness.   Hematological: Does not bruise/bleed easily.   Psychiatric/Behavioral: Negative for confusion. The patient is not nervous/anxious.      Objective:     Vital Signs (Most Recent):  Temp: 99.2 °F (37.3 °C) (06/09/19 1727)  Pulse: 67 (06/09/19 1727)  Resp: 18 (06/09/19 1727)  BP: 110/62 (06/09/19 1727)  SpO2: 97 % (06/09/19 1727) Vital Signs (24h Range):  Temp:  [98.4 °F (36.9 °C)-99.2 °F (37.3 °C)] 99.2 °F (37.3 °C)  Pulse:  [67-90] 67  Resp:  [18] 18  SpO2:  [95 %-97 %] 97 %  BP: (110-127)/(60-66) 110/62     Weight: 131.5 kg (290 lb)  Body mass index is 38.26 kg/m².    Physical Exam   Constitutional: He is oriented to person, place, and time. He appears well-developed and well-nourished.  Non-toxic appearance. He does not have a sickly appearance. He does not appear ill. No distress.   HENT:   Head: Normocephalic and atraumatic.   Nose: Nose normal.   Mouth/Throat: Oropharynx is clear and moist. No oropharyngeal exudate.   Eyes: Pupils are  equal, round, and reactive to light. Conjunctivae and EOM are normal. Right eye exhibits no discharge. Left eye exhibits no discharge. No scleral icterus.   Neck: Normal range of motion. Neck supple. No JVD present. No tracheal deviation present. No thyromegaly present.   Cardiovascular: Normal rate, regular rhythm, normal heart sounds and intact distal pulses. Exam reveals no gallop and no friction rub.   No murmur heard.  Pulmonary/Chest: Effort normal and breath sounds normal. No stridor. No respiratory distress. He has no wheezes. He has no rales. He exhibits no tenderness.   Abdominal: Soft. Bowel sounds are normal. He exhibits no distension and no mass. There is no tenderness. There is no rebound and no guarding.   Genitourinary:   Genitourinary Comments: No read in place   Musculoskeletal: Normal range of motion. He exhibits no edema or tenderness.   Lymphadenopathy:     He has no cervical adenopathy.   1+ Edema to legs   Neurological: He is alert and oriented to person, place, and time. He displays normal reflexes. A sensory deficit is present. No cranial nerve deficit. He exhibits normal muscle tone. Coordination and gait abnormal. GCS eye subscore is 4. GCS verbal subscore is 5. GCS motor subscore is 6.   Sensory deficits to legs   Skin: Skin is warm and dry. No rash noted. No erythema. No pallor.   Left forearm with erythema from proximal wrist to elbow and somewhat to ventral area of distal brachial arm.  There is swelling to the olecranon bursa with enlarged callus to the elbow and proximal dorsal forearm area.  Both the left wrist and elbow are freely mobile with good ROM with no pain.   Psychiatric: He has a normal mood and affect. His speech is normal and behavior is normal. Judgment and thought content normal. Cognition and memory are normal.   Nursing note and vitals reviewed.        CRANIAL NERVES     CN III, IV, VI   Pupils are equal, round, and reactive to light.  Extraocular motions are  normal.       Significant Labs:   Recent Results (from the past 24 hour(s))   CBC auto differential    Collection Time: 06/09/19  3:39 PM   Result Value Ref Range    WBC 12.42 3.90 - 12.70 K/uL    RBC 3.88 (L) 4.60 - 6.20 M/uL    Hemoglobin 12.0 (L) 14.0 - 18.0 g/dL    Hematocrit 36.2 (L) 40.0 - 54.0 %    Mean Corpuscular Volume 93 82 - 98 fL    Mean Corpuscular Hemoglobin 30.9 27.0 - 31.0 pg    Mean Corpuscular Hemoglobin Conc 33.1 32.0 - 36.0 g/dL    RDW 14.0 11.5 - 14.5 %    Platelets 207 150 - 350 K/uL    MPV 11.3 9.2 - 12.9 fL    Immature Granulocytes 0.4 0.0 - 0.5 %    Gran # (ANC) 10.2 (H) 1.8 - 7.7 K/uL    Immature Grans (Abs) 0.05 (H) 0.00 - 0.04 K/uL    Lymph # 1.0 1.0 - 4.8 K/uL    Mono # 1.1 (H) 0.3 - 1.0 K/uL    Eos # 0.1 0.0 - 0.5 K/uL    Baso # 0.01 0.00 - 0.20 K/uL    nRBC 0 0 /100 WBC    Gran% 81.9 (H) 38.0 - 73.0 %    Lymph% 8.0 (L) 18.0 - 48.0 %    Mono% 8.7 4.0 - 15.0 %    Eosinophil% 0.9 0.0 - 8.0 %    Basophil% 0.1 0.0 - 1.9 %    Differential Method Automated    Comprehensive metabolic panel    Collection Time: 06/09/19  3:39 PM   Result Value Ref Range    Sodium 140 136 - 145 mmol/L    Potassium 4.0 3.5 - 5.1 mmol/L    Chloride 105 95 - 110 mmol/L    CO2 25 23 - 29 mmol/L    Glucose 102 70 - 110 mg/dL    BUN, Bld 16 8 - 23 mg/dL    Creatinine 0.9 0.5 - 1.4 mg/dL    Calcium 9.4 8.7 - 10.5 mg/dL    Total Protein 7.1 6.0 - 8.4 g/dL    Albumin 3.2 (L) 3.5 - 5.2 g/dL    Total Bilirubin 0.7 0.1 - 1.0 mg/dL    Alkaline Phosphatase 100 55 - 135 U/L    AST 26 10 - 40 U/L    ALT 28 10 - 44 U/L    Anion Gap 10 8 - 16 mmol/L    eGFR if African American >60.0 >60 mL/min/1.73 m^2    eGFR if non African American >60.0 >60 mL/min/1.73 m^2   Lactic acid, plasma    Collection Time: 06/09/19  3:39 PM   Result Value Ref Range    Lactate (Lactic Acid) 0.9 0.5 - 2.2 mmol/L         Significant Imaging:   No radiographs done in the ED     Assessment/Plan:     Cellulitis of the left arm  -vancomycin 2 g IVPB, 2,000  mg, Intravenous, ED 1 Time, 2,000 mg at 06/09/19 1759   -Pharmacy consulted to continue Vanco at next proper dose and frequency  -clindamycin 900 MG/50 ML D5W 900 mg/50 mL IVPB 900 mg, 900 mg, Intravenous, Q8H   -Consider discontinuation of Clinda if ASO negative  -acetaminophen tablet 650 mg, 650 mg, Oral, Q4H PRN fever, mild pain  -No signs or symptoms of elbow or wrist joint involvement, but monitor closely  -More worrisome is possible involvement of the olecranon bursa  -Check Xray of wrist and elbow on the left  -Ortho consult  -Check ESR, CRP, ASO Ab titer for GAS    Multiple sclerosis  -baclofen tablet 10 mg, 10 mg, Oral, TID  -modafinil tablet 200 mg, 200 mg, Oral, Daily  -Home med: AVONEX 30 mcg/0.5 mL injection, INJECT 30 MCG INTO THE MUSCLE ONCE A WEEK  -PT and OT consult    Seizure disorder  -levETIRAcetam tablet 500 mg, 500 mg, Oral, Nightly    Essential hypertension  -furosemide tablet 40 mg, 40 mg, Oral, BID  -amLODIPine tablet 10 mg, 10 mg, Oral, Daily  -losartan tablet 50 mg, 50 mg, Oral, Daily    Normocytic anemia  -Stable to monitor as outpatient    VTE Risk Mitigation (From admission, onward)        Ordered     enoxaparin injection 40 mg  Daily      06/09/19 1830     Place ESTRELLA hose  Until discontinued      06/09/19 1830     Place sequential compression device  Until discontinued      06/09/19 1830     IP VTE HIGH RISK PATIENT  Once      06/09/19 1830            MYRTLE Fox MD  Department of Hospital Medicine   [facility]

## 2019-06-09 NOTE — ED NOTES
Patient identifiers for Rajan Cooper III 63 y.o. male checked and correct.  Chief Complaint   Patient presents with    Cellulitis     left arm redness and swelling , on Keflex      Past Medical History:   Diagnosis Date    Hypertension     MS (multiple sclerosis)      Allergies reported:   Review of patient's allergies indicates:   Allergen Reactions    Norco [hydrocodone-acetaminophen] Anaphylaxis         LOC: Patient is awake, alert, and aware of environment with an appropriate affect. Patient is oriented x 3 and speaking appropriately.  APPEARANCE: Patient resting comfortably and in no acute distress. Patient is clean and well groomed, patient's clothing is properly fastened.  SKIN: The skin is warm and dry. Patient has normal skin turgor and moist mucus membranes. Cellulitis noted to left arm. Arm swollen and pink in color. Patient reports it began as a callus and has become progressively worse. Patient reports low grade fever.   MUSKULOSKELETAL: Patient is moving all extremities well, no obvious deformities noted. Pulses intact. Patient uses walker to ambulate. Reports constant generalized weakness.  RESPIRATORY: Airway is open and patent. Respirations are spontaneous and non-labored with normal effort and rate. Denies SOB.  CARDIAC: Patient has a normal rate and rhythm. Bilateral lower extremity peripheral edema noted.   ABDOMEN: No distention noted. Soft and non-tender upon palpation. Denies nausea and vomiting.   NEUROLOGICAL:PERRL. Facial expression is symmetrical. Hand grasps are equal bilaterally. Normal sensation in all extremities when touched with finger. Denies headache, numbness, and tingling.

## 2019-06-09 NOTE — ED PROVIDER NOTES
Encounter Date: 6/9/2019    SCRIBE #1 NOTE: I, Ryley Natarajan, am scribing for, and in the presence of,  Dr. Aponte. I have scribed the following portions of the note - the APC attestation.       History     Chief Complaint   Patient presents with    Cellulitis     left arm redness and swelling , on Keflex      63 y.o. M with PMH significant for muscular sclerosis and HTN presents to the ED for emergent evaluation secondary to left arm cellulitis. He reports the cellulitis started Thursday night along the callous of his left forearm, has see by Dr. Garcia (PCP) on Friday and started in Keflex 500mg PO TID, labs were ordered. WBC of 13.63. Patient reports to ED secondary to spreading of cellulitis to above the elbow and toward the hand. 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 He denies systemic signs of infection. He denies fever, chills, malaise, nausea, Vomiting, chest pain, SOB, abdominal pain, myalgias, arthralgias, headache, neck pain or confusion.         Review of patient's allergies indicates:   Allergen Reactions    Norco [hydrocodone-acetaminophen] Anaphylaxis     Past Medical History:   Diagnosis Date    Hypertension     MS (multiple sclerosis)     Staph aureus infection 2017     Past Surgical History:   Procedure Laterality Date    ARTHROTOMY-ANKLE Right 3/10/2017    Performed by [facility] at [facility] OR [facility]    INCISION AND DRAINAGE (I&D) left wrist abscess, left wrist arthrotomy with irrigation and debridement, supine, hand table, hand set 1 and 2 Left 3/4/2017    Performed by Kunal Shipley MD at [facility] OR [facility]    INCISION AND DRAINAGE (I&D), ABSCESS Right 3/10/2017    Performed by Kunal Shipley MD at [facility] OR [facility]     Family History   Problem Relation Age of Onset    Diabetes Mother     Cancer Father     No Known Problems Sister     No Known Problems Brother     No Known Problems Maternal  Aunt     No Known Problems Maternal Uncle     No Known Problems Paternal Aunt     No Known Problems Paternal Uncle     No Known Problems Maternal Grandmother     No Known Problems Maternal Grandfather     No Known Problems Paternal Grandmother     No Known Problems Paternal Grandfather     Hypertension Neg Hx     Amblyopia Neg Hx     Blindness Neg Hx     Cataracts Neg Hx     Glaucoma Neg Hx     Macular degeneration Neg Hx     Retinal detachment Neg Hx     Strabismus Neg Hx     Stroke Neg Hx     Thyroid disease Neg Hx      Social History     Tobacco Use    Smoking status: Never Smoker    Smokeless tobacco: Never Used   Substance Use Topics    Alcohol use: No    Drug use: No     Review of Systems   Constitutional: Negative for appetite change, chills, diaphoresis, fatigue and fever.   HENT: Negative for drooling, ear pain, sneezing and sore throat.    Eyes: Negative for pain, redness and itching.   Respiratory: Negative for apnea, cough, choking, shortness of breath and wheezing.    Cardiovascular: Negative for chest pain.   Gastrointestinal: Negative for abdominal pain, constipation, diarrhea, nausea and vomiting.   Genitourinary: Negative for dysuria, frequency, hematuria and urgency.   Musculoskeletal: Positive for gait problem (ambulates with walker secondary to MS) and joint swelling (left elbow). Negative for arthralgias, back pain, myalgias and neck pain.   Skin: Positive for color change (erythema of left arm - above elbow to wrist). Negative for pallor, rash and wound.   Allergic/Immunologic: Negative for immunocompromised state.   Neurological: Negative for dizziness, weakness, light-headedness and headaches.   Hematological: Does not bruise/bleed easily.   Psychiatric/Behavioral: Negative for agitation. The patient is nervous/anxious.        Physical Exam     Initial Vitals [06/09/19 1427]   BP Pulse Resp Temp SpO2   124/60 90 18 98.4 °F (36.9 °C) 95 %      MAP       --         Physical  Exam    Nursing note and vitals reviewed.  Constitutional: He appears well-developed and well-nourished. No distress.   HENT:   Head: Normocephalic and atraumatic.   Eyes: Conjunctivae and EOM are normal. Pupils are equal, round, and reactive to light.   Neck: Normal range of motion.   Cardiovascular: Normal rate, regular rhythm and normal heart sounds.   Pulmonary/Chest: Breath sounds normal. No respiratory distress. He has no wheezes. He has no rales.   Abdominal: Soft. Bowel sounds are normal. There is no tenderness.   Musculoskeletal: Normal range of motion.        Arms:  Cellulitis of left arm - full ROM of LUE,  strength equal, sensation intact, pulses intact.    Neurological: He is alert and oriented to person, place, and time.   Skin: Skin is warm, dry and intact. No abrasion, no bruising, no ecchymosis, no laceration, no lesion, no rash and no abscess noted. There is erythema. No pallor.        Cellulitis of LUE extending from above the elbow to above the wrist, no associated tenderness or fluctuance, no palpable abscess, slightly warm to touch   Psychiatric: He has a normal mood and affect.                     ED Course   Procedures  Labs Reviewed - No data to display       Imaging Results    None          Medical Decision Making:   Initial Assessment:   63 y.o. M with  left arm cellulitis x 4 days. Patient denies pain in LUE, no abscess or fluctuance noted. Was seen Friday by PCP (Dr. Garcia), started on Keflex 500mg PO TID and lab work ordered. Patient reports to ED today secondary to worsening/spreadign of the erythema. Patient with leukocytosis of 13.63 from Friday. Given worsening in condition on PO antibiotics, patient considered failed outpatient treatment with oral antibiotics. Along with history of staphylococcal arthritis of L wrist requiring I and D and hospital admission, I feel that this patient meets criteria for hospital admission.     3:51 PM CBC, CMP, Lactic acid and Blood cultures  ordered. No imaging warranted at this time as cellulitis is diffuse with no abscess or pinpoint tenderness/pain of LUE. Patient and wife agree to proceed with current plan.     4:57 PM - Patient re evaluated in RWR, WBC 12.42, down from 13.63. Lactic acid normal.  Discussed need for admission secondary to failed outpatient treatment on oral antibiotics, will need IV antibiotics. Per ED case management, meets criteria for hospital admission. Per Dr Fox, admit to [facility] to floor admission ordered placed. IV Vancomycin 2g ordered. Patient agrees to proceed with plan for hospital admission.     I discussed the care of this patient with my supervising MD.    Differential Diagnosis:   Cellulitis, abscess, pyogenic arthritis, sepsis, gout            Scribe Attestation:   Scribe #1: I performed the above scribed service and the documentation accurately describes the services I performed. I attest to the accuracy of the note.    Attending Attestation:     Physician Attestation Statement for NP/PA:   I discussed this assessment and plan of this patient with the NP/PA, but I did not personally examine the patient. The face to face encounter was performed by the NP/PA.                     Clinical Impression:   Cellulitis, left upper extremity    Disposition:   Disposition: Admitted  Condition: Stable                        MARCIA Calhoun  06/09/19 7980       Adolfo Aponte MD  06/10/19 0640

## 2019-06-10 ENCOUNTER — PATIENT MESSAGE (OUTPATIENT)
Dept: INTERNAL MEDICINE | Facility: CLINIC | Age: 64
End: 2019-06-10

## 2019-06-10 LAB
ANION GAP SERPL CALC-SCNC: 10 MMOL/L (ref 8–16)
BASOPHILS # BLD AUTO: 0.01 K/UL (ref 0–0.2)
BASOPHILS NFR BLD: 0.1 % (ref 0–1.9)
BUN SERPL-MCNC: 17 MG/DL (ref 8–23)
CALCIUM SERPL-MCNC: 9.1 MG/DL (ref 8.7–10.5)
CHLORIDE SERPL-SCNC: 103 MMOL/L (ref 95–110)
CO2 SERPL-SCNC: 28 MMOL/L (ref 23–29)
CREAT SERPL-MCNC: 0.8 MG/DL (ref 0.5–1.4)
DIFFERENTIAL METHOD: ABNORMAL
EOSINOPHIL # BLD AUTO: 0.1 K/UL (ref 0–0.5)
EOSINOPHIL NFR BLD: 1 % (ref 0–8)
ERYTHROCYTE [DISTWIDTH] IN BLOOD BY AUTOMATED COUNT: 14.1 % (ref 11.5–14.5)
EST. GFR  (AFRICAN AMERICAN): >60 ML/MIN/1.73 M^2
EST. GFR  (NON AFRICAN AMERICAN): >60 ML/MIN/1.73 M^2
ESTIMATED AVG GLUCOSE: 123 MG/DL (ref 68–131)
GLUCOSE SERPL-MCNC: 120 MG/DL (ref 70–110)
HBA1C MFR BLD HPLC: 5.9 % (ref 4–5.6)
HCT VFR BLD AUTO: 35.5 % (ref 40–54)
HGB BLD-MCNC: 11.5 G/DL (ref 14–18)
IMM GRANULOCYTES # BLD AUTO: 0.05 K/UL (ref 0–0.04)
IMM GRANULOCYTES NFR BLD AUTO: 0.5 % (ref 0–0.5)
LYMPHOCYTES # BLD AUTO: 1.3 K/UL (ref 1–4.8)
LYMPHOCYTES NFR BLD: 12.8 % (ref 18–48)
MAGNESIUM SERPL-MCNC: 2.2 MG/DL (ref 1.6–2.6)
MCH RBC QN AUTO: 30.7 PG (ref 27–31)
MCHC RBC AUTO-ENTMCNC: 32.4 G/DL (ref 32–36)
MCV RBC AUTO: 95 FL (ref 82–98)
MONOCYTES # BLD AUTO: 0.9 K/UL (ref 0.3–1)
MONOCYTES NFR BLD: 9.2 % (ref 4–15)
NEUTROPHILS # BLD AUTO: 7.5 K/UL (ref 1.8–7.7)
NEUTROPHILS NFR BLD: 76.4 % (ref 38–73)
NRBC BLD-RTO: 0 /100 WBC
PHOSPHATE SERPL-MCNC: 3.4 MG/DL (ref 2.7–4.5)
PLATELET # BLD AUTO: 202 K/UL (ref 150–350)
PMV BLD AUTO: 11.5 FL (ref 9.2–12.9)
POTASSIUM SERPL-SCNC: 3.6 MMOL/L (ref 3.5–5.1)
RBC # BLD AUTO: 3.74 M/UL (ref 4.6–6.2)
SODIUM SERPL-SCNC: 141 MMOL/L (ref 136–145)
WBC # BLD AUTO: 9.84 K/UL (ref 3.9–12.7)

## 2019-06-10 PROCEDURE — 25000003 PHARM REV CODE 250: Performed by: INTERNAL MEDICINE

## 2019-06-10 PROCEDURE — 83036 HEMOGLOBIN GLYCOSYLATED A1C: CPT

## 2019-06-10 PROCEDURE — 36415 COLL VENOUS BLD VENIPUNCTURE: CPT

## 2019-06-10 PROCEDURE — 99222 1ST HOSP IP/OBS MODERATE 55: CPT | Mod: ,,, | Performed by: ORTHOPAEDIC SURGERY

## 2019-06-10 PROCEDURE — 85025 COMPLETE CBC W/AUTO DIFF WBC: CPT

## 2019-06-10 PROCEDURE — 99233 SBSQ HOSP IP/OBS HIGH 50: CPT | Mod: ,,, | Performed by: HOSPITALIST

## 2019-06-10 PROCEDURE — 80048 BASIC METABOLIC PNL TOTAL CA: CPT

## 2019-06-10 PROCEDURE — S0077 INJECTION, CLINDAMYCIN PHOSP: HCPCS | Performed by: INTERNAL MEDICINE

## 2019-06-10 PROCEDURE — 83735 ASSAY OF MAGNESIUM: CPT

## 2019-06-10 PROCEDURE — 84100 ASSAY OF PHOSPHORUS: CPT

## 2019-06-10 PROCEDURE — 63600175 PHARM REV CODE 636 W HCPCS: Performed by: INTERNAL MEDICINE

## 2019-06-10 PROCEDURE — 11000001 HC ACUTE MED/SURG PRIVATE ROOM

## 2019-06-10 PROCEDURE — 99233 PR SUBSEQUENT HOSPITAL CARE,LEVL III: ICD-10-PCS | Mod: ,,, | Performed by: HOSPITALIST

## 2019-06-10 PROCEDURE — 99222 PR INITIAL HOSPITAL CARE,LEVL II: ICD-10-PCS | Mod: ,,, | Performed by: ORTHOPAEDIC SURGERY

## 2019-06-10 RX ADMIN — Medication 2000 MG: at 06:06

## 2019-06-10 RX ADMIN — ENOXAPARIN SODIUM 40 MG: 100 INJECTION SUBCUTANEOUS at 05:06

## 2019-06-10 RX ADMIN — LOSARTAN POTASSIUM 50 MG: 25 TABLET, FILM COATED ORAL at 08:06

## 2019-06-10 RX ADMIN — FUROSEMIDE 40 MG: 40 TABLET ORAL at 08:06

## 2019-06-10 RX ADMIN — BACLOFEN 10 MG: 10 TABLET ORAL at 09:06

## 2019-06-10 RX ADMIN — FUROSEMIDE 40 MG: 40 TABLET ORAL at 05:06

## 2019-06-10 RX ADMIN — CLINDAMYCIN IN 5 PERCENT DEXTROSE 900 MG: 18 INJECTION, SOLUTION INTRAVENOUS at 01:06

## 2019-06-10 RX ADMIN — AMLODIPINE BESYLATE 10 MG: 10 TABLET ORAL at 08:06

## 2019-06-10 RX ADMIN — CLINDAMYCIN IN 5 PERCENT DEXTROSE 900 MG: 18 INJECTION, SOLUTION INTRAVENOUS at 09:06

## 2019-06-10 RX ADMIN — Medication 2000 MG: at 05:06

## 2019-06-10 RX ADMIN — BACLOFEN 10 MG: 10 TABLET ORAL at 03:06

## 2019-06-10 RX ADMIN — CLINDAMYCIN IN 5 PERCENT DEXTROSE 900 MG: 18 INJECTION, SOLUTION INTRAVENOUS at 05:06

## 2019-06-10 RX ADMIN — MODAFINIL 200 MG: 100 TABLET ORAL at 08:06

## 2019-06-10 NOTE — PLAN OF CARE
Problem: Adult Inpatient Plan of Care  Goal: Plan of Care Review  Outcome: Ongoing (interventions implemented as appropriate)  PT resting in bed comfortably. IV intact and infusing free of redness and irritation. fall precautions maintained no falls noted, call light in reach, bed locked, non skid socks on while out of bed pt instructed to call for assistance, skin integrity maintained. pt independent with positioning, c/o pain managed with prn meds.pt reports that L arm appears less red today. no other complaints or concerns, will cont to follow careplan

## 2019-06-10 NOTE — SUBJECTIVE & OBJECTIVE
Past Medical History:   Diagnosis Date    Hypertension     MS (multiple sclerosis)     Staph aureus infection 2017       Past Surgical History:   Procedure Laterality Date    ARTHROTOMY-ANKLE Right 3/10/2017    Performed by [facility]ley [facility] at [facility]    INCISION AND DRAINAGE (I&D) left wrist abscess, left wrist arthrotomy with irrigation and debridement, supine, hand table, hand set 1 and 2 Left 3/4/2017    Performed by Kunal Shipley MD at [facility]    INCISION AND DRAINAGE (I&D), ABSCESS Right 3/10/2017    Performed by Kunal Shipley MD at [facility]       Review of patient's allergies indicates:   Allergen Reactions    Norco [hydrocodone-acetaminophen] Anaphylaxis       Current Facility-Administered Medications   Medication    acetaminophen tablet 650 mg    [START ON 6/10/2019] amLODIPine tablet 10 mg    baclofen tablet 10 mg    clindamycin 900 MG/50 ML D5W 900 mg/50 mL IVPB 900 mg    dextrose 10% (D10W) Bolus    dextrose 10% (D10W) Bolus    enoxaparin injection 40 mg    furosemide tablet 40 mg    glucagon (human recombinant) injection 1 mg    glucose chewable tablet 16 g    glucose chewable tablet 24 g    ibuprofen tablet 200 mg    levETIRAcetam tablet 500 mg    [START ON 6/10/2019] losartan tablet 50 mg    [START ON 6/10/2019] modafinil tablet 200 mg    promethazine tablet 25 mg    promethazine tablet 25 mg    ramelteon tablet 8 mg    senna-docusate 8.6-50 mg per tablet 1 tablet    sodium chloride 0.9% flush 10 mL    [START ON 6/10/2019] vancomycin 2 g in 0.9% sodium chloride 500 mL IVPB    vancomycin in dextrose 5 % 1 gram/250 mL IVPB 1,000 mg     Family History     Problem Relation (Age of Onset)    Cancer Father    Diabetes Mother    No Known Problems Sister, Brother, Maternal Aunt, Maternal Uncle, Paternal Aunt, Paternal Uncle, Maternal Grandmother, Maternal Grandfather, Paternal Grandmother, Paternal Grandfather        Tobacco Use    Smoking  "status: Never Smoker    Smokeless tobacco: Never Used   Substance and Sexual Activity    Alcohol use: No    Drug use: No    Sexual activity: Not on file     ROS   Constitutional: negative for fevers  Eyes: no visual changes  ENT: negative for hearing loss  Respiratory: negative for dyspnea  Cardiovascular: negative for chest pain  Gastrointestinal: negative for abdominal pain  Genitourinary: negative for dysuria  Neurological: negative for headaches  Behavioral/Psych: negative for hallucinations  Endocrine: negative for temperature intolerance    Objective:     Vital Signs (Most Recent):  Temp: 97.6 °F (36.4 °C) (06/09/19 2020)  Pulse: 68 (06/09/19 2020)  Resp: 18 (06/09/19 2020)  BP: 131/70 (06/09/19 2020)  SpO2: 97 % (06/09/19 2020) Vital Signs (24h Range):  Temp:  [97.6 °F (36.4 °C)-99.2 °F (37.3 °C)] 97.6 °F (36.4 °C)  Pulse:  [66-90] 68  Resp:  [16-18] 18  SpO2:  [95 %-97 %] 97 %  BP: (110-131)/(60-73) 131/70     Weight: 131.5 kg (290 lb)  Height: 6' 1" (185.4 cm)  Body mass index is 38.26 kg/m².    No intake or output data in the 24 hours ending 06/09/19 2130    Ortho/SPM Exam  Physical Exam:  Gen:  No acute distress  CV:  Peripherally well-perfused.  Pulses 2+ bilaterally.  Lungs:  Normal respiratory effort.  Abdomen:  Soft, non-tender, non-distended  Head/Neck:  Normocephalic.  Atraumatic. No TTP, AROM and PROM intact without pain  Neuro:  CN intact without deficit, SILT throughout B/L Upper & Lower Extremities  Pelvis: No TTP, Stable to direct anterior pressure over ASIS.    LEFT UPPER EXTREMITY:     INSPECTION  - There is diffuse erythema to left dorsal forearm and elbow. No active drainage wounds or swelling noted  PALPATION  - No palpable fluctuance, mild fullness to right elbow.   RANGE OF MOTION  - AROM and PROM intact at the at the elbow, wrist with little pain  NEUROVASCULAR  - AIN/PIN/Radial/Median/Ulnar Nerves assessed in isolation without deficit  - SILT throughout  - Radial & Ulnar arteries " palpated 2+  - Capillary Refill <3s     - DP and PT palpated  2+  - Capillary Refill <3s      Significant Labs: All pertinent labs within the past 24 hours have been reviewed.    Significant Imaging: I have reviewed and interpreted all pertinent imaging results/findings.   No acute bony abnormality

## 2019-06-10 NOTE — PLAN OF CARE
SW following for dc needs.Post acute needs to be determined.               Katarzyna Ndiaye LMSW  [facility]   [id number]

## 2019-06-10 NOTE — CONSULTS
[facility]  Orthopedics  Consult Note    Patient Name: Rajan Cooper III  MRN: [id number]  Admission Date: 6/9/2019  Hospital Length of Stay: 0 days  Attending Provider: Iwona Verduzco MD  Primary Care Provider: Tom Garcia MD      Inpatient consult to Orthopedics  Consult performed by: Jose Lawrence MD  Consult ordered by: HAYDEN Fox MD        Subjective:     Principal Problem:Olecranon bursitis    Chief Complaint:   Chief Complaint   Patient presents with    Cellulitis     left arm redness and swelling , on Keflex         HPI: Mr. Cooper is a very nice 64 yo man with a history of left wrist abscess, HTN and multiple sclerosis.  In march of 2017 he developed MSSA bacteremia and had septic joints requiring washout to the left wrist and the right ankle.  He recently presented to his PCP's office on 6/7/19 (Friday) complaining of left forearm and elbow swelling.  Due to his MS he has to move around with his forearms resting on a cart to support himself.  He has developed quite prominent calluses on his elbows and post arms due to this. He has been taking cephalexin for several days without improvement prompting visit to ED and admission cellulitis treatment. Ortho consulted to r/o right septic elbow.     Past Medical History:   Diagnosis Date    Hypertension     MS (multiple sclerosis)     Staph aureus infection 2017       Past Surgical History:   Procedure Laterality Date    ARTHROTOMY-ANKLE Right 3/10/2017    Performed by [facility] at [facility]    INCISION AND DRAINAGE (I&D) left wrist abscess, left wrist arthrotomy with irrigation and debridement, supine, hand table, hand set 1 and 2 Left 3/4/2017    Performed by [facility] at [facility]    INCISION AND DRAINAGE (I&D), ABSCESS Right 3/10/2017    Performed by [facility] at [facility]       Review of patient's allergies indicates:   Allergen Reactions    Norco  [hydrocodone-acetaminophen] Anaphylaxis       Current Facility-Administered Medications   Medication    acetaminophen tablet 650 mg    [START ON 6/10/2019] amLODIPine tablet 10 mg    baclofen tablet 10 mg    clindamycin 900 MG/50 ML D5W 900 mg/50 mL IVPB 900 mg    dextrose 10% (D10W) Bolus    dextrose 10% (D10W) Bolus    enoxaparin injection 40 mg    furosemide tablet 40 mg    glucagon (human recombinant) injection 1 mg    glucose chewable tablet 16 g    glucose chewable tablet 24 g    ibuprofen tablet 200 mg    levETIRAcetam tablet 500 mg    [START ON 6/10/2019] losartan tablet 50 mg    [START ON 6/10/2019] modafinil tablet 200 mg    promethazine tablet 25 mg    promethazine tablet 25 mg    ramelteon tablet 8 mg    senna-docusate 8.6-50 mg per tablet 1 tablet    sodium chloride 0.9% flush 10 mL    [START ON 6/10/2019] vancomycin 2 g in 0.9% sodium chloride 500 mL IVPB    vancomycin in dextrose 5 % 1 gram/250 mL IVPB 1,000 mg     Family History     Problem Relation (Age of Onset)    Cancer Father    Diabetes Mother    No Known Problems Sister, Brother, Maternal Aunt, Maternal Uncle, Paternal Aunt, Paternal Uncle, Maternal Grandmother, Maternal Grandfather, Paternal Grandmother, Paternal Grandfather        Tobacco Use    Smoking status: Never Smoker    Smokeless tobacco: Never Used   Substance and Sexual Activity    Alcohol use: No    Drug use: No    Sexual activity: Not on file     ROS   Constitutional: negative for fevers  Eyes: no visual changes  ENT: negative for hearing loss  Respiratory: negative for dyspnea  Cardiovascular: negative for chest pain  Gastrointestinal: negative for abdominal pain  Genitourinary: negative for dysuria  Neurological: negative for headaches  Behavioral/Psych: negative for hallucinations  Endocrine: negative for temperature intolerance    Objective:     Vital Signs (Most Recent):  Temp: 97.6 °F (36.4 °C) (06/09/19 2020)  Pulse: 68 (06/09/19 2020)  Resp: 18  "(06/09/19 2020)  BP: 131/70 (06/09/19 2020)  SpO2: 97 % (06/09/19 2020) Vital Signs (24h Range):  Temp:  [97.6 °F (36.4 °C)-99.2 °F (37.3 °C)] 97.6 °F (36.4 °C)  Pulse:  [66-90] 68  Resp:  [16-18] 18  SpO2:  [95 %-97 %] 97 %  BP: (110-131)/(60-73) 131/70     Weight: 131.5 kg (290 lb)  Height: 6' 1" (185.4 cm)  Body mass index is 38.26 kg/m².    No intake or output data in the 24 hours ending 06/09/19 2130    Ortho/SPM Exam  Physical Exam:  Gen:  No acute distress  CV:  Peripherally well-perfused.  Pulses 2+ bilaterally.  Lungs:  Normal respiratory effort.  Abdomen:  Soft, non-tender, non-distended  Head/Neck:  Normocephalic.  Atraumatic. No TTP, AROM and PROM intact without pain  Neuro:  CN intact without deficit, SILT throughout B/L Upper & Lower Extremities  Pelvis: No TTP, Stable to direct anterior pressure over ASIS.    LEFT UPPER EXTREMITY:     INSPECTION  - There is diffuse erythema to left dorsal forearm and elbow. No active drainage wounds or swelling noted  PALPATION  - No palpable fluctuance, mild fullness to right elbow.   RANGE OF MOTION  - AROM and PROM intact at the at the elbow, wrist with little pain  NEUROVASCULAR  - AIN/PIN/Radial/Median/Ulnar Nerves assessed in isolation without deficit  - SILT throughout  - Radial & Ulnar arteries palpated 2+  - Capillary Refill <3s     - DP and PT palpated  2+  - Capillary Refill <3s      Significant Labs: All pertinent labs within the past 24 hours have been reviewed.    Significant Imaging: I have reviewed and interpreted all pertinent imaging results/findings.   No acute bony abnormality    Assessment/Plan:     * Olecranon bursitis  Rajan Cooper III is a 63 y.o. male with olecranon bursitis left elbow, developing septic olecranon bursitis with surrounding cellulitis. No concern for septic joint. AF VSS at this time.     - Admitted to medicine on IV vanc and clinda. Will attempt non operative management at this time as clinical picture is consistent with " cellulitis/ olecranon bursitis and does not warrant operative intervention. If this does not improve with IVAB may require aspiration and or MRI to determine extent of fluid collection. Patient is dependent on elbows for ambulation due to his MS so will try and avoid posterior elbow incision at all costs as an olecranon bursectomy would put him at very high risk of continued wound drainage and breakdown. Will continue to follow. Placed in compressive dressing.       Cellulitis of left arm            Jose Lawrence MD  Orthopedics  [facility]

## 2019-06-10 NOTE — HPI
Mr. Cooper is a very nice 64 yo man with a history of left wrist abscess, HTN and multiple sclerosis.  In march of 2017 he developed MSSA bacteremia and had septic joints requiring washout to the left wrist and the right ankle.  He recently presented to his PCP's office on 6/7/19 (Friday) complaining of left forearm and elbow swelling.  Due to his MS he has to move around with his forearms resting on a cart to support himself.  He has developed quite prominent calluses on his elbows and post arms due to this. He has been taking cephalexin for several days without improvement prompting visit to ED and admission cellulitis treatment. Ortho consulted to r/o right septic elbow.

## 2019-06-10 NOTE — ASSESSMENT & PLAN NOTE
Rajan Cooper III is a 63 y.o. male with olecranon bursitis left elbow, developing septic olecranon bursitis with surrounding cellulitis. No concern for septic joint. AF VSS at this time.     - Admitted to medicine on IV vanc and clinda. Will attempt non operative management at this time as clinical picture is consistent with cellulitis/ olecranon bursitis and does not warrant operative intervention. If this does not improve with IVAB may require aspiration and or MRI to determine extent of fluid collection. Patient is dependent on elbows for ambulation due to his MS so will try and avoid posterior elbow incision at all costs as an olecranon bursectomy would put him at very high risk of continued wound drainage and breakdown. Will continue to follow. Placed in compressive dressing.

## 2019-06-10 NOTE — PLAN OF CARE
06/10/19 1433   Discharge Assessment   Assessment Type Discharge Planning Assessment   Confirmed/corrected address and phone number on facesheet? No   Assessment information obtained from? Patient;Medical Record   Expected Length of Stay (days) 3   Communicated expected length of stay with patient/caregiver no   Prior to hospitilization cognitive status: Alert/Oriented;No Deficits   Prior to hospitalization functional status: Assistive Equipment   Current cognitive status: Alert/Oriented;No Deficits   Current Functional Status: Assistive Equipment   Facility Arrived From: Home   Lives With spouse   Able to Return to Prior Arrangements yes   Is patient able to care for self after discharge? Yes   Who are your caregiver(s) and their phone number(s)? Dorothy Cooper (Spouse) [phone]   Patient's perception of discharge disposition home or selfcare   Readmission Within the Last 30 Days no previous admission in last 30 days   Patient currently being followed by outpatient case management? No   Patient currently receives any other outside agency services? No   Equipment Currently Used at Home rollator   Do you have any problems affording any of your prescribed medications? No   Is the patient taking medications as prescribed? yes   Does the patient have transportation home? Yes   Transportation Anticipated family or friend will provide   Dialysis Name and Scheduled days N/A   Does the patient receive services at the [facility]mad[facility]? No   Discharge Plan A Home   Discharge Plan B Home;Home Health   DME Needed Upon Discharge  none   Patient/Family in Agreement with Plan yes     CM to the Bedside at this time. The patient states he resides with his Spouse in a 1 Story Home without SUSAN. He endorses the use of a Rollator, that was present at the Bedside, for outside the Home and long distances. The patient was notified that the CM team would be following throughout this hospital admission for D/C needs and/or  recommendations. Current D/C plan will likely be Home with No Needs. Patient is aware and agrees. CM placed name and number on the board at the Bedside for the patient to call with D/C needs or questions. Understanding verbalized.     My Health Packet reviewed with the patient and left at the Bedside at the time of visit.

## 2019-06-11 PROBLEM — M70.20 OLECRANON BURSITIS: Status: RESOLVED | Noted: 2019-06-09 | Resolved: 2019-06-11

## 2019-06-11 LAB
ANION GAP SERPL CALC-SCNC: 9 MMOL/L (ref 8–16)
BASOPHILS # BLD AUTO: 0.03 K/UL (ref 0–0.2)
BASOPHILS NFR BLD: 0.4 % (ref 0–1.9)
BUN SERPL-MCNC: 12 MG/DL (ref 8–23)
CALCIUM SERPL-MCNC: 9 MG/DL (ref 8.7–10.5)
CHLORIDE SERPL-SCNC: 105 MMOL/L (ref 95–110)
CO2 SERPL-SCNC: 26 MMOL/L (ref 23–29)
CREAT SERPL-MCNC: 0.8 MG/DL (ref 0.5–1.4)
CRP SERPL-MCNC: 183.8 MG/L (ref 0–8.2)
DIFFERENTIAL METHOD: ABNORMAL
EOSINOPHIL # BLD AUTO: 0.2 K/UL (ref 0–0.5)
EOSINOPHIL NFR BLD: 2.6 % (ref 0–8)
ERYTHROCYTE [DISTWIDTH] IN BLOOD BY AUTOMATED COUNT: 13.9 % (ref 11.5–14.5)
EST. GFR  (AFRICAN AMERICAN): >60 ML/MIN/1.73 M^2
EST. GFR  (NON AFRICAN AMERICAN): >60 ML/MIN/1.73 M^2
GLUCOSE SERPL-MCNC: 100 MG/DL (ref 70–110)
HCT VFR BLD AUTO: 35.5 % (ref 40–54)
HGB BLD-MCNC: 11.4 G/DL (ref 14–18)
IMM GRANULOCYTES # BLD AUTO: 0.05 K/UL (ref 0–0.04)
IMM GRANULOCYTES NFR BLD AUTO: 0.6 % (ref 0–0.5)
LYMPHOCYTES # BLD AUTO: 1.3 K/UL (ref 1–4.8)
LYMPHOCYTES NFR BLD: 14.9 % (ref 18–48)
MAGNESIUM SERPL-MCNC: 2.1 MG/DL (ref 1.6–2.6)
MCH RBC QN AUTO: 31.1 PG (ref 27–31)
MCHC RBC AUTO-ENTMCNC: 32.1 G/DL (ref 32–36)
MCV RBC AUTO: 97 FL (ref 82–98)
MONOCYTES # BLD AUTO: 1 K/UL (ref 0.3–1)
MONOCYTES NFR BLD: 11.7 % (ref 4–15)
NEUTROPHILS # BLD AUTO: 6 K/UL (ref 1.8–7.7)
NEUTROPHILS NFR BLD: 69.8 % (ref 38–73)
NRBC BLD-RTO: 0 /100 WBC
PHOSPHATE SERPL-MCNC: 4.2 MG/DL (ref 2.7–4.5)
PLATELET # BLD AUTO: 232 K/UL (ref 150–350)
PMV BLD AUTO: 11 FL (ref 9.2–12.9)
POTASSIUM SERPL-SCNC: 3.7 MMOL/L (ref 3.5–5.1)
RBC # BLD AUTO: 3.67 M/UL (ref 4.6–6.2)
SODIUM SERPL-SCNC: 140 MMOL/L (ref 136–145)
VANCOMYCIN TROUGH SERPL-MCNC: 11.9 UG/ML (ref 10–22)
WBC # BLD AUTO: 8.53 K/UL (ref 3.9–12.7)

## 2019-06-11 PROCEDURE — 80202 ASSAY OF VANCOMYCIN: CPT

## 2019-06-11 PROCEDURE — 86140 C-REACTIVE PROTEIN: CPT

## 2019-06-11 PROCEDURE — 84100 ASSAY OF PHOSPHORUS: CPT

## 2019-06-11 PROCEDURE — 36415 COLL VENOUS BLD VENIPUNCTURE: CPT

## 2019-06-11 PROCEDURE — S0077 INJECTION, CLINDAMYCIN PHOSP: HCPCS | Performed by: INTERNAL MEDICINE

## 2019-06-11 PROCEDURE — 25000003 PHARM REV CODE 250: Performed by: INTERNAL MEDICINE

## 2019-06-11 PROCEDURE — 85025 COMPLETE CBC W/AUTO DIFF WBC: CPT

## 2019-06-11 PROCEDURE — 99233 SBSQ HOSP IP/OBS HIGH 50: CPT | Mod: ,,, | Performed by: HOSPITALIST

## 2019-06-11 PROCEDURE — 11000001 HC ACUTE MED/SURG PRIVATE ROOM

## 2019-06-11 PROCEDURE — 80048 BASIC METABOLIC PNL TOTAL CA: CPT

## 2019-06-11 PROCEDURE — 63600175 PHARM REV CODE 636 W HCPCS: Performed by: INTERNAL MEDICINE

## 2019-06-11 PROCEDURE — 97161 PT EVAL LOW COMPLEX 20 MIN: CPT

## 2019-06-11 PROCEDURE — 83735 ASSAY OF MAGNESIUM: CPT

## 2019-06-11 PROCEDURE — 99233 PR SUBSEQUENT HOSPITAL CARE,LEVL III: ICD-10-PCS | Mod: ,,, | Performed by: HOSPITALIST

## 2019-06-11 PROCEDURE — 97116 GAIT TRAINING THERAPY: CPT

## 2019-06-11 RX ADMIN — LOSARTAN POTASSIUM 50 MG: 25 TABLET, FILM COATED ORAL at 09:06

## 2019-06-11 RX ADMIN — Medication 2000 MG: at 06:06

## 2019-06-11 RX ADMIN — Medication 2000 MG: at 05:06

## 2019-06-11 RX ADMIN — AMLODIPINE BESYLATE 10 MG: 10 TABLET ORAL at 09:06

## 2019-06-11 RX ADMIN — BACLOFEN 10 MG: 10 TABLET ORAL at 02:06

## 2019-06-11 RX ADMIN — CLINDAMYCIN IN 5 PERCENT DEXTROSE 900 MG: 18 INJECTION, SOLUTION INTRAVENOUS at 10:06

## 2019-06-11 RX ADMIN — CLINDAMYCIN IN 5 PERCENT DEXTROSE 900 MG: 18 INJECTION, SOLUTION INTRAVENOUS at 05:06

## 2019-06-11 RX ADMIN — MODAFINIL 200 MG: 100 TABLET ORAL at 09:06

## 2019-06-11 RX ADMIN — FUROSEMIDE 40 MG: 40 TABLET ORAL at 05:06

## 2019-06-11 RX ADMIN — BACLOFEN 10 MG: 10 TABLET ORAL at 09:06

## 2019-06-11 RX ADMIN — ENOXAPARIN SODIUM 40 MG: 100 INJECTION SUBCUTANEOUS at 05:06

## 2019-06-11 RX ADMIN — FUROSEMIDE 40 MG: 40 TABLET ORAL at 09:06

## 2019-06-11 RX ADMIN — CLINDAMYCIN IN 5 PERCENT DEXTROSE 900 MG: 18 INJECTION, SOLUTION INTRAVENOUS at 02:06

## 2019-06-11 NOTE — PROGRESS NOTES
Pharmacokinetic Assessment Follow Up: IV Vancomycin    Vancomycin serum concentration assessment(s):    The trough level was drawned correctly and can be used to guide therapy at this time.    Vancomycin Regimen Plan:    Continue current regimen of Vancomycin 2000 mg IV every 12 hour with next serum trough concentration measured at 0530 prior to 5th dose on 6/13/2019.    Pharmacy will continue to follow and monitor vancomycin.    Please contact pharmacy at extension [phone] or [phone] for questions regarding this assessment.    Thank you for the consult,   Dejah Marcano     Patient brief summary:  Rajan Cooper III is a 63 y.o. male initiated on antimicrobial therapy with IV Vancomycin for treatment of suspected skin & soft tissue    The patient received 2000 mg x1 in the ED, followed by the current treatment regimen: Vancomycin 2000 mg IV every 12 hours    Drug Allergies:   Review of patient's allergies indicates:   Allergen Reactions    Norco [hydrocodone-acetaminophen] Anaphylaxis       Actual Body Weight:   131.5 kg    Renal Function:   Estimated Creatinine Clearance: 136.1 mL/min (based on SCr of 0.8 mg/dL).,     Dialysis Method (if applicable):  N/A    CBC (last 72 hours):  Recent Labs   Lab Result Units 06/09/19  1539 06/10/19  0402 06/11/19  0337   WBC K/uL 12.42 9.84 8.53   Hemoglobin g/dL 12.0* 11.5* 11.4*   Hemoglobin A1C %  --  5.9*  --    Hematocrit % 36.2* 35.5* 35.5*   Platelets K/uL 207 202 232   Gran% % 81.9* 76.4* 69.8   Lymph% % 8.0* 12.8* 14.9*   Mono% % 8.7 9.2 11.7   Eosinophil% % 0.9 1.0 2.6   Basophil% % 0.1 0.1 0.4   Differential Method  Automated Automated Automated       Metabolic Panel (last 72 hours):  Recent Labs   Lab Result Units 06/09/19  1539 06/10/19  0402 06/11/19  0337   Sodium mmol/L 140 141 140   Potassium mmol/L 4.0 3.6 3.7   Chloride mmol/L 105 103 105   CO2 mmol/L 25 28 26   Glucose mg/dL 102 120* 100   BUN, Bld mg/dL 16 17 12   Creatinine mg/dL 0.9 0.8 0.8   Albumin g/dL 3.2*   --   --    Total Bilirubin mg/dL 0.7  --   --    Alkaline Phosphatase U/L 100  --   --    AST U/L 26  --   --    ALT U/L 28  --   --    Magnesium mg/dL  --  2.2 2.1   Phosphorus mg/dL  --  3.4 4.2       Vancomycin Administrations:  vancomycin given in the last 96 hours                   vancomycin 2 g in 0.9% sodium chloride 500 mL IVPB (mg) 2,000 mg New Bag 06/11/19 0601     2,000 mg New Bag 06/10/19 1751     2,000 mg New Bag  0625    vancomycin 2 g in 0.9% sodium chloride 500 mL IVPB (mg) 2,000 mg New Bag 06/09/19 1759                Drug levels (last 3 results):  Recent Labs   Lab Result Units 06/11/19  0558   Vancomycin-Trough ug/mL 11.9       Microbiologic Results:  Microbiology Results (last 7 days)     Procedure Component Value Units Date/Time    Blood culture #1 **CANNOT BE ORDERED STAT** [[id number]] Collected:  06/09/19 1539    Order Status:  Completed Specimen:  Blood from Peripheral, Hand, Right Updated:  06/10/19 1622     Blood Culture, Routine No Growth to date     Blood Culture, Routine No Growth to date    Blood culture #2 **CANNOT BE ORDERED STAT** [[id number]] Collected:  06/09/19 1547    Order Status:  Completed Specimen:  Blood from Peripheral, Antecubital, Right Updated:  06/10/19 1622     Blood Culture, Routine No Growth to date     Blood Culture, Routine No Growth to date

## 2019-06-11 NOTE — SUBJECTIVE & OBJECTIVE
"Principal Problem:Cellulitis of left arm    Principal Orthopedic Problem: Same    Interval History: Patient seen and examined at bedside.  No acute events overnight.  Pain is improving.  Erythema slightly improved still no pain with ROM of the elbow and no pain over olecranon bursa.    Review of patient's allergies indicates:   Allergen Reactions    Norco [hydrocodone-acetaminophen] Anaphylaxis       Current Facility-Administered Medications   Medication    acetaminophen tablet 650 mg    amLODIPine tablet 10 mg    baclofen tablet 10 mg    clindamycin 900 MG/50 ML D5W 900 mg/50 mL IVPB 900 mg    dextrose 10% (D10W) Bolus    dextrose 10% (D10W) Bolus    enoxaparin injection 40 mg    furosemide tablet 40 mg    glucagon (human recombinant) injection 1 mg    glucose chewable tablet 16 g    glucose chewable tablet 24 g    ibuprofen tablet 200 mg    levETIRAcetam tablet 500 mg    losartan tablet 50 mg    modafinil tablet 200 mg    oxyCODONE-acetaminophen 5-325 mg per tablet 1 tablet    oxyCODONE-acetaminophen 5-325 mg per tablet 2 tablet    promethazine tablet 25 mg    promethazine tablet 25 mg    ramelteon tablet 8 mg    senna-docusate 8.6-50 mg per tablet 1 tablet    sodium chloride 0.9% flush 10 mL    vancomycin 2 g in 0.9% sodium chloride 500 mL IVPB     Objective:     Vital Signs (Most Recent):  Temp: 97.3 °F (36.3 °C) (06/11/19 0429)  Pulse: 64 (06/11/19 0429)  Resp: 16 (06/11/19 0429)  BP: (!) 145/74 (06/11/19 0429)  SpO2: (!) 94 % (06/11/19 0429) Vital Signs (24h Range):  Temp:  [97.2 °F (36.2 °C)-98.3 °F (36.8 °C)] 97.3 °F (36.3 °C)  Pulse:  [64-72] 64  Resp:  [16] 16  SpO2:  [93 %-96 %] 94 %  BP: (141-158)/(71-88) 145/74     Weight: 131.5 kg (290 lb)  Height: 6' 1" (185.4 cm)  Body mass index is 38.26 kg/m².      Intake/Output Summary (Last 24 hours) at 6/11/2019 0701  Last data filed at 6/10/2019 0800  Gross per 24 hour   Intake 180 ml   Output --   Net 180 ml       Ortho/SPM " Exam    Physical Exam:  NAD, A/O x 3.  Erythema over forearm with swelling, no pain with ROM of the elbow,  No ttp over olecranon bursa  No focal motor or sensory deficits noted.  \      Significant Labs:   CBC:   Recent Labs   Lab 06/09/19  1539 06/10/19  0402 06/11/19  0337   WBC 12.42 9.84 8.53   HGB 12.0* 11.5* 11.4*   HCT 36.2* 35.5* 35.5*    202 232     CRP:   Recent Labs   Lab 06/09/19  2026 06/11/19  0337   .8* 183.8*     All pertinent labs within the past 24 hours have been reviewed.    Significant Imaging: I have reviewed and interpreted all pertinent imaging results/findings.

## 2019-06-11 NOTE — ASSESSMENT & PLAN NOTE
Rajan Cooper III is a 63 y.o. male with cellulitis of the left arm.      -Pain is improving.  Erythema slightly improved.  WBC and CRP are both downtrending.  No pain with ROM of the elbow or ttp over olecranon so not concerned for olecranon bursitis or septic arthritis.  Will continue to follow

## 2019-06-11 NOTE — PROGRESS NOTES
Hospital Medicine  Progress note    [place] [facility] [place]on Brian MD  Admit Date: 6/9/2019  REGINALD 6/12/2019  Length of Stay:  LOS: 1 day   Code status: Full Code    Principal Problem:  Olecranon bursitis    Overview:    Interval hx: Patient seen and examined at bedside, admitted last night for cellulitis and IV antibiotics     ROS     Constitutional: Positive for fatigue. Negative for chills and fever.   HENT: Negative for congestion.    Eyes: Negative for itching.   Respiratory: Negative for cough and shortness of breath.    Cardiovascular: Negative for chest pain.   Gastrointestinal: Negative for abdominal pain, constipation, diarrhea, nausea and vomiting.   Endocrine: Negative for cold intolerance.   Genitourinary: Positive for difficulty urinating.   Musculoskeletal: Positive for gait problem.   Skin: Positive for color change, rash and wound.   Allergic/Immunologic: Positive for immunocompromised state.   Neurological: Positive for weakness and numbness. Negative for dizziness.   Hematological: Does not bruise/bleed easily.   Psychiatric/Behavioral: Negative for confusion. The patient is not nervous/anxious.      PEx  Temp:  [97.1 °F (36.2 °C)-98.3 °F (36.8 °C)]   Pulse:  [68-72]   Resp:  [16-18]   BP: (117-158)/(57-88)   SpO2:  [93 %-96 %]     Intake/Output Summary (Last 24 hours) at 6/10/2019 2030  Last data filed at 6/10/2019 0800  Gross per 24 hour   Intake 530 ml   Output --   Net 530 ml     Constitutional: He is oriented to person, place, and time. He appears well-developed and well-nourished.  Non-toxic appearance. He does not have a sickly appearance. He does not appear ill. No distress.   HENT:   Head: Normocephalic and atraumatic.   Nose: Nose normal.   Mouth/Throat: Oropharynx is clear and moist. No oropharyngeal exudate.   Eyes: Pupils are equal, round, and reactive to light. Conjunctivae and EOM are normal. Right eye exhibits no discharge. Left eye exhibits no discharge. No scleral icterus.    Neck: Normal range of motion. Neck supple. No JVD present. No tracheal deviation present. No thyromegaly present.   Cardiovascular: Normal rate, regular rhythm, normal heart sounds and intact distal pulses. Exam reveals no gallop and no friction rub.   No murmur heard.  Pulmonary/Chest: Effort normal and breath sounds normal. No stridor. No respiratory distress. He has no wheezes. He has no rales. He exhibits no tenderness.   Abdominal: Soft. Bowel sounds are normal. He exhibits no distension and no mass. There is no tenderness. There is no rebound and no guarding.   Genitourinary:   Genitourinary Comments: No read in place   Musculoskeletal: Normal range of motion. He exhibits no edema or tenderness.   Lymphadenopathy:     He has no cervical adenopathy.   1+ Edema to legs   Neurological: He is alert and oriented to person, place, and time. He displays normal reflexes. A sensory deficit is present. No cranial nerve deficit. He exhibits normal muscle tone. Coordination and gait abnormal. GCS eye subscore is 4. GCS verbal subscore is 5. GCS motor subscore is 6.   Sensory deficits to legs   Skin: Skin is warm and dry. No rash noted. No erythema. No pallor.   Left forearm with erythema from proximal wrist to elbow and somewhat to ventral area of distal brachial arm.  There is swelling to the olecranon bursa with enlarged callus to the elbow and proximal dorsal forearm area.  Both the left wrist and elbow are freely mobile with good ROM with no pain.   Psychiatric: He has a normal mood and affect. His speech is normal and behavior is normal. Judgment and thought content normal. Cognition and memory are normal.   Recent Labs   Lab 06/07/19  1547 06/09/19  1539 06/10/19  0402   WBC 13.63* 12.42 9.84   HGB 13.0* 12.0* 11.5*   HCT 40.5 36.2* 35.5*    207 202     Recent Labs   Lab 06/07/19  1547 06/09/19  1539 06/10/19  0402    140 141   K 4.0 4.0 3.6    105 103   CO2 29 25 28   BUN 25* 16 17    CREATININE 1.2 0.9 0.8   GLU 96 102 120*   CALCIUM 9.2 9.4 9.1   MG  --   --  2.2   PHOS  --   --  3.4     Recent Labs   Lab 06/07/19  1547 06/09/19  1539   ALKPHOS 75 100   ALT 19 28   AST 20 26   ALBUMIN 3.8 3.2*   PROT 7.4 7.1   BILITOT 1.3* 0.7        No results for input(s): CPK, CPKMB, MB, TROPONINI in the last 72 hours.  No results for input(s): POCTGLUCOSE in the last 168 hours.  Hemoglobin A1C   Date Value Ref Range Status   06/10/2019 5.9 (H) 4.0 - 5.6 % Final     Comment:     ADA Screening Guidelines:  5.7-6.4%  Consistent with prediabetes  >or=6.5%  Consistent with diabetes  High levels of fetal hemoglobin interfere with the HbA1C  assay. Heterozygous hemoglobin variants (HbS, HgC, etc)do  not significantly interfere with this assay.   However, presence of multiple variants may affect accuracy.     12/22/2014 6.1 4.5 - 6.2 % Final       Scheduled Meds:   amLODIPine  10 mg Oral Daily    baclofen  10 mg Oral TID    clindamycin (CLEOCIN) IVPB  900 mg Intravenous Q8H    enoxaparin  40 mg Subcutaneous Daily    furosemide  40 mg Oral BID    levETIRAcetam  500 mg Oral Nightly    losartan  50 mg Oral Daily    modafinil  200 mg Oral Daily    vancomycin (VANCOCIN) IVPB  2,000 mg Intravenous Q12H     Continuous Infusions:  As Needed:  acetaminophen, Dextrose 10% Bolus, Dextrose 10% Bolus, glucagon (human recombinant), glucose, glucose, ibuprofen, oxyCODONE-acetaminophen, oxyCODONE-acetaminophen, promethazine, promethazine, ramelteon, senna-docusate 8.6-50 mg, sodium chloride 0.9%    Active Hospital Problems    Diagnosis  POA    *Olecranon bursitis [M70.20]  Yes    Cellulitis of left arm [L03.114]  Yes    PVD (peripheral vascular disease) [I73.9]  Yes    HTN (hypertension) [I10]  Yes    MS (multiple sclerosis) [G35]  Yes      Resolved Hospital Problems   No resolved problems to display.         Assessment and Plan    Cellulitis of the left arm  -vancomycin 2 g IVPB, 2,000 mg, Intravenous, ED 1 Time,  2,000 mg at 06/09/19 1759              -Pharmacy consulted to continue Vanco at next proper dose and frequency  -clindamycin 900 MG/50 ML D5W 900 mg/50 mL IVPB 900 mg, 900 mg, Intravenous, Q8H              -Consider discontinuation of Clinda if ASO negative  -acetaminophen tablet 650 mg, 650 mg, Oral, Q4H PRN fever, mild pain  -No signs or symptoms of elbow or wrist joint involvement, but monitor closely  -More worrisome is possible involvement of the olecranon bursa  -Check Xray of wrist and elbow on the left  -Ortho consult  - CRP :213     Multiple sclerosis  -baclofen tablet 10 mg, 10 mg, Oral, TID  -modafinil tablet 200 mg, 200 mg, Oral, Daily  -Home med: AVONEX 30 mcg/0.5 mL injection, INJECT 30 MCG INTO THE MUSCLE ONCE A WEEK  -PT and OT consult     Seizure disorder  -levETIRAcetam tablet 500 mg, 500 mg, Oral, Nightly     Essential hypertension  -furosemide tablet 40 mg, 40 mg, Oral, BID  -amLODIPine tablet 10 mg, 10 mg, Oral, Daily  -losartan tablet 50 mg, 50 mg, Oral, Daily     Normocytic anemia  -Stable to monitor as outpatient    High Risk Conditions  Multiple sclerosis, HTN     Diet: Low sodium diet   GI PPx:   DVT PPx:    Anticoagulants   Medication Route Frequency    enoxaparin injection 40 mg Subcutaneous Daily         Goals of Care: Full code  Discharge plan: pending clinical improvement     Time (minutes) spent in care of the patient (Greater than 1/2 spent in direct face-to-face contact) 35 minutes    Trenton Gore MD  Staff Hospitalist  Department of Hospital Medicine  [facility]   [phone]

## 2019-06-11 NOTE — PT/OT/SLP DISCHARGE
Physical Therapy Discharge Summary    Name: Rajan Cooper III  MRN: [id number]   Principal Problem: Cellulitis of left arm     Patient Discharged from acute Physical Therapy on 6/11/19.  Please refer to prior PT noted date on 6/11/19 for functional status.     Assessment:     Patient has met all goals and is not appropriate for therapy.    Objective:     GOALS:   Multidisciplinary Problems     Physical Therapy Goals     Not on file          Multidisciplinary Problems (Resolved)        Problem: Physical Therapy Goal    Goal Priority Disciplines Outcome Goal Variances Interventions   Physical Therapy Goal   (Resolved)     PT, PT/OT Outcome(s) achieved                     Reasons for Discontinuation of Therapy Services  Satisfactory goal achievement.      Plan:     Patient Discharged to: Home with Home Health Service.    Sagar Mcleod, PT  6/11/2019

## 2019-06-11 NOTE — PLAN OF CARE
Problem: Physical Therapy Goal  Goal: Physical Therapy Goal  Outcome: Outcome(s) achieved Date Met: 06/11/19  Patient exhibited safe functional mobility.  Patient has gait instability and a balance issue related to his history of MS but he is at baseline.  Patient safe to return home and walk with supervision.    TITA Majano  6/11/2019

## 2019-06-11 NOTE — PROGRESS NOTES
"Hospital Medicine  Progress note    Team: [facility] [place] [facility] B Stefan Mcginnis MD  Admit Date: 6/9/2019  REGINALD 6/12/2019  Length of Stay:  LOS: 2 days   Code status: Full Code    Principal Problem:  Cellulitis of left arm    Overview:   Mr. Cooper is a very nice 64 yo man with a history of left wrist abscess, HTN and multiple sclerosis.  In march of 2017 he developed MSSA bacteremia and had septic joints requiring washout to the left wrist and the right ankle.  He recently presented to his PCP's office on 6/7/19 (Friday) complaining of left forearm and elbow swelling.  Due to his MS (he still works!) he has to move around with his forearms resting on a cart to support himself.  He has developed quite prominent calluses on his elbows and post arms due to this.  His PCP, Dr. Garcia, examined him and noted, "Presently is not developed any fevers or chills.  His wife thinks his MS maybe acting up slightly which could be a sign of the infection.  We will monitor this in do some labs in probably treat for infection although his may be a elbow bursitis inflammation as well."  He was ultimately diagnosed with olecranon bursitis and forearm cellulitis and started on cephALEXin (KEFLEX) 500 MG capsule; Take 1 capsule (500 mg total) by mouth every 8 (eight) hours.     He took the Keflex as prescribed, but has seen worsening swelling and redness to the left forearm, no beginning two spread to the distal upper arm.  He denies eloina left wrist or elbow pain with movement to suggest septic arthritis.  He has had no fever or chills.  Today the area around the elbow rapidly enlarged and got red, prompting him to return to the ED.         Interval hx:s/p ortho eval -  Erythema  improved, reports no pain with ROM of the elbow and no pain over olecranon bursa.    ROS     Constitutional: Positive for fatigue. Negative for chills and fever.   HENT: Negative for congestion.    Eyes: Negative for itching.   Respiratory: Negative for cough and " shortness of breath.    Cardiovascular: Negative for chest pain.   Gastrointestinal: Negative for abdominal pain, constipation, diarrhea, nausea and vomiting.   Endocrine: Negative for cold intolerance.   Genitourinary: Positive for difficulty urinating.   Musculoskeletal: Positive for gait problem.   Skin: Positive for color change, rash and wound.   Allergic/Immunologic: Positive for immunocompromised state.   Neurological: Positive for weakness and numbness. Negative for dizziness.   Hematological: Does not bruise/bleed easily.   Psychiatric/Behavioral: Negative for confusion. The patient is not nervous/anxious.      PEx  Temp:  [97.2 °F (36.2 °C)-98.5 °F (36.9 °C)]   Pulse:  [64-72]   Resp:  [16-18]   BP: (137-157)/(70-85)   SpO2:  [94 %-96 %]   No intake or output data in the 24 hours ending 06/11/19 9197  Constitutional: He is oriented to person, place, and time. He appears well-developed and well-nourished.  Non-toxic appearance. He does not have a sickly appearance. He does not appear ill. No distress.   HENT:   Head: Normocephalic and atraumatic.   Nose: Nose normal.   Mouth/Throat: Oropharynx is clear and moist. No oropharyngeal exudate.   Eyes: Pupils are equal, round, and reactive to light. Conjunctivae and EOM are normal. Right eye exhibits no discharge. Left eye exhibits no discharge. No scleral icterus.   Neck: Normal range of motion. Neck supple. No JVD present. No tracheal deviation present. No thyromegaly present.   Cardiovascular: Normal rate, regular rhythm, normal heart sounds and intact distal pulses. Exam reveals no gallop and no friction rub.   No murmur heard.  Pulmonary/Chest: Effort normal and breath sounds normal. No stridor. No respiratory distress. He has no wheezes. He has no rales. He exhibits no tenderness.   Abdominal: Soft. Bowel sounds are normal. He exhibits no distension and no mass. There is no tenderness. There is no rebound and no guarding.   Genitourinary:   Genitourinary  Comments: No read in place   Musculoskeletal: Normal range of motion. He exhibits no edema or tenderness.   Lymphadenopathy:     He has no cervical adenopathy.   1+ Edema to legs   Neurological: He is alert and oriented to person, place, and time. He displays normal reflexes. A sensory deficit is present. No cranial nerve deficit. He exhibits normal muscle tone. Coordination and gait abnormal. GCS eye subscore is 4. GCS verbal subscore is 5. GCS motor subscore is 6.   Sensory deficits to legs   Skin: Skin is warm and dry. No rash noted. No erythema. No pallor.   Left forearm with erythema from proximal wrist to elbow and somewhat to ventral area of distal brachial arm.  There is swelling to the olecranon bursa with enlarged callus to the elbow and proximal dorsal forearm area.  Both the left wrist and elbow are freely mobile with good ROM with no pain.   Psychiatric: He has a normal mood and affect. His speech is normal and behavior is normal. Judgment and thought content normal. Cognition and memory are normal.   Recent Labs   Lab 06/09/19  1539 06/10/19  0402 06/11/19  0337   WBC 12.42 9.84 8.53   HGB 12.0* 11.5* 11.4*   HCT 36.2* 35.5* 35.5*    202 232     Recent Labs   Lab 06/09/19  1539 06/10/19  0402 06/11/19  0337    141 140   K 4.0 3.6 3.7    103 105   CO2 25 28 26   BUN 16 17 12   CREATININE 0.9 0.8 0.8    120* 100   CALCIUM 9.4 9.1 9.0   MG  --  2.2 2.1   PHOS  --  3.4 4.2     Recent Labs   Lab 06/07/19  1547 06/09/19  1539   ALKPHOS 75 100   ALT 19 28   AST 20 26   ALBUMIN 3.8 3.2*   PROT 7.4 7.1   BILITOT 1.3* 0.7        No results for input(s): CPK, CPKMB, MB, TROPONINI in the last 72 hours.  No results for input(s): POCTGLUCOSE in the last 168 hours.  Hemoglobin A1C   Date Value Ref Range Status   06/10/2019 5.9 (H) 4.0 - 5.6 % Final     Comment:     ADA Screening Guidelines:  5.7-6.4%  Consistent with prediabetes  >or=6.5%  Consistent with diabetes  High levels of fetal  hemoglobin interfere with the HbA1C  assay. Heterozygous hemoglobin variants (HbS, HgC, etc)do  not significantly interfere with this assay.   However, presence of multiple variants may affect accuracy.     12/22/2014 6.1 4.5 - 6.2 % Final       Scheduled Meds:   amLODIPine  10 mg Oral Daily    baclofen  10 mg Oral TID    clindamycin (CLEOCIN) IVPB  900 mg Intravenous Q8H    enoxaparin  40 mg Subcutaneous Daily    furosemide  40 mg Oral BID    levETIRAcetam  500 mg Oral Nightly    losartan  50 mg Oral Daily    modafinil  200 mg Oral Daily    vancomycin (VANCOCIN) IVPB  2,000 mg Intravenous Q12H     Continuous Infusions:  As Needed:  acetaminophen, Dextrose 10% Bolus, Dextrose 10% Bolus, glucagon (human recombinant), glucose, glucose, ibuprofen, oxyCODONE-acetaminophen, oxyCODONE-acetaminophen, promethazine, promethazine, ramelteon, senna-docusate 8.6-50 mg, sodium chloride 0.9%    Active Hospital Problems    Diagnosis  POA    *Cellulitis of left arm [L03.114]  Yes    PVD (peripheral vascular disease) [I73.9]  Yes    HTN (hypertension) [I10]  Yes    MS (multiple sclerosis) [G35]  Yes      Resolved Hospital Problems    Diagnosis Date Resolved POA    Olecranon bursitis [M70.20] 06/11/2019 Yes         Assessment and Plan    Cellulitis of the left arm  -vancomycin 2 g IVPB, 2,000 mg, Intravenous, ED 1 Time, 2,000 mg at 06/09/19 1759              -Pharmacy consulted to continue Vanco at next proper dose and frequency (monitor for toxicity)  -clindamycin 900 MG/50 ML D5W 900 mg/50 mL IVPB 900 mg, 900 mg, Intravenous, Q8H              -Consider discontinuation of Clinda if ASO negative  -acetaminophen tablet 650 mg, 650 mg, Oral, Q4H PRN fever, mild pain  -No signs or symptoms of elbow or wrist joint involvement, but monitor closely  -More worrisome is possible involvement of the olecranon bursa  -Check Xray of wrist and elbow on the left  -Ortho consult - WBC and CRP are both downtrending.  No pain with ROM  of the elbow or ttp over olecranon so not concerned for olecranon bursitis or septic arthritis.  - CRP :213-->83     Multiple sclerosis  -baclofen tablet 10 mg, 10 mg, Oral, TID  -modafinil tablet 200 mg, 200 mg, Oral, Daily  -Home med: AVONEX 30 mcg/0.5 mL injection, INJECT 30 MCG INTO THE MUSCLE ONCE A WEEK  -PT and OT consult     Seizure disorder  -levETIRAcetam tablet 500 mg, 500 mg, Oral, Nightly     Essential hypertension  -furosemide tablet 40 mg, 40 mg, Oral, BID  -amLODIPine tablet 10 mg, 10 mg, Oral, Daily  -losartan tablet 50 mg, 50 mg, Oral, Daily     Normocytic anemia  -Stable to monitor as outpatient    High Risk Conditions  Multiple sclerosis, HTN     Diet: Low sodium diet   GI PPx:   DVT PPx:    Anticoagulants   Medication Route Frequency    enoxaparin injection 40 mg Subcutaneous Daily         Goals of Care: Full code  Discharge plan: pending clinical improvement     Stefan Mcginnis MD  Attending Staff Physician  [facility] Medicine  pager- [phone]  Spectralqpk - [phone]

## 2019-06-11 NOTE — PLAN OF CARE
SW following for dc needs.Post acute needs to be determined.           Katarzyna Ndiaye LMSW  [facility]   [id number]

## 2019-06-11 NOTE — PT/OT/SLP EVAL
"Physical Therapy Evaluation and Discharge Note    Patient Name:  Rajan Cooper III   MRN:  [id number]    Recommendations:     Discharge Recommendations:  home health PT   Discharge Equipment Recommendations: none   Barriers to discharge: None    Assessment:     Rajan Cooper III is a 63 y.o. male admitted with a medical diagnosis of Cellulitis of left arm.  Patient exhibited minor balance deficits that were due to his history of MS.  Patient is currently ambulating at baseline.  Patient mentioned he will change one habit he has at work and will no longer lean over the buggy that he supports himself on.  Patient is safe to return home and walk with supervision.  At this time, patient is functioning at their prior level of function and does not require further acute PT services.     Recent Surgery: * No surgery found *      Plan:     During this hospitalization, patient does not require further acute PT services.  Please re-consult if situation changes.      Subjective     Chief Complaint: cellulitis in arm  Patient/Family Comments/goals: "I need to put arms on my work buggy so that I'm not putting pressure on my elbows"  Pain/Comfort:  · Pain Rating Post-Intervention 1: 0/10    Patients cultural, spiritual, Congregation conflicts given the current situation:      Living Environment:  Patient lives with his wife in a single story home with 1 SUSAN.  Patient's bathroom has a tub/shower.  Prior to admission, patients level of function was modified independent.  Patient uses a rollator for walking long distances or when he feels symptoms of MS.  At work, patient uses a buggy to stabilize himself by leaning over it with his weight on his elbows.  Equipment used at home: none, rollator.  DME owned (not currently used): power scooters.  Upon discharge, patient will have assistance from wife.    Objective:     Communicated with nursing prior to session.  Patient found up in chair with   upon PT entry to room.    General Precautions: " Standard, fall   Orthopedic Precautions:N/A   Braces: N/A     Exams:  · Fine Motor Coordination:    · -       Intact  · Sensation:    · -       Decreased sensation in feet.  Patient states he could not feel soft touch on left foot.  · RLE ROM: WNL  · RLE Strength: WNL  · LLE ROM: WNL  · LLE Strength: WNL    Functional Mobility:  · Transfers:     · Sit to Stand:  independence with no AD  · Gait: ~400 feet with stand by assistance  · Balance: ECHEVERRIA balance test performed, noted impairments due to history of MS  · Stairs:  Pt ascended/descended 5 stair(s) with No Assistive Device with bilateral handrails with Independent.     AM-PAC 6 CLICK MOBILITY  Total Score:24       Therapeutic Activities and Exercises:  - Pt ambulated ~400 feet with stand by assistance.  Patient ambulated with a waddling gait and notable decreased plantar flexion.  Patient appeared to walk without putting much weight through his forefoot which could be due to decreased sensation as noted in the sensation exam.  Patient exhibited no LOB or SOB.  PT followed behind with rollator in case patient became unsteady but it was not needed.  - Pt ascended/descended 5 steps with no difficulty    ECHEVERRIA BALANCE SCALE   1.SITTING TO STANDING:  (4) Pt able to stand without using hands and stabilize independently   2. STANDING UNSUPPORTED: (4) Pt able to stand safely 2 minutes   3. SITTING WITH BACK UNSUPPORTED BUT FEET SUPPORTED ON FLOOR: (4) Pt able to sit safely and securely 2 minutes   4. STANDING TO SITTING:(4) Pt sits safely with minimal use of hands   5. TRANSFERS:(4) Pt  able to transfer safely with minor use of hands  6. STANDING UNSUPPORTED WITH EYES CLOSED:(3) Pt able to stand 10 seconds with supervision  7. STANDING UNSUPPORTED WITH FEET TOGETHER:(3) Pt able to place feet together independently and stand for 1 minute with supervision  8. REACHING FORWARD WITH OUTSTRETCHED ARM WHILE STANDING:(4) Pt  4 can reach forward confidently >25 cm (10 inches)  9.   OBJECT FROM THE FLOOR FROM A STANDING POSITION:(4) Pt  able to  slipper safely and easily  10. TURNING TO LOOK BEHIND OVER LEFT AND RIGHT SHOULDERS WHILE STANDING:(4) Pt looks behind from both sides and weight shifts well  11. TURN 360 DEGREES:(4) Pt able to turn 360 degrees safely in 4 seconds or less  12. PLACING ALTERNATE FOOT ON STEP OR STOOL WHILE STANDING UNSUPPORTED:(4) Pt able to stand independently and safely and complete 8 steps in 20 seconds  13. STANDING UNSUPPORTED ONE FOOT IN FRONT: (3) Pt able to place foot ahead of other independently and hold 30 seconds  14. STANDING ON ONE LEG:(1) Pt tries to lift leg unable to hold 3 seconds but remains standing independently    Total Score 50/56    41-56 = low fall risk  21-40 = medium fall risk  0 -20 = high fall risk      AM-PAC 6 CLICK MOBILITY  Total Score:24     Patient left up in chair with call button in reach.    GOALS:   Multidisciplinary Problems     Physical Therapy Goals     Not on file          Multidisciplinary Problems (Resolved)        Problem: Physical Therapy Goal    Goal Priority Disciplines Outcome Goal Variances Interventions   Physical Therapy Goal   (Resolved)     PT, PT/OT Outcome(s) achieved                     History:     Past Medical History:   Diagnosis Date    Hypertension     MS (multiple sclerosis)     Staph aureus infection 2017       Past Surgical History:   Procedure Laterality Date    ARTHROTOMY-ANKLE Right 3/10/2017    Performed by [facility]    INCISION AND DRAINAGE (I&D) left wrist abscess, left wrist arthrotomy with irrigation and debridement, supine, hand table, hand set 1 and 2 Left 3/4/2017    Performed by Kunal [facility]ley MD at [facility]    INCISION AND DRAINAGE (I&D), ABSCESS Right 3/10/2017    [facility]       Time Tracking:     PT Received On: 06/11/19  PT Start Time: 0923     PT Stop Time: 0948  PT Total Time (min): 25  min     Billable Minutes: Evaluation 12 minutes and Gait Training 13 minutes      Tom Murcia, TITA  06/11/2019

## 2019-06-11 NOTE — PROGRESS NOTES
[facility]  Orthopedics  Progress Note    Patient Name: Rajan Cooper III  MRN: [id number]  Admission Date: 6/9/2019  Hospital Length of Stay: 2 days  Attending Provider: Stefan Mcginnis MD  Primary Care Provider: oTm Garcia MD    Subjective:     Principal Problem:Cellulitis of left arm    Principal Orthopedic Problem: Same    Interval History: Patient seen and examined at bedside.  No acute events overnight.  Pain is improving.  Erythema slightly improved still no pain with ROM of the elbow and no pain over olecranon bursa.    Review of patient's allergies indicates:   Allergen Reactions    Norco [hydrocodone-acetaminophen] Anaphylaxis       Current Facility-Administered Medications   Medication    acetaminophen tablet 650 mg    amLODIPine tablet 10 mg    baclofen tablet 10 mg    clindamycin 900 MG/50 ML D5W 900 mg/50 mL IVPB 900 mg    dextrose 10% (D10W) Bolus    dextrose 10% (D10W) Bolus    enoxaparin injection 40 mg    furosemide tablet 40 mg    glucagon (human recombinant) injection 1 mg    glucose chewable tablet 16 g    glucose chewable tablet 24 g    ibuprofen tablet 200 mg    levETIRAcetam tablet 500 mg    losartan tablet 50 mg    modafinil tablet 200 mg    oxyCODONE-acetaminophen 5-325 mg per tablet 1 tablet    oxyCODONE-acetaminophen 5-325 mg per tablet 2 tablet    promethazine tablet 25 mg    promethazine tablet 25 mg    ramelteon tablet 8 mg    senna-docusate 8.6-50 mg per tablet 1 tablet    sodium chloride 0.9% flush 10 mL    vancomycin 2 g in 0.9% sodium chloride 500 mL IVPB     Objective:     Vital Signs (Most Recent):  Temp: 97.3 °F (36.3 °C) (06/11/19 0429)  Pulse: 64 (06/11/19 0429)  Resp: 16 (06/11/19 0429)  BP: (!) 145/74 (06/11/19 0429)  SpO2: (!) 94 % (06/11/19 0429) Vital Signs (24h Range):  Temp:  [97.2 °F (36.2 °C)-98.3 °F (36.8 °C)] 97.3 °F (36.3 °C)  Pulse:  [64-72] 64  Resp:  [16] 16  SpO2:  [93 %-96 %] 94 %  BP: (141-158)/(71-88) 145/74  "    Weight: 131.5 kg (290 lb)  Height: 6' 1" (185.4 cm)  Body mass index is 38.26 kg/m².      Intake/Output Summary (Last 24 hours) at 6/11/2019 0701  Last data filed at 6/10/2019 0800  Gross per 24 hour   Intake 180 ml   Output --   Net 180 ml       Ortho/SPM Exam    Physical Exam:  NAD, A/O x 3.  Erythema over forearm with swelling, no pain with ROM of the elbow,  No ttp over olecranon bursa  No focal motor or sensory deficits noted.  \      Significant Labs:   CBC:   Recent Labs   Lab 06/09/19  1539 06/10/19  0402 06/11/19  0337   WBC 12.42 9.84 8.53   HGB 12.0* 11.5* 11.4*   HCT 36.2* 35.5* 35.5*    202 232     CRP:   Recent Labs   Lab 06/09/19  2026 06/11/19  0337   .8* 183.8*     All pertinent labs within the past 24 hours have been reviewed.    Significant Imaging: I have reviewed and interpreted all pertinent imaging results/findings.    Assessment/Plan:     * Cellulitis of left arm  Rajan Cooper III is a 63 y.o. male with cellulitis of the left arm.      -Pain is improving.  Erythema slightly improved.  WBC and CRP are both downtrending.  No pain with ROM of the elbow or ttp over olecranon so not concerned for olecranon bursitis or septic arthritis.  Will continue to follow            Jose Beck MD  Orthopedics  [facility]  "

## 2019-06-11 NOTE — PLAN OF CARE
06/11/19 1627   Post-Acute Status   Post-Acute Authorization Placement   Post-Acute Placement Status Referrals Sent     SW met with patient at bedside to discuss the discharge plan in regard to home health and he is requesting [email or web address].SW sent the referral via  and will follow up. Patient expected to discharge tomorrow.    Katarzyna Ndiaye LMSW  [facility]   [id number]

## 2019-06-12 ENCOUNTER — TELEPHONE (OUTPATIENT)
Dept: INTERNAL MEDICINE | Facility: CLINIC | Age: 64
End: 2019-06-12

## 2019-06-12 VITALS
HEIGHT: 73 IN | HEART RATE: 72 BPM | RESPIRATION RATE: 18 BRPM | DIASTOLIC BLOOD PRESSURE: 83 MMHG | TEMPERATURE: 98 F | WEIGHT: 290 LBS | OXYGEN SATURATION: 97 % | SYSTOLIC BLOOD PRESSURE: 174 MMHG | BODY MASS INDEX: 38.43 KG/M2

## 2019-06-12 PROBLEM — M00.9 PYOGENIC ARTHRITIS OF LEFT WRIST: Status: RESOLVED | Noted: 2017-03-06 | Resolved: 2019-06-12

## 2019-06-12 PROBLEM — L03.90 CELLULITIS: Status: ACTIVE | Noted: 2019-06-12

## 2019-06-12 PROBLEM — L03.114 CELLULITIS OF LEFT ARM: Status: RESOLVED | Noted: 2019-06-09 | Resolved: 2019-06-12

## 2019-06-12 PROBLEM — M00.032: Status: RESOLVED | Noted: 2017-03-07 | Resolved: 2019-06-12

## 2019-06-12 LAB
ANION GAP SERPL CALC-SCNC: 9 MMOL/L (ref 8–16)
ASO AB SERPL-ACNC: 29 IU/ML
BASOPHILS # BLD AUTO: 0.04 K/UL (ref 0–0.2)
BASOPHILS NFR BLD: 0.5 % (ref 0–1.9)
BUN SERPL-MCNC: 14 MG/DL (ref 8–23)
CALCIUM SERPL-MCNC: 9.8 MG/DL (ref 8.7–10.5)
CHLORIDE SERPL-SCNC: 103 MMOL/L (ref 95–110)
CO2 SERPL-SCNC: 29 MMOL/L (ref 23–29)
CREAT SERPL-MCNC: 0.8 MG/DL (ref 0.5–1.4)
CRP SERPL-MCNC: 123.1 MG/L (ref 0–8.2)
DIFFERENTIAL METHOD: ABNORMAL
EOSINOPHIL # BLD AUTO: 0.3 K/UL (ref 0–0.5)
EOSINOPHIL NFR BLD: 2.9 % (ref 0–8)
ERYTHROCYTE [DISTWIDTH] IN BLOOD BY AUTOMATED COUNT: 13.8 % (ref 11.5–14.5)
ERYTHROCYTE [SEDIMENTATION RATE] IN BLOOD BY WESTERGREN METHOD: >120 MM/HR (ref 0–23)
EST. GFR  (AFRICAN AMERICAN): >60 ML/MIN/1.73 M^2
EST. GFR  (NON AFRICAN AMERICAN): >60 ML/MIN/1.73 M^2
GLUCOSE SERPL-MCNC: 125 MG/DL (ref 70–110)
HCT VFR BLD AUTO: 39.5 % (ref 40–54)
HGB BLD-MCNC: 12.4 G/DL (ref 14–18)
IMM GRANULOCYTES # BLD AUTO: 0.14 K/UL (ref 0–0.04)
IMM GRANULOCYTES NFR BLD AUTO: 1.6 % (ref 0–0.5)
LYMPHOCYTES # BLD AUTO: 1.5 K/UL (ref 1–4.8)
LYMPHOCYTES NFR BLD: 17.9 % (ref 18–48)
MAGNESIUM SERPL-MCNC: 2.2 MG/DL (ref 1.6–2.6)
MCH RBC QN AUTO: 30.8 PG (ref 27–31)
MCHC RBC AUTO-ENTMCNC: 31.4 G/DL (ref 32–36)
MCV RBC AUTO: 98 FL (ref 82–98)
MONOCYTES # BLD AUTO: 1 K/UL (ref 0.3–1)
MONOCYTES NFR BLD: 11.4 % (ref 4–15)
NEUTROPHILS # BLD AUTO: 5.6 K/UL (ref 1.8–7.7)
NEUTROPHILS NFR BLD: 65.7 % (ref 38–73)
NRBC BLD-RTO: 0 /100 WBC
PHOSPHATE SERPL-MCNC: 4.8 MG/DL (ref 2.7–4.5)
PLATELET # BLD AUTO: 257 K/UL (ref 150–350)
PMV BLD AUTO: 10.9 FL (ref 9.2–12.9)
POTASSIUM SERPL-SCNC: 4.6 MMOL/L (ref 3.5–5.1)
RBC # BLD AUTO: 4.03 M/UL (ref 4.6–6.2)
SODIUM SERPL-SCNC: 141 MMOL/L (ref 136–145)
WBC # BLD AUTO: 8.5 K/UL (ref 3.9–12.7)

## 2019-06-12 PROCEDURE — 99239 PR HOSPITAL DISCHARGE DAY,>30 MIN: ICD-10-PCS | Mod: ,,, | Performed by: HOSPITALIST

## 2019-06-12 PROCEDURE — 85652 RBC SED RATE AUTOMATED: CPT

## 2019-06-12 PROCEDURE — 80048 BASIC METABOLIC PNL TOTAL CA: CPT

## 2019-06-12 PROCEDURE — 99239 HOSP IP/OBS DSCHRG MGMT >30: CPT | Mod: ,,, | Performed by: HOSPITALIST

## 2019-06-12 PROCEDURE — 25000003 PHARM REV CODE 250: Performed by: INTERNAL MEDICINE

## 2019-06-12 PROCEDURE — S0077 INJECTION, CLINDAMYCIN PHOSP: HCPCS | Performed by: INTERNAL MEDICINE

## 2019-06-12 PROCEDURE — 85025 COMPLETE CBC W/AUTO DIFF WBC: CPT

## 2019-06-12 PROCEDURE — 83735 ASSAY OF MAGNESIUM: CPT

## 2019-06-12 PROCEDURE — 36415 COLL VENOUS BLD VENIPUNCTURE: CPT

## 2019-06-12 PROCEDURE — 86140 C-REACTIVE PROTEIN: CPT

## 2019-06-12 PROCEDURE — 63600175 PHARM REV CODE 636 W HCPCS: Performed by: INTERNAL MEDICINE

## 2019-06-12 PROCEDURE — 84100 ASSAY OF PHOSPHORUS: CPT

## 2019-06-12 RX ORDER — CLINDAMYCIN HYDROCHLORIDE 150 MG/1
450 CAPSULE ORAL EVERY 6 HOURS
Qty: 96 CAPSULE | Refills: 0 | Status: SHIPPED | OUTPATIENT
Start: 2019-06-12 | End: 2019-06-18 | Stop reason: SDUPTHER

## 2019-06-12 RX ADMIN — CLINDAMYCIN IN 5 PERCENT DEXTROSE 900 MG: 18 INJECTION, SOLUTION INTRAVENOUS at 09:06

## 2019-06-12 RX ADMIN — MODAFINIL 200 MG: 100 TABLET ORAL at 09:06

## 2019-06-12 RX ADMIN — Medication 2000 MG: at 05:06

## 2019-06-12 RX ADMIN — LOSARTAN POTASSIUM 50 MG: 25 TABLET, FILM COATED ORAL at 09:06

## 2019-06-12 RX ADMIN — AMLODIPINE BESYLATE 10 MG: 10 TABLET ORAL at 09:06

## 2019-06-12 RX ADMIN — FUROSEMIDE 40 MG: 40 TABLET ORAL at 09:06

## 2019-06-12 NOTE — SUBJECTIVE & OBJECTIVE
"Principal Problem:Cellulitis of left arm    Principal Orthopedic Problem: Same    Interval History: Patient seen and examined at bedside.  No acute events overnight.  Pain is significantly improved erythema that arm is also significantly improved patient has no pain with range of motion of the elbow no tenderness over the elbow.  White blood cell count is within normal limits patient is afebrile and CRP has been significantly down trending.    Review of patient's allergies indicates:   Allergen Reactions    Norco [hydrocodone-acetaminophen] Anaphylaxis       Current Facility-Administered Medications   Medication    acetaminophen tablet 650 mg    amLODIPine tablet 10 mg    baclofen tablet 10 mg    clindamycin 900 MG/50 ML D5W 900 mg/50 mL IVPB 900 mg    dextrose 10% (D10W) Bolus    dextrose 10% (D10W) Bolus    enoxaparin injection 40 mg    furosemide tablet 40 mg    glucagon (human recombinant) injection 1 mg    glucose chewable tablet 16 g    glucose chewable tablet 24 g    ibuprofen tablet 200 mg    levETIRAcetam tablet 500 mg    losartan tablet 50 mg    modafinil tablet 200 mg    oxyCODONE-acetaminophen 5-325 mg per tablet 1 tablet    oxyCODONE-acetaminophen 5-325 mg per tablet 2 tablet    promethazine tablet 25 mg    promethazine tablet 25 mg    ramelteon tablet 8 mg    senna-docusate 8.6-50 mg per tablet 1 tablet    sodium chloride 0.9% flush 10 mL    vancomycin 2 g in 0.9% sodium chloride 500 mL IVPB     Objective:     Vital Signs (Most Recent):  Temp: 97.6 °F (36.4 °C) (06/12/19 0735)  Pulse: 72 (06/12/19 0735)  Resp: 18 (06/12/19 0735)  BP: (!) 174/83 (06/12/19 0735)  SpO2: 97 % (06/12/19 0735) Vital Signs (24h Range):  Temp:  [97.2 °F (36.2 °C)-98.5 °F (36.9 °C)] 97.6 °F (36.4 °C)  Pulse:  [62-76] 72  Resp:  [18] 18  SpO2:  [93 %-97 %] 97 %  BP: (126-174)/(58-94) 174/83     Weight: 131.5 kg (290 lb)  Height: 6' 1" (185.4 cm)  Body mass index is 38.26 kg/m².      Intake/Output Summary " (Last 24 hours) at 6/12/2019 0954  Last data filed at 6/11/2019 1800  Gross per 24 hour   Intake 960 ml   Output --   Net 960 ml       Ortho/SPM Exam      Physical Exam:  NAD, A/O x 3.  Erythema over forearm with swelling, no pain with ROM of the elbow,  No ttp over olecranon bursa  No focal motor or sensory deficits noted.  \      Significant Labs:   CBC:   Recent Labs   Lab 06/11/19  0337 06/12/19  0354   WBC 8.53 8.50   HGB 11.4* 12.4*   HCT 35.5* 39.5*    257     CRP:   Recent Labs   Lab 06/11/19  0337 06/12/19  0727   .8* 123.1*     All pertinent labs within the past 24 hours have been reviewed.    Significant Imaging: I have reviewed and interpreted all pertinent imaging results/findings.

## 2019-06-12 NOTE — DISCHARGE SUMMARY
"[facility] Hospital Medicine  Discharge Summary      Patient Name: Rajan Cooper III  MRN: [id number]  Admission Date: 6/9/2019  Hospital Length of Stay: 3 days  Discharge Date and Time:  06/12/2019 12:10 PM  Attending Physician: Stefan Mcginnis MD   Discharging Provider: Stefan Mcginnis MD  Primary Care Provider: Tom Garcia MD    Hospital Medicine Team: [facility]OS[facility] MED B Stefan Mcginnis MD    HPI:  Mr. Cooper is a very nice 64 yo man with a history of left wrist abscess, HTN and multiple sclerosis.  In march of 2017 he developed MSSA bacteremia and had septic joints requiring washout to the left wrist and the right ankle.  He recently presented to his PCP's office on 6/7/19 (Friday) complaining of left forearm and elbow swelling.  Due to his MS (he still works!) he has to move around with his forearms resting on a cart to support himself.  He has developed quite prominent calluses on his elbows and post arms due to this.  His PCP, Dr. Garcia, examined him and noted, "Presently is not developed any fevers or chills.  His wife thinks his MS maybe acting up slightly which could be a sign of the infection.  We will monitor this in do some labs in probably treat for infection although his may be a elbow bursitis inflammation as well."  He was ultimately diagnosed with olecranon bursitis and forearm cellulitis and started on cephALEXin (KEFLEX) 500 MG capsule; Take 1 capsule (500 mg total) by mouth every 8 (eight) hours.     He took the Keflex as prescribed, but has seen worsening swelling and redness to the left forearm, no beginning two spread to the distal upper arm.  He denies eloina left wrist or elbow pain with movement to suggest septic arthritis.  He has had no fever or chills.  Today the area around the elbow rapidly enlarged and got red, prompting him to return to the ED.           Interval hx:s/p ortho eval -  Erythema  improved, reports no pain with ROM of the elbow and no pain over " olecranon bursa.       * No surgery found *      Hospital Course:      Cellulitis of the left arm  -vancomycin 2 g IVPB, 2,000 mg, Intravenous, ED 1 Time, 2,000 mg at 06/09/19 1759              -Pharmacy consulted to continue Vanco at next proper dose and frequency (monitor for toxicity)  -clindamycin 900 MG/50 ML D5W 900 mg/50 mL IVPB 900 mg, 900 mg, Intravenous, Q8H              -Consider discontinuation of Clinda if ASO negative  -acetaminophen tablet 650 mg, 650 mg, Oral, Q4H PRN fever, mild pain  -No signs or symptoms of elbow or wrist joint involvement, but monitor closely  -More worrisome is possible involvement of the olecranon bursa  -Check Xray of wrist and elbow on the left  -Ortho consult - WBC and CRP are both downtrending.  No pain with ROM of the elbow or ttp over olecranon so not concerned for olecranon bursitis or septic arthritis.  - CRP :213-->83   White blood cell count is within normal limits patient is afebrile and CRP has been significantly down trending.  Patient being discharged with oral clindamycin for 8 more days       Multiple sclerosis  -baclofen tablet 10 mg, 10 mg, Oral, TID  -modafinil tablet 200 mg, 200 mg, Oral, Daily  -Home med: AVONEX 30 mcg/0.5 mL injection, INJECT 30 MCG INTO THE MUSCLE ONCE A WEEK  -PT and OT consult     Seizure disorder  -levETIRAcetam tablet 500 mg, 500 mg, Oral, Nightly     Essential hypertension  -furosemide tablet 40 mg, 40 mg, Oral, BID  -amLODIPine tablet 10 mg, 10 mg, Oral, Daily  -losartan tablet 50 mg, 50 mg, Oral, Daily     Normocytic anemia  -Stable to monitor as outpatient     High Risk Conditions  Multiple sclerosis, HTN     Patient being discharged with oral clindamycin and home health    Consults:   Consults (From admission, onward)        Status Ordering Provider     Inpatient consult to Orthopedics  Once     Provider:  (Not yet assigned)    Completed HAYDEN PARKER     Pharmacy to dose Vancomycin consult  Once     Provider:  (Not yet  assigned)    Acknowledged HAYDEN PARKER          Final Active Diagnoses:    Diagnosis Date Noted POA    Cellulitis [L03.90] 06/12/2019 Yes    PVD (peripheral vascular disease) [I73.9]  Yes    HTN (hypertension) [I10] 11/30/2012 Yes    MS (multiple sclerosis) [G35]  Yes      Problems Resolved During this Admission:    Diagnosis Date Noted Date Resolved POA    PRINCIPAL PROBLEM:  Cellulitis of left arm [L03.114] 06/09/2019 06/12/2019 Yes    Olecranon bursitis [M70.20] 06/09/2019 06/11/2019 Yes      Discharged Condition: fair    Disposition: Home or Self Care    Follow Up:  Follow-up Information     Tom Garcia MD In 1 week.    Specialty:  Internal Medicine  Contact information:  [address]  [facility] [address] [phone]                 Patient Instructions:   No discharge procedures on file.  Medications:  Reconciled Home Medications:      Medication List      Start taking these medications    clindamycin 150 MG capsule  Commonly known as:  CLEOCIN  Take 3 capsules (450 mg total) by mouth every 6 (six) hours. for 8 days        Continue taking these medications    amLODIPine 10 MG tablet  Commonly known as:  NORVASC  TAKE 1 TABLET BY MOUTH ONCE DAILY     AVONEX 30 mcg/0.5 mL injection  Generic drug:  interferon beta-1a  INJECT 30 MCG INTO THE MUSCLE ONCE A WEEK     baclofen 10 MG tablet  Commonly known as:  LIORESAL  TAKE 1 TABLET BY MOUTH 3 TIMES A DAY     furosemide 40 MG tablet  Commonly known as:  LASIX  TAKE 1 TABLET BY MOUTH 2 TIMES A DAY     levETIRAcetam 500 MG Tab  Commonly known as:  KEPPRA  TAKE 1 TABLET (500 MG TOTAL) BY MOUTH NIGHTLY.     losartan 50 MG tablet  Commonly known as:  COZAAR  TAKE 1 TABLET (50 MG TOTAL) BY MOUTH ONCE DAILY.     modafinil 200 MG Tab  Commonly known as:  PROVIGIL  Take 1 tablet (200 mg total) by mouth once daily.     ondansetron 4 MG tablet  Commonly known as:  ZOFRAN  Take 1 tablet (4 mg total) by mouth every 6 (six) hours as needed for Nausea.         Stop taking these medications    cephALEXin 500 MG capsule  Commonly known as:  KEFLEX     ibuprofen 200 MG tablet  Commonly known as:  ADVIL,MOTRIN            Significant Diagnostic Studies: Labs:   BMP:   Recent Labs   Lab 06/11/19  0337 06/12/19  0354    125*    141   K 3.7 4.6    103   CO2 26 29   BUN 12 14   CREATININE 0.8 0.8   CALCIUM 9.0 9.8   MG 2.1 2.2   , CMP   Recent Labs   Lab 06/11/19  0337 06/12/19  0354    141   K 3.7 4.6    103   CO2 26 29    125*   BUN 12 14   CREATININE 0.8 0.8   CALCIUM 9.0 9.8   ANIONGAP 9 9   ESTGFRAFRICA >60.0 >60.0   EGFRNONAA >60.0 >60.0   , CBC   Recent Labs   Lab 06/11/19  0337 06/12/19  0354   WBC 8.53 8.50   HGB 11.4* 12.4*   HCT 35.5* 39.5*    257   , INR   Lab Results   Component Value Date    INR 1.1 03/04/2017   , Lipid Panel   Lab Results   Component Value Date    CHOL 188 06/07/2019    HDL 55 06/07/2019    LDLCALC 117.4 06/07/2019    TRIG 78 06/07/2019    CHOLHDL 29.3 06/07/2019   , Troponin No results for input(s): TROPONINI in the last 168 hours., A1C:   Recent Labs   Lab 06/10/19  0402   HGBA1C 5.9*    and All labs within the past 24 hours have been reviewed  Microbiology:   Blood Culture   Lab Results   Component Value Date    LABBLOO No Growth to date 06/09/2019    LABBLOO No Growth to date 06/09/2019    LABBLOO No Growth to date 06/09/2019   , Sputum Culture No results found for: GSRESP, RESPIRATORYC and Urine Culture    Lab Results   Component Value Date    LABURIN No growth 08/22/2018     Radiology:  Imaging Results    None       Cardiac Graphics: Sinus tachycardia  Possible Left atrial enlargemen    Pending Diagnostic Studies:     None        Indwelling Lines/Drains at time of discharge:   Lines/Drains/Airways     Epidural Line                 Perineural Analgesia/Anesthesia Assessment (using dermatomes) Epidural 03/10/17 1400 823 days                       Stefan Mcginnis MD  Department of Hospital  Medicine  [facility]

## 2019-06-12 NOTE — PROGRESS NOTES
[facility]  Orthopedics  Progress Note    Patient Name: Rajan Cooper III  MRN: [id number]  Admission Date: 6/9/2019  Hospital Length of Stay: 3 days  Attending Provider: Stefan Mcginnis MD  Primary Care Provider: Tom Garcia MD    Subjective:     Principal Problem:Cellulitis of left arm    Principal Orthopedic Problem: Same    Interval History: Patient seen and examined at bedside.  No acute events overnight.  Pain is significantly improved erythema that arm is also significantly improved patient has no pain with range of motion of the elbow no tenderness over the elbow.  White blood cell count is within normal limits patient is afebrile and CRP has been significantly down trending.    Review of patient's allergies indicates:   Allergen Reactions    Norco [hydrocodone-acetaminophen] Anaphylaxis       Current Facility-Administered Medications   Medication    acetaminophen tablet 650 mg    amLODIPine tablet 10 mg    baclofen tablet 10 mg    clindamycin 900 MG/50 ML D5W 900 mg/50 mL IVPB 900 mg    dextrose 10% (D10W) Bolus    dextrose 10% (D10W) Bolus    enoxaparin injection 40 mg    furosemide tablet 40 mg    glucagon (human recombinant) injection 1 mg    glucose chewable tablet 16 g    glucose chewable tablet 24 g    ibuprofen tablet 200 mg    levETIRAcetam tablet 500 mg    losartan tablet 50 mg    modafinil tablet 200 mg    oxyCODONE-acetaminophen 5-325 mg per tablet 1 tablet    oxyCODONE-acetaminophen 5-325 mg per tablet 2 tablet    promethazine tablet 25 mg    promethazine tablet 25 mg    ramelteon tablet 8 mg    senna-docusate 8.6-50 mg per tablet 1 tablet    sodium chloride 0.9% flush 10 mL    vancomycin 2 g in 0.9% sodium chloride 500 mL IVPB     Objective:     Vital Signs (Most Recent):  Temp: 97.6 °F (36.4 °C) (06/12/19 0735)  Pulse: 72 (06/12/19 0735)  Resp: 18 (06/12/19 0735)  BP: (!) 174/83 (06/12/19 0735)  SpO2: 97 % (06/12/19 0735) Vital Signs (24h  "Range):  Temp:  [97.2 °F (36.2 °C)-98.5 °F (36.9 °C)] 97.6 °F (36.4 °C)  Pulse:  [62-76] 72  Resp:  [18] 18  SpO2:  [93 %-97 %] 97 %  BP: (126-174)/(58-94) 174/83     Weight: 131.5 kg (290 lb)  Height: 6' 1" (185.4 cm)  Body mass index is 38.26 kg/m².      Intake/Output Summary (Last 24 hours) at 6/12/2019 0954  Last data filed at 6/11/2019 1800  Gross per 24 hour   Intake 960 ml   Output --   Net 960 ml       Ortho/SPM Exam      Physical Exam:  NAD, A/O x 3.  Erythema over forearm with swelling, no pain with ROM of the elbow,  No ttp over olecranon bursa  No focal motor or sensory deficits noted.  \      Significant Labs:   CBC:   Recent Labs   Lab 06/11/19  0337 06/12/19  0354   WBC 8.53 8.50   HGB 11.4* 12.4*   HCT 35.5* 39.5*    257     CRP:   Recent Labs   Lab 06/11/19  0337 06/12/19  0727   .8* 123.1*     All pertinent labs within the past 24 hours have been reviewed.    Significant Imaging: I have reviewed and interpreted all pertinent imaging results/findings.    Assessment/Plan:     No notes have been filed under this hospital service.  Service: Orthopedic Surgery        Jose Beck MD  Orthopedics  [facility]  "

## 2019-06-12 NOTE — TELEPHONE ENCOUNTER
Can we let pt know that I got his discharge note. Saw they have him going home on Clindamycin.   They may have mentioned but sometimes this can cause diarrhea and if it does please let us know. Also offer a follow up office visit if he needs one. Thanks

## 2019-06-12 NOTE — ASSESSMENT & PLAN NOTE
Rajan Cooper III is a 63 y.o. male with cellulitis of the left arm.      -   Pain is significantly improved erythema that arm is also significantly improved patient has no pain with range of motion of the elbow no tenderness over the elbow.  White blood cell count is within normal limits patient is afebrile and CRP has been significantly down trending.  No surgical intervention necessary at this time instructed patient that if cellulitis worsens or signs of worsening infection to return to the emergency room.

## 2019-06-12 NOTE — PLAN OF CARE
[facility]    HOME HEALTH ORDERS  FACE TO FACE ENCOUNTER    Patient Name: Rajan Cooper III  YOB: 1955    PCP: Tom Garcia MD   PCP Address: [address] / [place] [address]  PCP Phone Number: [phone]  PCP Fax: [phone]    Encounter Date: 06/12/2019    Admit to Home Health    Diagnoses:  Active Hospital Problems    Diagnosis  POA    *Cellulitis of left arm [L03.114]  Yes    PVD (peripheral vascular disease) [I73.9]  Yes    HTN (hypertension) [I10]  Yes    MS (multiple sclerosis) [G35]  Yes      Resolved Hospital Problems    Diagnosis Date Resolved POA    Olecranon bursitis [M70.20] 06/11/2019 Yes       No future appointments.        I have seen and examined this patient face to face today. My clinical findings that support the need for the home health skilled services and home bound status are the following:  Weakness/numbness causing balance and gait disturbance due to Weakness/Debility making it taxing to leave home.    Allergies:  Review of patient's allergies indicates:   Allergen Reactions    Norco [hydrocodone-acetaminophen] Anaphylaxis       Diet: cardiac diet    Activities: activity as tolerated    Nursing:   SN to complete comprehensive assessment including routine vital signs. Instruct on disease process and s/s of complications to report to MD. Review/verify medication list sent home with the patient at time of discharge  and instruct patient/caregiver as needed. Frequency may be adjusted depending on start of care date.    Notify MD if SBP > 160 or < 90; DBP > 90 or < 50; HR > 120 or < 50; Temp > 101;       CONSULTS:    Physical Therapy to evaluate and treat. Evaluate for home safety and equipment needs; Establish/upgrade home exercise program. Perform / instruct on therapeutic exercises, gait training, transfer training, and Range of Motion.  Occupational Therapy to evaluate and treat. Evaluate home environment for safety and equipment  needs. Perform/Instruct on transfers, ADL training, ROM, and therapeutic exercises.  Aide to provide assistance with personal care, ADLs, and vital signs.    MISCELLANEOUS CARE:  N/A    WOUND CARE ORDERS  n/a      Medications: Review discharge medications with patient and family and provide education.       Rajan Cooper III   Home Medication Instructions HUMBLE:[id number]    Printed on:06/12/19 1016   Medication Information                      amLODIPine (NORVASC) 10 MG tablet  TAKE 1 TABLET BY MOUTH ONCE DAILY             AVONEX 30 mcg/0.5 mL injection  INJECT 30 MCG INTO THE MUSCLE ONCE A WEEK             baclofen (LIORESAL) 10 MG tablet  TAKE 1 TABLET BY MOUTH 3 TIMES A DAY             clindamycin (CLEOCIN) 150 MG capsule  Take 3 capsules (450 mg total) by mouth every 6 (six) hours. for 8 days             furosemide (LASIX) 40 MG tablet  TAKE 1 TABLET BY MOUTH 2 TIMES A DAY             levETIRAcetam (KEPPRA) 500 MG Tab  TAKE 1 TABLET (500 MG TOTAL) BY MOUTH NIGHTLY.             losartan (COZAAR) 50 MG tablet  TAKE 1 TABLET (50 MG TOTAL) BY MOUTH ONCE DAILY.             modafinil (PROVIGIL) 200 MG Tab  Take 1 tablet (200 mg total) by mouth once daily.             ondansetron (ZOFRAN) 4 MG tablet  Take 1 tablet (4 mg total) by mouth every 6 (six) hours as needed for Nausea.                   I certify that this patient is confined to his home and needs intermittent skilled nursing care, physical therapy and occupational therapy.

## 2019-06-13 NOTE — PLAN OF CARE
06/13/19 1008   Final Note   Assessment Type Final Discharge Note   Anticipated Discharge Disposition Home-Health

## 2019-06-14 LAB
BACTERIA BLD CULT: NORMAL
BACTERIA BLD CULT: NORMAL

## 2019-06-14 NOTE — PLAN OF CARE
06/14/19 0958   Final Note   Assessment Type Final Discharge Note   Anticipated Discharge Disposition Home-Health  ([facility])   What phone number can be called within the next 1-3 days to see how you are doing after discharge? [phone]   Hospital Follow Up  Appt(s) scheduled? Yes   Discharge plans and expectations educations in teach back method with documentation complete? Yes     Future Appointments   Date Time Provider Department Center   6/18/2019 10:30 AM Tom Garcia MD [facility]RANADO

## 2019-06-18 ENCOUNTER — OFFICE VISIT (OUTPATIENT)
Dept: INTERNAL MEDICINE | Facility: CLINIC | Age: 64
End: 2019-06-18
Payer: COMMERCIAL

## 2019-06-18 VITALS
BODY MASS INDEX: 39.44 KG/M2 | DIASTOLIC BLOOD PRESSURE: 78 MMHG | HEART RATE: 74 BPM | SYSTOLIC BLOOD PRESSURE: 126 MMHG | OXYGEN SATURATION: 95 % | WEIGHT: 298.94 LBS

## 2019-06-18 DIAGNOSIS — L03.114 CELLULITIS OF LEFT UPPER EXTREMITY: Primary | ICD-10-CM

## 2019-06-18 DIAGNOSIS — G35 MS (MULTIPLE SCLEROSIS): ICD-10-CM

## 2019-06-18 DIAGNOSIS — I10 ESSENTIAL HYPERTENSION: ICD-10-CM

## 2019-06-18 PROCEDURE — 99999 PR PBB SHADOW E&M-EST. PATIENT-LVL IV: CPT | Mod: PBBFAC,,, | Performed by: INTERNAL MEDICINE

## 2019-06-18 PROCEDURE — 99999 PR PBB SHADOW E&M-EST. PATIENT-LVL IV: ICD-10-PCS | Mod: PBBFAC,,, | Performed by: INTERNAL MEDICINE

## 2019-06-18 PROCEDURE — 99495 TCM SERVICES (MODERATE COMPLEXITY): ICD-10-PCS | Mod: S$GLB,,, | Performed by: INTERNAL MEDICINE

## 2019-06-18 PROCEDURE — 99495 TRANSJ CARE MGMT MOD F2F 14D: CPT | Mod: S$GLB,,, | Performed by: INTERNAL MEDICINE

## 2019-06-18 RX ORDER — CLINDAMYCIN HYDROCHLORIDE 150 MG/1
450 CAPSULE ORAL EVERY 6 HOURS
Qty: 48 CAPSULE | Refills: 0 | Status: SHIPPED | OUTPATIENT
Start: 2019-06-18 | End: 2019-06-24 | Stop reason: SDUPTHER

## 2019-06-18 NOTE — PROGRESS NOTES
Subjective:       Patient ID: Rajan Cooper III is a 63 y.o. male.    Chief Complaint: Hospital Follow Up    Transitional Care Note    Family and/or Caretaker present at visit?  Yes.  Diagnostic tests reviewed/disposition: No diagnosic tests pending after this hospitalization.  Disease/illness education: Discussed cellulitis  Home health/community services discussion/referrals: Patient does not have home health established from hospital visit.  They do not need home health.  If needed, we will set up home health for the patient.   Establishment or re-establishment of referral orders for community resources: No other necessary community resources.   Discussion with other health care providers: No discussion with other health care providers necessary.        Rajan Cooper III is a 63 y.o. male with olecranon bursitis left elbow, developing septic olecranon bursitis with surrounding cellulitis. No concern for septic joint. AF VSS at this time.      - Admitted to medicine on IV vanc and clinda. Will attempt non operative management at this time as clinical picture is consistent with cellulitis/ olecranon bursitis and does not warrant operative intervention. If this does not improve with IVAB may require aspiration and or MRI to determine extent of fluid collection. Patient is dependent on elbows for ambulation due to his MS so will try and avoid posterior elbow incision at all costs as an olecranon bursectomy would put him at very high risk of continued wound drainage and breakdown. Will continue to follow. Placed in compressive dressing.       Patient here for hospital follow-up after admission and treatment for left forearm cellulitis.  He is definitely improved since admit but has not improved much in the last few days on oral antibiotics.  Two years ago when he had a wrist and ankle infection he saw Infectious Disease and had outpatient parenteral antibiotics for several weeks.  His sed rate and CRP were extremely  high on admit and definitely improved but I will repeat those again.  He is just about done with his clindamycin and still has some redness and induration.  Patient has a history of fairly stable multiple sclerosis and hypertension    Review of Systems   Constitutional: Negative for chills, fatigue, fever and unexpected weight change.   HENT: Negative for nosebleeds and trouble swallowing.    Eyes: Negative for pain and visual disturbance.   Respiratory: Negative for cough, shortness of breath and wheezing.    Cardiovascular: Negative for chest pain and palpitations.   Gastrointestinal: Negative for abdominal pain, constipation, diarrhea, nausea and vomiting.   Genitourinary: Negative for difficulty urinating and hematuria.   Musculoskeletal: Positive for arthralgias. Negative for neck pain.   Skin: Positive for color change. Negative for rash.   Neurological: Positive for weakness (Related to multiple sclerosis). Negative for dizziness and headaches.   Hematological: Does not bruise/bleed easily.   Psychiatric/Behavioral: Negative for dysphoric mood, sleep disturbance and suicidal ideas.       Objective:      Physical Exam   Constitutional: He appears well-developed and well-nourished.   HENT:   Head: Normocephalic.   Cardiovascular: Normal rate.   Pulmonary/Chest: Effort normal and breath sounds normal.   Neurological: He exhibits abnormal muscle tone. Coordination abnormal.   Skin: Skin is warm. There is erythema.   See photo of left forearm and dorsal aspect of left elbow.  Mildly warm not tender but definitely indurated               Assessment:       1. Cellulitis of left upper extremity    2. Essential hypertension    3. MS (multiple sclerosis)        Plan:       Rajan was seen today for hospital follow up.    Diagnoses and all orders for this visit:    Cellulitis of left upper extremity  -     Ambulatory referral to Infectious Disease  -     Sedimentation rate; Future  -     C-reactive protein;  Future    Essential hypertension    MS (multiple sclerosis)    Other orders  -     clindamycin (CLEOCIN) 150 MG capsule; Take 3 capsules (450 mg total) by mouth every 6 (six) hours. for 4 days        extending antibiotics.  Try to arrange infectious disease consult.  Review labs.

## 2019-06-19 ENCOUNTER — PATIENT MESSAGE (OUTPATIENT)
Dept: INTERNAL MEDICINE | Facility: CLINIC | Age: 64
End: 2019-06-19

## 2019-06-20 RX ORDER — LOSARTAN POTASSIUM 50 MG/1
50 TABLET ORAL DAILY
Qty: 90 TABLET | Refills: 2 | Status: SHIPPED | OUTPATIENT
Start: 2019-06-20 | End: 2020-03-21 | Stop reason: SDUPTHER

## 2019-06-24 RX ORDER — CLINDAMYCIN HYDROCHLORIDE 150 MG/1
450 CAPSULE ORAL EVERY 6 HOURS
Qty: 84 CAPSULE | Refills: 0 | Status: SHIPPED | OUTPATIENT
Start: 2019-06-24 | End: 2019-06-28

## 2019-06-25 RX ORDER — FUROSEMIDE 40 MG/1
TABLET ORAL
Qty: 180 TABLET | Refills: 3 | Status: SHIPPED | OUTPATIENT
Start: 2019-06-25 | End: 2020-06-14

## 2019-07-09 ENCOUNTER — OFFICE VISIT (OUTPATIENT)
Dept: INFECTIOUS DISEASES | Facility: CLINIC | Age: 64
End: 2019-07-09
Payer: COMMERCIAL

## 2019-07-09 VITALS
HEART RATE: 78 BPM | BODY MASS INDEX: 39.88 KG/M2 | SYSTOLIC BLOOD PRESSURE: 131 MMHG | TEMPERATURE: 98 F | HEIGHT: 73 IN | DIASTOLIC BLOOD PRESSURE: 73 MMHG | WEIGHT: 300.94 LBS

## 2019-07-09 DIAGNOSIS — L85.3 DRY SKIN: ICD-10-CM

## 2019-07-09 DIAGNOSIS — L97.811: ICD-10-CM

## 2019-07-09 DIAGNOSIS — L97.821 SKIN ULCER OF KNEE, LEFT, LIMITED TO BREAKDOWN OF SKIN: ICD-10-CM

## 2019-07-09 DIAGNOSIS — L03.114 CELLULITIS OF LEFT UPPER EXTREMITY: Primary | ICD-10-CM

## 2019-07-09 DIAGNOSIS — Z86.19 HISTORY OF STAPH INFECTION: ICD-10-CM

## 2019-07-09 PROCEDURE — 99213 PR OFFICE/OUTPT VISIT, EST, LEVL III, 20-29 MIN: ICD-10-PCS | Mod: S$GLB,,, | Performed by: PHYSICIAN ASSISTANT

## 2019-07-09 PROCEDURE — 3078F DIAST BP <80 MM HG: CPT | Mod: CPTII,S$GLB,, | Performed by: PHYSICIAN ASSISTANT

## 2019-07-09 PROCEDURE — 3008F PR BODY MASS INDEX (BMI) DOCUMENTED: ICD-10-PCS | Mod: CPTII,S$GLB,, | Performed by: PHYSICIAN ASSISTANT

## 2019-07-09 PROCEDURE — 3078F PR MOST RECENT DIASTOLIC BLOOD PRESSURE < 80 MM HG: ICD-10-PCS | Mod: CPTII,S$GLB,, | Performed by: PHYSICIAN ASSISTANT

## 2019-07-09 PROCEDURE — 3075F PR MOST RECENT SYSTOLIC BLOOD PRESS GE 130-139MM HG: ICD-10-PCS | Mod: CPTII,S$GLB,, | Performed by: PHYSICIAN ASSISTANT

## 2019-07-09 PROCEDURE — 99999 PR PBB SHADOW E&M-EST. PATIENT-LVL III: CPT | Mod: PBBFAC,,, | Performed by: PHYSICIAN ASSISTANT

## 2019-07-09 PROCEDURE — 99213 OFFICE O/P EST LOW 20 MIN: CPT | Mod: S$GLB,,, | Performed by: PHYSICIAN ASSISTANT

## 2019-07-09 PROCEDURE — 99999 PR PBB SHADOW E&M-EST. PATIENT-LVL III: ICD-10-PCS | Mod: PBBFAC,,, | Performed by: PHYSICIAN ASSISTANT

## 2019-07-09 PROCEDURE — 3008F BODY MASS INDEX DOCD: CPT | Mod: CPTII,S$GLB,, | Performed by: PHYSICIAN ASSISTANT

## 2019-07-09 PROCEDURE — 3075F SYST BP GE 130 - 139MM HG: CPT | Mod: CPTII,S$GLB,, | Performed by: PHYSICIAN ASSISTANT

## 2019-07-09 RX ORDER — MUPIROCIN 20 MG/G
OINTMENT TOPICAL
Qty: 30 G | Refills: 1 | Status: SHIPPED | OUTPATIENT
Start: 2019-07-09 | End: 2019-11-18

## 2019-07-09 NOTE — PROGRESS NOTES
Subjective:      Patient ID: Rajan Cooper III is a 63 y.o. male.    Chief Complaint:Recurrent Skin Infections      History of Present Illness:  Mr. Cooper is 63 yop male with a history of MS, L septic wrist and R septic ankle both with MSSA (also in blood and urine cultures) s/p L wrist washout and debridemnt and ankle arthrocentsis cleared bcx 3/7/17, TTE negative sent donald with cefazolin x 6 weeks from last I&D with end date 4/15/17, recently admitted 6/9/19-6/12/19 for Left arm septic bursitis.  He was treated initially with Vanc and Clindamycin then sent home on clindamycin.  His PCP has extended the treatment with Clindamycin until a couple of days ago which is essentially a month of treatment.  He had been seen by ortho sx as inpt and olecranon bursitis and septic arthritis not suspected.  He follows up today.    Mr. Cooper says he has been doing well. His arm has overall improved.  He denies joint pain there.  As well he put new floors in his house himself 2d ago and got the skin worn off his knees.  He denies any pain there or joint pain.  The patient denies any recent fever, chills, or sweats.      Review of Systems   Constitution: Negative for chills, decreased appetite, fever, malaise/fatigue, night sweats, weight gain and weight loss.   HENT: Negative for congestion, ear pain, hearing loss, hoarse voice, sore throat and tinnitus.    Eyes: Negative for blurred vision, redness and visual disturbance.   Cardiovascular: Negative for chest pain, leg swelling and palpitations.   Respiratory: Negative for cough, hemoptysis, shortness of breath, sputum production and wheezing.    Endocrine: Negative for cold intolerance and heat intolerance.   Hematologic/Lymphatic: Negative for adenopathy. Does not bruise/bleed easily.   Skin: Negative for dry skin, itching, rash and suspicious lesions.   Musculoskeletal: Negative for back pain, joint pain, myalgias and neck pain.   Gastrointestinal: Negative for abdominal pain,  constipation, diarrhea, heartburn, nausea and vomiting.   Genitourinary: Negative for dysuria, flank pain, frequency, hematuria, hesitancy and urgency.   Neurological: Negative for dizziness, headaches, numbness, paresthesias and weakness.   Psychiatric/Behavioral: Negative for depression and memory loss. The patient does not have insomnia and is not nervous/anxious.    Allergic/Immunologic: Negative for environmental allergies, HIV exposure, hives and persistent infections.     Objective:   Physical Exam   Constitutional: He is oriented to person, place, and time. He appears well-developed and well-nourished. No distress.       HENT:   Head: Normocephalic and atraumatic.   Cardiovascular: Normal rate, regular rhythm and normal heart sounds. Exam reveals no gallop and no friction rub.   No murmur heard.  Pulmonary/Chest: Effort normal and breath sounds normal. No respiratory distress. He has no wheezes. He has no rales.   Abdominal: Soft. Bowel sounds are normal. He exhibits no distension and no mass. There is no tenderness. There is no rebound and no guarding.   Musculoskeletal: He exhibits no edema.   Neurological: He is alert and oriented to person, place, and time.   Skin: Skin is warm and dry. He is not diaphoretic.   Psychiatric: He has a normal mood and affect. His behavior is normal.                         Assessment:       1. Cellulitis of left upper extremity    2. Dry skin    3. Skin ulcer of knee, left, limited to breakdown of skin    4. Skin ulcer of knee, right, limited to breakdown of skin        Suspect L arm cellulitis - well treated with essentially a month of Clindamycin  At risk for future infection due to mechanical trauma to the skin especially in light of prior HX of MSSA infection  BL knee abrasions limited to skin - no cellulitis but at risk  Xerosis at a baseline  No current skin or systemic signs of infection  Plan:   1. Triple ABX ointment 2-3x per day to knees and R elbow  2. Staph  decolonization per protocol - mupirocin to nares - rx sent in  3. Counseled needs padding to surfaces to prevent skin damage and infectious risk  4. Cerave for dry skin  5. Probiotics due to long term abx use  6. Call for any signs or sx of infection which was reviewed with patient

## 2019-07-09 NOTE — LETTER
July 9, 2019      Tom Garcia MD  [address]  [facility] [address]           [facility]  [address]  [facility] [address]  Phone: [phone]  Fax: [phone]          Patient: Rajan Cooper III   MR Number: [id number]   YOB: 1955   Date of Visit: 7/9/2019       Dear Dr. Tom Garcia:    Thank you for referring Rajan Cooper to me for evaluation. Attached you will find relevant portions of my assessment and plan of care.    If you have questions, please do not hesitate to call me. I look forward to following Rajan Cooper along with you.    Sincerely,    Daniel Ramirez Jr., PA    Enclosure  CC:  No Recipients    If you would like to receive this communication electronically, please contact [email or web address] or [phone] to request more information on [email or web address] Link access.    For providers and/or their staff who would like to refer a patient to [facility], please contact us through our one-stop-shop provider referral line, [facility], at [phone].    If you feel you have received this communication in error or would no longer like to receive these types of communications, please e-mail [email or web address]

## 2019-07-11 DIAGNOSIS — Z12.11 COLON CANCER SCREENING: ICD-10-CM

## 2019-08-01 RX ORDER — BACLOFEN 10 MG/1
TABLET ORAL
Qty: 90 TABLET | Refills: 2 | Status: SHIPPED | OUTPATIENT
Start: 2019-08-01 | End: 2019-08-29 | Stop reason: SDUPTHER

## 2019-08-29 RX ORDER — BACLOFEN 10 MG/1
TABLET ORAL
Qty: 90 TABLET | Refills: 2 | Status: SHIPPED | OUTPATIENT
Start: 2019-08-29 | End: 2019-10-30 | Stop reason: SDUPTHER

## 2019-10-30 RX ORDER — BACLOFEN 10 MG/1
TABLET ORAL
Qty: 90 TABLET | Refills: 2 | Status: SHIPPED | OUTPATIENT
Start: 2019-10-30 | End: 2019-11-25 | Stop reason: SDUPTHER

## 2019-11-13 ENCOUNTER — PATIENT OUTREACH (OUTPATIENT)
Dept: ADMINISTRATIVE | Facility: OTHER | Age: 64
End: 2019-11-13

## 2019-11-18 ENCOUNTER — OFFICE VISIT (OUTPATIENT)
Dept: INTERNAL MEDICINE | Facility: CLINIC | Age: 64
End: 2019-11-18
Payer: COMMERCIAL

## 2019-11-18 ENCOUNTER — OFFICE VISIT (OUTPATIENT)
Dept: NEUROLOGY | Facility: CLINIC | Age: 64
End: 2019-11-18
Payer: COMMERCIAL

## 2019-11-18 ENCOUNTER — DOCUMENTATION ONLY (OUTPATIENT)
Dept: INTERNAL MEDICINE | Facility: CLINIC | Age: 64
End: 2019-11-18

## 2019-11-18 ENCOUNTER — CLINICAL SUPPORT (OUTPATIENT)
Dept: INTERNAL MEDICINE | Facility: CLINIC | Age: 64
End: 2019-11-18
Payer: COMMERCIAL

## 2019-11-18 VITALS
BODY MASS INDEX: 39.96 KG/M2 | WEIGHT: 302.94 LBS | HEART RATE: 69 BPM | OXYGEN SATURATION: 100 % | DIASTOLIC BLOOD PRESSURE: 70 MMHG | SYSTOLIC BLOOD PRESSURE: 122 MMHG

## 2019-11-18 VITALS
HEART RATE: 78 BPM | SYSTOLIC BLOOD PRESSURE: 143 MMHG | DIASTOLIC BLOOD PRESSURE: 91 MMHG | HEIGHT: 73 IN | BODY MASS INDEX: 40.29 KG/M2 | WEIGHT: 304 LBS

## 2019-11-18 DIAGNOSIS — G35 MS (MULTIPLE SCLEROSIS): Primary | ICD-10-CM

## 2019-11-18 DIAGNOSIS — R26.9 GAIT DISORDER: ICD-10-CM

## 2019-11-18 DIAGNOSIS — Z00.00 ROUTINE PHYSICAL EXAMINATION: Primary | ICD-10-CM

## 2019-11-18 DIAGNOSIS — G35 MS (MULTIPLE SCLEROSIS): ICD-10-CM

## 2019-11-18 DIAGNOSIS — I73.9 PVD (PERIPHERAL VASCULAR DISEASE): ICD-10-CM

## 2019-11-18 DIAGNOSIS — I10 ESSENTIAL HYPERTENSION: ICD-10-CM

## 2019-11-18 PROCEDURE — 99999 PR PBB SHADOW E&M-EST. PATIENT-LVL I: CPT | Mod: PBBFAC,,,

## 2019-11-18 PROCEDURE — 90686 IIV4 VACC NO PRSV 0.5 ML IM: CPT | Mod: S$GLB,,, | Performed by: INTERNAL MEDICINE

## 2019-11-18 PROCEDURE — 3074F SYST BP LT 130 MM HG: CPT | Mod: CPTII,S$GLB,, | Performed by: INTERNAL MEDICINE

## 2019-11-18 PROCEDURE — 90715 TDAP VACCINE GREATER THAN OR EQUAL TO 7YO IM: ICD-10-PCS | Mod: S$GLB,,, | Performed by: INTERNAL MEDICINE

## 2019-11-18 PROCEDURE — 3080F DIAST BP >= 90 MM HG: CPT | Mod: CPTII,S$GLB,, | Performed by: NEUROMUSCULOSKELETAL MEDICINE & OMM

## 2019-11-18 PROCEDURE — 90472 TDAP VACCINE GREATER THAN OR EQUAL TO 7YO IM: ICD-10-PCS | Mod: S$GLB,,, | Performed by: INTERNAL MEDICINE

## 2019-11-18 PROCEDURE — 99214 OFFICE O/P EST MOD 30 MIN: CPT | Mod: S$GLB,,, | Performed by: NEUROMUSCULOSKELETAL MEDICINE & OMM

## 2019-11-18 PROCEDURE — 90472 IMMUNIZATION ADMIN EACH ADD: CPT | Mod: S$GLB,,, | Performed by: INTERNAL MEDICINE

## 2019-11-18 PROCEDURE — 3074F PR MOST RECENT SYSTOLIC BLOOD PRESSURE < 130 MM HG: ICD-10-PCS | Mod: CPTII,S$GLB,, | Performed by: INTERNAL MEDICINE

## 2019-11-18 PROCEDURE — 90471 IMMUNIZATION ADMIN: CPT | Mod: S$GLB,,, | Performed by: INTERNAL MEDICINE

## 2019-11-18 PROCEDURE — 99214 PR OFFICE/OUTPT VISIT, EST, LEVL IV, 30-39 MIN: ICD-10-PCS | Mod: S$GLB,,, | Performed by: NEUROMUSCULOSKELETAL MEDICINE & OMM

## 2019-11-18 PROCEDURE — 3078F DIAST BP <80 MM HG: CPT | Mod: CPTII,S$GLB,, | Performed by: INTERNAL MEDICINE

## 2019-11-18 PROCEDURE — 3078F PR MOST RECENT DIASTOLIC BLOOD PRESSURE < 80 MM HG: ICD-10-PCS | Mod: CPTII,S$GLB,, | Performed by: INTERNAL MEDICINE

## 2019-11-18 PROCEDURE — 3077F PR MOST RECENT SYSTOLIC BLOOD PRESSURE >= 140 MM HG: ICD-10-PCS | Mod: CPTII,S$GLB,, | Performed by: NEUROMUSCULOSKELETAL MEDICINE & OMM

## 2019-11-18 PROCEDURE — 3080F PR MOST RECENT DIASTOLIC BLOOD PRESSURE >= 90 MM HG: ICD-10-PCS | Mod: CPTII,S$GLB,, | Performed by: NEUROMUSCULOSKELETAL MEDICINE & OMM

## 2019-11-18 PROCEDURE — 3077F SYST BP >= 140 MM HG: CPT | Mod: CPTII,S$GLB,, | Performed by: NEUROMUSCULOSKELETAL MEDICINE & OMM

## 2019-11-18 PROCEDURE — 99999 PR PBB SHADOW E&M-EST. PATIENT-LVL III: ICD-10-PCS | Mod: PBBFAC,,, | Performed by: INTERNAL MEDICINE

## 2019-11-18 PROCEDURE — 99396 PR PREVENTIVE VISIT,EST,40-64: ICD-10-PCS | Mod: 25,S$GLB,, | Performed by: INTERNAL MEDICINE

## 2019-11-18 PROCEDURE — 90471 FLU VACCINE (QUAD) GREATER THAN OR EQUAL TO 3YO PRESERVATIVE FREE IM: ICD-10-PCS | Mod: S$GLB,,, | Performed by: INTERNAL MEDICINE

## 2019-11-18 PROCEDURE — 3008F BODY MASS INDEX DOCD: CPT | Mod: CPTII,S$GLB,, | Performed by: NEUROMUSCULOSKELETAL MEDICINE & OMM

## 2019-11-18 PROCEDURE — 99999 PR PBB SHADOW E&M-EST. PATIENT-LVL III: ICD-10-PCS | Mod: PBBFAC,,, | Performed by: NEUROMUSCULOSKELETAL MEDICINE & OMM

## 2019-11-18 PROCEDURE — 99396 PREV VISIT EST AGE 40-64: CPT | Mod: 25,S$GLB,, | Performed by: INTERNAL MEDICINE

## 2019-11-18 PROCEDURE — 99999 PR PBB SHADOW E&M-EST. PATIENT-LVL III: CPT | Mod: PBBFAC,,, | Performed by: INTERNAL MEDICINE

## 2019-11-18 PROCEDURE — 99999 PR PBB SHADOW E&M-EST. PATIENT-LVL III: CPT | Mod: PBBFAC,,, | Performed by: NEUROMUSCULOSKELETAL MEDICINE & OMM

## 2019-11-18 PROCEDURE — 99999 PR PBB SHADOW E&M-EST. PATIENT-LVL I: ICD-10-PCS | Mod: PBBFAC,,,

## 2019-11-18 PROCEDURE — 90686 FLU VACCINE (QUAD) GREATER THAN OR EQUAL TO 3YO PRESERVATIVE FREE IM: ICD-10-PCS | Mod: S$GLB,,, | Performed by: INTERNAL MEDICINE

## 2019-11-18 PROCEDURE — 90715 TDAP VACCINE 7 YRS/> IM: CPT | Mod: S$GLB,,, | Performed by: INTERNAL MEDICINE

## 2019-11-18 PROCEDURE — 3008F PR BODY MASS INDEX (BMI) DOCUMENTED: ICD-10-PCS | Mod: CPTII,S$GLB,, | Performed by: NEUROMUSCULOSKELETAL MEDICINE & OMM

## 2019-11-18 RX ORDER — MODAFINIL 200 MG/1
200 TABLET ORAL DAILY
Qty: 400 TABLET | Refills: 3 | Status: SHIPPED | OUTPATIENT
Start: 2019-11-18 | End: 2021-05-13 | Stop reason: SDUPTHER

## 2019-11-18 NOTE — PROGRESS NOTES
Subjective:       Patient ID: Rajan Cooper III is a 63 y.o. male.    Chief Complaint: Annual Exam    HPI:  63-year-old here for follow-up for purposes of vaccines and colon screening.  He has agreed to do a fit kit so we will provide him with 1.  He saw his neurologist this morning and prescriptions were updated.  He is otherwise up-to-date with refills.  He would like a Tdap in flu shot which we will provide as well.  He is walking with a walker for stability relative to his multiple sclerosis.  Hypertension is stable.  He denies any GI or  complaints.    Review of Systems   Constitutional: Negative for chills, fatigue, fever and unexpected weight change.   HENT: Negative for nosebleeds and trouble swallowing.    Eyes: Negative for pain and visual disturbance.   Respiratory: Negative for cough, shortness of breath and wheezing.    Cardiovascular: Negative for chest pain and palpitations.   Gastrointestinal: Negative for abdominal pain, constipation, diarrhea, nausea and vomiting.   Genitourinary: Negative for difficulty urinating and hematuria.   Musculoskeletal: Positive for arthralgias and gait problem. Negative for neck pain.   Skin: Negative for rash.   Neurological: Positive for weakness. Negative for dizziness and headaches.   Hematological: Does not bruise/bleed easily.   Psychiatric/Behavioral: Negative for dysphoric mood, sleep disturbance and suicidal ideas.       Objective:      Physical Exam   Constitutional: He is oriented to person, place, and time. He appears well-developed and well-nourished. No distress.   Obese male no acute distress   HENT:   Head: Normocephalic and atraumatic.   Right Ear: External ear normal.   Left Ear: External ear normal.   Mouth/Throat: Oropharynx is clear and moist. No oropharyngeal exudate.   TM's clear, pharynx clear   Eyes: Pupils are equal, round, and reactive to light. Conjunctivae and EOM are normal. No scleral icterus.   Neck: Normal range of motion. Neck supple.  No thyromegaly present.   No supraclavicular nodes palpated   Cardiovascular: Normal rate, regular rhythm and normal heart sounds.   No murmur heard.  Pulmonary/Chest: Effort normal and breath sounds normal. He has no wheezes.   Abdominal: Soft. Bowel sounds are normal. He exhibits no mass. There is no tenderness.   Musculoskeletal: He exhibits no edema.   Lymphadenopathy:     He has no cervical adenopathy.   Neurological: He is alert and oriented to person, place, and time. Coordination abnormal.   Skin: No rash noted. No erythema. No pallor.   Psychiatric: He has a normal mood and affect. His behavior is normal.   Vitals reviewed.      Assessment:       1. Routine physical examination    2. Essential hypertension    3. Gait disorder    4. MS (multiple sclerosis)    5. PVD (peripheral vascular disease)        Plan:       Rajan was seen today for annual exam.    Diagnoses and all orders for this visit:    Routine physical examination    Essential hypertension    Gait disorder    MS (multiple sclerosis)    PVD (peripheral vascular disease)            FIT KIT    FitKit was given to patient on 11/18/2019 3:34 PM      FLU and Tdap

## 2019-11-18 NOTE — PROGRESS NOTES
Neurologic exam:  Cranial nerves: Normal visual acuity at bedside testing. Visual field to confrontation - normal. Pupils are reactive. External ocular movements- full range of motion; no nystagmus or diplopia. Normal corneal reflexes and facial sensations. No facial asymmetry noted and facial movements including smiling were normal. Hearing was unimpaired. Palate movements are normal with normal gag response. Shoulder movements including shrug response was normal. Tongue movements are normal and with no evidence of atrophy or involuntary movements.  Muscle strength: upper- normal lower-normal. Tone was normal stands without hands -with minimal effort   Sensory examination:Pinprick and touch - normal . Vibration- normal 15-20 seconds at toes  Deep tendon reflexes: upper extremity-2+; lower extremity- KJ-absent; Babinski is negative  Cerebellar exam upper extremities - finger to nose - normal ; rapid alternating movements -\  Gait- unsteady, Tandem gait unsteady. Romberg's - unsteady   Cervical exam: Cervical / trapezius muscle - no tenderness; Lumbar Exam: Low back tenderness-negative sciatic notch tenderness-negative straight leg raising test-negative    Diagnoses: Multiple sclerosis;Leg pain; muscle cramps; weight gain; peripheral edema    Recommendations:  Continue baclofen 10 mg 3 pills per day, Provigil, Keppra..  Follow-up in 6 months. .                                          Not recorded   All Charges for This Encounter     Code Description Service Date Service Provider Modifiers Qty   16820 MI OFFICE/OUTPT VISIT,EST,LEVL IV 10/30/2018 Jose Shi MD S$GLB 1   [id number] MI PBB SHADOW E&M-EST. PATIENT-LVL III 10/30/2018 Jose Shi MD PBBFAC 1   3077F MI MOST RECENT SYSTOLIC BLOOD PRESSURE >= 140 MM HG 10/30/2018 Jose Shi MD S$GLB, CPTII 1   3079F MI MOST RECENT DIASTOLIC BLOOD PRESSURE 80-89 MM HG 10/30/2018 Jose Shi MD S$GLB, CPTII 1   3008F MI BODY MASS INDEX  (BMI) DOCUMENTED 10/30/2018 Jose Shi MD S$GLB, CPTII 1   Level of Service     Level of Service   MO OFFICE/OUTPT VISIT,ESTИРИНА IV [34711]   BestPractice Advisories     Click to view BestPractice Advisory history   AVS Reports     Date/Time Report Action User   10/30/2018  9:19 AM After Visit Summary Printed Catherine Mcwilliams MA   Encounter-Level Documents - 10/30/2018:     After Visit Summary - Document on 10/30/2018 9:19 AM by Catherine Mcwilliams MA: After Visit Summary   Orders Placed      None   Medication Changes         baclofen                 10 MG Tab, TAKE 1 TABLET BY MOUTH 3 TIMES A DAY       losartan potassium                Protocol Details       50 mg Oral Daily      Protocol Details                                      Medication List    Fall Risk     Patient Mobility Status: Ambulatory   Number of falls in the past 12 months?: 0   Fall Risk?: No   Visit Diagnoses         MS (multiple sclerosis)      Problem List

## 2019-11-26 RX ORDER — BACLOFEN 10 MG/1
TABLET ORAL
Qty: 90 TABLET | Refills: 2 | Status: SHIPPED | OUTPATIENT
Start: 2019-11-26 | End: 2020-08-25 | Stop reason: SDUPTHER

## 2019-12-04 ENCOUNTER — TELEPHONE (OUTPATIENT)
Dept: ADMINISTRATIVE | Facility: OTHER | Age: 64
End: 2019-12-04

## 2019-12-11 RX ORDER — INTERFERON BETA-1A 30MCG/.5ML
KIT INTRAMUSCULAR
Qty: 4 SYRINGE | Refills: 12 | Status: SHIPPED | OUTPATIENT
Start: 2019-12-11 | End: 2021-01-06 | Stop reason: SDUPTHER

## 2019-12-28 NOTE — TELEPHONE ENCOUNTER
We have a few urgent care tomorrow. Can he come in? Cefdinir does not necessarily treat UTI and if we see him tomorrow we can do some tests including his urine.    You can access the FollowMyHealth Patient Portal offered by [facility] by registering at the following website: [email or web address]. By joining [facility]’s FollowMyHealth portal, you will also be able to view your health information using other applications (apps) compatible with our system.

## 2020-02-21 ENCOUNTER — TELEPHONE (OUTPATIENT)
Dept: INTERNAL MEDICINE | Facility: CLINIC | Age: 65
End: 2020-02-21

## 2020-02-21 ENCOUNTER — PATIENT MESSAGE (OUTPATIENT)
Dept: INTERNAL MEDICINE | Facility: CLINIC | Age: 65
End: 2020-02-21

## 2020-02-21 NOTE — TELEPHONE ENCOUNTER
----- Message from Janet Montano sent at 2/21/2020 10:51 AM CST -----  Contact: Patient [phone]  Patient is returning a phone call.  Who left a message for the patient: Dorothea BALES  Does patient know what this is regarding:  Leg pain  Comments:

## 2020-02-21 NOTE — TELEPHONE ENCOUNTER
----- Message from Lani Mireles sent at 2/21/2020  8:45 AM CST -----  Contact: [phone]  Would like to get medical advice.  Symptoms (please be specific):  Leg pain   Pharmacy name and phone #:  [id number] - [place] - [address]   [phone] (Phone)  [phone] (Fax)  Would the patient rather a call back or a response via [facility]?:  Call back   Comments:  Patient is requesting an antibiotic.  Please advise, thanks .

## 2020-02-23 RX ORDER — CEPHALEXIN 500 MG/1
500 CAPSULE ORAL EVERY 8 HOURS
Qty: 21 CAPSULE | Refills: 0 | Status: SHIPPED | OUTPATIENT
Start: 2020-02-23 | End: 2020-03-01

## 2020-02-26 ENCOUNTER — PATIENT MESSAGE (OUTPATIENT)
Dept: INTERNAL MEDICINE | Facility: CLINIC | Age: 65
End: 2020-02-26

## 2020-02-26 ENCOUNTER — LAB VISIT (OUTPATIENT)
Dept: LAB | Facility: HOSPITAL | Age: 65
End: 2020-02-26
Attending: INTERNAL MEDICINE
Payer: COMMERCIAL

## 2020-02-26 ENCOUNTER — OFFICE VISIT (OUTPATIENT)
Dept: INTERNAL MEDICINE | Facility: CLINIC | Age: 65
End: 2020-02-26
Payer: COMMERCIAL

## 2020-02-26 VITALS
BODY MASS INDEX: 41.16 KG/M2 | TEMPERATURE: 98 F | HEART RATE: 68 BPM | SYSTOLIC BLOOD PRESSURE: 138 MMHG | DIASTOLIC BLOOD PRESSURE: 84 MMHG | WEIGHT: 311.94 LBS | OXYGEN SATURATION: 99 %

## 2020-02-26 DIAGNOSIS — R60.9 EDEMA, UNSPECIFIED TYPE: Primary | ICD-10-CM

## 2020-02-26 DIAGNOSIS — L03.116 CELLULITIS OF LEFT LOWER EXTREMITY: ICD-10-CM

## 2020-02-26 DIAGNOSIS — L53.9 ERYTHEMA: ICD-10-CM

## 2020-02-26 DIAGNOSIS — G35 MS (MULTIPLE SCLEROSIS): ICD-10-CM

## 2020-02-26 DIAGNOSIS — R60.9 EDEMA, UNSPECIFIED TYPE: ICD-10-CM

## 2020-02-26 LAB
BASOPHILS # BLD AUTO: 0.04 K/UL (ref 0–0.2)
BASOPHILS NFR BLD: 0.5 % (ref 0–1.9)
DIFFERENTIAL METHOD: ABNORMAL
EOSINOPHIL # BLD AUTO: 0.4 K/UL (ref 0–0.5)
EOSINOPHIL NFR BLD: 6 % (ref 0–8)
ERYTHROCYTE [DISTWIDTH] IN BLOOD BY AUTOMATED COUNT: 13.2 % (ref 11.5–14.5)
HCT VFR BLD AUTO: 45.1 % (ref 40–54)
HGB BLD-MCNC: 14.1 G/DL (ref 14–18)
IMM GRANULOCYTES # BLD AUTO: 0.08 K/UL (ref 0–0.04)
IMM GRANULOCYTES NFR BLD AUTO: 1.1 % (ref 0–0.5)
LYMPHOCYTES # BLD AUTO: 1.9 K/UL (ref 1–4.8)
LYMPHOCYTES NFR BLD: 25.6 % (ref 18–48)
MCH RBC QN AUTO: 30.5 PG (ref 27–31)
MCHC RBC AUTO-ENTMCNC: 31.3 G/DL (ref 32–36)
MCV RBC AUTO: 98 FL (ref 82–98)
MONOCYTES # BLD AUTO: 0.7 K/UL (ref 0.3–1)
MONOCYTES NFR BLD: 9.7 % (ref 4–15)
NEUTROPHILS # BLD AUTO: 4.2 K/UL (ref 1.8–7.7)
NEUTROPHILS NFR BLD: 57.1 % (ref 38–73)
NRBC BLD-RTO: 0 /100 WBC
PLATELET # BLD AUTO: 315 K/UL (ref 150–350)
PMV BLD AUTO: 10.7 FL (ref 9.2–12.9)
RBC # BLD AUTO: 4.62 M/UL (ref 4.6–6.2)
WBC # BLD AUTO: 7.39 K/UL (ref 3.9–12.7)

## 2020-02-26 PROCEDURE — 99214 PR OFFICE/OUTPT VISIT, EST, LEVL IV, 30-39 MIN: ICD-10-PCS | Mod: 25,S$GLB,, | Performed by: INTERNAL MEDICINE

## 2020-02-26 PROCEDURE — 99214 OFFICE O/P EST MOD 30 MIN: CPT | Mod: 25,S$GLB,, | Performed by: INTERNAL MEDICINE

## 2020-02-26 PROCEDURE — 3079F DIAST BP 80-89 MM HG: CPT | Mod: CPTII,S$GLB,, | Performed by: INTERNAL MEDICINE

## 2020-02-26 PROCEDURE — 99999 PR PBB SHADOW E&M-EST. PATIENT-LVL IV: ICD-10-PCS | Mod: PBBFAC,,, | Performed by: INTERNAL MEDICINE

## 2020-02-26 PROCEDURE — 3079F PR MOST RECENT DIASTOLIC BLOOD PRESSURE 80-89 MM HG: ICD-10-PCS | Mod: CPTII,S$GLB,, | Performed by: INTERNAL MEDICINE

## 2020-02-26 PROCEDURE — 85025 COMPLETE CBC W/AUTO DIFF WBC: CPT

## 2020-02-26 PROCEDURE — 96372 PR INJECTION,THERAP/PROPH/DIAG2ST, IM OR SUBCUT: ICD-10-PCS | Mod: S$GLB,,, | Performed by: INTERNAL MEDICINE

## 2020-02-26 PROCEDURE — 99999 PR PBB SHADOW E&M-EST. PATIENT-LVL IV: CPT | Mod: PBBFAC,,, | Performed by: INTERNAL MEDICINE

## 2020-02-26 PROCEDURE — 36415 COLL VENOUS BLD VENIPUNCTURE: CPT

## 2020-02-26 PROCEDURE — 3008F PR BODY MASS INDEX (BMI) DOCUMENTED: ICD-10-PCS | Mod: CPTII,S$GLB,, | Performed by: INTERNAL MEDICINE

## 2020-02-26 PROCEDURE — 96372 THER/PROPH/DIAG INJ SC/IM: CPT | Mod: S$GLB,,, | Performed by: INTERNAL MEDICINE

## 2020-02-26 PROCEDURE — 3075F SYST BP GE 130 - 139MM HG: CPT | Mod: CPTII,S$GLB,, | Performed by: INTERNAL MEDICINE

## 2020-02-26 PROCEDURE — 3075F PR MOST RECENT SYSTOLIC BLOOD PRESS GE 130-139MM HG: ICD-10-PCS | Mod: CPTII,S$GLB,, | Performed by: INTERNAL MEDICINE

## 2020-02-26 PROCEDURE — 3008F BODY MASS INDEX DOCD: CPT | Mod: CPTII,S$GLB,, | Performed by: INTERNAL MEDICINE

## 2020-02-26 RX ORDER — TRIAMCINOLONE ACETONIDE 1 MG/G
CREAM TOPICAL 2 TIMES DAILY
Qty: 80 G | Refills: 1 | Status: SHIPPED | OUTPATIENT
Start: 2020-02-26 | End: 2020-09-21

## 2020-02-26 RX ORDER — CEFTRIAXONE 1 G/1
1 INJECTION, POWDER, FOR SOLUTION INTRAMUSCULAR; INTRAVENOUS
Status: COMPLETED | OUTPATIENT
Start: 2020-02-26 | End: 2020-02-26

## 2020-02-26 RX ADMIN — CEFTRIAXONE 1 G: 1 INJECTION, POWDER, FOR SOLUTION INTRAMUSCULAR; INTRAVENOUS at 10:02

## 2020-02-26 NOTE — PROGRESS NOTES
Subjective:       Patient ID: Rajan Cooper III is a 64 y.o. male.    Chief Complaint: Recurrent Skin Infections and URI    HPI:  Patient with history of lower extremity edema, neuropathy, multiple sclerosis complains of possible recurrent cellulitis.  He has had this in the past.  There is been some redness some swelling and mild pain distally.  He thinks a fair bruits may have irritated the skin but he has been having more swelling and was worried about a DVT as he had this on the right side some years ago.  We had sent out some antibiotics based on the portal request and he started taking it.  He thinks the redness is slightly better but the skin is peeling a little bit as well.  He is concerned because on some occasions he has need parental therapy and even ended up in the hospital.  No fevers or chills.  Some pain including with walking.  Swelling does go down a little over night.    Review of Systems   Constitutional: Negative for chills, fatigue, fever and unexpected weight change.   HENT: Negative for nosebleeds and trouble swallowing.    Eyes: Negative for pain and visual disturbance.   Respiratory: Negative for cough, shortness of breath and wheezing.    Cardiovascular: Negative for chest pain and palpitations.   Gastrointestinal: Negative for abdominal pain, constipation, diarrhea, nausea and vomiting.   Genitourinary: Negative for difficulty urinating and hematuria.   Musculoskeletal: Positive for arthralgias and gait problem. Negative for neck pain.   Skin: Positive for color change and rash.   Neurological: Negative for dizziness and headaches.   Hematological: Does not bruise/bleed easily.   Psychiatric/Behavioral: Negative for dysphoric mood, sleep disturbance and suicidal ideas.       Objective:      Physical Exam   Constitutional: He is oriented to person, place, and time. He appears well-developed and well-nourished. No distress.   HENT:   Head: Normocephalic and atraumatic.   Mouth/Throat:  Oropharynx is clear and moist. No oropharyngeal exudate.   TM's clear, pharynx clear   Eyes: Pupils are equal, round, and reactive to light. Conjunctivae and EOM are normal. No scleral icterus.   Neck:   No supraclavicular nodes palpated   Cardiovascular: Normal rate, regular rhythm and normal heart sounds.   No murmur heard.  Pulmonary/Chest: Effort normal and breath sounds normal. He has no wheezes.   Abdominal: Soft. Bowel sounds are normal. He exhibits no mass. There is no tenderness.   Musculoskeletal: He exhibits no edema.   Neurological: He is alert and oriented to person, place, and time. Coordination abnormal.   Skin: No rash noted. There is erythema. No pallor.   Psychiatric: He has a normal mood and affect. His behavior is normal.             Assessment:       1. Edema, unspecified type    2. Erythema    3. Cellulitis of left lower extremity    4. MS (multiple sclerosis)        Plan:       Rajan was seen today for recurrent skin infections and uri.    Diagnoses and all orders for this visit:    Edema, unspecified type  -     CV Ultrasound doppler venous DVT leg left; Future  -     US Lower Extremity Veins Left; Future  -     CBC auto differential; Future    Erythema  -     CV Ultrasound doppler venous DVT leg left; Future  -     US Lower Extremity Veins Left; Future  -     CBC auto differential; Future  -     cefTRIAXone injection 1 g    Cellulitis of left lower extremity  -     CV Ultrasound doppler venous DVT leg left; Future  -     US Lower Extremity Veins Left; Future  -     CBC auto differential; Future  -     cefTRIAXone injection 1 g    MS (multiple sclerosis)    Other orders  -     triamcinolone acetonide 0.1% (KENALOG) 0.1 % cream; Apply topically 2 (two) times daily.

## 2020-02-27 ENCOUNTER — PATIENT MESSAGE (OUTPATIENT)
Dept: INTERNAL MEDICINE | Facility: CLINIC | Age: 65
End: 2020-02-27

## 2020-02-27 ENCOUNTER — CLINICAL SUPPORT (OUTPATIENT)
Dept: CARDIOLOGY | Facility: CLINIC | Age: 65
End: 2020-02-27
Attending: INTERNAL MEDICINE
Payer: COMMERCIAL

## 2020-02-27 ENCOUNTER — HOSPITAL ENCOUNTER (OUTPATIENT)
Dept: RADIOLOGY | Facility: HOSPITAL | Age: 65
Discharge: HOME | End: 2020-02-27
Attending: INTERNAL MEDICINE
Payer: COMMERCIAL

## 2020-02-27 DIAGNOSIS — L53.9 ERYTHEMA: ICD-10-CM

## 2020-02-27 DIAGNOSIS — L03.116 CELLULITIS OF LEFT LOWER EXTREMITY: ICD-10-CM

## 2020-02-27 DIAGNOSIS — R60.9 EDEMA, UNSPECIFIED TYPE: ICD-10-CM

## 2020-02-27 PROCEDURE — 93971 EXTREMITY STUDY: CPT | Mod: 26,LT,, | Performed by: RADIOLOGY

## 2020-02-27 PROCEDURE — 93971 EXTREMITY STUDY: CPT | Mod: TC,LT

## 2020-02-27 PROCEDURE — 93971 US LOWER EXTREMITY VEINS LEFT: ICD-10-PCS | Mod: 26,LT,, | Performed by: RADIOLOGY

## 2020-03-10 ENCOUNTER — TELEPHONE (OUTPATIENT)
Dept: INTERNAL MEDICINE | Facility: CLINIC | Age: 65
End: 2020-03-10

## 2020-03-10 RX ORDER — OSELTAMIVIR PHOSPHATE 75 MG/1
75 CAPSULE ORAL DAILY
Qty: 10 CAPSULE | Refills: 0 | Status: SHIPPED | OUTPATIENT
Start: 2020-03-10 | End: 2020-03-20

## 2020-03-21 RX ORDER — LOSARTAN POTASSIUM 50 MG/1
50 TABLET ORAL DAILY
Qty: 30 TABLET | Refills: 2 | Status: SHIPPED | OUTPATIENT
Start: 2020-03-21 | End: 2020-06-12 | Stop reason: SDUPTHER

## 2020-03-21 RX ORDER — AMLODIPINE BESYLATE 10 MG/1
TABLET ORAL
Qty: 90 TABLET | Refills: 3 | Status: SHIPPED | OUTPATIENT
Start: 2020-03-21 | End: 2021-03-18

## 2020-06-04 ENCOUNTER — TELEPHONE (OUTPATIENT)
Dept: ORTHOPEDICS | Facility: CLINIC | Age: 65
End: 2020-06-04

## 2020-06-04 ENCOUNTER — HOSPITAL ENCOUNTER (OUTPATIENT)
Dept: RADIOLOGY | Facility: HOSPITAL | Age: 65
Discharge: HOME | End: 2020-06-04
Attending: PHYSICIAN ASSISTANT
Payer: COMMERCIAL

## 2020-06-04 ENCOUNTER — PATIENT OUTREACH (OUTPATIENT)
Dept: ADMINISTRATIVE | Facility: OTHER | Age: 65
End: 2020-06-04

## 2020-06-04 ENCOUNTER — OFFICE VISIT (OUTPATIENT)
Dept: ORTHOPEDICS | Facility: CLINIC | Age: 65
End: 2020-06-04
Payer: COMMERCIAL

## 2020-06-04 ENCOUNTER — HOSPITAL ENCOUNTER (OUTPATIENT)
Dept: RADIOLOGY | Facility: HOSPITAL | Age: 65
Discharge: HOME | End: 2020-06-04
Attending: INTERNAL MEDICINE
Payer: COMMERCIAL

## 2020-06-04 VITALS
WEIGHT: 313.25 LBS | DIASTOLIC BLOOD PRESSURE: 72 MMHG | TEMPERATURE: 100 F | SYSTOLIC BLOOD PRESSURE: 132 MMHG | BODY MASS INDEX: 41.33 KG/M2 | HEART RATE: 82 BPM

## 2020-06-04 DIAGNOSIS — M25.562 PAIN IN BOTH KNEES, UNSPECIFIED CHRONICITY: ICD-10-CM

## 2020-06-04 DIAGNOSIS — M25.562 CHRONIC PAIN OF BOTH KNEES: ICD-10-CM

## 2020-06-04 DIAGNOSIS — G89.29 CHRONIC PAIN OF BOTH KNEES: ICD-10-CM

## 2020-06-04 DIAGNOSIS — M25.561 CHRONIC PAIN OF BOTH KNEES: ICD-10-CM

## 2020-06-04 DIAGNOSIS — M25.561 PAIN IN BOTH KNEES, UNSPECIFIED CHRONICITY: ICD-10-CM

## 2020-06-04 DIAGNOSIS — M79.605 LOWER EXTREMITY PAIN, LEFT: ICD-10-CM

## 2020-06-04 DIAGNOSIS — L03.116 CELLULITIS OF LEFT LOWER EXTREMITY: Primary | ICD-10-CM

## 2020-06-04 PROCEDURE — 73564 X-RAY EXAM KNEE 4 OR MORE: CPT | Mod: 26,,, | Performed by: RADIOLOGY

## 2020-06-04 PROCEDURE — 99203 PR OFFICE/OUTPT VISIT, NEW, LEVL III, 30-44 MIN: ICD-10-PCS | Mod: S$GLB,,, | Performed by: PHYSICIAN ASSISTANT

## 2020-06-04 PROCEDURE — 3078F DIAST BP <80 MM HG: CPT | Mod: CPTII,S$GLB,, | Performed by: PHYSICIAN ASSISTANT

## 2020-06-04 PROCEDURE — 3075F SYST BP GE 130 - 139MM HG: CPT | Mod: CPTII,S$GLB,, | Performed by: PHYSICIAN ASSISTANT

## 2020-06-04 PROCEDURE — 73564 XR KNEE ORTHO BILAT WITH FLEXION: ICD-10-PCS | Mod: 26,,, | Performed by: RADIOLOGY

## 2020-06-04 PROCEDURE — 3078F PR MOST RECENT DIASTOLIC BLOOD PRESSURE < 80 MM HG: ICD-10-PCS | Mod: CPTII,S$GLB,, | Performed by: PHYSICIAN ASSISTANT

## 2020-06-04 PROCEDURE — 99999 PR PBB SHADOW E&M-EST. PATIENT-LVL IV: ICD-10-PCS | Mod: PBBFAC,,, | Performed by: PHYSICIAN ASSISTANT

## 2020-06-04 PROCEDURE — 3008F PR BODY MASS INDEX (BMI) DOCUMENTED: ICD-10-PCS | Mod: CPTII,S$GLB,, | Performed by: PHYSICIAN ASSISTANT

## 2020-06-04 PROCEDURE — 99999 PR PBB SHADOW E&M-EST. PATIENT-LVL IV: CPT | Mod: PBBFAC,,, | Performed by: PHYSICIAN ASSISTANT

## 2020-06-04 PROCEDURE — 73564 X-RAY EXAM KNEE 4 OR MORE: CPT | Mod: TC,50

## 2020-06-04 PROCEDURE — 3008F BODY MASS INDEX DOCD: CPT | Mod: CPTII,S$GLB,, | Performed by: PHYSICIAN ASSISTANT

## 2020-06-04 PROCEDURE — 3075F PR MOST RECENT SYSTOLIC BLOOD PRESS GE 130-139MM HG: ICD-10-PCS | Mod: CPTII,S$GLB,, | Performed by: PHYSICIAN ASSISTANT

## 2020-06-04 PROCEDURE — 99203 OFFICE O/P NEW LOW 30 MIN: CPT | Mod: S$GLB,,, | Performed by: PHYSICIAN ASSISTANT

## 2020-06-04 RX ORDER — SULFAMETHOXAZOLE AND TRIMETHOPRIM 800; 160 MG/1; MG/1
1 TABLET ORAL 2 TIMES DAILY
Qty: 20 TABLET | Refills: 0 | Status: SHIPPED | OUTPATIENT
Start: 2020-06-04 | End: 2020-06-15 | Stop reason: SDUPTHER

## 2020-06-04 NOTE — TELEPHONE ENCOUNTER
Ortho Referral: [id number]  Appt scheduled today with RED Saez PA-C/[facility] at 6:30pm with arrival at 6:15pm for chronic pain of both knees per Dr. Garcia/Int Med referral. Pt states pain is only to L knee. No known injury, but states has MS and may have injured unknowingly. Pt confirms time and location of appt. Active in [facility].

## 2020-06-04 NOTE — LETTER
June 4, 2020      Tom Garcia MD  [address]  [facility] [address]           [facility]  [address]  [facility] [address]  Phone: [phone]  Fax: [phone]          Patient: Rajan Cooper III   MR Number: [id number]   YOB: 1955   Date of Visit: 6/4/2020       Dear Dr. Tom Garcia:    Thank you for referring Rajan Cooper to me for evaluation. Attached you will find relevant portions of my assessment and plan of care.    If you have questions, please do not hesitate to call me. I look forward to following Rajan Cooper along with you.    Sincerely,    Mariann Saez PA-C    Enclosure  CC:  No Recipients    If you would like to receive this communication electronically, please contact [email or web address] or [phone] to request more information on [employer] Link access.    For providers and/or their staff who would like to refer a patient to [facility], please contact us through our one-stop-shop provider referral line, [facility] , at [phone].    If you feel you have received this communication in error or would no longer like to receive these types of communications, please e-mail [email or web address]

## 2020-06-04 NOTE — PROGRESS NOTES
Subjective:      Patient ID: Rajan Cooper III is a 64 y.o. male.    Chief Complaint: Pain and Swelling of the Left Knee    HPI  64 year old male presents with chief complaint of left knee pain x 1 week. He does not recall trauma. He has h/o MS. He has decreased sensation in his legs due to the MS. He reports pain at the anterior and medial knee. It is worse when he is sitting still. Walking eases the pain. He reports swelling. He reports erythema at the lower extremity for the past 2 days. He denies fever, chills, and sweats. No h/o gout. He reports h/o staph infections. In 2017 he had I&D of the left wrist and right ankle by Dr. Shipley. Cultures were positive for staph. He ambulates with a walker.   Review of Systems   Constitution: Negative for chills, fever and night sweats.   Cardiovascular: Negative for chest pain.   Respiratory: Negative for cough and shortness of breath.    Hematologic/Lymphatic: Does not bruise/bleed easily.   Skin: Positive for color change (erythema LLE).   Gastrointestinal: Negative for heartburn.   Genitourinary: Negative for dysuria.   Neurological: Negative for numbness and paresthesias.   Psychiatric/Behavioral: Negative for altered mental status.   Allergic/Immunologic: Negative for persistent infections.         Objective:            General    Vitals reviewed.  Constitutional: He is oriented to person, place, and time. He appears well-developed and well-nourished.   Cardiovascular: Normal rate.    Neurological: He is alert and oriented to person, place, and time.             Left Knee Exam:  ROM 0-110 degrees. No pain with ROM.  No effusion  Mild soft tissue swelling at the anterior knee over the tibial tuberosity  TTP medial knee and tibial tuberosity  LE erythema and warmth noted at the medial knee down the shin and calf to the lower leg just above the ankle  Mild to moderate LE swelling      X-ray: ordered and reviewed by myself. OA present.      Assessment:       Encounter  Diagnoses   Name Primary?    Lower extremity pain, left     Cellulitis of left lower extremity Yes          Plan:       Discussed treatment options with patient. Bactrim sent to pharmacy for cellulitis. Low suspicion for septic joint. He has painless ROM and no joint effusion. He will elevate LE above level of heart. Patient was advised that if his symptoms worsen or do not improve in the next couple of days, he should go to the ED as he may need IV antibiotics. He voiced understanding.

## 2020-06-07 ENCOUNTER — PATIENT MESSAGE (OUTPATIENT)
Dept: INTERNAL MEDICINE | Facility: CLINIC | Age: 65
End: 2020-06-07

## 2020-06-09 ENCOUNTER — OFFICE VISIT (OUTPATIENT)
Dept: INTERNAL MEDICINE | Facility: CLINIC | Age: 65
End: 2020-06-09
Payer: COMMERCIAL

## 2020-06-09 ENCOUNTER — LAB VISIT (OUTPATIENT)
Dept: LAB | Facility: HOSPITAL | Age: 65
End: 2020-06-09
Attending: INTERNAL MEDICINE
Payer: COMMERCIAL

## 2020-06-09 VITALS — TEMPERATURE: 99 F | SYSTOLIC BLOOD PRESSURE: 132 MMHG | HEART RATE: 80 BPM | DIASTOLIC BLOOD PRESSURE: 78 MMHG

## 2020-06-09 DIAGNOSIS — L03.116 CELLULITIS OF LEFT LOWER EXTREMITY: ICD-10-CM

## 2020-06-09 DIAGNOSIS — L03.116 CELLULITIS OF LEFT LOWER EXTREMITY: Primary | ICD-10-CM

## 2020-06-09 DIAGNOSIS — G35 MS (MULTIPLE SCLEROSIS): ICD-10-CM

## 2020-06-09 LAB
BASOPHILS # BLD AUTO: 0.03 K/UL (ref 0–0.2)
BASOPHILS NFR BLD: 0.3 % (ref 0–1.9)
CRP SERPL-MCNC: 118.5 MG/L (ref 0–8.2)
DIFFERENTIAL METHOD: ABNORMAL
EOSINOPHIL # BLD AUTO: 0.3 K/UL (ref 0–0.5)
EOSINOPHIL NFR BLD: 2.8 % (ref 0–8)
ERYTHROCYTE [DISTWIDTH] IN BLOOD BY AUTOMATED COUNT: 14.4 % (ref 11.5–14.5)
ERYTHROCYTE [SEDIMENTATION RATE] IN BLOOD BY WESTERGREN METHOD: 42 MM/HR (ref 0–23)
HCT VFR BLD AUTO: 45.1 % (ref 40–54)
HGB BLD-MCNC: 13.7 G/DL (ref 14–18)
IMM GRANULOCYTES # BLD AUTO: 0.07 K/UL (ref 0–0.04)
IMM GRANULOCYTES NFR BLD AUTO: 0.7 % (ref 0–0.5)
LYMPHOCYTES # BLD AUTO: 1.3 K/UL (ref 1–4.8)
LYMPHOCYTES NFR BLD: 13.1 % (ref 18–48)
MCH RBC QN AUTO: 29.3 PG (ref 27–31)
MCHC RBC AUTO-ENTMCNC: 30.4 G/DL (ref 32–36)
MCV RBC AUTO: 97 FL (ref 82–98)
MONOCYTES # BLD AUTO: 0.9 K/UL (ref 0.3–1)
MONOCYTES NFR BLD: 9.3 % (ref 4–15)
NEUTROPHILS # BLD AUTO: 7.1 K/UL (ref 1.8–7.7)
NEUTROPHILS NFR BLD: 73.8 % (ref 38–73)
NRBC BLD-RTO: 0 /100 WBC
PLATELET # BLD AUTO: 366 K/UL (ref 150–350)
PMV BLD AUTO: 10.6 FL (ref 9.2–12.9)
RBC # BLD AUTO: 4.67 M/UL (ref 4.6–6.2)
WBC # BLD AUTO: 9.66 K/UL (ref 3.9–12.7)

## 2020-06-09 PROCEDURE — 96372 THER/PROPH/DIAG INJ SC/IM: CPT | Mod: S$GLB,,, | Performed by: INTERNAL MEDICINE

## 2020-06-09 PROCEDURE — 85652 RBC SED RATE AUTOMATED: CPT

## 2020-06-09 PROCEDURE — 36415 COLL VENOUS BLD VENIPUNCTURE: CPT

## 2020-06-09 PROCEDURE — 99999 PR PBB SHADOW E&M-EST. PATIENT-LVL III: CPT | Mod: PBBFAC,,, | Performed by: INTERNAL MEDICINE

## 2020-06-09 PROCEDURE — 99214 PR OFFICE/OUTPT VISIT, EST, LEVL IV, 30-39 MIN: ICD-10-PCS | Mod: 25,S$GLB,, | Performed by: INTERNAL MEDICINE

## 2020-06-09 PROCEDURE — 99214 OFFICE O/P EST MOD 30 MIN: CPT | Mod: 25,S$GLB,, | Performed by: INTERNAL MEDICINE

## 2020-06-09 PROCEDURE — 85025 COMPLETE CBC W/AUTO DIFF WBC: CPT

## 2020-06-09 PROCEDURE — 96372 PR INJECTION,THERAP/PROPH/DIAG2ST, IM OR SUBCUT: ICD-10-PCS | Mod: S$GLB,,, | Performed by: INTERNAL MEDICINE

## 2020-06-09 PROCEDURE — 99999 PR PBB SHADOW E&M-EST. PATIENT-LVL III: ICD-10-PCS | Mod: PBBFAC,,, | Performed by: INTERNAL MEDICINE

## 2020-06-09 PROCEDURE — 86140 C-REACTIVE PROTEIN: CPT

## 2020-06-09 PROCEDURE — 3075F SYST BP GE 130 - 139MM HG: CPT | Mod: CPTII,S$GLB,, | Performed by: INTERNAL MEDICINE

## 2020-06-09 PROCEDURE — 3078F DIAST BP <80 MM HG: CPT | Mod: CPTII,S$GLB,, | Performed by: INTERNAL MEDICINE

## 2020-06-09 PROCEDURE — 3075F PR MOST RECENT SYSTOLIC BLOOD PRESS GE 130-139MM HG: ICD-10-PCS | Mod: CPTII,S$GLB,, | Performed by: INTERNAL MEDICINE

## 2020-06-09 PROCEDURE — 3078F PR MOST RECENT DIASTOLIC BLOOD PRESSURE < 80 MM HG: ICD-10-PCS | Mod: CPTII,S$GLB,, | Performed by: INTERNAL MEDICINE

## 2020-06-09 RX ORDER — CEFTRIAXONE 1 G/1
1 INJECTION, POWDER, FOR SOLUTION INTRAMUSCULAR; INTRAVENOUS
Status: COMPLETED | OUTPATIENT
Start: 2020-06-09 | End: 2020-06-09

## 2020-06-09 RX ADMIN — CEFTRIAXONE 1 G: 1 INJECTION, POWDER, FOR SOLUTION INTRAMUSCULAR; INTRAVENOUS at 08:06

## 2020-06-09 NOTE — PROGRESS NOTES
Subjective:       Patient ID: Rajan Cooper III is a 64 y.o. male.    Chief Complaint: Recurrent Skin Infections    Patient here for orthopedic follow-up for cellulitis left leg.  It is still red and swollen but less tender and less swollen than it was last week.  Patient has had infections on the arms and legs in the past.  No fever.  Less tenderness.  He is continuing his same medications.  He denies any GI or  complaints.  Because of his multiple sclerosis he has decreased sensation in his legs but does not remember hitting anything.  There is some disruption of the skin, some cracking, crusting.  I reviewed the orthopedic note and x-rays.  I have sent a question to the reading radiologist because it appears a word was left off of the transcription from the report.  I believe it is supposed to say no fracture dislocation or destruction but there is a word missing.    Review of Systems   Constitutional: Negative for chills, fatigue, fever and unexpected weight change.   HENT: Negative for nosebleeds and trouble swallowing.    Eyes: Negative for pain and visual disturbance.   Respiratory: Negative for cough, shortness of breath and wheezing.    Cardiovascular: Negative for chest pain and palpitations.   Gastrointestinal: Negative for abdominal pain, constipation, diarrhea, nausea and vomiting.   Genitourinary: Negative for difficulty urinating and hematuria.   Musculoskeletal: Positive for gait problem. Negative for neck pain.   Skin: Positive for color change. Negative for rash.   Neurological: Positive for weakness. Negative for dizziness and headaches.   Hematological: Does not bruise/bleed easily.   Psychiatric/Behavioral: Negative for dysphoric mood, sleep disturbance and suicidal ideas.       Objective:      Physical Exam   Constitutional: He appears well-developed and well-nourished.   Patient walks with a walker due to multiple sclerosis   HENT:   Head: Normocephalic and atraumatic.   Musculoskeletal: He  exhibits edema and tenderness (Medial soft tissue left knee).   Skin: There is erythema.   Psychiatric: He has a normal mood and affect. His behavior is normal.   Vitals reviewed.          Assessment:       1. Cellulitis of left lower extremity    2. MS (multiple sclerosis)        Plan:       Rajan was seen today for recurrent skin infections.    Diagnoses and all orders for this visit:    Cellulitis of left lower extremity  -     C-reactive protein; Future  -     Sedimentation rate; Future  -     CBC auto differential; Future  -     cefTRIAXone injection 1 g    MS (multiple sclerosis)          Continue current antibiotic.  Labs as above, Rocephin shot.  Monitor symptoms.

## 2020-06-10 ENCOUNTER — PATIENT MESSAGE (OUTPATIENT)
Dept: INTERNAL MEDICINE | Facility: CLINIC | Age: 65
End: 2020-06-10

## 2020-06-11 ENCOUNTER — PATIENT MESSAGE (OUTPATIENT)
Dept: INTERNAL MEDICINE | Facility: CLINIC | Age: 65
End: 2020-06-11

## 2020-06-11 ENCOUNTER — TELEPHONE (OUTPATIENT)
Dept: INTERNAL MEDICINE | Facility: CLINIC | Age: 65
End: 2020-06-11

## 2020-06-11 DIAGNOSIS — L03.116 CELLULITIS OF LEFT LOWER EXTREMITY: Primary | ICD-10-CM

## 2020-06-12 ENCOUNTER — LAB VISIT (OUTPATIENT)
Dept: LAB | Facility: HOSPITAL | Age: 65
End: 2020-06-12
Attending: INTERNAL MEDICINE
Payer: COMMERCIAL

## 2020-06-12 DIAGNOSIS — L03.116 CELLULITIS OF LEFT LOWER EXTREMITY: ICD-10-CM

## 2020-06-12 LAB
BASOPHILS # BLD AUTO: 0.02 K/UL (ref 0–0.2)
BASOPHILS NFR BLD: 0.2 % (ref 0–1.9)
CRP SERPL-MCNC: 88.7 MG/L (ref 0–8.2)
DIFFERENTIAL METHOD: ABNORMAL
EOSINOPHIL # BLD AUTO: 0.2 K/UL (ref 0–0.5)
EOSINOPHIL NFR BLD: 2.7 % (ref 0–8)
ERYTHROCYTE [DISTWIDTH] IN BLOOD BY AUTOMATED COUNT: 14.2 % (ref 11.5–14.5)
HCT VFR BLD AUTO: 38.3 % (ref 40–54)
HGB BLD-MCNC: 12.2 G/DL (ref 14–18)
IMM GRANULOCYTES # BLD AUTO: 0.06 K/UL (ref 0–0.04)
IMM GRANULOCYTES NFR BLD AUTO: 0.7 % (ref 0–0.5)
LYMPHOCYTES # BLD AUTO: 1.5 K/UL (ref 1–4.8)
LYMPHOCYTES NFR BLD: 16.7 % (ref 18–48)
MCH RBC QN AUTO: 29.5 PG (ref 27–31)
MCHC RBC AUTO-ENTMCNC: 31.9 G/DL (ref 32–36)
MCV RBC AUTO: 93 FL (ref 82–98)
MONOCYTES # BLD AUTO: 0.9 K/UL (ref 0.3–1)
MONOCYTES NFR BLD: 10 % (ref 4–15)
NEUTROPHILS # BLD AUTO: 6 K/UL (ref 1.8–7.7)
NEUTROPHILS NFR BLD: 69.7 % (ref 38–73)
NRBC BLD-RTO: 0 /100 WBC
PLATELET # BLD AUTO: 345 K/UL (ref 150–350)
PMV BLD AUTO: 10.7 FL (ref 9.2–12.9)
RBC # BLD AUTO: 4.13 M/UL (ref 4.6–6.2)
WBC # BLD AUTO: 8.67 K/UL (ref 3.9–12.7)

## 2020-06-12 PROCEDURE — 36415 COLL VENOUS BLD VENIPUNCTURE: CPT

## 2020-06-12 PROCEDURE — 86140 C-REACTIVE PROTEIN: CPT

## 2020-06-12 PROCEDURE — 85025 COMPLETE CBC W/AUTO DIFF WBC: CPT

## 2020-06-15 ENCOUNTER — PATIENT MESSAGE (OUTPATIENT)
Dept: INTERNAL MEDICINE | Facility: CLINIC | Age: 65
End: 2020-06-15

## 2020-06-15 RX ORDER — SULFAMETHOXAZOLE AND TRIMETHOPRIM 800; 160 MG/1; MG/1
1 TABLET ORAL 2 TIMES DAILY
Qty: 20 TABLET | Refills: 0 | Status: ON HOLD | OUTPATIENT
Start: 2020-06-15 | End: 2020-06-26 | Stop reason: HOSPADM

## 2020-06-24 ENCOUNTER — PATIENT OUTREACH (OUTPATIENT)
Dept: ADMINISTRATIVE | Facility: OTHER | Age: 65
End: 2020-06-24

## 2020-06-24 ENCOUNTER — OFFICE VISIT (OUTPATIENT)
Dept: INTERNAL MEDICINE | Facility: CLINIC | Age: 65
End: 2020-06-24
Payer: COMMERCIAL

## 2020-06-24 VITALS
DIASTOLIC BLOOD PRESSURE: 82 MMHG | BODY MASS INDEX: 40.9 KG/M2 | OXYGEN SATURATION: 99 % | HEART RATE: 76 BPM | HEIGHT: 73 IN | WEIGHT: 308.63 LBS | SYSTOLIC BLOOD PRESSURE: 126 MMHG

## 2020-06-24 DIAGNOSIS — L02.416 ABSCESS OF LEFT KNEE: Primary | ICD-10-CM

## 2020-06-24 PROCEDURE — 3079F PR MOST RECENT DIASTOLIC BLOOD PRESSURE 80-89 MM HG: ICD-10-PCS | Mod: CPTII,S$GLB,, | Performed by: INTERNAL MEDICINE

## 2020-06-24 PROCEDURE — 99999 PR PBB SHADOW E&M-EST. PATIENT-LVL III: CPT | Mod: PBBFAC,,, | Performed by: INTERNAL MEDICINE

## 2020-06-24 PROCEDURE — 3008F BODY MASS INDEX DOCD: CPT | Mod: CPTII,S$GLB,, | Performed by: INTERNAL MEDICINE

## 2020-06-24 PROCEDURE — 3008F PR BODY MASS INDEX (BMI) DOCUMENTED: ICD-10-PCS | Mod: CPTII,S$GLB,, | Performed by: INTERNAL MEDICINE

## 2020-06-24 PROCEDURE — 99999 PR PBB SHADOW E&M-EST. PATIENT-LVL III: ICD-10-PCS | Mod: PBBFAC,,, | Performed by: INTERNAL MEDICINE

## 2020-06-24 PROCEDURE — 99213 PR OFFICE/OUTPT VISIT, EST, LEVL III, 20-29 MIN: ICD-10-PCS | Mod: S$GLB,,, | Performed by: INTERNAL MEDICINE

## 2020-06-24 PROCEDURE — 99213 OFFICE O/P EST LOW 20 MIN: CPT | Mod: S$GLB,,, | Performed by: INTERNAL MEDICINE

## 2020-06-24 PROCEDURE — 3074F PR MOST RECENT SYSTOLIC BLOOD PRESSURE < 130 MM HG: ICD-10-PCS | Mod: CPTII,S$GLB,, | Performed by: INTERNAL MEDICINE

## 2020-06-24 PROCEDURE — 3079F DIAST BP 80-89 MM HG: CPT | Mod: CPTII,S$GLB,, | Performed by: INTERNAL MEDICINE

## 2020-06-24 PROCEDURE — 3074F SYST BP LT 130 MM HG: CPT | Mod: CPTII,S$GLB,, | Performed by: INTERNAL MEDICINE

## 2020-06-24 NOTE — PROGRESS NOTES
Subjective:       Patient ID: Rajan Cooper III is a 64 y.o. male.    Chief Complaint: Leg Swelling (left leg)    64 year old man with MS and diminished sensation in legs and history of staph infections, has been on bactrim for almost 3 weeks and now has fluctuant abscess over patellar tendon.  Knee and shin are painful.  Knee gets very swollen and gets a little better after elevation.  No fever    Review of Systems   Constitutional: Negative for activity change, appetite change and fever.   HENT: Negative for congestion, postnasal drip and sore throat.    Respiratory: Negative for cough, shortness of breath and wheezing.    Cardiovascular: Negative for chest pain and palpitations.   Gastrointestinal: Negative for abdominal pain, blood in stool, constipation, diarrhea, nausea and vomiting.   Genitourinary: Negative for decreased urine volume, difficulty urinating, flank pain and frequency.   Musculoskeletal: Negative for arthralgias.   Neurological: Negative for dizziness, weakness and headaches.       Objective:      Physical Exam  Musculoskeletal:      Left knee: He exhibits decreased range of motion, swelling, effusion and erythema. He exhibits no ecchymosis, no deformity and no laceration. Tenderness found.        Legs:          Assessment:       1. Abscess of left knee     unsure of abscess is superficial only or perhaps extends into knee joint.  Ortho should eval for I&D   Plan:   Rajan was seen today for leg swelling.    Diagnoses and all orders for this visit:    Abscess of left knee

## 2020-06-25 ENCOUNTER — TELEPHONE (OUTPATIENT)
Dept: ORTHOPEDICS | Facility: CLINIC | Age: 65
End: 2020-06-25

## 2020-06-25 ENCOUNTER — HOSPITAL ENCOUNTER (OUTPATIENT)
Facility: HOSPITAL | Age: 65
Discharge: HOME | End: 2020-06-26
Attending: EMERGENCY MEDICINE | Admitting: INTERNAL MEDICINE
Payer: COMMERCIAL

## 2020-06-25 DIAGNOSIS — M79.89 LEG SWELLING: ICD-10-CM

## 2020-06-25 DIAGNOSIS — L03.116 LEFT LEG CELLULITIS: Primary | ICD-10-CM

## 2020-06-25 DIAGNOSIS — R07.9 CHEST PAIN: ICD-10-CM

## 2020-06-25 PROBLEM — M70.52 INFRAPATELLAR BURSITIS OF LEFT KNEE: Status: ACTIVE | Noted: 2020-06-25

## 2020-06-25 LAB
ANION GAP SERPL CALC-SCNC: 11 MMOL/L (ref 8–16)
BASOPHILS # BLD AUTO: 0.02 K/UL (ref 0–0.2)
BASOPHILS NFR BLD: 0.3 % (ref 0–1.9)
BUN SERPL-MCNC: 22 MG/DL (ref 8–23)
CALCIUM SERPL-MCNC: 9.8 MG/DL (ref 8.7–10.5)
CHLORIDE SERPL-SCNC: 102 MMOL/L (ref 95–110)
CO2 SERPL-SCNC: 26 MMOL/L (ref 23–29)
CREAT SERPL-MCNC: 1.2 MG/DL (ref 0.5–1.4)
CRP SERPL-MCNC: 156.5 MG/L (ref 0–8.2)
DIFFERENTIAL METHOD: ABNORMAL
EOSINOPHIL # BLD AUTO: 0.1 K/UL (ref 0–0.5)
EOSINOPHIL NFR BLD: 1.6 % (ref 0–8)
ERYTHROCYTE [DISTWIDTH] IN BLOOD BY AUTOMATED COUNT: 14.2 % (ref 11.5–14.5)
ERYTHROCYTE [SEDIMENTATION RATE] IN BLOOD BY WESTERGREN METHOD: 114 MM/HR (ref 0–23)
EST. GFR  (AFRICAN AMERICAN): >60 ML/MIN/1.73 M^2
EST. GFR  (NON AFRICAN AMERICAN): >60 ML/MIN/1.73 M^2
GLUCOSE SERPL-MCNC: 102 MG/DL (ref 70–110)
HCT VFR BLD AUTO: 39.1 % (ref 40–54)
HGB BLD-MCNC: 12.3 G/DL (ref 14–18)
IMM GRANULOCYTES # BLD AUTO: 0.03 K/UL (ref 0–0.04)
IMM GRANULOCYTES NFR BLD AUTO: 0.4 % (ref 0–0.5)
LYMPHOCYTES # BLD AUTO: 1 K/UL (ref 1–4.8)
LYMPHOCYTES NFR BLD: 12.9 % (ref 18–48)
MCH RBC QN AUTO: 29.3 PG (ref 27–31)
MCHC RBC AUTO-ENTMCNC: 31.5 G/DL (ref 32–36)
MCV RBC AUTO: 93 FL (ref 82–98)
MONOCYTES # BLD AUTO: 0.7 K/UL (ref 0.3–1)
MONOCYTES NFR BLD: 9.3 % (ref 4–15)
NEUTROPHILS # BLD AUTO: 5.6 K/UL (ref 1.8–7.7)
NEUTROPHILS NFR BLD: 75.5 % (ref 38–73)
NRBC BLD-RTO: 0 /100 WBC
PLATELET # BLD AUTO: 323 K/UL (ref 150–350)
PMV BLD AUTO: 10.4 FL (ref 9.2–12.9)
POTASSIUM SERPL-SCNC: 4.7 MMOL/L (ref 3.5–5.1)
PROCALCITONIN SERPL IA-MCNC: 0.26 NG/ML
RBC # BLD AUTO: 4.2 M/UL (ref 4.6–6.2)
SARS-COV-2 RDRP RESP QL NAA+PROBE: NEGATIVE
SODIUM SERPL-SCNC: 139 MMOL/L (ref 136–145)
WBC # BLD AUTO: 7.39 K/UL (ref 3.9–12.7)

## 2020-06-25 PROCEDURE — 85652 RBC SED RATE AUTOMATED: CPT

## 2020-06-25 PROCEDURE — 25000003 PHARM REV CODE 250: Performed by: INTERNAL MEDICINE

## 2020-06-25 PROCEDURE — U0002 COVID-19 LAB TEST NON-CDC: HCPCS

## 2020-06-25 PROCEDURE — 86140 C-REACTIVE PROTEIN: CPT

## 2020-06-25 PROCEDURE — 85025 COMPLETE CBC W/AUTO DIFF WBC: CPT

## 2020-06-25 PROCEDURE — 87040 BLOOD CULTURE FOR BACTERIA: CPT

## 2020-06-25 PROCEDURE — 96376 TX/PRO/DX INJ SAME DRUG ADON: CPT

## 2020-06-25 PROCEDURE — 99220 PR INITIAL OBSERVATION CARE,LEVL III: CPT | Mod: ,,, | Performed by: INTERNAL MEDICINE

## 2020-06-25 PROCEDURE — G0378 HOSPITAL OBSERVATION PER HR: HCPCS

## 2020-06-25 PROCEDURE — 94761 N-INVAS EAR/PLS OXIMETRY MLT: CPT

## 2020-06-25 PROCEDURE — 80048 BASIC METABOLIC PNL TOTAL CA: CPT

## 2020-06-25 PROCEDURE — 63600175 PHARM REV CODE 636 W HCPCS: Performed by: INTERNAL MEDICINE

## 2020-06-25 PROCEDURE — 36000 PLACE NEEDLE IN VEIN: CPT

## 2020-06-25 PROCEDURE — 99220 PR INITIAL OBSERVATION CARE,LEVL III: ICD-10-PCS | Mod: ,,, | Performed by: INTERNAL MEDICINE

## 2020-06-25 PROCEDURE — 25000003 PHARM REV CODE 250: Performed by: PHYSICIAN ASSISTANT

## 2020-06-25 PROCEDURE — 96374 THER/PROPH/DIAG INJ IV PUSH: CPT

## 2020-06-25 PROCEDURE — 99285 EMERGENCY DEPT VISIT HI MDM: CPT | Mod: ,,, | Performed by: EMERGENCY MEDICINE

## 2020-06-25 PROCEDURE — 99285 PR EMERGENCY DEPT VISIT,LEVEL V: ICD-10-PCS | Mod: ,,, | Performed by: EMERGENCY MEDICINE

## 2020-06-25 PROCEDURE — 99285 EMERGENCY DEPT VISIT HI MDM: CPT | Mod: 25

## 2020-06-25 PROCEDURE — 84145 PROCALCITONIN (PCT): CPT

## 2020-06-25 RX ORDER — AMLODIPINE BESYLATE 10 MG/1
10 TABLET ORAL DAILY
Status: DISCONTINUED | OUTPATIENT
Start: 2020-06-26 | End: 2020-06-26 | Stop reason: HOSPADM

## 2020-06-25 RX ORDER — MODAFINIL 200 MG/1
200 TABLET ORAL DAILY
Status: DISCONTINUED | OUTPATIENT
Start: 2020-06-26 | End: 2020-06-26 | Stop reason: HOSPADM

## 2020-06-25 RX ORDER — ACETAMINOPHEN 325 MG/1
650 TABLET ORAL EVERY 4 HOURS PRN
Status: DISCONTINUED | OUTPATIENT
Start: 2020-06-25 | End: 2020-06-26 | Stop reason: HOSPADM

## 2020-06-25 RX ORDER — LOSARTAN POTASSIUM 50 MG/1
50 TABLET ORAL DAILY
Status: DISCONTINUED | OUTPATIENT
Start: 2020-06-26 | End: 2020-06-26 | Stop reason: HOSPADM

## 2020-06-25 RX ORDER — IBUPROFEN 200 MG
16 TABLET ORAL
Status: DISCONTINUED | OUTPATIENT
Start: 2020-06-25 | End: 2020-06-26 | Stop reason: HOSPADM

## 2020-06-25 RX ORDER — GLUCAGON 1 MG
1 KIT INJECTION
Status: DISCONTINUED | OUTPATIENT
Start: 2020-06-25 | End: 2020-06-26 | Stop reason: HOSPADM

## 2020-06-25 RX ORDER — ONDANSETRON 2 MG/ML
4 INJECTION INTRAMUSCULAR; INTRAVENOUS EVERY 8 HOURS PRN
Status: DISCONTINUED | OUTPATIENT
Start: 2020-06-25 | End: 2020-06-26 | Stop reason: HOSPADM

## 2020-06-25 RX ORDER — IBUPROFEN 200 MG
24 TABLET ORAL
Status: DISCONTINUED | OUTPATIENT
Start: 2020-06-25 | End: 2020-06-26 | Stop reason: HOSPADM

## 2020-06-25 RX ORDER — TRAMADOL HYDROCHLORIDE 50 MG/1
50 TABLET ORAL EVERY 6 HOURS PRN
Status: DISCONTINUED | OUTPATIENT
Start: 2020-06-25 | End: 2020-06-26 | Stop reason: HOSPADM

## 2020-06-25 RX ORDER — FUROSEMIDE 40 MG/1
40 TABLET ORAL 2 TIMES DAILY
Status: DISCONTINUED | OUTPATIENT
Start: 2020-06-25 | End: 2020-06-26 | Stop reason: HOSPADM

## 2020-06-25 RX ORDER — SODIUM CHLORIDE 0.9 % (FLUSH) 0.9 %
10 SYRINGE (ML) INJECTION
Status: DISCONTINUED | OUTPATIENT
Start: 2020-06-25 | End: 2020-06-26 | Stop reason: HOSPADM

## 2020-06-25 RX ORDER — LIDOCAINE HYDROCHLORIDE 10 MG/ML
5 INJECTION, SOLUTION EPIDURAL; INFILTRATION; INTRACAUDAL; PERINEURAL
Status: COMPLETED | OUTPATIENT
Start: 2020-06-25 | End: 2020-06-25

## 2020-06-25 RX ORDER — BACLOFEN 10 MG/1
10 TABLET ORAL 3 TIMES DAILY
Status: DISCONTINUED | OUTPATIENT
Start: 2020-06-25 | End: 2020-06-26 | Stop reason: HOSPADM

## 2020-06-25 RX ADMIN — VANCOMYCIN HYDROCHLORIDE 2000 MG: 100 INJECTION, POWDER, LYOPHILIZED, FOR SOLUTION INTRAVENOUS at 05:06

## 2020-06-25 RX ADMIN — FUROSEMIDE 40 MG: 40 TABLET ORAL at 10:06

## 2020-06-25 RX ADMIN — BACLOFEN 10 MG: 10 TABLET ORAL at 10:06

## 2020-06-25 RX ADMIN — LIDOCAINE HYDROCHLORIDE 50 MG: 10 INJECTION, SOLUTION EPIDURAL; INFILTRATION; INTRACAUDAL; PERINEURAL at 11:06

## 2020-06-25 NOTE — SUBJECTIVE & OBJECTIVE
Past Medical History:   Diagnosis Date    Hypertension     MS (multiple sclerosis)     Staph aureus infection 2017       No past surgical history on file.    Review of patient's allergies indicates:   Allergen Reactions    Norco [hydrocodone-acetaminophen] Anaphylaxis       Current Facility-Administered Medications   Medication    acetaminophen tablet 650 mg    [START ON 6/26/2020] amLODIPine tablet 10 mg    baclofen tablet 10 mg    dextrose 10% (D10W) Bolus    dextrose 10% (D10W) Bolus    furosemide tablet 40 mg    glucagon (human recombinant) injection 1 mg    glucose chewable tablet 16 g    glucose chewable tablet 24 g    [START ON 6/26/2020] losartan tablet 50 mg    [START ON 6/26/2020] modafiniL tablet 200 mg    ondansetron injection 4 mg    sodium chloride 0.9% flush 10 mL    traMADoL tablet 50 mg    vancomycin - pharmacy to dose    [START ON 6/26/2020] vancomycin 1.75 g in 5 % dextrose 500 mL IVPB    vancomycin 2 g in dextrose 5 % 500 mL IVPB     Current Outpatient Medications   Medication Sig    amLODIPine (NORVASC) 10 MG tablet TAKE 1 TABLET BY MOUTH ONCE DAILY    AVONEX 30 mcg/0.5 mL injection INJECT 30 MCG INTO THE MUSCLE ONCE A WEEK    baclofen (LIORESAL) 10 MG tablet TAKE 1 TABLET BY MOUTH 3 TIMES A DAY    furosemide (LASIX) 40 MG tablet TAKE 1 TABLET BY MOUTH 2 TIMES A DAY    losartan (COZAAR) 50 MG tablet TAKE 1 TABLET BY MOUTH EVERY DAY    modafinil (PROVIGIL) 200 MG Tab Take 1 tablet (200 mg total) by mouth once daily.    sulfamethoxazole-trimethoprim 800-160mg (BACTRIM DS) 800-160 mg Tab Take 1 tablet by mouth 2 (two) times daily. for 10 days    triamcinolone acetonide 0.1% (KENALOG) 0.1 % cream Apply topically 2 (two) times daily.     Family History     Problem Relation (Age of Onset)    Cancer Father    Diabetes Mother    No Known Problems Sister, Brother, Maternal Aunt, Maternal Uncle, Paternal Aunt, Paternal Uncle, Maternal Grandmother, Maternal Grandfather,  Paternal Grandmother, Paternal Grandfather    Pancreatic cancer Father        Tobacco Use    Smoking status: Never Smoker    Smokeless tobacco: Never Used   Substance and Sexual Activity    Alcohol use: No    Drug use: No    Sexual activity: Not on file     ROS Per ED 6/25/20  Objective:     Vital Signs (Most Recent):  Temp: 98.5 °F (36.9 °C) (06/25/20 1712)  Pulse: 63 (06/25/20 1712)  Resp: 18 (06/25/20 1517)  BP: 120/70 (06/25/20 1712)  SpO2: 98 % (06/25/20 1712) Vital Signs (24h Range):  Temp:  [97.5 °F (36.4 °C)-98.5 °F (36.9 °C)] 98.5 °F (36.9 °C)  Pulse:  [63-82] 63  Resp:  [15-18] 18  SpO2:  [95 %-98 %] 98 %  BP: (118-143)/(67-73) 120/70     Weight: 136.1 kg (300 lb)  Height: 6' (182.9 cm)  Body mass index is 40.69 kg/m².      Ortho/SPM Exam     Vitals: Afebrile.  Vital signs stable.  General: No acute distress.  Cardio: Regular rate.  Chest: No increased work of breathing.    Walked patient walk without pain.    Left Lower Extremity Exam    - 2x2 cm area of edema, redness, erythema, at the proximal aspect of tibial tubercle with small amount of fluctuance at superior as[ect  - 5x5 mm wound from earlier removal of scab by ED  - TTP over area of edema  - Compartments soft and compressible  - ROM full (eversion,inversion,plantar/dorsiflexion)  - Painless ROM at knee  - TA/EHL/Gastroc/FHL assessed in isolation without deficit  - SILT throughout  - DP and PT palpated  2+  - Capillary Refill <3s      Significant Labs:   A1C: No results for input(s): HGBA1C in the last 4320 hours.  ABGs: No results for input(s): PH, PCO2, HCO3, POCSATURATED, BE in the last 48 hours.  Blood Culture: No results for input(s): LABBLOO in the last 48 hours.  BMP:   Recent Labs   Lab 06/25/20  1122         K 4.7      CO2 26   BUN 22   CREATININE 1.2   CALCIUM 9.8     CBC:   Recent Labs   Lab 06/25/20  1122   WBC 7.39   HGB 12.3*   HCT 39.1*        CMP:   Recent Labs   Lab 06/25/20  1122      K 4.7       CO2 26      BUN 22   CREATININE 1.2   CALCIUM 9.8   ANIONGAP 11   EGFRNONAA >60.0     Coagulation: No results for input(s): LABPROT, INR, APTT in the last 48 hours.  CRP:   Recent Labs   Lab 06/25/20  1122   .5*     Lactic Acid: No results for input(s): LACTATE in the last 48 hours.  POCT Glucose: No results for input(s): POCTGLUCOSE in the last 48 hours.  Prealbumin: No results for input(s): PREALBUMIN in the last 48 hours.  Troponin: No results for input(s): TROPONINI in the last 48 hours.  Urine Culture: No results for input(s): LABURIN in the last 48 hours.  Urine Studies: No results for input(s): COLORU, APPEARANCEUA, PHUR, SPECGRAV, PROTEINUA, GLUCUA, KETONESU, BILIRUBINUA, OCCULTUA, NITRITE, UROBILINOGEN, LEUKOCYTESUR, RBCUA, WBCUA, BACTERIA, SQUAMEPITHEL, HYALINECASTS in the last 48 hours.    Invalid input(s): WRIGHTSUR  Wound Culture: No results for input(s): LABAERO in the last 48 hours.  All pertinent labs within the past 24 hours have been reviewed.    Significant Imaging: I have reviewed all pertinent imaging results/findings.     No acute fracture, no air in tissues, no dislocation.

## 2020-06-25 NOTE — CONSULTS
[facility]  Orthopedics  Consult Note    Patient Name: Rajan Cooper III  MRN: [id number]  Admission Date: 6/25/2020  Hospital Length of Stay: 0 days  Attending Provider: Maxim Galarza MD  Primary Care Provider: Tom Garcia MD      Inpatient consult to Orthopedics  Consult performed by: Jose Khan MD  Consult ordered by: Maxim Galarza MD        Subjective:     Principal Problem:Infrapatellar bursitis of left knee    Chief Complaint:   Chief Complaint   Patient presents with    Abscess     abscess to L knee. Redness noted         HPI: Rajan Cooper III is a 64 y.o. male with hx of left wrist abscess, hypertension, multiple sclerosis on Avonex and hx of MSSA bacteremia presenting with progressively worsening left knee pain with associated swelling and redness for the past week. Patient seen in ortho clinic on 6/4/2020, diagnosed with cellulitis, and started on bactrim. He remained on bactrim for about 3 weeks, and cellulitis had initially improved. However for the past week patient states knee has been worsening and now experiencing worsening pain and swelling. He has no pain wit ambulation. Ambulates with walker at baseline due to MS. Patient denies any prior knee trauma, fevers, chills, cp, dyspnea, cough, abdominal pain, diarrhea, bleeds. No other complaints. No recent travel. No recent knee injections or instrumentations. No home blood thinners.         Past Medical History:   Diagnosis Date    Hypertension     MS (multiple sclerosis)     Staph aureus infection 2017       No past surgical history on file.    Review of patient's allergies indicates:   Allergen Reactions    Norco [hydrocodone-acetaminophen] Anaphylaxis       Current Facility-Administered Medications   Medication    acetaminophen tablet 650 mg    [START ON 6/26/2020] amLODIPine tablet 10 mg    baclofen tablet 10 mg    dextrose 10% (D10W) Bolus    dextrose 10% (D10W) Bolus    furosemide tablet 40 mg     glucagon (human recombinant) injection 1 mg    glucose chewable tablet 16 g    glucose chewable tablet 24 g    [START ON 6/26/2020] losartan tablet 50 mg    [START ON 6/26/2020] modafiniL tablet 200 mg    ondansetron injection 4 mg    sodium chloride 0.9% flush 10 mL    traMADoL tablet 50 mg    vancomycin - pharmacy to dose    [START ON 6/26/2020] vancomycin 1.75 g in 5 % dextrose 500 mL IVPB    vancomycin 2 g in dextrose 5 % 500 mL IVPB     Current Outpatient Medications   Medication Sig    amLODIPine (NORVASC) 10 MG tablet TAKE 1 TABLET BY MOUTH ONCE DAILY    AVONEX 30 mcg/0.5 mL injection INJECT 30 MCG INTO THE MUSCLE ONCE A WEEK    baclofen (LIORESAL) 10 MG tablet TAKE 1 TABLET BY MOUTH 3 TIMES A DAY    furosemide (LASIX) 40 MG tablet TAKE 1 TABLET BY MOUTH 2 TIMES A DAY    losartan (COZAAR) 50 MG tablet TAKE 1 TABLET BY MOUTH EVERY DAY    modafinil (PROVIGIL) 200 MG Tab Take 1 tablet (200 mg total) by mouth once daily.    sulfamethoxazole-trimethoprim 800-160mg (BACTRIM DS) 800-160 mg Tab Take 1 tablet by mouth 2 (two) times daily. for 10 days    triamcinolone acetonide 0.1% (KENALOG) 0.1 % cream Apply topically 2 (two) times daily.     Family History     Problem Relation (Age of Onset)    Cancer Father    Diabetes Mother    No Known Problems Sister, Brother, Maternal Aunt, Maternal Uncle, Paternal Aunt, Paternal Uncle, Maternal Grandmother, Maternal Grandfather, Paternal Grandmother, Paternal Grandfather    Pancreatic cancer Father        Tobacco Use    Smoking status: Never Smoker    Smokeless tobacco: Never Used   Substance and Sexual Activity    Alcohol use: No    Drug use: No    Sexual activity: Not on file     ROS Per ED 6/25/20  Objective:     Vital Signs (Most Recent):  Temp: 98.5 °F (36.9 °C) (06/25/20 1712)  Pulse: 63 (06/25/20 1712)  Resp: 18 (06/25/20 1517)  BP: 120/70 (06/25/20 1712)  SpO2: 98 % (06/25/20 1712) Vital Signs (24h Range):  Temp:  [97.5 °F (36.4 °C)-98.5 °F (36.9  °C)] 98.5 °F (36.9 °C)  Pulse:  [63-82] 63  Resp:  [15-18] 18  SpO2:  [95 %-98 %] 98 %  BP: (118-143)/(67-73) 120/70     Weight: 136.1 kg (300 lb)  Height: 6' (182.9 cm)  Body mass index is 40.69 kg/m².      Ortho/SPM Exam     Vitals: Afebrile.  Vital signs stable.  General: No acute distress.  Cardio: Regular rate.  Chest: No increased work of breathing.    Walked patient walk without pain.    Left Lower Extremity Exam    - 2x2 cm area of edema, redness, erythema, at the proximal aspect of tibial tubercle with small amount of fluctuance at superior as[ect  - 5x5 mm wound from earlier removal of scab by ED  - TTP over area of edema  - Compartments soft and compressible  - ROM full (eversion,inversion,plantar/dorsiflexion)  - Painless ROM at knee  - TA/EHL/Gastroc/FHL assessed in isolation without deficit  - SILT throughout  - DP and PT palpated  2+  - Capillary Refill <3s      Significant Labs:   A1C: No results for input(s): HGBA1C in the last 4320 hours.  ABGs: No results for input(s): PH, PCO2, HCO3, POCSATURATED, BE in the last 48 hours.  Blood Culture: No results for input(s): LABBLOO in the last 48 hours.  BMP:   Recent Labs   Lab 06/25/20  1122         K 4.7      CO2 26   BUN 22   CREATININE 1.2   CALCIUM 9.8     CBC:   Recent Labs   Lab 06/25/20  1122   WBC 7.39   HGB 12.3*   HCT 39.1*        CMP:   Recent Labs   Lab 06/25/20  1122      K 4.7      CO2 26      BUN 22   CREATININE 1.2   CALCIUM 9.8   ANIONGAP 11   EGFRNONAA >60.0     Coagulation: No results for input(s): LABPROT, INR, APTT in the last 48 hours.  CRP:   Recent Labs   Lab 06/25/20  1122   .5*     Lactic Acid: No results for input(s): LACTATE in the last 48 hours.  POCT Glucose: No results for input(s): POCTGLUCOSE in the last 48 hours.  Prealbumin: No results for input(s): PREALBUMIN in the last 48 hours.  Troponin: No results for input(s): TROPONINI in the last 48 hours.  Urine Culture: No  results for input(s): LABURIN in the last 48 hours.  Urine Studies: No results for input(s): COLORU, APPEARANCEUA, PHUR, SPECGRAV, PROTEINUA, GLUCUA, KETONESU, BILIRUBINUA, OCCULTUA, NITRITE, UROBILINOGEN, LEUKOCYTESUR, RBCUA, WBCUA, BACTERIA, SQUAMEPITHEL, HYALINECASTS in the last 48 hours.    Invalid input(s): WRIGHTSUR  Wound Culture: No results for input(s): LABAERO in the last 48 hours.  All pertinent labs within the past 24 hours have been reviewed.    Significant Imaging: I have reviewed all pertinent imaging results/findings.     No acute fracture, no air in tissues, no dislocation.     Assessment/Plan:     * Infrapatellar bursitis of left knee  Rajan Cooper III is a 64 y.o. male with left infrapatellar bursitis. No concern for septic knee joint at this time.   Diagnosis discussed in detail with patient.    WBC: 7, crp 15, esr 114.     Patient can weight bear as tolerated. Patient on vancomycin per primary, continue abx. Compressive wrap placed in ED. ED attempted aspiration. Aspiration performed at bedside, see procedure note. Will not aspirate at this time. Will follow while inpatient. NPO at MN as precaution. Aspiration pending. Will discuss with staff.     Procedure Note:  After consent was obtained, using sterile technique the left tibia was entered and 3 cc of bloody colored fluid was withdrawn and sent for cell count, /cbc, culture and crystals. Sterile dressing was applied. The procedure was well tolerated.  Watch for fever, or increased swelling or persistent pain in knee. Activity as tolerated.              Jose Khan MD  Orthopedics  [facility]

## 2020-06-25 NOTE — H&P
[facility] Medicine                                                         History and Physical       Team: [facility] G Maxim Galarza MD     Admit Date: 6/25/2020   HOD: 0       REGINALD:  6/27/2020    Primary care Physician: Tom Garcia MD    Principal Problem: Left leg cellulitis      Patient information was obtained from patient, spouse/SO and ER records.      Code status: Full Code      HPI:     Mr Rajan Cooper III is a 64 y.o. gentleman with hx of left wrist abscess, hypertension, multiple sclerosis on Avonex and hx of MSSA bacteremia presenting with progressively worsening left knee pain with associated swelling and redness for the past week. Patient was apparently diagnosed with left knee cellulitis few weeks back and started on bactrim. He remained on bactrim for about 3 weeks, and cellulitis had initially improved. However for the past week patient states knee has been worsening and now experiencing worsening pain and swelling prompting PCP. PCP noted redness and fluctuance near left knee site, and rec ED evaluation for possible I&D of abscess. Patient denies any prior knee trauma, fevers, chills, cp, dyspnea, cough, abdominal pain, diarrhea, bleeds. No other complaints. No recent travel. No recent knee injections or instrumentations.     In the ED, patient was HDS and afebrile. Labs with worsening ESR (42-->114) and CRP (88 --> 156.5). COVID neg. No white count. Procal pending. Ultrasound negative for DVT. ED attempted aspiration fluctuant mass over patella but no return. Given vancomycin and admitted to hospital medicine as observation for failed OP therapy for left knee cellulitis.       Review of Systems:  Pain Scale: 5 /10   Constitutional: no fever or chills  Respiratory: no cough or shortness of breath  Cardiovascular: no chest pain or palpitations  Gastrointestinal: no nausea or vomiting, no abdominal  pain or change in bowel habits  Genitourinary: no hematuria or dysuria  Integument/Breast: left knee erythema  Hematologic/Lymphatic: no easy bruising or lymphadenopathy  Musculoskeletal: arthralgias, myalgias  Neurological: no seizures or tremors  Behavioral/Psych: no depression or anxiety      PAST HISTORY:     Past Medical History:   Diagnosis Date    Hypertension     MS (multiple sclerosis)     Staph aureus infection 2017       No past surgical history on file.    Family History   Problem Relation Age of Onset    Diabetes Mother     Cancer Father     Pancreatic cancer Father     No Known Problems Sister     No Known Problems Brother     No Known Problems Maternal Aunt     No Known Problems Maternal Uncle     No Known Problems Paternal Aunt     No Known Problems Paternal Uncle     No Known Problems Maternal Grandmother     No Known Problems Maternal Grandfather     No Known Problems Paternal Grandmother     No Known Problems Paternal Grandfather     Hypertension Neg Hx     Amblyopia Neg Hx     Blindness Neg Hx     Cataracts Neg Hx     Glaucoma Neg Hx     Macular degeneration Neg Hx     Retinal detachment Neg Hx     Strabismus Neg Hx     Stroke Neg Hx     Thyroid disease Neg Hx        Social History     Socioeconomic History    Marital status:      Spouse name: Not on file    Number of children: Not on file    Years of education: Not on file    Highest education level: Not on file   Occupational History    Not on file   Social Needs    Financial resource strain: Not on file    Food insecurity     Worry: Not on file     Inability: Not on file    Transportation needs     Medical: Not on file     Non-medical: Not on file   Tobacco Use    Smoking status: Never Smoker    Smokeless tobacco: Never Used   Substance and Sexual Activity    Alcohol use: No    Drug use: No    Sexual activity: Not on file   Lifestyle    Physical activity     Days per week: Not on file     Minutes  per session: Not on file    Stress: Not on file   Relationships    Social connections     Talks on phone: Not on file     Gets together: Not on file     Attends [employer] service: Not on file     Active member of club or organization: Not on file     Attends meetings of clubs or organizations: Not on file     Relationship status: Not on file   Other Topics Concern    Not on file   Social History Narrative    Not on file         MEDICATIONS and ALLERGIES:   Reviewed    No current facility-administered medications on file prior to encounter.      Current Outpatient Medications on File Prior to Encounter   Medication Sig Dispense Refill    amLODIPine (NORVASC) 10 MG tablet TAKE 1 TABLET BY MOUTH ONCE DAILY 90 tablet 3    AVONEX 30 mcg/0.5 mL injection INJECT 30 MCG INTO THE MUSCLE ONCE A WEEK 4 Syringe 12    baclofen (LIORESAL) 10 MG tablet TAKE 1 TABLET BY MOUTH 3 TIMES A DAY 90 tablet 2    furosemide (LASIX) 40 MG tablet TAKE 1 TABLET BY MOUTH 2 TIMES A  tablet 3    losartan (COZAAR) 50 MG tablet TAKE 1 TABLET BY MOUTH EVERY DAY 90 tablet 0    modafinil (PROVIGIL) 200 MG Tab Take 1 tablet (200 mg total) by mouth once daily. 400 tablet 3    sulfamethoxazole-trimethoprim 800-160mg (BACTRIM DS) 800-160 mg Tab Take 1 tablet by mouth 2 (two) times daily. for 10 days 20 tablet 0    triamcinolone acetonide 0.1% (KENALOG) 0.1 % cream Apply topically 2 (two) times daily. 80 g 1        Review of patient's allergies indicates:   Allergen Reactions    Norco [hydrocodone-acetaminophen] Anaphylaxis           PHYSICAL EXAM:     Temp:  [97.5 °F (36.4 °C)-98.1 °F (36.7 °C)]   Pulse:  [66-82]   Resp:  [15-18]   BP: (118-143)/(67-73)   SpO2:  [95 %-97 %]   Body mass index is 40.69 kg/m².   No intake or output data in the 24 hours ending 06/25/20 1643    General appearance: no distress, non toxic  Mental status: Alert and oriented x 3  HEENT:  conjunctivae/corneas clear, PERRL  Neck: supple, thyroid not  enlarged  Pulm:   normal respiratory effort, CTA B, no c/w/r  Card: RRR, S1, S2 normal, no murmur, click, rub or gallop  Abd: soft, NT, ND, BS present; no masses, no organomegaly  Ext: bandaged left knee, mild patellar erythema with associated swelling compared to right knee, FROM, unable to note any joint effusion. Left knee tender to palpation  Pulses: 2+, symmetric  Skin: color, texture, turgor normal. No rashes or lesions  Neuro: Cranial Nerves grossly intact, no focal numbness or weakness, normal strength and tone       LABS and IMAGING:       Recent Results (from the past 24 hour(s))   CBC auto differential    Collection Time: 06/25/20 11:22 AM   Result Value Ref Range    WBC 7.39 3.90 - 12.70 K/uL    RBC 4.20 (L) 4.60 - 6.20 M/uL    Hemoglobin 12.3 (L) 14.0 - 18.0 g/dL    Hematocrit 39.1 (L) 40.0 - 54.0 %    Mean Corpuscular Volume 93 82 - 98 fL    Mean Corpuscular Hemoglobin 29.3 27.0 - 31.0 pg    Mean Corpuscular Hemoglobin Conc 31.5 (L) 32.0 - 36.0 g/dL    RDW 14.2 11.5 - 14.5 %    Platelets 323 150 - 350 K/uL    MPV 10.4 9.2 - 12.9 fL    Immature Granulocytes 0.4 0.0 - 0.5 %    Gran # (ANC) 5.6 1.8 - 7.7 K/uL    Immature Grans (Abs) 0.03 0.00 - 0.04 K/uL    Lymph # 1.0 1.0 - 4.8 K/uL    Mono # 0.7 0.3 - 1.0 K/uL    Eos # 0.1 0.0 - 0.5 K/uL    Baso # 0.02 0.00 - 0.20 K/uL    nRBC 0 0 /100 WBC    Gran% 75.5 (H) 38.0 - 73.0 %    Lymph% 12.9 (L) 18.0 - 48.0 %    Mono% 9.3 4.0 - 15.0 %    Eosinophil% 1.6 0.0 - 8.0 %    Basophil% 0.3 0.0 - 1.9 %    Differential Method Automated    Basic metabolic panel    Collection Time: 06/25/20 11:22 AM   Result Value Ref Range    Sodium 139 136 - 145 mmol/L    Potassium 4.7 3.5 - 5.1 mmol/L    Chloride 102 95 - 110 mmol/L    CO2 26 23 - 29 mmol/L    Glucose 102 70 - 110 mg/dL    BUN, Bld 22 8 - 23 mg/dL    Creatinine 1.2 0.5 - 1.4 mg/dL    Calcium 9.8 8.7 - 10.5 mg/dL    Anion Gap 11 8 - 16 mmol/L    eGFR if African American >60.0 >60 mL/min/1.73 m^2    eGFR if non African  American >60.0 >60 mL/min/1.73 m^2   Sedimentation rate    Collection Time: 06/25/20 11:22 AM   Result Value Ref Range    Sed Rate 114 (H) 0 - 23 mm/Hr   C-reactive protein    Collection Time: 06/25/20 11:22 AM   Result Value Ref Range    .5 (H) 0.0 - 8.2 mg/L   COVID-19 Rapid Screening    Collection Time: 06/25/20  3:16 PM   Result Value Ref Range    SARS-CoV-2 RNA, Amplification, Qual Negative Negative       No results for input(s): POCTGLUCOSE in the last 168 hours.    Active Hospital Problems    Diagnosis  POA    Left leg cellulitis [L03.116]  Yes      Resolved Hospital Problems   No resolved problems to display.           ASSESSMENT and PLAN:     Cellulitis of the left Knee vs prepatellar bursitis  - presenting with progressively worsening left knee pain and redness, was given bactrim initially with improvement however now worsening prompting ED  - unable to aspirate pustule noted on top of patella via ED (there was a concern for possible connection, possible bursitis? )  - CRP and ESR elevated, afebrile, no wbc elevation   - Given vancomycin in the ED, continue for now  - Consult ortho for knee evaluation   - Fu blood cultures     Multiple sclerosis  -baclofen tablet 10 mg, 10 mg, Oral, TID  -modafinil tablet 200 mg, 200 mg, Oral, Daily  -Home med: AVONEX 30 mcg/0.5 mL injection, INJECT 30 MCG INTO THE MUSCLE ONCE A WEEK     Essential hypertension  -furosemide tablet 40 mg, 40 mg, Oral, BID  -amLODIPine tablet 10 mg, 10 mg, Oral, Daily  -losartan tablet 50 mg, 50 mg, Oral, Daily     Normocytic anemia  -Stable      Code Status:  Full  Prophylaxis: SCDs    Discharge plan and follow up - d/c home once medically stable      Maxim Galarza MD  Hospital Medicine Staff  Pager [phone]

## 2020-06-25 NOTE — ED NOTES
LOC: The patient is awake, alert and aware of environment with an appropriate affect, the patient is oriented x 3 and speaking appropriately.  APPEARANCE: Patient resting comfortably and in no acute distress, patient is clean and well groomed, patient's clothing is properly fastened.  MUSCULOSKELETAL: Patient moving all extremities spontaneously  RESPIRATORY: Airway is open and patent, respirations are spontaneous, patient has a normal effort and rate, no accessory muscle use noted  ABDOMEN: Soft and non tender to palpation, no distention noted  NEUROLOGIC:  facial expression is symmetrical, patient moving all extremities spontaneously, normal sensation in all extremities when touched with a finger.  Follows all commands appropriately.    Patient is currently being treated for abscess to left knee, patient states the abscess is looking better and is smaller, patient was told to come to the ed for eval for a possible I&D of abscess, no acute distress noted, redness noted around site, abscess about the size of quarter on left knee cap, will continue to monitor

## 2020-06-25 NOTE — ED PROVIDER NOTES
"Encounter Date: 6/25/2020       History     Chief Complaint   Patient presents with    Abscess     abscess to L knee. Redness noted      Mr Cooper is a 64yoM who presents for suspected abscess and continued leg swelling; pertinent PMHx MS, HTN, h/o staph skin infections. Patient has been seen by his PCP and orthopedics several times over the past month for left leg cellulitis.  Redness and swelling to left leg have moderately improved though have not gone back to baseline, he has 1 day left of Bactrim.  He saw his PCP 2 days ago for onset of "boil" near left knee.  She referred him to orthopedics as she was unsure if it could be communicating with knee joint.  Unable to have evaluation in after hours Orthopedic Clinic, referred to ED. Denies knee trauma or pressure injury. Neuropathy at baseline d/t MS.  Patient is walking is at baseline and he denies new or worsening pain to knee joint.  Fever/chills, nausea/vomiting.  Compliant with Bactrim.  The patients available PMH, PSH, Social History, medications, allergies, and triage vital signs were reviewed just prior to their medical evaluation.  A ten point review of systems was completed and is negative except as documented above.  Patient denies any other acute medical complaint.    Please be advised this text was dictated with Switchcam software and may contain errors due to translation.           Review of patient's allergies indicates:   Allergen Reactions    Norco [hydrocodone-acetaminophen] Anaphylaxis     Past Medical History:   Diagnosis Date    Hypertension     MS (multiple sclerosis)     Staph aureus infection 2017     No past surgical history on file.  Family History   Problem Relation Age of Onset    Diabetes Mother     Cancer Father     Pancreatic cancer Father     No Known Problems Sister     No Known Problems Brother     No Known Problems Maternal Aunt     No Known Problems Maternal Uncle     No Known Problems Paternal Aunt     No Known " Problems Paternal Uncle     No Known Problems Maternal Grandmother     No Known Problems Maternal Grandfather     No Known Problems Paternal Grandmother     No Known Problems Paternal Grandfather     Hypertension Neg Hx     Amblyopia Neg Hx     Blindness Neg Hx     Cataracts Neg Hx     Glaucoma Neg Hx     Macular degeneration Neg Hx     Retinal detachment Neg Hx     Strabismus Neg Hx     Stroke Neg Hx     Thyroid disease Neg Hx      Social History     Tobacco Use    Smoking status: Never Smoker    Smokeless tobacco: Never Used   Substance Use Topics    Alcohol use: No    Drug use: No     Review of Systems   Constitutional: Negative for chills and fever.   Respiratory: Negative for cough and shortness of breath.    Cardiovascular: Positive for leg swelling. Negative for chest pain.   Gastrointestinal: Negative for nausea and vomiting.   Musculoskeletal: Negative for arthralgias and joint swelling.   Skin: Positive for color change. Negative for pallor and wound.   Neurological: Weakness: baseline MS symptom.       Physical Exam     Initial Vitals [06/25/20 0952]   BP Pulse Resp Temp SpO2   (!) 141/73 82 15 97.5 °F (36.4 °C) 95 %      MAP       --         Physical Exam    Vitals reviewed.  Constitutional: He appears well-developed and well-nourished. He is not diaphoretic. No distress.   Walker at bedside   HENT:   Head: Normocephalic and atraumatic.   Right Ear: External ear normal.   Left Ear: External ear normal.   Eyes: Conjunctivae and EOM are normal. Pupils are equal, round, and reactive to light. No scleral icterus.   Cardiovascular: Normal rate, regular rhythm and intact distal pulses.   Pulmonary/Chest: No respiratory distress. He has no wheezes. He has no rhonchi. He has no rales.   Abdominal:   Morbidly obese   Musculoskeletal: Tenderness and edema present.      Comments: Left leg with pitting edema, erythema compared to right. Serial improvement compared to prior picture on EMR.    2x2cm  area of warm, tender, erythematous fluctuance lateral of patellar tendon with overlying pustule.     Bedside US without appreciation of significant hypoechoic sac.    FROM L knee without pain  Distal leg NV intact   Neurological: He is alert and oriented to person, place, and time. He has normal strength. No cranial nerve deficit or sensory deficit.   Skin: Skin is warm and dry. Capillary refill takes less than 2 seconds. There is erythema. No pallor.   Psychiatric: He has a normal mood and affect. His behavior is normal. Judgment and thought content normal.         ED Course   Procedures  Labs Reviewed   CBC W/ AUTO DIFFERENTIAL - Abnormal; Notable for the following components:       Result Value    RBC 4.20 (*)     Hemoglobin 12.3 (*)     Hematocrit 39.1 (*)     Mean Corpuscular Hemoglobin Conc 31.5 (*)     Gran% 75.5 (*)     Lymph% 12.9 (*)     All other components within normal limits   SEDIMENTATION RATE - Abnormal; Notable for the following components:    Sed Rate 114 (*)     All other components within normal limits   C-REACTIVE PROTEIN - Abnormal; Notable for the following components:    .5 (*)     All other components within normal limits   PROCALCITONIN - Abnormal; Notable for the following components:    Procalcitonin 0.26 (*)     All other components within normal limits   CULTURE, BLOOD   CULTURE, BLOOD   BASIC METABOLIC PANEL   SARS-COV-2 RNA AMPLIFICATION, QUAL          Imaging Results          X-Ray Knee 3 View Left (Final result)  Result time 06/25/20 17:11:43    Final result by Tor Ascencio MD (06/25/20 17:11:43)                 Impression:      No evidence of fracture or dislocation.    Soft tissue swelling throughout the left knee.      Electronically signed by: Tor Ascencio MD  Date:    06/25/2020  Time:    17:11             Narrative:    EXAMINATION:  XR KNEE 3 VIEW LEFT    CLINICAL HISTORY:  cellulitis, pain;    TECHNIQUE:  AP, lateral, and Merchant views of the left knee were  performed.    COMPARISON:  06/04/2020.    FINDINGS:  The bone mineralization is within normal limits.  There is no cortical step-off.  There is no evidence of periostitis.    The joint spaces are maintained.  There is unchanged appearance of slight lateral subluxation of the patella from the trochlear groove.  There is no evidence of a dislocation.    There is soft tissue swelling throughout the left knee.  No radiopaque foreign body is identified.  No soft tissue gas identified.                               US Lower Extremity Veins Left (Final result)  Result time 06/25/20 12:14:31    Final result by Kieran Angeles MD (06/25/20 12:14:31)                 Impression:      No evidence of deep venous thrombosis in the left lower extremity.    Electronically signed by resident: Lore Washington  Date:    06/25/2020  Time:    12:02    Electronically signed by: Kieran Angeles MD  Date:    06/25/2020  Time:    12:14             Narrative:    EXAMINATION:  US LOWER EXTREMITY VEINS LEFT    CLINICAL HISTORY:  Other specified soft tissue disorders    TECHNIQUE:  Duplex and color flow Doppler evaluation and graded compression of the left lower extremity veins was performed.    COMPARISON:  Ultrasound left lower extremity 02/27/2020, ultrasound bilateral lower extremities 05/07/2012    FINDINGS:  Left thigh veins: The common femoral, femoral, popliteal, upper greater saphenous, and deep femoral veins are patent and free of thrombus. The veins are normally compressible and have normal phasic flow and augmentation response.    Left calf veins: The visualized calf veins are patent.    Contralateral CFV: The contralateral (right) common femoral vein is patent and free of thrombus.    Miscellaneous: None                                 Medical Decision Making:   History:   Old Medical Records: I decided to obtain old medical records.  Old Records Summarized: records from clinic visits.  Initial Assessment:   Patient presents for  continued left leg swelling/redness despite abx use, addition to new fluctuant mass lateral of patellar tendon. VSS, afebrile  Differential Diagnosis:   DDx includes cellulitis, DVT, abscess vs phlegmon, vasculitis. Physical exam and history taking lower clinical suspicion for phlegmasia cerulea dolens, ischemic limb, septic shock.   Clinical Tests:   Lab Tests: Ordered and Reviewed  Radiological Study: Ordered  ED Management:  Fluctuant mass examined via US with attending. Pustule over fluctuant mass de-roofed and aspirated without return of matter.  Pressure dressing applied.  Update:  Ultrasound negative for DVT.  Labs show significant CRP/ESR elevation from baseline.  Due to ongoing symptoms in new infectious process of left lower extremity and has failed outpatient antibiotics, recommend admission. Patient agreed to plan of care and voiced understanding.  Admitted to  observation.    Tiffanie Garnett PA-C  06/25/2020    I discussed the following case, diagnosis and plan of care with attending physician.                                   Clinical Impression:       ICD-10-CM ICD-9-CM   1. Left leg cellulitis  L03.116 682.6   2. Leg swelling  M79.89 729.81   3. Chest pain  R07.9 786.50         Disposition:   Disposition: Admitted  Condition: Stable     ED Disposition Condition    Observation                           Tiffanie Garnett PA-C  06/25/20 2215

## 2020-06-25 NOTE — HPI
Rajan Cooper III is a 64 y.o. male with hx of left wrist abscess, hypertension, multiple sclerosis on Avonex and hx of MSSA bacteremia presenting with progressively worsening left knee pain with associated swelling and redness for the past week. Patient seen in ortho clinic on 6/4/2020, diagnosed with cellulitis, and started on bactrim. He remained on bactrim for about 3 weeks, and cellulitis had initially improved. However for the past week patient states knee has been worsening and now experiencing worsening pain and swelling. He has no pain wit ambulation. Ambulates with walker at baseline due to MS. Patient denies any prior knee trauma, fevers, chills, cp, dyspnea, cough, abdominal pain, diarrhea, bleeds. No other complaints. No recent travel. No recent knee injections or instrumentations. No home blood thinners.

## 2020-06-25 NOTE — TELEPHONE ENCOUNTER
Spoke to patient regarding his appt in the after hours clinic. He was referred from urgent care for abscess drainage left knee. Told patient that we cannot perform this in clinic. He should go to the ED main campus for evaluation as his symptoms could get worse. Patient voiced understanding and says he will go to ED today.

## 2020-06-25 NOTE — PROGRESS NOTES
Pharmacokinetic Initial Assessment: IV Vancomycin    Assessment/Plan:      Initiate intravenous vancomycin with loading dose of 2000 mg once followed by a maintenance dose of vancomycin 1750mg IV every 12 hours  Desired empiric serum trough concentration is 10 to 20 mcg/mL.  Draw vancomycin trough level 30 min prior to fourth dose on 6/27/2020 at approximately 0430. MAR Reminder placed in chart.  Pharmacy will continue to follow and monitor vancomycin.      Please contact pharmacy at extension [phone] with any questions regarding this assessment.     Thank you for the consult,   Maria Guadalupe FairchildD  Peds/PICU Clinical Pharmacy Specialist       Patient brief summary:  Rajan Cooper III is a 64 y.o. male initiated on antimicrobial therapy with IV Vancomycin for treatment of suspected bone/joint infection/SSTI [L knee abscess]    Drug Allergies:   Review of patient's allergies indicates:   Allergen Reactions    Norco [hydrocodone-acetaminophen] Anaphylaxis       Actual Body Weight:   136kg     Renal Function:   Estimated Creatinine Clearance: 90 mL/min (based on SCr of 1.2 mg/dL).,     Dialysis Method (if applicable):  N/A    CBC (last 72 hours):  Recent Labs   Lab Result Units 06/25/20  1122   WBC K/uL 7.39   Hemoglobin g/dL 12.3*   Hematocrit % 39.1*   Platelets K/uL 323   Gran% % 75.5*   Lymph% % 12.9*   Mono% % 9.3   Eosinophil% % 1.6   Basophil% % 0.3   Differential Method  Automated       Metabolic Panel (last 72 hours):  Recent Labs   Lab Result Units 06/25/20  1122   Sodium mmol/L 139   Potassium mmol/L 4.7   Chloride mmol/L 102   CO2 mmol/L 26   Glucose mg/dL 102   BUN, Bld mg/dL 22   Creatinine mg/dL 1.2       Drug levels (last 3 results):  No results for input(s): VANCOMYCINRA, VANCOMYCINPE, VANCOMYCINTR in the last 72 hours.    Microbiologic Results:  Microbiology Results (last 7 days)     Procedure Component Value Units Date/Time    Blood culture #1 **CANNOT BE ORDERED STAT** [[id number]] Collected:  06/25/20 1623    Order Status: Sent Specimen: Blood from Peripheral, Antecubital, Right Updated: 06/25/20 1636    Blood culture #2 **CANNOT BE ORDERED STAT** [[id number]] Collected: 06/25/20 1634    Order Status: Sent Specimen: Blood from Peripheral, Hand, Right Updated: 06/25/20 1634    Aerobic culture (Specify Source) **CANNOT BE ORDERED AS STAT* [[id number]]     Order Status: Canceled Specimen: Abscess from Knee, Left     Culture, Anaerobe [[id number]]     Order Status: Canceled Specimen: Abscess from Knee, Left

## 2020-06-25 NOTE — PROGRESS NOTES
Requested updates within Care Everywhere.  Patient's chart was reviewed for overdue CHRISTIANO topics.  Immunizations reconciled.    Orders placed: n/a  Tasked appts: n/a  Labs Linked: n/a

## 2020-06-25 NOTE — ASSESSMENT & PLAN NOTE
Rajan Cooper III is a 64 y.o. male with left infrapatellar bursitis. No concern for septic knee joint at this time.   Diagnosis discussed in detail with patient.    WBC: 7, crp 15, esr 114.     Patient can weight bear as tolerated. Patient on vancomycin per primary, continue abx. Compressive wrap placed in ED. ED attempted aspiration. Will not aspirate at this time. Will follow while inpatient. NPO at [facility] as precaution. Will discuss with staff.

## 2020-06-26 VITALS
OXYGEN SATURATION: 97 % | HEART RATE: 74 BPM | HEIGHT: 73 IN | DIASTOLIC BLOOD PRESSURE: 89 MMHG | BODY MASS INDEX: 39.68 KG/M2 | TEMPERATURE: 98 F | RESPIRATION RATE: 16 BRPM | SYSTOLIC BLOOD PRESSURE: 137 MMHG | WEIGHT: 299.38 LBS

## 2020-06-26 LAB
ALBUMIN SERPL BCP-MCNC: 2.9 G/DL (ref 3.5–5.2)
ALP SERPL-CCNC: 86 U/L (ref 55–135)
ALT SERPL W/O P-5'-P-CCNC: 17 U/L (ref 10–44)
ANION GAP SERPL CALC-SCNC: 10 MMOL/L (ref 8–16)
APPEARANCE FLD: NORMAL
AST SERPL-CCNC: 16 U/L (ref 10–40)
BASOPHILS # BLD AUTO: 0.02 K/UL (ref 0–0.2)
BASOPHILS NFR BLD: 0.3 % (ref 0–1.9)
BILIRUB SERPL-MCNC: 0.4 MG/DL (ref 0.1–1)
BODY FLD TYPE: NORMAL
BODY FLD TYPE: NORMAL
BUN SERPL-MCNC: 18 MG/DL (ref 8–23)
CALCIUM SERPL-MCNC: 9.3 MG/DL (ref 8.7–10.5)
CHLORIDE SERPL-SCNC: 100 MMOL/L (ref 95–110)
CO2 SERPL-SCNC: 28 MMOL/L (ref 23–29)
COLOR FLD: NORMAL
CREAT SERPL-MCNC: 0.9 MG/DL (ref 0.5–1.4)
CRYSTALS FLD MICRO: NEGATIVE
DIFFERENTIAL METHOD: ABNORMAL
EOSINOPHIL # BLD AUTO: 0.2 K/UL (ref 0–0.5)
EOSINOPHIL NFR BLD: 3.8 % (ref 0–8)
ERYTHROCYTE [DISTWIDTH] IN BLOOD BY AUTOMATED COUNT: 14.1 % (ref 11.5–14.5)
EST. GFR  (AFRICAN AMERICAN): >60 ML/MIN/1.73 M^2
EST. GFR  (NON AFRICAN AMERICAN): >60 ML/MIN/1.73 M^2
GLUCOSE SERPL-MCNC: 124 MG/DL (ref 70–110)
GRAM STN SPEC: NORMAL
GRAM STN SPEC: NORMAL
HCT VFR BLD AUTO: 39 % (ref 40–54)
HGB BLD-MCNC: 12.1 G/DL (ref 14–18)
IMM GRANULOCYTES # BLD AUTO: 0.02 K/UL (ref 0–0.04)
IMM GRANULOCYTES NFR BLD AUTO: 0.3 % (ref 0–0.5)
LYMPHOCYTES # BLD AUTO: 0.9 K/UL (ref 1–4.8)
LYMPHOCYTES NFR BLD: 14.8 % (ref 18–48)
LYMPHOCYTES NFR FLD MANUAL: 1 %
MAGNESIUM SERPL-MCNC: 2.1 MG/DL (ref 1.6–2.6)
MCH RBC QN AUTO: 29.7 PG (ref 27–31)
MCHC RBC AUTO-ENTMCNC: 31 G/DL (ref 32–36)
MCV RBC AUTO: 96 FL (ref 82–98)
MONOCYTES # BLD AUTO: 0.6 K/UL (ref 0.3–1)
MONOCYTES NFR BLD: 10.2 % (ref 4–15)
MONOS+MACROS NFR FLD MANUAL: 2 %
NEUTROPHILS # BLD AUTO: 4.4 K/UL (ref 1.8–7.7)
NEUTROPHILS NFR BLD: 70.6 % (ref 38–73)
NEUTROPHILS NFR FLD MANUAL: 97 %
NRBC BLD-RTO: 0 /100 WBC
PATH INTERP FLD-IMP: NORMAL
PHOSPHATE SERPL-MCNC: 3.7 MG/DL (ref 2.7–4.5)
PLATELET # BLD AUTO: 274 K/UL (ref 150–350)
PMV BLD AUTO: 9.8 FL (ref 9.2–12.9)
POTASSIUM SERPL-SCNC: 4 MMOL/L (ref 3.5–5.1)
PROT SERPL-MCNC: 7.7 G/DL (ref 6–8.4)
RBC # BLD AUTO: 4.08 M/UL (ref 4.6–6.2)
SODIUM SERPL-SCNC: 138 MMOL/L (ref 136–145)
WBC # BLD AUTO: 6.27 K/UL (ref 3.9–12.7)
WBC # FLD: NORMAL /CU MM

## 2020-06-26 PROCEDURE — 25000003 PHARM REV CODE 250: Performed by: STUDENT IN AN ORGANIZED HEALTH CARE EDUCATION/TRAINING PROGRAM

## 2020-06-26 PROCEDURE — 25000003 PHARM REV CODE 250: Performed by: INTERNAL MEDICINE

## 2020-06-26 PROCEDURE — 99217 PR OBSERVATION CARE DISCHARGE: CPT | Mod: ,,, | Performed by: INTERNAL MEDICINE

## 2020-06-26 PROCEDURE — 27301 DRAIN THIGH/KNEE LESION: CPT

## 2020-06-26 PROCEDURE — 89051 BODY FLUID CELL COUNT: CPT

## 2020-06-26 PROCEDURE — 84100 ASSAY OF PHOSPHORUS: CPT

## 2020-06-26 PROCEDURE — 36415 COLL VENOUS BLD VENIPUNCTURE: CPT

## 2020-06-26 PROCEDURE — G0378 HOSPITAL OBSERVATION PER HR: HCPCS

## 2020-06-26 PROCEDURE — 87102 FUNGUS ISOLATION CULTURE: CPT

## 2020-06-26 PROCEDURE — 87077 CULTURE AEROBIC IDENTIFY: CPT

## 2020-06-26 PROCEDURE — 87116 MYCOBACTERIA CULTURE: CPT

## 2020-06-26 PROCEDURE — 83735 ASSAY OF MAGNESIUM: CPT

## 2020-06-26 PROCEDURE — 89060 EXAM SYNOVIAL FLUID CRYSTALS: CPT

## 2020-06-26 PROCEDURE — 87205 SMEAR GRAM STAIN: CPT

## 2020-06-26 PROCEDURE — 87070 CULTURE OTHR SPECIMN AEROBIC: CPT

## 2020-06-26 PROCEDURE — 85025 COMPLETE CBC W/AUTO DIFF WBC: CPT

## 2020-06-26 PROCEDURE — 87075 CULTR BACTERIA EXCEPT BLOOD: CPT

## 2020-06-26 PROCEDURE — 99217 PR OBSERVATION CARE DISCHARGE: ICD-10-PCS | Mod: ,,, | Performed by: INTERNAL MEDICINE

## 2020-06-26 PROCEDURE — 63600175 PHARM REV CODE 636 W HCPCS: Performed by: INTERNAL MEDICINE

## 2020-06-26 PROCEDURE — 80053 COMPREHEN METABOLIC PANEL: CPT

## 2020-06-26 PROCEDURE — 87206 SMEAR FLUORESCENT/ACID STAI: CPT

## 2020-06-26 PROCEDURE — 87186 SC STD MICRODIL/AGAR DIL: CPT

## 2020-06-26 RX ORDER — LIDOCAINE HYDROCHLORIDE AND EPINEPHRINE 10; 10 MG/ML; UG/ML
20 INJECTION, SOLUTION INFILTRATION; PERINEURAL ONCE
Status: COMPLETED | OUTPATIENT
Start: 2020-06-26 | End: 2020-06-26

## 2020-06-26 RX ORDER — TRAMADOL HYDROCHLORIDE 50 MG/1
50 TABLET ORAL EVERY 6 HOURS
Qty: 15 TABLET | Refills: 0 | Status: SHIPPED | OUTPATIENT
Start: 2020-06-26 | End: 2023-01-30

## 2020-06-26 RX ORDER — SENNOSIDES 8.6 MG/1
8.6 TABLET ORAL DAILY PRN
Status: DISCONTINUED | OUTPATIENT
Start: 2020-06-26 | End: 2020-06-26 | Stop reason: HOSPADM

## 2020-06-26 RX ORDER — DOXYCYCLINE 100 MG/1
100 CAPSULE ORAL 2 TIMES DAILY
Qty: 28 CAPSULE | Refills: 0 | Status: SHIPPED | OUTPATIENT
Start: 2020-06-26 | End: 2020-07-09 | Stop reason: SDUPTHER

## 2020-06-26 RX ADMIN — BACLOFEN 10 MG: 10 TABLET ORAL at 09:06

## 2020-06-26 RX ADMIN — TRAMADOL HYDROCHLORIDE 50 MG: 50 TABLET, FILM COATED ORAL at 09:06

## 2020-06-26 RX ADMIN — SENNOSIDES 8.6 MG: 8.6 TABLET, FILM COATED ORAL at 12:06

## 2020-06-26 RX ADMIN — TRAMADOL HYDROCHLORIDE 50 MG: 50 TABLET, FILM COATED ORAL at 03:06

## 2020-06-26 RX ADMIN — VANCOMYCIN HYDROCHLORIDE 1750 MG: 750 INJECTION, POWDER, LYOPHILIZED, FOR SOLUTION INTRAVENOUS at 04:06

## 2020-06-26 RX ADMIN — LIDOCAINE HYDROCHLORIDE AND EPINEPHRINE 20 ML: 10; 10 INJECTION, SOLUTION INFILTRATION; PERINEURAL at 09:06

## 2020-06-26 RX ADMIN — FUROSEMIDE 40 MG: 40 TABLET ORAL at 09:06

## 2020-06-26 RX ADMIN — LOSARTAN POTASSIUM 50 MG: 50 TABLET, FILM COATED ORAL at 09:06

## 2020-06-26 RX ADMIN — AMLODIPINE BESYLATE 10 MG: 10 TABLET ORAL at 09:06

## 2020-06-26 RX ADMIN — MODAFINIL 200 MG: 200 TABLET ORAL at 09:06

## 2020-06-26 RX ADMIN — BACLOFEN 10 MG: 10 TABLET ORAL at 03:06

## 2020-06-26 NOTE — PLAN OF CARE
CM met with patient at the bedside to discuss discharge planning assessment. Pt lives with spouse and has transportation home at discharge. Pt verified PCP and Pharmacy. CM will continue to follow for discharge needs.         06/26/20 1100   Discharge Assessment   Assessment Type Discharge Planning Assessment   Confirmed/corrected address and phone number on facesheet? Yes   Assessment information obtained from? Patient   Expected Length of Stay (days) 2   Communicated expected length of stay with patient/caregiver yes   Prior to hospitilization cognitive status: Alert/Oriented   Prior to hospitalization functional status: Assistive Equipment   Current cognitive status: Alert/Oriented   Current Functional Status: Assistive Equipment   Lives With spouse   Able to Return to Prior Arrangements yes   Is patient able to care for self after discharge? Yes   Who are your caregiver(s) and their phone number(s)? Dorothy wheat- [phone]   Patient's perception of discharge disposition home or selfcare   Readmission Within the Last 30 Days no previous admission in last 30 days   Patient currently being followed by outpatient case management? No   Patient currently receives any other outside agency services? No   Equipment Currently Used at Home rollator;wheelchair;power chair;shower chair   Do you have any problems affording any of your prescribed medications? No   Is the patient taking medications as prescribed? yes   Does the patient have transportation home? Yes   Transportation Anticipated family or friend will provide   Does the patient receive services at the [facility]? No   Discharge Plan A Home with family   Discharge Plan B Home Health   DME Needed Upon Discharge  none   Patient/Family in Agreement with Plan yes

## 2020-06-26 NOTE — PLAN OF CARE
Discharge instructions and symptom management reviewed prior to discharge.  Medication changes reviewed, patient pain managed with medications, no falls or signs of infection.  Patient expresses readiness for transition home. IV discontinued.

## 2020-06-26 NOTE — PLAN OF CARE
06/26/20 1318   Final Note   Assessment Type Final Discharge Note   Anticipated Discharge Disposition Home   What phone number can be called within the next 1-3 days to see how you are doing after discharge? [phone]   Discharge plans and expectations educations in teach back method with documentation complete? Yes   Right Care Referral Info   Post Acute Recommendation No Care   Post-Acute Status   Post-Acute Authorization Other   Other Status No Post-Acute Service Needs     Future Appointments   Date Time Provider Department Center   7/9/2020  9:40 AM Tom Garcia MD [facility] [place]

## 2020-06-26 NOTE — PLAN OF CARE
Pt is a/ox4, up ad abraham with walker, VSS. He denies pain, n/v, dizziness, and sob. Fall risks and precautions, as well as pain management were discussed, and any questions addressed. Pt remains free from falls and injury. Personal belongings and call light are within reach. Will continue to monitor.

## 2020-06-26 NOTE — ED NOTES
Unable to call report to floor nurse, unable to give report to charge nurse. Will call back in 15 mins.

## 2020-06-26 NOTE — SUBJECTIVE & OBJECTIVE
"Principal Problem:Infrapatellar bursitis of left knee    Principal Orthopedic Problem: Same    Interval History: Pt seen and examined at bedside. UNA. Pt feels his swelling is improving. Pain is controlled. No drainage from knee.    Review of patient's allergies indicates:   Allergen Reactions    Norco [hydrocodone-acetaminophen] Anaphylaxis       Current Facility-Administered Medications   Medication    acetaminophen tablet 650 mg    amLODIPine tablet 10 mg    baclofen tablet 10 mg    dextrose 10% (D10W) Bolus    dextrose 10% (D10W) Bolus    furosemide tablet 40 mg    glucagon (human recombinant) injection 1 mg    glucose chewable tablet 16 g    glucose chewable tablet 24 g    lidocaine-EPINEPHrine 1%-1:100,000 injection 20 mL    losartan tablet 50 mg    modafiniL tablet 200 mg    ondansetron injection 4 mg    senna tablet 8.6 mg    sodium chloride 0.9% flush 10 mL    traMADoL tablet 50 mg    vancomycin - pharmacy to dose    vancomycin 1.75 g in 5 % dextrose 500 mL IVPB     Objective:     Vital Signs (Most Recent):  Temp: 96.4 °F (35.8 °C) (06/26/20 0807)  Pulse: 64 (06/26/20 0807)  Resp: 16 (06/26/20 0937)  BP: 133/70 (06/26/20 0807)  SpO2: 98 % (06/26/20 0807) Vital Signs (24h Range):  Temp:  [96.4 °F (35.8 °C)-98.8 °F (37.1 °C)] 96.4 °F (35.8 °C)  Pulse:  [63-72] 64  Resp:  [15-20] 16  SpO2:  [95 %-98 %] 98 %  BP: (118-143)/(63-71) 133/70     Weight: 135.8 kg (299 lb 6.2 oz)  Height: 6' 1" (185.4 cm)  Body mass index is 39.5 kg/m².      Ortho/SPM Exam     Vitals: Afebrile.  Vital signs stable.  General: No acute distress.  Cardio: Regular rate.  Chest: No increased work of breathing.    Left Lower Extremity Exam     - 2x2 cm area of edema, redness, erythema, at the proximal aspect of tibial tubercle with small amount of fluctuance at superior aspect  - 5x5 mm wound at inferior edge of edema  - TTP over area of edema  - Compartments soft and compressible  - ROM full " (eversion,inversion,plantar/dorsiflexion)  - Painless ROM at knee  - TA/EHL/Gastroc/FHL assessed in isolation without deficit  - SILT throughout  - DP and PT palpated  2+  - Capillary Refill <3s        Significant Labs:   A1C: No results for input(s): HGBA1C in the last 4320 hours.  ABGs: No results for input(s): PH, PCO2, HCO3, POCSATURATED, BE in the last 48 hours.  Blood Culture:   Recent Labs   Lab 06/25/20  1623 06/25/20  1634   LABBLOO No Growth to date No Growth to date     BMP:   Recent Labs   Lab 06/26/20  0611   *      K 4.0      CO2 28   BUN 18   CREATININE 0.9   CALCIUM 9.3   MG 2.1     CBC:   Recent Labs   Lab 06/25/20  1122 06/26/20  0611   WBC 7.39 6.27   HGB 12.3* 12.1*   HCT 39.1* 39.0*    274     CMP:   Recent Labs   Lab 06/25/20  1122 06/26/20  0611    138   K 4.7 4.0    100   CO2 26 28    124*   BUN 22 18   CREATININE 1.2 0.9   CALCIUM 9.8 9.3   PROT  --  7.7   ALBUMIN  --  2.9*   BILITOT  --  0.4   ALKPHOS  --  86   AST  --  16   ALT  --  17   ANIONGAP 11 10   EGFRNONAA >60.0 >60.0     Coagulation: No results for input(s): LABPROT, INR, APTT in the last 48 hours.  CRP:   Recent Labs   Lab 06/25/20  1122   .5*     Lactic Acid: No results for input(s): LACTATE in the last 48 hours.  POCT Glucose: No results for input(s): POCTGLUCOSE in the last 48 hours.  Prealbumin: No results for input(s): PREALBUMIN in the last 48 hours.  Troponin: No results for input(s): TROPONINI in the last 48 hours.  Wound Culture: No results for input(s): LABAERO in the last 48 hours.  All pertinent labs within the past 24 hours have been reviewed.    Significant Imaging: I have reviewed all pertinent imaging results/findings.   No new imaging    Procedure note:  After time out was performed and patient ID, side, and site were verified, the left knee was sterilly prepped in the standard fashion.  A 22-gauge needle was introduced into the skin to perform a field block  without complication and 15 cc of 1% lidocaine was then injected without difficulty.  After adequate analgesia was obtained, a 1 cm incision was made at the superior aspect of the fluid collection.  A hemostat was then used to bluntly explore the fluid collection.  Thin bloody fluid was expressed.  Packing was placed into the wound.  A sterile dressing of 4x4s was placed and covered with a 6 in Ace with light compression.  The patient tolerated the procedure well with no complications.  Blood loss was minimal.

## 2020-06-26 NOTE — DISCHARGE SUMMARY
[facility]  Discharge Summary  Hospital Medicine    Patient Name: Rajan Cooper III  YOB: 1955    Admit Date: 6/25/2020    Discharge Date and Time: 6/26/2020  4:25 PM    Discharge Attending Physician: Maxim Galarza MD     Team: [facility]OS[facility] MED G    Reason for Admission:   Chief Complaint   Patient presents with    Abscess     abscess to L knee. Redness noted        Active Hospital Problems    Diagnosis  POA    *Infrapatellar bursitis of left knee [M70.52]  Yes    Left leg cellulitis [L03.116]  Yes      Resolved Hospital Problems   No resolved problems to display.       HPI:   Mr Rajan Cooper III is a 64 y.o. gentleman with hx of left wrist abscess, hypertension, multiple sclerosis on Avonex and hx of MSSA bacteremia presenting with progressively worsening left knee pain with associated swelling and redness for the past week. Patient was apparently diagnosed with left knee cellulitis few weeks back and started on bactrim. He remained on bactrim for about 3 weeks, and cellulitis had initially improved. However for the past week patient states knee has been worsening and now experiencing worsening pain and swelling prompting PCP. PCP noted redness and fluctuance near left knee site, and rec ED evaluation for possible I&D of abscess. Patient denies any prior knee trauma, fevers, chills, cp, dyspnea, cough, abdominal pain, diarrhea, bleeds. No other complaints. No recent travel. No recent knee injections or instrumentations.      In the ED, patient was HDS and afebrile. Labs with worsening ESR (42-->114) and CRP (88 --> 156.5). COVID neg. No white count. Procal pending. Ultrasound negative for DVT. ED attempted aspiration fluctuant mass over patella but no return. Given vancomycin and admitted to hospital medicine as observation for failed OP therapy for left knee cellulitis.     Hospital Course:   Admitted to hospital medicine for cellulitis vs bursitis. No fever or white count. Elevated ESR  "and CRP noted. Started on BS abx. Ortho consulted, s/p aspiration of infrapatellar bursa. Suspected bursitis as per ortho. Tolerated procedure well. Feels better post aspiration. Okay to dc as very stable on 14 days of doxy with ortho fu. Ortho to set up apt for follow up. PCP to fu on final results and check ESR and CRP on completion of treatment.     Principal Problem: Infrapatellar bursitis of left knee    Other Problems Addressed:  Multiple sclerosis  Essential hypertension  Normocytic anemia      Procedures Performed: L infrapatellar bursa aspiration     Special Care, Treatment, and Services Provided: none    Consults: Ortho     Significant Diagnostic Studies:  No results found for: EF  Hemoglobin A1C   Date Value Ref Range Status   06/10/2019 5.9 (H) 4.0 - 5.6 % Final     Comment:     ADA Screening Guidelines:  5.7-6.4%  Consistent with prediabetes  >or=6.5%  Consistent with diabetes  High levels of fetal hemoglobin interfere with the HbA1C  assay. Heterozygous hemoglobin variants (HbS, HgC, etc)do  not significantly interfere with this assay.   However, presence of multiple variants may affect accuracy.     12/22/2014 6.1 4.5 - 6.2 % Final     All labs reviewed     Final Diagnoses: Same as principal problem.    Discharged Condition: stable  Face to face services were provided on 6/26/2020   Time Spent:  I spent > 30 minutes on the discharge, which included reviewing hospital course with patient/family, reviewing discharge medications, and arranging follow-up care.  Physical Exam on 6/26/2020:  /89 (BP Location: Left arm, Patient Position: Lying)   Pulse 74   Temp 97.5 °F (36.4 °C) (Oral)   Resp 16   Ht 6' 1" (1.854 m)   Wt 135.8 kg (299 lb 6.2 oz)   SpO2 97%   BMI 39.50 kg/m²     General appearance: no distress, non toxic  Mental status: Alert and oriented x 3  HEENT:  conjunctivae/corneas clear, PERRL  Neck: supple, thyroid not enlarged  Pulm:   normal respiratory effort, CTA B, no c/w/r  Card: " RRR, S1, S2 normal, no murmur, click, rub or gallop  Abd: soft, NT, ND, BS present; no masses, no organomegaly  Ext: bandaged left knee, mild patellar erythema with associated swelling compared to right knee, FROM, unable to note any joint effusion. Left knee tender to palpation  Pulses: 2+, symmetric  Skin: color, texture, turgor normal. No rashes or lesions  Neuro: Cranial Nerves grossly intact, no focal numbness or weakness, normal strength and tone     Disposition: Home or Self Care    Follow Up Instructions:   Follow-up Information     Tom Garcia MD. Schedule an appointment as soon as possible for a visit in 1 week.    Specialty: Internal Medicine  Why: Post hospital follow up, needs repeat ESR and CRP in 2 weeks after treatment  Contact information:  [id number]  [facility] [address]  [phone]                 Future Appointments   Date Time Provider Department Center   7/2/2020 10:00 AM Fernandez Norton NP [facility]THO [place]   7/9/2020  9:40 AM Tom Garcia MD [facility] [place] [facility]       Medications:    Discharge Medication List as of 6/26/2020  3:49 PM      START taking these medications    Details   doxycycline (VIBRAMYCIN) 100 MG Cap Take 1 capsule (100 mg total) by mouth 2 (two) times daily. for 14 days, Starting Fri 6/26/2020, Until Fri 7/10/2020, Normal      traMADoL (ULTRAM) 50 mg tablet Take 1 tablet (50 mg total) by mouth every 6 (six) hours., Starting Fri 6/26/2020, Normal         CONTINUE these medications which have NOT CHANGED    Details   amLODIPine (NORVASC) 10 MG tablet TAKE 1 TABLET BY MOUTH ONCE DAILY, Normal      AVONEX 30 mcg/0.5 mL injection INJECT 30 MCG INTO THE MUSCLE ONCE A WEEK, Normal      baclofen (LIORESAL) 10 MG tablet TAKE 1 TABLET BY MOUTH 3 TIMES A DAY, Normal      furosemide (LASIX) 40 MG tablet TAKE 1 TABLET BY MOUTH 2 TIMES A DAY, Normal      losartan (COZAAR) 50 MG tablet TAKE 1 TABLET BY MOUTH EVERY DAY, Normal      modafinil (PROVIGIL) 200 MG  Tab Take 1 tablet (200 mg total) by mouth once daily., Starting Mon 11/18/2019, Print      triamcinolone acetonide 0.1% (KENALOG) 0.1 % cream Apply topically 2 (two) times daily., Starting Wed 2/26/2020, Normal         STOP taking these medications       sulfamethoxazole-trimethoprim 800-160mg (BACTRIM DS) 800-160 mg Tab Comments:   Reason for Stopping:               Discharge Instructions:  Discussed in length with patient. If his presenting symptoms were to worsen, or if febrile, he should return to the ED. Patient voiced understanding. He needs to fu with PCP and Ortho with appropriate labs.      Maxim Galarza MD  Department of Hospital Medicine

## 2020-06-26 NOTE — PROGRESS NOTES
"[facility]  Orthopedics  Progress Note    Patient Name: Rajan Cooper III  MRN: [id number]  Admission Date: 6/25/2020  Hospital Length of Stay: 0 days  Attending Provider: Maxim Galarza MD  Primary Care Provider: Tom Garcia MD    Subjective:     Principal Problem:Infrapatellar bursitis of left knee    Principal Orthopedic Problem: Same    Interval History: Pt seen and examined at bedside. NAEON. Pt feels his swelling is improving. Pain is controlled. No drainage from knee.    Review of patient's allergies indicates:   Allergen Reactions    Norco [hydrocodone-acetaminophen] Anaphylaxis       Current Facility-Administered Medications   Medication    acetaminophen tablet 650 mg    amLODIPine tablet 10 mg    baclofen tablet 10 mg    dextrose 10% (D10W) Bolus    dextrose 10% (D10W) Bolus    furosemide tablet 40 mg    glucagon (human recombinant) injection 1 mg    glucose chewable tablet 16 g    glucose chewable tablet 24 g    lidocaine-EPINEPHrine 1%-1:100,000 injection 20 mL    losartan tablet 50 mg    modafiniL tablet 200 mg    ondansetron injection 4 mg    senna tablet 8.6 mg    sodium chloride 0.9% flush 10 mL    traMADoL tablet 50 mg    vancomycin - pharmacy to dose    vancomycin 1.75 g in 5 % dextrose 500 mL IVPB     Objective:     Vital Signs (Most Recent):  Temp: 96.4 °F (35.8 °C) (06/26/20 0807)  Pulse: 64 (06/26/20 0807)  Resp: 16 (06/26/20 0937)  BP: 133/70 (06/26/20 0807)  SpO2: 98 % (06/26/20 0807) Vital Signs (24h Range):  Temp:  [96.4 °F (35.8 °C)-98.8 °F (37.1 °C)] 96.4 °F (35.8 °C)  Pulse:  [63-72] 64  Resp:  [15-20] 16  SpO2:  [95 %-98 %] 98 %  BP: (118-143)/(63-71) 133/70     Weight: 135.8 kg (299 lb 6.2 oz)  Height: 6' 1" (185.4 cm)  Body mass index is 39.5 kg/m².      Ortho/SPM Exam     Vitals: Afebrile.  Vital signs stable.  General: No acute distress.  Cardio: Regular rate.  Chest: No increased work of breathing.    Left Lower Extremity Exam     - 2x2 cm " area of edema, redness, erythema, at the proximal aspect of tibial tubercle with small amount of fluctuance at superior aspect  - 5x5 mm wound at inferior edge of edema  - TTP over area of edema  - Compartments soft and compressible  - ROM full (eversion,inversion,plantar/dorsiflexion)  - Painless ROM at knee  - TA/EHL/Gastroc/FHL assessed in isolation without deficit  - SILT throughout  - DP and PT palpated  2+  - Capillary Refill <3s        Significant Labs:   A1C: No results for input(s): HGBA1C in the last 4320 hours.  ABGs: No results for input(s): PH, PCO2, HCO3, POCSATURATED, BE in the last 48 hours.  Blood Culture:   Recent Labs   Lab 06/25/20  1623 06/25/20  1634   LABBLOO No Growth to date No Growth to date     BMP:   Recent Labs   Lab 06/26/20  0611   *      K 4.0      CO2 28   BUN 18   CREATININE 0.9   CALCIUM 9.3   MG 2.1     CBC:   Recent Labs   Lab 06/25/20  1122 06/26/20  0611   WBC 7.39 6.27   HGB 12.3* 12.1*   HCT 39.1* 39.0*    274     CMP:   Recent Labs   Lab 06/25/20  1122 06/26/20  0611    138   K 4.7 4.0    100   CO2 26 28    124*   BUN 22 18   CREATININE 1.2 0.9   CALCIUM 9.8 9.3   PROT  --  7.7   ALBUMIN  --  2.9*   BILITOT  --  0.4   ALKPHOS  --  86   AST  --  16   ALT  --  17   ANIONGAP 11 10   EGFRNONAA >60.0 >60.0     Coagulation: No results for input(s): LABPROT, INR, APTT in the last 48 hours.  CRP:   Recent Labs   Lab 06/25/20  1122   .5*     Lactic Acid: No results for input(s): LACTATE in the last 48 hours.  POCT Glucose: No results for input(s): POCTGLUCOSE in the last 48 hours.  Prealbumin: No results for input(s): PREALBUMIN in the last 48 hours.  Troponin: No results for input(s): TROPONINI in the last 48 hours.  Wound Culture: No results for input(s): LABAERO in the last 48 hours.  All pertinent labs within the past 24 hours have been reviewed.    Significant Imaging: I have reviewed all pertinent imaging results/findings.    No new imaging    Procedure note:  After time out was performed and patient ID, side, and site were verified, the left knee was sterilly prepped in the standard fashion.  A 22-gauge needle was introduced into the skin to perform a field block without complication and 15 cc of 1% lidocaine was then injected without difficulty.  After adequate analgesia was obtained, a 1 cm incision was made at the superior aspect of the fluid collection.  A hemostat was then used to bluntly explore the fluid collection.  Thin bloody fluid was expressed.  Packing was placed into the wound.  A sterile dressing of 4x4s was placed and covered with a 6 in Ace with light compression.  The patient tolerated the procedure well with no complications.  Blood loss was minimal.      Assessment/Plan:     * Infrapatellar bursitis of left knee  Rajan Cooper III is a 64 y.o. male with left infrapatellar bursitis. No concern for septic knee joint at this time.   Diagnosis discussed in detail with patient.    WBC: 7, crp 15, esr 114.     Patient can weight bear as tolerated. Patient on vancomycin per primary, continue abx. Compressive wrap placed in ED. ED attempted aspiration.  Aspiration performed, results below.  Bedside I&D performed, packing placed.  Packing can come out in 2 days.  Will follow while inpatient. NPO at MN as precaution.     Aspiration results:  14188 WBC, 97% segs.  GRAM stain negative for organisms, no crystals.            Jose Khan MD  Orthopedics  [facility]

## 2020-06-26 NOTE — ASSESSMENT & PLAN NOTE
Rajan Cooper III is a 64 y.o. male with left infrapatellar bursitis. No concern for septic knee joint at this time.   Diagnosis discussed in detail with patient.    WBC: 7, crp 15, esr 114.     Patient can weight bear as tolerated. Patient on vancomycin per primary, continue abx. Compressive wrap placed in ED. ED attempted aspiration.  Aspiration performed, results below.  Bedside I&D performed, packing placed.  Packing can come out in 2 days.  Will follow while inpatient. NPO at MN as precaution.     Aspiration results:  30217 WBC, 97% segs.  GRAM stain negative for organisms, no crystals.

## 2020-06-29 LAB — BACTERIA SPEC AEROBE CULT: ABNORMAL

## 2020-06-30 LAB
BACTERIA BLD CULT: NORMAL
BACTERIA BLD CULT: NORMAL
BACTERIA SPEC ANAEROBE CULT: NORMAL

## 2020-07-02 ENCOUNTER — OFFICE VISIT (OUTPATIENT)
Dept: ORTHOPEDICS | Facility: CLINIC | Age: 65
End: 2020-07-02
Payer: COMMERCIAL

## 2020-07-02 ENCOUNTER — LAB VISIT (OUTPATIENT)
Dept: LAB | Facility: HOSPITAL | Age: 65
End: 2020-07-02
Payer: COMMERCIAL

## 2020-07-02 VITALS
WEIGHT: 299.38 LBS | DIASTOLIC BLOOD PRESSURE: 70 MMHG | BODY MASS INDEX: 39.68 KG/M2 | HEIGHT: 73 IN | SYSTOLIC BLOOD PRESSURE: 134 MMHG | HEART RATE: 74 BPM

## 2020-07-02 DIAGNOSIS — Z51.89 ENCOUNTER FOR WOUND RE-CHECK: Primary | ICD-10-CM

## 2020-07-02 DIAGNOSIS — M70.52 INFRAPATELLAR BURSITIS OF LEFT KNEE: ICD-10-CM

## 2020-07-02 LAB
CRP SERPL-MCNC: 30.9 MG/L (ref 0–8.2)
ERYTHROCYTE [SEDIMENTATION RATE] IN BLOOD BY WESTERGREN METHOD: 55 MM/HR (ref 0–23)

## 2020-07-02 PROCEDURE — 85652 RBC SED RATE AUTOMATED: CPT

## 2020-07-02 PROCEDURE — 99999 PR PBB SHADOW E&M-EST. PATIENT-LVL III: ICD-10-PCS | Mod: PBBFAC,,, | Performed by: NURSE PRACTITIONER

## 2020-07-02 PROCEDURE — 99999 PR PBB SHADOW E&M-EST. PATIENT-LVL III: CPT | Mod: PBBFAC,,, | Performed by: NURSE PRACTITIONER

## 2020-07-02 PROCEDURE — 36415 COLL VENOUS BLD VENIPUNCTURE: CPT

## 2020-07-02 PROCEDURE — 99024 POSTOP FOLLOW-UP VISIT: CPT | Mod: S$GLB,,, | Performed by: NURSE PRACTITIONER

## 2020-07-02 PROCEDURE — 86140 C-REACTIVE PROTEIN: CPT

## 2020-07-02 PROCEDURE — 99024 PR POST-OP FOLLOW-UP VISIT: ICD-10-PCS | Mod: S$GLB,,, | Performed by: NURSE PRACTITIONER

## 2020-07-02 NOTE — PROGRESS NOTES
Mr. Cooper is here today for a post-operative visit after undergoing an I&D for infrapatellar bursitis of his left knee by Dr. Shipley on 6/26/2020.    Interval History:  He reports that he is doing ok, here for a wound check.  Pain is improving.  He is still taking his oral Doxycycline.  Cultures grew S. aureus sensitive to Doxycycline.  He is not taking pain medication.  He denies fever, chills, and sweats since the time of the surgery.     Physical exam:  Dressing taken down.  Incision is clean, dry and intact.  He does have some erythema to his left lower leg.  Leg is swollen but patient reports it is less swollen than previous days.  There is no drainage from incision.  He has no calf tenderness.        Above photo(s) was/were taken with verbal permission of patient and/or their representative.  The purpose of photo(s) is to aid in medical treatment plan.        RADS: none done today    Assessment:  Post-op visit (1 week)    Plan:    ICD-10-CM ICD-9-CM   1. Encounter for wound re-check  Z51.89 V58.89   2. Infrapatellar bursitis of left knee  M70.52 726.69     Current care, treatment plan, precautions, activity level/ modifications, limitations, rehabilitation exercises and proposed future treatment were discussed with the patient. We discussed the need to monitor for changes in symptoms and condition and report them to the physician.  Discussed importance of compliance with all appointments and follow up examinations.       - Pain medication: refill was not needed,   - Pain medication refill policy provided to patient for review, yes  - Patient is to return to clinic in 1 week for wound check.  - Will send to lab to check ESR and CRP today.  - He will continue his course of oral antibiotics and follow up with his PCP next week.  - He was advised if swelling or redness worsens he needs to go to the ED.    Lab Results   Component Value Date    SEDRATE 55 (H) 07/02/2020     Lab Results   Component Value Date    CRP  30.9 (H) 07/02/2020     - ESR and CRP are down from previous, patient notified.       If there are any questions prior to scheduled follow up, the patient was instructed to contact the office

## 2020-07-09 ENCOUNTER — LAB VISIT (OUTPATIENT)
Dept: LAB | Facility: HOSPITAL | Age: 65
End: 2020-07-09
Attending: INTERNAL MEDICINE
Payer: COMMERCIAL

## 2020-07-09 ENCOUNTER — OFFICE VISIT (OUTPATIENT)
Dept: ORTHOPEDICS | Facility: CLINIC | Age: 65
End: 2020-07-09
Payer: COMMERCIAL

## 2020-07-09 ENCOUNTER — OFFICE VISIT (OUTPATIENT)
Dept: INTERNAL MEDICINE | Facility: CLINIC | Age: 65
End: 2020-07-09
Payer: COMMERCIAL

## 2020-07-09 VITALS
HEIGHT: 73 IN | WEIGHT: 297.19 LBS | DIASTOLIC BLOOD PRESSURE: 69 MMHG | BODY MASS INDEX: 39.39 KG/M2 | HEART RATE: 65 BPM | SYSTOLIC BLOOD PRESSURE: 142 MMHG

## 2020-07-09 VITALS
WEIGHT: 297.19 LBS | BODY MASS INDEX: 39.21 KG/M2 | SYSTOLIC BLOOD PRESSURE: 130 MMHG | HEART RATE: 72 BPM | OXYGEN SATURATION: 98 % | DIASTOLIC BLOOD PRESSURE: 84 MMHG

## 2020-07-09 DIAGNOSIS — Z12.5 SCREENING FOR PROSTATE CANCER: ICD-10-CM

## 2020-07-09 DIAGNOSIS — I10 ESSENTIAL HYPERTENSION: ICD-10-CM

## 2020-07-09 DIAGNOSIS — R26.9 GAIT DISORDER: ICD-10-CM

## 2020-07-09 DIAGNOSIS — G35 MS (MULTIPLE SCLEROSIS): ICD-10-CM

## 2020-07-09 DIAGNOSIS — M70.52 INFRAPATELLAR BURSITIS OF LEFT KNEE: ICD-10-CM

## 2020-07-09 DIAGNOSIS — Z51.89 VISIT FOR WOUND CHECK: ICD-10-CM

## 2020-07-09 DIAGNOSIS — M70.52 INFRAPATELLAR BURSITIS OF LEFT KNEE: Primary | ICD-10-CM

## 2020-07-09 DIAGNOSIS — L03.116 LEFT LEG CELLULITIS: Primary | ICD-10-CM

## 2020-07-09 DIAGNOSIS — L03.116 LEFT LEG CELLULITIS: ICD-10-CM

## 2020-07-09 LAB
COMPLEXED PSA SERPL-MCNC: 0.56 NG/ML (ref 0–4)
CRP SERPL-MCNC: 14.6 MG/L (ref 0–8.2)
ERYTHROCYTE [SEDIMENTATION RATE] IN BLOOD BY WESTERGREN METHOD: 43 MM/HR (ref 0–23)

## 2020-07-09 PROCEDURE — 3075F SYST BP GE 130 - 139MM HG: CPT | Mod: CPTII,S$GLB,, | Performed by: INTERNAL MEDICINE

## 2020-07-09 PROCEDURE — 36415 COLL VENOUS BLD VENIPUNCTURE: CPT

## 2020-07-09 PROCEDURE — 3079F PR MOST RECENT DIASTOLIC BLOOD PRESSURE 80-89 MM HG: ICD-10-PCS | Mod: CPTII,S$GLB,, | Performed by: INTERNAL MEDICINE

## 2020-07-09 PROCEDURE — 99024 POSTOP FOLLOW-UP VISIT: CPT | Mod: S$GLB,,, | Performed by: NURSE PRACTITIONER

## 2020-07-09 PROCEDURE — 85652 RBC SED RATE AUTOMATED: CPT

## 2020-07-09 PROCEDURE — 84153 ASSAY OF PSA TOTAL: CPT

## 2020-07-09 PROCEDURE — 99999 PR PBB SHADOW E&M-EST. PATIENT-LVL III: CPT | Mod: PBBFAC,,, | Performed by: NURSE PRACTITIONER

## 2020-07-09 PROCEDURE — 3008F BODY MASS INDEX DOCD: CPT | Mod: CPTII,S$GLB,, | Performed by: INTERNAL MEDICINE

## 2020-07-09 PROCEDURE — 99999 PR PBB SHADOW E&M-EST. PATIENT-LVL III: ICD-10-PCS | Mod: PBBFAC,,, | Performed by: NURSE PRACTITIONER

## 2020-07-09 PROCEDURE — 3075F PR MOST RECENT SYSTOLIC BLOOD PRESS GE 130-139MM HG: ICD-10-PCS | Mod: CPTII,S$GLB,, | Performed by: INTERNAL MEDICINE

## 2020-07-09 PROCEDURE — 99214 PR OFFICE/OUTPT VISIT, EST, LEVL IV, 30-39 MIN: ICD-10-PCS | Mod: S$GLB,,, | Performed by: INTERNAL MEDICINE

## 2020-07-09 PROCEDURE — 99214 OFFICE O/P EST MOD 30 MIN: CPT | Mod: S$GLB,,, | Performed by: INTERNAL MEDICINE

## 2020-07-09 PROCEDURE — 99024 PR POST-OP FOLLOW-UP VISIT: ICD-10-PCS | Mod: S$GLB,,, | Performed by: NURSE PRACTITIONER

## 2020-07-09 PROCEDURE — 86140 C-REACTIVE PROTEIN: CPT

## 2020-07-09 PROCEDURE — 3008F PR BODY MASS INDEX (BMI) DOCUMENTED: ICD-10-PCS | Mod: CPTII,S$GLB,, | Performed by: INTERNAL MEDICINE

## 2020-07-09 PROCEDURE — 3079F DIAST BP 80-89 MM HG: CPT | Mod: CPTII,S$GLB,, | Performed by: INTERNAL MEDICINE

## 2020-07-09 PROCEDURE — 99999 PR PBB SHADOW E&M-EST. PATIENT-LVL IV: CPT | Mod: PBBFAC,,, | Performed by: INTERNAL MEDICINE

## 2020-07-09 PROCEDURE — 99999 PR PBB SHADOW E&M-EST. PATIENT-LVL IV: ICD-10-PCS | Mod: PBBFAC,,, | Performed by: INTERNAL MEDICINE

## 2020-07-09 RX ORDER — DOXYCYCLINE 100 MG/1
100 CAPSULE ORAL 2 TIMES DAILY
Qty: 28 CAPSULE | Refills: 0 | Status: SHIPPED | OUTPATIENT
Start: 2020-07-09 | End: 2020-07-23

## 2020-07-09 NOTE — PROGRESS NOTES
Mr. Cooper is here today for a post-operative visit after undergoing an I&D for infrapatellar bursitis of his left knee by Dr. Shipley on 6/26/2020.    Interval History:  He reports that he is doing ok, here for a wound check.  I seen him last week and his knee was still red but not warm.  At time, he said he was still taking his Doxycycline.  ESR/CRP was checked and down from previous reading.  Currently he states he has no pain.  He said he seen his PCP earlier today and he extended his oral antibiotics for another week.  Cultures grew S. aureus sensitive to Doxycycline.  He is not taking pain medication.  He denies fever, chills, and sweats since the time of the surgery.     Physical exam:  Incision is open to air, there is no redness or drainage to his knee.  He has no pain to the area.  He can flex and extend his leg from 0-90 degrees.  He has no calf pain.      RADS: none done today    Assessment:  Post-op visit (2 weeks)    Plan:    ICD-10-CM ICD-9-CM   1. Infrapatellar bursitis of left knee  M70.52 726.69   2. Visit for wound check  Z51.89 V58.89     Current care, treatment plan, precautions, activity level/ modifications, limitations, rehabilitation exercises and proposed future treatment were discussed with the patient. We discussed the need to monitor for changes in symptoms and condition and report them to the physician.  Discussed importance of compliance with all appointments and follow up examinations.       - Pain medication: refill was not needed,   - Pain medication refill policy provided to patient for review, yes  - Patient is to return to clinic as needed or if swelling and/or pain returns.  - He will continue his course of oral antibiotics and follow up with his PCP.  - He was advised if swelling or redness worsens he needs to go to the ED.       If there are any questions prior to scheduled follow up, the patient was instructed to contact the office

## 2020-07-09 NOTE — PROGRESS NOTES
Subjective:       Patient ID: Rajan Cooper III is a 64 y.o. male.    Chief Complaint: Follow-up    HPI:  Patient comes in for follow-up of recent hospitalization for pre patella bursitis with cellulitis.  He is improving nicely and I have attached a photo here but he will continue to complete the antibiotics.  He is seeing orthopedics today would like sed rate and CRP labs done.  He is also due for a PSA.  He continues to use a walker as his gait is off because of his multiple sclerosis.  We talked about using caution with his skin as he has had numerous cellulitis infections.    Review of Systems   Constitutional: Negative for chills, fatigue, fever and unexpected weight change.   HENT: Negative for nosebleeds and trouble swallowing.    Eyes: Negative for pain and visual disturbance.   Respiratory: Negative for cough, shortness of breath and wheezing.    Cardiovascular: Negative for chest pain and palpitations.   Gastrointestinal: Negative for abdominal pain, constipation, diarrhea, nausea and vomiting.   Genitourinary: Negative for difficulty urinating and hematuria.   Musculoskeletal: Positive for arthralgias and gait problem. Negative for neck pain.   Integumentary:  Positive for wound (Healing nicely). Negative for rash.   Neurological: Negative for dizziness and headaches.   Hematological: Does not bruise/bleed easily.   Psychiatric/Behavioral: Negative for dysphoric mood, sleep disturbance and suicidal ideas.         Objective:          Physical Exam  Constitutional:       Appearance: Normal appearance.      Comments: Overweight male no acute distress   Cardiovascular:      Pulses: Normal pulses.   Musculoskeletal:         General: Swelling and signs of injury (Healing area of cellulitis left knee  ) present. No tenderness.      Right lower leg: Edema present.      Left lower leg: Edema present.   Neurological:      Mental Status: He is alert and oriented to person, place, and time.   Psychiatric:          Mood and Affect: Mood normal.         Behavior: Behavior normal.         Assessment:       1. Left leg cellulitis    2. Infrapatellar bursitis of left knee    3. Gait disorder    4. Essential hypertension    5. MS (multiple sclerosis)    6. Screening for prostate cancer        Plan:       Rajan was seen today for follow-up.    Diagnoses and all orders for this visit:    Left leg cellulitis  -     Sedimentation rate; Future  -     C-reactive protein; Future    Infrapatellar bursitis of left knee  -     Sedimentation rate; Future  -     C-reactive protein; Future    Gait disorder  -     Sedimentation rate; Future  -     C-reactive protein; Future    Essential hypertension  -     Sedimentation rate; Future  -     C-reactive protein; Future    MS (multiple sclerosis)  -     Sedimentation rate; Future  -     C-reactive protein; Future    Screening for prostate cancer  -     PSA, Screening; Future    Other orders  -     doxycycline (VIBRAMYCIN) 100 MG Cap; Take 1 capsule (100 mg total) by mouth 2 (two) times daily. for 14 days

## 2020-07-17 ENCOUNTER — TELEPHONE (OUTPATIENT)
Dept: INTERNAL MEDICINE | Facility: CLINIC | Age: 65
End: 2020-07-17

## 2020-07-17 DIAGNOSIS — Z20.822 CLOSE EXPOSURE TO COVID-19 VIRUS: Primary | ICD-10-CM

## 2020-07-17 DIAGNOSIS — R53.83 FATIGUE: ICD-10-CM

## 2020-07-17 DIAGNOSIS — R53.83 FATIGUE, UNSPECIFIED TYPE: ICD-10-CM

## 2020-07-17 NOTE — TELEPHONE ENCOUNTER
----- Message from Mert Gatica sent at 7/17/2020 12:30 PM CDT -----  Contact: Dorothy wife [phone]  Patient Wife has been expose to The COVID-19 and will like to get an order for the test, please advise.

## 2020-07-17 NOTE — TELEPHONE ENCOUNTER
Pt states he has fatigue. pts daughter tested positive for covid and he has been exposed     Requesting covid test

## 2020-07-18 ENCOUNTER — LAB VISIT (OUTPATIENT)
Dept: INTERNAL MEDICINE | Facility: CLINIC | Age: 65
End: 2020-07-18
Payer: COMMERCIAL

## 2020-07-18 DIAGNOSIS — R53.83 FATIGUE, UNSPECIFIED TYPE: ICD-10-CM

## 2020-07-18 DIAGNOSIS — R53.83 FATIGUE: ICD-10-CM

## 2020-07-18 DIAGNOSIS — Z20.822 CLOSE EXPOSURE TO COVID-19 VIRUS: ICD-10-CM

## 2020-07-18 PROCEDURE — U0003 INFECTIOUS AGENT DETECTION BY NUCLEIC ACID (DNA OR RNA); SEVERE ACUTE RESPIRATORY SYNDROME CORONAVIRUS 2 (SARS-COV-2) (CORONAVIRUS DISEASE [COVID-19]), AMPLIFIED PROBE TECHNIQUE, MAKING USE OF HIGH THROUGHPUT TECHNOLOGIES AS DESCRIBED BY CMS-2020-01-R: HCPCS

## 2020-07-19 LAB — SARS-COV-2 RNA RESP QL NAA+PROBE: NOT DETECTED

## 2020-07-27 LAB — FUNGUS SPEC CULT: NORMAL

## 2020-08-16 NOTE — NURSING
Discharge instructions given to patient. Verbalizes understanding. IV removed, catheter intact. No complaints/concerns voiced at this time.  
Yes

## 2020-08-25 RX ORDER — BACLOFEN 10 MG/1
10 TABLET ORAL 3 TIMES DAILY
Qty: 90 TABLET | Refills: 2 | Status: SHIPPED | OUTPATIENT
Start: 2020-08-25 | End: 2020-12-16

## 2020-08-28 LAB
ACID FAST MOD KINY STN SPEC: NORMAL
MYCOBACTERIUM SPEC QL CULT: NORMAL

## 2020-09-04 DIAGNOSIS — Z12.11 COLON CANCER SCREENING: ICD-10-CM

## 2020-10-05 ENCOUNTER — PATIENT MESSAGE (OUTPATIENT)
Dept: ADMINISTRATIVE | Facility: HOSPITAL | Age: 65
End: 2020-10-05

## 2020-10-30 ENCOUNTER — PATIENT MESSAGE (OUTPATIENT)
Dept: ADMINISTRATIVE | Facility: HOSPITAL | Age: 65
End: 2020-10-30

## 2020-12-16 RX ORDER — TRIAMCINOLONE ACETONIDE 1 MG/G
CREAM TOPICAL
Qty: 80 G | Refills: 1 | Status: SHIPPED | OUTPATIENT
Start: 2020-12-16 | End: 2023-12-05

## 2020-12-16 NOTE — PROGRESS NOTES
Refill Authorization Note   Rajan Cooper  is requesting a refill authorization.  Brief Assessment and Rationale for Refill:  Approve     Medication Therapy Plan:  [facility]    Medication Reconciliation Completed: No   Comments:   Orders Placed This Encounter    triamcinolone acetonide 0.1% (KENALOG) 0.1 % cream      Requested Prescriptions   Signed Prescriptions Disp Refills    triamcinolone acetonide 0.1% (KENALOG) 0.1 % cream 80 g 1     Sig: APPLY TO AFFECTED AREA TWICE A DAY       Dermatology: Corticosteroids - desonide, flurandrenolide, and triamcinolone Failed - 12/15/2020 11:11 PM        Failed - Manual Review: Max refill duration of 6 months.        Passed - Patient is at least 18 years old        Passed - Office visit in past 12 months or future 90 days     Recent Outpatient Visits            5 months ago Infrapatellar bursitis of left knee    [facility] 5th Fl Fernandez Norton NP    5 months ago Left leg cellulitis    [facility] Tom Garcia MD    5 months ago Encounter for wound re-check    [facility] 5th Fl Fernandez Norton NP    5 months ago Abscess of left knee    [facility] birdie Bell MD    6 months ago Cellulitis of left lower extremity    [facility] birdie Garcia MD                        Appointments  past 12m or future 3m with PCP    Date Provider   Last Visit   7/9/2020 Tom Garcia MD   Next Visit   Visit date not found Tom Garcia MD   ED visits in past 90 days: 0     Note composed:1:55 PM 12/16/2020

## 2020-12-16 NOTE — TELEPHONE ENCOUNTER
No new care gaps identified.  Powered by [employer]. Reference number: [id number]. 12/15/2020 11:12:36 PM   CST

## 2021-01-04 ENCOUNTER — PATIENT MESSAGE (OUTPATIENT)
Dept: ADMINISTRATIVE | Facility: HOSPITAL | Age: 66
End: 2021-01-04

## 2021-01-06 DIAGNOSIS — G35 MS (MULTIPLE SCLEROSIS): Primary | ICD-10-CM

## 2021-01-06 RX ORDER — INTERFERON BETA-1A 30MCG/.5ML
30 KIT INTRAMUSCULAR
Qty: 4 SYRINGE | Refills: 12 | Status: SHIPPED | OUTPATIENT
Start: 2021-01-06 | End: 2021-05-13 | Stop reason: SDUPTHER

## 2021-02-18 ENCOUNTER — PATIENT MESSAGE (OUTPATIENT)
Dept: INTERNAL MEDICINE | Facility: CLINIC | Age: 66
End: 2021-02-18

## 2021-02-25 ENCOUNTER — OFFICE VISIT (OUTPATIENT)
Dept: INTERNAL MEDICINE | Facility: CLINIC | Age: 66
End: 2021-02-25
Payer: COMMERCIAL

## 2021-02-25 VITALS
SYSTOLIC BLOOD PRESSURE: 134 MMHG | DIASTOLIC BLOOD PRESSURE: 72 MMHG | HEART RATE: 87 BPM | HEIGHT: 73 IN | BODY MASS INDEX: 40.02 KG/M2 | OXYGEN SATURATION: 93 % | WEIGHT: 302 LBS

## 2021-02-25 DIAGNOSIS — G35 MS (MULTIPLE SCLEROSIS): ICD-10-CM

## 2021-02-25 DIAGNOSIS — I10 ESSENTIAL HYPERTENSION: ICD-10-CM

## 2021-02-25 DIAGNOSIS — R09.89 CHEST CONGESTION: ICD-10-CM

## 2021-02-25 DIAGNOSIS — R05.9 COUGH: Primary | ICD-10-CM

## 2021-02-25 PROCEDURE — 3078F DIAST BP <80 MM HG: CPT | Mod: CPTII,S$GLB,, | Performed by: INTERNAL MEDICINE

## 2021-02-25 PROCEDURE — 3008F BODY MASS INDEX DOCD: CPT | Mod: CPTII,S$GLB,, | Performed by: INTERNAL MEDICINE

## 2021-02-25 PROCEDURE — 3008F PR BODY MASS INDEX (BMI) DOCUMENTED: ICD-10-PCS | Mod: CPTII,S$GLB,, | Performed by: INTERNAL MEDICINE

## 2021-02-25 PROCEDURE — 1126F AMNT PAIN NOTED NONE PRSNT: CPT | Mod: S$GLB,,, | Performed by: INTERNAL MEDICINE

## 2021-02-25 PROCEDURE — 99999 PR PBB SHADOW E&M-EST. PATIENT-LVL III: ICD-10-PCS | Mod: PBBFAC,,, | Performed by: INTERNAL MEDICINE

## 2021-02-25 PROCEDURE — 99213 PR OFFICE/OUTPT VISIT, EST, LEVL III, 20-29 MIN: ICD-10-PCS | Mod: S$GLB,,, | Performed by: INTERNAL MEDICINE

## 2021-02-25 PROCEDURE — 99999 PR PBB SHADOW E&M-EST. PATIENT-LVL III: CPT | Mod: PBBFAC,,, | Performed by: INTERNAL MEDICINE

## 2021-02-25 PROCEDURE — 3075F PR MOST RECENT SYSTOLIC BLOOD PRESS GE 130-139MM HG: ICD-10-PCS | Mod: CPTII,S$GLB,, | Performed by: INTERNAL MEDICINE

## 2021-02-25 PROCEDURE — 1126F PR PAIN SEVERITY QUANTIFIED, NO PAIN PRESENT: ICD-10-PCS | Mod: S$GLB,,, | Performed by: INTERNAL MEDICINE

## 2021-02-25 PROCEDURE — 3078F PR MOST RECENT DIASTOLIC BLOOD PRESSURE < 80 MM HG: ICD-10-PCS | Mod: CPTII,S$GLB,, | Performed by: INTERNAL MEDICINE

## 2021-02-25 PROCEDURE — 3075F SYST BP GE 130 - 139MM HG: CPT | Mod: CPTII,S$GLB,, | Performed by: INTERNAL MEDICINE

## 2021-02-25 PROCEDURE — 99213 OFFICE O/P EST LOW 20 MIN: CPT | Mod: S$GLB,,, | Performed by: INTERNAL MEDICINE

## 2021-02-25 RX ORDER — DOXYCYCLINE 100 MG/1
100 CAPSULE ORAL 2 TIMES DAILY
Qty: 20 CAPSULE | Refills: 0 | Status: SHIPPED | OUTPATIENT
Start: 2021-02-25 | End: 2021-03-07

## 2021-03-03 ENCOUNTER — IMMUNIZATION (OUTPATIENT)
Dept: PRIMARY CARE CLINIC | Facility: CLINIC | Age: 66
End: 2021-03-03

## 2021-03-03 DIAGNOSIS — Z23 NEED FOR VACCINATION: Primary | ICD-10-CM

## 2021-03-03 PROCEDURE — 0001A PR IMMUNIZ ADMIN, SARS-COV-2 COVID-19 VACC, 30MCG/0.3ML, 1ST DOSE: CPT | Mod: CV19,S$GLB,, | Performed by: INTERNAL MEDICINE

## 2021-03-03 PROCEDURE — 0001A PR IMMUNIZ ADMIN, SARS-COV-2 COVID-19 VACC, 30MCG/0.3ML, 1ST DOSE: ICD-10-PCS | Mod: CV19,S$GLB,, | Performed by: INTERNAL MEDICINE

## 2021-03-03 PROCEDURE — 91300 PR SARS-COV- 2 COVID-19 VACCINE, NO PRSV, 30MCG/0.3ML, IM: ICD-10-PCS | Mod: S$GLB,,, | Performed by: INTERNAL MEDICINE

## 2021-03-03 PROCEDURE — 91300 PR SARS-COV- 2 COVID-19 VACCINE, NO PRSV, 30MCG/0.3ML, IM: CPT | Mod: S$GLB,,, | Performed by: INTERNAL MEDICINE

## 2021-03-03 RX ADMIN — Medication 0.3 ML: at 09:03

## 2021-03-18 RX ORDER — LOSARTAN POTASSIUM 50 MG/1
TABLET ORAL
Qty: 90 TABLET | Refills: 1 | Status: SHIPPED | OUTPATIENT
Start: 2021-03-18 | End: 2021-09-15

## 2021-03-18 RX ORDER — AMLODIPINE BESYLATE 10 MG/1
TABLET ORAL
Qty: 90 TABLET | Refills: 3 | Status: SHIPPED | OUTPATIENT
Start: 2021-03-18 | End: 2022-04-16 | Stop reason: SDUPTHER

## 2021-03-26 ENCOUNTER — IMMUNIZATION (OUTPATIENT)
Dept: PRIMARY CARE CLINIC | Facility: CLINIC | Age: 66
End: 2021-03-26

## 2021-03-26 DIAGNOSIS — Z23 NEED FOR VACCINATION: Primary | ICD-10-CM

## 2021-03-26 PROCEDURE — 91300 PR SARS-COV- 2 COVID-19 VACCINE, NO PRSV, 30MCG/0.3ML, IM: CPT | Mod: S$GLB,,, | Performed by: INTERNAL MEDICINE

## 2021-03-26 PROCEDURE — 91300 PR SARS-COV- 2 COVID-19 VACCINE, NO PRSV, 30MCG/0.3ML, IM: ICD-10-PCS | Mod: S$GLB,,, | Performed by: INTERNAL MEDICINE

## 2021-03-26 PROCEDURE — 0002A PR IMMUNIZ ADMIN, SARS-COV-2 COVID-19 VACC, 30MCG/0.3ML, 2ND DOSE: ICD-10-PCS | Mod: CV19,S$GLB,, | Performed by: INTERNAL MEDICINE

## 2021-03-26 PROCEDURE — 0002A PR IMMUNIZ ADMIN, SARS-COV-2 COVID-19 VACC, 30MCG/0.3ML, 2ND DOSE: CPT | Mod: CV19,S$GLB,, | Performed by: INTERNAL MEDICINE

## 2021-03-26 RX ADMIN — Medication 0.3 ML: at 09:03

## 2021-04-05 ENCOUNTER — PATIENT MESSAGE (OUTPATIENT)
Dept: ADMINISTRATIVE | Facility: HOSPITAL | Age: 66
End: 2021-04-05

## 2021-04-30 ENCOUNTER — OFFICE VISIT (OUTPATIENT)
Dept: INTERNAL MEDICINE | Facility: CLINIC | Age: 66
End: 2021-04-30
Payer: COMMERCIAL

## 2021-04-30 ENCOUNTER — HOSPITAL ENCOUNTER (OUTPATIENT)
Dept: RADIOLOGY | Facility: HOSPITAL | Age: 66
Discharge: HOME | End: 2021-04-30
Attending: PHYSICIAN ASSISTANT
Payer: COMMERCIAL

## 2021-04-30 VITALS
SYSTOLIC BLOOD PRESSURE: 116 MMHG | HEART RATE: 79 BPM | OXYGEN SATURATION: 95 % | RESPIRATION RATE: 16 BRPM | BODY MASS INDEX: 42.17 KG/M2 | DIASTOLIC BLOOD PRESSURE: 60 MMHG | HEIGHT: 72 IN | WEIGHT: 311.31 LBS

## 2021-04-30 DIAGNOSIS — R05.3 CHRONIC COUGH: ICD-10-CM

## 2021-04-30 DIAGNOSIS — R05.3 CHRONIC COUGH: Primary | ICD-10-CM

## 2021-04-30 DIAGNOSIS — R60.0 BILATERAL LOWER EXTREMITY EDEMA: ICD-10-CM

## 2021-04-30 DIAGNOSIS — R06.2 WHEEZING: ICD-10-CM

## 2021-04-30 DIAGNOSIS — G35 MS (MULTIPLE SCLEROSIS): ICD-10-CM

## 2021-04-30 DIAGNOSIS — I10 ESSENTIAL HYPERTENSION: ICD-10-CM

## 2021-04-30 PROCEDURE — 99214 PR OFFICE/OUTPT VISIT, EST, LEVL IV, 30-39 MIN: ICD-10-PCS | Mod: S$GLB,,, | Performed by: PHYSICIAN ASSISTANT

## 2021-04-30 PROCEDURE — 99999 PR PBB SHADOW E&M-EST. PATIENT-LVL IV: CPT | Mod: PBBFAC,,, | Performed by: PHYSICIAN ASSISTANT

## 2021-04-30 PROCEDURE — 99214 OFFICE O/P EST MOD 30 MIN: CPT | Mod: S$GLB,,, | Performed by: PHYSICIAN ASSISTANT

## 2021-04-30 PROCEDURE — 1126F AMNT PAIN NOTED NONE PRSNT: CPT | Mod: S$GLB,,, | Performed by: PHYSICIAN ASSISTANT

## 2021-04-30 PROCEDURE — 71046 X-RAY EXAM CHEST 2 VIEWS: CPT | Mod: TC

## 2021-04-30 PROCEDURE — 99999 PR PBB SHADOW E&M-EST. PATIENT-LVL IV: ICD-10-PCS | Mod: PBBFAC,,, | Performed by: PHYSICIAN ASSISTANT

## 2021-04-30 PROCEDURE — 71046 X-RAY EXAM CHEST 2 VIEWS: CPT | Mod: 26,,, | Performed by: RADIOLOGY

## 2021-04-30 PROCEDURE — 3008F BODY MASS INDEX DOCD: CPT | Mod: CPTII,S$GLB,, | Performed by: PHYSICIAN ASSISTANT

## 2021-04-30 PROCEDURE — 71046 XR CHEST PA AND LATERAL: ICD-10-PCS | Mod: 26,,, | Performed by: RADIOLOGY

## 2021-04-30 PROCEDURE — 3008F PR BODY MASS INDEX (BMI) DOCUMENTED: ICD-10-PCS | Mod: CPTII,S$GLB,, | Performed by: PHYSICIAN ASSISTANT

## 2021-04-30 PROCEDURE — 1126F PR PAIN SEVERITY QUANTIFIED, NO PAIN PRESENT: ICD-10-PCS | Mod: S$GLB,,, | Performed by: PHYSICIAN ASSISTANT

## 2021-05-03 ENCOUNTER — TELEPHONE (OUTPATIENT)
Dept: INTERNAL MEDICINE | Facility: CLINIC | Age: 66
End: 2021-05-03

## 2021-05-03 RX ORDER — BENZONATATE 100 MG/1
100 CAPSULE ORAL 3 TIMES DAILY PRN
Qty: 20 CAPSULE | Refills: 0 | Status: SHIPPED | OUTPATIENT
Start: 2021-05-03 | End: 2021-05-12 | Stop reason: SDUPTHER

## 2021-05-03 RX ORDER — PREDNISONE 20 MG/1
20 TABLET ORAL DAILY
Qty: 5 TABLET | Refills: 0 | Status: SHIPPED | OUTPATIENT
Start: 2021-05-03 | End: 2021-05-08

## 2021-05-12 ENCOUNTER — PATIENT MESSAGE (OUTPATIENT)
Dept: INTERNAL MEDICINE | Facility: CLINIC | Age: 66
End: 2021-05-12

## 2021-05-12 RX ORDER — BENZONATATE 100 MG/1
100 CAPSULE ORAL 3 TIMES DAILY PRN
Qty: 30 CAPSULE | Refills: 0 | Status: SHIPPED | OUTPATIENT
Start: 2021-05-12 | End: 2021-05-22

## 2021-05-13 ENCOUNTER — TELEPHONE (OUTPATIENT)
Dept: NEUROLOGY | Facility: CLINIC | Age: 66
End: 2021-05-13

## 2021-05-13 ENCOUNTER — OFFICE VISIT (OUTPATIENT)
Dept: NEUROLOGY | Facility: CLINIC | Age: 66
End: 2021-05-13
Payer: COMMERCIAL

## 2021-05-13 VITALS
BODY MASS INDEX: 42.82 KG/M2 | DIASTOLIC BLOOD PRESSURE: 77 MMHG | SYSTOLIC BLOOD PRESSURE: 144 MMHG | WEIGHT: 315 LBS | HEART RATE: 71 BPM

## 2021-05-13 DIAGNOSIS — G35 MS (MULTIPLE SCLEROSIS): ICD-10-CM

## 2021-05-13 PROCEDURE — 1126F AMNT PAIN NOTED NONE PRSNT: CPT | Mod: S$GLB,,, | Performed by: NEUROMUSCULOSKELETAL MEDICINE & OMM

## 2021-05-13 PROCEDURE — 99214 OFFICE O/P EST MOD 30 MIN: CPT | Mod: S$GLB,,, | Performed by: NEUROMUSCULOSKELETAL MEDICINE & OMM

## 2021-05-13 PROCEDURE — 99999 PR PBB SHADOW E&M-EST. PATIENT-LVL III: CPT | Mod: PBBFAC,,, | Performed by: NEUROMUSCULOSKELETAL MEDICINE & OMM

## 2021-05-13 PROCEDURE — 3008F BODY MASS INDEX DOCD: CPT | Mod: CPTII,S$GLB,, | Performed by: NEUROMUSCULOSKELETAL MEDICINE & OMM

## 2021-05-13 PROCEDURE — 99214 PR OFFICE/OUTPT VISIT, EST, LEVL IV, 30-39 MIN: ICD-10-PCS | Mod: S$GLB,,, | Performed by: NEUROMUSCULOSKELETAL MEDICINE & OMM

## 2021-05-13 PROCEDURE — 3008F PR BODY MASS INDEX (BMI) DOCUMENTED: ICD-10-PCS | Mod: CPTII,S$GLB,, | Performed by: NEUROMUSCULOSKELETAL MEDICINE & OMM

## 2021-05-13 PROCEDURE — 1126F PR PAIN SEVERITY QUANTIFIED, NO PAIN PRESENT: ICD-10-PCS | Mod: S$GLB,,, | Performed by: NEUROMUSCULOSKELETAL MEDICINE & OMM

## 2021-05-13 PROCEDURE — 99999 PR PBB SHADOW E&M-EST. PATIENT-LVL III: ICD-10-PCS | Mod: PBBFAC,,, | Performed by: NEUROMUSCULOSKELETAL MEDICINE & OMM

## 2021-05-13 RX ORDER — MODAFINIL 200 MG/1
200 TABLET ORAL DAILY
Qty: 400 TABLET | Refills: 3 | Status: SHIPPED | OUTPATIENT
Start: 2021-05-13 | End: 2021-05-13

## 2021-05-13 RX ORDER — MODAFINIL 200 MG/1
200 TABLET ORAL DAILY
Qty: 400 TABLET | Refills: 3 | Status: SHIPPED | OUTPATIENT
Start: 2021-05-13 | End: 2021-05-13 | Stop reason: SDUPTHER

## 2021-05-13 RX ORDER — INTERFERON BETA-1A 30MCG/.5ML
30 KIT INTRAMUSCULAR
Qty: 4 SYRINGE | Refills: 12 | OUTPATIENT
Start: 2021-05-13 | End: 2021-11-16 | Stop reason: SDUPTHER

## 2021-06-01 RX ORDER — MODAFINIL 200 MG/1
200 TABLET ORAL DAILY
Qty: 400 TABLET | Refills: 3 | Status: SHIPPED | OUTPATIENT
Start: 2021-06-01 | End: 2022-03-31 | Stop reason: SDUPTHER

## 2021-06-17 RX ORDER — FUROSEMIDE 40 MG/1
TABLET ORAL
Qty: 180 TABLET | Refills: 2 | Status: SHIPPED | OUTPATIENT
Start: 2021-06-17 | End: 2022-06-13

## 2021-07-07 ENCOUNTER — PATIENT MESSAGE (OUTPATIENT)
Dept: ADMINISTRATIVE | Facility: HOSPITAL | Age: 66
End: 2021-07-07

## 2021-07-08 ENCOUNTER — TELEPHONE (OUTPATIENT)
Dept: INTERNAL MEDICINE | Facility: CLINIC | Age: 66
End: 2021-07-08

## 2021-07-08 DIAGNOSIS — N30.01 ACUTE CYSTITIS WITH HEMATURIA: ICD-10-CM

## 2021-07-08 DIAGNOSIS — R39.198 DIFFICULTY URINATING: ICD-10-CM

## 2021-07-08 RX ORDER — CEFDINIR 300 MG/1
300 CAPSULE ORAL 2 TIMES DAILY
Qty: 20 CAPSULE | Refills: 0 | Status: CANCELLED | OUTPATIENT
Start: 2021-07-08 | End: 2021-07-18

## 2021-07-08 RX ORDER — CLINDAMYCIN HYDROCHLORIDE 300 MG/1
300 CAPSULE ORAL EVERY 8 HOURS
Qty: 21 CAPSULE | Refills: 0 | Status: SHIPPED | OUTPATIENT
Start: 2021-07-08 | End: 2021-07-23 | Stop reason: SDUPTHER

## 2021-07-23 RX ORDER — CLINDAMYCIN HYDROCHLORIDE 300 MG/1
300 CAPSULE ORAL EVERY 8 HOURS
Qty: 90 CAPSULE | Refills: 0 | Status: SHIPPED | OUTPATIENT
Start: 2021-07-23 | End: 2021-10-21

## 2021-09-03 ENCOUNTER — PATIENT MESSAGE (OUTPATIENT)
Dept: NEUROLOGY | Facility: CLINIC | Age: 66
End: 2021-09-03

## 2021-09-15 RX ORDER — LOSARTAN POTASSIUM 50 MG/1
TABLET ORAL
Qty: 90 TABLET | Refills: 1 | Status: SHIPPED | OUTPATIENT
Start: 2021-09-15 | End: 2022-04-12

## 2021-09-29 DIAGNOSIS — Z12.11 COLON CANCER SCREENING: ICD-10-CM

## 2021-10-04 ENCOUNTER — PATIENT MESSAGE (OUTPATIENT)
Dept: ADMINISTRATIVE | Facility: HOSPITAL | Age: 66
End: 2021-10-04

## 2021-10-11 ENCOUNTER — IMMUNIZATION (OUTPATIENT)
Dept: INTERNAL MEDICINE | Facility: CLINIC | Age: 66
End: 2021-10-11
Payer: COMMERCIAL

## 2021-10-11 DIAGNOSIS — Z23 NEED FOR VACCINATION: Primary | ICD-10-CM

## 2021-10-11 PROCEDURE — 0003A COVID-19, MRNA, LNP-S, PF, 30 MCG/0.3 ML DOSE VACCINE: CPT | Mod: CV19,PBBFAC | Performed by: INTERNAL MEDICINE

## 2021-10-11 PROCEDURE — 91300 COVID-19, MRNA, LNP-S, PF, 30 MCG/0.3 ML DOSE VACCINE: CPT | Mod: PBBFAC | Performed by: INTERNAL MEDICINE

## 2021-10-21 ENCOUNTER — PATIENT MESSAGE (OUTPATIENT)
Dept: INTERNAL MEDICINE | Facility: CLINIC | Age: 66
End: 2021-10-21

## 2021-10-21 ENCOUNTER — PATIENT MESSAGE (OUTPATIENT)
Dept: INTERNAL MEDICINE | Facility: CLINIC | Age: 66
End: 2021-10-21
Payer: COMMERCIAL

## 2021-10-21 RX ORDER — CLINDAMYCIN HYDROCHLORIDE 300 MG/1
300 CAPSULE ORAL EVERY 8 HOURS
Qty: 21 CAPSULE | Refills: 1 | Status: SHIPPED | OUTPATIENT
Start: 2021-10-21 | End: 2023-01-30

## 2021-11-08 ENCOUNTER — OFFICE VISIT (OUTPATIENT)
Dept: URGENT CARE | Facility: CLINIC | Age: 66
End: 2021-11-08
Payer: COMMERCIAL

## 2021-11-08 VITALS
RESPIRATION RATE: 18 BRPM | DIASTOLIC BLOOD PRESSURE: 69 MMHG | HEIGHT: 72 IN | SYSTOLIC BLOOD PRESSURE: 148 MMHG | OXYGEN SATURATION: 99 % | TEMPERATURE: 97 F | BODY MASS INDEX: 42.66 KG/M2 | HEART RATE: 71 BPM | WEIGHT: 315 LBS

## 2021-11-08 DIAGNOSIS — M25.562 ACUTE PAIN OF LEFT KNEE: Primary | ICD-10-CM

## 2021-11-08 PROCEDURE — 3078F PR MOST RECENT DIASTOLIC BLOOD PRESSURE < 80 MM HG: ICD-10-PCS | Mod: CPTII,S$GLB,, | Performed by: PHYSICIAN ASSISTANT

## 2021-11-08 PROCEDURE — 73562 XR KNEE 3 VIEW LEFT: ICD-10-PCS | Mod: FY,LT,S$GLB, | Performed by: RADIOLOGY

## 2021-11-08 PROCEDURE — 73562 X-RAY EXAM OF KNEE 3: CPT | Mod: FY,LT,S$GLB, | Performed by: RADIOLOGY

## 2021-11-08 PROCEDURE — 96372 THER/PROPH/DIAG INJ SC/IM: CPT | Mod: S$GLB,,, | Performed by: FAMILY MEDICINE

## 2021-11-08 PROCEDURE — 1160F PR REVIEW ALL MEDS BY PRESCRIBER/CLIN PHARMACIST DOCUMENTED: ICD-10-PCS | Mod: CPTII,S$GLB,, | Performed by: PHYSICIAN ASSISTANT

## 2021-11-08 PROCEDURE — 99214 OFFICE O/P EST MOD 30 MIN: CPT | Mod: 25,S$GLB,, | Performed by: PHYSICIAN ASSISTANT

## 2021-11-08 PROCEDURE — 1160F RVW MEDS BY RX/DR IN RCRD: CPT | Mod: CPTII,S$GLB,, | Performed by: PHYSICIAN ASSISTANT

## 2021-11-08 PROCEDURE — 1159F MED LIST DOCD IN RCRD: CPT | Mod: CPTII,S$GLB,, | Performed by: PHYSICIAN ASSISTANT

## 2021-11-08 PROCEDURE — 3077F SYST BP >= 140 MM HG: CPT | Mod: CPTII,S$GLB,, | Performed by: PHYSICIAN ASSISTANT

## 2021-11-08 PROCEDURE — 4010F PR ACE/ARB THEARPY RXD/TAKEN: ICD-10-PCS | Mod: CPTII,S$GLB,, | Performed by: PHYSICIAN ASSISTANT

## 2021-11-08 PROCEDURE — 3008F PR BODY MASS INDEX (BMI) DOCUMENTED: ICD-10-PCS | Mod: CPTII,S$GLB,, | Performed by: PHYSICIAN ASSISTANT

## 2021-11-08 PROCEDURE — 3008F BODY MASS INDEX DOCD: CPT | Mod: CPTII,S$GLB,, | Performed by: PHYSICIAN ASSISTANT

## 2021-11-08 PROCEDURE — 4010F ACE/ARB THERAPY RXD/TAKEN: CPT | Mod: CPTII,S$GLB,, | Performed by: PHYSICIAN ASSISTANT

## 2021-11-08 PROCEDURE — 1159F PR MEDICATION LIST DOCUMENTED IN MEDICAL RECORD: ICD-10-PCS | Mod: CPTII,S$GLB,, | Performed by: PHYSICIAN ASSISTANT

## 2021-11-08 PROCEDURE — 99214 PR OFFICE/OUTPT VISIT, EST, LEVL IV, 30-39 MIN: ICD-10-PCS | Mod: 25,S$GLB,, | Performed by: PHYSICIAN ASSISTANT

## 2021-11-08 PROCEDURE — 96372 PR INJECTION,THERAP/PROPH/DIAG2ST, IM OR SUBCUT: ICD-10-PCS | Mod: S$GLB,,, | Performed by: FAMILY MEDICINE

## 2021-11-08 PROCEDURE — 3078F DIAST BP <80 MM HG: CPT | Mod: CPTII,S$GLB,, | Performed by: PHYSICIAN ASSISTANT

## 2021-11-08 PROCEDURE — 3077F PR MOST RECENT SYSTOLIC BLOOD PRESSURE >= 140 MM HG: ICD-10-PCS | Mod: CPTII,S$GLB,, | Performed by: PHYSICIAN ASSISTANT

## 2021-11-08 RX ORDER — NAPROXEN 500 MG/1
500 TABLET ORAL 2 TIMES DAILY WITH MEALS
Qty: 20 TABLET | Refills: 1 | Status: SHIPPED | OUTPATIENT
Start: 2021-11-08 | End: 2023-01-30

## 2021-11-08 RX ORDER — BETAMETHASONE SODIUM PHOSPHATE AND BETAMETHASONE ACETATE 3; 3 MG/ML; MG/ML
6 INJECTION, SUSPENSION INTRA-ARTICULAR; INTRALESIONAL; INTRAMUSCULAR; SOFT TISSUE
Status: COMPLETED | OUTPATIENT
Start: 2021-11-08 | End: 2021-11-08

## 2021-11-08 RX ADMIN — BETAMETHASONE SODIUM PHOSPHATE AND BETAMETHASONE ACETATE 6 MG: 3; 3 INJECTION, SUSPENSION INTRA-ARTICULAR; INTRALESIONAL; INTRAMUSCULAR; SOFT TISSUE at 07:11

## 2021-11-16 DIAGNOSIS — G35 MS (MULTIPLE SCLEROSIS): ICD-10-CM

## 2021-11-17 RX ORDER — INTERFERON BETA-1A 30MCG/.5ML
30 KIT INTRAMUSCULAR
Qty: 4 EACH | Refills: 12 | OUTPATIENT
Start: 2021-11-17 | End: 2022-01-06 | Stop reason: SDUPTHER

## 2021-12-21 ENCOUNTER — PATIENT MESSAGE (OUTPATIENT)
Dept: NEUROLOGY | Facility: CLINIC | Age: 66
End: 2021-12-21
Payer: COMMERCIAL

## 2022-01-06 DIAGNOSIS — G35 MS (MULTIPLE SCLEROSIS): ICD-10-CM

## 2022-01-06 RX ORDER — INTERFERON BETA-1A 30MCG/.5ML
30 KIT INTRAMUSCULAR
Qty: 4 EACH | Refills: 12 | Status: SHIPPED | OUTPATIENT
Start: 2022-01-06 | End: 2023-01-30 | Stop reason: SDUPTHER

## 2022-01-12 ENCOUNTER — OFFICE VISIT (OUTPATIENT)
Dept: URGENT CARE | Facility: CLINIC | Age: 67
End: 2022-01-12
Payer: COMMERCIAL

## 2022-01-12 VITALS
TEMPERATURE: 97 F | SYSTOLIC BLOOD PRESSURE: 133 MMHG | DIASTOLIC BLOOD PRESSURE: 80 MMHG | BODY MASS INDEX: 42.66 KG/M2 | OXYGEN SATURATION: 95 % | RESPIRATION RATE: 20 BRPM | HEART RATE: 67 BPM | HEIGHT: 72 IN | WEIGHT: 315 LBS

## 2022-01-12 DIAGNOSIS — S99.922A INJURY OF LEFT FOOT, INITIAL ENCOUNTER: Primary | ICD-10-CM

## 2022-01-12 PROCEDURE — 1159F MED LIST DOCD IN RCRD: CPT | Mod: CPTII,S$GLB,, | Performed by: FAMILY MEDICINE

## 2022-01-12 PROCEDURE — 90715 TDAP VACCINE 7 YRS/> IM: CPT | Mod: S$GLB,,, | Performed by: FAMILY MEDICINE

## 2022-01-12 PROCEDURE — 3075F PR MOST RECENT SYSTOLIC BLOOD PRESS GE 130-139MM HG: ICD-10-PCS | Mod: CPTII,S$GLB,, | Performed by: FAMILY MEDICINE

## 2022-01-12 PROCEDURE — 90471 IMMUNIZATION ADMIN: CPT | Mod: S$GLB,,, | Performed by: FAMILY MEDICINE

## 2022-01-12 PROCEDURE — 99214 OFFICE O/P EST MOD 30 MIN: CPT | Mod: 25,S$GLB,, | Performed by: FAMILY MEDICINE

## 2022-01-12 PROCEDURE — 1159F PR MEDICATION LIST DOCUMENTED IN MEDICAL RECORD: ICD-10-PCS | Mod: CPTII,S$GLB,, | Performed by: FAMILY MEDICINE

## 2022-01-12 PROCEDURE — 3079F DIAST BP 80-89 MM HG: CPT | Mod: CPTII,S$GLB,, | Performed by: FAMILY MEDICINE

## 2022-01-12 PROCEDURE — 73630 XR FOOT COMPLETE 3 VIEW LEFT: ICD-10-PCS | Mod: FY,LT,S$GLB, | Performed by: RADIOLOGY

## 2022-01-12 PROCEDURE — 90715 TDAP VACCINE GREATER THAN OR EQUAL TO 7YO IM: ICD-10-PCS | Mod: S$GLB,,, | Performed by: FAMILY MEDICINE

## 2022-01-12 PROCEDURE — 3075F SYST BP GE 130 - 139MM HG: CPT | Mod: CPTII,S$GLB,, | Performed by: FAMILY MEDICINE

## 2022-01-12 PROCEDURE — 99214 PR OFFICE/OUTPT VISIT, EST, LEVL IV, 30-39 MIN: ICD-10-PCS | Mod: 25,S$GLB,, | Performed by: FAMILY MEDICINE

## 2022-01-12 PROCEDURE — 1160F PR REVIEW ALL MEDS BY PRESCRIBER/CLIN PHARMACIST DOCUMENTED: ICD-10-PCS | Mod: CPTII,S$GLB,, | Performed by: FAMILY MEDICINE

## 2022-01-12 PROCEDURE — 1160F RVW MEDS BY RX/DR IN RCRD: CPT | Mod: CPTII,S$GLB,, | Performed by: FAMILY MEDICINE

## 2022-01-12 PROCEDURE — 3079F PR MOST RECENT DIASTOLIC BLOOD PRESSURE 80-89 MM HG: ICD-10-PCS | Mod: CPTII,S$GLB,, | Performed by: FAMILY MEDICINE

## 2022-01-12 PROCEDURE — 90471 TDAP VACCINE GREATER THAN OR EQUAL TO 7YO IM: ICD-10-PCS | Mod: S$GLB,,, | Performed by: FAMILY MEDICINE

## 2022-01-12 PROCEDURE — 3008F BODY MASS INDEX DOCD: CPT | Mod: CPTII,S$GLB,, | Performed by: FAMILY MEDICINE

## 2022-01-12 PROCEDURE — 3008F PR BODY MASS INDEX (BMI) DOCUMENTED: ICD-10-PCS | Mod: CPTII,S$GLB,, | Performed by: FAMILY MEDICINE

## 2022-01-12 PROCEDURE — 73630 X-RAY EXAM OF FOOT: CPT | Mod: FY,LT,S$GLB, | Performed by: RADIOLOGY

## 2022-01-12 RX ORDER — MUPIROCIN 20 MG/G
OINTMENT TOPICAL
Qty: 22 G | Refills: 1 | Status: SHIPPED | OUTPATIENT
Start: 2022-01-12 | End: 2023-03-02 | Stop reason: SDUPTHER

## 2022-01-12 RX ORDER — AMOXICILLIN AND CLAVULANATE POTASSIUM 875; 125 MG/1; MG/1
1 TABLET, FILM COATED ORAL 2 TIMES DAILY
Qty: 20 TABLET | Refills: 0 | Status: SHIPPED | OUTPATIENT
Start: 2022-01-12 | End: 2022-01-22

## 2022-01-12 NOTE — PROGRESS NOTES
"Subjective:       Patient ID: Rajan Cooper III is a 66 y.o. male.    Vitals:  height is 6' (1.829 m) and weight is 142.9 kg (315 lb) (abnormal). His temperature is 97.3 °F (36.3 °C). His blood pressure is 133/80 and his pulse is 67. His respiration is 20 and oxygen saturation is 95%.     Chief Complaint: Injury (Her tooth toes on his left foot - Entered by patient)    Patient states he tripped and fell on concrete on 01/09/2021. States it's still bleeding from Sunday. "can't feel feet to know how painful it is"    Injury  This is a new problem. Episode onset: 01/09/2021. The problem occurs rarely. The problem has been unchanged. Pertinent negatives include no abdominal pain, anorexia, arthralgias, change in bowel habit, chest pain, chills, congestion, coughing, diaphoresis, fatigue, fever, headaches, joint swelling, myalgias, nausea, neck pain, numbness, rash, sore throat, swollen glands, urinary symptoms, vertigo, visual change, vomiting or weakness. Nothing aggravates the symptoms. He has tried nothing for the symptoms.       Constitution: Negative for chills, sweating, fatigue and fever.   HENT: Negative for congestion and sore throat.    Neck: Negative for neck pain.   Cardiovascular: Negative for chest pain.   Respiratory: Negative for cough.    Gastrointestinal: Negative for abdominal pain, nausea and vomiting.   Musculoskeletal: Negative for joint pain, joint swelling and muscle ache.   Skin: Positive for erythema. Negative for rash.   Neurological: Negative for history of vertigo, headaches and numbness.       Objective:      Physical Exam   Constitutional: No distress. obesity  Cardiovascular: Normal rate, regular rhythm, normal heart sounds and normal pulses.   Pulmonary/Chest: Effort normal and breath sounds normal.   Abdominal: Normal appearance.   Musculoskeletal:         General: Tenderness (3rd and 4th digit tenderness, bruised with swelling noted) present.   Neurological: He is alert.   Skin: " bruising and erythema   Nursing note and vitals reviewed.        Assessment:       1. Injury of left foot, initial encounter          Plan:         Injury of left foot, initial encounter  -     X-Ray Foot Complete 3 view Left; Future; Expected date: 01/12/2022  -     (In Office Administered) Tdap Vaccine  -     mupirocin (BACTROBAN) 2 % ointment; Apply to affected area 3 times daily  Dispense: 22 g; Refill: 1  -     amoxicillin-clavulanate 875-125mg (AUGMENTIN) 875-125 mg per tablet; Take 1 tablet by mouth 2 (two) times daily. for 10 days  Dispense: 20 tablet; Refill: 0    elevation

## 2022-01-12 NOTE — PATIENT INSTRUCTIONS
Patient Education       Toe Injury Discharge Instructions   About this topic   Your toes have many parts. You can see the skin and toenails. Other parts include bones, ligaments, tendons, and cartilage. You also have muscles, nerves, and blood vessels. You may have toe pain or other problems if any of these parts are hurt.  Common toe injuries are:  · Broken bone  · Sprained or torn ligament  · Dislocated toe ? Toe bone is moved out of its normal position  · Tendonitis, tendon injury, or muscle sprain  · Bunion ? Bump at the outer edge of the bottom of the big toe  · Hammertoe or mallet toe ? Toes are bent at one of the toe joints making the toe look like a claw  · Toenail problems like ingrown toenail, fungus infection, bruise under the nail  · Skin problems like corns, callus, blisters, rash, athlete's foot, warts  · Cuts or puncture wounds  · Amputation  · Growth on a nerve     What care is needed at home?   · Ask your doctor what you need to do when you go home. Make sure you ask questions if you do not understand what the doctor says. This way you will know what you need to do.  · Rest. Avoid activities that make your problem worse.  · Place an ice pack or a bag of frozen peas wrapped in a towel over the painful part. Never put ice right on the skin. Do not leave the ice on more than 10 to 15 minutes at a time.  · Prop your foot on pillows to help with swelling.  · Tape the toes together for healing if your doctor suggests you do so.  · Use a brace, splint, or walking boot if your doctor tells you to wear one.  · Use crutches, a cane, or a walker to take pressure off of your injured toe. Talk to your doctor about how much weight you are allowed to put on the foot with the injured toe.  · Change wound dressings if you have an open area. Your doctor will tell you how to do this.  · Be sure to have shoes that fit the right way. Get ones with a wide toe, good support, and without high heels.  · Use padding over a  bunion or corn to help lessen pain and pressure.  · Use creams, warm soaks, or a pumice stone to treat corns and calluses. Do this only if your doctor tells you to. If you have high blood sugar, you should not do anything at home that may cause an opening in the skin.  · If you have diabetes, keep your blood sugars under control. High blood sugars can delay healing.  What follow-up care is needed?   · Your doctor may ask you to make visits to the office to check on your progress. Be sure to keep these visits.  · Your doctor may send you to a doctor who specializes in foot problems.  · You may also need to see a physical therapist (PT). The PT will teach you exercises to help you get back your strength and motion.  What drugs may be needed?   The doctor may order drugs or creams to:  · Help with pain and swelling  · Prevent or fight an infection  · Treat a rash or skin problem  The doctor may give you a shot of an anti-inflammatory drug called a corticosteroid. This will help with swelling. Talk with your doctor about the risks of this shot.  Will physical activity be limited?   You may need to rest your foot for a while. You should not do physical activity that makes your health problem worse. If you run, work out, or play sports, you may not be able to do these things until your health problem gets better. If you have surgery, you may not be able to put any weight on your toe for a few weeks.  What problems could happen?   · Infection  · Loss of motion  · Weakness  · Injury to nerves, blood vessels, or other tissues  · Trouble walking or with balance  · Ongoing pain  · Problem, like a bunion or wart, comes back  · Poor healing  What can be done to prevent this health problem?   · Wear comfortable, supportive shoes with a wide toe. Avoid high heels and tight shoes. If your child has a toe injury, be sure to check shoe size often.  · Wear shoes when walking outdoors. Do not go barefoot.  · Keep a healthy weight.  Being overweight puts extra stress on your feet.  · If you are a runner, run on softer surfaces such as a track instead of concrete.  · Wash your feet every day. Make sure your feet are dry before putting on socks.  · Always wear clean, dry socks. Change them if they get damp.  · Do not go barefoot in wet areas such as swimming pools, public showers, or in locker rooms. This may help you avoid getting a fungus infection.  · If you have diabetes, be sure to check your feet every day. Make sure to use a mirror to check the bottoms of your feet, or have someone else check the bottoms of your feet for you. Sometimes, numbness from diabetes may prevent you from seeing a cut or problem on your foot.  · Do not attempt to remove warts, calluses, or corns yourself, especially if you have diabetes. Be sure to ask your doctor what is safe to do at home.  · Be careful when trimming your toenails. Cutting them too short may cause an ingrown toenail.  When do I need to call the doctor?   · Signs of infection. These include a fever of 100.4°F (38°C) or higher, chills, or wound that will not heal.  · Signs of wound infection. These include swelling, redness, warmth around the wound; too much pain when touched; yellowish, greenish, or bloody discharge; foul smell coming from the cut site; cut site opens up.  · Pain or swelling gets worse  · Numbness and tingling get worse  · Toe is cold and pale  · Toe changes color or gets darker  Teach Back: Helping You Understand   The Teach Back Method helps you understand the information we are giving you. After you talk with the staff, tell them in your own words what you learned. This helps to make sure the staff has described each thing clearly. It also helps to explain things that may have been confusing. Before going home, make sure you can do these:  · I can tell you about my condition.  · I can tell you how I will care for my injured area.  · I can tell you what may help ease my  pain.  · I can tell you what I will do if I have more pain or numbness and tingling or swelling.  Where can I learn more?   [employer]  https://www.nhs.uk/conditions/foot-pain/toe-pain/   Last Reviewed Date   2020-10-13  Consumer Information Use and Disclaimer   This information is not specific medical advice and does not replace information you receive from your health care provider. This is only a brief summary of general information. It does NOT include all information about conditions, illnesses, injuries, tests, procedures, treatments, therapies, discharge instructions or life-style choices that may apply to you. You must talk with your health care provider for complete information about your health and treatment options. This information should not be used to decide whether or not to accept your health care providers advice, instructions or recommendations. Only your health care provider has the knowledge and training to provide advice that is right for you.  Copyright   Copyright © 2021 [employer] and its affiliates and/or licensors. All rights reserved.

## 2022-02-21 ENCOUNTER — TELEPHONE (OUTPATIENT)
Dept: NEUROLOGY | Facility: CLINIC | Age: 67
End: 2022-02-21
Payer: COMMERCIAL

## 2022-02-21 NOTE — TELEPHONE ENCOUNTER
Prior Authorization for Avonex Prefilled 30MCG/0.5ML syringe kit 4 pens / 28 day supply sent to [employer] Key# [id number]

## 2022-02-28 ENCOUNTER — PATIENT MESSAGE (OUTPATIENT)
Dept: PSYCHIATRY | Facility: CLINIC | Age: 67
End: 2022-02-28
Payer: COMMERCIAL

## 2022-03-03 ENCOUNTER — PATIENT MESSAGE (OUTPATIENT)
Dept: NEUROLOGY | Facility: CLINIC | Age: 67
End: 2022-03-03
Payer: COMMERCIAL

## 2022-03-04 ENCOUNTER — TELEPHONE (OUTPATIENT)
Dept: NEUROLOGY | Facility: CLINIC | Age: 67
End: 2022-03-04
Payer: COMMERCIAL

## 2022-03-04 NOTE — TELEPHONE ENCOUNTER
----- Message from Anselmo Pastor sent at 3/4/2022  2:17 PM CST -----  Regarding: Call Back  Who Called: Faiza ([facility])         What is the reason for the call: calling in regards to knowing if started on prior authorization or if its been approved for medication. Please contact to further discuss.          Can patient be contacted on [employer]: n/a         Call back number: [phone]

## 2022-03-07 ENCOUNTER — TELEPHONE (OUTPATIENT)
Dept: NEUROLOGY | Facility: CLINIC | Age: 67
End: 2022-03-07
Payer: COMMERCIAL

## 2022-03-07 NOTE — TELEPHONE ENCOUNTER
----- Message from Eunice Florence sent at 3/7/2022  1:04 PM CST -----  Type: RX Refill Request    Who Called:    RX Name and Strength:AVONEX 30 mcg/0.5 mL injection    Preferred Pharmacy with phone number:[facility]    Would the patient rather a call back or a response via [facility]?call back    Best Call Back Number:    Additional Information:Update on authorization

## 2022-03-07 NOTE — TELEPHONE ENCOUNTER
Prior Authorization for Avonex Prefilled 30MCG/0.5ML syringe kit 4 pens / 28 day supply sent to [employer] Key # [id number]

## 2022-03-08 ENCOUNTER — TELEPHONE (OUTPATIENT)
Dept: NEUROLOGY | Facility: CLINIC | Age: 67
End: 2022-03-08
Payer: COMMERCIAL

## 2022-03-08 NOTE — TELEPHONE ENCOUNTER
Spoke with [employer] [place] and was informed that PA was denied due to failure to specify that MS is Relapsing.  I informed [facility] that I would resubmit Prior Authorization.  Prior Authorization resubmitted to [email or web address] KEY # [id number] Pens every 28 days.

## 2022-03-08 NOTE — TELEPHONE ENCOUNTER
----- Message from Nette Ibarra RN sent at 3/7/2022  1:07 PM CST -----  Rama Bowman. Do you want to call or would you like me to call?  ----- Message -----  From: Eunice Florence  Sent: 3/7/2022   1:05 PM CST  To: Yuridia Garcia Staff    Type: RX Refill Request    Who Called:    RX Name and Strength:AVONEX 30 mcg/0.5 mL injection    Preferred Pharmacy with phone number:[facility] - [facility]    Would the patient rather a call back or a response via [facility]?call back    Best Call Back Number:    Additional Information:Update on authorization

## 2022-03-09 ENCOUNTER — TELEPHONE (OUTPATIENT)
Dept: NEUROLOGY | Facility: CLINIC | Age: 67
End: 2022-03-09
Payer: COMMERCIAL

## 2022-03-09 NOTE — TELEPHONE ENCOUNTER
I returned call to [employer] assist.  They report that patient most likely qualifies for having 100% of their copay covered, and they reached out to patient and spouse a few months ago but there was no response.  They connected with [facility] and [facility] informed me that prior authorization has been approved, but the co pay is $750.00, and they would either need a form of payment, or the Co[employer] assist information before they could send out the medication.  [facility] message sent to patient to inform him of above and asking him to follow up.

## 2022-03-09 NOTE — TELEPHONE ENCOUNTER
----- Message from Nette Ibarra RN sent at 3/9/2022 11:35 AM CST -----  Contact: Yvrose cook [facility] @ [phone] ext [phone]  Rama Bowman. Not sure who Tuyet is.   ----- Message -----  From: Gilbert Mack  Sent: 3/9/2022  11:25 AM CST  To: Yuridia Garcia Staff    Caller requesting a return phone call from Tuyet regarding copay assistance relating to AVONEX 30 mcg/0.5 mL injection, pls contact [employer]  to enroll patient @ [phone]

## 2022-03-30 ENCOUNTER — PATIENT OUTREACH (OUTPATIENT)
Dept: ADMINISTRATIVE | Facility: OTHER | Age: 67
End: 2022-03-30
Payer: COMMERCIAL

## 2022-03-31 ENCOUNTER — OFFICE VISIT (OUTPATIENT)
Dept: NEUROLOGY | Facility: CLINIC | Age: 67
End: 2022-03-31
Payer: COMMERCIAL

## 2022-03-31 VITALS
SYSTOLIC BLOOD PRESSURE: 167 MMHG | HEART RATE: 68 BPM | WEIGHT: 315 LBS | BODY MASS INDEX: 42.66 KG/M2 | DIASTOLIC BLOOD PRESSURE: 92 MMHG | HEIGHT: 72 IN

## 2022-03-31 DIAGNOSIS — M79.606 PAIN OF LOWER EXTREMITY, UNSPECIFIED LATERALITY: ICD-10-CM

## 2022-03-31 DIAGNOSIS — G35 MS (MULTIPLE SCLEROSIS): Primary | ICD-10-CM

## 2022-03-31 PROCEDURE — 3008F PR BODY MASS INDEX (BMI) DOCUMENTED: ICD-10-PCS | Mod: CPTII,S$GLB,, | Performed by: NEUROMUSCULOSKELETAL MEDICINE & OMM

## 2022-03-31 PROCEDURE — 1101F PT FALLS ASSESS-DOCD LE1/YR: CPT | Mod: CPTII,S$GLB,, | Performed by: NEUROMUSCULOSKELETAL MEDICINE & OMM

## 2022-03-31 PROCEDURE — 3080F PR MOST RECENT DIASTOLIC BLOOD PRESSURE >= 90 MM HG: ICD-10-PCS | Mod: CPTII,S$GLB,, | Performed by: NEUROMUSCULOSKELETAL MEDICINE & OMM

## 2022-03-31 PROCEDURE — 3008F BODY MASS INDEX DOCD: CPT | Mod: CPTII,S$GLB,, | Performed by: NEUROMUSCULOSKELETAL MEDICINE & OMM

## 2022-03-31 PROCEDURE — 1126F AMNT PAIN NOTED NONE PRSNT: CPT | Mod: CPTII,S$GLB,, | Performed by: NEUROMUSCULOSKELETAL MEDICINE & OMM

## 2022-03-31 PROCEDURE — 1126F PR PAIN SEVERITY QUANTIFIED, NO PAIN PRESENT: ICD-10-PCS | Mod: CPTII,S$GLB,, | Performed by: NEUROMUSCULOSKELETAL MEDICINE & OMM

## 2022-03-31 PROCEDURE — 1159F MED LIST DOCD IN RCRD: CPT | Mod: CPTII,S$GLB,, | Performed by: NEUROMUSCULOSKELETAL MEDICINE & OMM

## 2022-03-31 PROCEDURE — 99215 OFFICE O/P EST HI 40 MIN: CPT | Mod: S$GLB,,, | Performed by: NEUROMUSCULOSKELETAL MEDICINE & OMM

## 2022-03-31 PROCEDURE — 1101F PR PT FALLS ASSESS DOC 0-1 FALLS W/OUT INJ PAST YR: ICD-10-PCS | Mod: CPTII,S$GLB,, | Performed by: NEUROMUSCULOSKELETAL MEDICINE & OMM

## 2022-03-31 PROCEDURE — 3077F PR MOST RECENT SYSTOLIC BLOOD PRESSURE >= 140 MM HG: ICD-10-PCS | Mod: CPTII,S$GLB,, | Performed by: NEUROMUSCULOSKELETAL MEDICINE & OMM

## 2022-03-31 PROCEDURE — 3080F DIAST BP >= 90 MM HG: CPT | Mod: CPTII,S$GLB,, | Performed by: NEUROMUSCULOSKELETAL MEDICINE & OMM

## 2022-03-31 PROCEDURE — 99999 PR PBB SHADOW E&M-EST. PATIENT-LVL III: ICD-10-PCS | Mod: PBBFAC,,, | Performed by: NEUROMUSCULOSKELETAL MEDICINE & OMM

## 2022-03-31 PROCEDURE — 3288F FALL RISK ASSESSMENT DOCD: CPT | Mod: CPTII,S$GLB,, | Performed by: NEUROMUSCULOSKELETAL MEDICINE & OMM

## 2022-03-31 PROCEDURE — 3077F SYST BP >= 140 MM HG: CPT | Mod: CPTII,S$GLB,, | Performed by: NEUROMUSCULOSKELETAL MEDICINE & OMM

## 2022-03-31 PROCEDURE — 99999 PR PBB SHADOW E&M-EST. PATIENT-LVL III: CPT | Mod: PBBFAC,,, | Performed by: NEUROMUSCULOSKELETAL MEDICINE & OMM

## 2022-03-31 PROCEDURE — 1159F PR MEDICATION LIST DOCUMENTED IN MEDICAL RECORD: ICD-10-PCS | Mod: CPTII,S$GLB,, | Performed by: NEUROMUSCULOSKELETAL MEDICINE & OMM

## 2022-03-31 PROCEDURE — 99215 PR OFFICE/OUTPT VISIT, EST, LEVL V, 40-54 MIN: ICD-10-PCS | Mod: S$GLB,,, | Performed by: NEUROMUSCULOSKELETAL MEDICINE & OMM

## 2022-03-31 PROCEDURE — 3288F PR FALLS RISK ASSESSMENT DOCUMENTED: ICD-10-PCS | Mod: CPTII,S$GLB,, | Performed by: NEUROMUSCULOSKELETAL MEDICINE & OMM

## 2022-03-31 RX ORDER — MODAFINIL 200 MG/1
200 TABLET ORAL DAILY
Qty: 400 TABLET | Refills: 3 | Status: SHIPPED | OUTPATIENT
Start: 2022-03-31

## 2022-03-31 NOTE — PROGRESS NOTES
Present illness patient presents for follow-up for multiple sclerosis.  He has had significant weight gain in terms of 323 lb.  He continues with Avonex in no problems.  He has some leg pains and persistent balance problems.  He has difficulty standing from the chair without using his hands.  Although he is not diagnosed with diabetes his mother was a bad diabetic and he has numbers consistent with diabetes.  He is on 2 blood pressure pills as well.  His predominant problem with weight is not doing portion control.  He continues to work full-time with Provigil 200 mg in the morning for fatigue.  He walks a fair amount work however does not have regular program.    Previous note:  5-13-21  patient follows up for multiple sclerosis which is stable.  He does complain of the sensitivity with fatigue and weakness in the legs with the.  He is still working.  He continues to take Avonex without problems.  He takes Provigil for fatigue every morning.  He obtains the Provigil from [employer].   Previous note:  11-18-19  Neurologic exam:  Cranial nerves: Normal visual acuity at bedside testing. Visual field to confrontation - normal. Pupils are reactive. External ocular movements- full range of motion; no nystagmus or diplopia. Normal corneal reflexes and facial sensations. No facial asymmetry noted and facial movements including smiling were normal. Hearing was unimpaired. Palate movements are normal with normal gag response. Shoulder movements including shrug response was normal. Tongue movements are normal and with no evidence of atrophy or involuntary movements.  Muscle strength: upper- normal lower-normal. Tone was normal stands without hands -with minimal effort   Sensory examination:Pinprick and touch - normal . Vibration- normal 15-20 seconds at toes  Deep tendon reflexes: upper extremity-2+; lower extremity- KJ-absent; Babinski is negative  Cerebellar exam upper extremities - finger to nose - normal ; rapid alternating  movements -\  Gait- unsteady, Tandem gait unsteady. Romberg's - unsteady   Cervical exam: Cervical / trapezius muscle - no tenderness; Lumbar Exam: Low back tenderness-negative sciatic notch tenderness-negative straight leg raising test-negative    Diagnoses: Multiple sclerosis;Leg pain; muscle cramps; weight gain; peripheral edema    Recommendations:  Long discussion with the patient in reference to weight loss and counting carbs; Continue baclofen 10 mg 3 pills per day, Provigil, Keppra..  Provigil 200 mg hand written script given for 400 tablets to obtain from [facility].  Follow-up in 6 months. .

## 2022-04-11 NOTE — TELEPHONE ENCOUNTER
Care Due:                  Date            Visit Type   Department     Provider  --------------------------------------------------------------------------------                                MYCHART                              FOLLOWUP/OF  [facility] INTERNAL  Last Visit: 02-      [facility] VISIT   MEDICINE       Tom Garcia  Next Visit: None Scheduled  None         None Found                                                            Last  Test          Frequency    Reason                     Performed    Due Date  --------------------------------------------------------------------------------    Office Visit  12 months..  losartan.................  02- 02-    CMP.........  12 months..  losartan.................  06- 04-    Powered by [employer]. Reference number: [id number].   4/11/2022 7:15:49 AM CDT

## 2022-04-12 RX ORDER — LOSARTAN POTASSIUM 50 MG/1
TABLET ORAL
Qty: 90 TABLET | Refills: 1 | Status: SHIPPED | OUTPATIENT
Start: 2022-04-12 | End: 2022-09-20

## 2022-04-12 NOTE — TELEPHONE ENCOUNTER
Refill Routing Note   Medication(s) are not appropriate for processing by [facility] for the following reason(s):      - Required vitals are abnormal    [facility] action(s):  Defer Medication-related problems identified:   Requires labs  Requires appointment     Medication Therapy Plan: Labs(CMP)  Medication reconciliation completed: No     Appointments  past 12m or future 3m with PCP    Date Provider   Last Visit   2/25/2021 Tom Garcia MD   Next Visit   Visit date not found Tmo Garcia MD   ED visits in past 90 days: 0        Note composed:10:01 AM 04/12/2022

## 2022-05-30 ENCOUNTER — PATIENT MESSAGE (OUTPATIENT)
Dept: ADMINISTRATIVE | Facility: HOSPITAL | Age: 67
End: 2022-05-30
Payer: COMMERCIAL

## 2022-06-24 ENCOUNTER — PATIENT MESSAGE (OUTPATIENT)
Dept: PSYCHIATRY | Facility: CLINIC | Age: 67
End: 2022-06-24
Payer: COMMERCIAL

## 2022-08-17 ENCOUNTER — TELEPHONE (OUTPATIENT)
Dept: NEUROLOGY | Facility: CLINIC | Age: 67
End: 2022-08-17
Payer: COMMERCIAL

## 2022-08-31 DIAGNOSIS — I10 HTN (HYPERTENSION): ICD-10-CM

## 2022-09-20 RX ORDER — LOSARTAN POTASSIUM 50 MG/1
TABLET ORAL
Qty: 90 TABLET | Refills: 0 | Status: SHIPPED | OUTPATIENT
Start: 2022-09-20 | End: 2022-12-26 | Stop reason: SDUPTHER

## 2022-09-20 NOTE — TELEPHONE ENCOUNTER
Refill Routing Note   Medication(s) are not appropriate for processing by [facility] for the following reason(s):      - Patient has not been seen in over 15 months by PCP  - Required laboratory values are outdated  - Required vitals are abnormal    [facility] action(s):  Defer          Medication reconciliation completed: No     Appointments  past 12m or future 3m with PCP    Date Provider   Last Visit   2/25/2021 Tom Garcia MD   Next Visit   Visit date not found Tom Garcia MD   ED visits in past 90 days: 0        Note composed:7:51 AM 09/20/2022

## 2022-09-20 NOTE — TELEPHONE ENCOUNTER
Care Due:                  Date            Visit Type   Department     Provider  --------------------------------------------------------------------------------                                MYCHART                              FOLLOWUP/OF  [facility] INTERNAL  Last Visit: 02-      FICE VISIT   MEDICINE       Tom Garcia  Next Visit: None Scheduled  None         None Found                                                            Last  Test          Frequency    Reason                     Performed    Due Date  --------------------------------------------------------------------------------    Office Visit  12 months..  losartan.................  02- 02-    CMP.........  12 months..  losartan.................  Not Found    Overdue    [employer] Embedded Care Gaps. Reference number: [id number]. 9/20/2022   12:28:58 AM CDT

## 2022-09-21 ENCOUNTER — PATIENT MESSAGE (OUTPATIENT)
Dept: INTERNAL MEDICINE | Facility: CLINIC | Age: 67
End: 2022-09-21
Payer: COMMERCIAL

## 2022-09-26 NOTE — TELEPHONE ENCOUNTER
I received a notification that the patient has not read my last portal message.  Can we please check in with the patient to see if they are able to read it or if not please read the message to them and let me know their response or if they have any questions.    Tom Garcia MD FACP  Internal Medicine

## 2022-12-01 ENCOUNTER — PATIENT MESSAGE (OUTPATIENT)
Dept: PSYCHIATRY | Facility: CLINIC | Age: 67
End: 2022-12-01
Payer: COMMERCIAL

## 2023-01-09 ENCOUNTER — TELEPHONE (OUTPATIENT)
Dept: NEUROLOGY | Facility: CLINIC | Age: 68
End: 2023-01-09
Payer: COMMERCIAL

## 2023-01-09 NOTE — TELEPHONE ENCOUNTER
----- Message from Wendi Song sent at 1/9/2023 12:13 PM CST -----  Contact: Fax # [phone]  Rx Refill/Request    Is this a Refill or New Rx:  refill    Rx Name and Strength:  AVONEX 30 mcg/0.5 mL injection    Preferred Pharmacy with phone number:[facility]    Communication Preference:pt will also need prior auth    Additional Information

## 2023-01-12 ENCOUNTER — TELEPHONE (OUTPATIENT)
Dept: NEUROLOGY | Facility: CLINIC | Age: 68
End: 2023-01-12
Payer: COMMERCIAL

## 2023-01-13 NOTE — TELEPHONE ENCOUNTER
----- Message from Tremontana Chevalier sent at 1/12/2023  3:50 PM CST -----  Regarding: appt/refill/pt advice   Pt's wife Dorothy soliz to schedule appt with provider in MS clinic. Pt was prev seen by Dr. Shi. Caller is concerned because pt's insurance needs a PA for AVONEX 30 mcg/0.5 mL injection. See epic note.     Caller scheduled 1st avail appt on 01/30 with Dr. Das but will out of medication before then. Pls cll pt or Dorothy @ [phone]. Placed pt on [facility]

## 2023-01-30 ENCOUNTER — OFFICE VISIT (OUTPATIENT)
Dept: NEUROLOGY | Facility: CLINIC | Age: 68
End: 2023-01-30
Payer: COMMERCIAL

## 2023-01-30 ENCOUNTER — LAB VISIT (OUTPATIENT)
Dept: LAB | Facility: HOSPITAL | Age: 68
End: 2023-01-30
Attending: PSYCHIATRY & NEUROLOGY
Payer: COMMERCIAL

## 2023-01-30 VITALS
WEIGHT: 303.88 LBS | BODY MASS INDEX: 41.16 KG/M2 | HEART RATE: 77 BPM | DIASTOLIC BLOOD PRESSURE: 85 MMHG | SYSTOLIC BLOOD PRESSURE: 143 MMHG | HEIGHT: 72 IN

## 2023-01-30 DIAGNOSIS — L40.9 PSORIASIS: ICD-10-CM

## 2023-01-30 DIAGNOSIS — M19.90 ARTHRITIS: ICD-10-CM

## 2023-01-30 DIAGNOSIS — E55.9 VITAMIN D DEFICIENCY: ICD-10-CM

## 2023-01-30 DIAGNOSIS — M62.838 MUSCLE SPASM: ICD-10-CM

## 2023-01-30 DIAGNOSIS — R26.9 GAIT DISTURBANCE: ICD-10-CM

## 2023-01-30 DIAGNOSIS — Z71.89 COUNSELING REGARDING GOALS OF CARE: ICD-10-CM

## 2023-01-30 DIAGNOSIS — G35 MS (MULTIPLE SCLEROSIS): Primary | ICD-10-CM

## 2023-01-30 DIAGNOSIS — G35 MS (MULTIPLE SCLEROSIS): ICD-10-CM

## 2023-01-30 LAB
25(OH)D3+25(OH)D2 SERPL-MCNC: 13 NG/ML (ref 30–96)
ALBUMIN SERPL BCP-MCNC: 4 G/DL (ref 3.5–5.2)
ALP SERPL-CCNC: 85 U/L (ref 55–135)
ALT SERPL W/O P-5'-P-CCNC: 39 U/L (ref 10–44)
ANION GAP SERPL CALC-SCNC: 9 MMOL/L (ref 8–16)
AST SERPL-CCNC: 27 U/L (ref 10–40)
BASOPHILS # BLD AUTO: 0.02 K/UL (ref 0–0.2)
BASOPHILS NFR BLD: 0.3 % (ref 0–1.9)
BILIRUB SERPL-MCNC: 0.7 MG/DL (ref 0.1–1)
BUN SERPL-MCNC: 20 MG/DL (ref 8–23)
CALCIUM SERPL-MCNC: 9.6 MG/DL (ref 8.7–10.5)
CHLORIDE SERPL-SCNC: 102 MMOL/L (ref 95–110)
CO2 SERPL-SCNC: 28 MMOL/L (ref 23–29)
CREAT SERPL-MCNC: 0.8 MG/DL (ref 0.5–1.4)
DIFFERENTIAL METHOD: ABNORMAL
EOSINOPHIL # BLD AUTO: 0.2 K/UL (ref 0–0.5)
EOSINOPHIL NFR BLD: 2.5 % (ref 0–8)
ERYTHROCYTE [DISTWIDTH] IN BLOOD BY AUTOMATED COUNT: 13.4 % (ref 11.5–14.5)
EST. GFR  (NO RACE VARIABLE): >60 ML/MIN/1.73 M^2
GLUCOSE SERPL-MCNC: 87 MG/DL (ref 70–110)
HCT VFR BLD AUTO: 44.4 % (ref 40–54)
HGB BLD-MCNC: 14.2 G/DL (ref 14–18)
IMM GRANULOCYTES # BLD AUTO: 0.03 K/UL (ref 0–0.04)
IMM GRANULOCYTES NFR BLD AUTO: 0.4 % (ref 0–0.5)
LYMPHOCYTES # BLD AUTO: 1.5 K/UL (ref 1–4.8)
LYMPHOCYTES NFR BLD: 22.5 % (ref 18–48)
MCH RBC QN AUTO: 31.1 PG (ref 27–31)
MCHC RBC AUTO-ENTMCNC: 32 G/DL (ref 32–36)
MCV RBC AUTO: 97 FL (ref 82–98)
MONOCYTES # BLD AUTO: 0.6 K/UL (ref 0.3–1)
MONOCYTES NFR BLD: 8.3 % (ref 4–15)
NEUTROPHILS # BLD AUTO: 4.4 K/UL (ref 1.8–7.7)
NEUTROPHILS NFR BLD: 66 % (ref 38–73)
NRBC BLD-RTO: 0 /100 WBC
PLATELET # BLD AUTO: 234 K/UL (ref 150–450)
PMV BLD AUTO: 11.3 FL (ref 9.2–12.9)
POTASSIUM SERPL-SCNC: 4.4 MMOL/L (ref 3.5–5.1)
PROT SERPL-MCNC: 7.7 G/DL (ref 6–8.4)
RBC # BLD AUTO: 4.57 M/UL (ref 4.6–6.2)
SODIUM SERPL-SCNC: 139 MMOL/L (ref 136–145)
WBC # BLD AUTO: 6.71 K/UL (ref 3.9–12.7)

## 2023-01-30 PROCEDURE — 1160F PR REVIEW ALL MEDS BY PRESCRIBER/CLIN PHARMACIST DOCUMENTED: ICD-10-PCS | Mod: CPTII,S$GLB,, | Performed by: PSYCHIATRY & NEUROLOGY

## 2023-01-30 PROCEDURE — 86363 MOG-IGG1 ANTB FLO CYTMTRY EA: CPT | Performed by: PSYCHIATRY & NEUROLOGY

## 2023-01-30 PROCEDURE — 36415 COLL VENOUS BLD VENIPUNCTURE: CPT | Performed by: PSYCHIATRY & NEUROLOGY

## 2023-01-30 PROCEDURE — 1126F AMNT PAIN NOTED NONE PRSNT: CPT | Mod: CPTII,S$GLB,, | Performed by: PSYCHIATRY & NEUROLOGY

## 2023-01-30 PROCEDURE — 1101F PT FALLS ASSESS-DOCD LE1/YR: CPT | Mod: CPTII,S$GLB,, | Performed by: PSYCHIATRY & NEUROLOGY

## 2023-01-30 PROCEDURE — 3079F PR MOST RECENT DIASTOLIC BLOOD PRESSURE 80-89 MM HG: ICD-10-PCS | Mod: CPTII,S$GLB,, | Performed by: PSYCHIATRY & NEUROLOGY

## 2023-01-30 PROCEDURE — 3077F PR MOST RECENT SYSTOLIC BLOOD PRESSURE >= 140 MM HG: ICD-10-PCS | Mod: CPTII,S$GLB,, | Performed by: PSYCHIATRY & NEUROLOGY

## 2023-01-30 PROCEDURE — 99999 PR PBB SHADOW E&M-EST. PATIENT-LVL IV: ICD-10-PCS | Mod: PBBFAC,,, | Performed by: PSYCHIATRY & NEUROLOGY

## 2023-01-30 PROCEDURE — 1159F PR MEDICATION LIST DOCUMENTED IN MEDICAL RECORD: ICD-10-PCS | Mod: CPTII,S$GLB,, | Performed by: PSYCHIATRY & NEUROLOGY

## 2023-01-30 PROCEDURE — 3077F SYST BP >= 140 MM HG: CPT | Mod: CPTII,S$GLB,, | Performed by: PSYCHIATRY & NEUROLOGY

## 2023-01-30 PROCEDURE — 82306 VITAMIN D 25 HYDROXY: CPT | Performed by: PSYCHIATRY & NEUROLOGY

## 2023-01-30 PROCEDURE — 99999 PR PBB SHADOW E&M-EST. PATIENT-LVL IV: CPT | Mod: PBBFAC,,, | Performed by: PSYCHIATRY & NEUROLOGY

## 2023-01-30 PROCEDURE — 85025 COMPLETE CBC W/AUTO DIFF WBC: CPT | Performed by: PSYCHIATRY & NEUROLOGY

## 2023-01-30 PROCEDURE — 99215 PR OFFICE/OUTPT VISIT, EST, LEVL V, 40-54 MIN: ICD-10-PCS | Mod: S$GLB,,, | Performed by: PSYCHIATRY & NEUROLOGY

## 2023-01-30 PROCEDURE — 3008F BODY MASS INDEX DOCD: CPT | Mod: CPTII,S$GLB,, | Performed by: PSYCHIATRY & NEUROLOGY

## 2023-01-30 PROCEDURE — 1160F RVW MEDS BY RX/DR IN RCRD: CPT | Mod: CPTII,S$GLB,, | Performed by: PSYCHIATRY & NEUROLOGY

## 2023-01-30 PROCEDURE — 3079F DIAST BP 80-89 MM HG: CPT | Mod: CPTII,S$GLB,, | Performed by: PSYCHIATRY & NEUROLOGY

## 2023-01-30 PROCEDURE — 80053 COMPREHEN METABOLIC PANEL: CPT | Performed by: PSYCHIATRY & NEUROLOGY

## 2023-01-30 PROCEDURE — 1126F PR PAIN SEVERITY QUANTIFIED, NO PAIN PRESENT: ICD-10-PCS | Mod: CPTII,S$GLB,, | Performed by: PSYCHIATRY & NEUROLOGY

## 2023-01-30 PROCEDURE — 99215 OFFICE O/P EST HI 40 MIN: CPT | Mod: S$GLB,,, | Performed by: PSYCHIATRY & NEUROLOGY

## 2023-01-30 PROCEDURE — 1101F PR PT FALLS ASSESS DOC 0-1 FALLS W/OUT INJ PAST YR: ICD-10-PCS | Mod: CPTII,S$GLB,, | Performed by: PSYCHIATRY & NEUROLOGY

## 2023-01-30 PROCEDURE — 3288F PR FALLS RISK ASSESSMENT DOCUMENTED: ICD-10-PCS | Mod: CPTII,S$GLB,, | Performed by: PSYCHIATRY & NEUROLOGY

## 2023-01-30 PROCEDURE — 3288F FALL RISK ASSESSMENT DOCD: CPT | Mod: CPTII,S$GLB,, | Performed by: PSYCHIATRY & NEUROLOGY

## 2023-01-30 PROCEDURE — 1159F MED LIST DOCD IN RCRD: CPT | Mod: CPTII,S$GLB,, | Performed by: PSYCHIATRY & NEUROLOGY

## 2023-01-30 PROCEDURE — 3008F PR BODY MASS INDEX (BMI) DOCUMENTED: ICD-10-PCS | Mod: CPTII,S$GLB,, | Performed by: PSYCHIATRY & NEUROLOGY

## 2023-01-30 RX ORDER — BACLOFEN 10 MG/1
TABLET ORAL
Qty: 360 TABLET | Refills: 3 | Status: SHIPPED | OUTPATIENT
Start: 2023-01-30 | End: 2023-04-05 | Stop reason: SDUPTHER

## 2023-01-30 RX ORDER — INTERFERON BETA-1A 30MCG/.5ML
30 KIT INTRAMUSCULAR
Qty: 4 EACH | Refills: 12 | Status: SHIPPED | OUTPATIENT
Start: 2023-01-30

## 2023-01-30 NOTE — PROGRESS NOTES
Subjective:          Patient ID: Rajan Cooper III is a 67 y.o. male who presents today for a routine clinic visit for MS.   He comes in to establish care.  He comes in with his wife.     MS HPI:  DMT: interferon beta-1a IM -- has been on Avonex x 30 years  Side effects from DMT? none  Taking vitamin D3 as recommended? no  Initial relapse 30 years ago --he went numb from the waist down.  Diagnosed by Dr. Shi and started on Avonex and has been on it ever since.   Diagnosed 30 years ago, used cane 20 years ago, walker 5 years ago.    Since initial relapse 30 years ago, has only had one relapse --states he lost color vision in both eyes.  Saw Dr. Palomares -- no diagnosis found.  Has not come back since.   He feels that his disability is progressively getting worse.   Continues to work full time as a [employer]; he does not want to retire anytime soon.   Takes baclofen 20mg AM and that's it; on Provigil 200mg /day;    Medications:  Current Outpatient Medications   Medication Sig    amLODIPine (NORVASC) 10 MG tablet TAKE 1 TABLET BY MOUTH EVERY DAY    AVONEX 30 mcg/0.5 mL injection Inject 0.5 mLs (30 mcg total) into the muscle every 7 days.    baclofen (LIORESAL) 10 MG tablet TAKE 1 TABLET BY MOUTH THREE TIMES A DAY    clindamycin (CLEOCIN) 300 MG capsule Take 1 capsule (300 mg total) by mouth every 8 (eight) hours.    furosemide (LASIX) 40 MG tablet TAKE 1 TABLET BY MOUTH 2 TIMES A DAY    losartan (COZAAR) 50 MG tablet Take 1 tablet (50 mg total) by mouth once daily.    modafiniL (PROVIGIL) 200 MG Tab Take 1 tablet (200 mg total) by mouth once daily. Take qam    mupirocin (BACTROBAN) 2 % ointment Apply to affected area 3 times daily    naproxen (NAPROSYN) 500 MG tablet Take 1 tablet (500 mg total) by mouth 2 (two) times daily with meals.    traMADoL (ULTRAM) 50 mg tablet Take 1 tablet (50 mg total) by mouth every 6 (six) hours.    triamcinolone acetonide 0.1% (KENALOG) 0.1 % cream APPLY TO AFFECTED AREA TWICE A DAY        SOCIAL HISTORY  Social History     Tobacco Use    Smoking status: Never    Smokeless tobacco: Never   Substance Use Topics    Alcohol use: No    Drug use: No       Living arrangements - the patient lives with their spouse.    REVIEW OF SYMPTOMS 1/30/2023   Do you feel abnormally tired on most days? No   Do you feel you generally sleep well? No--spasm in his legs;    Do you have difficulty controlling your bladder?  No   Do you have difficulty controlling your bowels?  No   Do you have frequent muscle cramps, tightness or spasms in your limbs?  Yes   Do you have new visual symptoms?  No   Do you have worsening difficulty with your memory or thinking? No   Do you have worsening symptoms of anxiety or depression?  No   For patients who walk, Do you have more difficulty walking?  Yes   Have you fallen since your last visit?  No   For patients who use wheelchairs: Do you have any skin wounds or breakdown? No   Do you have difficulty using your hands?  No   Do you have shooting or burning pain? Yes   Do you have difficulty with sexual function?  Yes   If you are sexually active, are you using birth control? Y/N  N/A Not Applicable   Do you often choke when swallowing liquids or solid food?  No   Do you experience worsening symptoms when overheated? Yes   Do you need any new equipment such as a wheelchair, walker or shower chair? No   Do you receive co-pay financial assistance for your principal MS medicine? Yes   Would you be interested in participating in an MS research trial in the future? No   For patients on Gilenya, Tecfidera, Aubagio, Rituxan, Ocrevus, Tysabri, Lemtrada or Methotrexate, are you aware that you should NOT receive live virus vaccines?  Not Applicable   Do you feel you have adequate family/friend support?  Yes   Do you have health insurance?   Yes   Are you currently employed? Yes   Do you receive SSDI/SSI?  Not Applicable   Do you use marijuana or cannabis products? No   Have you been diagnosed  with a urinary tract infection since your last visit here? No   Have you been diagnosed with a respiratory tract infection since your last visit here? No   Have you been to the emergency room since your last visit here? No   Have you been hospitalized since your last visit here?  No            Objective:        Timed 25 Foot Walk: 1/30/2023   Did patient wear an AFO? No   Was assistive device used? Yes   Assistive device used (rain one): Bilateral Assistance   Bilateral device used Walker/Rollator   Time for 25 Foot Walk (seconds) 10     Neurologic Exam    Psoriais on hands and feet; decreased ROM in wrists;   3+ reflexes t/o (AJ not tested)   Moderate edema of LE bilaterally   Strength full   No SALO   No nystagmus or dysarthria   Decreased vib distal LE bilaterally   Gait: walker       Labs:     No results found for: DFBGTOWQ74SO  No results found for: JCVINDEX, JCVANTIBODY  No results found for: QG4LRRAV, ABSOLUTECD3, RZ8BFYKB, ABSOLUTECD8, HK6QWOPW, ABSOLUTECD4, LABCD48  Lab Results   Component Value Date    WBC 8.25 04/30/2021    RBC 4.54 (L) 04/30/2021    HGB 13.9 (L) 04/30/2021    HCT 43.1 04/30/2021    MCV 95 04/30/2021    MCH 30.6 04/30/2021    MCHC 32.3 04/30/2021    RDW 14.7 (H) 04/30/2021     04/30/2021    MPV 11.7 04/30/2021    GRAN 4.7 04/30/2021    GRAN 56.9 04/30/2021    LYMPH 1.7 04/30/2021    LYMPH 20.7 04/30/2021    MONO 1.0 04/30/2021    MONO 11.5 04/30/2021    EOS 0.8 (H) 04/30/2021    BASO 0.04 04/30/2021    EOSINOPHIL 9.9 (H) 04/30/2021    BASOPHIL 0.5 04/30/2021     Sodium   Date Value Ref Range Status   04/30/2021 140 136 - 145 mmol/L Final     Potassium   Date Value Ref Range Status   04/30/2021 4.1 3.5 - 5.1 mmol/L Final     Chloride   Date Value Ref Range Status   04/30/2021 100 95 - 110 mmol/L Final     CO2   Date Value Ref Range Status   04/30/2021 30 (H) 23 - 29 mmol/L Final     Glucose   Date Value Ref Range Status   04/30/2021 80 70 - 110 mg/dL Final     BUN   Date Value Ref  Range Status   04/30/2021 19 8 - 23 mg/dL Final     Creatinine   Date Value Ref Range Status   04/30/2021 0.8 0.5 - 1.4 mg/dL Final     Calcium   Date Value Ref Range Status   04/30/2021 9.4 8.7 - 10.5 mg/dL Final     Total Protein   Date Value Ref Range Status   06/26/2020 7.7 6.0 - 8.4 g/dL Final     Albumin   Date Value Ref Range Status   06/26/2020 2.9 (L) 3.5 - 5.2 g/dL Final     Total Bilirubin   Date Value Ref Range Status   06/26/2020 0.4 0.1 - 1.0 mg/dL Final     Comment:     For infants and newborns, interpretation of results should be based  on gestational age, weight and in agreement with clinical  observations.  Premature Infant recommended reference ranges:  Up to 24 hours.............<8.0 mg/dL  Up to 48 hours............<12.0 mg/dL  3-5 days..................<15.0 mg/dL  6-29 days.................<15.0 mg/dL       Alkaline Phosphatase   Date Value Ref Range Status   06/26/2020 86 55 - 135 U/L Final     AST   Date Value Ref Range Status   06/26/2020 16 10 - 40 U/L Final     ALT   Date Value Ref Range Status   06/26/2020 17 10 - 44 U/L Final     Anion Gap   Date Value Ref Range Status   04/30/2021 10 8 - 16 mmol/L Final     eGFR if    Date Value Ref Range Status   04/30/2021 >60.0 >60 mL/min/1.73 m^2 Final     eGFR if non    Date Value Ref Range Status   04/30/2021 >60.0 >60 mL/min/1.73 m^2 Final     Comment:     Calculation used to obtain the estimated glomerular filtration  rate (eGFR) is the CKD-EPI equation.        No results found for: HEPBSAG, HEPBSAB, HEPBCAB        MS Impression and Plan:     NEURO MULTIPLE SCLEROSIS IMPRESSION:   MS Status:     Number of relapses in the past year?:  0    Clinical Progression:  Worsened    Clinical Progression comment:  Per history    MRI Progression:  N/A  Plan:     DMT:  No change in management    Implement Disease Modifying Therapy:  Interferon beta-1a IM    Symptom Management:  Implement change in symptom management     Implement Change in Symptom Management:  Gait and Spasticity (PT; take 20mg of baclofen hs)     Next Imaging Due: 2/7/2023     Next Labs Due: 1/30/2023              Problem List Items Addressed This Visit          Unprioritized    MS (multiple sclerosis) - Primary    Relevant Medications    baclofen (LIORESAL) 10 MG tablet    AVONEX 30 mcg/0.5 mL injection    Other Relevant Orders    CNS Demyelinating Dis Eval (AQP-4 IGG and MOG IGG)    Ambulatory referral/consult to Physical/Occupational Therapy    MRI Brain Demyelinating W W/O Contrast    MRI Cervical Spine Demyelinating W W/O Contrast    CBC Auto Differential    Comprehensive Metabolic Panel    Vitamin D     Other Visit Diagnoses       Muscle spasm        Relevant Medications    baclofen (LIORESAL) 10 MG tablet    Gait disturbance        Relevant Orders    Ambulatory referral/consult to Physical/Occupational Therapy    Psoriasis        Relevant Orders    Ambulatory referral/consult to Rheumatology    Arthritis        Relevant Orders    Ambulatory referral/consult to Rheumatology    Vitamin D deficiency        Relevant Orders    Vitamin D    Counseling regarding goals of care                Antonella Das MD    I spent a total of 60 minutes on the day of the visit.This includes face to face time and non-face to face time preparing to see the patient (eg, review of tests), obtaining and/or reviewing separately obtained history, documenting clinical information in the electronic or other health record, independently interpreting results and communicating results to the patient/family/caregiver, or care coordinator.

## 2023-02-02 ENCOUNTER — PATIENT MESSAGE (OUTPATIENT)
Dept: NEUROLOGY | Facility: CLINIC | Age: 68
End: 2023-02-02
Payer: COMMERCIAL

## 2023-02-02 DIAGNOSIS — E55.9 VITAMIN D DEFICIENCY: Primary | ICD-10-CM

## 2023-02-03 RX ORDER — CHOLECALCIFEROL (VITAMIN D3) 1250 MCG
1 TABLET ORAL WEEKLY
Qty: 12 TABLET | Refills: 3 | Status: SHIPPED | OUTPATIENT
Start: 2023-02-03

## 2023-02-06 LAB
CNS DEMYELINATING DISEASE EVAL: NORMAL
MOG-IGG1: NEGATIVE
NMO/AQP4 FACS,S: NEGATIVE

## 2023-02-08 ENCOUNTER — TELEPHONE (OUTPATIENT)
Dept: NEUROLOGY | Facility: CLINIC | Age: 68
End: 2023-02-08
Payer: COMMERCIAL

## 2023-02-08 NOTE — TELEPHONE ENCOUNTER
----- Message from Jeannette Wells sent at 2/8/2023  1:44 PM CST -----  Contact: Pt  [facility] is requesting clinical notes to be faxed over for Pt Medication request.     Medication:AVONEX 30 mcg/0.5 mL injection      [facility] ([facility] ) - [place], [place] - [address]  [address]  [facility] [address]  [facility] [address]  Phone: [phone] Fax: [phone]

## 2023-02-15 ENCOUNTER — PATIENT MESSAGE (OUTPATIENT)
Dept: NEUROLOGY | Facility: CLINIC | Age: 68
End: 2023-02-15
Payer: COMMERCIAL

## 2023-02-19 ENCOUNTER — PATIENT MESSAGE (OUTPATIENT)
Dept: INTERNAL MEDICINE | Facility: CLINIC | Age: 68
End: 2023-02-19
Payer: COMMERCIAL

## 2023-02-19 DIAGNOSIS — U07.1 POSITIVE SELF-ADMINISTERED ANTIGEN TEST FOR COVID-19: Primary | ICD-10-CM

## 2023-02-20 ENCOUNTER — PATIENT MESSAGE (OUTPATIENT)
Dept: PSYCHIATRY | Facility: CLINIC | Age: 68
End: 2023-02-20
Payer: COMMERCIAL

## 2023-02-20 NOTE — TELEPHONE ENCOUNTER
----- Message from Caroline Womack sent at 2/20/2023 12:32 PM CST -----  Regarding: Prior Authorization  Contact: Ericka @ [phone]  Ericka from [facility] is calling to start a Prior Authorization on medication: AVONEX 30 mcg/0.5 mL injection. Asking for a call back

## 2023-02-22 ENCOUNTER — PATIENT MESSAGE (OUTPATIENT)
Dept: NEUROLOGY | Facility: CLINIC | Age: 68
End: 2023-02-22
Payer: COMMERCIAL

## 2023-02-27 ENCOUNTER — PATIENT MESSAGE (OUTPATIENT)
Dept: INTERNAL MEDICINE | Facility: CLINIC | Age: 68
End: 2023-02-27
Payer: COMMERCIAL

## 2023-03-02 ENCOUNTER — OFFICE VISIT (OUTPATIENT)
Dept: INTERNAL MEDICINE | Facility: CLINIC | Age: 68
End: 2023-03-02
Payer: COMMERCIAL

## 2023-03-02 ENCOUNTER — LAB VISIT (OUTPATIENT)
Dept: LAB | Facility: HOSPITAL | Age: 68
End: 2023-03-02
Payer: COMMERCIAL

## 2023-03-02 ENCOUNTER — PATIENT MESSAGE (OUTPATIENT)
Dept: INTERNAL MEDICINE | Facility: CLINIC | Age: 68
End: 2023-03-02

## 2023-03-02 VITALS
HEART RATE: 78 BPM | HEIGHT: 72 IN | WEIGHT: 288.81 LBS | DIASTOLIC BLOOD PRESSURE: 68 MMHG | BODY MASS INDEX: 39.12 KG/M2 | OXYGEN SATURATION: 95 % | SYSTOLIC BLOOD PRESSURE: 120 MMHG

## 2023-03-02 DIAGNOSIS — S81.801A WOUND OF RIGHT LOWER EXTREMITY, INITIAL ENCOUNTER: ICD-10-CM

## 2023-03-02 DIAGNOSIS — Z13.1 DIABETES MELLITUS SCREENING: ICD-10-CM

## 2023-03-02 DIAGNOSIS — G35 MS (MULTIPLE SCLEROSIS): ICD-10-CM

## 2023-03-02 DIAGNOSIS — Z12.5 PROSTATE CANCER SCREENING: ICD-10-CM

## 2023-03-02 DIAGNOSIS — R53.83 FATIGUE, UNSPECIFIED TYPE: ICD-10-CM

## 2023-03-02 DIAGNOSIS — S81.801A WOUND OF RIGHT LOWER EXTREMITY, INITIAL ENCOUNTER: Primary | ICD-10-CM

## 2023-03-02 DIAGNOSIS — Z86.16 HISTORY OF COVID-19: ICD-10-CM

## 2023-03-02 LAB
ALBUMIN SERPL BCP-MCNC: 3.7 G/DL (ref 3.5–5.2)
ALP SERPL-CCNC: 84 U/L (ref 55–135)
ALT SERPL W/O P-5'-P-CCNC: 28 U/L (ref 10–44)
ANION GAP SERPL CALC-SCNC: 9 MMOL/L (ref 8–16)
AST SERPL-CCNC: 29 U/L (ref 10–40)
BASOPHILS # BLD AUTO: 0.01 K/UL (ref 0–0.2)
BASOPHILS NFR BLD: 0.1 % (ref 0–1.9)
BILIRUB SERPL-MCNC: 0.6 MG/DL (ref 0.1–1)
BUN SERPL-MCNC: 28 MG/DL (ref 8–23)
CALCIUM SERPL-MCNC: 9.4 MG/DL (ref 8.7–10.5)
CHLORIDE SERPL-SCNC: 102 MMOL/L (ref 95–110)
CO2 SERPL-SCNC: 31 MMOL/L (ref 23–29)
CREAT SERPL-MCNC: 1.2 MG/DL (ref 0.5–1.4)
DIFFERENTIAL METHOD: ABNORMAL
EOSINOPHIL # BLD AUTO: 0.2 K/UL (ref 0–0.5)
EOSINOPHIL NFR BLD: 1.6 % (ref 0–8)
ERYTHROCYTE [DISTWIDTH] IN BLOOD BY AUTOMATED COUNT: 13.6 % (ref 11.5–14.5)
EST. GFR  (NO RACE VARIABLE): >60 ML/MIN/1.73 M^2
GLUCOSE SERPL-MCNC: 87 MG/DL (ref 70–110)
HCT VFR BLD AUTO: 41.5 % (ref 40–54)
HGB BLD-MCNC: 12.9 G/DL (ref 14–18)
IMM GRANULOCYTES # BLD AUTO: 0.03 K/UL (ref 0–0.04)
IMM GRANULOCYTES NFR BLD AUTO: 0.3 % (ref 0–0.5)
LYMPHOCYTES # BLD AUTO: 1.8 K/UL (ref 1–4.8)
LYMPHOCYTES NFR BLD: 19.5 % (ref 18–48)
MCH RBC QN AUTO: 30.4 PG (ref 27–31)
MCHC RBC AUTO-ENTMCNC: 31.1 G/DL (ref 32–36)
MCV RBC AUTO: 98 FL (ref 82–98)
MONOCYTES # BLD AUTO: 1 K/UL (ref 0.3–1)
MONOCYTES NFR BLD: 10.5 % (ref 4–15)
NEUTROPHILS # BLD AUTO: 6.2 K/UL (ref 1.8–7.7)
NEUTROPHILS NFR BLD: 68 % (ref 38–73)
NRBC BLD-RTO: 0 /100 WBC
PLATELET # BLD AUTO: 240 K/UL (ref 150–450)
PMV BLD AUTO: 11.7 FL (ref 9.2–12.9)
POTASSIUM SERPL-SCNC: 4.7 MMOL/L (ref 3.5–5.1)
PROT SERPL-MCNC: 7.2 G/DL (ref 6–8.4)
RBC # BLD AUTO: 4.24 M/UL (ref 4.6–6.2)
SODIUM SERPL-SCNC: 142 MMOL/L (ref 136–145)
TSH SERPL DL<=0.005 MIU/L-ACNC: 4.44 UIU/ML (ref 0.4–4)
WBC # BLD AUTO: 9.13 K/UL (ref 3.9–12.7)

## 2023-03-02 PROCEDURE — 1126F PR PAIN SEVERITY QUANTIFIED, NO PAIN PRESENT: ICD-10-PCS | Mod: CPTII,S$GLB,, | Performed by: PHYSICIAN ASSISTANT

## 2023-03-02 PROCEDURE — 1159F PR MEDICATION LIST DOCUMENTED IN MEDICAL RECORD: ICD-10-PCS | Mod: CPTII,S$GLB,, | Performed by: PHYSICIAN ASSISTANT

## 2023-03-02 PROCEDURE — 1101F PR PT FALLS ASSESS DOC 0-1 FALLS W/OUT INJ PAST YR: ICD-10-PCS | Mod: CPTII,S$GLB,, | Performed by: PHYSICIAN ASSISTANT

## 2023-03-02 PROCEDURE — 84439 ASSAY OF FREE THYROXINE: CPT | Performed by: PHYSICIAN ASSISTANT

## 2023-03-02 PROCEDURE — 3044F PR MOST RECENT HEMOGLOBIN A1C LEVEL <7.0%: ICD-10-PCS | Mod: CPTII,S$GLB,, | Performed by: PHYSICIAN ASSISTANT

## 2023-03-02 PROCEDURE — 3008F BODY MASS INDEX DOCD: CPT | Mod: CPTII,S$GLB,, | Performed by: PHYSICIAN ASSISTANT

## 2023-03-02 PROCEDURE — 3288F PR FALLS RISK ASSESSMENT DOCUMENTED: ICD-10-PCS | Mod: CPTII,S$GLB,, | Performed by: PHYSICIAN ASSISTANT

## 2023-03-02 PROCEDURE — 1159F MED LIST DOCD IN RCRD: CPT | Mod: CPTII,S$GLB,, | Performed by: PHYSICIAN ASSISTANT

## 2023-03-02 PROCEDURE — 3078F PR MOST RECENT DIASTOLIC BLOOD PRESSURE < 80 MM HG: ICD-10-PCS | Mod: CPTII,S$GLB,, | Performed by: PHYSICIAN ASSISTANT

## 2023-03-02 PROCEDURE — 99214 PR OFFICE/OUTPT VISIT, EST, LEVL IV, 30-39 MIN: ICD-10-PCS | Mod: S$GLB,,, | Performed by: PHYSICIAN ASSISTANT

## 2023-03-02 PROCEDURE — 99999 PR PBB SHADOW E&M-EST. PATIENT-LVL IV: ICD-10-PCS | Mod: PBBFAC,,, | Performed by: PHYSICIAN ASSISTANT

## 2023-03-02 PROCEDURE — 99214 OFFICE O/P EST MOD 30 MIN: CPT | Mod: S$GLB,,, | Performed by: PHYSICIAN ASSISTANT

## 2023-03-02 PROCEDURE — 85025 COMPLETE CBC W/AUTO DIFF WBC: CPT | Performed by: PHYSICIAN ASSISTANT

## 2023-03-02 PROCEDURE — 84153 ASSAY OF PSA TOTAL: CPT | Performed by: PHYSICIAN ASSISTANT

## 2023-03-02 PROCEDURE — 1160F PR REVIEW ALL MEDS BY PRESCRIBER/CLIN PHARMACIST DOCUMENTED: ICD-10-PCS | Mod: CPTII,S$GLB,, | Performed by: PHYSICIAN ASSISTANT

## 2023-03-02 PROCEDURE — 1101F PT FALLS ASSESS-DOCD LE1/YR: CPT | Mod: CPTII,S$GLB,, | Performed by: PHYSICIAN ASSISTANT

## 2023-03-02 PROCEDURE — 83036 HEMOGLOBIN GLYCOSYLATED A1C: CPT | Performed by: PHYSICIAN ASSISTANT

## 2023-03-02 PROCEDURE — 3044F HG A1C LEVEL LT 7.0%: CPT | Mod: CPTII,S$GLB,, | Performed by: PHYSICIAN ASSISTANT

## 2023-03-02 PROCEDURE — 3288F FALL RISK ASSESSMENT DOCD: CPT | Mod: CPTII,S$GLB,, | Performed by: PHYSICIAN ASSISTANT

## 2023-03-02 PROCEDURE — 1160F RVW MEDS BY RX/DR IN RCRD: CPT | Mod: CPTII,S$GLB,, | Performed by: PHYSICIAN ASSISTANT

## 2023-03-02 PROCEDURE — 3078F DIAST BP <80 MM HG: CPT | Mod: CPTII,S$GLB,, | Performed by: PHYSICIAN ASSISTANT

## 2023-03-02 PROCEDURE — 84443 ASSAY THYROID STIM HORMONE: CPT | Performed by: PHYSICIAN ASSISTANT

## 2023-03-02 PROCEDURE — 3008F PR BODY MASS INDEX (BMI) DOCUMENTED: ICD-10-PCS | Mod: CPTII,S$GLB,, | Performed by: PHYSICIAN ASSISTANT

## 2023-03-02 PROCEDURE — 99999 PR PBB SHADOW E&M-EST. PATIENT-LVL IV: CPT | Mod: PBBFAC,,, | Performed by: PHYSICIAN ASSISTANT

## 2023-03-02 PROCEDURE — 80053 COMPREHEN METABOLIC PANEL: CPT | Performed by: PHYSICIAN ASSISTANT

## 2023-03-02 PROCEDURE — 3074F PR MOST RECENT SYSTOLIC BLOOD PRESSURE < 130 MM HG: ICD-10-PCS | Mod: CPTII,S$GLB,, | Performed by: PHYSICIAN ASSISTANT

## 2023-03-02 PROCEDURE — 36415 COLL VENOUS BLD VENIPUNCTURE: CPT | Performed by: PHYSICIAN ASSISTANT

## 2023-03-02 PROCEDURE — 1126F AMNT PAIN NOTED NONE PRSNT: CPT | Mod: CPTII,S$GLB,, | Performed by: PHYSICIAN ASSISTANT

## 2023-03-02 PROCEDURE — 3074F SYST BP LT 130 MM HG: CPT | Mod: CPTII,S$GLB,, | Performed by: PHYSICIAN ASSISTANT

## 2023-03-02 RX ORDER — MUPIROCIN 20 MG/G
OINTMENT TOPICAL
Qty: 22 G | Refills: 1 | Status: SHIPPED | OUTPATIENT
Start: 2023-03-02 | End: 2023-07-01 | Stop reason: SDUPTHER
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
## 2023-03-03 LAB
COMPLEXED PSA SERPL-MCNC: 0.68 NG/ML (ref 0–4)
ESTIMATED AVG GLUCOSE: 126 MG/DL (ref 68–131)
HBA1C MFR BLD: 6 % (ref 4–5.6)
T4 FREE SERPL-MCNC: 0.93 NG/DL (ref 0.71–1.51)

## 2023-03-06 ENCOUNTER — PATIENT MESSAGE (OUTPATIENT)
Dept: INTERNAL MEDICINE | Facility: CLINIC | Age: 68
End: 2023-03-06
Payer: COMMERCIAL

## 2023-03-07 ENCOUNTER — PATIENT MESSAGE (OUTPATIENT)
Dept: INTERNAL MEDICINE | Facility: CLINIC | Age: 68
End: 2023-03-07
Payer: COMMERCIAL

## 2023-03-07 DIAGNOSIS — R39.198 DECREASED URINE STREAM: Primary | ICD-10-CM

## 2023-03-08 ENCOUNTER — LAB VISIT (OUTPATIENT)
Dept: LAB | Facility: HOSPITAL | Age: 68
End: 2023-03-08
Payer: COMMERCIAL

## 2023-03-08 ENCOUNTER — PATIENT MESSAGE (OUTPATIENT)
Dept: INTERNAL MEDICINE | Facility: CLINIC | Age: 68
End: 2023-03-08
Payer: COMMERCIAL

## 2023-03-08 DIAGNOSIS — R39.198 DECREASED URINE STREAM: Primary | ICD-10-CM

## 2023-03-08 DIAGNOSIS — R39.198 DECREASED URINE STREAM: ICD-10-CM

## 2023-03-08 LAB
BILIRUB UR QL STRIP: NEGATIVE
CLARITY UR REFRACT.AUTO: CLEAR
COLOR UR AUTO: YELLOW
GLUCOSE UR QL STRIP: NEGATIVE
HGB UR QL STRIP: NEGATIVE
KETONES UR QL STRIP: NEGATIVE
LEUKOCYTE ESTERASE UR QL STRIP: NEGATIVE
MICROSCOPIC COMMENT: ABNORMAL
NITRITE UR QL STRIP: NEGATIVE
PH UR STRIP: 6 [PH] (ref 5–8)
PROT UR QL STRIP: NEGATIVE
RBC #/AREA URNS AUTO: 6 /HPF (ref 0–4)
SP GR UR STRIP: 1.02 (ref 1–1.03)
SQUAMOUS #/AREA URNS AUTO: 0 /HPF
URN SPEC COLLECT METH UR: NORMAL
WBC #/AREA URNS AUTO: 2 /HPF (ref 0–5)

## 2023-03-08 PROCEDURE — 81001 URINALYSIS AUTO W/SCOPE: CPT | Performed by: PHYSICIAN ASSISTANT

## 2023-03-13 ENCOUNTER — OFFICE VISIT (OUTPATIENT)
Dept: UROLOGY | Facility: CLINIC | Age: 68
End: 2023-03-13
Payer: COMMERCIAL

## 2023-03-13 ENCOUNTER — HOSPITAL ENCOUNTER (OUTPATIENT)
Dept: RADIOLOGY | Facility: HOSPITAL | Age: 68
Discharge: HOME | End: 2023-03-13
Attending: PSYCHIATRY & NEUROLOGY
Payer: COMMERCIAL

## 2023-03-13 VITALS
HEART RATE: 72 BPM | DIASTOLIC BLOOD PRESSURE: 81 MMHG | SYSTOLIC BLOOD PRESSURE: 153 MMHG | WEIGHT: 305 LBS | HEIGHT: 72 IN | BODY MASS INDEX: 41.31 KG/M2

## 2023-03-13 DIAGNOSIS — G35 MS (MULTIPLE SCLEROSIS): ICD-10-CM

## 2023-03-13 DIAGNOSIS — N52.9 ERECTILE DYSFUNCTION, UNSPECIFIED ERECTILE DYSFUNCTION TYPE: Primary | ICD-10-CM

## 2023-03-13 DIAGNOSIS — R39.198 DECREASED URINE STREAM: ICD-10-CM

## 2023-03-13 LAB
BILIRUB SERPL-MCNC: NORMAL MG/DL
BLOOD URINE, POC: NORMAL
CLARITY, POC UA: CLEAR
COLOR, POC UA: NORMAL
GLUCOSE UR QL STRIP: NORMAL
KETONES UR QL STRIP: NORMAL
LEUKOCYTE ESTERASE URINE, POC: NORMAL
NITRITE, POC UA: NORMAL
PH, POC UA: 7
POC RESIDUAL URINE VOLUME: 0 ML (ref 0–100)
PROTEIN, POC: NORMAL
SPECIFIC GRAVITY, POC UA: 1.01
UROBILINOGEN, POC UA: NORMAL

## 2023-03-13 PROCEDURE — 3008F BODY MASS INDEX DOCD: CPT | Mod: CPTII,S$GLB,,

## 2023-03-13 PROCEDURE — 3044F HG A1C LEVEL LT 7.0%: CPT | Mod: CPTII,S$GLB,,

## 2023-03-13 PROCEDURE — 1101F PR PT FALLS ASSESS DOC 0-1 FALLS W/OUT INJ PAST YR: ICD-10-PCS | Mod: CPTII,S$GLB,,

## 2023-03-13 PROCEDURE — 72156 MRI CERVICAL SPINE DEMYELINATING W W/O CONTRAST: ICD-10-PCS | Mod: 26,,, | Performed by: RADIOLOGY

## 2023-03-13 PROCEDURE — 3044F PR MOST RECENT HEMOGLOBIN A1C LEVEL <7.0%: ICD-10-PCS | Mod: CPTII,S$GLB,,

## 2023-03-13 PROCEDURE — 1160F RVW MEDS BY RX/DR IN RCRD: CPT | Mod: CPTII,S$GLB,,

## 2023-03-13 PROCEDURE — 25500020 PHARM REV CODE 255: Performed by: PSYCHIATRY & NEUROLOGY

## 2023-03-13 PROCEDURE — 1159F PR MEDICATION LIST DOCUMENTED IN MEDICAL RECORD: ICD-10-PCS | Mod: CPTII,S$GLB,,

## 2023-03-13 PROCEDURE — 99999 PR PBB SHADOW E&M-EST. PATIENT-LVL IV: ICD-10-PCS | Mod: PBBFAC,,,

## 2023-03-13 PROCEDURE — 1101F PT FALLS ASSESS-DOCD LE1/YR: CPT | Mod: CPTII,S$GLB,,

## 2023-03-13 PROCEDURE — 51798 US URINE CAPACITY MEASURE: CPT | Mod: S$GLB,,,

## 2023-03-13 PROCEDURE — 70553 MRI BRAIN STEM W/O & W/DYE: CPT | Mod: 26,,, | Performed by: RADIOLOGY

## 2023-03-13 PROCEDURE — 72156 MRI NECK SPINE W/O & W/DYE: CPT | Mod: 26,,, | Performed by: RADIOLOGY

## 2023-03-13 PROCEDURE — 3288F PR FALLS RISK ASSESSMENT DOCUMENTED: ICD-10-PCS | Mod: CPTII,S$GLB,,

## 2023-03-13 PROCEDURE — 99999 PR PBB SHADOW E&M-EST. PATIENT-LVL IV: CPT | Mod: PBBFAC,,,

## 2023-03-13 PROCEDURE — 70553 MRI BRAIN DEMYELINATING W/ WO CONTRAST: ICD-10-PCS | Mod: 26,,, | Performed by: RADIOLOGY

## 2023-03-13 PROCEDURE — 51798 POCT BLADDER SCAN: ICD-10-PCS | Mod: S$GLB,,,

## 2023-03-13 PROCEDURE — 72156 MRI NECK SPINE W/O & W/DYE: CPT | Mod: TC

## 2023-03-13 PROCEDURE — 81002 URINALYSIS NONAUTO W/O SCOPE: CPT | Mod: S$GLB,,,

## 2023-03-13 PROCEDURE — 1159F MED LIST DOCD IN RCRD: CPT | Mod: CPTII,S$GLB,,

## 2023-03-13 PROCEDURE — 3288F FALL RISK ASSESSMENT DOCD: CPT | Mod: CPTII,S$GLB,,

## 2023-03-13 PROCEDURE — 3008F PR BODY MASS INDEX (BMI) DOCUMENTED: ICD-10-PCS | Mod: CPTII,S$GLB,,

## 2023-03-13 PROCEDURE — 3077F PR MOST RECENT SYSTOLIC BLOOD PRESSURE >= 140 MM HG: ICD-10-PCS | Mod: CPTII,S$GLB,,

## 2023-03-13 PROCEDURE — 99204 OFFICE O/P NEW MOD 45 MIN: CPT | Mod: S$GLB,,,

## 2023-03-13 PROCEDURE — 1160F PR REVIEW ALL MEDS BY PRESCRIBER/CLIN PHARMACIST DOCUMENTED: ICD-10-PCS | Mod: CPTII,S$GLB,,

## 2023-03-13 PROCEDURE — 3079F PR MOST RECENT DIASTOLIC BLOOD PRESSURE 80-89 MM HG: ICD-10-PCS | Mod: CPTII,S$GLB,,

## 2023-03-13 PROCEDURE — 1126F AMNT PAIN NOTED NONE PRSNT: CPT | Mod: CPTII,S$GLB,,

## 2023-03-13 PROCEDURE — 70553 MRI BRAIN STEM W/O & W/DYE: CPT | Mod: TC

## 2023-03-13 PROCEDURE — 99204 PR OFFICE/OUTPT VISIT, NEW, LEVL IV, 45-59 MIN: ICD-10-PCS | Mod: S$GLB,,,

## 2023-03-13 PROCEDURE — 3077F SYST BP >= 140 MM HG: CPT | Mod: CPTII,S$GLB,,

## 2023-03-13 PROCEDURE — 3079F DIAST BP 80-89 MM HG: CPT | Mod: CPTII,S$GLB,,

## 2023-03-13 PROCEDURE — 1126F PR PAIN SEVERITY QUANTIFIED, NO PAIN PRESENT: ICD-10-PCS | Mod: CPTII,S$GLB,,

## 2023-03-13 PROCEDURE — 81002 POCT URINE DIPSTICK WITHOUT MICROSCOPE: ICD-10-PCS | Mod: S$GLB,,,

## 2023-03-13 PROCEDURE — A9585 GADOBUTROL INJECTION: HCPCS | Performed by: PSYCHIATRY & NEUROLOGY

## 2023-03-13 RX ORDER — SILDENAFIL 100 MG/1
100 TABLET, FILM COATED ORAL DAILY PRN
Qty: 10 TABLET | Refills: 2 | Status: SHIPPED | OUTPATIENT
Start: 2023-03-13 | End: 2023-04-05 | Stop reason: SDUPTHER

## 2023-03-13 RX ORDER — SILDENAFIL 100 MG/1
100 TABLET, FILM COATED ORAL DAILY PRN
Qty: 10 TABLET | Refills: 2 | Status: SHIPPED | OUTPATIENT
Start: 2023-03-13 | End: 2023-03-13

## 2023-03-13 RX ORDER — TAMSULOSIN HYDROCHLORIDE 0.4 MG/1
0.4 CAPSULE ORAL NIGHTLY
Qty: 90 CAPSULE | Refills: 3 | Status: SHIPPED | OUTPATIENT
Start: 2023-03-13 | End: 2023-04-05 | Stop reason: SDUPTHER

## 2023-03-13 RX ORDER — GADOBUTROL 604.72 MG/ML
10 INJECTION INTRAVENOUS
Status: COMPLETED | OUTPATIENT
Start: 2023-03-13 | End: 2023-03-13

## 2023-03-13 RX ADMIN — GADOBUTROL 10 ML: 604.72 INJECTION INTRAVENOUS at 05:03

## 2023-03-13 NOTE — PROGRESS NOTES
CHIEF COMPLAINT:  Decreased FOS    HISTORY OF PRESENTING ILLINESS:  Rajan Cooper III is a 67 y.o. male new to urology. He is a referral from GROVER Sloan PA-C for decreased FOS. Hemoglobin A1C 6.0% 3/2/23. PSA 0.68 3/2/23. He was dx with Covid about 3 weeks ago. He has been experiencing some urgency and decreased FOS since then. He takes 80 mg Lasix every morning. ED x 1 year, has been rx'd Viagra in the past but he did not need the medication at that time. PMHx includes MS, HTN, Staph infection, obesity, gait disorder. No family hx of  cancers.            REVIEW OF SYSTEMS:  Review of Systems   Constitutional:  Positive for malaise/fatigue. Negative for chills and fever.   HENT:  Negative for congestion and sore throat.    Respiratory:  Negative for cough and shortness of breath.    Cardiovascular:  Negative for chest pain and palpitations.   Gastrointestinal:  Negative for nausea and vomiting.   Genitourinary:  Positive for urgency. Negative for dysuria, flank pain, frequency and hematuria.        Weak FOS   Neurological:  Negative for dizziness and headaches.       PATIENT HISTORY:  Past Medical History:   Diagnosis Date    Hypertension     MS (multiple sclerosis)     Staph aureus infection 2017       History reviewed. No pertinent surgical history.    Family History   Problem Relation Age of Onset    Diabetes Mother     Cancer Father     Pancreatic cancer Father     No Known Problems Sister     No Known Problems Brother     No Known Problems Maternal Aunt     No Known Problems Maternal Uncle     No Known Problems Paternal Aunt     No Known Problems Paternal Uncle     No Known Problems Maternal Grandmother     No Known Problems Maternal Grandfather     No Known Problems Paternal Grandmother     No Known Problems Paternal Grandfather     Hypertension Neg Hx     Amblyopia Neg Hx     Blindness Neg Hx     Cataracts Neg Hx     Glaucoma Neg Hx     Macular degeneration Neg Hx     Retinal detachment Neg Hx     Strabismus  Neg Hx     Stroke Neg Hx     Thyroid disease Neg Hx        Social History     Socioeconomic History    Marital status:    Tobacco Use    Smoking status: Never    Smokeless tobacco: Never   Substance and Sexual Activity    Alcohol use: No    Drug use: No       Allergies:  Norco [hydrocodone-acetaminophen]    Medications:    Current Outpatient Medications:     amLODIPine (NORVASC) 10 MG tablet, TAKE 1 TABLET BY MOUTH EVERY DAY, Disp: 90 tablet, Rfl: 3    AVONEX 30 mcg/0.5 mL injection, Inject 0.5 mLs (30 mcg total) into the muscle every 7 days., Disp: 4 each, Rfl: 12    baclofen (LIORESAL) 10 MG tablet, Take 2 tabs po AM and 2 tabs po HS, Disp: 360 tablet, Rfl: 3    cholecalciferol, vitamin D3, 1,250 mcg (50,000 unit) Tab, Take 1 tablet by mouth once a week., Disp: 12 tablet, Rfl: 3    furosemide (LASIX) 40 MG tablet, TAKE 1 TABLET BY MOUTH 2 TIMES A DAY, Disp: 180 tablet, Rfl: 2    losartan (COZAAR) 50 MG tablet, Take 1 tablet (50 mg total) by mouth once daily., Disp: 90 tablet, Rfl: 0    modafiniL (PROVIGIL) 200 MG Tab, Take 1 tablet (200 mg total) by mouth once daily. Take qam, Disp: 400 tablet, Rfl: 3    mupirocin (BACTROBAN) 2 % ointment, Apply to affected area 3 times daily, Disp: 22 g, Rfl: 1    sildenafiL (VIAGRA) 100 MG tablet, Take 1 tablet (100 mg total) by mouth daily as needed for Erectile Dysfunction (take 1 hour prior to intercourse on an empty stomach)., Disp: 10 tablet, Rfl: 2    tamsulosin (FLOMAX) 0.4 mg Cap, Take 1 capsule (0.4 mg total) by mouth every evening., Disp: 90 capsule, Rfl: 3    triamcinolone acetonide 0.1% (KENALOG) 0.1 % cream, APPLY TO AFFECTED AREA TWICE A DAY (Patient not taking: Reported on 3/2/2023), Disp: 80 g, Rfl: 1    PHYSICAL EXAMINATION:  Physical Exam  Constitutional:       Appearance: Normal appearance.   HENT:      Head: Normocephalic and atraumatic.      Right Ear: External ear normal.      Left Ear: External ear normal.   Pulmonary:      Effort: Pulmonary effort  is normal. No respiratory distress.   Genitourinary:     Comments: Unable to perform MALA due to stool in rectum.   Musculoskeletal:      Comments: Generalized weakness   Neurological:      General: No focal deficit present.      Mental Status: He is alert and oriented to person, place, and time.   Psychiatric:         Mood and Affect: Mood normal.         Behavior: Behavior normal.         LABS:  UA dip WNL  PVR 0 ml      Lab Results   Component Value Date    PSA 0.68 03/02/2023    PSA 0.56 07/09/2020    PSA 0.38 06/07/2019       Lab Results   Component Value Date    CREATININE 1.2 03/02/2023    EGFRNORACEVR >60.0 03/02/2023         IMPRESSION:  Encounter Diagnoses   Name Primary?    Erectile dysfunction, unspecified erectile dysfunction type Yes    Decreased urine stream          Assessment:       1. Erectile dysfunction, unspecified erectile dysfunction type    2. Decreased urine stream          Plan:   - Flomax 0.4 mg nightly, MoA and most common SE discussed   - 100 mg Viagra for ED, MoA and most common SE discussed. Erection over 4 hours is a medical emergency.     Follow up in 6 weeks to discuss urinary symptoms and ED. MALA next visit.    I spent 45 minutes with the patient of which more than half was spent in direct consultation with the patient in regards to our treatment and plan.  We addressed the office findings and recent labs.   Education and recommendations of today's plan of care including home remedies and needed follow up with PCP.   We discussed the chief complaint; reviewed the LUTS and the possible contributory factors.   Recommended lifestyle modifications with proper, healthy diet, good hydration if no fluid restrictions; reducing bladder irritants.   Benefits of regular exercise.

## 2023-03-23 ENCOUNTER — PATIENT MESSAGE (OUTPATIENT)
Dept: NEUROLOGY | Facility: CLINIC | Age: 68
End: 2023-03-23
Payer: COMMERCIAL

## 2023-03-23 DIAGNOSIS — M48.02 CERVICAL STENOSIS OF SPINE: Primary | ICD-10-CM

## 2023-03-27 ENCOUNTER — TELEPHONE (OUTPATIENT)
Dept: NEUROSURGERY | Facility: CLINIC | Age: 68
End: 2023-03-27
Payer: COMMERCIAL

## 2023-03-27 DIAGNOSIS — M54.2 NECK PAIN: Primary | ICD-10-CM

## 2023-03-28 ENCOUNTER — HOSPITAL ENCOUNTER (OUTPATIENT)
Dept: RADIOLOGY | Facility: HOSPITAL | Age: 68
Discharge: HOME | End: 2023-03-28
Attending: NEUROLOGICAL SURGERY
Payer: COMMERCIAL

## 2023-03-28 ENCOUNTER — OFFICE VISIT (OUTPATIENT)
Dept: NEUROSURGERY | Facility: CLINIC | Age: 68
End: 2023-03-28
Payer: COMMERCIAL

## 2023-03-28 VITALS
HEIGHT: 72 IN | HEART RATE: 72 BPM | BODY MASS INDEX: 41.29 KG/M2 | DIASTOLIC BLOOD PRESSURE: 81 MMHG | SYSTOLIC BLOOD PRESSURE: 153 MMHG | WEIGHT: 304.88 LBS

## 2023-03-28 DIAGNOSIS — M54.12 CERVICAL RADICULOPATHY AT C5: ICD-10-CM

## 2023-03-28 DIAGNOSIS — G95.9 CERVICAL MYELOPATHY: Primary | ICD-10-CM

## 2023-03-28 DIAGNOSIS — M54.2 NECK PAIN: ICD-10-CM

## 2023-03-28 DIAGNOSIS — M48.02 CERVICAL STENOSIS OF SPINE: ICD-10-CM

## 2023-03-28 PROCEDURE — 3008F BODY MASS INDEX DOCD: CPT | Mod: CPTII,S$GLB,, | Performed by: NEUROLOGICAL SURGERY

## 2023-03-28 PROCEDURE — 3008F PR BODY MASS INDEX (BMI) DOCUMENTED: ICD-10-PCS | Mod: CPTII,S$GLB,, | Performed by: NEUROLOGICAL SURGERY

## 2023-03-28 PROCEDURE — 99205 OFFICE O/P NEW HI 60 MIN: CPT | Mod: S$GLB,,, | Performed by: NEUROLOGICAL SURGERY

## 2023-03-28 PROCEDURE — 1125F PR PAIN SEVERITY QUANTIFIED, PAIN PRESENT: ICD-10-PCS | Mod: CPTII,S$GLB,, | Performed by: NEUROLOGICAL SURGERY

## 2023-03-28 PROCEDURE — 72050 X-RAY EXAM NECK SPINE 4/5VWS: CPT | Mod: 26,,, | Performed by: RADIOLOGY

## 2023-03-28 PROCEDURE — 3077F SYST BP >= 140 MM HG: CPT | Mod: CPTII,S$GLB,, | Performed by: NEUROLOGICAL SURGERY

## 2023-03-28 PROCEDURE — 99999 PR PBB SHADOW E&M-EST. PATIENT-LVL IV: ICD-10-PCS | Mod: PBBFAC,,, | Performed by: NEUROLOGICAL SURGERY

## 2023-03-28 PROCEDURE — 3288F FALL RISK ASSESSMENT DOCD: CPT | Mod: CPTII,S$GLB,, | Performed by: NEUROLOGICAL SURGERY

## 2023-03-28 PROCEDURE — 1100F PR PT FALLS ASSESS DOC 2+ FALLS/FALL W/INJURY/YR: ICD-10-PCS | Mod: CPTII,S$GLB,, | Performed by: NEUROLOGICAL SURGERY

## 2023-03-28 PROCEDURE — 3077F PR MOST RECENT SYSTOLIC BLOOD PRESSURE >= 140 MM HG: ICD-10-PCS | Mod: CPTII,S$GLB,, | Performed by: NEUROLOGICAL SURGERY

## 2023-03-28 PROCEDURE — 3288F PR FALLS RISK ASSESSMENT DOCUMENTED: ICD-10-PCS | Mod: CPTII,S$GLB,, | Performed by: NEUROLOGICAL SURGERY

## 2023-03-28 PROCEDURE — 1159F PR MEDICATION LIST DOCUMENTED IN MEDICAL RECORD: ICD-10-PCS | Mod: CPTII,S$GLB,, | Performed by: NEUROLOGICAL SURGERY

## 2023-03-28 PROCEDURE — 72050 X-RAY EXAM NECK SPINE 4/5VWS: CPT | Mod: TC,FY

## 2023-03-28 PROCEDURE — 1125F AMNT PAIN NOTED PAIN PRSNT: CPT | Mod: CPTII,S$GLB,, | Performed by: NEUROLOGICAL SURGERY

## 2023-03-28 PROCEDURE — 3079F PR MOST RECENT DIASTOLIC BLOOD PRESSURE 80-89 MM HG: ICD-10-PCS | Mod: CPTII,S$GLB,, | Performed by: NEUROLOGICAL SURGERY

## 2023-03-28 PROCEDURE — 1159F MED LIST DOCD IN RCRD: CPT | Mod: CPTII,S$GLB,, | Performed by: NEUROLOGICAL SURGERY

## 2023-03-28 PROCEDURE — 1100F PTFALLS ASSESS-DOCD GE2>/YR: CPT | Mod: CPTII,S$GLB,, | Performed by: NEUROLOGICAL SURGERY

## 2023-03-28 PROCEDURE — 3044F PR MOST RECENT HEMOGLOBIN A1C LEVEL <7.0%: ICD-10-PCS | Mod: CPTII,S$GLB,, | Performed by: NEUROLOGICAL SURGERY

## 2023-03-28 PROCEDURE — 99205 PR OFFICE/OUTPT VISIT, NEW, LEVL V, 60-74 MIN: ICD-10-PCS | Mod: S$GLB,,, | Performed by: NEUROLOGICAL SURGERY

## 2023-03-28 PROCEDURE — 3079F DIAST BP 80-89 MM HG: CPT | Mod: CPTII,S$GLB,, | Performed by: NEUROLOGICAL SURGERY

## 2023-03-28 PROCEDURE — 72050 XR CERVICAL SPINE AP LAT WITH FLEX EXTEN: ICD-10-PCS | Mod: 26,,, | Performed by: RADIOLOGY

## 2023-03-28 PROCEDURE — 3044F HG A1C LEVEL LT 7.0%: CPT | Mod: CPTII,S$GLB,, | Performed by: NEUROLOGICAL SURGERY

## 2023-03-28 PROCEDURE — 99999 PR PBB SHADOW E&M-EST. PATIENT-LVL IV: CPT | Mod: PBBFAC,,, | Performed by: NEUROLOGICAL SURGERY

## 2023-03-28 RX ORDER — ERGOCALCIFEROL 1.25 MG/1
CAPSULE ORAL
COMMUNITY
Start: 2023-02-05 | End: 2023-04-05 | Stop reason: SDUPTHER

## 2023-03-28 NOTE — H&P (VIEW-ONLY)
NEUROSURGICAL OUTPATIENT CONSULTATION NOTE    DATE OF SERVICE:  03/28/2023    ATTENDING PHYSICIAN:  Dawit Henao MD    CONSULT REQUESTED BY:  Tegan Charles     REASON FOR CONSULT:  Cervical stenosis    HISTORY OF PRESENT ILLNESS:  This is a very pleasant 67 y.o. male who has a history of MS for last 20 years.  He typically uses a walker but has had functional decline now requires a scooter to get around.  He notes some clumsiness with his hands and some decreased sensation in his hands.  Feels more unsteady on his feet and has had his right leg gave out a few times.  He has some neck pain as well.  Recent MS CNS MRI survey demonstrated severe stenosis.    PAST MEDICAL HISTORY:  Active Ambulatory Problems     Diagnosis Date Noted    MS (multiple sclerosis)     HTN (hypertension) 11/30/2012    Gait disorder 04/17/2013    Muscle cramps 05/04/2015    Ischemia of toe 04/04/2017    PVD (peripheral vascular disease)     Leg pain 09/14/2017    Cellulitis 06/12/2019    Dry skin 07/09/2019    Left leg cellulitis 06/25/2020    Infrapatellar bursitis of left knee 06/25/2020     Resolved Ambulatory Problems     Diagnosis Date Noted    Abscess of left hand 03/04/2017    Pyogenic arthritis of left wrist 03/06/2017    Staphylococcus aureus sepsis 03/06/2017    Acute respiratory failure with hypoxemia 03/06/2017    Staphylococcal arthritis of left wrist 03/07/2017    AAA (abdominal aortic aneurysm)     Cellulitis of left arm 06/09/2019    Olecranon bursitis 06/09/2019     Past Medical History:   Diagnosis Date    Hypertension     Staph aureus infection 2017       PAST SURGICAL HISTORY:  No past surgical history on file.    SOCIAL HISTORY:   Social History     Socioeconomic History    Marital status:    Tobacco Use    Smoking status: Never    Smokeless tobacco: Never   Substance and Sexual Activity    Alcohol use: No    Drug use: No       FAMILY HISTORY:  Family History   Problem Relation Age of Onset    Diabetes  Mother     Cancer Father     Pancreatic cancer Father     No Known Problems Sister     No Known Problems Brother     No Known Problems Maternal Aunt     No Known Problems Maternal Uncle     No Known Problems Paternal Aunt     No Known Problems Paternal Uncle     No Known Problems Maternal Grandmother     No Known Problems Maternal Grandfather     No Known Problems Paternal Grandmother     No Known Problems Paternal Grandfather     Hypertension Neg Hx     Amblyopia Neg Hx     Blindness Neg Hx     Cataracts Neg Hx     Glaucoma Neg Hx     Macular degeneration Neg Hx     Retinal detachment Neg Hx     Strabismus Neg Hx     Stroke Neg Hx     Thyroid disease Neg Hx        CURRENTS MEDICATIONS:  Current Outpatient Medications on File Prior to Visit   Medication Sig Dispense Refill    amLODIPine (NORVASC) 10 MG tablet TAKE 1 TABLET BY MOUTH EVERY DAY 90 tablet 3    AVONEX 30 mcg/0.5 mL injection Inject 0.5 mLs (30 mcg total) into the muscle every 7 days. 4 each 12    baclofen (LIORESAL) 10 MG tablet Take 2 tabs po AM and 2 tabs po  tablet 3    cholecalciferol, vitamin D3, 1,250 mcg (50,000 unit) Tab Take 1 tablet by mouth once a week. 12 tablet 3    ergocalciferol (ERGOCALCIFEROL) 50,000 unit Cap Take by mouth every 7 days.      furosemide (LASIX) 40 MG tablet TAKE 1 TABLET BY MOUTH 2 TIMES A  tablet 2    losartan (COZAAR) 50 MG tablet Take 1 tablet (50 mg total) by mouth once daily. 90 tablet 0    modafiniL (PROVIGIL) 200 MG Tab Take 1 tablet (200 mg total) by mouth once daily. Take qam 400 tablet 3    mupirocin (BACTROBAN) 2 % ointment Apply to affected area 3 times daily 22 g 1    sildenafiL (VIAGRA) 100 MG tablet Take 1 tablet (100 mg total) by mouth daily as needed for Erectile Dysfunction (take 1 hour prior to intercourse on an empty stomach). 10 tablet 2    tamsulosin (FLOMAX) 0.4 mg Cap Take 1 capsule (0.4 mg total) by mouth every evening. 90 capsule 3    triamcinolone acetonide 0.1% (KENALOG) 0.1 %  cream APPLY TO AFFECTED AREA TWICE A DAY (Patient not taking: Reported on 3/2/2023) 80 g 1     No current facility-administered medications on file prior to visit.       ALLERGIES:  Review of patient's allergies indicates:   Allergen Reactions    Norco [hydrocodone-acetaminophen] Anaphylaxis       REVIEW OF SYSTEMS:  ROS - per HPI    OBJECTIVE:    PHYSICAL EXAMINATION:   Vitals:    03/28/23 0930   BP: (!) 153/81   Pulse: 72       Neurologic Exam  Pain in right shoulder and neck  fROM  Right delt 4-, abducts to <90 degrees  Bilateral hand numbness  No Brooke's  Left patellar reflex 3+  No clonus    DIAGNOSTIC DATA:  I personally interpreted the following imaging:     MRI cervical with   C3/4: facet arthropathy, listhesis, severe central stenosis with cord compression and myelomalacia  C4/5 severe disc spondylosis with loss of height, right C5 root compression.    ASSESMENT:  This is a 67 y.o. male with     Problem List Items Addressed This Visit    None  Visit Diagnoses       Cervical myelopathy    -  Primary    Cervical stenosis of spine        Relevant Orders    CT Cervical Spine Without Contrast    Cervical radiculopathy at C5                PLAN:  We reviewed imaging and discussed that he has symptoms of cervical myelopathy as well as right-sided C5 radiculopathy.  The symptoms are likely resulting from a combination of facet arthropathy and listhesis at C3-4 as well as disc spondylosis at C4-5 with severe right-sided foraminal stenosis.  I think C4 corpectomy would be my preferred approach, but I would like to evaluate with CT cervical spine.  Option would be posterior approach.  Will contact when imaging is completed.         Dawit Henao MD, FAANS    Disclaimer: This note was partly generated using dictation software which may occasionally result in transcription errors.

## 2023-04-05 ENCOUNTER — PATIENT MESSAGE (OUTPATIENT)
Dept: INTERNAL MEDICINE | Facility: CLINIC | Age: 68
End: 2023-04-05
Payer: COMMERCIAL

## 2023-04-05 DIAGNOSIS — M62.838 MUSCLE SPASM: ICD-10-CM

## 2023-04-05 DIAGNOSIS — N52.9 ERECTILE DYSFUNCTION, UNSPECIFIED ERECTILE DYSFUNCTION TYPE: ICD-10-CM

## 2023-04-05 DIAGNOSIS — G35 MS (MULTIPLE SCLEROSIS): ICD-10-CM

## 2023-04-05 DIAGNOSIS — R39.198 DECREASED URINE STREAM: ICD-10-CM

## 2023-04-05 NOTE — TELEPHONE ENCOUNTER
Care Due:                  Date            Visit Type   Department     Provider  --------------------------------------------------------------------------------    Last Visit: None Found      None         None Found  Next Visit: None Scheduled  None         None Found                                                            Last  Test          Frequency    Reason                     Performed    Due Date  --------------------------------------------------------------------------------    Office Visit  12 months..  losartan.................  Not Found    Overdue    [employer] Embedded Care Gaps. Reference number: [id number]. 4/05/2023   4:12:51 PM CDT

## 2023-04-06 ENCOUNTER — HOSPITAL ENCOUNTER (OUTPATIENT)
Dept: RADIOLOGY | Facility: HOSPITAL | Age: 68
Discharge: HOME | End: 2023-04-06
Attending: NEUROLOGICAL SURGERY
Payer: COMMERCIAL

## 2023-04-06 DIAGNOSIS — Z01.818 PRE-OP EVALUATION: ICD-10-CM

## 2023-04-06 DIAGNOSIS — M48.02 CERVICAL STENOSIS OF SPINE: ICD-10-CM

## 2023-04-06 DIAGNOSIS — G95.9 CERVICAL MYELOPATHY: Primary | ICD-10-CM

## 2023-04-06 PROCEDURE — 72125 CT NECK SPINE W/O DYE: CPT | Mod: 26,,, | Performed by: RADIOLOGY

## 2023-04-06 PROCEDURE — 72125 CT CERVICAL SPINE WITHOUT CONTRAST: ICD-10-PCS | Mod: 26,,, | Performed by: RADIOLOGY

## 2023-04-06 PROCEDURE — 72125 CT NECK SPINE W/O DYE: CPT | Mod: TC

## 2023-04-06 NOTE — PROGRESS NOTES
Plan for C3-6 PCF with Dr. Henao at [facility] for urgent treatment of progressive myelopathy. Patient will complete pre-op testing on Monday. Message sent to pcp requesting clearance appointment.      Tuyet Gutierrez PA-C  [facility]  Department of Neurosurgery  [phone]

## 2023-04-07 RX ORDER — AMLODIPINE BESYLATE 10 MG/1
10 TABLET ORAL DAILY
Qty: 90 TABLET | Refills: 3 | Status: SHIPPED | OUTPATIENT
Start: 2023-04-07 | End: 2023-08-14

## 2023-04-07 RX ORDER — SILDENAFIL 100 MG/1
100 TABLET, FILM COATED ORAL DAILY PRN
Qty: 10 TABLET | Refills: 2 | Status: ON HOLD | OUTPATIENT
Start: 2023-04-07 | End: 2023-04-15 | Stop reason: SDUPTHER

## 2023-04-07 RX ORDER — TAMSULOSIN HYDROCHLORIDE 0.4 MG/1
0.4 CAPSULE ORAL NIGHTLY
Qty: 90 CAPSULE | Refills: 3 | Status: SHIPPED | OUTPATIENT
Start: 2023-04-07 | End: 2024-01-09 | Stop reason: SDUPTHER

## 2023-04-07 RX ORDER — BACLOFEN 10 MG/1
TABLET ORAL
Qty: 360 TABLET | Refills: 3 | Status: SHIPPED | OUTPATIENT
Start: 2023-04-07 | End: 2024-01-09 | Stop reason: SDUPTHER

## 2023-04-07 RX ORDER — FUROSEMIDE 40 MG/1
40 TABLET ORAL 2 TIMES DAILY
Qty: 180 TABLET | Refills: 2 | Status: SHIPPED | OUTPATIENT
Start: 2023-04-07 | End: 2023-11-11

## 2023-04-07 RX ORDER — ERGOCALCIFEROL 1.25 MG/1
50000 CAPSULE ORAL
Qty: 12 CAPSULE | Refills: 3 | Status: SHIPPED | OUTPATIENT
Start: 2023-04-07 | End: 2024-01-09 | Stop reason: SDUPTHER

## 2023-04-07 RX ORDER — LOSARTAN POTASSIUM 50 MG/1
50 TABLET ORAL DAILY
Qty: 90 TABLET | Refills: 3 | Status: SHIPPED | OUTPATIENT
Start: 2023-04-07 | End: 2024-01-09 | Stop reason: SDUPTHER

## 2023-04-10 ENCOUNTER — TELEPHONE (OUTPATIENT)
Dept: INTERNAL MEDICINE | Facility: CLINIC | Age: 68
End: 2023-04-10
Payer: COMMERCIAL

## 2023-04-10 NOTE — TELEPHONE ENCOUNTER
----- Message from Khushboo Hurtado MA sent at 4/10/2023  4:00 PM CDT -----    ----- Message -----  From: Tuyet Gutierrez PA-C  Sent: 4/9/2023   7:52 PM CDT  To: Radha Thomas    Good evening,     Dr. Henao has added this patient on for an urgent surgery on 4/14 at [facility]. Surgery will consist of a posterior surgical fusion under general anesthesia, approximately 2.5 hours, with 2 days in the hospital. Dr. Garcia mentioned he is out of town, but I was wondering if someone could assist me with getting the patient scheduled with someone from his team early this week for  a clearance appt?    Thanks,  Tuyet

## 2023-04-10 NOTE — TELEPHONE ENCOUNTER
I can see pt 4/12 at 4:30pm  If that does not work please see if any available provider has appt for preop.

## 2023-04-11 ENCOUNTER — HOSPITAL ENCOUNTER (OUTPATIENT)
Dept: PREADMISSION TESTING | Facility: HOSPITAL | Age: 68
Discharge: HOME | End: 2023-04-11
Attending: NEUROLOGICAL SURGERY
Payer: COMMERCIAL

## 2023-04-11 ENCOUNTER — ANESTHESIA EVENT (OUTPATIENT)
Dept: SURGERY | Facility: HOSPITAL | Age: 68
End: 2023-04-11
Payer: COMMERCIAL

## 2023-04-11 ENCOUNTER — HOSPITAL ENCOUNTER (OUTPATIENT)
Dept: RADIOLOGY | Facility: HOSPITAL | Age: 68
Discharge: HOME | End: 2023-04-11
Attending: NEUROLOGICAL SURGERY
Payer: COMMERCIAL

## 2023-04-11 VITALS
DIASTOLIC BLOOD PRESSURE: 77 MMHG | WEIGHT: 304.88 LBS | RESPIRATION RATE: 16 BRPM | OXYGEN SATURATION: 96 % | HEIGHT: 72 IN | BODY MASS INDEX: 41.29 KG/M2 | SYSTOLIC BLOOD PRESSURE: 133 MMHG | TEMPERATURE: 97 F | HEART RATE: 76 BPM

## 2023-04-11 DIAGNOSIS — Z01.818 PREOPERATIVE TESTING: Primary | ICD-10-CM

## 2023-04-11 DIAGNOSIS — Z01.818 PRE-OP EVALUATION: ICD-10-CM

## 2023-04-11 LAB
ABO + RH BLD: NORMAL
ALBUMIN SERPL BCP-MCNC: 3.8 G/DL (ref 3.5–5.2)
ALP SERPL-CCNC: 81 U/L (ref 55–135)
ALT SERPL W/O P-5'-P-CCNC: 23 U/L (ref 10–44)
ANION GAP SERPL CALC-SCNC: 9 MMOL/L (ref 8–16)
APTT PPP: 27.6 SEC (ref 21–32)
AST SERPL-CCNC: 20 U/L (ref 10–40)
BASOPHILS # BLD AUTO: 0.01 K/UL (ref 0–0.2)
BASOPHILS NFR BLD: 0.2 % (ref 0–1.9)
BILIRUB SERPL-MCNC: 0.7 MG/DL (ref 0.1–1)
BLD GP AB SCN CELLS X3 SERPL QL: NORMAL
BUN SERPL-MCNC: 25 MG/DL (ref 8–23)
CALCIUM SERPL-MCNC: 9.5 MG/DL (ref 8.7–10.5)
CHLORIDE SERPL-SCNC: 104 MMOL/L (ref 95–110)
CO2 SERPL-SCNC: 27 MMOL/L (ref 23–29)
CREAT SERPL-MCNC: 0.8 MG/DL (ref 0.5–1.4)
DIFFERENTIAL METHOD: ABNORMAL
EOSINOPHIL # BLD AUTO: 0.2 K/UL (ref 0–0.5)
EOSINOPHIL NFR BLD: 3.1 % (ref 0–8)
ERYTHROCYTE [DISTWIDTH] IN BLOOD BY AUTOMATED COUNT: 13.5 % (ref 11.5–14.5)
EST. GFR  (NO RACE VARIABLE): >60 ML/MIN/1.73 M^2
GLUCOSE SERPL-MCNC: 92 MG/DL (ref 70–110)
HCT VFR BLD AUTO: 42.4 % (ref 40–54)
HGB BLD-MCNC: 13.7 G/DL (ref 14–18)
IMM GRANULOCYTES # BLD AUTO: 0.02 K/UL (ref 0–0.04)
IMM GRANULOCYTES NFR BLD AUTO: 0.3 % (ref 0–0.5)
INR PPP: 1 (ref 0.8–1.2)
LYMPHOCYTES # BLD AUTO: 1.6 K/UL (ref 1–4.8)
LYMPHOCYTES NFR BLD: 25.6 % (ref 18–48)
MCH RBC QN AUTO: 29.9 PG (ref 27–31)
MCHC RBC AUTO-ENTMCNC: 32.3 G/DL (ref 32–36)
MCV RBC AUTO: 93 FL (ref 82–98)
MONOCYTES # BLD AUTO: 0.6 K/UL (ref 0.3–1)
MONOCYTES NFR BLD: 8.9 % (ref 4–15)
NEUTROPHILS # BLD AUTO: 3.8 K/UL (ref 1.8–7.7)
NEUTROPHILS NFR BLD: 61.9 % (ref 38–73)
NRBC BLD-RTO: 0 /100 WBC
PLATELET # BLD AUTO: 206 K/UL (ref 150–450)
PMV BLD AUTO: 11.7 FL (ref 9.2–12.9)
POTASSIUM SERPL-SCNC: 4.5 MMOL/L (ref 3.5–5.1)
PROT SERPL-MCNC: 7.6 G/DL (ref 6–8.4)
PROTHROMBIN TIME: 10.2 SEC (ref 9–12.5)
RBC # BLD AUTO: 4.58 M/UL (ref 4.6–6.2)
SODIUM SERPL-SCNC: 140 MMOL/L (ref 136–145)
WBC # BLD AUTO: 6.2 K/UL (ref 3.9–12.7)

## 2023-04-11 PROCEDURE — 71045 X-RAY EXAM CHEST 1 VIEW: CPT | Mod: 26,,, | Performed by: INTERNAL MEDICINE

## 2023-04-11 PROCEDURE — 80053 COMPREHEN METABOLIC PANEL: CPT | Performed by: PHYSICIAN ASSISTANT

## 2023-04-11 PROCEDURE — 85730 THROMBOPLASTIN TIME PARTIAL: CPT | Performed by: PHYSICIAN ASSISTANT

## 2023-04-11 PROCEDURE — 85025 COMPLETE CBC W/AUTO DIFF WBC: CPT | Performed by: PHYSICIAN ASSISTANT

## 2023-04-11 PROCEDURE — 93005 ELECTROCARDIOGRAM TRACING: CPT

## 2023-04-11 PROCEDURE — 93010 EKG 12-LEAD: ICD-10-PCS | Mod: ,,, | Performed by: INTERNAL MEDICINE

## 2023-04-11 PROCEDURE — 85610 PROTHROMBIN TIME: CPT | Performed by: PHYSICIAN ASSISTANT

## 2023-04-11 PROCEDURE — 93010 ELECTROCARDIOGRAM REPORT: CPT | Mod: ,,, | Performed by: INTERNAL MEDICINE

## 2023-04-11 PROCEDURE — 71045 X-RAY EXAM CHEST 1 VIEW: CPT | Mod: TC,FY

## 2023-04-11 PROCEDURE — 86900 BLOOD TYPING SEROLOGIC ABO: CPT | Performed by: NEUROLOGICAL SURGERY

## 2023-04-11 PROCEDURE — 71045 XR CHEST 1 VIEW PRE-OP: ICD-10-PCS | Mod: 26,,, | Performed by: INTERNAL MEDICINE

## 2023-04-11 NOTE — ANESTHESIA PREPROCEDURE EVALUATION
04/11/2023  Rajan Cooper III is a 67 y.o., male scheduled for LAMINECTOMY, SPINE, CERVICAL, WITH POSTERIOR FUSION (Bilateral: Spine Cervical) - on 4/14/2023.    Past Medical History:   Diagnosis Date    Hypertension     MS (multiple sclerosis)     Staph aureus infection 2017         History reviewed. No pertinent surgical history.      Pre-operative evaluation for Procedure(s) (LRB):  LAMINECTOMY, SPINE, CERVICAL, WITH POSTERIOR FUSION (Bilateral)    Rajan Cooper III is a 67 y.o. male     Patient Active Problem List   Diagnosis    MS (multiple sclerosis)    HTN (hypertension)    Gait disorder    Muscle cramps    Ischemia of toe    PVD (peripheral vascular disease)    Leg pain    Cellulitis    Dry skin    Left leg cellulitis    Infrapatellar bursitis of left knee       Review of patient's allergies indicates:   Allergen Reactions    Norco [hydrocodone-acetaminophen] Anaphylaxis     Pt states getting shakey, and having halucinations       No current facility-administered medications on file prior to encounter.     Current Outpatient Medications on File Prior to Encounter   Medication Sig Dispense Refill    amLODIPine (NORVASC) 10 MG tablet Take 1 tablet (10 mg total) by mouth once daily. 90 tablet 3    baclofen (LIORESAL) 10 MG tablet Take 2 tabs po AM and 2 tabs po  tablet 3    furosemide (LASIX) 40 MG tablet Take 1 tablet (40 mg total) by mouth 2 (two) times daily. 180 tablet 2    losartan (COZAAR) 50 MG tablet Take 1 tablet (50 mg total) by mouth once daily. 90 tablet 3    modafiniL (PROVIGIL) 200 MG Tab Take 1 tablet (200 mg total) by mouth once daily. Take qam 400 tablet 3    tamsulosin (FLOMAX) 0.4 mg Cap Take 1 capsule (0.4 mg total) by mouth every evening. 90 capsule 3    AVONEX 30 mcg/0.5 mL injection Inject 0.5 mLs (30 mcg total) into the muscle every 7 days. 4 each 12     cholecalciferol, vitamin D3, 1,250 mcg (50,000 unit) Tab Take 1 tablet by mouth once a week. 12 tablet 3    ergocalciferol (ERGOCALCIFEROL) 50,000 unit Cap Take 1 capsule (50,000 Units total) by mouth every 7 days. 12 capsule 3    mupirocin (BACTROBAN) 2 % ointment Apply to affected area 3 times daily 22 g 1    sildenafiL (VIAGRA) 100 MG tablet Take 1 tablet (100 mg total) by mouth daily as needed for Erectile Dysfunction (take 1 hour prior to intercourse on an empty stomach). 10 tablet 2    triamcinolone acetonide 0.1% (KENALOG) 0.1 % cream APPLY TO AFFECTED AREA TWICE A DAY 80 g 1       History reviewed. No pertinent surgical history.    Social History     Socioeconomic History    Marital status:    Tobacco Use    Smoking status: Never    Smokeless tobacco: Never   Substance and Sexual Activity    Alcohol use: No    Drug use: No         CBC:   Recent Labs     23  1400   WBC 6.20   RBC 4.58*   HGB 13.7*   HCT 42.4      MCV 93   MCH 29.9   MCHC 32.3       CMP:   Recent Labs     23  1400      K 4.5      CO2 27   BUN 25*   CREATININE 0.8   GLU 92   CALCIUM 9.5   ALBUMIN 3.8   PROT 7.6   ALKPHOS 81   ALT 23   AST 20   BILITOT 0.7       INR  Recent Labs     23  1400   INR 1.0   APTT 27.6           Diagnostic Studies:      EKD Echo:  Results for orders placed or performed during the hospital encounter of 17   2D echo with color flow doppler   Result Value Ref Range    EF + QEF 63 55 - 65    Diastolic Dysfunction No     Pericardial Effusion NONE     Mitral Valve Mobility NORMAL     Tricuspid Valve Regurgitation TRIVIAL          Pre-op Assessment    I have reviewed the Patient Summary Reports.     I have reviewed the Nursing Notes. I have reviewed the NPO Status.   I have reviewed the Medications.     Review of Systems  Anesthesia Hx:  No previous Anesthesia  History of prior surgery of interest to airway management or planning: Denies Family Hx of  Anesthesia complications.   Denies Personal Hx of Anesthesia complications.   Social:  Non-Smoker, No Alcohol Use    Hematology/Oncology:  Hematology Normal   Oncology Normal     EENT/Dental:EENT/Dental Normal   Cardiovascular:   Hypertension    Pulmonary:  Pulmonary Normal    Renal/:  Renal/ Normal     Hepatic/GI:  Hepatic/GI Normal    Musculoskeletal:  Spine Disorders:    Neurological:   MS   Endocrine:  Endocrine Normal    Dermatological:  Skin Normal    Psych:  Psychiatric Normal           Physical Exam  General: Well nourished, Alert, Oriented and Cooperative    Airway:  Mallampati: II   Mouth Opening: Normal  TM Distance: Normal  Tongue: Normal  Neck ROM: Normal ROM    Dental:  Intact    Chest/Lungs:  Normal Respiratory Rate    Heart:  Rate: Normal  Rhythm: Regular Rhythm        Anesthesia Plan  Type of Anesthesia, risks & benefits discussed:    Anesthesia Type: Gen ETT  Intra-op Monitoring Plan: Standard ASA Monitors and Art Line  Induction:  IV  Airway Plan: Video  Informed Consent: Informed consent signed with the Patient and all parties understand the risks and agree with anesthesia plan.  All questions answered.   ASA Score: 3  Anesthesia Plan Notes: A line + 2 PIV    Ready For Surgery From Anesthesia Perspective.     .

## 2023-04-11 NOTE — DISCHARGE INSTRUCTIONS
Before 7 AM, enter through the Emergency Entrance..   After 7 AM enter through the Main Entrance.      Your procedure  is scheduled for __4/14/2023________.    Call [phone] between 2pm and 5pm on __4/13/2023_____to find out your arrival time for the day of surgery.    You may have one visitor.  No children allowed.     You will be going to the Same Day Surgery Unit on the 2nd floor of the hospital.    Important instructions:  Do not eat anything after midnight.  You may have plain water, non carbonated.  You may also have Gatorade or Powerade after midnight.    Stop all fluids 2 hours before your surgery.    It is okay to brush your teeth.  Do not have gum, candy or mints.    SEE MEDICATION SHEET.   TAKE MEDICATIONS AS DIRECTED WITH SIPS OF WATER.    STOP taking Aspirin, Ibuprofen,  Advil, Motrin, Mobic(meloxicam), Aleve (naproxen), Fish oil, and Vitamin E for at least 7 days before your surgery.     You may take Tylenol if needed which is not a blood thinner.    Please shower the night before and the morning of your surgery.      Use Chlorhexidine soap as instructed by your pre op nurse.   Please place clean linens on your bed the night before surgery. Please wear fresh clean clothing after each shower.    No shaving of procedural area at least 4-5 days before surgery due to increased risk of skin irritation and/or possible infection.    Contact lenses and removable denture work may not be worn during your procedure.    You may wear deodorant only. If you are having breast surgery, do not wear deodorant on the operative side.    Do not wear powder, body lotion, perfume/cologne or make-up.    Do not wear any jewelry or have any metal on your body.    You will be asked to remove any dentures or partials for the procedure.    If you are going home on the same day of surgery, you must arrange for a family member or a friend to drive you home.  Public transportation is prohibited.  You will not be able to  drive home if you were given anesthesia or sedation.    Patients who want to have their Post-op prescriptions filled from our in-house [facility], bring a Credit/Debit Card or cash with you. A co-pay may be required.  The pharmacy closes at 5:30 pm.    Wear loose fitting clothes allowing for bandages.    Please leave money and valuables home.      You may bring your cell phone.    Call the doctor if fever or illness should occur before your surgery.    Call [phone] to contact us here if needed.                            CLOTHES ON DAY OF SURGERY    SHOULDER surgery:  you must have a very oversized shirt.  Very, Very large.  You will probably have a large sling on with your arm strapped to your chest.  You will not be able to put the arm of the operated shoulder into a sleeve.  You can put the arm of the un-operated shoulder into the sleeve, but the shirt will need to be draped over the operated shoulder.       ARM or HAND surgery:  make sure that your sleeves are large and loose enough to pass over large dressings or cast.      BREAST or UNDERARM surgery:  wear a loose, button down shirt so that you can dress without raising your arms over your head.    ABDOMINAL surgery:  wear loose, comfortable clothing.  Nothing tight around the abdomen.  NO JEANS    PENIS or SCROTAL surgery:  loose comfortable clothing.  Large sweat pants, pajama pants or a robe.  ABSOLUTELY NO JEANS      LEG or FOOT surgery:  wear large loose pants that are able to pass over any large dressings or casts.  You could also wear loose shorts or a skirt.

## 2023-04-12 ENCOUNTER — TELEPHONE (OUTPATIENT)
Dept: NEUROSURGERY | Facility: CLINIC | Age: 68
End: 2023-04-12
Payer: COMMERCIAL

## 2023-04-12 NOTE — TELEPHONE ENCOUNTER
"Informed pt, per our Prior Auth department       "The auth for this patient is still pending with their insurance."      Pt requested to know why is it still pending for his insurance Friday. I stated that I am unsure and recommended that they call our prior auth department to see what is going on. Provided the number. Pt voiced understanding   "

## 2023-04-13 ENCOUNTER — OFFICE VISIT (OUTPATIENT)
Dept: INTERNAL MEDICINE | Facility: CLINIC | Age: 68
End: 2023-04-13
Payer: COMMERCIAL

## 2023-04-13 ENCOUNTER — TELEPHONE (OUTPATIENT)
Dept: NEUROSURGERY | Facility: CLINIC | Age: 68
End: 2023-04-13
Payer: COMMERCIAL

## 2023-04-13 VITALS
SYSTOLIC BLOOD PRESSURE: 138 MMHG | BODY MASS INDEX: 41.8 KG/M2 | HEIGHT: 72 IN | WEIGHT: 308.63 LBS | OXYGEN SATURATION: 95 % | DIASTOLIC BLOOD PRESSURE: 80 MMHG | HEART RATE: 72 BPM

## 2023-04-13 DIAGNOSIS — Z01.818 PRE-OP EVALUATION: Primary | ICD-10-CM

## 2023-04-13 PROCEDURE — 4010F PR ACE/ARB THEARPY RXD/TAKEN: ICD-10-PCS | Mod: CPTII,S$GLB,, | Performed by: STUDENT IN AN ORGANIZED HEALTH CARE EDUCATION/TRAINING PROGRAM

## 2023-04-13 PROCEDURE — 3044F HG A1C LEVEL LT 7.0%: CPT | Mod: CPTII,S$GLB,, | Performed by: STUDENT IN AN ORGANIZED HEALTH CARE EDUCATION/TRAINING PROGRAM

## 2023-04-13 PROCEDURE — 99999 PR PBB SHADOW E&M-EST. PATIENT-LVL III: ICD-10-PCS | Mod: PBBFAC,,, | Performed by: STUDENT IN AN ORGANIZED HEALTH CARE EDUCATION/TRAINING PROGRAM

## 2023-04-13 PROCEDURE — 99999 PR PBB SHADOW E&M-EST. PATIENT-LVL III: CPT | Mod: PBBFAC,,, | Performed by: STUDENT IN AN ORGANIZED HEALTH CARE EDUCATION/TRAINING PROGRAM

## 2023-04-13 PROCEDURE — 1100F PR PT FALLS ASSESS DOC 2+ FALLS/FALL W/INJURY/YR: ICD-10-PCS | Mod: CPTII,S$GLB,, | Performed by: STUDENT IN AN ORGANIZED HEALTH CARE EDUCATION/TRAINING PROGRAM

## 2023-04-13 PROCEDURE — 3075F SYST BP GE 130 - 139MM HG: CPT | Mod: CPTII,S$GLB,, | Performed by: STUDENT IN AN ORGANIZED HEALTH CARE EDUCATION/TRAINING PROGRAM

## 2023-04-13 PROCEDURE — 4010F ACE/ARB THERAPY RXD/TAKEN: CPT | Mod: CPTII,S$GLB,, | Performed by: STUDENT IN AN ORGANIZED HEALTH CARE EDUCATION/TRAINING PROGRAM

## 2023-04-13 PROCEDURE — 3288F FALL RISK ASSESSMENT DOCD: CPT | Mod: CPTII,S$GLB,, | Performed by: STUDENT IN AN ORGANIZED HEALTH CARE EDUCATION/TRAINING PROGRAM

## 2023-04-13 PROCEDURE — 3044F PR MOST RECENT HEMOGLOBIN A1C LEVEL <7.0%: ICD-10-PCS | Mod: CPTII,S$GLB,, | Performed by: STUDENT IN AN ORGANIZED HEALTH CARE EDUCATION/TRAINING PROGRAM

## 2023-04-13 PROCEDURE — 3079F DIAST BP 80-89 MM HG: CPT | Mod: CPTII,S$GLB,, | Performed by: STUDENT IN AN ORGANIZED HEALTH CARE EDUCATION/TRAINING PROGRAM

## 2023-04-13 PROCEDURE — 3075F PR MOST RECENT SYSTOLIC BLOOD PRESS GE 130-139MM HG: ICD-10-PCS | Mod: CPTII,S$GLB,, | Performed by: STUDENT IN AN ORGANIZED HEALTH CARE EDUCATION/TRAINING PROGRAM

## 2023-04-13 PROCEDURE — 1100F PTFALLS ASSESS-DOCD GE2>/YR: CPT | Mod: CPTII,S$GLB,, | Performed by: STUDENT IN AN ORGANIZED HEALTH CARE EDUCATION/TRAINING PROGRAM

## 2023-04-13 PROCEDURE — 99213 PR OFFICE/OUTPT VISIT, EST, LEVL III, 20-29 MIN: ICD-10-PCS | Mod: S$GLB,,, | Performed by: STUDENT IN AN ORGANIZED HEALTH CARE EDUCATION/TRAINING PROGRAM

## 2023-04-13 PROCEDURE — 1126F AMNT PAIN NOTED NONE PRSNT: CPT | Mod: CPTII,S$GLB,, | Performed by: STUDENT IN AN ORGANIZED HEALTH CARE EDUCATION/TRAINING PROGRAM

## 2023-04-13 PROCEDURE — 99213 OFFICE O/P EST LOW 20 MIN: CPT | Mod: S$GLB,,, | Performed by: STUDENT IN AN ORGANIZED HEALTH CARE EDUCATION/TRAINING PROGRAM

## 2023-04-13 PROCEDURE — 3079F PR MOST RECENT DIASTOLIC BLOOD PRESSURE 80-89 MM HG: ICD-10-PCS | Mod: CPTII,S$GLB,, | Performed by: STUDENT IN AN ORGANIZED HEALTH CARE EDUCATION/TRAINING PROGRAM

## 2023-04-13 PROCEDURE — 1126F PR PAIN SEVERITY QUANTIFIED, NO PAIN PRESENT: ICD-10-PCS | Mod: CPTII,S$GLB,, | Performed by: STUDENT IN AN ORGANIZED HEALTH CARE EDUCATION/TRAINING PROGRAM

## 2023-04-13 PROCEDURE — 3008F PR BODY MASS INDEX (BMI) DOCUMENTED: ICD-10-PCS | Mod: CPTII,S$GLB,, | Performed by: STUDENT IN AN ORGANIZED HEALTH CARE EDUCATION/TRAINING PROGRAM

## 2023-04-13 PROCEDURE — 3288F PR FALLS RISK ASSESSMENT DOCUMENTED: ICD-10-PCS | Mod: CPTII,S$GLB,, | Performed by: STUDENT IN AN ORGANIZED HEALTH CARE EDUCATION/TRAINING PROGRAM

## 2023-04-13 PROCEDURE — 3008F BODY MASS INDEX DOCD: CPT | Mod: CPTII,S$GLB,, | Performed by: STUDENT IN AN ORGANIZED HEALTH CARE EDUCATION/TRAINING PROGRAM

## 2023-04-13 NOTE — PROGRESS NOTES
Internal Medicine Clinic Note  2023    Subjective   Patient Name: Rajan Cooper III  : 1955    Chief Complaint:   Chief Complaint   Patient presents with    Pre-op Exam       History of Present Illness:  Mr. Rajan Cooper III is a 67 y.o. male with MS, HTN, venous insufficiency with chronic lower extremity edema, and new pre-DM who presents for pre-op. Scheduled for laminectomy with PCF C3-6 tomorrow at [facility]. Denies any cardiac or pulmonary disease. Has never seen a cardiologist or had cardiac catheterization. Had a negative stress test in . TTE in 2017 with normal systolic and diastolic function, trace TR and MAC with normal mitral valve function. Recently had pre op testing 23 including EKG with SR, no Q waves or other ischemic changes noted, CMP, CBC, and coags unremarkable. Takes losartan twice daily, and Lasix twice daily for the chronic lower extremity edema. 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     Review of Systems   Constitutional:  Negative for chills and fever.   HENT:  Negative for sore throat.    Respiratory:  Negative for shortness of breath.    Cardiovascular:  Negative for chest pain, palpitations and leg swelling.   Gastrointestinal:  Negative for abdominal pain, constipation, diarrhea, nausea and vomiting.   Genitourinary:  Negative for dysuria.   Musculoskeletal:  Negative for falls.   Neurological:  Positive for sensory change (sensory change in hands). Negative for dizziness and loss of consciousness.   Psychiatric/Behavioral:  Negative for substance abuse.      PAST HISTORY:     Past Medical History:   Diagnosis Date    Hypertension     MS (multiple sclerosis)     Staph aureus infection 2017       No past surgical history on file.    Family History   Problem Relation Age of Onset     Diabetes Mother     Cancer Father     Pancreatic cancer Father     No Known Problems Sister     No Known Problems Brother     No Known Problems Maternal Aunt     No Known Problems Maternal Uncle     No Known Problems Paternal Aunt     No Known Problems Paternal Uncle     No Known Problems Maternal Grandmother     No Known Problems Maternal Grandfather     No Known Problems Paternal Grandmother     No Known Problems Paternal Grandfather     Hypertension Neg Hx     Amblyopia Neg Hx     Blindness Neg Hx     Cataracts Neg Hx     Glaucoma Neg Hx     Macular degeneration Neg Hx     Retinal detachment Neg Hx     Strabismus Neg Hx     Stroke Neg Hx     Thyroid disease Neg Hx        Social History     Socioeconomic History    Marital status:    Tobacco Use    Smoking status: Never    Smokeless tobacco: Never   Substance and Sexual Activity    Alcohol use: No    Drug use: No       MEDICATIONS & ALLERGIES:       Current Outpatient Medications:     amLODIPine (NORVASC) 10 MG tablet, Take 1 tablet (10 mg total) by mouth once daily., Disp: 90 tablet, Rfl: 3    AVONEX 30 mcg/0.5 mL injection, Inject 0.5 mLs (30 mcg total) into the muscle every 7 days., Disp: 4 each, Rfl: 12    baclofen (LIORESAL) 10 MG tablet, Take 2 tabs po AM and 2 tabs po HS, Disp: 360 tablet, Rfl: 3    cholecalciferol, vitamin D3, 1,250 mcg (50,000 unit) Tab, Take 1 tablet by mouth once a week., Disp: 12 tablet, Rfl: 3    ergocalciferol (ERGOCALCIFEROL) 50,000 unit Cap, Take 1 capsule (50,000 Units total) by mouth every 7 days., Disp: 12 capsule, Rfl: 3    furosemide (LASIX) 40 MG tablet, Take 1 tablet (40 mg total) by mouth 2 (two) times daily., Disp: 180 tablet, Rfl: 2    losartan (COZAAR) 50 MG tablet, Take 1 tablet (50 mg total) by mouth once daily., Disp: 90 tablet, Rfl: 3    modafiniL (PROVIGIL) 200 MG Tab, Take 1 tablet (200 mg total) by mouth once daily. Take qam, Disp: 400 tablet, Rfl: 3    mupirocin (BACTROBAN) 2 % ointment, Apply to affected  area 3 times daily, Disp: 22 g, Rfl: 1    sildenafiL (VIAGRA) 100 MG tablet, Take 1 tablet (100 mg total) by mouth daily as needed for Erectile Dysfunction (take 1 hour prior to intercourse on an empty stomach)., Disp: 10 tablet, Rfl: 2    tamsulosin (FLOMAX) 0.4 mg Cap, Take 1 capsule (0.4 mg total) by mouth every evening., Disp: 90 capsule, Rfl: 3    triamcinolone acetonide 0.1% (KENALOG) 0.1 % cream, APPLY TO AFFECTED AREA TWICE A DAY, Disp: 80 g, Rfl: 1    Review of patient's allergies indicates:   Allergen Reactions    Norco [hydrocodone-acetaminophen] Anaphylaxis       OBJECTIVE:     Vital Signs:  Vitals:    04/13/23 0817   BP: 138/80   BP Location: Left arm   Patient Position: Sitting   BP Method: Large (Manual)   Pulse: 72   SpO2: 95%   Weight: (!) 140 kg (308 lb 10.3 oz)   Height: 6' (1.829 m)       Body mass index is 41.86 kg/m².     Physical Exam  Constitutional:       General: He is not in acute distress.     Appearance: He is obese.   HENT:      Head: Normocephalic and atraumatic.      Mouth/Throat:      Mouth: Mucous membranes are moist.      Pharynx: Oropharynx is clear.   Eyes:      Extraocular Movements: Extraocular movements intact.      Pupils: Pupils are equal, round, and reactive to light.   Cardiovascular:      Rate and Rhythm: Normal rate and regular rhythm.      Pulses: Normal pulses.      Heart sounds: Normal heart sounds.   Pulmonary:      Effort: Pulmonary effort is normal. No respiratory distress.      Breath sounds: Normal breath sounds.   Abdominal:      General: Abdomen is flat. Bowel sounds are normal.      Palpations: Abdomen is soft.   Musculoskeletal:      Cervical back: Neck supple.      Right lower leg: Edema present.      Left lower leg: Edema present.   Skin:     General: Skin is warm and dry.      Capillary Refill: Capillary refill takes less than 2 seconds.   Neurological:      General: No focal deficit present.      Mental Status: He is alert and oriented to person, place,  and time. Mental status is at baseline.   Psychiatric:         Mood and Affect: Mood normal.         Behavior: Behavior normal.         Thought Content: Thought content normal.         Judgment: Judgment normal.       Laboratory  Lab Results   Component Value Date    WBC 6.20 04/11/2023    HGB 13.7 (L) 04/11/2023    HCT 42.4 04/11/2023    MCV 93 04/11/2023     04/11/2023     BMP  Lab Results   Component Value Date     04/11/2023    K 4.5 04/11/2023     04/11/2023    CO2 27 04/11/2023    BUN 25 (H) 04/11/2023    CREATININE 0.8 04/11/2023    CALCIUM 9.5 04/11/2023    ANIONGAP 9 04/11/2023    EGFRNORACEVR >60 04/11/2023     Lab Results   Component Value Date    INR 1.0 04/11/2023    INR 1.1 03/04/2017     Lab Results   Component Value Date    HGBA1C 6.0 (H) 03/02/2023     No results for input(s): POCTGLUCOSE in the last 72 hours.    Health Maintenance Due   Topic Date Due    Pneumococcal Vaccines (Age 65+) (1 - PCV) Never done    Shingles Vaccine (1 of 2) Never done    Colorectal Cancer Screening  Never done    COVID-19 Vaccine (5 - Booster for Pfizer series) 07/29/2022    Influenza Vaccine (1) 09/01/2022       ASSESSMENT & PLAN:   Rajan was seen today in clinic for pre-op    Pre-op evaluation  Preoperative Cardiovascular Risk Assessment:  Non-emergent surgery  No active cardiac problems (such as unstable angina, decompensated heart failure, significant uncontrolled arrhythmias or severe valvular disease).  Intermediate risk surgery   Functional Status: able to achieve > or = 4 METS   Revised cardiac risk index (RCRI) score is 0.   Reviewed recent testing 4/11/23 including EKG with SR, no Q waves or other ischemic changes noted; and CMP, CBC, and coags unremarkable.       Preoperative evaluation:  - Ok to proceed to surgery  - RCRI score of 0 thus 3.9% rate of major cardiac event.  Low risk for surgery. No absolute contraindications to the proposed surgery at this time   - hold losartan and  furosemide morning of surgery  - Discussed new diagnosis of pre-DM with patient, advised on healthy diet, physical activity, and weight loss to prevent progression to DM, especially considering significant family history of DM        RTC as needed.    Discussed with Dr. Malhotra  - staff attestation to follow      Marisel Wood DO  Internal Medicine, PGY-3  [facility]  [address] [place] [address] [phone]

## 2023-04-13 NOTE — PROGRESS NOTES
I have reviewed the notes, assessments, and/or procedures performed by the resident, I concur with her/his documentation of Rajan Cooper III.

## 2023-04-13 NOTE — PATIENT INSTRUCTIONS
-- Low risk for surgery  -- Hold losartan and Lasix the morning of. Can be resumed after  -- Good luck

## 2023-04-13 NOTE — PROGRESS NOTES
I have reviewed the cervical CT and recommend a C3-6 Posterior Cervical Fusion for progressive myelopathy. Surgical plan reviewed with patient including risk and benefits including but not limited to bleeding, death, and paralysis. Patient wishes to proceed. Case scheduled for 4/14/23 at [facility].         Dawit Henao MD, FAANS

## 2023-04-14 ENCOUNTER — ANESTHESIA (OUTPATIENT)
Dept: SURGERY | Facility: HOSPITAL | Age: 68
End: 2023-04-14
Payer: COMMERCIAL

## 2023-04-14 ENCOUNTER — HOSPITAL ENCOUNTER (OUTPATIENT)
Facility: HOSPITAL | Age: 68
LOS: 1 days | Discharge: HOME | End: 2023-04-16
Attending: NEUROLOGICAL SURGERY | Admitting: NEUROLOGICAL SURGERY
Payer: COMMERCIAL

## 2023-04-14 ENCOUNTER — TELEPHONE (OUTPATIENT)
Dept: NEUROSURGERY | Facility: CLINIC | Age: 68
End: 2023-04-14
Payer: COMMERCIAL

## 2023-04-14 DIAGNOSIS — Z98.1 S/P CERVICAL SPINAL FUSION: Primary | ICD-10-CM

## 2023-04-14 DIAGNOSIS — R06.02 SHORTNESS OF BREATH: ICD-10-CM

## 2023-04-14 DIAGNOSIS — G95.9 CERVICAL MYELOPATHY: ICD-10-CM

## 2023-04-14 DIAGNOSIS — N52.9 ERECTILE DYSFUNCTION, UNSPECIFIED ERECTILE DYSFUNCTION TYPE: ICD-10-CM

## 2023-04-14 LAB — POCT GLUCOSE: 115 MG/DL (ref 70–110)

## 2023-04-14 PROCEDURE — 63045 PR LAMINEC/FACETECT/FORAMIN,CERVICAL 1 SEG: ICD-10-PCS | Mod: AS,,, | Performed by: PHYSICIAN ASSISTANT

## 2023-04-14 PROCEDURE — 63600175 PHARM REV CODE 636 W HCPCS: Performed by: ANESTHESIOLOGY

## 2023-04-14 PROCEDURE — 22600 PR ARTHRODESIS, POST/POSTLAT, SNGL INTERSPACE, CERVICAL BELOW C2: ICD-10-PCS | Mod: 51,,, | Performed by: NEUROLOGICAL SURGERY

## 2023-04-14 PROCEDURE — 22842 PR POSTERIOR SEGMENTAL INSTRUMENTATION 3-6 VRT SEG: ICD-10-PCS | Mod: AS,,, | Performed by: PHYSICIAN ASSISTANT

## 2023-04-14 PROCEDURE — 25000003 PHARM REV CODE 250: Performed by: PHYSICIAN ASSISTANT

## 2023-04-14 PROCEDURE — 63600175 PHARM REV CODE 636 W HCPCS: Performed by: PHYSICIAN ASSISTANT

## 2023-04-14 PROCEDURE — 36620 ARTERIAL: ICD-10-PCS | Mod: 59,,, | Performed by: ANESTHESIOLOGY

## 2023-04-14 PROCEDURE — 22614 PR ARTHRODESIS, POST/POSTLAT, SNGL INTERSPACE, EA ADDTL: ICD-10-PCS | Mod: AS,,, | Performed by: PHYSICIAN ASSISTANT

## 2023-04-14 PROCEDURE — 22842 INSERT SPINE FIXATION DEVICE: CPT | Mod: AS,,, | Performed by: PHYSICIAN ASSISTANT

## 2023-04-14 PROCEDURE — 63048 LAM FACETEC &FORAMOT EA ADDL: CPT | Mod: ,,, | Performed by: NEUROLOGICAL SURGERY

## 2023-04-14 PROCEDURE — 22600 ARTHRD PST TQ 1NTRSPC CRV: CPT | Mod: AS,51,, | Performed by: PHYSICIAN ASSISTANT

## 2023-04-14 PROCEDURE — C1889 IMPLANT/INSERT DEVICE, NOC: HCPCS | Performed by: NEUROLOGICAL SURGERY

## 2023-04-14 PROCEDURE — D9220A PRA ANESTHESIA: ICD-10-PCS | Mod: CRNA,,, | Performed by: NURSE ANESTHETIST, CERTIFIED REGISTERED

## 2023-04-14 PROCEDURE — 22842 PR POSTERIOR SEGMENTAL INSTRUMENTATION 3-6 VRT SEG: ICD-10-PCS | Mod: ,,, | Performed by: NEUROLOGICAL SURGERY

## 2023-04-14 PROCEDURE — C1713 ANCHOR/SCREW BN/BN,TIS/BN: HCPCS | Performed by: NEUROLOGICAL SURGERY

## 2023-04-14 PROCEDURE — 36000710: Performed by: NEUROLOGICAL SURGERY

## 2023-04-14 PROCEDURE — 37000009 HC ANESTHESIA EA ADD 15 MINS: Performed by: NEUROLOGICAL SURGERY

## 2023-04-14 PROCEDURE — 22600 ARTHRD PST TQ 1NTRSPC CRV: CPT | Mod: 51,,, | Performed by: NEUROLOGICAL SURGERY

## 2023-04-14 PROCEDURE — D9220A PRA ANESTHESIA: Mod: ANES,,, | Performed by: ANESTHESIOLOGY

## 2023-04-14 PROCEDURE — 27201423 OPTIME MED/SURG SUP & DEVICES STERILE SUPPLY: Performed by: NEUROLOGICAL SURGERY

## 2023-04-14 PROCEDURE — 37000008 HC ANESTHESIA 1ST 15 MINUTES: Performed by: NEUROLOGICAL SURGERY

## 2023-04-14 PROCEDURE — 63600175 PHARM REV CODE 636 W HCPCS: Performed by: NURSE ANESTHETIST, CERTIFIED REGISTERED

## 2023-04-14 PROCEDURE — 71000039 HC RECOVERY, EACH ADD'L HOUR: Performed by: NEUROLOGICAL SURGERY

## 2023-04-14 PROCEDURE — 25000003 PHARM REV CODE 250: Performed by: NURSE ANESTHETIST, CERTIFIED REGISTERED

## 2023-04-14 PROCEDURE — 22600 PR ARTHRODESIS, POST/POSTLAT, SNGL INTERSPACE, CERVICAL BELOW C2: ICD-10-PCS | Mod: AS,51,, | Performed by: PHYSICIAN ASSISTANT

## 2023-04-14 PROCEDURE — 36620 INSERTION CATHETER ARTERY: CPT | Performed by: ANESTHESIOLOGY

## 2023-04-14 PROCEDURE — 63600175 PHARM REV CODE 636 W HCPCS: Performed by: NEUROLOGICAL SURGERY

## 2023-04-14 PROCEDURE — D9220A PRA ANESTHESIA: ICD-10-PCS | Mod: ANES,,, | Performed by: ANESTHESIOLOGY

## 2023-04-14 PROCEDURE — 22614 PR ARTHRODESIS, POST/POSTLAT, SNGL INTERSPACE, EA ADDTL: ICD-10-PCS | Mod: ,,, | Performed by: NEUROLOGICAL SURGERY

## 2023-04-14 PROCEDURE — 63048 LAM FACETEC &FORAMOT EA ADDL: CPT | Mod: AS,,, | Performed by: PHYSICIAN ASSISTANT

## 2023-04-14 PROCEDURE — 63048 PR LAMINECT/FACETECT/FORAMINOT, EA ADDTL VERTEBRAL SEGM: ICD-10-PCS | Mod: ,,, | Performed by: NEUROLOGICAL SURGERY

## 2023-04-14 PROCEDURE — 20930 SP BONE ALGRFT MORSEL ADD-ON: CPT | Mod: ,,, | Performed by: NEUROLOGICAL SURGERY

## 2023-04-14 PROCEDURE — 22842 INSERT SPINE FIXATION DEVICE: CPT | Mod: ,,, | Performed by: NEUROLOGICAL SURGERY

## 2023-04-14 PROCEDURE — D9220A PRA ANESTHESIA: Mod: CRNA,,, | Performed by: NURSE ANESTHETIST, CERTIFIED REGISTERED

## 2023-04-14 PROCEDURE — 36000711: Performed by: NEUROLOGICAL SURGERY

## 2023-04-14 PROCEDURE — 25000003 PHARM REV CODE 250: Performed by: ANESTHESIOLOGY

## 2023-04-14 PROCEDURE — 22614 ARTHRD PST TQ 1NTRSPC EA ADD: CPT | Mod: AS,,, | Performed by: PHYSICIAN ASSISTANT

## 2023-04-14 PROCEDURE — 63045 LAM FACETEC & FORAMOT CRV: CPT | Mod: AS,,, | Performed by: PHYSICIAN ASSISTANT

## 2023-04-14 PROCEDURE — 63048 PR LAMINECT/FACETECT/FORAMINOT, EA ADDTL VERTEBRAL SEGM: ICD-10-PCS | Mod: AS,,, | Performed by: PHYSICIAN ASSISTANT

## 2023-04-14 PROCEDURE — 36415 COLL VENOUS BLD VENIPUNCTURE: CPT | Performed by: NEUROLOGICAL SURGERY

## 2023-04-14 PROCEDURE — 63045 LAM FACETEC & FORAMOT CRV: CPT | Mod: ,,, | Performed by: NEUROLOGICAL SURGERY

## 2023-04-14 PROCEDURE — 22614 ARTHRD PST TQ 1NTRSPC EA ADD: CPT | Mod: ,,, | Performed by: NEUROLOGICAL SURGERY

## 2023-04-14 PROCEDURE — 25000003 PHARM REV CODE 250: Performed by: NEUROLOGICAL SURGERY

## 2023-04-14 PROCEDURE — 20930 PR ALLOGRAFT FOR SPINE SURGERY ONLY MORSELIZED: ICD-10-PCS | Mod: ,,, | Performed by: NEUROLOGICAL SURGERY

## 2023-04-14 PROCEDURE — 63045 PR LAMINEC/FACETECT/FORAMIN,CERVICAL 1 SEG: ICD-10-PCS | Mod: ,,, | Performed by: NEUROLOGICAL SURGERY

## 2023-04-14 PROCEDURE — 71000033 HC RECOVERY, INTIAL HOUR: Performed by: NEUROLOGICAL SURGERY

## 2023-04-14 DEVICE — IMPLANTABLE DEVICE: Type: IMPLANTABLE DEVICE | Site: NECK | Status: FUNCTIONAL

## 2023-04-14 RX ORDER — POLYETHYLENE GLYCOL 3350 17 G/17G
17 POWDER, FOR SOLUTION ORAL DAILY
Status: DISCONTINUED | OUTPATIENT
Start: 2023-04-15 | End: 2023-04-16 | Stop reason: HOSPADM

## 2023-04-14 RX ORDER — ROCURONIUM BROMIDE 10 MG/ML
INJECTION, SOLUTION INTRAVENOUS
Status: DISCONTINUED | OUTPATIENT
Start: 2023-04-14 | End: 2023-04-14

## 2023-04-14 RX ORDER — ONDANSETRON 2 MG/ML
8 INJECTION INTRAMUSCULAR; INTRAVENOUS EVERY 6 HOURS PRN
Status: DISCONTINUED | OUTPATIENT
Start: 2023-04-14 | End: 2023-04-16 | Stop reason: HOSPADM

## 2023-04-14 RX ORDER — FUROSEMIDE 40 MG/1
40 TABLET ORAL 2 TIMES DAILY
Status: DISCONTINUED | OUTPATIENT
Start: 2023-04-14 | End: 2023-04-15

## 2023-04-14 RX ORDER — OXYCODONE AND ACETAMINOPHEN 10; 325 MG/1; MG/1
1 TABLET ORAL EVERY 4 HOURS PRN
Status: DISCONTINUED | OUTPATIENT
Start: 2023-04-14 | End: 2023-04-16 | Stop reason: HOSPADM

## 2023-04-14 RX ORDER — ACETAMINOPHEN 500 MG
1000 TABLET ORAL
Status: COMPLETED | OUTPATIENT
Start: 2023-04-14 | End: 2023-04-14

## 2023-04-14 RX ORDER — HYDRALAZINE HYDROCHLORIDE 20 MG/ML
10 INJECTION INTRAMUSCULAR; INTRAVENOUS EVERY 4 HOURS PRN
Status: DISCONTINUED | OUTPATIENT
Start: 2023-04-14 | End: 2023-04-16 | Stop reason: HOSPADM

## 2023-04-14 RX ORDER — BACLOFEN 10 MG/1
10 TABLET ORAL 2 TIMES DAILY
Status: DISCONTINUED | OUTPATIENT
Start: 2023-04-14 | End: 2023-04-16 | Stop reason: HOSPADM

## 2023-04-14 RX ORDER — PROPOFOL 10 MG/ML
VIAL (ML) INTRAVENOUS CONTINUOUS PRN
Status: DISCONTINUED | OUTPATIENT
Start: 2023-04-14 | End: 2023-04-14

## 2023-04-14 RX ORDER — MIDAZOLAM HYDROCHLORIDE 1 MG/ML
INJECTION, SOLUTION INTRAMUSCULAR; INTRAVENOUS
Status: DISCONTINUED | OUTPATIENT
Start: 2023-04-14 | End: 2023-04-14

## 2023-04-14 RX ORDER — VASOPRESSIN 20 [USP'U]/ML
INJECTION, SOLUTION INTRAMUSCULAR; SUBCUTANEOUS
Status: DISCONTINUED | OUTPATIENT
Start: 2023-04-14 | End: 2023-04-14

## 2023-04-14 RX ORDER — PROCHLORPERAZINE EDISYLATE 5 MG/ML
5 INJECTION INTRAMUSCULAR; INTRAVENOUS EVERY 6 HOURS PRN
Status: DISCONTINUED | OUTPATIENT
Start: 2023-04-14 | End: 2023-04-16 | Stop reason: HOSPADM

## 2023-04-14 RX ORDER — HYDROMORPHONE HYDROCHLORIDE 1 MG/ML
1 INJECTION, SOLUTION INTRAMUSCULAR; INTRAVENOUS; SUBCUTANEOUS
Status: DISCONTINUED | OUTPATIENT
Start: 2023-04-14 | End: 2023-04-16 | Stop reason: HOSPADM

## 2023-04-14 RX ORDER — SODIUM CHLORIDE, SODIUM LACTATE, POTASSIUM CHLORIDE, CALCIUM CHLORIDE 600; 310; 30; 20 MG/100ML; MG/100ML; MG/100ML; MG/100ML
INJECTION, SOLUTION INTRAVENOUS CONTINUOUS
Status: DISCONTINUED | OUTPATIENT
Start: 2023-04-14 | End: 2023-04-14

## 2023-04-14 RX ORDER — LIDOCAINE HYDROCHLORIDE 20 MG/ML
INJECTION INTRAVENOUS
Status: DISCONTINUED | OUTPATIENT
Start: 2023-04-14 | End: 2023-04-14

## 2023-04-14 RX ORDER — LIDOCAINE HCL/EPINEPHRINE/PF 2%-1:200K
VIAL (ML) INJECTION
Status: DISCONTINUED | OUTPATIENT
Start: 2023-04-14 | End: 2023-04-14 | Stop reason: HOSPADM

## 2023-04-14 RX ORDER — MUPIROCIN 20 MG/G
1 OINTMENT TOPICAL
Status: DISCONTINUED | OUTPATIENT
Start: 2023-04-14 | End: 2023-04-14

## 2023-04-14 RX ORDER — EPHEDRINE SULFATE 50 MG/ML
INJECTION, SOLUTION INTRAVENOUS
Status: DISCONTINUED | OUTPATIENT
Start: 2023-04-14 | End: 2023-04-14

## 2023-04-14 RX ORDER — MAG HYDROX/ALUMINUM HYD/SIMETH 200-200-20
30 SUSPENSION, ORAL (FINAL DOSE FORM) ORAL EVERY 4 HOURS PRN
Status: DISCONTINUED | OUTPATIENT
Start: 2023-04-14 | End: 2023-04-16 | Stop reason: HOSPADM

## 2023-04-14 RX ORDER — AMLODIPINE BESYLATE 5 MG/1
10 TABLET ORAL DAILY
Status: DISCONTINUED | OUTPATIENT
Start: 2023-04-15 | End: 2023-04-16 | Stop reason: HOSPADM

## 2023-04-14 RX ORDER — AMOXICILLIN 250 MG
2 CAPSULE ORAL NIGHTLY PRN
Status: DISCONTINUED | OUTPATIENT
Start: 2023-04-14 | End: 2023-04-16 | Stop reason: HOSPADM

## 2023-04-14 RX ORDER — LOSARTAN POTASSIUM 25 MG/1
50 TABLET ORAL DAILY
Status: DISCONTINUED | OUTPATIENT
Start: 2023-04-15 | End: 2023-04-16 | Stop reason: HOSPADM

## 2023-04-14 RX ORDER — OXYCODONE AND ACETAMINOPHEN 5; 325 MG/1; MG/1
1 TABLET ORAL EVERY 4 HOURS PRN
Status: DISCONTINUED | OUTPATIENT
Start: 2023-04-14 | End: 2023-04-16 | Stop reason: HOSPADM

## 2023-04-14 RX ORDER — BUPIVACAINE HYDROCHLORIDE 5 MG/ML
INJECTION, SOLUTION PERINEURAL
Status: DISCONTINUED | OUTPATIENT
Start: 2023-04-14 | End: 2023-04-14 | Stop reason: HOSPADM

## 2023-04-14 RX ORDER — PROPOFOL 10 MG/ML
VIAL (ML) INTRAVENOUS
Status: DISCONTINUED | OUTPATIENT
Start: 2023-04-14 | End: 2023-04-14

## 2023-04-14 RX ORDER — SODIUM CHLORIDE 9 MG/ML
INJECTION, SOLUTION INTRAVENOUS CONTINUOUS
Status: DISCONTINUED | OUTPATIENT
Start: 2023-04-14 | End: 2023-04-15

## 2023-04-14 RX ORDER — SODIUM CHLORIDE 9 MG/ML
INJECTION, SOLUTION INTRAVENOUS CONTINUOUS
Status: DISCONTINUED | OUTPATIENT
Start: 2023-04-14 | End: 2023-04-14

## 2023-04-14 RX ORDER — LABETALOL HYDROCHLORIDE 5 MG/ML
10 INJECTION, SOLUTION INTRAVENOUS EVERY 4 HOURS PRN
Status: DISCONTINUED | OUTPATIENT
Start: 2023-04-14 | End: 2023-04-16 | Stop reason: HOSPADM

## 2023-04-14 RX ORDER — FENTANYL CITRATE 50 UG/ML
25 INJECTION, SOLUTION INTRAMUSCULAR; INTRAVENOUS EVERY 5 MIN PRN
Status: DISCONTINUED | OUTPATIENT
Start: 2023-04-14 | End: 2023-04-14 | Stop reason: HOSPADM

## 2023-04-14 RX ORDER — SODIUM CHLORIDE 0.9 % (FLUSH) 0.9 %
10 SYRINGE (ML) INJECTION
Status: DISCONTINUED | OUTPATIENT
Start: 2023-04-14 | End: 2023-04-14 | Stop reason: HOSPADM

## 2023-04-14 RX ORDER — CEFAZOLIN SODIUM 2 G/50ML
2 SOLUTION INTRAVENOUS
Status: DISCONTINUED | OUTPATIENT
Start: 2023-04-14 | End: 2023-04-16 | Stop reason: HOSPADM

## 2023-04-14 RX ORDER — HEPARIN SODIUM 5000 [USP'U]/ML
5000 INJECTION, SOLUTION INTRAVENOUS; SUBCUTANEOUS EVERY 8 HOURS
Status: DISCONTINUED | OUTPATIENT
Start: 2023-04-15 | End: 2023-04-16 | Stop reason: HOSPADM

## 2023-04-14 RX ORDER — FENTANYL CITRATE 50 UG/ML
INJECTION, SOLUTION INTRAMUSCULAR; INTRAVENOUS
Status: DISCONTINUED | OUTPATIENT
Start: 2023-04-14 | End: 2023-04-14

## 2023-04-14 RX ORDER — LIDOCAINE HYDROCHLORIDE 10 MG/ML
1 INJECTION, SOLUTION EPIDURAL; INFILTRATION; INTRACAUDAL; PERINEURAL ONCE
Status: DISCONTINUED | OUTPATIENT
Start: 2023-04-14 | End: 2023-04-14 | Stop reason: HOSPADM

## 2023-04-14 RX ORDER — DOCUSATE SODIUM 100 MG/1
100 CAPSULE, LIQUID FILLED ORAL DAILY
Status: DISCONTINUED | OUTPATIENT
Start: 2023-04-15 | End: 2023-04-16 | Stop reason: HOSPADM

## 2023-04-14 RX ORDER — MUPIROCIN 20 MG/G
OINTMENT TOPICAL 2 TIMES DAILY
Status: DISCONTINUED | OUTPATIENT
Start: 2023-04-14 | End: 2023-04-16 | Stop reason: HOSPADM

## 2023-04-14 RX ORDER — MODAFINIL 100 MG/1
200 TABLET ORAL DAILY
Status: DISCONTINUED | OUTPATIENT
Start: 2023-04-15 | End: 2023-04-16 | Stop reason: HOSPADM

## 2023-04-14 RX ORDER — MUPIROCIN 20 MG/G
OINTMENT TOPICAL
Status: DISCONTINUED | OUTPATIENT
Start: 2023-04-14 | End: 2023-04-14

## 2023-04-14 RX ORDER — TAMSULOSIN HYDROCHLORIDE 0.4 MG/1
0.4 CAPSULE ORAL NIGHTLY
Status: DISCONTINUED | OUTPATIENT
Start: 2023-04-14 | End: 2023-04-16 | Stop reason: HOSPADM

## 2023-04-14 RX ORDER — DEXAMETHASONE SODIUM PHOSPHATE 4 MG/ML
INJECTION, SOLUTION INTRA-ARTICULAR; INTRALESIONAL; INTRAMUSCULAR; INTRAVENOUS; SOFT TISSUE
Status: DISCONTINUED | OUTPATIENT
Start: 2023-04-14 | End: 2023-04-14

## 2023-04-14 RX ORDER — BISACODYL 10 MG
10 SUPPOSITORY, RECTAL RECTAL DAILY PRN
Status: DISCONTINUED | OUTPATIENT
Start: 2023-04-14 | End: 2023-04-16 | Stop reason: HOSPADM

## 2023-04-14 RX ORDER — PHENYLEPHRINE HYDROCHLORIDE 10 MG/ML
INJECTION INTRAVENOUS
Status: DISCONTINUED | OUTPATIENT
Start: 2023-04-14 | End: 2023-04-14

## 2023-04-14 RX ORDER — ACETAMINOPHEN 325 MG/1
650 TABLET ORAL EVERY 6 HOURS PRN
Status: DISCONTINUED | OUTPATIENT
Start: 2023-04-14 | End: 2023-04-16 | Stop reason: HOSPADM

## 2023-04-14 RX ORDER — ONDANSETRON 2 MG/ML
INJECTION INTRAMUSCULAR; INTRAVENOUS
Status: DISCONTINUED | OUTPATIENT
Start: 2023-04-14 | End: 2023-04-14

## 2023-04-14 RX ORDER — VANCOMYCIN HYDROCHLORIDE 1 G/20ML
INJECTION, POWDER, LYOPHILIZED, FOR SOLUTION INTRAVENOUS
Status: DISCONTINUED | OUTPATIENT
Start: 2023-04-14 | End: 2023-04-14 | Stop reason: HOSPADM

## 2023-04-14 RX ADMIN — CEFAZOLIN SODIUM 2 G: 2 SOLUTION INTRAVENOUS at 05:04

## 2023-04-14 RX ADMIN — MUPIROCIN: 20 OINTMENT TOPICAL at 08:04

## 2023-04-14 RX ADMIN — FENTANYL CITRATE 25 MCG: 50 INJECTION, SOLUTION INTRAMUSCULAR; INTRAVENOUS at 02:04

## 2023-04-14 RX ADMIN — BACLOFEN 10 MG: 10 TABLET ORAL at 08:04

## 2023-04-14 RX ADMIN — PHENYLEPHRINE HYDROCHLORIDE 200 MCG: 10 INJECTION INTRAVENOUS at 11:04

## 2023-04-14 RX ADMIN — CEFAZOLIN SODIUM 2 G: 2 SOLUTION INTRAVENOUS at 10:04

## 2023-04-14 RX ADMIN — SUGAMMADEX 200 MG: 100 INJECTION, SOLUTION INTRAVENOUS at 11:04

## 2023-04-14 RX ADMIN — LIDOCAINE HYDROCHLORIDE 100 MG: 20 INJECTION, SOLUTION INTRAVENOUS at 11:04

## 2023-04-14 RX ADMIN — EPHEDRINE SULFATE 15 MG: 50 INJECTION INTRAVENOUS at 11:04

## 2023-04-14 RX ADMIN — MUPIROCIN: 20 OINTMENT TOPICAL at 09:04

## 2023-04-14 RX ADMIN — MIDAZOLAM HYDROCHLORIDE 2 MG: 1 INJECTION, SOLUTION INTRAMUSCULAR; INTRAVENOUS at 11:04

## 2023-04-14 RX ADMIN — FUROSEMIDE 40 MG: 40 TABLET ORAL at 05:04

## 2023-04-14 RX ADMIN — SODIUM CHLORIDE: 9 INJECTION, SOLUTION INTRAVENOUS at 04:04

## 2023-04-14 RX ADMIN — ROCURONIUM BROMIDE 50 MG: 10 INJECTION, SOLUTION INTRAVENOUS at 11:04

## 2023-04-14 RX ADMIN — SODIUM CHLORIDE, POTASSIUM CHLORIDE, SODIUM LACTATE AND CALCIUM CHLORIDE: 600; 310; 30; 20 INJECTION, SOLUTION INTRAVENOUS at 10:04

## 2023-04-14 RX ADMIN — EPHEDRINE SULFATE 5 MG: 50 INJECTION INTRAVENOUS at 11:04

## 2023-04-14 RX ADMIN — SODIUM CHLORIDE, SODIUM LACTATE, POTASSIUM CHLORIDE, AND CALCIUM CHLORIDE: .6; .31; .03; .02 INJECTION, SOLUTION INTRAVENOUS at 11:04

## 2023-04-14 RX ADMIN — ACETAMINOPHEN 1000 MG: 500 TABLET, FILM COATED ORAL at 09:04

## 2023-04-14 RX ADMIN — CEFAZOLIN SODIUM 3 ML: 2 SOLUTION INTRAVENOUS at 11:04

## 2023-04-14 RX ADMIN — PROPOFOL 110 MCG/KG/MIN: 10 INJECTION, EMULSION INTRAVENOUS at 11:04

## 2023-04-14 RX ADMIN — ONDANSETRON 4 MG: 2 INJECTION, SOLUTION INTRAMUSCULAR; INTRAVENOUS at 01:04

## 2023-04-14 RX ADMIN — VASOPRESSIN 2 UNITS: 20 INJECTION INTRAVENOUS at 11:04

## 2023-04-14 RX ADMIN — REMIFENTANIL HYDROCHLORIDE 0.05 MCG/KG/MIN: 1 INJECTION, POWDER, LYOPHILIZED, FOR SOLUTION INTRAVENOUS at 11:04

## 2023-04-14 RX ADMIN — FENTANYL CITRATE 50 MCG: 50 INJECTION, SOLUTION INTRAMUSCULAR; INTRAVENOUS at 10:04

## 2023-04-14 RX ADMIN — PHENYLEPHRINE HYDROCHLORIDE 50 MCG: 10 INJECTION INTRAVENOUS at 12:04

## 2023-04-14 RX ADMIN — PROPOFOL 200 MG: 10 INJECTION, EMULSION INTRAVENOUS at 11:04

## 2023-04-14 RX ADMIN — DEXAMETHASONE SODIUM PHOSPHATE 12 MG: 4 INJECTION, SOLUTION INTRAMUSCULAR; INTRAVENOUS at 11:04

## 2023-04-14 RX ADMIN — TAMSULOSIN HYDROCHLORIDE 0.4 MG: 0.4 CAPSULE ORAL at 08:04

## 2023-04-14 RX ADMIN — FENTANYL CITRATE 50 MCG: 50 INJECTION, SOLUTION INTRAMUSCULAR; INTRAVENOUS at 11:04

## 2023-04-14 RX ADMIN — FENTANYL CITRATE 100 MCG: 50 INJECTION, SOLUTION INTRAMUSCULAR; INTRAVENOUS at 01:04

## 2023-04-14 NOTE — TRANSFER OF CARE
Anesthesia Transfer of Care Note    Patient: Rajan Cooper III    Procedure(s) Performed: Procedure(s) (LRB):  LAMINECTOMY, SPINE, CERVICAL, WITH POSTERIOR FUSION (N/A)    Patient location: PACU    Anesthesia Type: general    Transport from OR: Transported from OR on 6-10 L/min O2 by face mask with adequate spontaneous ventilation    Post pain: adequate analgesia    Post assessment: no apparent anesthetic complications and tolerated procedure well    Post vital signs: stable    Level of consciousness: lethargic and responds to stimulation    Nausea/Vomiting: no nausea/vomiting    Complications: none    Transfer of care protocol was followed      Last vitals:   Visit Vitals  BP (!) 142/74 (BP Location: Left arm, Patient Position: Lying)   Pulse 73   Temp 36.5 °C (97.7 °F) (Temporal)   Resp 12   Wt (!) 138.3 kg (304 lb 14 oz)   SpO2 99%   BMI 41.35 kg/m²

## 2023-04-14 NOTE — OP NOTE
Date of Procedure: 4/14/2023     Pre-Operative Diagnosis: Cervical myelopathy [G95.9]  Cervical stenosis of spine [M48.02]    Post-Operative Diagnosis: Post-Op Diagnosis Codes:     * Cervical myelopathy [G95.9]     * Cervical stenosis of spine [M48.02]    Anesthesia: General    Procedures performed:  Laminectomy and foraminotomy C3/4 and C4/5  Instrumented posterolateral fusion C3-5 with lateral mass screws and rods  Allograft, [place]     Surgeon: Dawit Henao MD    Assistant: Tuyet Gutierrez PA-C, scrubbed and assisting with all portions, no resident available.    Indication for Procedure: 66 yo man with MS, had progressive weakness in bilateral hands and legs.  MRI cervical showed severe stenosis with cord compression at C3/4.  He also had CT scan showing anterior arthrodesis C4 through 6.  He was consented for posterior approach for decompression of the spinal cord.  We noted that he has a history of MS and that some of the symptoms may be overlapping with the MS symptoms and that no improvement was guaranteed from surgery.    Operative Note:  After consent was obtained and placed in medical record the patient was taken to the operating room.  Neuro monitoring was placed.  The patient was positioned prone on chest rolls.  All pressure points were padded.  The posterior cervical area was prepped and draped in usual sterile fashion after applying the Finley clamp and placed in the cervical spine in the neutral position.  We palpated the external landmarks and marked C2 through C6 levels then infiltrated the skin with local anesthetic and opened the skin with a 10 blade knife using Bovie to dissect soft tissues and expose the spinous processes, lamina and lateral masses of C3 through C6.  As expected, the lateral masses of C4 through C6 were autofused.  We used a 2 mm drill to decorticate the lateral masses of C3-4 and 5 bilaterally and then used a tap to tap to 14 mm depth and placed 16 mm screws in the  lateral masses without complication.  6 lateral mass screws were placed a 16 mm length and these were connected by 245 mm rods.  Six set screws were used to secure the rods.  We then began our decompression by disconnecting the interspinous ligament and interlaminar ligament between C2-3 and C4-5.  We then used a drill to disconnect the lamina of C3 and C4 from the lateral masses and remove these EN bloc.  We also removed all the intervening ligamentum flavum.  We performed a medial foraminotomy at C3-4 and at C4-5 with Kerrison punches as well.  The cord was well decompressed.  There were no changes in the neuro monitoring.  We decorticated the lateral masses and placed the synthetic allograft in the posterolateral position.  We got final x-rays and then tunneled a drain through a separate incision before closing the fascia over the drain we irrigated with antibiotic solution.  We closed the cervical fascia with 0 Vicryl sutures before closing Jennifer's layer with 2-0 Vicryl and then the dermal layer with 2-0 Vicryl.  Skin was closed with a stapler and a sterile dressing was applied.  All counts were correct.    EBL: 250 ml

## 2023-04-14 NOTE — PLAN OF CARE
Preop plan of care reviewed.  Questions encouraged and questions answered. Pt verbalized readiness to proceed. Preop tylenol given

## 2023-04-14 NOTE — ANESTHESIA PROCEDURE NOTES
Arterial    Diagnosis: HTN    Patient location during procedure: holding area  Procedure start time: 4/14/2023 10:43 AM  Timeout: 4/14/2023 10:41 AM  Procedure end time: 4/14/2023 10:46 AM    Staffing  Authorizing Provider: Gali Calhoun MD  Performing Provider: Gali Calhoun MD    Anesthesiologist was present at the time of the procedure.    Preanesthetic Checklist  Completed: patient identified, IV checked, site marked, risks and benefits discussed, surgical consent, monitors and equipment checked, pre-op evaluation, timeout performed and anesthesia consent givenArterial  Skin Prep: chlorhexidine gluconate  Local Infiltration: none and lidocaine  Orientation: left  Location: radial    Catheter Size: 20 G  Catheter placement by Ultrasound guidance. Heme positive aspiration all ports.   Vessel Caliber: large, patent, compressibility normal  Needle advanced into vessel with real time Ultrasound guidance.Insertion Attempts: 1  Assessment  Dressing: secured with tape and tegaderm  Patient: Tolerated well

## 2023-04-14 NOTE — NURSING
Arrived to floor via bed from PACU, AAO x 4, can make his needs known, denies any pain at this time, C-collar in place, posterior 10 fr accordion drain in place, unable to see insertion site, small amount of bloody drainage to canister,TEDS & SCD in place, safety maintained.[facility], [facility]  Nurses Note -- 4 Eyes      4/14/2023       Skin assessed on: Admit      [x] No Pressure Injuries Present    []Prevention Measures Documented    [] Yes LDA  for Pressure Injury Previously documented     [] Yes New Pressure Injury Discovered   [] LDA for New Pressure Injury Added      Attending RN:  Elenita Walker, RN     Second RN:  Susy RN

## 2023-04-14 NOTE — ANESTHESIA PROCEDURE NOTES
Intubation    Date/Time: 4/14/2023 11:16 AM  Performed by: Daniel Jaime CRNA  Authorized by: Daniel Jaime CRNA     Intubation:     Induction:  Intravenous    Intubated:  Postinduction    Mask Ventilation:  Easy with oral airway    Attempts:  1    Attempted By:  CRNA    Method of Intubation:  Video laryngoscopy    Blade:  Other (see comments) (Dukes X3 blade)    Laryngeal View Grade: Grade I - full view of cords      Difficult Airway Encountered?: No      Complications:  None    Airway Device:  Oral endotracheal tube    Airway Device Size:  8.0    Style/Cuff Inflation:  Cuffed (inflated to minimal occlusive pressure)    Inflation Amount (mL):  6    Tube secured:  22    Secured at:  The lips    Placement Verified By:  Capnometry    Complicating Factors:  Obesity, short neck and poor neck/head extension    Findings Post-Intubation:  BS equal bilateral and atraumatic/condition of teeth unchanged

## 2023-04-14 NOTE — PROGRESS NOTES
Certification of Assistant at Surgery       Surgery Date: 4/14/2023     Participating Surgeons:  Surgeon(s) and Role:     * Dawit Henao MD - Primary       Tuyet Gutierrez PA-C - Assisting       Procedures:  Procedure(s) (LRB):  LAMINECTOMY, SPINE, CERVICAL, WITH POSTERIOR FUSION (N/A)    Assistant Surgeon's Certification of Necessity:  I understand that section 1842 (b) (6) (d) of the Social Security Act generally prohibits Medicare Part B reasonable charge payment for the services of assistants at surgery in teaching hospitals when qualified residents are available to furnish such services. I certify that the services for which payment is claimed were medically necessary, and that no qualified resident was available to perform the services. I further understand that these services are subject to post-payment review by the Medicare carrier.      Tuyet Gutierrez PA-C    04/14/2023  2:47 PM

## 2023-04-14 NOTE — TELEPHONE ENCOUNTER
Returned pt's call, pt stated that the peer to peer was done yesterday and he was approved for his surgery. Pt voiced understanding

## 2023-04-15 PROBLEM — M79.89 LOCALIZED SWELLING OF BOTH LOWER EXTREMITIES: Status: ACTIVE | Noted: 2023-04-15

## 2023-04-15 PROBLEM — E66.01 SEVERE OBESITY (BMI >= 40): Status: ACTIVE | Noted: 2023-04-15

## 2023-04-15 LAB
ALBUMIN SERPL BCP-MCNC: 3.7 G/DL (ref 3.5–5.2)
ALP SERPL-CCNC: 83 U/L (ref 55–135)
ALT SERPL W/O P-5'-P-CCNC: 22 U/L (ref 10–44)
ANION GAP SERPL CALC-SCNC: 12 MMOL/L (ref 8–16)
AST SERPL-CCNC: 22 U/L (ref 10–40)
BASOPHILS # BLD AUTO: 0.01 K/UL (ref 0–0.2)
BASOPHILS NFR BLD: 0.1 % (ref 0–1.9)
BILIRUB SERPL-MCNC: 0.8 MG/DL (ref 0.1–1)
BUN SERPL-MCNC: 24 MG/DL (ref 8–23)
CALCIUM SERPL-MCNC: 9.8 MG/DL (ref 8.7–10.5)
CHLORIDE SERPL-SCNC: 101 MMOL/L (ref 95–110)
CO2 SERPL-SCNC: 28 MMOL/L (ref 23–29)
CREAT SERPL-MCNC: 1.1 MG/DL (ref 0.5–1.4)
DIFFERENTIAL METHOD: ABNORMAL
EOSINOPHIL # BLD AUTO: 0 K/UL (ref 0–0.5)
EOSINOPHIL NFR BLD: 0.1 % (ref 0–8)
ERYTHROCYTE [DISTWIDTH] IN BLOOD BY AUTOMATED COUNT: 13.9 % (ref 11.5–14.5)
EST. GFR  (NO RACE VARIABLE): >60 ML/MIN/1.73 M^2
GLUCOSE SERPL-MCNC: 111 MG/DL (ref 70–110)
HCT VFR BLD AUTO: 42.1 % (ref 40–54)
HGB BLD-MCNC: 13.5 G/DL (ref 14–18)
IMM GRANULOCYTES # BLD AUTO: 0.04 K/UL (ref 0–0.04)
IMM GRANULOCYTES NFR BLD AUTO: 0.3 % (ref 0–0.5)
LYMPHOCYTES # BLD AUTO: 0.9 K/UL (ref 1–4.8)
LYMPHOCYTES NFR BLD: 7.1 % (ref 18–48)
MCH RBC QN AUTO: 29.9 PG (ref 27–31)
MCHC RBC AUTO-ENTMCNC: 32.1 G/DL (ref 32–36)
MCV RBC AUTO: 93 FL (ref 82–98)
MONOCYTES # BLD AUTO: 1.3 K/UL (ref 0.3–1)
MONOCYTES NFR BLD: 10.9 % (ref 4–15)
NEUTROPHILS # BLD AUTO: 9.8 K/UL (ref 1.8–7.7)
NEUTROPHILS NFR BLD: 81.5 % (ref 38–73)
NRBC BLD-RTO: 0 /100 WBC
PLATELET # BLD AUTO: 240 K/UL (ref 150–450)
PMV BLD AUTO: 11.2 FL (ref 9.2–12.9)
POCT GLUCOSE: 108 MG/DL (ref 70–110)
POCT GLUCOSE: 98 MG/DL (ref 70–110)
POTASSIUM SERPL-SCNC: 4.3 MMOL/L (ref 3.5–5.1)
PROT SERPL-MCNC: 7.4 G/DL (ref 6–8.4)
RBC # BLD AUTO: 4.51 M/UL (ref 4.6–6.2)
SODIUM SERPL-SCNC: 141 MMOL/L (ref 136–145)
WBC # BLD AUTO: 12.06 K/UL (ref 3.9–12.7)

## 2023-04-15 PROCEDURE — 97165 OT EVAL LOW COMPLEX 30 MIN: CPT

## 2023-04-15 PROCEDURE — 97112 NEUROMUSCULAR REEDUCATION: CPT

## 2023-04-15 PROCEDURE — 97530 THERAPEUTIC ACTIVITIES: CPT

## 2023-04-15 PROCEDURE — 27000221 HC OXYGEN, UP TO 24 HOURS

## 2023-04-15 PROCEDURE — 97161 PT EVAL LOW COMPLEX 20 MIN: CPT

## 2023-04-15 PROCEDURE — 85025 COMPLETE CBC W/AUTO DIFF WBC: CPT | Performed by: NURSE PRACTITIONER

## 2023-04-15 PROCEDURE — 80053 COMPREHEN METABOLIC PANEL: CPT | Performed by: NURSE PRACTITIONER

## 2023-04-15 PROCEDURE — 63600175 PHARM REV CODE 636 W HCPCS: Performed by: PHYSICIAN ASSISTANT

## 2023-04-15 PROCEDURE — 25000003 PHARM REV CODE 250: Performed by: PHYSICIAN ASSISTANT

## 2023-04-15 PROCEDURE — 94761 N-INVAS EAR/PLS OXIMETRY MLT: CPT

## 2023-04-15 PROCEDURE — 36415 COLL VENOUS BLD VENIPUNCTURE: CPT | Performed by: NURSE PRACTITIONER

## 2023-04-15 PROCEDURE — 94799 UNLISTED PULMONARY SVC/PX: CPT

## 2023-04-15 RX ORDER — OXYCODONE AND ACETAMINOPHEN 10; 325 MG/1; MG/1
1 TABLET ORAL EVERY 4 HOURS PRN
Qty: 30 TABLET | Refills: 0 | Status: SHIPPED | OUTPATIENT
Start: 2023-04-15 | End: 2023-05-01 | Stop reason: SDUPTHER

## 2023-04-15 RX ORDER — ONDANSETRON 4 MG/1
4 TABLET, ORALLY DISINTEGRATING ORAL EVERY 6 HOURS PRN
Qty: 15 TABLET | Refills: 0 | Status: SHIPPED | OUTPATIENT
Start: 2023-04-15 | End: 2023-12-05

## 2023-04-15 RX ORDER — SILDENAFIL 100 MG/1
100 TABLET, FILM COATED ORAL DAILY PRN
Qty: 10 TABLET | Refills: 2
Start: 2023-04-28

## 2023-04-15 RX ADMIN — DOCUSATE SODIUM 100 MG: 100 CAPSULE, LIQUID FILLED ORAL at 08:04

## 2023-04-15 RX ADMIN — CEFAZOLIN SODIUM 2 G: 2 SOLUTION INTRAVENOUS at 05:04

## 2023-04-15 RX ADMIN — CEFAZOLIN SODIUM 2 G: 2 SOLUTION INTRAVENOUS at 10:04

## 2023-04-15 RX ADMIN — MUPIROCIN: 20 OINTMENT TOPICAL at 08:04

## 2023-04-15 RX ADMIN — POLYETHYLENE GLYCOL 3350 17 G: 17 POWDER, FOR SOLUTION ORAL at 08:04

## 2023-04-15 RX ADMIN — HEPARIN SODIUM 5000 UNITS: 5000 INJECTION INTRAVENOUS; SUBCUTANEOUS at 05:04

## 2023-04-15 RX ADMIN — AMLODIPINE BESYLATE 10 MG: 5 TABLET ORAL at 08:04

## 2023-04-15 RX ADMIN — BACLOFEN 10 MG: 10 TABLET ORAL at 08:04

## 2023-04-15 RX ADMIN — LOSARTAN POTASSIUM 50 MG: 25 TABLET, FILM COATED ORAL at 08:04

## 2023-04-15 RX ADMIN — FUROSEMIDE 40 MG: 40 TABLET ORAL at 08:04

## 2023-04-15 RX ADMIN — MODAFINIL 200 MG: 100 TABLET ORAL at 08:04

## 2023-04-15 RX ADMIN — CEFAZOLIN SODIUM 2 G: 2 SOLUTION INTRAVENOUS at 04:04

## 2023-04-15 RX ADMIN — TAMSULOSIN HYDROCHLORIDE 0.4 MG: 0.4 CAPSULE ORAL at 08:04

## 2023-04-15 NOTE — DISCHARGE INSTRUCTIONS
Please follow ONLY the instructions that are checked below.    Activity Restrictions:  [x]  Return to work will be determined on an individual basis.  [x]  No lifting greater than 10 pounds.  [x]  Avoid bending and twisting the area of your surgery more than 45 degrees from neutral position in any direction.  [x]  No driving or operating machinery:  [x]  until cleared by your surgeon.  [x]  while taking narcotic pain medications or muscle relaxants.    [x]  Wear brace/collar at all times    [x]  Increase ambulation over the next 2 weeks so that you are walking 2 miles per day at 2 weeks post-operatively.  [x]  Make sure to take two dedicated 5-10 minute walks per day, along with short walks every 1-2 hours, even if just to walk to the restroom and back.   [x]  Walk on paved surfaces only. It is okay to walk up and down stairs while holding onto a side rail.  [x]  No sexual activity for 2-3 weeks.    Discharge Medication/Follow-up:  [x]  Please refer to discharge medication reconciliation form.  [x]  For the first week after surgery: Check your blood pressure before taking any blood pressure medications at home. If BP is below 120/80, do not take your blood pressure medication.   [x]  Do not take ANY non-steroidal anti-inflammatory drugs (NSAIDS), including the following: ibuprofen, naprosyn, Aleve, Advil, Indocin, Mobic, or Celebrex for:  [x]  8 weeks  [x]  Prescriptions for appropriate medication will be given upon discharge.   [x]  Pain control: Percocet for severe pain. Over the counter tylenol for mild pain   [x]  Muscle relaxer: continue baclofen as previously prescribed for muscle spasms.   [x]  Take docusate (Colace 100 mg) and miralax daily until bowel activity returns to janine. You can get this over the counter.  [x]  If you have not had a bowel movement by day 3 after surgery, try a fleet's enema or suppository, both over the counter. Call neurosurgery if you have not had a bowel movement by day 5 after  surgery.   [x]  Follow-up appointment:  [x]  10-14 days post-op for wound check by PA/nurse  [x]  4-6 weeks with MD    Wound Care:  [x]  Remove dressing or bandaid in  2  days.  [x]  No bandage required once removed. Keep your incision open to the air.  [x]  You may shower on the 3rd day after your surgery. Have the force of water hit you opposite from the incision. Pat the incision dry after your shower; do not scrub the incision. Do not use Hibiclens after surgery.  [x]  You wound is closed with one of the following: skin glue, steri strips, OR staples. Allow skin glue/steri strips to fall off on their own over time (if applicable). If you have staples, these will be removed at your clinic appt in 2 weeks.   [x]  You cannot take a bath until 8 weeks after surgery.    Call your doctor or go to the Emergency Room for any signs of infection, including: increased redness, drainage, pain, or fever (temperature ?101.5 for 24 hours). Call your doctor or go to the Emergency Room if there are any localized neurological changes; problems with speech, vision, numbness, tingling, weakness, or severe headache; or for other concerns.    Special Instructions:  [x]  No use of tobacco products.  [x]  Diet: Please eat a regular diet as tolerated.  []  Other diet:              Specific physician instructions:           Physicians need 3 days' notice for pain medicine to be refilled. Pain medicine will only be refilled between 8 AM and 5 PM, Monday through Friday, due to Food and Drug Administration regulation of documentation.    If you have any questions about this form, please call [phone].

## 2023-04-15 NOTE — NURSING
[facility], [facility]  Nurses Note -- 4 Eyes      4/15/2023       Skin assessed on: Q Shift      [x] No Pressure Injuries Present    []Prevention Measures Documented    [] Yes LDA  for Pressure Injury Previously documented     [] Yes New Pressure Injury Discovered   [] LDA for New Pressure Injury Added      Attending RN:  Elenita Walker RN     Second RN:  Praveena Deleon RN

## 2023-04-15 NOTE — ASSESSMENT & PLAN NOTE
-Continue home meds: aclofen tablet 10 mg BID and modafinil tablet 200 mg qd   -Resume home AVONEX 30 injection on discharge as scheduled  -Continue outpatient follow up

## 2023-04-15 NOTE — SUBJECTIVE & OBJECTIVE
Past Medical History:   Diagnosis Date    Hypertension     MS (multiple sclerosis)     Staph aureus infection 2017       History reviewed. No pertinent surgical history.    Review of patient's allergies indicates:   Allergen Reactions    Norco [hydrocodone-acetaminophen] Anaphylaxis     Pt states getting shakey, and having halucinations       No current facility-administered medications on file prior to encounter.     Current Outpatient Medications on File Prior to Encounter   Medication Sig    amLODIPine (NORVASC) 10 MG tablet Take 1 tablet (10 mg total) by mouth once daily.    baclofen (LIORESAL) 10 MG tablet Take 2 tabs po AM and 2 tabs po HS    furosemide (LASIX) 40 MG tablet Take 1 tablet (40 mg total) by mouth 2 (two) times daily.    losartan (COZAAR) 50 MG tablet Take 1 tablet (50 mg total) by mouth once daily.    modafiniL (PROVIGIL) 200 MG Tab Take 1 tablet (200 mg total) by mouth once daily. Take qam    tamsulosin (FLOMAX) 0.4 mg Cap Take 1 capsule (0.4 mg total) by mouth every evening.    AVONEX 30 mcg/0.5 mL injection Inject 0.5 mLs (30 mcg total) into the muscle every 7 days.    cholecalciferol, vitamin D3, 1,250 mcg (50,000 unit) Tab Take 1 tablet by mouth once a week.    ergocalciferol (ERGOCALCIFEROL) 50,000 unit Cap Take 1 capsule (50,000 Units total) by mouth every 7 days.    mupirocin (BACTROBAN) 2 % ointment Apply to affected area 3 times daily    triamcinolone acetonide 0.1% (KENALOG) 0.1 % cream APPLY TO AFFECTED AREA TWICE A DAY    [DISCONTINUED] sildenafiL (VIAGRA) 100 MG tablet Take 1 tablet (100 mg total) by mouth daily as needed for Erectile Dysfunction (take 1 hour prior to intercourse on an empty stomach).     Family History       Problem Relation (Age of Onset)    Cancer Father    Diabetes Mother    No Known Problems Sister, Brother, Maternal Aunt, Maternal Uncle, Paternal Aunt, Paternal Uncle, Maternal Grandmother, Maternal Grandfather, Paternal Grandmother, Paternal Grandfather     Pancreatic cancer Father          Tobacco Use    Smoking status: Never    Smokeless tobacco: Never   Substance and Sexual Activity    Alcohol use: No    Drug use: No    Sexual activity: Not on file     Review of Systems   Constitutional:  Negative for chills, fatigue and fever.   Respiratory:  Positive for cough. Negative for shortness of breath (no SOB at this time).    Cardiovascular:  Positive for leg swelling. Negative for chest pain and palpitations.   Gastrointestinal:  Negative for abdominal pain, nausea and vomiting.   Musculoskeletal:  Negative for joint swelling.   Neurological:  Positive for weakness. Negative for dizziness and headaches.   Psychiatric/Behavioral:  Negative for confusion and hallucinations.      Objective:     Vital Signs (Most Recent):  Temp: 98.1 °F (36.7 °C) (04/15/23 1613)  Pulse: 84 (04/15/23 1613)  Resp: 20 (04/15/23 1613)  BP: (!) 141/73 (04/15/23 1613)  SpO2: 95 % (04/15/23 1613)   Vital Signs (24h Range):  Temp:  [97.6 °F (36.4 °C)-98.6 °F (37 °C)] 98.1 °F (36.7 °C)  Pulse:  [] 84  Resp:  [18-20] 20  SpO2:  [92 %-95 %] 95 %  BP: (139-173)/(73-85) 141/73     Weight: (!) 142 kg (313 lb 0.9 oz)  Body mass index is 42.46 kg/m².    Physical Exam  Vitals and nursing note reviewed.   Constitutional:       General: He is not in acute distress.     Appearance: He is obese. He is not ill-appearing.   HENT:      Head: Atraumatic.      Nose: Nose normal.      Mouth/Throat:      Mouth: Mucous membranes are moist.      Comments: Pt in neck collar; voice appears hoarse   Eyes:      Extraocular Movements: Extraocular movements intact.      Pupils: Pupils are equal, round, and reactive to light.   Neck:      Comments: Currently in a collar  Cardiovascular:      Rate and Rhythm: Normal rate and regular rhythm.      Heart sounds: No murmur heard.    No gallop.   Pulmonary:      Effort: Pulmonary effort is normal. No respiratory distress.      Breath sounds: Normal breath sounds. No wheezing  or rales.      Comments: Patient on room air   Abdominal:      General: There is no distension.      Palpations: Abdomen is soft.      Tenderness: There is no abdominal tenderness. There is no guarding.   Musculoskeletal:         General: No swelling or tenderness.      Right lower leg: Edema present.      Left lower leg: Edema present.      Comments: Trace edema bilateral lower extremity   Skin:     General: Skin is warm and dry.   Neurological:      Mental Status: He is alert and oriented to person, place, and time.       Significant Labs: All pertinent labs within the past 24 hours have been reviewed.  A1C:   Recent Labs   Lab 03/02/23  1541   HGBA1C 6.0*     CBC:   Recent Labs   Lab 04/15/23  1339   WBC 12.06   HGB 13.5*   HCT 42.1        CMP:   Recent Labs   Lab 04/15/23  1339      K 4.3      CO2 28   *   BUN 24*   CREATININE 1.1   CALCIUM 9.8   PROT 7.4   ALBUMIN 3.7   BILITOT 0.8   ALKPHOS 83   AST 22   ALT 22   ANIONGAP 12             Significant Imaging: I have reviewed all pertinent imaging results/findings within the past 24 hours.

## 2023-04-15 NOTE — PT/OT/SLP EVAL
Occupational Therapy   Evaluation    Name: Rajan Cooper III  MRN: [id number]  Admitting Diagnosis: S/P cervical spinal fusion  Recent Surgery: Procedure(s) (LRB):  LAMINECTOMY, SPINE, CERVICAL, WITH POSTERIOR FUSION (N/A) 1 Day Post-Op    Recommendations:     Discharge Recommendations: home health OT  Discharge Equipment Recommendations:  bedside commode  Barriers to discharge:   (Increased caregiver assist required.)    Assessment:     Rajan Cooper III is a 67 y.o. male with a medical diagnosis of S/P cervical spinal fusion, and reports subjectively (+) responses to Sx. Patient presents with 4/5 R hand gross , Patient cheerfully demonstrating increased RUE AROM to WFL as needed for self feeding and grooming within limits of collar. Patient reports absence of pain during eval, and light touch intact grossly BUEs.  Performance deficits affecting function: weakness, impaired endurance, orthopedic precautions, impaired self care skills, gait instability, impaired balance, decreased safety awareness, decreased lower extremity function, decreased upper extremity function.      Rehab Prognosis: Good; patient would benefit from acute skilled OT services to address these deficits and reach maximum level of function.       Plan:     Patient to be seen 3 x/week, 5 x/week to address the above listed problems via self-care/home management, therapeutic activities, neuromuscular re-education  Plan of Care Expires: 04/21/23  Plan of Care Reviewed with: patient, spouse    Subjective     Chief Complaint: Routine post Sx discomfort  Patient/Family Comments/goals: Return to own home with Spouse assisting. Minimize risk of Patient and caregiver strain.     Occupational Profile:  Living Environment: SS, w/ Spouse, tub shower combo with TTB and detachable hand  (described by spouse). SBA baths, (I) < mod (I) dressing and (I) toileting. Electric scooter utilized w/in home for mobility. Patient asserts (I) transfers to/from scooter.   (-) driving, Spouse oversees 1* home management/housekeeping.   Equipment Used at Home: walker, rolling, bath bench (electric scotter, transfer tub bench per Spouses description.)  Assistance upon Discharge: Spouse. Family member said to be installing this date, front entry ramp for 1 step to enter home (described as excessively high threshold).      Pain/Comfort:  Pain Rating 1: 0/10    Patients cultural, spiritual, Taoist conflicts given the current situation: no    Objective:     Communicated with: RN prior to session.  Patient found HOB elevated with cervical collar, Condom Catheter, hemovac, peripheral IV upon OT entry to room.    General Precautions: Standard, fall  Orthopedic Precautions: spinal precautions  Braces: Aspen collar  Respiratory Status: Room air    Occupational Performance:    Bed Mobility:    Patient completed Rolling/Turning to Right with minimum assistance. Log roll edu provided at outset and reiterated at sessions end.   Patient completed Scooting/Bridging with minimum assistance  Patient completed Supine to Sit with minimum assistance    Functional Mobility/Transfers:  Patient completed Sit <> Stand Transfer with minimum assistance  with  rolling walker   Functional Mobility: Amb to hallway and back with PT/OT following with chair 2* Spouses cheerful assertion that Patient was ambulating this date at noteworthy greater distance, nd with greater ease than recent norms. Spouse noting also her observed absence of foot drop. Please see PT for distance and performance specific findings.     Activities of Daily Living:  Grooming: minimum assistance EOB  Upper Body Dressing: moderate assistance back gown  Lower Body Dressing: maximal assistance footwear  Toileting: condom cath.  Bariatric BSC requested.     Patient presents with reduced mobility, UB strength, and stamina deficits, which impact the patient's ability to complete functional tasks & activities safely and in a timely manner. Pt  could benefit from a collaborative therapeutic program to improve quality of life and sustain focus on impairment resoltion. Patient outlook for progression towards goals (+) at this time.       Cognitive/Visual Perceptual:Intact, A+OX4, able to follow complex direction, able to make complex needs known, congruent information integration at time of eval. Patient cooperative / active in own POC.  Behavior:   Upper Body Physical Exam:  RESPIRATORY: non-labored / normal effort / rate. (-) accessory MM engagement.    UB SKIN: Observable UB skin warm and dry.   Sensation: Norm in UB extremities light touch/localization.  Posture: Rounded shoulders,  [place] Collar  BUE AROM WNL  BUE MMT  NT save B gross  - WNL, no resistantce elbow> shoulders, spontaneous obs sufficient for ADL transfers and r/w use this date.   UB GM/FM coordination WNL  Fair (+) dynamic standing bedside  (-) UB edema      Treatment & Education:  Discussed role of OT,eval and collaborative POC dev completed. Patient and Spouse state understanding and agreement with all herein.   Interventions:   UE ROM/MMT assessment  Bed mobility training / assessment  Functional mobility assessment  Sit/standing balance assessment  Educated on importance of sitting OOB in bedside chair to promote increased strength, endurance & proper breathing.  Intro UB seated  AROM constructs BUE all joints, all planes, elbows, wrists, digits and Shoulders below 75 degrees. All EOB seated (AAROM B shoulders >/= 75 degrees, no c/o pain) 1x10, x3 daily recommended for (I) ex as suggested to counter (-) effects of limited mobility inherent in acute setting. Intro deep restorative breathing as needed to support intercostal ROM and effective resp/circulation in func tasks. Good  return demo and verbal recitation.        AMPAC 6 Click ADL:  AMPAC Total Score: 15      Patient left sitting edge of bed with all lines intact, call button in reach, RN notified, and Spouse and PCT  present    GOALS:   Multidisciplinary Problems       Occupational Therapy Goals          Problem: Occupational Therapy    Goal Priority Disciplines Outcome Interventions   Occupational Therapy Goal     OT, PT/OT Ongoing, Progressing    Description: Goals to be met by: 04/22/23     Patient will increase functional independence with ADLs by performing:    Feeding with Modified [place].  UE Dressing with Set-up Assistance.  LE Dressing with Moderate Assistance.  Grooming while seated at sink with Set-up Assistance.  Toileting from bedside commode with Stand-by Assistance for hygiene and clothing management.   Bathing UB and sunny sponge I nsit at basin with Supervision.  Toilet transfer to bedside commode with Supervision.  Upper extremity exercise program  BUE AROM/no resistance B hands and elbows, no shoulder AROM above 45 degrees at present, 1x10 reps per handout, with assistance as needed.                         History:     Past Medical History:   Diagnosis Date    Hypertension     MS (multiple sclerosis)     Staph aureus infection 2017       History reviewed. No pertinent surgical history.    Time Tracking:     OT Date of Treatment: 04/15/23  OT Start Time: 1016  OT Stop Time: 1041  OT Total Time (min): 25 min    Billable Minutes:Evaluation 15  Neuromuscular Re-education 10    4/15/2023

## 2023-04-15 NOTE — PLAN OF CARE
[facility]      HOME HEALTH ORDERS  FACE TO FACE ENCOUNTER    Patient Name: Rajan Cooper III  YOB: 1955    PCP: Tom Garcia MD   PCP Address: [address] / [place] [address]  PCP Phone Number: [phone]  PCP Fax: [phone]    Encounter Date: 4/6/23    Admit to Home Health    Diagnoses:  Active Hospital Problems    Diagnosis  POA    *S/P cervical spinal fusion [Z98.1]  Not Applicable     4/14/23: C3-5 PCF with Dr. Henao for progressive myelopathy           Resolved Hospital Problems   No resolved problems to display.       Follow Up Appointments:  Future Appointments   Date Time Provider Department Center   4/26/2023  8:40 AM Aurora Laguerre NP [facility] UROLOG [facility]   4/28/2023  1:30 PM Angelique Hinojosa PA-C [facility] NEUROSU [facility]   5/3/2023  9:30 AM Jairo Minor MD [facility]       Allergies:  Review of patient's allergies indicates:   Allergen Reactions    Norco [hydrocodone-acetaminophen] Anaphylaxis     Pt states getting shakey, and having halucinations       Medications: Review discharge medications with patient and family and provide education.    Current Facility-Administered Medications   Medication Dose Route Frequency Provider Last Rate Last Admin    0.9%  NaCl infusion   Intravenous Continuous Tuyet Gutierrez PA-C 75 mL/hr at 04/15/23 0622 Rate Verify at 04/15/23 0622    acetaminophen tablet 650 mg  650 mg Oral Q6H PRN Tuyet Gutierrez PA-C        aluminum-magnesium hydroxide-simethicone 200-200-20 mg/5 mL suspension 30 mL  30 mL Oral Q4H PRN Tuyet Gutierrez PA-C        amLODIPine tablet 10 mg  10 mg Oral Daily Tuyet Gutierrez PA-C   10 mg at 04/15/23 0840    baclofen tablet 10 mg  10 mg Oral BID Tuyet Gutierrez PA-C   10 mg at 04/15/23 0840    bisacodyL suppository 10 mg  10 mg Rectal Daily PRN Tuyet Gutierrez PA-C        cefazolin (ANCEF) 2 gram in dextrose 5% 50 mL IVPB (premix)  2 g Intravenous  Q6H Tuyet Gutierrez, PA-C   Stopped at 04/15/23 1122    docusate sodium capsule 100 mg  100 mg Oral Daily Tuyet SKaye Gutierrez, PA-C   100 mg at 04/15/23 0840    furosemide tablet 40 mg  40 mg Oral BID Tuyet SKaye Gutierrez, PA-C   40 mg at 04/15/23 0840    heparin (porcine) injection 5,000 Units  5,000 Units Subcutaneous Q8H Tuyet Gutierrez, PA-C        hydrALAZINE injection 10 mg  10 mg Intravenous Q4H PRN Tuyet Gutierrez, PA-C        HYDROmorphone injection 1 mg  1 mg Intravenous Q3H PRN Tuyet Gutierrez, PA-C        labetaloL injection 10 mg  10 mg Intravenous Q4H PRN Tuyet Gutierrez, PA-C        losartan tablet 50 mg  50 mg Oral Daily Tuyet Gutierrez, PA-C   50 mg at 04/15/23 0841    modafiniL tablet 200 mg  200 mg Oral Daily Tuyet Gutierrez, PA-C   200 mg at 04/15/23 0839    mupirocin 2 % ointment   Nasal BID Tuyet Gutierrez, PA-C   Given at 04/15/23 0840    ondansetron injection 8 mg  8 mg Intravenous Q6H PRN Tuyet Gutierrez, PA-C        oxyCODONE-acetaminophen  mg per tablet 1 tablet  1 tablet Oral Q4H PRN Tuyet Gutierrez, PA-C        oxyCODONE-acetaminophen 5-325 mg per tablet 1 tablet  1 tablet Oral Q4H PRN Tuyet Gutierrez, PA-C        polyethylene glycol packet 17 g  17 g Oral Daily Tuyet Gutierrez, PA-C   17 g at 04/15/23 0839    prochlorperazine injection Soln 5 mg  5 mg Intravenous Q6H PRN Tuyet Gutierrez, PA-C        senna-docusate 8.6-50 mg per tablet 2 tablet  2 tablet Oral Nightly PRN Tuyet Gutierrez, PA-C        tamsulosin 24 hr capsule 0.4 mg  0.4 mg Oral QHS Tuyet Gutierrez PA-C   0.4 mg at 04/14/23 2037     Current Discharge Medication List        START taking these medications    Details   ondansetron (ZOFRAN-ODT) 4 MG TbDL Take 1 tablet (4 mg total) by mouth every 6 (six) hours as needed (nausea).  Qty: 15 tablet, Refills: 0      oxyCODONE-acetaminophen (PERCOCET)  mg per tablet Take 1 tablet by mouth every  4 (four) hours as needed (8-10/10).  Qty: 30 tablet, Refills: 0    Comments: Quantity prescribed more than 7 day supply? Yes, quantity medically necessary           CONTINUE these medications which have CHANGED    Details   sildenafiL (VIAGRA) 100 MG tablet Take 1 tablet (100 mg total) by mouth daily as needed for Erectile Dysfunction (take 1 hour prior to intercourse on an empty stomach).  Qty: 10 tablet, Refills: 2    Associated Diagnoses: Erectile dysfunction, unspecified erectile dysfunction type           CONTINUE these medications which have NOT CHANGED    Details   amLODIPine (NORVASC) 10 MG tablet Take 1 tablet (10 mg total) by mouth once daily.  Qty: 90 tablet, Refills: 3    Comments: .      baclofen (LIORESAL) 10 MG tablet Take 2 tabs po AM and 2 tabs po HS  Qty: 360 tablet, Refills: 3    Associated Diagnoses: MS (multiple sclerosis); Muscle spasm      furosemide (LASIX) 40 MG tablet Take 1 tablet (40 mg total) by mouth 2 (two) times daily.  Qty: 180 tablet, Refills: 2      losartan (COZAAR) 50 MG tablet Take 1 tablet (50 mg total) by mouth once daily.  Qty: 90 tablet, Refills: 3    Comments: .      modafiniL (PROVIGIL) 200 MG Tab Take 1 tablet (200 mg total) by mouth once daily. Take qam  Qty: 400 tablet, Refills: 3    Associated Diagnoses: MS (multiple sclerosis)      tamsulosin (FLOMAX) 0.4 mg Cap Take 1 capsule (0.4 mg total) by mouth every evening.  Qty: 90 capsule, Refills: 3    Associated Diagnoses: Decreased urine stream      AVONEX 30 mcg/0.5 mL injection Inject 0.5 mLs (30 mcg total) into the muscle every 7 days.  Qty: 4 each, Refills: 12    Associated Diagnoses: MS (multiple sclerosis)      cholecalciferol, vitamin D3, 1,250 mcg (50,000 unit) Tab Take 1 tablet by mouth once a week.  Qty: 12 tablet, Refills: 3    Associated Diagnoses: Vitamin D deficiency      ergocalciferol (ERGOCALCIFEROL) 50,000 unit Cap Take 1 capsule (50,000 Units total) by mouth every 7 days.  Qty: 12 capsule, Refills: 3       mupirocin (BACTROBAN) 2 % ointment Apply to affected area 3 times daily  Qty: 22 g, Refills: 1    Associated Diagnoses: Wound of right lower extremity, initial encounter      triamcinolone acetonide 0.1% (KENALOG) 0.1 % cream APPLY TO AFFECTED AREA TWICE A DAY  Qty: 80 g, Refills: 1    Comments: Please delete all prior scripts with same name and strength including on holds.               I have seen and examined this patient within the last 30 days. My clinical findings that support the need for the home health skilled services and home bound status are the following:no   Weakness/numbness causing balance and gait disturbance due to Surgery making it taxing to leave home.     Diet:   diabetic diet 2000 calorie    Labs:  N/a    Referrals/ Consults  Physical Therapy to evaluate and treat. Evaluate for home safety and equipment needs; Establish/upgrade home exercise program. Perform / instruct on therapeutic exercises, gait training, transfer training, and Range of Motion.  Occupational Therapy to evaluate and treat. Evaluate home environment for safety and equipment needs. Perform/Instruct on transfers, ADL training, ROM, and therapeutic exercises.    Activities:   No lifting>10 lbs, bending, twisting, driving, or strenuous activity for 6 weeks.   Cervical collar at all times       Nursing:   Agency to admit patient within  24 hours  of hospital discharge unless specified on physician order or at patient request    SN to complete comprehensive assessment including routine vital signs. Instruct on disease process and s/s of complications to report to MD. Review/verify medication list sent home with the patient at time of discharge  and instruct patient/caregiver as needed. Frequency may be adjusted depending on start of care date.     Skilled nurse to perform up to 3 visits PRN for symptoms related to diagnosis    Notify MD if SBP > 160 or < 90; DBP > 90 or < 50; HR > 120 or < 50; Temp > 101; O2 < 88%; Other:        Ok to schedule additional visits based on staff availability and patient request on consecutive days within the home health episode.    When multiple disciplines ordered:    Start of Care occurs on Sunday - Wednesday schedule remaining discipline evaluations as ordered on separate consecutive days following the start of care.    Thursday SOC -schedule subsequent evaluations Friday and Monday the following week.     Friday - Saturday SOC - schedule subsequent discipline evaluations on consecutive days starting Monday of the following week.    For all post-discharge communication and subsequent orders please contact patient's primary care physician. If unable to reach primary care physician or do not receive response within 30 minutes, please contact [phone] for clinical staff order clarification    Miscellaneous   N/a    Home Health Aide:  Physical Therapy Three times weekly and Occupational Therapy Three times weekly    Wound Care Orders  Monitor wound for signs of infection or dehiscence. Call [phone] with any concerns       I certify that this patient is confined to his home and needs physical therapy and occupational therapy.

## 2023-04-15 NOTE — PLAN OF CARE
Problem: Physical Therapy  Goal: Physical Therapy Goal  Description: Goals to be met by: 23     Patient will increase functional independence with mobility by performin. Supine to sit with Modified [place]  2. Rolling to Left and Right with Modified [place]  3. Sit to stand transfer with Modified [place] using RW  4. Bed to chair transfer with Modified [place] using Rolling Walker  5. Gait x100 feet with Modified [place] using Rolling Walker and cervical collar   6. Lower extremity exercise program 2 sets x15 reps per handout, with independence    Outcome: Ongoing, Progressing     Pt will benefit from HHPT services upon discharge.

## 2023-04-15 NOTE — ASSESSMENT & PLAN NOTE
-s/p Laminectomy and foraminotomy C3/4 and C4/5 and posterolateral fusion C3-5 on 04/14/23  -management per primary team: Neurosurgery

## 2023-04-15 NOTE — NURSING
[facility] [place]es [place] -- 4 Eyes      4/15/2023       Skin assessed on: Q Shift      [x] No Pressure Injuries Present    []Prevention Measures Documented    [] Yes LDA  for Pressure Injury Previously documented     [] Yes New Pressure Injury Discovered   [] LDA for New Pressure Injury Added      Attending RN:  Praveena Deleon, RN     Second RN:  Kate Palmer, PCT

## 2023-04-15 NOTE — ASSESSMENT & PLAN NOTE
-per wife and patient, has had lower extremity swelling over the past few months.  Denies any trauma or falls   -takes p.o. Lasix 40 mg b.i.d. as needed for leg swelling  -no no history of heart failure   -lower extremity swelling appear symmetrical and bilateral   -low suspicion for DVT at this time   -previous echo in 03/2017 with preserved EF and no diastolic function   -repeat echo

## 2023-04-15 NOTE — PLAN OF CARE
Problem: Adult Inpatient Plan of Care  Goal: Plan of Care Review  Outcome: Ongoing, Progressing  Goal: Patient-Specific Goal (Individualized)  Outcome: Ongoing, Progressing  Goal: Readiness for Transition of Care  Outcome: Ongoing, Progressing     Problem: Skin Injury Risk Increased  Goal: Skin Health and Integrity  Outcome: Ongoing, Progressing     Pt free from falls or any further trauma through out the shift. Prescribed medication administered. Inj NS infusing at 75 ml/hr. Aspen brace and Hemovac drain in place. Pt in no distress. Will continue to monitor.

## 2023-04-15 NOTE — PLAN OF CARE
[facility] - Med Surg  Initial Discharge Assessment    Patient from home and lives with spouse. Plan for discharge with home health. [place] to continue to follow for dc needs.      Primary Care Provider: Tom Garcia MD    Admission Diagnosis: Cervical myelopathy [G95.9]  Cervical stenosis of spine [M48.02]    Admission Date: 4/14/2023  Expected Discharge Date:     Discharge Barriers Identified: None    Payor: BLUE CROSS BLUE SHIELD / Plan: BCBS ALL OUT OF STATE / Product Type: PPO /     Extended Emergency Contact Information  Primary Emergency Contact: KennethDorothy  Address: [address] [place] [address]           [place] [address] [facility]  Home Phone: [phone]  Mobile Phone: [phone]  Relation: Spouse    Discharge Plan A: Home Health  Discharge Plan B: Home with family      [id number] [place] - [address] [address]  Phone: [phone] Fax: [phone]    [facility]  [place] - [address]  [address] [facility] [address] [place] [address]  Phone: [phone] Fax: [phone]    [facility] ([facility] - [place], [place] - [address]  [address]  [address]  [facility] [address]  Phone: [phone] Fax: [phone]    [facility] - [place] - [address]   [address] [address]  Phone: [phone] Fax: [phone]      Initial Assessment (most recent)       Adult Discharge Assessment - 04/15/23 1345          Discharge Assessment    Assessment Type Discharge Planning Assessment     Confirmed/corrected address, phone number and insurance Yes     Confirmed Demographics Correct on Facesheet     Source of Information patient;family     If unable to respond/provide information was family/caregiver contacted? Yes     Contact Name/Number Dorothy Cooper (Spouse)   [phone]     Communicated REGINALD with patient/caregiver Date not available/Unable to determine      People in Home spouse     Facility Arrived From: Home     Do you expect to return to your current living situation? Yes     Do you have help at home or someone to help you manage your care at home? Yes     Who are your caregiver(s) and their phone number(s)? Dorothy Cooper (Spouse)   [phone]     Prior to hospitilization cognitive status: Alert/Oriented     Current cognitive status: Alert/Oriented     Equipment Currently Used at Home walker, rolling;shower chair;power chair     Readmission within 30 days? No     Patient currently being followed by outpatient case management? No     Do you currently have service(s) that help you manage your care at home? No     Do you take prescription medications? Yes     Do you have prescription coverage? Yes     Coverage BCBS     Do you have any problems affording any of your prescribed medications? No     Is the patient taking medications as prescribed? yes     Who is going to help you get home at discharge? KennethDorothy (Spouse)   [phone]     How do you get to doctors appointments? family or friend will provide     Are you on dialysis? No     Do you take coumadin? No     Discharge Plan A Home Health     Discharge Plan B Home with family     DME Needed Upon Discharge  other (see comments)   TBD    Discharge Plan discussed with: Patient;Spouse/sig other     Discharge Barriers Identified None        Physical Activity    On average, how many days per week do you engage in moderate to strenuous exercise (like a brisk walk)? 0 days     On average, how many minutes do you engage in exercise at this level? 0 min        Financial Resource Strain    How hard is it for you to pay for the very basics like food, housing, medical care, and heating? Not hard at all        Housing Stability    In the last 12 months, was there a time when you were not able to pay the mortgage or rent on time? No     In the last 12 months, was there a time when you did not have a steady place to sleep or  slept in a shelter (including now)? No        Transportation Needs    In the past 12 months, has lack of transportation kept you from medical appointments or from getting medications? No     In the past 12 months, has lack of transportation kept you from meetings, work, or from getting things needed for daily living? No        Food Insecurity    Within the past 12 months, you worried that your food would run out before you got the money to buy more. Never true     Within the past 12 months, the food you bought just didn't last and you didn't have money to get more. Never true        Stress    Do you feel stress - tense, restless, nervous, or anxious, or unable to sleep at night because your mind is troubled all the time - these days? Rather much        Social Connections    In a typical week, how many times do you talk on the phone with family, friends, or neighbors? More than three times a week     How often do you get together with friends or relatives? More than three times a week     How often do you attend Muslim or Yarsanism services? Never     How often do you attend meetings of the clubs or organizations you belong to? Never     Are you , , , , never , or living with a partner?         Alcohol Use    Q1: How often do you have a drink containing alcohol? Never     Q2: How many drinks containing alcohol do you have on a typical day when you are drinking? Patient does not drink        OTHER    Name(s) of People in Home Dorothy Cooper (Spouse)   [phone]

## 2023-04-15 NOTE — ASSESSMENT & PLAN NOTE
Body mass index is 42.46 kg/m². Morbid obesity complicates all aspects of disease management from diagnostic modalities to treatment. Weight loss encouraged and health benefits explained to patient.

## 2023-04-15 NOTE — HPI
67-year-old male with history of multiple sclerosis, hypertension, and venous insufficiency admitted on 04/14/2023 to Neurosurgery.  Patient with cervical myelopathy and cervical stenosis of spine.  Has been having progressive weakness in bilateral hands and legs.  Also noted lower extremity swelling that has been worsening over the last few months.  Wife at bedside.  Patient states he is unable to make it to the bathroom on time now.  Feels like he is unable to have this sensation to go.  Takes Lasix twice a day as needed for leg swelling.  Unsure of any history of heart failure.  Admits medication compliance for his blood pressure.  Hospital Medicine consulted for assistance with medical management.

## 2023-04-15 NOTE — ASSESSMENT & PLAN NOTE
-continue home medications: Amlodipine 10 mg daily, losartan 50 mg daily  -titrate home meds as needed   -continue pain control   -IV hydralazine as needed

## 2023-04-15 NOTE — PT/OT/SLP EVAL
Physical Therapy Evaluation    Patient Name:  Rajan Cooper III   MRN:  [id number]    Recommendations:     Discharge Recommendations: home health PT (with family assistance)   Discharge Equipment Recommendations: none   Barriers to discharge: None    Assessment:     Rajan Cooper III is a 67 y.o. male admitted with a medical diagnosis of S/P cervical spinal fusion.  He presents with the following impairments/functional limitations: weakness, impaired endurance, impaired sensation, impaired self care skills, impaired functional mobility, gait instability, impaired balance, decreased coordination, decreased upper extremity function, decreased lower extremity function, abnormal tone, decreased ROM, impaired skin, orthopedic precautions.    Rehab Prognosis: Good; patient would benefit from acute skilled PT services to address these deficits and reach maximum level of function.    Recent Surgery: Procedure(s) (LRB):  LAMINECTOMY, SPINE, CERVICAL, WITH POSTERIOR FUSION (N/A) 1 Day Post-Op    Plan:     During this hospitalization, patient to be seen daily to address the identified rehab impairments via gait training, therapeutic activities, therapeutic exercises and progress toward the following goals:    Plan of Care Expires:  04/29/23    Subjective     Chief Complaint: BUE weakness  Patient/Family Comments/goals: Pt agreeable to therapy.  Pain/Comfort:  Pain Rating 1: 0/10      Living Environment:  Pt lives with spouse in a [facility] with 1 high threshold to enter.  Currently a ramp is being made.  Prior to admission, patients level of function was mod I with short distance ambulation using RW.  Pt uses a scooter.  Equipment at home: walker, rolling, power chair, shower chair.  Upon discharge, patient will have assistance from spouse.    Objective:     Communicated with nurse Roman prior to session.  Patient found left sidelying with cervical collar, hemovac, peripheral IV, Condom Catheter, and SCD upon PT entry to room.    General  Precautions: Standard, fall, [place]  Orthopedic Precautions:spinal precautions   Braces: Cervical collar  Respiratory Status: Nasal cannula, flow 2 L/min    Exams:  Cognitive Exam:  Patient was able to follow 2 steps command, mostly due to [place].  Gross Motor Coordination:  WFL  Postural Exam:  Patient presented with the following abnormalities:    -       Rounded shoulders  -       Forward head  Sensation:    -       light/touch decreased to BLE; Pt reported h/o some numbness and tingling sensation to BLE.  Skin Integrity/Edema:      -       Skin integrity: Visible skin intact and posterior cervical dressing with bloody drainage (pt with hemovac)  -       Edema: None noted BLE  BLE ROM: WFL  BLE Strength: WFL, 4/5    Functional Mobility: Pt with h/o MS and BUE/BLE weakness.  Pt feeling much better upon sitting EOB and able to ambulate a short distance.  Pt with difficulty standing from low surfaces, his home furniture has been modified to meet his needs.  Spouse present and very supportive.    Bed Mobility:     Rolling Right: minimum assistance  Scooting: stand by assistance and contact guard assistance  Supine to Sit: moderate assistance with BLE and HOB elevated   Transfers:     Sit to Stand: stand by assistance and contact guard assistance with rolling walker and bed elevated   Bed to Chair: Pt declined to sit in bedside chair 2* low surface  Gait: Pt ambulated ~40 ft with CGA using RW and cervical collar.  spO2 on RA 94% and  bpm.  Pt with decreased B foot clearance, decreased step length, and decreased zahra.    Balance: Pt with fair dynamic standing balance.       AM-PAC 6 CLICK MOBILITY  Total Score:16       Treatment & Education:  BLE seated therex x10 reps: AP, LAQ, and hip flex    Pt/spouse educated on acute skilled PT services and goals.  Pt to call for nursing assistance with OOB activities.  Pt/spouse educated on cervical collar at all times with handout provided concerning placement and care of  collar.  Pt/spouse educated on spinal precautions s/p cervical spine sx: log roll for bed mobility, no heavy lifting >5-10lbs, no overhead movement or reaching with BUE, and no twisting/bending.  Pt/spouse verbalized good understanding of all information.     Patient left sitting edge of bed with all lines intact, call button in reach, nurse Jessie notified, and spouse present.  Tray table in front.     GOALS:   Multidisciplinary Problems       Physical Therapy Goals          Problem: Physical Therapy    Goal Priority Disciplines Outcome Goal Variances Interventions   Physical Therapy Goal     PT, PT/OT Ongoing, Progressing     Description: Goals to be met by: 23     Patient will increase functional independence with mobility by performin. Supine to sit with Modified [place]  2. Rolling to Left and Right with Modified [place]  3. Sit to stand transfer with Modified [place] using RW  4. Bed to chair transfer with Modified [place] using Rolling Walker  5. Gait x100 feet with Modified [place] using Rolling Walker and cervical collar   6. Lower extremity exercise program 2 sets x15 reps per handout, with independence                         History:     Past Medical History:   Diagnosis Date    Hypertension     MS (multiple sclerosis)     Staph aureus infection 2017       History reviewed. No pertinent surgical history.    Time Tracking:     PT Received On: 04/15/23  PT Start Time: 1011     PT Stop Time: 1035  PT Total Time (min): 24 min     Billable Minutes: Evaluation 16 min co-eval with OT and Therapeutic Activity 8 min      04/15/2023

## 2023-04-15 NOTE — HOSPITAL COURSE
67-year-old male admitted to nurse surgery for cervical myelopathy and cervical stenosis of spine. Patient s/p Laminectomy and foraminotomy C3/4 and C4/5 and posterolateral fusion C3-5 on 04/14/23.  Patient tolerated the procedure well.  Hospital medicine consulted for assistance with management.  Patient O2 sats of 92% on 2 L.  Now weaned off to room air.    Of note, patient was on p.o. Lasix 40 mg b.i.d. to take as needed for leg swelling.  No known history of heart failure. Lungs clear on exam.  Chest x-ray with no acute process. echocardiogram ordered to further evaluate, pending

## 2023-04-15 NOTE — CONSULTS
[facility] Medicine  Consult Note    Patient Name: Rajan Cooper III  MRN: [id number]  Admission Date: 4/14/2023  Hospital Length of Stay: 1 days  Attending Physician: Dawit Henao MD   Primary Care Provider: Tom Garcia MD           Patient information was obtained from patient, spouse/SO and ER records.     Consults  Subjective:     Principal Problem: S/P cervical spinal fusion    Chief Complaint: No chief complaint on file.       HPI: 67-year-old male with history of multiple sclerosis, hypertension, and venous insufficiency admitted on 04/14/2023 to Neurosurgery.  Patient with cervical myelopathy and cervical stenosis of spine.  Has been having progressive weakness in bilateral hands and legs.  Also noted lower extremity swelling that has been worsening over the last few months.  Wife at bedside.  Patient states he is unable to make it to the bathroom on time now.  Feels like he is unable to have this sensation to go.  Takes Lasix twice a day as needed for leg swelling.  Unsure of any history of heart failure.  Admits medication compliance for his blood pressure.  Hospital Medicine consulted for assistance with medical management.      Past Medical History:   Diagnosis Date    Hypertension     MS (multiple sclerosis)     Staph aureus infection 2017       History reviewed. No pertinent surgical history.    Review of patient's allergies indicates:   Allergen Reactions    Norco [hydrocodone-acetaminophen] Anaphylaxis     Pt states getting shakey, and having halucinations       No current facility-administered medications on file prior to encounter.     Current Outpatient Medications on File Prior to Encounter   Medication Sig    amLODIPine (NORVASC) 10 MG tablet Take 1 tablet (10 mg total) by mouth once daily.    baclofen (LIORESAL) 10 MG tablet Take 2 tabs po AM and 2 tabs po HS    furosemide (LASIX) 40 MG tablet Take 1 tablet (40 mg total) by mouth 2 (two) times daily.    losartan  (COZAAR) 50 MG tablet Take 1 tablet (50 mg total) by mouth once daily.    modafiniL (PROVIGIL) 200 MG Tab Take 1 tablet (200 mg total) by mouth once daily. Take qam    tamsulosin (FLOMAX) 0.4 mg Cap Take 1 capsule (0.4 mg total) by mouth every evening.    AVONEX 30 mcg/0.5 mL injection Inject 0.5 mLs (30 mcg total) into the muscle every 7 days.    cholecalciferol, vitamin D3, 1,250 mcg (50,000 unit) Tab Take 1 tablet by mouth once a week.    ergocalciferol (ERGOCALCIFEROL) 50,000 unit Cap Take 1 capsule (50,000 Units total) by mouth every 7 days.    mupirocin (BACTROBAN) 2 % ointment Apply to affected area 3 times daily    triamcinolone acetonide 0.1% (KENALOG) 0.1 % cream APPLY TO AFFECTED AREA TWICE A DAY    [DISCONTINUED] sildenafiL (VIAGRA) 100 MG tablet Take 1 tablet (100 mg total) by mouth daily as needed for Erectile Dysfunction (take 1 hour prior to intercourse on an empty stomach).     Family History       Problem Relation (Age of Onset)    Cancer Father    Diabetes Mother    No Known Problems Sister, Brother, Maternal Aunt, Maternal Uncle, Paternal Aunt, Paternal Uncle, Maternal Grandmother, Maternal Grandfather, Paternal Grandmother, Paternal Grandfather    Pancreatic cancer Father          Tobacco Use    Smoking status: Never    Smokeless tobacco: Never   Substance and Sexual Activity    Alcohol use: No    Drug use: No    Sexual activity: Not on file     Review of Systems   Constitutional:  Negative for chills, fatigue and fever.   Respiratory:  Positive for cough. Negative for shortness of breath (no SOB at this time).    Cardiovascular:  Positive for leg swelling. Negative for chest pain and palpitations.   Gastrointestinal:  Negative for abdominal pain, nausea and vomiting.   Musculoskeletal:  Negative for joint swelling.   Neurological:  Positive for weakness. Negative for dizziness and headaches.   Psychiatric/Behavioral:  Negative for confusion and hallucinations.      Objective:      Vital Signs (Most Recent):  Temp: 98.1 °F (36.7 °C) (04/15/23 1613)  Pulse: 84 (04/15/23 1613)  Resp: 20 (04/15/23 1613)  BP: (!) 141/73 (04/15/23 1613)  SpO2: 95 % (04/15/23 1613)   Vital Signs (24h Range):  Temp:  [97.6 °F (36.4 °C)-98.6 °F (37 °C)] 98.1 °F (36.7 °C)  Pulse:  [] 84  Resp:  [18-20] 20  SpO2:  [92 %-95 %] 95 %  BP: (139-173)/(73-85) 141/73     Weight: (!) 142 kg (313 lb 0.9 oz)  Body mass index is 42.46 kg/m².    Physical Exam  Vitals and nursing note reviewed.   Constitutional:       General: He is not in acute distress.     Appearance: He is obese. He is not ill-appearing.   HENT:      Head: Atraumatic.      Nose: Nose normal.      Mouth/Throat:      Mouth: Mucous membranes are moist.      Comments: Pt in neck collar; voice appears hoarse   Eyes:      Extraocular Movements: Extraocular movements intact.      Pupils: Pupils are equal, round, and reactive to light.   Neck:      Comments: Currently in a collar  Cardiovascular:      Rate and Rhythm: Normal rate and regular rhythm.      Heart sounds: No murmur heard.    No gallop.   Pulmonary:      Effort: Pulmonary effort is normal. No respiratory distress.      Breath sounds: Normal breath sounds. No wheezing or rales.      Comments: Patient on room air   Abdominal:      General: There is no distension.      Palpations: Abdomen is soft.      Tenderness: There is no abdominal tenderness. There is no guarding.   Musculoskeletal:         General: No swelling or tenderness.      Right lower leg: Edema present.      Left lower leg: Edema present.      Comments: Trace edema bilateral lower extremity   Skin:     General: Skin is warm and dry.   Neurological:      Mental Status: He is alert and oriented to person, place, and time.       Significant Labs: All pertinent labs within the past 24 hours have been reviewed.  A1C:   Recent Labs   Lab 03/02/23  1541   HGBA1C 6.0*     CBC:   Recent Labs   Lab 04/15/23  1339   WBC 12.06   HGB 13.5*   HCT 42.1         CMP:   Recent Labs   Lab 04/15/23  1339      K 4.3      CO2 28   *   BUN 24*   CREATININE 1.1   CALCIUM 9.8   PROT 7.4   ALBUMIN 3.7   BILITOT 0.8   ALKPHOS 83   AST 22   ALT 22   ANIONGAP 12             Significant Imaging: I have reviewed all pertinent imaging results/findings within the past 24 hours.          Assessment/Plan:     * S/P cervical spinal fusion  -s/p Laminectomy and foraminotomy C3/4 and C4/5 and posterolateral fusion C3-5 on 04/14/23  -management per primary team: Neurosurgery      HTN (hypertension)  -continue home medications: Amlodipine 10 mg daily, losartan 50 mg daily  -titrate home meds as needed   -continue pain control   -IV hydralazine as needed    Localized swelling of both lower extremities  -per wife and patient, has had lower extremity swelling over the past few months.  Denies any trauma or falls   -takes p.o. Lasix 40 mg b.i.d. as needed for leg swelling  -no no history of heart failure   -lower extremity swelling appear symmetrical and bilateral   -low suspicion for DVT at this time   -previous echo in 03/2017 with preserved EF and no diastolic function   -repeat echo      MS (multiple sclerosis)  -Continue home meds: aclofen tablet 10 mg BID and modafinil tablet 200 mg qd   -Resume home AVONEX 30 injection on discharge as scheduled  -Continue outpatient follow up       Severe obesity (BMI >= 40)  Body mass index is 42.46 kg/m². Morbid obesity complicates all aspects of disease management from diagnostic modalities to treatment. Weight loss encouraged and health benefits explained to patient.           VTE Risk Mitigation (From admission, onward)         Ordered     heparin (porcine) injection 5,000 Units  Every 8 hours         04/14/23 1627     IP VTE HIGH RISK PATIENT  Once         04/14/23 1627     Place ESTRELLA hose  Until discontinued         04/14/23 0856     Place sequential compression device  Until discontinued         04/14/23 0856                     Thank you for your consult. I will follow-up with patient. Please contact us if you have any additional questions.    Rachell Hernandez DO  Department of Hospital Medicine   [facility] - Med Surg

## 2023-04-15 NOTE — PLAN OF CARE
Problem: Occupational Therapy  Goal: Occupational Therapy Goal  Description: Goals to be met by: 04/22/23     Patient will increase functional independence with ADLs by performing:    Feeding with Modified [place].  UE Dressing with Set-up Assistance.  LE Dressing with Moderate Assistance.  Grooming while seated at sink with Set-up Assistance.  Toileting from bedside commode with Stand-by Assistance for hygiene and clothing management.   Bathing UB and sunny sponge I nsit at basin with Supervision.  Toilet transfer to bedside commode with Supervision.  Upper extremity exercise program  BUE AROM/no resistance B hands and elbows, no shoulder AROM above 45 degrees at present, 1x10 reps per handout, with assistance as needed.    Outcome: Ongoing, Progressing

## 2023-04-15 NOTE — NURSING
Returned to room via wheelchair, no distress noted, c-collar in place,no concerns voiced at this time.

## 2023-04-16 VITALS
OXYGEN SATURATION: 91 % | BODY MASS INDEX: 42.4 KG/M2 | WEIGHT: 313.06 LBS | RESPIRATION RATE: 20 BRPM | SYSTOLIC BLOOD PRESSURE: 140 MMHG | TEMPERATURE: 99 F | HEIGHT: 72 IN | DIASTOLIC BLOOD PRESSURE: 84 MMHG | HEART RATE: 90 BPM

## 2023-04-16 LAB
ASCENDING AORTA: 3.51 CM
AV INDEX (PROSTH): 0.6
AV MEAN GRADIENT: 4 MMHG
AV PEAK GRADIENT: 6 MMHG
AV VALVE AREA: 2.07 CM2
AV VELOCITY RATIO: 0.63
BSA FOR ECHO PROCEDURE: 2.69 M2
CV ECHO LV RWT: 0.5 CM
DOP CALC AO PEAK VEL: 1.27 M/S
DOP CALC AO VTI: 26.2 CM
DOP CALC LVOT AREA: 3.4 CM2
DOP CALC LVOT DIAMETER: 2.09 CM
DOP CALC LVOT PEAK VEL: 0.8 M/S
DOP CALC LVOT STROKE VOLUME: 54.18 CM3
DOP CALC MV VTI: 20.1 CM
DOP CALCLVOT PEAK VEL VTI: 15.8 CM
E WAVE DECELERATION TIME: 150.07 MSEC
E/A RATIO: 0.78
E/E' RATIO: 7.22 M/S
ECHO LV POSTERIOR WALL: 1.19 CM (ref 0.6–1.1)
EJECTION FRACTION: 60 %
FRACTIONAL SHORTENING: 30 % (ref 28–44)
INTERVENTRICULAR SEPTUM: 1.15 CM (ref 0.6–1.1)
IVRT: 95.15 MSEC
LA MAJOR: 4.7 CM
LEFT ATRIUM SIZE: 4.45 CM
LEFT INTERNAL DIMENSION IN SYSTOLE: 3.35 CM (ref 2.1–4)
LEFT VENTRICLE DIASTOLIC VOLUME INDEX: 41.69 ML/M2
LEFT VENTRICLE DIASTOLIC VOLUME: 107.56 ML
LEFT VENTRICLE MASS INDEX: 82 G/M2
LEFT VENTRICLE SYSTOLIC VOLUME INDEX: 17.7 ML/M2
LEFT VENTRICLE SYSTOLIC VOLUME: 45.76 ML
LEFT VENTRICULAR INTERNAL DIMENSION IN DIASTOLE: 4.8 CM (ref 3.5–6)
LEFT VENTRICULAR MASS: 211.43 G
LV LATERAL E/E' RATIO: 5.91 M/S
LV SEPTAL E/E' RATIO: 9.29 M/S
LVOT MG: 1.24 MMHG
LVOT MV: 0.49 CM/S
MV MEAN GRADIENT: 2 MMHG
MV PEAK A VEL: 0.83 M/S
MV PEAK E VEL: 0.65 M/S
MV PEAK GRADIENT: 4 MMHG
MV STENOSIS PRESSURE HALF TIME: 43.52 MS
MV VALVE AREA BY CONTINUITY EQUATION: 2.7 CM2
MV VALVE AREA P 1/2 METHOD: 5.06 CM2
PISA TR MAX VEL: 1.48 M/S
PV PEAK VELOCITY: 1.21 CM/S
RA MAJOR: 4.5 CM
RA WIDTH: 3.92 CM
RIGHT VENTRICULAR END-DIASTOLIC DIMENSION: 4.04 CM
SINUS: 3.33 CM
STJ: 2.86 CM
TDI LATERAL: 0.11 M/S
TDI SEPTAL: 0.07 M/S
TDI: 0.09 M/S
TR MAX PG: 9 MMHG
TRICUSPID ANNULAR PLANE SYSTOLIC EXCURSION: 2.34 CM

## 2023-04-16 PROCEDURE — 97116 GAIT TRAINING THERAPY: CPT | Mod: CQ

## 2023-04-16 PROCEDURE — 97530 THERAPEUTIC ACTIVITIES: CPT | Mod: CQ

## 2023-04-16 PROCEDURE — 97110 THERAPEUTIC EXERCISES: CPT | Mod: CQ

## 2023-04-16 PROCEDURE — 63600175 PHARM REV CODE 636 W HCPCS: Performed by: PHYSICIAN ASSISTANT

## 2023-04-16 PROCEDURE — 25000003 PHARM REV CODE 250: Performed by: PHYSICIAN ASSISTANT

## 2023-04-16 RX ADMIN — OXYCODONE AND ACETAMINOPHEN 1 TABLET: 10; 325 TABLET ORAL at 08:04

## 2023-04-16 RX ADMIN — CEFAZOLIN SODIUM 2 G: 2 SOLUTION INTRAVENOUS at 10:04

## 2023-04-16 RX ADMIN — LOSARTAN POTASSIUM 50 MG: 25 TABLET, FILM COATED ORAL at 08:04

## 2023-04-16 RX ADMIN — DOCUSATE SODIUM 100 MG: 100 CAPSULE, LIQUID FILLED ORAL at 08:04

## 2023-04-16 RX ADMIN — MODAFINIL 200 MG: 100 TABLET ORAL at 08:04

## 2023-04-16 RX ADMIN — BACLOFEN 10 MG: 10 TABLET ORAL at 08:04

## 2023-04-16 RX ADMIN — MUPIROCIN: 20 OINTMENT TOPICAL at 08:04

## 2023-04-16 RX ADMIN — HEPARIN SODIUM 5000 UNITS: 5000 INJECTION INTRAVENOUS; SUBCUTANEOUS at 01:04

## 2023-04-16 RX ADMIN — AMLODIPINE BESYLATE 10 MG: 5 TABLET ORAL at 08:04

## 2023-04-16 RX ADMIN — CEFAZOLIN SODIUM 2 G: 2 SOLUTION INTRAVENOUS at 05:04

## 2023-04-16 RX ADMIN — HEPARIN SODIUM 5000 UNITS: 5000 INJECTION INTRAVENOUS; SUBCUTANEOUS at 05:04

## 2023-04-16 NOTE — NURSING
Discharge instructions with spinal precautions hand out reviewed(spinal precautions following your surgery hand  out)  Return demonstration completed with therapist and reinforced with myself Dressing  applied to posterior neck as ordered. Staples intact clean and dry. Hemovac removed by Dr Robertson. No complains of pain at this time. Hemovac output clarified and confirmed. Output 150 cc states PM nurse

## 2023-04-16 NOTE — PROGRESS NOTES
[facility] Medicine  Progress Note    Patient Name: Rajan Cooper III  MRN: [id number]  Patient Class: OP- Outpatient Recovery   Admission Date: 4/14/2023  Length of Stay: 1 days  Attending Physician: Dawit Henao MD  Primary Care Provider: Tom Garcia MD        Subjective:     Principal Problem:S/P cervical spinal fusion        HPI:  67-year-old male with history of multiple sclerosis, hypertension, and venous insufficiency admitted on 04/14/2023 to Neurosurgery.  Patient with cervical myelopathy and cervical stenosis of spine.  Has been having progressive weakness in bilateral hands and legs.  Also noted lower extremity swelling that has been worsening over the last few months.  Wife at bedside.  Patient states he is unable to make it to the bathroom on time now.  Feels like he is unable to have this sensation to go.  Takes Lasix twice a day as needed for leg swelling.  Unsure of any history of heart failure.  Admits medication compliance for his blood pressure.  Hospital Medicine consulted for assistance with medical management.      Overview/Hospital Course:  67-year-old male admitted to nurse surgery for cervical myelopathy and cervical stenosis of spine. Patient s/p Laminectomy and foraminotomy C3/4 and C4/5 and posterolateral fusion C3-5 on 04/14/23.  Patient tolerated the procedure well.  Hospital medicine consulted for assistance with management.  Patient O2 sats of 92% on 2 L.  Now weaned off to room air.    Of note, patient was on p.o. Lasix 40 mg b.i.d. to take as needed for leg swelling.  No known history of heart failure. Lungs clear on exam.  Chest x-ray with no acute process. echocardiogram ordered to further evaluate, pending      Interval History: No acute overnight events. Pt afebrile. States he has no pain. Currently on room air. Denies any chest pain or shortness of breath     Review of Systems   Constitutional:  Negative for chills, fatigue and fever.   Respiratory:   Positive for cough (dry, improved). Negative for shortness of breath (no SOB at this time).    Cardiovascular:  Positive for leg swelling. Negative for chest pain and palpitations.   Gastrointestinal:  Negative for abdominal pain, nausea and vomiting.   Musculoskeletal:  Negative for joint swelling.   Neurological:  Positive for weakness. Negative for dizziness and headaches.   Psychiatric/Behavioral:  Negative for confusion and hallucinations.      Objective:     Vital Signs (Most Recent):  Temp: 97.3 °F (36.3 °C) (04/16/23 0726)  Pulse: 75 (04/16/23 0726)  Resp: 20 (04/16/23 0726)  BP: (!) 177/82 (04/16/23 0726)  SpO2: (!) 92 % (04/16/23 0726)   Vital Signs (24h Range):  Temp:  [97.3 °F (36.3 °C)-98.6 °F (37 °C)] 97.3 °F (36.3 °C)  Pulse:  [] 75  Resp:  [18-20] 20  SpO2:  [91 %-96 %] 92 %  BP: (121-177)/(68-85) 177/82     Weight: (!) 142 kg (313 lb 0.9 oz)  Body mass index is 42.46 kg/m².    Intake/Output Summary (Last 24 hours) at 4/16/2023 0747  Last data filed at 4/16/2023 0522  Gross per 24 hour   Intake 379.47 ml   Output 1471 ml   Net -1091.53 ml      Physical Exam  Vitals and nursing note reviewed.   Constitutional:       General: He is not in acute distress.     Appearance: He is obese. He is not ill-appearing.   HENT:      Head: Atraumatic.      Nose: Nose normal.      Mouth/Throat:      Mouth: Mucous membranes are moist.      Comments: Pt in neck collar; voice appears hoarse   Eyes:      Extraocular Movements: Extraocular movements intact.      Pupils: Pupils are equal, round, and reactive to light.   Neck:      Comments: Currently in a collar  Cardiovascular:      Rate and Rhythm: Normal rate and regular rhythm.      Heart sounds: No murmur heard.    No gallop.   Pulmonary:      Effort: Pulmonary effort is normal. No respiratory distress.      Breath sounds: Normal breath sounds. No wheezing or rales.      Comments: Patient on room air   Abdominal:      General: There is no distension.       Palpations: Abdomen is soft.      Tenderness: There is no abdominal tenderness. There is no guarding.   Musculoskeletal:         General: No swelling or tenderness.      Right lower leg: Edema present.      Left lower leg: Edema present.      Comments: Trace edema bilateral lower extremity   Skin:     General: Skin is warm and dry.   Neurological:      Mental Status: He is alert and oriented to person, place, and time.   Psychiatric:         Mood and Affect: Mood normal.         Thought Content: Thought content normal.       Significant Labs: All pertinent labs within the past 24 hours have been reviewed.    Significant Imaging: I have reviewed all pertinent imaging results/findings within the past 24 hours.      Assessment/Plan:      * S/P cervical spinal fusion  -s/p Laminectomy and foraminotomy C3/4 and C4/5 and posterolateral fusion C3-5 on 04/14/23  -management per primary team: Neurosurgery      HTN (hypertension)  -continue home medications: Amlodipine 10 mg daily, losartan 50 mg daily  -titrate home meds as needed   -continue pain control   -IV hydralazine as needed    Localized swelling of both lower extremities  -per wife and patient, has had lower extremity swelling over the past few months.  Denies any trauma or falls   -takes p.o. Lasix 40 mg b.i.d. as needed for leg swelling  -no no history of heart failure   -lower extremity swelling appear symmetrical and bilateral   -low suspicion for DVT at this time   -previous echo in 03/2017 with preserved EF and no diastolic function   -repeat echo pending      MS (multiple sclerosis)  -Continue home meds: aclofen tablet 10 mg BID and modafinil tablet 200 mg qd   -Resume home AVONEX 30 injection on discharge as scheduled  -Continue outpatient follow up       Severe obesity (BMI >= 40)  Body mass index is 42.46 kg/m². Morbid obesity complicates all aspects of disease management from diagnostic modalities to treatment. Weight loss encouraged and health benefits  explained to patient.           VTE Risk Mitigation (From admission, onward)         Ordered     heparin (porcine) injection 5,000 Units  Every 8 hours         04/14/23 1627     IP VTE HIGH RISK PATIENT  Once         04/14/23 1627     Place ESTRELLA hose  Until discontinued         04/14/23 0856     Place sequential compression device  Until discontinued         04/14/23 0856                Discharge Planning   REGINALD:      Code Status: Prior   Is the patient medically ready for discharge?:     Reason for patient still in hospital (select all that apply): Patient trending condition, Treatment and Other (specify) Per primary team, neurosurgery  Discharge Plan A: Home Health        Thank you for your consult and allowing me to participate in this patient's care. I will follow-up with patient. Awaiting echo results, but that can be followed up on discharge as well. Echo results should not hold up discharge planning. Please contact us if you have any additional questions.            Rachell Hernandez DO  Department of Hospital Medicine   [facility] - Med Surg

## 2023-04-16 NOTE — PLAN OF CARE
04/16/23 1248   Final Note   Assessment Type Final Discharge Note   Anticipated Discharge Disposition Home-Health   Hospital Resources/Appts/Education Provided Provided patient/caregiver with written discharge plan information;Appointments scheduled and added to AVS   Post-Acute Status   Post-Acute Authorization Home Health   Home Health Status Set-up Complete/Auth obtained   Discharge Delays None known at this time     Patient will discharge home with home health. ([facility])    Patient will be transported home by family at discharge.     Patient will discharge with no DME.     Patient discharge needs have been addressed from a SW standpoint.

## 2023-04-16 NOTE — SUBJECTIVE & OBJECTIVE
Interval History: No acute overnight events. Pt afebrile. States he has no pain. Currently on room air. Denies any chest pain or shortness of breath     Review of Systems   Constitutional:  Negative for chills, fatigue and fever.   Respiratory:  Positive for cough (dry, improved). Negative for shortness of breath (no SOB at this time).    Cardiovascular:  Positive for leg swelling. Negative for chest pain and palpitations.   Gastrointestinal:  Negative for abdominal pain, nausea and vomiting.   Musculoskeletal:  Negative for joint swelling.   Neurological:  Positive for weakness. Negative for dizziness and headaches.   Psychiatric/Behavioral:  Negative for confusion and hallucinations.      Objective:     Vital Signs (Most Recent):  Temp: 97.3 °F (36.3 °C) (04/16/23 0726)  Pulse: 75 (04/16/23 0726)  Resp: 20 (04/16/23 0726)  BP: (!) 177/82 (04/16/23 0726)  SpO2: (!) 92 % (04/16/23 0726)   Vital Signs (24h Range):  Temp:  [97.3 °F (36.3 °C)-98.6 °F (37 °C)] 97.3 °F (36.3 °C)  Pulse:  [] 75  Resp:  [18-20] 20  SpO2:  [91 %-96 %] 92 %  BP: (121-177)/(68-85) 177/82     Weight: (!) 142 kg (313 lb 0.9 oz)  Body mass index is 42.46 kg/m².    Intake/Output Summary (Last 24 hours) at 4/16/2023 0747  Last data filed at 4/16/2023 0522  Gross per 24 hour   Intake 379.47 ml   Output 1471 ml   Net -1091.53 ml      Physical Exam  Vitals and nursing note reviewed.   Constitutional:       General: He is not in acute distress.     Appearance: He is obese. He is not ill-appearing.   HENT:      Head: Atraumatic.      Nose: Nose normal.      Mouth/Throat:      Mouth: Mucous membranes are moist.      Comments: Pt in neck collar; voice appears hoarse   Eyes:      Extraocular Movements: Extraocular movements intact.      Pupils: Pupils are equal, round, and reactive to light.   Neck:      Comments: Currently in a collar  Cardiovascular:      Rate and Rhythm: Normal rate and regular rhythm.      Heart sounds: No murmur heard.    No  gallop.   Pulmonary:      Effort: Pulmonary effort is normal. No respiratory distress.      Breath sounds: Normal breath sounds. No wheezing or rales.      Comments: Patient on room air   Abdominal:      General: There is no distension.      Palpations: Abdomen is soft.      Tenderness: There is no abdominal tenderness. There is no guarding.   Musculoskeletal:         General: No swelling or tenderness.      Right lower leg: Edema present.      Left lower leg: Edema present.      Comments: Trace edema bilateral lower extremity   Skin:     General: Skin is warm and dry.   Neurological:      Mental Status: He is alert and oriented to person, place, and time.   Psychiatric:         Mood and Affect: Mood normal.         Thought Content: Thought content normal.       Significant Labs: All pertinent labs within the past 24 hours have been reviewed.    Significant Imaging: I have reviewed all pertinent imaging results/findings within the past 24 hours.

## 2023-04-16 NOTE — NURSING
Report received and care assumed. Discussed plan of care and safety with patient . Reviewed call system. No acute distress noted  Room air. Hemovac intact.[facility]  Nurses Note -- 4 Eyes      4/16/2023       Skin assessed on: Q Shift      [x] No Pressure Injuries Present    []Prevention Measures Documented    [] Yes LDA  for Pressure Injury Previously documented     [] Yes New Pressure Injury Discovered   [] LDA for New Pressure Injury Added      Attending RN:  Palma Ma RN     Second RN: Praveena Deleon RN

## 2023-04-16 NOTE — PLAN OF CARE
Problem: Physical Therapy  Goal: Physical Therapy Goal  Description: Goals to be met by: 23     Patient will increase functional independence with mobility by performin. Supine to sit with Modified [place]  2. Rolling to Left and Right with Modified [place]  3. Sit to stand transfer with Modified [place] using RW  4. Bed to chair transfer with Modified [place] using Rolling Walker  5. Gait x100 feet with Modified [place] using Rolling Walker and cervical collar   6. Lower extremity exercise program 2 sets x15 reps per handout, with independence    Outcome: Ongoing, Progressing

## 2023-04-16 NOTE — PLAN OF CARE
Problem: Adult Inpatient Plan of Care  Goal: Plan of Care Review  Outcome: Ongoing, Progressing  Goal: Patient-Specific Goal (Individualized)  Outcome: Ongoing, Progressing  Goal: Optimal Comfort and Wellbeing  Outcome: Ongoing, Progressing     Problem: Skin Injury Risk Increased  Goal: Skin Health and Integrity  Outcome: Ongoing, Progressing     Pt free from falls or any further trauma through out he shift. Prescribed medication administered. No complaints of pain. Hemovac drain and C collar in place. Pt in no distress. Will continue to monitor.

## 2023-04-16 NOTE — PT/OT/SLP PROGRESS
Physical Therapy Treatment    Patient Name:  Rajan Cooper III   MRN:  [id number]    Recommendations:     Discharge Recommendations: home health PT (with family assistance)  Discharge Equipment Recommendations: none  Barriers to discharge: None    Assessment:     Rajan Cooper III is a 67 y.o. male admitted with a medical diagnosis of S/P cervical spinal fusion.  He presents with the following impairments/functional limitations: weakness, impaired endurance, impaired self care skills, impaired functional mobility, gait instability, impaired balance, decreased upper extremity function, decreased lower extremity function, decreased ROM, impaired coordination, impaired skin, edema, orthopedic precautions .  Pt tolerate short gt distance x  2 trials with rw with decreased zahra, decreased step length and height, decreased wt shifting.   Rehab Prognosis: Good; patient would benefit from acute skilled PT services to address these deficits and reach maximum level of function.    Recent Surgery: Procedure(s) (LRB):  LAMINECTOMY, SPINE, CERVICAL, WITH POSTERIOR FUSION (N/A) 2 Days Post-Op    Plan:     During this hospitalization, patient to be seen daily to address the identified rehab impairments via gait training, therapeutic activities, therapeutic exercises and progress toward the following goals:    Plan of Care Expires:  04/29/23    Subjective     Chief Complaint: no c/o  Patient/Family Comments/goals: pt and spouse agreed to therapy, very pleasant.  Pain/Comfort:  Pain Rating 1: 0/10  Pain Rating Post-Intervention 1: 0/10      Objective:     Communicated with nurse Waldrop prior to session.  Patient found HOB elevated with telemetry, peripheral IV, pressure relief boots, PureWick upon PT entry to room.     General Precautions: Standard, fall  Orthopedic Precautions: spinal precautions  Braces: Cervical collar  Respiratory Status: Room air     Functional Mobility:  Bed Mobility:     Rolling Right: minimum  assistance  Scooting: stand by assistance  Supine to Sit: minimum assistance  Sit to Supine: minimum assistance  Transfers:     Sit to Stand:  stand by assistance with rolling walker  Gait: 40 ft x 2 with rw with SBA with vcs for safety and no planting and twisting with turns  Balance: F+ dynamic/static standing      AM-PAC 6 CLICK MOBILITY  Turning over in bed (including adjusting bedclothes, sheets and blankets)?: 3  Sitting down on and standing up from a chair with arms (e.g., wheelchair, bedside commode, etc.): 4  Moving from lying on back to sitting on the side of the bed?: 3  Moving to and from a bed to a chair (including a wheelchair)?: 3  Need to walk in hospital room?: 3  Climbing 3-5 steps with a railing?: 2  Basic Mobility Total Score: 18       Treatment & Education:  Lower Extremity Exercises.   Patient educated on the purpose of therapeutic exercise.    Patient verbalized acceptance/understanding of instructions, expectations, and limitations(for safety).  Patient performed: 2 sets of 10 reps (each) of B LE There Ex: AP, LAQ, Hip abd/add, Hip flexion while sitting up on EOB.    Supine; QS, AP, GS   Patient required still requires verbal cues/tactile cues to ensure correct sequence, to maintain proper form, and to allow for self-correction.  Pt reported no complaints or problems with exercise activity.      Patient left HOB elevated with all lines intact, call button in reach, Nurse notified, and spouse present..    GOALS:   Multidisciplinary Problems       Physical Therapy Goals          Problem: Physical Therapy    Goal Priority Disciplines Outcome Goal Variances Interventions   Physical Therapy Goal     PT, PT/OT Ongoing, Progressing     Description: Goals to be met by: 23     Patient will increase functional independence with mobility by performin. Supine to sit with Modified [place]  2. Rolling to Left and Right with Modified [place]  3. Sit to stand transfer with Modified  [place] using RW  4. Bed to chair transfer with Modified [place] using Rolling Walker  5. Gait x100 feet with Modified [place] using Rolling Walker and cervical collar   6. Lower extremity exercise program 2 sets x15 reps per handout, with independence                         Time Tracking:     PT Received On: 04/15/23  PT Start Time: 1055     PT Stop Time: 1135  PT Total Time (min): 40 min     Billable Minutes: Gait Training 15, Therapeutic Activity 15, and Therapeutic Exercise 10    Treatment Type: Treatment  PT/PTA: PTA     Number of PTA visits since last PT visit: 2     04/16/2023

## 2023-04-16 NOTE — ASSESSMENT & PLAN NOTE
-per wife and patient, has had lower extremity swelling over the past few months.  Denies any trauma or falls   -takes p.o. Lasix 40 mg b.i.d. as needed for leg swelling  -no no history of heart failure   -lower extremity swelling appear symmetrical and bilateral   -low suspicion for DVT at this time   -previous echo in 03/2017 with preserved EF and no diastolic function   -repeat echo pending

## 2023-04-16 NOTE — PROGRESS NOTES
POD#2    S/p C3-5 lami and fusion for myelopathy    S:  Feels improvement in  strength, HF strength and balance  Minimal pain    O:  AF   VSS  Postop labs reviewed    Drain: RN confirms documentation error, urine appears to be documented in drain output. Drain has put out minimal amount    Incision c/d/I   4/5  Proximal UE 5/5  Bilateral HF 4+ and distally 5/5    A/P:  Plan for discharge home today w HHPT.

## 2023-04-17 PROCEDURE — G0180 MD CERTIFICATION HHA PATIENT: HCPCS | Mod: ,,, | Performed by: PHYSICIAN ASSISTANT

## 2023-04-17 PROCEDURE — G0180 PR HOME HEALTH MD CERTIFICATION: ICD-10-PCS | Mod: ,,, | Performed by: PHYSICIAN ASSISTANT

## 2023-04-21 ENCOUNTER — TELEPHONE (OUTPATIENT)
Dept: NEUROSURGERY | Facility: CLINIC | Age: 68
End: 2023-04-21
Payer: COMMERCIAL

## 2023-04-21 ENCOUNTER — PATIENT MESSAGE (OUTPATIENT)
Dept: ADMINISTRATIVE | Facility: HOSPITAL | Age: 68
End: 2023-04-21
Payer: COMMERCIAL

## 2023-04-21 DIAGNOSIS — Z98.1 S/P CERVICAL SPINAL FUSION: Primary | ICD-10-CM

## 2023-04-21 NOTE — TELEPHONE ENCOUNTER
Reached out to patient spouse as requested. She reported that patient voice still hasn't come back yet and sometimes when he's drinking, he choking as well. Patient was advise that I will send the provider a msg and once a response is given, I will return her phone call. Patient agreed and verbalized understanding.    ----- Message from Carol Romano sent at 4/21/2023 11:33 AM CDT -----  Regarding: call back  Contact: Luciana [phone]  Luciana pt wife calling in regarding procedure on 4/14/23 caller stated that pt has  no gotten any better since then caller is requesting a call back

## 2023-04-26 NOTE — ANESTHESIA POSTPROCEDURE EVALUATION
Anesthesia Post Evaluation    Patient: Rajan Cooper III    Procedure(s) Performed: Procedure(s) (LRB):  LAMINECTOMY, SPINE, CERVICAL, WITH POSTERIOR FUSION (N/A)    Final Anesthesia Type: general      Patient location during evaluation: PACU  Patient participation: Yes- Able to Participate  Level of consciousness: awake and alert and oriented  Post-procedure vital signs: reviewed and stable  Pain management: adequate  Airway patency: patent    PONV status at discharge: No PONV  Anesthetic complications: no      Cardiovascular status: blood pressure returned to baseline, hemodynamically stable and stable  Respiratory status: unassisted, spontaneous ventilation and room air  Hydration status: euvolemic  Follow-up not needed.          Vitals Value Taken Time   /84 04/16/23 1143   Temp 37.1 °C (98.8 °F) 04/16/23 1143   Pulse 90 04/16/23 1143   Resp 20 04/16/23 1143   SpO2 91 % 04/16/23 1143         Event Time   Out of Recovery 15:41:46         Pain/Selene Score: No data recorded

## 2023-04-28 ENCOUNTER — HOSPITAL ENCOUNTER (OUTPATIENT)
Dept: RADIOLOGY | Facility: HOSPITAL | Age: 68
Discharge: HOME | End: 2023-04-28
Attending: PHYSICIAN ASSISTANT
Payer: COMMERCIAL

## 2023-04-28 ENCOUNTER — EXTERNAL HOME HEALTH (OUTPATIENT)
Dept: HOME HEALTH SERVICES | Facility: HOSPITAL | Age: 68
End: 2023-04-28
Payer: COMMERCIAL

## 2023-04-28 ENCOUNTER — OFFICE VISIT (OUTPATIENT)
Dept: NEUROSURGERY | Facility: CLINIC | Age: 68
End: 2023-04-28
Payer: COMMERCIAL

## 2023-04-28 ENCOUNTER — TELEPHONE (OUTPATIENT)
Dept: NEUROSURGERY | Facility: CLINIC | Age: 68
End: 2023-04-28

## 2023-04-28 VITALS
DIASTOLIC BLOOD PRESSURE: 73 MMHG | HEIGHT: 72 IN | TEMPERATURE: 98 F | HEART RATE: 68 BPM | SYSTOLIC BLOOD PRESSURE: 120 MMHG | WEIGHT: 313 LBS | BODY MASS INDEX: 42.39 KG/M2

## 2023-04-28 DIAGNOSIS — Z98.1 S/P CERVICAL SPINAL FUSION: ICD-10-CM

## 2023-04-28 DIAGNOSIS — Z98.1 S/P CERVICAL SPINAL FUSION: Primary | ICD-10-CM

## 2023-04-28 PROCEDURE — 3044F HG A1C LEVEL LT 7.0%: CPT | Mod: CPTII,S$GLB,, | Performed by: PHYSICIAN ASSISTANT

## 2023-04-28 PROCEDURE — 1101F PT FALLS ASSESS-DOCD LE1/YR: CPT | Mod: CPTII,S$GLB,, | Performed by: PHYSICIAN ASSISTANT

## 2023-04-28 PROCEDURE — 3288F PR FALLS RISK ASSESSMENT DOCUMENTED: ICD-10-PCS | Mod: CPTII,S$GLB,, | Performed by: PHYSICIAN ASSISTANT

## 2023-04-28 PROCEDURE — 72040 X-RAY EXAM NECK SPINE 2-3 VW: CPT | Mod: TC,FY

## 2023-04-28 PROCEDURE — 99999 PR PBB SHADOW E&M-EST. PATIENT-LVL V: CPT | Mod: PBBFAC,,, | Performed by: PHYSICIAN ASSISTANT

## 2023-04-28 PROCEDURE — 3074F SYST BP LT 130 MM HG: CPT | Mod: CPTII,S$GLB,, | Performed by: PHYSICIAN ASSISTANT

## 2023-04-28 PROCEDURE — 1159F PR MEDICATION LIST DOCUMENTED IN MEDICAL RECORD: ICD-10-PCS | Mod: CPTII,S$GLB,, | Performed by: PHYSICIAN ASSISTANT

## 2023-04-28 PROCEDURE — 3078F DIAST BP <80 MM HG: CPT | Mod: CPTII,S$GLB,, | Performed by: PHYSICIAN ASSISTANT

## 2023-04-28 PROCEDURE — 3288F FALL RISK ASSESSMENT DOCD: CPT | Mod: CPTII,S$GLB,, | Performed by: PHYSICIAN ASSISTANT

## 2023-04-28 PROCEDURE — 1159F MED LIST DOCD IN RCRD: CPT | Mod: CPTII,S$GLB,, | Performed by: PHYSICIAN ASSISTANT

## 2023-04-28 PROCEDURE — 3008F BODY MASS INDEX DOCD: CPT | Mod: CPTII,S$GLB,, | Performed by: PHYSICIAN ASSISTANT

## 2023-04-28 PROCEDURE — 3008F PR BODY MASS INDEX (BMI) DOCUMENTED: ICD-10-PCS | Mod: CPTII,S$GLB,, | Performed by: PHYSICIAN ASSISTANT

## 2023-04-28 PROCEDURE — 4010F PR ACE/ARB THEARPY RXD/TAKEN: ICD-10-PCS | Mod: CPTII,S$GLB,, | Performed by: PHYSICIAN ASSISTANT

## 2023-04-28 PROCEDURE — 3044F PR MOST RECENT HEMOGLOBIN A1C LEVEL <7.0%: ICD-10-PCS | Mod: CPTII,S$GLB,, | Performed by: PHYSICIAN ASSISTANT

## 2023-04-28 PROCEDURE — 3078F PR MOST RECENT DIASTOLIC BLOOD PRESSURE < 80 MM HG: ICD-10-PCS | Mod: CPTII,S$GLB,, | Performed by: PHYSICIAN ASSISTANT

## 2023-04-28 PROCEDURE — 72040 X-RAY EXAM NECK SPINE 2-3 VW: CPT | Mod: 26,,, | Performed by: RADIOLOGY

## 2023-04-28 PROCEDURE — 99024 PR POST-OP FOLLOW-UP VISIT: ICD-10-PCS | Mod: S$GLB,,, | Performed by: PHYSICIAN ASSISTANT

## 2023-04-28 PROCEDURE — 1101F PR PT FALLS ASSESS DOC 0-1 FALLS W/OUT INJ PAST YR: ICD-10-PCS | Mod: CPTII,S$GLB,, | Performed by: PHYSICIAN ASSISTANT

## 2023-04-28 PROCEDURE — 99024 POSTOP FOLLOW-UP VISIT: CPT | Mod: S$GLB,,, | Performed by: PHYSICIAN ASSISTANT

## 2023-04-28 PROCEDURE — 4010F ACE/ARB THERAPY RXD/TAKEN: CPT | Mod: CPTII,S$GLB,, | Performed by: PHYSICIAN ASSISTANT

## 2023-04-28 PROCEDURE — 1160F RVW MEDS BY RX/DR IN RCRD: CPT | Mod: CPTII,S$GLB,, | Performed by: PHYSICIAN ASSISTANT

## 2023-04-28 PROCEDURE — 1126F PR PAIN SEVERITY QUANTIFIED, NO PAIN PRESENT: ICD-10-PCS | Mod: CPTII,S$GLB,, | Performed by: PHYSICIAN ASSISTANT

## 2023-04-28 PROCEDURE — 99999 PR PBB SHADOW E&M-EST. PATIENT-LVL V: ICD-10-PCS | Mod: PBBFAC,,, | Performed by: PHYSICIAN ASSISTANT

## 2023-04-28 PROCEDURE — 72040 XR CERVICAL SPINE AP LATERAL: ICD-10-PCS | Mod: 26,,, | Performed by: RADIOLOGY

## 2023-04-28 PROCEDURE — 1126F AMNT PAIN NOTED NONE PRSNT: CPT | Mod: CPTII,S$GLB,, | Performed by: PHYSICIAN ASSISTANT

## 2023-04-28 PROCEDURE — 3074F PR MOST RECENT SYSTOLIC BLOOD PRESSURE < 130 MM HG: ICD-10-PCS | Mod: CPTII,S$GLB,, | Performed by: PHYSICIAN ASSISTANT

## 2023-04-28 PROCEDURE — 1160F PR REVIEW ALL MEDS BY PRESCRIBER/CLIN PHARMACIST DOCUMENTED: ICD-10-PCS | Mod: CPTII,S$GLB,, | Performed by: PHYSICIAN ASSISTANT

## 2023-04-28 NOTE — TELEPHONE ENCOUNTER
Dr. Henao,    Patient stated you had mentioned wearing the neck brace 24/7 for 6 weeks.  Can he take it off to sleep at this point or do you want him to continue wearing it to sleep? He is 2 weeks po.    Also his voice is very hoarse.  Do you want him to see ENT or give this more time?    Thanks,  Angelique Hinojosa, [facility], PA-C  Neurosurgery  [facility]  04/28/2023

## 2023-04-28 NOTE — PROGRESS NOTES
Postoperative Wound Check:    Date of Procedure: 4/14/2023      Pre-Operative Diagnosis: Cervical myelopathy [G95.9]  Cervical stenosis of spine [M48.02]     Post-Operative Diagnosis: Post-Op Diagnosis Codes:     * Cervical myelopathy [G95.9]     * Cervical stenosis of spine [M48.02]     Anesthesia: General     Procedures performed:  Laminectomy and foraminotomy C3/4 and C4/5  Instrumented posterolateral fusion C3-5 with lateral mass screws and rods  Allograft, [place]      Surgeon: Dawit Henao MD     Assistant: Tuyet Gutierrez PA-C, scrubbed and assisting with all portions, no resident available.    HPI:    Patient states overall he is doing okay.  He has minimal neck pain.  No arm pain or paresthesias.  He has been wearing his neck brace.  He denies any problems with the incisions.  He is very hoarse still.    Patient denies any problems with the incisions.  No redness, swelling, or drainage, and no fever, chills, or sweats.      Physical Exam:    Vitals:    04/28/23 1316   BP: 120/73   Pulse: 68   Temp: 98.4 °F (36.9 °C)   TempSrc: Oral   Weight: (!) 142 kg (313 lb)   Height: 6' (1.829 m)   PainSc: 0-No pain         Incision:  Wound edges are approximated.  No redness, swelling, or drainage.      Diagnosis:     1. S/P cervical spinal fusion              Plan:       Orders Placed This Encounter    X-Ray Cervical Spine AP And Lateral    X-Ray Cervical Spine AP And Lateral         -Medical Assistant and I removed the staples without any complications.  Chloraprep applied.  -continue working with HHPTOT  -Continue neck brace        The patient will follow up with Dr. Henao in 4 weeks for the 6 week po fu with xrays beforehand.    Angelique Hinojosa, [facility], PA-C  Neurosurgery  [facility]

## 2023-04-29 ENCOUNTER — PATIENT MESSAGE (OUTPATIENT)
Dept: NEUROSURGERY | Facility: CLINIC | Age: 68
End: 2023-04-29
Payer: COMMERCIAL

## 2023-05-01 ENCOUNTER — PATIENT MESSAGE (OUTPATIENT)
Dept: NEUROSURGERY | Facility: CLINIC | Age: 68
End: 2023-05-01
Payer: COMMERCIAL

## 2023-05-02 RX ORDER — OXYCODONE AND ACETAMINOPHEN 10; 325 MG/1; MG/1
1 TABLET ORAL EVERY 6 HOURS PRN
Qty: 40 TABLET | Refills: 0 | Status: SHIPPED | OUTPATIENT
Start: 2023-05-02 | End: 2023-08-14

## 2023-05-10 ENCOUNTER — PATIENT MESSAGE (OUTPATIENT)
Dept: NEUROSURGERY | Facility: CLINIC | Age: 68
End: 2023-05-10
Payer: COMMERCIAL

## 2023-05-10 ENCOUNTER — TELEPHONE (OUTPATIENT)
Dept: NEUROSURGERY | Facility: CLINIC | Age: 68
End: 2023-05-10
Payer: COMMERCIAL

## 2023-05-10 DIAGNOSIS — G95.9 CERVICAL MYELOPATHY: ICD-10-CM

## 2023-05-10 DIAGNOSIS — Z98.1 S/P CERVICAL SPINAL FUSION: Primary | ICD-10-CM

## 2023-05-10 NOTE — TELEPHONE ENCOUNTER
Can you please provide RTW statement w the requested dates..  <10bs lbs lifting, no bending or twisting

## 2023-05-11 ENCOUNTER — PATIENT MESSAGE (OUTPATIENT)
Dept: NEUROSURGERY | Facility: CLINIC | Age: 68
End: 2023-05-11
Payer: COMMERCIAL

## 2023-05-26 ENCOUNTER — PATIENT MESSAGE (OUTPATIENT)
Dept: NEUROSURGERY | Facility: CLINIC | Age: 68
End: 2023-05-26
Payer: COMMERCIAL

## 2023-06-13 ENCOUNTER — OFFICE VISIT (OUTPATIENT)
Dept: NEUROSURGERY | Facility: CLINIC | Age: 68
End: 2023-06-13
Payer: COMMERCIAL

## 2023-06-13 ENCOUNTER — HOSPITAL ENCOUNTER (OUTPATIENT)
Dept: RADIOLOGY | Facility: HOSPITAL | Age: 68
Discharge: HOME | End: 2023-06-13
Attending: PHYSICIAN ASSISTANT
Payer: COMMERCIAL

## 2023-06-13 ENCOUNTER — DOCUMENT SCAN (OUTPATIENT)
Dept: HOME HEALTH SERVICES | Facility: HOSPITAL | Age: 68
End: 2023-06-13
Payer: COMMERCIAL

## 2023-06-13 ENCOUNTER — TELEPHONE (OUTPATIENT)
Dept: NEUROSURGERY | Facility: CLINIC | Age: 68
End: 2023-06-13
Payer: COMMERCIAL

## 2023-06-13 VITALS — HEIGHT: 72 IN | WEIGHT: 313 LBS | BODY MASS INDEX: 42.39 KG/M2

## 2023-06-13 DIAGNOSIS — G95.9 CERVICAL MYELOPATHY: ICD-10-CM

## 2023-06-13 DIAGNOSIS — Z98.1 S/P CERVICAL SPINAL FUSION: ICD-10-CM

## 2023-06-13 DIAGNOSIS — Z98.1 S/P CERVICAL SPINAL FUSION: Primary | ICD-10-CM

## 2023-06-13 PROCEDURE — 72040 X-RAY EXAM NECK SPINE 2-3 VW: CPT | Mod: 26,,, | Performed by: RADIOLOGY

## 2023-06-13 PROCEDURE — 3008F PR BODY MASS INDEX (BMI) DOCUMENTED: ICD-10-PCS | Mod: CPTII,S$GLB,, | Performed by: NEUROLOGICAL SURGERY

## 2023-06-13 PROCEDURE — 99024 PR POST-OP FOLLOW-UP VISIT: ICD-10-PCS | Mod: S$GLB,,, | Performed by: NEUROLOGICAL SURGERY

## 2023-06-13 PROCEDURE — 3044F HG A1C LEVEL LT 7.0%: CPT | Mod: CPTII,S$GLB,, | Performed by: NEUROLOGICAL SURGERY

## 2023-06-13 PROCEDURE — 1126F PR PAIN SEVERITY QUANTIFIED, NO PAIN PRESENT: ICD-10-PCS | Mod: CPTII,S$GLB,, | Performed by: NEUROLOGICAL SURGERY

## 2023-06-13 PROCEDURE — 1159F PR MEDICATION LIST DOCUMENTED IN MEDICAL RECORD: ICD-10-PCS | Mod: CPTII,S$GLB,, | Performed by: NEUROLOGICAL SURGERY

## 2023-06-13 PROCEDURE — 99999 PR PBB SHADOW E&M-EST. PATIENT-LVL IV: CPT | Mod: PBBFAC,,, | Performed by: NEUROLOGICAL SURGERY

## 2023-06-13 PROCEDURE — 99024 POSTOP FOLLOW-UP VISIT: CPT | Mod: S$GLB,,, | Performed by: NEUROLOGICAL SURGERY

## 2023-06-13 PROCEDURE — 99999 PR PBB SHADOW E&M-EST. PATIENT-LVL IV: ICD-10-PCS | Mod: PBBFAC,,, | Performed by: NEUROLOGICAL SURGERY

## 2023-06-13 PROCEDURE — 3288F FALL RISK ASSESSMENT DOCD: CPT | Mod: CPTII,S$GLB,, | Performed by: NEUROLOGICAL SURGERY

## 2023-06-13 PROCEDURE — 72040 XR CERVICAL SPINE AP LATERAL: ICD-10-PCS | Mod: 26,,, | Performed by: RADIOLOGY

## 2023-06-13 PROCEDURE — 4010F ACE/ARB THERAPY RXD/TAKEN: CPT | Mod: CPTII,S$GLB,, | Performed by: NEUROLOGICAL SURGERY

## 2023-06-13 PROCEDURE — 3044F PR MOST RECENT HEMOGLOBIN A1C LEVEL <7.0%: ICD-10-PCS | Mod: CPTII,S$GLB,, | Performed by: NEUROLOGICAL SURGERY

## 2023-06-13 PROCEDURE — 3008F BODY MASS INDEX DOCD: CPT | Mod: CPTII,S$GLB,, | Performed by: NEUROLOGICAL SURGERY

## 2023-06-13 PROCEDURE — 1159F MED LIST DOCD IN RCRD: CPT | Mod: CPTII,S$GLB,, | Performed by: NEUROLOGICAL SURGERY

## 2023-06-13 PROCEDURE — 4010F PR ACE/ARB THEARPY RXD/TAKEN: ICD-10-PCS | Mod: CPTII,S$GLB,, | Performed by: NEUROLOGICAL SURGERY

## 2023-06-13 PROCEDURE — 72040 X-RAY EXAM NECK SPINE 2-3 VW: CPT | Mod: TC,FY

## 2023-06-13 PROCEDURE — 3288F PR FALLS RISK ASSESSMENT DOCUMENTED: ICD-10-PCS | Mod: CPTII,S$GLB,, | Performed by: NEUROLOGICAL SURGERY

## 2023-06-13 PROCEDURE — 1101F PT FALLS ASSESS-DOCD LE1/YR: CPT | Mod: CPTII,S$GLB,, | Performed by: NEUROLOGICAL SURGERY

## 2023-06-13 PROCEDURE — 1101F PR PT FALLS ASSESS DOC 0-1 FALLS W/OUT INJ PAST YR: ICD-10-PCS | Mod: CPTII,S$GLB,, | Performed by: NEUROLOGICAL SURGERY

## 2023-06-13 PROCEDURE — 1126F AMNT PAIN NOTED NONE PRSNT: CPT | Mod: CPTII,S$GLB,, | Performed by: NEUROLOGICAL SURGERY

## 2023-06-13 NOTE — PROGRESS NOTES
NEUROSURGICAL OUTPATIENT CONSULTATION NOTE    DATE OF SERVICE:  06/13/2023    ATTENDING PHYSICIAN:  Dawit Henao MD    POV 3m    HISTORY OF PRESENT ILLNESS:  This is a very pleasant 67 y.o. male who is here for routine follow    S/p C3-5 lami and fusion for myelopathy    H/o MS    Subjective:  Improved arm strength, still using scooter (baseline)    No pain  Hoarseness is improved as well    PAST MEDICAL HISTORY:  Active Ambulatory Problems     Diagnosis Date Noted    MS (multiple sclerosis)     HTN (hypertension) 11/30/2012    Gait disorder 04/17/2013    Muscle cramps 05/04/2015    Ischemia of toe 04/04/2017    Leg pain 09/14/2017    Cellulitis 06/12/2019    Dry skin 07/09/2019    Left leg cellulitis 06/25/2020    Infrapatellar bursitis of left knee 06/25/2020    S/P cervical spinal fusion 04/14/2023    Localized swelling of both lower extremities 04/15/2023    Severe obesity (BMI >= 40) 04/15/2023     Resolved Ambulatory Problems     Diagnosis Date Noted    Abscess of left hand 03/04/2017    Pyogenic arthritis of left wrist 03/06/2017    Staphylococcus aureus sepsis 03/06/2017    Acute respiratory failure with hypoxemia 03/06/2017    Staphylococcal arthritis of left wrist 03/07/2017    AAA (abdominal aortic aneurysm)     PVD (peripheral vascular disease)     Cellulitis of left arm 06/09/2019    Olecranon bursitis 06/09/2019     Past Medical History:   Diagnosis Date    Hypertension     Staph aureus infection 2017       PAST SURGICAL HISTORY:  Past Surgical History:   Procedure Laterality Date    POSTERIOR FUSION OF CERVICAL SPINE WITH LAMINECTOMY N/A 4/14/2023    Procedure: LAMINECTOMY, SPINE, CERVICAL, WITH POSTERIOR FUSION;  Surgeon: Dawit Henao MD;  Location: [facility];  Service: Neurosurgery;  Laterality: N/A;  C3-6 PCF   fluoro  gel rolls  macdonald  pulsavac, hemovac drain  neuromonitoring  [facility]HAS CLEARANCE  11AM START---NEED CONSENTS, , H/P, ----NEUROMONITORING  DIPAK  [phone]  SHUKRI BANGURA [phone]  CALLED WILLEM ON 4/10/       SOCIAL HISTORY:   Social History     Socioeconomic History    Marital status:    Tobacco Use    Smoking status: Never    Smokeless tobacco: Never   Substance and Sexual Activity    Alcohol use: No    Drug use: No     Social Determinants of Health     Financial Resource Strain: Low Risk     Difficulty of Paying Living Expenses: Not hard at all   Food Insecurity: No Food Insecurity    Worried About Running Out of Food in the Last Year: Never true    Ran Out of Food in the Last Year: Never true   Transportation Needs: No Transportation Needs    Lack of Transportation (Medical): No    Lack of Transportation (Non-Medical): No   Physical Activity: Inactive    Days of Exercise per Week: 0 days    Minutes of Exercise per Session: 0 min   Stress: Stress Concern Present    Feeling of Stress : Rather much   Social Connections: Moderately Isolated    Frequency of Communication with Friends and Family: More than three times a week    Frequency of Social Gatherings with Friends and Family: More than three times a week    Attends Yazdanism Services: Never    Attends Club or Organization Meetings: Never    Marital Status:    Housing Stability: Unknown    Unable to Pay for Housing in the Last Year: No    Unstable Housing in the Last Year: No       FAMILY HISTORY:  Family History   Problem Relation Age of Onset    Diabetes Mother     Cancer Father     Pancreatic cancer Father     No Known Problems Sister     No Known Problems Brother     No Known Problems Maternal Aunt     No Known Problems Maternal Uncle     No Known Problems Paternal Aunt     No Known Problems Paternal Uncle     No Known Problems Maternal Grandmother     No Known Problems Maternal Grandfather     No Known Problems Paternal Grandmother     No Known Problems Paternal Grandfather     Hypertension Neg Hx     Amblyopia Neg Hx     Blindness Neg Hx     Cataracts Neg Hx      Glaucoma Neg Hx     Macular degeneration Neg Hx     Retinal detachment Neg Hx     Strabismus Neg Hx     Stroke Neg Hx     Thyroid disease Neg Hx        CURRENTS MEDICATIONS:  Current Outpatient Medications on File Prior to Visit   Medication Sig Dispense Refill    amLODIPine (NORVASC) 10 MG tablet Take 1 tablet (10 mg total) by mouth once daily. 90 tablet 3    AVONEX 30 mcg/0.5 mL injection Inject 0.5 mLs (30 mcg total) into the muscle every 7 days. 4 each 12    baclofen (LIORESAL) 10 MG tablet Take 2 tabs po AM and 2 tabs po  tablet 3    cholecalciferol, vitamin D3, 1,250 mcg (50,000 unit) Tab Take 1 tablet by mouth once a week. 12 tablet 3    ergocalciferol (ERGOCALCIFEROL) 50,000 unit Cap Take 1 capsule (50,000 Units total) by mouth every 7 days. 12 capsule 3    furosemide (LASIX) 40 MG tablet Take 1 tablet (40 mg total) by mouth 2 (two) times daily. 180 tablet 2    losartan (COZAAR) 50 MG tablet Take 1 tablet (50 mg total) by mouth once daily. 90 tablet 3    modafiniL (PROVIGIL) 200 MG Tab Take 1 tablet (200 mg total) by mouth once daily. Take qam 400 tablet 3    mupirocin (BACTROBAN) 2 % ointment Apply to affected area 3 times daily 22 g 1    ondansetron (ZOFRAN-ODT) 4 MG TbDL Take 1 tablet (4 mg total) by mouth every 6 (six) hours as needed (nausea). 15 tablet 0    sildenafiL (VIAGRA) 100 MG tablet Take 1 tablet (100 mg total) by mouth daily as needed for Erectile Dysfunction (take 1 hour prior to intercourse on an empty stomach). 10 tablet 2    tamsulosin (FLOMAX) 0.4 mg Cap Take 1 capsule (0.4 mg total) by mouth every evening. 90 capsule 3    triamcinolone acetonide 0.1% (KENALOG) 0.1 % cream APPLY TO AFFECTED AREA TWICE A DAY 80 g 1    oxyCODONE-acetaminophen (PERCOCET)  mg per tablet Take 1 tablet by mouth every 6 (six) hours as needed for Pain. (Patient not taking: Reported on 6/13/2023) 40 tablet 0     No current facility-administered medications on file prior to visit.        ALLERGIES:  Review of patient's allergies indicates:   Allergen Reactions    Norco [hydrocodone-acetaminophen] Anaphylaxis     Pt states getting shakey, and having halucinations       REVIEW OF SYSTEMS:  ROS - per HPI    OBJECTIVE:    PHYSICAL EXAMINATION:   There were no vitals filed for this visit.    + right Brooke's  No LE hyperreflexia   4/5  Right deltoid limited to 90 degrees abduction    DIAGNOSTIC DATA:  I personally interpreted the following imaging:     XR cervical from today unchanged    ASSESMENT:  This is a 67 y.o. male with     Problem List Items Addressed This Visit          Neuro    S/P cervical spinal fusion - Primary    Overview     4/14/23: C3-5 PCF with Dr. Henao for progressive myelopathy           Other Visit Diagnoses       Cervical myelopathy                PLAN:  Diagnosis: cervical myelopathy, s/p brandy fusion    Plan: Refer to outpatient PT  D/C collar  Can lift 50 lbs    Follow-up: 3 months w new XR      Dawit Henao MD, FAANS    Disclaimer: This note was partly generated using dictation software which may occasionally result in transcription errors.

## 2023-06-29 PROBLEM — R26.9 ABNORMALITY OF GAIT AND MOBILITY: Status: ACTIVE | Noted: 2023-06-29

## 2023-06-29 PROBLEM — M25.612 DECREASED SHOULDER MOBILITY, LEFT: Status: ACTIVE | Noted: 2023-06-29

## 2023-06-29 PROBLEM — M25.611 DECREASED SHOULDER MOBILITY, RIGHT: Status: ACTIVE | Noted: 2023-06-29

## 2023-07-01 ENCOUNTER — OFFICE VISIT (OUTPATIENT)
Dept: INTERNAL MEDICINE | Facility: CLINIC | Age: 68
End: 2023-07-01
Payer: COMMERCIAL

## 2023-07-01 VITALS
HEART RATE: 75 BPM | WEIGHT: 304.25 LBS | OXYGEN SATURATION: 95 % | BODY MASS INDEX: 41.21 KG/M2 | DIASTOLIC BLOOD PRESSURE: 80 MMHG | SYSTOLIC BLOOD PRESSURE: 128 MMHG | HEIGHT: 72 IN

## 2023-07-01 DIAGNOSIS — L08.9 WOUND INFECTION: Primary | ICD-10-CM

## 2023-07-01 DIAGNOSIS — T14.8XXA WOUND INFECTION: Primary | ICD-10-CM

## 2023-07-01 DIAGNOSIS — I87.2 CHRONIC VENOUS INSUFFICIENCY: ICD-10-CM

## 2023-07-01 PROCEDURE — 3008F BODY MASS INDEX DOCD: CPT | Mod: CPTII,S$GLB,, | Performed by: STUDENT IN AN ORGANIZED HEALTH CARE EDUCATION/TRAINING PROGRAM

## 2023-07-01 PROCEDURE — 3074F PR MOST RECENT SYSTOLIC BLOOD PRESSURE < 130 MM HG: ICD-10-PCS | Mod: CPTII,S$GLB,, | Performed by: STUDENT IN AN ORGANIZED HEALTH CARE EDUCATION/TRAINING PROGRAM

## 2023-07-01 PROCEDURE — 1100F PR PT FALLS ASSESS DOC 2+ FALLS/FALL W/INJURY/YR: ICD-10-PCS | Mod: CPTII,S$GLB,, | Performed by: STUDENT IN AN ORGANIZED HEALTH CARE EDUCATION/TRAINING PROGRAM

## 2023-07-01 PROCEDURE — 99999 PR PBB SHADOW E&M-EST. PATIENT-LVL V: CPT | Mod: PBBFAC,,, | Performed by: STUDENT IN AN ORGANIZED HEALTH CARE EDUCATION/TRAINING PROGRAM

## 2023-07-01 PROCEDURE — 1159F MED LIST DOCD IN RCRD: CPT | Mod: CPTII,S$GLB,, | Performed by: STUDENT IN AN ORGANIZED HEALTH CARE EDUCATION/TRAINING PROGRAM

## 2023-07-01 PROCEDURE — 1100F PTFALLS ASSESS-DOCD GE2>/YR: CPT | Mod: CPTII,S$GLB,, | Performed by: STUDENT IN AN ORGANIZED HEALTH CARE EDUCATION/TRAINING PROGRAM

## 2023-07-01 PROCEDURE — 4010F PR ACE/ARB THEARPY RXD/TAKEN: ICD-10-PCS | Mod: CPTII,S$GLB,, | Performed by: STUDENT IN AN ORGANIZED HEALTH CARE EDUCATION/TRAINING PROGRAM

## 2023-07-01 PROCEDURE — 99999 PR PBB SHADOW E&M-EST. PATIENT-LVL V: ICD-10-PCS | Mod: PBBFAC,,, | Performed by: STUDENT IN AN ORGANIZED HEALTH CARE EDUCATION/TRAINING PROGRAM

## 2023-07-01 PROCEDURE — 3008F PR BODY MASS INDEX (BMI) DOCUMENTED: ICD-10-PCS | Mod: CPTII,S$GLB,, | Performed by: STUDENT IN AN ORGANIZED HEALTH CARE EDUCATION/TRAINING PROGRAM

## 2023-07-01 PROCEDURE — 3079F PR MOST RECENT DIASTOLIC BLOOD PRESSURE 80-89 MM HG: ICD-10-PCS | Mod: CPTII,S$GLB,, | Performed by: STUDENT IN AN ORGANIZED HEALTH CARE EDUCATION/TRAINING PROGRAM

## 2023-07-01 PROCEDURE — 3074F SYST BP LT 130 MM HG: CPT | Mod: CPTII,S$GLB,, | Performed by: STUDENT IN AN ORGANIZED HEALTH CARE EDUCATION/TRAINING PROGRAM

## 2023-07-01 PROCEDURE — 99214 PR OFFICE/OUTPT VISIT, EST, LEVL IV, 30-39 MIN: ICD-10-PCS | Mod: S$GLB,,, | Performed by: STUDENT IN AN ORGANIZED HEALTH CARE EDUCATION/TRAINING PROGRAM

## 2023-07-01 PROCEDURE — 1160F PR REVIEW ALL MEDS BY PRESCRIBER/CLIN PHARMACIST DOCUMENTED: ICD-10-PCS | Mod: CPTII,S$GLB,, | Performed by: STUDENT IN AN ORGANIZED HEALTH CARE EDUCATION/TRAINING PROGRAM

## 2023-07-01 PROCEDURE — 3288F PR FALLS RISK ASSESSMENT DOCUMENTED: ICD-10-PCS | Mod: CPTII,S$GLB,, | Performed by: STUDENT IN AN ORGANIZED HEALTH CARE EDUCATION/TRAINING PROGRAM

## 2023-07-01 PROCEDURE — 1160F RVW MEDS BY RX/DR IN RCRD: CPT | Mod: CPTII,S$GLB,, | Performed by: STUDENT IN AN ORGANIZED HEALTH CARE EDUCATION/TRAINING PROGRAM

## 2023-07-01 PROCEDURE — 99214 OFFICE O/P EST MOD 30 MIN: CPT | Mod: S$GLB,,, | Performed by: STUDENT IN AN ORGANIZED HEALTH CARE EDUCATION/TRAINING PROGRAM

## 2023-07-01 PROCEDURE — 1159F PR MEDICATION LIST DOCUMENTED IN MEDICAL RECORD: ICD-10-PCS | Mod: CPTII,S$GLB,, | Performed by: STUDENT IN AN ORGANIZED HEALTH CARE EDUCATION/TRAINING PROGRAM

## 2023-07-01 PROCEDURE — 4010F ACE/ARB THERAPY RXD/TAKEN: CPT | Mod: CPTII,S$GLB,, | Performed by: STUDENT IN AN ORGANIZED HEALTH CARE EDUCATION/TRAINING PROGRAM

## 2023-07-01 PROCEDURE — 3044F PR MOST RECENT HEMOGLOBIN A1C LEVEL <7.0%: ICD-10-PCS | Mod: CPTII,S$GLB,, | Performed by: STUDENT IN AN ORGANIZED HEALTH CARE EDUCATION/TRAINING PROGRAM

## 2023-07-01 PROCEDURE — 3079F DIAST BP 80-89 MM HG: CPT | Mod: CPTII,S$GLB,, | Performed by: STUDENT IN AN ORGANIZED HEALTH CARE EDUCATION/TRAINING PROGRAM

## 2023-07-01 PROCEDURE — 1126F AMNT PAIN NOTED NONE PRSNT: CPT | Mod: CPTII,S$GLB,, | Performed by: STUDENT IN AN ORGANIZED HEALTH CARE EDUCATION/TRAINING PROGRAM

## 2023-07-01 PROCEDURE — 3044F HG A1C LEVEL LT 7.0%: CPT | Mod: CPTII,S$GLB,, | Performed by: STUDENT IN AN ORGANIZED HEALTH CARE EDUCATION/TRAINING PROGRAM

## 2023-07-01 PROCEDURE — 3288F FALL RISK ASSESSMENT DOCD: CPT | Mod: CPTII,S$GLB,, | Performed by: STUDENT IN AN ORGANIZED HEALTH CARE EDUCATION/TRAINING PROGRAM

## 2023-07-01 PROCEDURE — 1126F PR PAIN SEVERITY QUANTIFIED, NO PAIN PRESENT: ICD-10-PCS | Mod: CPTII,S$GLB,, | Performed by: STUDENT IN AN ORGANIZED HEALTH CARE EDUCATION/TRAINING PROGRAM

## 2023-07-01 RX ORDER — MUPIROCIN 20 MG/G
OINTMENT TOPICAL
Qty: 22 G | Refills: 1 | Status: SHIPPED | OUTPATIENT
Start: 2023-07-01 | End: 2023-12-05

## 2023-07-01 RX ORDER — CEPHALEXIN 500 MG/1
500 CAPSULE ORAL 4 TIMES DAILY
Qty: 40 CAPSULE | Refills: 0 | Status: SHIPPED | OUTPATIENT
Start: 2023-07-01 | End: 2023-07-11

## 2023-07-01 NOTE — PROGRESS NOTES
SUBJECTIVE     Chief Complaint   Patient presents with    Leg Swelling       HPI  Rajan Cooper III is a 67 y.o. male with  MS with abnormality of gait & mobility, cervical myelopathy s/p cervical spinal fusion, HTN, chronic venous insufficiency, h/o of DVT in right anterior tibial & posterior tibial veins (3/2017), h/o septic arthritis (left wrist/right ankle both with MSSSA s/p washout/debridement with ankle arthrocentesis in 3/2017)  that presents for evaluation of sore on leg.     This is a new patient to me but established to [facility]. PCP is Dr. Garcia.     History of CVI. Prescribed Lasix 40 mg BID. Normal renal function on CMP on 4/15/23. Stopped taking Lasix following C-spine surgery as he was in bed frequently following procedure. BLE began to swell due to this. Restarted his lasix with drastic improvement of the edema, but developed ulcers on the inside portion of RLE. Has noted some redness surrounding area. No purulence or exudate noted. No pain, fevers, chills, nausea, vomiting, shortness of breath, chest pain, palpitations. No improvement with OTC topical antibiotic ointment.     PAST MEDICAL HISTORY:  Past Medical History:   Diagnosis Date    Hypertension     MS (multiple sclerosis)     Staph aureus infection 2017       PAST SURGICAL HISTORY:  Past Surgical History:   Procedure Laterality Date    POSTERIOR FUSION OF CERVICAL SPINE WITH LAMINECTOMY N/A 4/14/2023    Procedure: LAMINECTOMY, SPINE, CERVICAL, WITH POSTERIOR FUSION;  Surgeon: Dawit Henao MD;  Location: [facility] OR;  Service: Neurosurgery;  Laterality: N/A;  C3-6 PCF   fluoro  gel rolls  [place]  pulsavac, hemovac drain  neuromonitoring  [facility]----HAS CLEARANCE  11AM START---NEED CONSENTS, , H/P, ----NEUROMONITORING  DIPAK [phone]  SHUKRI BANGURA [phone]  CALLED WILLEM ON 4/10/       FAMILY HISTORY:  Family History   Problem Relation Age of Onset    Diabetes Mother     Cancer Father     Pancreatic cancer Father     No  Known Problems Sister     No Known Problems Brother     No Known Problems Maternal Aunt     No Known Problems Maternal Uncle     No Known Problems Paternal Aunt     No Known Problems Paternal Uncle     No Known Problems Maternal Grandmother     No Known Problems Maternal Grandfather     No Known Problems Paternal Grandmother     No Known Problems Paternal Grandfather     Hypertension Neg Hx     Amblyopia Neg Hx     Blindness Neg Hx     Cataracts Neg Hx     Glaucoma Neg Hx     Macular degeneration Neg Hx     Retinal detachment Neg Hx     Strabismus Neg Hx     Stroke Neg Hx     Thyroid disease Neg Hx        ALLERGIES AND MEDICATIONS: updated and reviewed.  Review of patient's allergies indicates:   Allergen Reactions    Norco [hydrocodone-acetaminophen] Anaphylaxis     Pt states getting shakey, and having halucinations     Current Outpatient Medications   Medication Sig Dispense Refill    amLODIPine (NORVASC) 10 MG tablet Take 1 tablet (10 mg total) by mouth once daily. 90 tablet 3    AVONEX 30 mcg/0.5 mL injection Inject 0.5 mLs (30 mcg total) into the muscle every 7 days. 4 each 12    baclofen (LIORESAL) 10 MG tablet Take 2 tabs po AM and 2 tabs po  tablet 3    cholecalciferol, vitamin D3, 1,250 mcg (50,000 unit) Tab Take 1 tablet by mouth once a week. 12 tablet 3    ergocalciferol (ERGOCALCIFEROL) 50,000 unit Cap Take 1 capsule (50,000 Units total) by mouth every 7 days. 12 capsule 3    furosemide (LASIX) 40 MG tablet Take 1 tablet (40 mg total) by mouth 2 (two) times daily. 180 tablet 2    losartan (COZAAR) 50 MG tablet Take 1 tablet (50 mg total) by mouth once daily. 90 tablet 3    modafiniL (PROVIGIL) 200 MG Tab Take 1 tablet (200 mg total) by mouth once daily. Take qam 400 tablet 3    mupirocin (BACTROBAN) 2 % ointment Apply to affected area 3 times daily 22 g 1    ondansetron (ZOFRAN-ODT) 4 MG TbDL Take 1 tablet (4 mg total) by mouth every 6 (six) hours as needed (nausea). 15 tablet 0     oxyCODONE-acetaminophen (PERCOCET)  mg per tablet Take 1 tablet by mouth every 6 (six) hours as needed for Pain. (Patient not taking: Reported on 6/13/2023) 40 tablet 0    sildenafiL (VIAGRA) 100 MG tablet Take 1 tablet (100 mg total) by mouth daily as needed for Erectile Dysfunction (take 1 hour prior to intercourse on an empty stomach). 10 tablet 2    tamsulosin (FLOMAX) 0.4 mg Cap Take 1 capsule (0.4 mg total) by mouth every evening. 90 capsule 3    triamcinolone acetonide 0.1% (KENALOG) 0.1 % cream APPLY TO AFFECTED AREA TWICE A DAY 80 g 1     No current facility-administered medications for this visit.       ROS  Review of Systems   All other systems reviewed and are negative.      OBJECTIVE     Physical Exam  There were no vitals filed for this visit. There is no height or weight on file to calculate BMI.            Physical Exam  Vitals reviewed.   Constitutional:       General: He is not in acute distress.     Appearance: Normal appearance. He is obese.   HENT:      Head: Normocephalic and atraumatic.      Mouth/Throat:      Mouth: Mucous membranes are moist.      Pharynx: Oropharynx is clear.   Eyes:      Extraocular Movements: Extraocular movements intact.      Conjunctiva/sclera: Conjunctivae normal.      Pupils: Pupils are equal, round, and reactive to light.   Cardiovascular:      Rate and Rhythm: Normal rate and regular rhythm.      Pulses: Normal pulses.      Heart sounds: Normal heart sounds.   Pulmonary:      Effort: Pulmonary effort is normal.      Breath sounds: Normal breath sounds.   Abdominal:      General: There is no distension.      Palpations: There is no mass.   Musculoskeletal:         General: Normal range of motion.      Cervical back: Normal range of motion and neck supple.      Right lower leg: Edema (trace pitting edema from ankle to knee) present.      Left lower leg: Edema (trace pitting edema from ankle to knee) present.      Comments: No TTP to bilateral calves.   Negative  Heidi's sign bilaterally.    Skin:     General: Skin is warm and dry.             Comments: Chronic venous stasis changes to the skin in BLE.   Neurological:      Mental Status: He is alert. Mental status is at baseline.         Health Maintenance         Date Due Completion Date    Pneumococcal Vaccines (Age 65+) (1 - PCV) Never done ---    Shingles Vaccine (1 of 2) Never done ---    Colorectal Cancer Screening Never done ---    COVID-19 Vaccine (5 - Pfizer series) 07/29/2022 6/3/2022    Influenza Vaccine (1) 09/01/2023 11/18/2019    PROSTATE-SPECIFIC ANTIGEN 03/02/2024 3/2/2023    Lipid Panel 06/07/2024 6/7/2019    Hemoglobin A1c (Diabetic Prevention Screening) 03/02/2026 3/2/2023    TETANUS VACCINE 01/12/2032 1/12/2022              ASSESSMENT     67 y.o. male with     1. Wound infection    2. Chronic venous insufficiency        PLAN:     1. Wound infection  - In RLE 2/2 venous stasis. Treat empirically for cellulitis.   - Strict ED precautions given, patient verbalized understanding.   - cephALEXin (KEFLEX) 500 MG capsule; Take 1 capsule (500 mg total) by mouth 4 (four) times daily. for 10 days  Dispense: 40 capsule; Refill: 0  - mupirocin (BACTROBAN) 2 % ointment; Apply to affected area 3 times daily  Dispense: 22 g; Refill: 1    2. Chronic venous insufficiency  - Continue compression stocking use.   - Elevated feet above level of heart.   - Continue Furosemide.       RTC PRN     Deon Nix MD  Family Medicine  [facility]  07/01/2023    This document was created using voice recognition software (M*Modal Fluency Direct). Although it may be edited, this document may contain errors related to incorrect recognition of the spoken word. Please call the physician if clarification is needed.           No follow-ups on file.

## 2023-07-01 NOTE — PATIENT INSTRUCTIONS
Go to the ER if you have any of the following:   - High fevers (>101F)  - Sudden severe leg pain  - Chest pain  - Sudden shortness of breath  - Worsening redness, drainage from leg on antibiotic  - Any concerning symptoms

## 2023-07-21 ENCOUNTER — PATIENT MESSAGE (OUTPATIENT)
Dept: PSYCHIATRY | Facility: CLINIC | Age: 68
End: 2023-07-21
Payer: COMMERCIAL

## 2023-08-14 ENCOUNTER — OFFICE VISIT (OUTPATIENT)
Dept: INTERNAL MEDICINE | Facility: CLINIC | Age: 68
End: 2023-08-14
Payer: COMMERCIAL

## 2023-08-14 ENCOUNTER — LAB VISIT (OUTPATIENT)
Dept: LAB | Facility: HOSPITAL | Age: 68
End: 2023-08-14
Attending: INTERNAL MEDICINE
Payer: COMMERCIAL

## 2023-08-14 VITALS
WEIGHT: 307.13 LBS | HEART RATE: 68 BPM | BODY MASS INDEX: 41.6 KG/M2 | SYSTOLIC BLOOD PRESSURE: 120 MMHG | HEIGHT: 72 IN | OXYGEN SATURATION: 98 % | DIASTOLIC BLOOD PRESSURE: 70 MMHG

## 2023-08-14 DIAGNOSIS — R60.9 EDEMA, UNSPECIFIED TYPE: Primary | ICD-10-CM

## 2023-08-14 DIAGNOSIS — I10 PRIMARY HYPERTENSION: ICD-10-CM

## 2023-08-14 DIAGNOSIS — R73.09 ELEVATED GLUCOSE LEVEL: ICD-10-CM

## 2023-08-14 DIAGNOSIS — Z98.1 S/P CERVICAL SPINAL FUSION: ICD-10-CM

## 2023-08-14 DIAGNOSIS — R60.9 EDEMA, UNSPECIFIED TYPE: ICD-10-CM

## 2023-08-14 DIAGNOSIS — G35 MS (MULTIPLE SCLEROSIS): ICD-10-CM

## 2023-08-14 LAB
ESTIMATED AVG GLUCOSE: 123 MG/DL (ref 68–131)
HBA1C MFR BLD: 5.9 % (ref 4–5.6)
T4 FREE SERPL-MCNC: 0.85 NG/DL (ref 0.71–1.51)
TSH SERPL DL<=0.005 MIU/L-ACNC: 7.2 UIU/ML (ref 0.4–4)

## 2023-08-14 PROCEDURE — 3074F PR MOST RECENT SYSTOLIC BLOOD PRESSURE < 130 MM HG: ICD-10-PCS | Mod: CPTII,S$GLB,, | Performed by: INTERNAL MEDICINE

## 2023-08-14 PROCEDURE — 99397 PER PM REEVAL EST PAT 65+ YR: CPT | Mod: S$GLB,,, | Performed by: INTERNAL MEDICINE

## 2023-08-14 PROCEDURE — 4010F PR ACE/ARB THEARPY RXD/TAKEN: ICD-10-PCS | Mod: CPTII,S$GLB,, | Performed by: INTERNAL MEDICINE

## 2023-08-14 PROCEDURE — 1159F MED LIST DOCD IN RCRD: CPT | Mod: CPTII,S$GLB,, | Performed by: INTERNAL MEDICINE

## 2023-08-14 PROCEDURE — 3044F HG A1C LEVEL LT 7.0%: CPT | Mod: CPTII,S$GLB,, | Performed by: INTERNAL MEDICINE

## 2023-08-14 PROCEDURE — 3078F PR MOST RECENT DIASTOLIC BLOOD PRESSURE < 80 MM HG: ICD-10-PCS | Mod: CPTII,S$GLB,, | Performed by: INTERNAL MEDICINE

## 2023-08-14 PROCEDURE — 3288F PR FALLS RISK ASSESSMENT DOCUMENTED: ICD-10-PCS | Mod: CPTII,S$GLB,, | Performed by: INTERNAL MEDICINE

## 2023-08-14 PROCEDURE — 36415 COLL VENOUS BLD VENIPUNCTURE: CPT | Performed by: INTERNAL MEDICINE

## 2023-08-14 PROCEDURE — 1160F PR REVIEW ALL MEDS BY PRESCRIBER/CLIN PHARMACIST DOCUMENTED: ICD-10-PCS | Mod: CPTII,S$GLB,, | Performed by: INTERNAL MEDICINE

## 2023-08-14 PROCEDURE — 99999 PR PBB SHADOW E&M-EST. PATIENT-LVL IV: ICD-10-PCS | Mod: PBBFAC,,, | Performed by: INTERNAL MEDICINE

## 2023-08-14 PROCEDURE — 1160F RVW MEDS BY RX/DR IN RCRD: CPT | Mod: CPTII,S$GLB,, | Performed by: INTERNAL MEDICINE

## 2023-08-14 PROCEDURE — 99397 PR PREVENTIVE VISIT,EST,65 & OVER: ICD-10-PCS | Mod: S$GLB,,, | Performed by: INTERNAL MEDICINE

## 2023-08-14 PROCEDURE — 1100F PTFALLS ASSESS-DOCD GE2>/YR: CPT | Mod: CPTII,S$GLB,, | Performed by: INTERNAL MEDICINE

## 2023-08-14 PROCEDURE — 84443 ASSAY THYROID STIM HORMONE: CPT | Performed by: INTERNAL MEDICINE

## 2023-08-14 PROCEDURE — 3008F PR BODY MASS INDEX (BMI) DOCUMENTED: ICD-10-PCS | Mod: CPTII,S$GLB,, | Performed by: INTERNAL MEDICINE

## 2023-08-14 PROCEDURE — 1126F AMNT PAIN NOTED NONE PRSNT: CPT | Mod: CPTII,S$GLB,, | Performed by: INTERNAL MEDICINE

## 2023-08-14 PROCEDURE — 3078F DIAST BP <80 MM HG: CPT | Mod: CPTII,S$GLB,, | Performed by: INTERNAL MEDICINE

## 2023-08-14 PROCEDURE — 1100F PR PT FALLS ASSESS DOC 2+ FALLS/FALL W/INJURY/YR: ICD-10-PCS | Mod: CPTII,S$GLB,, | Performed by: INTERNAL MEDICINE

## 2023-08-14 PROCEDURE — 4010F ACE/ARB THERAPY RXD/TAKEN: CPT | Mod: CPTII,S$GLB,, | Performed by: INTERNAL MEDICINE

## 2023-08-14 PROCEDURE — 3044F PR MOST RECENT HEMOGLOBIN A1C LEVEL <7.0%: ICD-10-PCS | Mod: CPTII,S$GLB,, | Performed by: INTERNAL MEDICINE

## 2023-08-14 PROCEDURE — 3288F FALL RISK ASSESSMENT DOCD: CPT | Mod: CPTII,S$GLB,, | Performed by: INTERNAL MEDICINE

## 2023-08-14 PROCEDURE — 1126F PR PAIN SEVERITY QUANTIFIED, NO PAIN PRESENT: ICD-10-PCS | Mod: CPTII,S$GLB,, | Performed by: INTERNAL MEDICINE

## 2023-08-14 PROCEDURE — 3074F SYST BP LT 130 MM HG: CPT | Mod: CPTII,S$GLB,, | Performed by: INTERNAL MEDICINE

## 2023-08-14 PROCEDURE — 99999 PR PBB SHADOW E&M-EST. PATIENT-LVL IV: CPT | Mod: PBBFAC,,, | Performed by: INTERNAL MEDICINE

## 2023-08-14 PROCEDURE — 1159F PR MEDICATION LIST DOCUMENTED IN MEDICAL RECORD: ICD-10-PCS | Mod: CPTII,S$GLB,, | Performed by: INTERNAL MEDICINE

## 2023-08-14 PROCEDURE — 84439 ASSAY OF FREE THYROXINE: CPT | Performed by: INTERNAL MEDICINE

## 2023-08-14 PROCEDURE — 83036 HEMOGLOBIN GLYCOSYLATED A1C: CPT | Performed by: INTERNAL MEDICINE

## 2023-08-14 PROCEDURE — 3008F BODY MASS INDEX DOCD: CPT | Mod: CPTII,S$GLB,, | Performed by: INTERNAL MEDICINE

## 2023-08-14 RX ORDER — AMLODIPINE BESYLATE 5 MG/1
5 TABLET ORAL DAILY
Qty: 90 TABLET | Refills: 3 | Status: SHIPPED | OUTPATIENT
Start: 2023-08-14 | End: 2024-01-09 | Stop reason: SDUPTHER

## 2023-08-14 NOTE — PROGRESS NOTES
Subjective:       Patient ID: Rajan Cooper III is a 67 y.o. male.    Chief Complaint: Annual Exam    Patient attended by his wife here for annual exam.  Of note the patient has MS and states he was thinking that his MS was worsening but he ended up having a cervical spine stenosis that required surgery.    He is still doing physical therapy to try to improve his strength and mobility.    He also is having increased swelling in the lower extremities.  He is trying to watch his salt intake and not drink fluid close to bedtime but he is waking up quite a bit at night.  We discussed reducing his amlodipine and half and seeing how his blood pressure does.  We talked about propping of feet, using compression socks as well.    We did discuss flu shot and we discussed colon screening.  He is not interested in colon screening at this time      Review of Systems   Constitutional:  Negative for activity change and unexpected weight change.   HENT:  Negative for hearing loss, rhinorrhea and trouble swallowing.    Eyes:  Negative for discharge and visual disturbance.   Respiratory:  Negative for chest tightness and wheezing.    Cardiovascular:  Positive for leg swelling. Negative for chest pain and palpitations.   Gastrointestinal:  Negative for blood in stool, constipation, diarrhea and vomiting.   Endocrine: Negative for polydipsia and polyuria.   Genitourinary:  Negative for difficulty urinating, hematuria and urgency.   Musculoskeletal:  Negative for arthralgias, joint swelling and neck pain.   Neurological:  Positive for weakness (MS). Negative for headaches.   Psychiatric/Behavioral:  Negative for confusion and dysphoric mood.            Past Medical History:   Diagnosis Date    Hypertension     MS (multiple sclerosis)     Staph aureus infection 2017     Past Surgical History:   Procedure Laterality Date    POSTERIOR FUSION OF CERVICAL SPINE WITH LAMINECTOMY N/A 4/14/2023    Procedure: LAMINECTOMY, SPINE, CERVICAL, WITH  POSTERIOR FUSION;  Surgeon: Dawit Henao MD;  Location: [facility] OR;  Service: Neurosurgery;  Laterality: N/A;  C3-6 PCF   fluoro  gel rolls  macdonald  pulsavac, hemovac drain  neuromonitoring  [facility]----HAS CLEARANCE  11AM START---NEED CONSENTS, , H/P, ----NEUROMONITORING  DIPAK [phone]  [facility] WILLEM [phone]  CALLED WILLEM ON 4/10/      Patient Active Problem List   Diagnosis    MS (multiple sclerosis)    HTN (hypertension)    Gait disorder    Muscle cramps    Ischemia of toe    Leg pain    Cellulitis    Dry skin    Left leg cellulitis    Infrapatellar bursitis of left knee    S/P cervical spinal fusion    Localized swelling of both lower extremities    Severe obesity (BMI >= 40)    Abnormality of gait and mobility    Decreased shoulder mobility, right        Objective:      Physical Exam  Constitutional:       General: He is not in acute distress.     Appearance: He is well-developed. He is obese.   HENT:      Head: Normocephalic and atraumatic.      Right Ear: Tympanic membrane, ear canal and external ear normal.      Left Ear: Tympanic membrane, ear canal and external ear normal.      Mouth/Throat:      Pharynx: No oropharyngeal exudate or posterior oropharyngeal erythema.   Eyes:      General: No scleral icterus.     Conjunctiva/sclera: Conjunctivae normal.      Pupils: Pupils are equal, round, and reactive to light.   Neck:      Thyroid: No thyromegaly.      Comments: No supraclavicular nodes palpated  Cardiovascular:      Rate and Rhythm: Normal rate and regular rhythm.      Pulses: Normal pulses.      Heart sounds: Normal heart sounds. No murmur heard.  Pulmonary:      Effort: Pulmonary effort is normal.      Breath sounds: Normal breath sounds. No wheezing.   Abdominal:      General: Bowel sounds are normal.      Palpations: Abdomen is soft. There is no mass.      Tenderness: There is no abdominal tenderness.   Musculoskeletal:         General: No tenderness.      Cervical back: Normal  "range of motion and neck supple.      Right lower leg: Edema present.      Left lower leg: Edema present.   Lymphadenopathy:      Cervical: No cervical adenopathy.   Skin:     Coloration: Skin is not jaundiced or pale.   Neurological:      General: No focal deficit present.      Mental Status: He is alert and oriented to person, place, and time.      Motor: Weakness present.      Coordination: Coordination abnormal.   Psychiatric:         Mood and Affect: Mood normal.         Behavior: Behavior normal.         Assessment:       Problem List Items Addressed This Visit          Neuro    MS (multiple sclerosis)    S/P cervical spinal fusion       Cardiac/Vascular    HTN (hypertension)     Other Visit Diagnoses       Elevated glucose level    -  Primary    Relevant Orders    Hemoglobin A1C    Edema, unspecified type        Relevant Orders    TSH            Plan:         Rajan was seen today for annual exam.    Diagnoses and all orders for this visit:    Elevated glucose level  -     Hemoglobin A1C; Future    Primary hypertension    MS (multiple sclerosis)    S/P cervical spinal fusion    Edema, unspecified type  -     TSH; Future    Other orders  -     amLODIPine (NORVASC) 5 MG tablet; Take 1 tablet (5 mg total) by mouth once daily.       Continues to decline Colon screening. Discussed with he and wife.   Offered to assist with any type at any time.               Portions of this note may have been created with voice recognition software. Occasional "wrong-word" or "sound-a-like" substitutions may have occurred due to the inherent limitations of voice recognition software. Please, read the note carefully and recognize, using context, where substitutions have occurred.  "

## 2023-08-17 ENCOUNTER — PATIENT MESSAGE (OUTPATIENT)
Dept: INTERNAL MEDICINE | Facility: CLINIC | Age: 68
End: 2023-08-17
Payer: COMMERCIAL

## 2023-08-17 DIAGNOSIS — E03.9 HYPOTHYROIDISM, UNSPECIFIED TYPE: Primary | ICD-10-CM

## 2023-08-17 RX ORDER — LEVOTHYROXINE SODIUM 50 UG/1
50 TABLET ORAL
Qty: 30 TABLET | Refills: 11 | Status: SHIPPED | OUTPATIENT
Start: 2023-08-17 | End: 2023-09-28 | Stop reason: SDUPTHER

## 2023-08-17 NOTE — ADDENDUM NOTE
Addended by: ASAD PATTERSON on: 8/17/2023 03:34 PM     Modules accepted: Orders     [FreeTextEntry1] : Proctozone for internal hemorrhoids\par The patient is offered colonoscopy. R/B/A d/w her including but not limited to bleeding, perforation and missed lesions. She agrees to proceed and miralax prep given

## 2023-08-21 ENCOUNTER — PATIENT MESSAGE (OUTPATIENT)
Dept: NEUROLOGY | Facility: CLINIC | Age: 68
End: 2023-08-21
Payer: COMMERCIAL

## 2023-08-21 DIAGNOSIS — Z98.1 STATUS POST CERVICAL SPINAL FUSION: ICD-10-CM

## 2023-08-21 DIAGNOSIS — Z78.9 IMPAIRED MOBILITY AND ACTIVITIES OF DAILY LIVING: ICD-10-CM

## 2023-08-21 DIAGNOSIS — G35 MULTIPLE SCLEROSIS: Primary | ICD-10-CM

## 2023-08-21 DIAGNOSIS — Z74.09 IMPAIRED MOBILITY AND ACTIVITIES OF DAILY LIVING: ICD-10-CM

## 2023-08-22 ENCOUNTER — PATIENT MESSAGE (OUTPATIENT)
Dept: ADMINISTRATIVE | Facility: HOSPITAL | Age: 68
End: 2023-08-22
Payer: COMMERCIAL

## 2023-08-24 ENCOUNTER — OFFICE VISIT (OUTPATIENT)
Dept: NEUROLOGY | Facility: CLINIC | Age: 68
End: 2023-08-24
Payer: COMMERCIAL

## 2023-08-24 VITALS
HEIGHT: 72 IN | WEIGHT: 315 LBS | HEART RATE: 68 BPM | BODY MASS INDEX: 42.66 KG/M2 | SYSTOLIC BLOOD PRESSURE: 138 MMHG | DIASTOLIC BLOOD PRESSURE: 76 MMHG

## 2023-08-24 DIAGNOSIS — G47.9 SLEEP DISTURBANCE: ICD-10-CM

## 2023-08-24 DIAGNOSIS — G35 MS (MULTIPLE SCLEROSIS): Primary | ICD-10-CM

## 2023-08-24 PROCEDURE — 3075F PR MOST RECENT SYSTOLIC BLOOD PRESS GE 130-139MM HG: ICD-10-PCS | Mod: CPTII,S$GLB,,

## 2023-08-24 PROCEDURE — 1126F PR PAIN SEVERITY QUANTIFIED, NO PAIN PRESENT: ICD-10-PCS | Mod: CPTII,S$GLB,,

## 2023-08-24 PROCEDURE — 1126F AMNT PAIN NOTED NONE PRSNT: CPT | Mod: CPTII,S$GLB,,

## 2023-08-24 PROCEDURE — 99215 PR OFFICE/OUTPT VISIT, EST, LEVL V, 40-54 MIN: ICD-10-PCS | Mod: S$GLB,,,

## 2023-08-24 PROCEDURE — 99215 OFFICE O/P EST HI 40 MIN: CPT | Mod: S$GLB,,,

## 2023-08-24 PROCEDURE — 1159F MED LIST DOCD IN RCRD: CPT | Mod: CPTII,S$GLB,,

## 2023-08-24 PROCEDURE — 3078F PR MOST RECENT DIASTOLIC BLOOD PRESSURE < 80 MM HG: ICD-10-PCS | Mod: CPTII,S$GLB,,

## 2023-08-24 PROCEDURE — 3075F SYST BP GE 130 - 139MM HG: CPT | Mod: CPTII,S$GLB,,

## 2023-08-24 PROCEDURE — 3008F PR BODY MASS INDEX (BMI) DOCUMENTED: ICD-10-PCS | Mod: CPTII,S$GLB,,

## 2023-08-24 PROCEDURE — 1160F PR REVIEW ALL MEDS BY PRESCRIBER/CLIN PHARMACIST DOCUMENTED: ICD-10-PCS | Mod: CPTII,S$GLB,,

## 2023-08-24 PROCEDURE — 1160F RVW MEDS BY RX/DR IN RCRD: CPT | Mod: CPTII,S$GLB,,

## 2023-08-24 PROCEDURE — 3288F FALL RISK ASSESSMENT DOCD: CPT | Mod: CPTII,S$GLB,,

## 2023-08-24 PROCEDURE — 1101F PR PT FALLS ASSESS DOC 0-1 FALLS W/OUT INJ PAST YR: ICD-10-PCS | Mod: CPTII,S$GLB,,

## 2023-08-24 PROCEDURE — 3008F BODY MASS INDEX DOCD: CPT | Mod: CPTII,S$GLB,,

## 2023-08-24 PROCEDURE — 3078F DIAST BP <80 MM HG: CPT | Mod: CPTII,S$GLB,,

## 2023-08-24 PROCEDURE — 4010F PR ACE/ARB THEARPY RXD/TAKEN: ICD-10-PCS | Mod: CPTII,S$GLB,,

## 2023-08-24 PROCEDURE — 4010F ACE/ARB THERAPY RXD/TAKEN: CPT | Mod: CPTII,S$GLB,,

## 2023-08-24 PROCEDURE — 1101F PT FALLS ASSESS-DOCD LE1/YR: CPT | Mod: CPTII,S$GLB,,

## 2023-08-24 PROCEDURE — 99999 PR PBB SHADOW E&M-EST. PATIENT-LVL IV: ICD-10-PCS | Mod: PBBFAC,,,

## 2023-08-24 PROCEDURE — 3044F HG A1C LEVEL LT 7.0%: CPT | Mod: CPTII,S$GLB,,

## 2023-08-24 PROCEDURE — 99999 PR PBB SHADOW E&M-EST. PATIENT-LVL IV: CPT | Mod: PBBFAC,,,

## 2023-08-24 PROCEDURE — 1159F PR MEDICATION LIST DOCUMENTED IN MEDICAL RECORD: ICD-10-PCS | Mod: CPTII,S$GLB,,

## 2023-08-24 PROCEDURE — 3044F PR MOST RECENT HEMOGLOBIN A1C LEVEL <7.0%: ICD-10-PCS | Mod: CPTII,S$GLB,,

## 2023-08-24 PROCEDURE — 3288F PR FALLS RISK ASSESSMENT DOCUMENTED: ICD-10-PCS | Mod: CPTII,S$GLB,,

## 2023-08-24 NOTE — PROGRESS NOTES
Patient ID: Rajan Cooper III is a 67 y.o. male who presents today for a routine clinic visit for MS.  He was last seen by Dr. Das on 1/30/2023.  The history was provided by the patient. He is accompanied by his wife.     Principle neurological diagnosis: MS   Date of symptom onset: 1993  Date of diagnosis: 1993  Disease type at diagnosis: RR  Disease type currently: SPMS  Previous therapy: Avonex  Current therapy: Avonex 2003 - present   Vitamin D Dose: 50,000 IU weekly   Last MRI Brain: 3/13/2023 - stable  Last MRI C-spine: 3/13/2023 - stable   Last MRI T-spine: NA  CSF: NA  JCV status: NA  Other relevant labs and tests: 1/30/2023: vitamin D - 12    Subjective:     He just had neck surgery about three months ago for cervical stenosis.  He is currently in PT for it and they are also working on strengthening his lower body.    He has seen some improvement with his arm weakness, but feels that he has seen a decrease with strength in his legs.     He is using a walker all the time and a scooter for longer distances for now. The scooter he is using was borrowed from a friend and he would like to see about getting his own.  He would like to start using the scooter in his home as well as he is not always strong enough to use just the walker. He also endorse a number of falls where is legs just become weak without much warning.  He has not had any serious injuries from the falls as of now.      He is planning to retire in the next few months and going to switch to medicare. He is wondering is he could switch DMTs because avonex is not covered with medicare.     He is not sleeping too well.  He wakes up moaning.  His wife says that he does stop breathing while sleeping.       ROS:      8/23/2023     6:39 PM   REVIEW OF SYMPTOMS   Do you feel abnormally tired on most days? Yes   Do you feel you generally sleep well? No   Do you have difficulty controlling your bladder?  Yes - he is on Lasix    Do you have difficulty  "controlling your bowels?  No   Do you have frequent muscle cramps, tightness or spasms in your limbs?  Yes - takes baclofen    Do you have new visual symptoms?  No   Do you have worsening difficulty with your memory or thinking? No   Do you have worsening symptoms of anxiety or depression?  No   For patients who walk, Do you have more difficulty walking?  Yes - "loss some ground with surgery", but continues PT to help with leg strengthening     Have you fallen since your last visit?  Yes - tingling down legs makes him feel weak   For patients who use wheelchairs: Do you have any skin wounds or breakdown? No   Do you have difficulty using your hands?  No   Do you have shooting or burning pain? No   Do you have difficulty with sexual function?  Yes   If you are sexually active, are you using birth control? Y/N  N/A Not Applicable   Do you often choke when swallowing liquids or solid food?  No   Do you experience worsening symptoms when overheated? Yes   Do you need any new equipment such as a wheelchair, walker or shower chair? Yes - would like a scooter   Do you receive co-pay financial assistance for your principal MS medicine? No     Would you be interested in participating in an MS research trial in the future? No    For patients on Gilenya, Tecfidera, Aubagio, Rituxan, Ocrevus, Tysabri, Lemtrada or Methotrexate, are you aware that you should NOT receive live virus vaccines?  Not Applicable   Do you feel you have adequate family/friend support?  Yes   Do you have health insurance?   Yes   Are you currently employed? Yes - but thinking of retiring soon    Do you receive SSDI/SSI?  SSI    Do you use marijuana or cannabis products? No   Have you been diagnosed with a urinary tract infection since your last visit here? No   Have you been diagnosed with a respiratory tract infection since your last visit here? No   Have you been to the emergency room since your last visit here? No   Have you been hospitalized since " your last visit here?  Yes  - neck surgery             1/30/2023    10:10 AM   FSS SCORE & INTERPRETATION   FSS SCORE  53   FSS SCORE INTERPRETATION May be suffering from fatigue         1/30/2023    10:07 AM   MS MARY-D SCORE & INTERPRETATION   MARY-D SCORE  8   MARY-D INTERPRETATION  No indication of Depression         1/30/2023    10:02 AM   MS MAGGY-7 SCORE & INTERPRETATION   MAGGY-7 SCORE  10   MAGGY-7 SCORE INTERPRETATION Moderate Anxiety         1/30/2023    10:14 AM   PEQ MS MOS PAIN EFFECTS SCORE & INTERPRETATION   PES SCORE 19   PES SCORE INTERPRETATION Scores can range from 6-30.  Items are scaled so that higher scores indicate a greater impact of pain on a patients mood and behavior.         1/30/2023    10:16 AM   PEQ MS SEXUAL SATISFACTION SCORE & INTERPRETATION   SSS SCORE  8   SSS SCORE INTERPRETATION Scores can range from 4-24.  Higher scores indicate greater problems with sexual satisfaction.         1/30/2023    10:18 AM   MS BLADDER CONTROL SCORE & INTERPRETATION   BLCS SCORE 2   BLCS SCORE INTERPRETATION  Scores can range from 0-22, with higher scores indicating greater bladder control problems.         1/30/2023    10:26 AM   MS BOWEL CONTROL SCORE & INTERPRETATION   BWCS SCORE 3   BWCS SCORE INTERPRETATION Scores can range from 0-26, with higher scores indicating greater bowel control problems.         1/30/2023    10:19 AM   PEQ MS IMPACT OF VISUAL IMPAIRMENT SCORE & INTERPRETATION   CATRACHO SCALE SCORE  0   CATRACHO SCORE INTERPRETATION Scores can range from 0-15, with higher scores indicating greater impact of visual problems on daily activites.         1/30/2023    10:23 AM   MS PDQ SCORE & INTERPRETATION   PDQ RETROSPECTIVE MEMORY SUBSCALE 4   PDQ ATTENTION/CONCENTRATION SUBSCALE 3   PDQ PROSPECTIVE MEMORY SUBSCALE 3   PDQ PLANNING/ORGANIZATION SUBSCALE 2   PDQ TOTAL SCORE 12   PDQ SCORE INTERPRETATION Scores can range from 0-80, with higher scores indicating greater perceived cognitive impairment.          1/30/2023    10:28 AM   MSSS SCORE & INTERPRETATION   MSSS TANGIBLE SUPPORT SUBSCALE 100   MSSS EMOTIONAL/INFORMATIONAL SUPPORT SUBSCALE 100   MSSS AFFECTIONATE SUPPORT SUBSCALE 100   MSSS POSITIVE SOCIAL INTERACTION SUBSCALE 100   MSSS TOTAL SCORE 100   MSSS SCORE INTERPRETATION Scores can range from 0-100, with higher scores indicating greater perceived support.        SOCIAL HISTORY  Living arrangements - the patient lives with his wife.   Social History     Socioeconomic History    Marital status:    Tobacco Use    Smoking status: Never    Smokeless tobacco: Never   Substance and Sexual Activity    Alcohol use: No    Drug use: No     Social Determinants of Health     Financial Resource Strain: Low Risk  (4/15/2023)    Overall Financial Resource Strain (CARDIA)     Difficulty of Paying Living Expenses: Not hard at all   Food Insecurity: No Food Insecurity (4/15/2023)    Hunger Vital Sign     Worried About Running Out of Food in the Last Year: Never true     Ran Out of Food in the Last Year: Never true   Transportation Needs: No Transportation Needs (4/15/2023)    PRAPARE - Transportation     Lack of Transportation (Medical): No     Lack of Transportation (Non-Medical): No   Physical Activity: Inactive (4/15/2023)    Exercise Vital Sign     Days of Exercise per Week: 0 days     Minutes of Exercise per Session: 0 min   Stress: Stress Concern Present (4/15/2023)    [employer] [place] [employer] - Occupational Stress Questionnaire     Feeling of Stress : Rather much   Social Connections: Moderately Isolated (4/15/2023)    Social Connection and Isolation Panel [NHANES]     Frequency of Communication with Friends and Family: More than three times a week     Frequency of Social Gatherings with Friends and Family: More than three times a week     Attends Rastafarian Services: Never     Attends Club or Organization Meetings: Never     Marital Status:    Housing Stability:  Unknown (4/15/2023)    Housing Stability Vital Sign     Unable to Pay for Housing in the Last Year: No     Unstable Housing in the Last Year: No       Current Outpatient Medications on File Prior to Visit   Medication Sig Dispense Refill    amLODIPine (NORVASC) 5 MG tablet Take 1 tablet (5 mg total) by mouth once daily. 90 tablet 3    AVONEX 30 mcg/0.5 mL injection Inject 0.5 mLs (30 mcg total) into the muscle every 7 days. 4 each 12    baclofen (LIORESAL) 10 MG tablet Take 2 tabs po AM and 2 tabs po  tablet 3    cholecalciferol, vitamin D3, 1,250 mcg (50,000 unit) Tab Take 1 tablet by mouth once a week. 12 tablet 3    ergocalciferol (ERGOCALCIFEROL) 50,000 unit Cap Take 1 capsule (50,000 Units total) by mouth every 7 days. 12 capsule 3    furosemide (LASIX) 40 MG tablet Take 1 tablet (40 mg total) by mouth 2 (two) times daily. 180 tablet 2    levothyroxine (SYNTHROID) 50 MCG tablet Take 1 tablet (50 mcg total) by mouth before breakfast. 30 tablet 11    losartan (COZAAR) 50 MG tablet Take 1 tablet (50 mg total) by mouth once daily. 90 tablet 3    modafiniL (PROVIGIL) 200 MG Tab Take 1 tablet (200 mg total) by mouth once daily. Take qam 400 tablet 3    mupirocin (BACTROBAN) 2 % ointment Apply to affected area 3 times daily 22 g 1    ondansetron (ZOFRAN-ODT) 4 MG TbDL Take 1 tablet (4 mg total) by mouth every 6 (six) hours as needed (nausea). 15 tablet 0    sildenafiL (VIAGRA) 100 MG tablet Take 1 tablet (100 mg total) by mouth daily as needed for Erectile Dysfunction (take 1 hour prior to intercourse on an empty stomach). 10 tablet 2    tamsulosin (FLOMAX) 0.4 mg Cap Take 1 capsule (0.4 mg total) by mouth every evening. 90 capsule 3    triamcinolone acetonide 0.1% (KENALOG) 0.1 % cream APPLY TO AFFECTED AREA TWICE A DAY 80 g 1     No current facility-administered medications on file prior to visit.       Objective:     1. 25 foot timed walk:      1/30/2023    12:01 AM 8/24/2023    12:02 AM    Timed 25 Foot Walk:   Did patient wear an AFO? No No   Was assistive device used? Yes Yes   Assistive device used (rain one): Bilateral Assistance Bilateral Assistance   Bilateral device used Walker/Rollator Walker/Rollator   Time for 25 Foot Walk (seconds) 10 17.71   Time for 25 Foot Walk (seconds)  16.29           NEURO EXAM    In general, the patient is well nourished and appears to be in no acute distress.    MENTAL STATUS: language is fluent, normal verbal comprehension, short-term and remote memory is intact, attention is normal, patient is alert and oriented x 3, fund of knowlege is appropriate by vocabulary.     CRANIAL NERVE EXAM:  There is no internuclear ophthalmoplegia.  Extraocular muscles are intact. No facial asymmetry. Facial sensation is intact bilaterally. There is no dysarthria. Uvula is midline, and palate moves symmetrically. Shoulder shrug intact bilaterlly. Tongue protrusion is midline. Hearing is intact to finger rub bilaterally. Neck is supple with full ROM    MOTOR EXAM: Normal bulk and tone throughout UE and LE bilaterally.   rapid sequential movements are normal with BUE; Strength is as follows:     Strength:  R deltoid 3+/5, L deltoid 5/5  R biceps 5/5, L biceps 5/5  R triceps 5/5, L triceps 5/5  R wrist flexors 5/5, L wrist flexors 5/5  R wrist extensors 5/5, L wrist extensors 5/5  R finger abductors 5/5, L finger abductors 5/5  R hip flexors 3+/5, L hip flexors 5/5  R knee extensors 5/5, L knee extensors 5/5  R knee flexors 5/5, L knee flexors 5/5  R ankle dorsiflexors 5/5, L ankle dorsiflexors 5/5    SENSORY EXAM: Normal to light touch throughout, vibration decreased BLE    COORDINATION: Normal finger-to-nose exam on left, unable to perform on right due to decreased mobility at shoulder.    GAIT: wide based and slowed, rollator dependent       Imaging:     Dicussed with patient     Results for orders placed during the hospital encounter of 03/13/23    MRI Brain Demyelinating W W/O  "Contrast    Impression  Brain appears grossly stable from prior exams again demonstrating findings compatible with the reported history of multiple sclerosis.  No definite new or enhancing lesions to indicate ongoing or active demyelination.    Advanced cervical spondylosis, progressed since the prior exam.  This results in severe spinal stenosis and cord compression at C3-4 with some subtle cord signal changes at this level presumably from compressive myelopathy.    No new focal spinal cord lesions identified elsewhere.    This report was flagged in Epic as abnormal.      Electronically signed by: Sagar Santos MD  Date:    03/14/2023  Time:    08:22    Results for orders placed during the hospital encounter of 03/13/23    MRI Cervical Spine Demyelinating W W/O Contrast    Impression  Brain appears grossly stable from prior exams again demonstrating findings compatible with the reported history of multiple sclerosis.  No definite new or enhancing lesions to indicate ongoing or active demyelination.    Advanced cervical spondylosis, progressed since the prior exam.  This results in severe spinal stenosis and cord compression at C3-4 with some subtle cord signal changes at this level presumably from compressive myelopathy.    No new focal spinal cord lesions identified elsewhere.    This report was flagged in Epic as abnormal.      Electronically signed by: Sagar Santos MD  Date:    03/14/2023  Time:    08:22    No results found for this or any previous visit.      Labs:     Lab Results   Component Value Date    OXDDPSBA39AZ 13 (L) 01/30/2023     No results found for: "JCVINDEX", "JCVANTIBODY"  No results found for: "PU2OCTLG", "ABSOLUTECD3", "HJ5XHRXG", "ABSOLUTECD8", "SW3INMEC", "ABSOLUTECD4", "LABCD48"  Lab Results   Component Value Date    WBC 12.06 04/15/2023    RBC 4.51 (L) 04/15/2023    HGB 13.5 (L) 04/15/2023    HCT 42.1 04/15/2023    MCV 93 04/15/2023    MCH 29.9 04/15/2023    MCHC 32.1 04/15/2023    RDW " 13.9 04/15/2023     04/15/2023    MPV 11.2 04/15/2023    GRAN 9.8 (H) 04/15/2023    GRAN 81.5 (H) 04/15/2023    LYMPH 0.9 (L) 04/15/2023    LYMPH 7.1 (L) 04/15/2023    MONO 1.3 (H) 04/15/2023    MONO 10.9 04/15/2023    EOS 0.0 04/15/2023    BASO 0.01 04/15/2023    EOSINOPHIL 0.1 04/15/2023    BASOPHIL 0.1 04/15/2023     Sodium   Date Value Ref Range Status   04/15/2023 141 136 - 145 mmol/L Final     Potassium   Date Value Ref Range Status   04/15/2023 4.3 3.5 - 5.1 mmol/L Final     Chloride   Date Value Ref Range Status   04/15/2023 101 95 - 110 mmol/L Final     CO2   Date Value Ref Range Status   04/15/2023 28 23 - 29 mmol/L Final     Glucose   Date Value Ref Range Status   04/15/2023 111 (H) 70 - 110 mg/dL Final     BUN   Date Value Ref Range Status   04/15/2023 24 (H) 8 - 23 mg/dL Final     Creatinine   Date Value Ref Range Status   04/15/2023 1.1 0.5 - 1.4 mg/dL Final     Calcium   Date Value Ref Range Status   04/15/2023 9.8 8.7 - 10.5 mg/dL Final     Total Protein   Date Value Ref Range Status   04/15/2023 7.4 6.0 - 8.4 g/dL Final     Albumin   Date Value Ref Range Status   04/15/2023 3.7 3.5 - 5.2 g/dL Final     Total Bilirubin   Date Value Ref Range Status   04/15/2023 0.8 0.1 - 1.0 mg/dL Final     Comment:     For infants and newborns, interpretation of results should be based  on gestational age, weight and in agreement with clinical  observations.    Premature Infant recommended reference ranges:  Up to 24 hours.............<8.0 mg/dL  Up to 48 hours............<12.0 mg/dL  3-5 days..................<15.0 mg/dL  6-29 days.................<15.0 mg/dL       Alkaline Phosphatase   Date Value Ref Range Status   04/15/2023 83 55 - 135 U/L Final     AST   Date Value Ref Range Status   04/15/2023 22 10 - 40 U/L Final     ALT   Date Value Ref Range Status   04/15/2023 22 10 - 44 U/L Final     Anion Gap   Date Value Ref Range Status   04/15/2023 12 8 - 16 mmol/L Final     eGFR if    Date  "Value Ref Range Status   04/30/2021 >60.0 >60 mL/min/1.73 m^2 Final     eGFR if non    Date Value Ref Range Status   04/30/2021 >60.0 >60 mL/min/1.73 m^2 Final     Comment:     Calculation used to obtain the estimated glomerular filtration  rate (eGFR) is the CKD-EPI equation.        No results found for: "HEPBSAG", "HEPBSAB", "HEPBCAB"        MS Impression and Plan:     NEURO MULTIPLE SCLEROSIS IMPRESSION:   MS Status:     Number of relapses in the past year?:  0    Clinical Progression:  Worsened    Clinical Progression comment:  Slower timed walk and decreased strength in right lower extremity     Type:  Progressive    MRI Progression:  Stable  Plan:     DMT:  No change in management    DMT comment:  Continue Avonex and vitamin D supplementation.  Within the next few month, patient will retire and switch to Medicare which is not covered.  He is considering changing DMT or possibly monitoring off DMT.     Symptom Management:  Implement change in symptom management    Implement Change in Symptom Management:  Sleep and Gait (Will refer to Sleep Clinic. Will follow up with SW to order scooter for in the home)       Will consult with Dr. Das about DMT change and will follow up with patient  Consult with SW team as I believe a scooter would be beneficial for him especially considering the multiple falls the pt has had.   MRIs in 3/2024  Follow up in 6 months with Dr. Das.     Plan discussed and questions were answered to satisfaction.     Problem List Items Addressed This Visit          Neuro    MS (multiple sclerosis) - Primary    Relevant Orders    Ambulatory referral/consult to Sleep Disorders     Other Visit Diagnoses       Sleep disturbance        Relevant Orders    Ambulatory referral/consult to Sleep Disorders            I spent a total of 60 minutes on the day of the visit.This includes face to face time and non-face to face time preparing to see the patient (eg, review of tests), " obtaining and/or reviewing separately obtained history, documenting clinical information in the electronic or other health record, independently interpreting results and communicating results to the patient/family/caregiver, or care coordinator.       Tegan Charles, LUKAS-C

## 2023-08-24 NOTE — Clinical Note
He is wondering if he could get insurance to help pay for a scooter for longer distances.  His mobility is a bit worse and needing it. Thank you!!!

## 2023-08-24 NOTE — Clinical Note
"Hey Dr. Das, he is about to retire and go on Medicare.  Avonex is not covered and would be around $7000/month OOP.  He is wondering about other DMT options or maybe to even start to monitoring off.  I'm guessing he may be more SPMS than active SPMS, so I'm not really sure where to go with DMT.  I did talk to him about Aubagio, Ocrevus, and Kesimpta, but not sure if we could do if he is potentially not "active", but he is progressing so could that mean "active"?! Let's discuss. "

## 2023-08-30 ENCOUNTER — PATIENT MESSAGE (OUTPATIENT)
Dept: NEUROLOGY | Facility: CLINIC | Age: 68
End: 2023-08-30
Payer: COMMERCIAL

## 2023-09-14 ENCOUNTER — HOSPITAL ENCOUNTER (OUTPATIENT)
Dept: RADIOLOGY | Facility: HOSPITAL | Age: 68
Discharge: HOME | End: 2023-09-14
Attending: NEUROLOGICAL SURGERY
Payer: COMMERCIAL

## 2023-09-14 ENCOUNTER — OFFICE VISIT (OUTPATIENT)
Dept: NEUROSURGERY | Facility: CLINIC | Age: 68
End: 2023-09-14
Payer: COMMERCIAL

## 2023-09-14 ENCOUNTER — PATIENT MESSAGE (OUTPATIENT)
Dept: NEUROLOGY | Facility: CLINIC | Age: 68
End: 2023-09-14
Payer: COMMERCIAL

## 2023-09-14 VITALS
HEART RATE: 60 BPM | BODY MASS INDEX: 41.75 KG/M2 | DIASTOLIC BLOOD PRESSURE: 73 MMHG | SYSTOLIC BLOOD PRESSURE: 121 MMHG | HEIGHT: 73 IN | WEIGHT: 315 LBS

## 2023-09-14 DIAGNOSIS — Z98.1 S/P CERVICAL SPINAL FUSION: Primary | ICD-10-CM

## 2023-09-14 DIAGNOSIS — Z98.1 S/P CERVICAL SPINAL FUSION: ICD-10-CM

## 2023-09-14 PROCEDURE — 1159F MED LIST DOCD IN RCRD: CPT | Mod: CPTII,S$GLB,, | Performed by: PHYSICIAN ASSISTANT

## 2023-09-14 PROCEDURE — 99999 PR PBB SHADOW E&M-EST. PATIENT-LVL IV: ICD-10-PCS | Mod: PBBFAC,,, | Performed by: PHYSICIAN ASSISTANT

## 2023-09-14 PROCEDURE — 3008F PR BODY MASS INDEX (BMI) DOCUMENTED: ICD-10-PCS | Mod: CPTII,S$GLB,, | Performed by: PHYSICIAN ASSISTANT

## 2023-09-14 PROCEDURE — 1101F PT FALLS ASSESS-DOCD LE1/YR: CPT | Mod: CPTII,S$GLB,, | Performed by: PHYSICIAN ASSISTANT

## 2023-09-14 PROCEDURE — 3288F FALL RISK ASSESSMENT DOCD: CPT | Mod: CPTII,S$GLB,, | Performed by: PHYSICIAN ASSISTANT

## 2023-09-14 PROCEDURE — 3044F HG A1C LEVEL LT 7.0%: CPT | Mod: CPTII,S$GLB,, | Performed by: PHYSICIAN ASSISTANT

## 2023-09-14 PROCEDURE — 1101F PR PT FALLS ASSESS DOC 0-1 FALLS W/OUT INJ PAST YR: ICD-10-PCS | Mod: CPTII,S$GLB,, | Performed by: PHYSICIAN ASSISTANT

## 2023-09-14 PROCEDURE — 72040 XR CERVICAL SPINE AP LATERAL: ICD-10-PCS | Mod: 26,,, | Performed by: RADIOLOGY

## 2023-09-14 PROCEDURE — 1160F PR REVIEW ALL MEDS BY PRESCRIBER/CLIN PHARMACIST DOCUMENTED: ICD-10-PCS | Mod: CPTII,S$GLB,, | Performed by: PHYSICIAN ASSISTANT

## 2023-09-14 PROCEDURE — 4010F PR ACE/ARB THEARPY RXD/TAKEN: ICD-10-PCS | Mod: CPTII,S$GLB,, | Performed by: PHYSICIAN ASSISTANT

## 2023-09-14 PROCEDURE — 99214 OFFICE O/P EST MOD 30 MIN: CPT | Mod: S$GLB,,, | Performed by: PHYSICIAN ASSISTANT

## 2023-09-14 PROCEDURE — 99214 PR OFFICE/OUTPT VISIT, EST, LEVL IV, 30-39 MIN: ICD-10-PCS | Mod: S$GLB,,, | Performed by: PHYSICIAN ASSISTANT

## 2023-09-14 PROCEDURE — 72040 X-RAY EXAM NECK SPINE 2-3 VW: CPT | Mod: TC,FY

## 2023-09-14 PROCEDURE — 3078F DIAST BP <80 MM HG: CPT | Mod: CPTII,S$GLB,, | Performed by: PHYSICIAN ASSISTANT

## 2023-09-14 PROCEDURE — 99999 PR PBB SHADOW E&M-EST. PATIENT-LVL IV: CPT | Mod: PBBFAC,,, | Performed by: PHYSICIAN ASSISTANT

## 2023-09-14 PROCEDURE — 3008F BODY MASS INDEX DOCD: CPT | Mod: CPTII,S$GLB,, | Performed by: PHYSICIAN ASSISTANT

## 2023-09-14 PROCEDURE — 1159F PR MEDICATION LIST DOCUMENTED IN MEDICAL RECORD: ICD-10-PCS | Mod: CPTII,S$GLB,, | Performed by: PHYSICIAN ASSISTANT

## 2023-09-14 PROCEDURE — 3074F SYST BP LT 130 MM HG: CPT | Mod: CPTII,S$GLB,, | Performed by: PHYSICIAN ASSISTANT

## 2023-09-14 PROCEDURE — 1126F PR PAIN SEVERITY QUANTIFIED, NO PAIN PRESENT: ICD-10-PCS | Mod: CPTII,S$GLB,, | Performed by: PHYSICIAN ASSISTANT

## 2023-09-14 PROCEDURE — 4010F ACE/ARB THERAPY RXD/TAKEN: CPT | Mod: CPTII,S$GLB,, | Performed by: PHYSICIAN ASSISTANT

## 2023-09-14 PROCEDURE — 3044F PR MOST RECENT HEMOGLOBIN A1C LEVEL <7.0%: ICD-10-PCS | Mod: CPTII,S$GLB,, | Performed by: PHYSICIAN ASSISTANT

## 2023-09-14 PROCEDURE — 3074F PR MOST RECENT SYSTOLIC BLOOD PRESSURE < 130 MM HG: ICD-10-PCS | Mod: CPTII,S$GLB,, | Performed by: PHYSICIAN ASSISTANT

## 2023-09-14 PROCEDURE — 1160F RVW MEDS BY RX/DR IN RCRD: CPT | Mod: CPTII,S$GLB,, | Performed by: PHYSICIAN ASSISTANT

## 2023-09-14 PROCEDURE — 72040 X-RAY EXAM NECK SPINE 2-3 VW: CPT | Mod: 26,,, | Performed by: RADIOLOGY

## 2023-09-14 PROCEDURE — 1126F AMNT PAIN NOTED NONE PRSNT: CPT | Mod: CPTII,S$GLB,, | Performed by: PHYSICIAN ASSISTANT

## 2023-09-14 PROCEDURE — 3078F PR MOST RECENT DIASTOLIC BLOOD PRESSURE < 80 MM HG: ICD-10-PCS | Mod: CPTII,S$GLB,, | Performed by: PHYSICIAN ASSISTANT

## 2023-09-14 PROCEDURE — 3288F PR FALLS RISK ASSESSMENT DOCUMENTED: ICD-10-PCS | Mod: CPTII,S$GLB,, | Performed by: PHYSICIAN ASSISTANT

## 2023-09-14 NOTE — PROGRESS NOTES
"Subjective:     Patient ID:  Rajan Cooper III is a 67 y.o. male.    Juliano    Chief Complaint:  5 month po fu    Date of Procedure: 4/14/2023      Pre-Operative Diagnosis: Cervical myelopathy [G95.9]  Cervical stenosis of spine [M48.02]     Post-Operative Diagnosis: Post-Op Diagnosis Codes:     * Cervical myelopathy [G95.9]     * Cervical stenosis of spine [M48.02]     Anesthesia: General     Procedures performed:  Laminectomy and foraminotomy C3/4 and C4/5  Instrumented posterolateral fusion C3-5 with lateral mass screws and rods  Allograft, [place]      Surgeon: Dawit Henao MD     Assistant: Tuyet Gutierrez PA-C, scrubbed and assisting with all portions, no resident available.    HPI    Rajan Cooper III is a 67 y.o. male who presents for follow up.  Patient is doing well.  He denies any neck pain no arm pain or paresthesias.  He has been in physical therapy and feels like his arm strength is getting better particularly his right arm.  He has more movement.  He still uses a scooter and then a walker at home.  This was even before the neck surgery due to his MS.  He is not taking any medications from a neck surgery standpoint.    Patient denies any recent accidents or trauma, no saddle anesthesias, and no bowel or bladder incontinence.    Patient denies any difficulty tying shoes or buttoning clothes, is not dropping things, does not have difficulty opening containers, and has had no change in handwriting.    Review of Systems:  Constitution: Negative for chills, fever, night sweats and weight loss.   Musculoskeletal: Negative for falls.   Gastrointestinal: Negative for bowel incontinence, nausea and vomiting.   Genitourinary: Negative for bladder incontinence.   Neurological: Negative for disturbances in coordination and loss of balance.      Objective:      Vitals:    09/14/23 0905   BP: 121/73   Pulse: 60   Weight: (!) 143.3 kg (315 lb 14.7 oz)   Height: 6' 1" (1.854 m)   PainSc: 0-No pain         Physical " Exam: Exam done in the scooter      General:  Rajan Cooper III is well-developed, well-nourished, appears stated age, in no acute distress, alert and oriented to person, place, and time.    Pulmonary/Chest:  Respiratory effort normal  Abdominal: Exhibits no distension  Psychiatric:  Normal mood and affect.  Behavior is normal.  Judgement and thought content normal      Musculoskeletal:    Patient is able to walk heel to toe without difficulty.    Cervical ROM:   No pain in cervical spine flexion, extension, left rotation, and right rotation, left lateral bending, and right lateral bending.    Cervical Spine Inspection:  Healed posterior surgical scars with no visible rashes.    Cervical Spine Palpation:  No tenderness to cervical spine palpation.      Neurological:    Muscle strength against resistance:     Right Left   Deltoid  4 / 5 5 / 5   Biceps  5 / 5 5 / 5   Triceps 5 / 5 5 / 5   Wrist flexion  5 / 5 5 / 5   Wrist extension 5 / 5 5 / 5   Finger abduction 5 / 5 5 / 5   Thumb opposition 5 / 5 5 / 5   Handgrip 4 / 5 4 / 5   Hip flexion  5 / 5 5 / 5   Hip extension 5 / 5 5 / 5   Hip abduction 5 / 5 5 / 5   Hip adduction 5/ 5 5 / 5   Knee extension  5 / 5 5 / 5   Knee flexion  5 / 5 5 / 5   Dorsiflexion  5 / 5 5 / 5        Plantar flexion  5 / 5 5 / 5   Inversion of the feet 5 / 5 5 / 5   Eversion of the feet 5 / 5 5 / 5       Reflexes:     Right Left   Triceps 2+ 3+   Biceps 2+ 2+   Brachioradialis 2+ 3+   Patellar 2+ 2+   Achilles 2+ 2+       Clonus:  Negative bilaterally  Brooke: Negative on the right.  Positive on the left    On gross examination of the bilateral lower extremities, patient has full painfree ROM with no signs of clubbing, cyanosis, edema, and weakness.      XRAY Interpretation:     Cervical spine xrays were personally reviewed today.  No fractures.  Hardware intact.      Assessment:          1. S/P cervical spinal fusion            Plan:          Orders Placed This Encounter    CT Cervical  Spine Without Contrast       Patient 5 months po and doing well.  Xrays look good.  Continue PT and then do home PT.    FU with me at one year post op with CT cspine.    Follow-Up:  Follow up in about 7 months (around 4/14/2024). If there are any questions prior to this, the patient was instructed to contact the office.       Angelique Hinojosa, [facility], PA-C  Neurosurgery  [facility]  09/14/2023

## 2023-09-15 NOTE — TELEPHONE ENCOUNTER
ROSALES from [employer]; called back.  [employer] explained that pt/wife stopped by today and that pt is currently looking at a scooter model that would not be covered by insurance.  Will notify pt of this and explain that in that event, [employer] might be willing to help cover cost.

## 2023-09-15 NOTE — TELEPHONE ENCOUNTER
Phoned  [employer] to discuss documentation that is needed for scooter.  Spoke with [employer] and he agreed to call back.

## 2023-09-25 NOTE — TELEPHONE ENCOUNTER
JUAN LUIS phoned [employer] at [employer] [phone] to follow up on mobility scooter for pt.  States pt/wife came back to discuss updated version of scooter that pt currently owns.  Nothing needed from MD office.  Explained that insurance is being sought but will take some time. Relayed this to pt.

## 2023-09-27 ENCOUNTER — PATIENT MESSAGE (OUTPATIENT)
Dept: INTERNAL MEDICINE | Facility: CLINIC | Age: 68
End: 2023-09-27
Payer: COMMERCIAL

## 2023-09-27 DIAGNOSIS — E03.9 HYPOTHYROIDISM, UNSPECIFIED TYPE: ICD-10-CM

## 2023-09-28 RX ORDER — LEVOTHYROXINE SODIUM 50 UG/1
50 TABLET ORAL
Qty: 90 TABLET | Refills: 3 | Status: SHIPPED | OUTPATIENT
Start: 2023-09-28 | End: 2024-01-09 | Stop reason: SDUPTHER

## 2023-09-28 NOTE — TELEPHONE ENCOUNTER
No care due was identified.  [facility] Embedded Care Due Messages. Reference number: [id number].   9/28/2023 6:45:57 AM CDT

## 2023-10-29 ENCOUNTER — PATIENT MESSAGE (OUTPATIENT)
Dept: INTERNAL MEDICINE | Facility: CLINIC | Age: 68
End: 2023-10-29
Payer: COMMERCIAL

## 2023-10-30 ENCOUNTER — E-VISIT (OUTPATIENT)
Dept: INTERNAL MEDICINE | Facility: CLINIC | Age: 68
End: 2023-10-30
Payer: COMMERCIAL

## 2023-10-30 DIAGNOSIS — N39.0 ACUTE UTI: Primary | ICD-10-CM

## 2023-10-30 PROCEDURE — 99421 OL DIG E/M SVC 5-10 MIN: CPT | Mod: ,,, | Performed by: PHYSICIAN ASSISTANT

## 2023-10-30 PROCEDURE — 99421 PR E&M, ONLINE DIGIT, EST, < 7 DAYS, 5-10 MINS: ICD-10-PCS | Mod: ,,, | Performed by: PHYSICIAN ASSISTANT

## 2023-10-31 RX ORDER — SULFAMETHOXAZOLE AND TRIMETHOPRIM 800; 160 MG/1; MG/1
1 TABLET ORAL 2 TIMES DAILY
Qty: 14 TABLET | Refills: 0 | Status: SHIPPED | OUTPATIENT
Start: 2023-10-31 | End: 2023-11-07

## 2023-10-31 RX ORDER — CEPHALEXIN 500 MG/1
500 CAPSULE ORAL 4 TIMES DAILY
Status: CANCELLED | OUTPATIENT
Start: 2023-10-31

## 2023-10-31 NOTE — PROGRESS NOTES
Patient ID: Rajan Cooper III is a 67 y.o. male.    Chief Complaint: Urinary Tract Infection (Entered automatically based on patient selection in Patient Portal.)    The patient initiated a request through [employer] on 10/30/2023 for evaluation and management with a chief complaint of Urinary Tract Infection (Entered automatically based on patient selection in Patient Portal.)     I evaluated the questionnaire submission on 10/31/2023.    Ohs Peq Evisit Uti Questionnaire    10/30/2023  4:30 PM CDT - Filed by Patient   Do you agree to participate in an E-Visit? Yes   If you have any of the following problems, please present to your local ER or call 911:  I acknowledge   What is the main issue that you would like for your doctor to address today? Weak stream and burns when I  pee   Are you able to take your vital signs? Yes   Systolic Blood Pressure: 139   Diastolic Blood Pressure: 81   Weight: 320   Height: 72   Pulse: 64   Temperature:    Respiration rate:    Pulse Oxygen:    What symptoms do you currently have? Pain while passing urine;  Change in urine appearance or smell   When did your symptoms first appear? 10/27/2023   List what you have done or taken to help your symptoms. Drinking cranberry juice and more water   Please indicate whether you have had the following symptoms during the past 24 hours     Urgent urination (a sudden and uncontrollable urge to urinate) Moderate   Frequent urination of small amounts of urine (going to the toilet very often) Moderate   Burning pain when urinating Moderate   Incomplete bladder emptying (still feel like you need to urinate again after urination) Moderate   Pain not associated with urination in the lower abdomen below the belly button) None   What does your urine look like? Cloudy   Blood seen in the urine None   Flank pain (pain in one or both sides of the lower back) None   Discharge from the urethra (urinary opening) without urination None   High body  temperature/fever? None-<99.5   Please rate how much discomfort you have experience because of the symptoms in the past 24 hours: Mild   Please indicate how the symptoms have interfered with your every day activities/work in the past 24 hours: Moderate   Please indicate how these symptoms have interfered with your social activities (visiting people, meeting with friends, etc.) in the past 24 hours? Moderate   Are you a diabetic? No   Provide any information you feel is important to your history not asked above I was treated months ago and antibiotic worked   Please attach any relevant images or files (if you have performed a home test for UTI, please submit a photo of results)          Recent Labs Obtained:  No visits with results within 7 Day(s) from this visit.   Latest known visit with results is:   Lab Visit on 2023   Component Date Value Ref Range Status    Hemoglobin A1C 2023 5.9 (H)  4.0 - 5.6 % Final    Comment: ADA Screening Guidelines:  5.7-6.4%  Consistent with prediabetes  >or=6.5%  Consistent with diabetes    High levels of fetal hemoglobin interfere with the HbA1C  assay. Heterozygous hemoglobin variants (HbS, HgC, etc)do  not significantly interfere with this assay.   However, presence of multiple variants may affect accuracy.      Estimated Avg Glucose 2023 123  68 - 131 mg/dL Final    TSH 2023 7.196 (H)  0.400 - 4.000 uIU/mL Final    Free T4 2023 0.85  0.71 - 1.51 ng/dL Final       No diagnosis found.     No orders of the defined types were placed in this encounter.           No follow-ups on file.      E-Visit Time Trackinm    Rajan was seen today for urinary tract infection.    Diagnoses and all orders for this visit:    Acute UTI  -     sulfamethoxazole-trimethoprim 800-160mg (BACTRIM DS) 800-160 mg Tab; Take 1 tablet by mouth 2 (two) times daily. for 7 days  -     Urinalysis, Reflex to Urine Culture Urine, Clean Catch; Future      Ruby Sloan PA-C

## 2023-11-11 RX ORDER — FUROSEMIDE 40 MG/1
40 TABLET ORAL 2 TIMES DAILY
Qty: 180 TABLET | Refills: 1 | Status: SHIPPED | OUTPATIENT
Start: 2023-11-11 | End: 2024-01-09 | Stop reason: SDUPTHER

## 2023-11-12 NOTE — TELEPHONE ENCOUNTER
No care due was identified.  Health [facility] Embedded Care Due Messages. Reference number: [id number].   11/11/2023 9:58:56 PM CST

## 2023-11-12 NOTE — TELEPHONE ENCOUNTER
Refill Decision Note   Rajan Cooper  is requesting a refill authorization.  Brief Assessment and Rationale for Refill:  Approve     Medication Therapy Plan:         Comments:     Note composed:11:29 PM 11/11/2023

## 2023-11-14 ENCOUNTER — PATIENT MESSAGE (OUTPATIENT)
Dept: NEUROLOGY | Facility: CLINIC | Age: 68
End: 2023-11-14
Payer: COMMERCIAL

## 2023-11-14 DIAGNOSIS — G35 MULTIPLE SCLEROSIS: Primary | ICD-10-CM

## 2023-11-14 DIAGNOSIS — Z74.09 IMPAIRED MOBILITY: ICD-10-CM

## 2023-12-01 NOTE — TELEPHONE ENCOUNTER
JUAN LUIS phoned Avni Rosas, PT phone number is [phone].  LVM for him to call re: concerns/feedback about pt's ability to safely operate a motor vehicle.

## 2023-12-01 NOTE — TELEPHONE ENCOUNTER
Spoke with PT Avni Rosas, PT who hasn't seen pt since June 2023 following a surgery.  Cannot speak about pt's driving abilities.

## 2023-12-04 ENCOUNTER — TELEPHONE (OUTPATIENT)
Dept: ADMINISTRATIVE | Facility: CLINIC | Age: 68
End: 2023-12-04
Payer: COMMERCIAL

## 2023-12-05 ENCOUNTER — OFFICE VISIT (OUTPATIENT)
Dept: INTERNAL MEDICINE | Facility: CLINIC | Age: 68
End: 2023-12-05
Payer: COMMERCIAL

## 2023-12-05 ENCOUNTER — TELEPHONE (OUTPATIENT)
Dept: ENDOSCOPY | Facility: HOSPITAL | Age: 68
End: 2023-12-05
Payer: COMMERCIAL

## 2023-12-05 ENCOUNTER — PATIENT MESSAGE (OUTPATIENT)
Dept: ENDOSCOPY | Facility: HOSPITAL | Age: 68
End: 2023-12-05
Payer: COMMERCIAL

## 2023-12-05 VITALS
DIASTOLIC BLOOD PRESSURE: 70 MMHG | SYSTOLIC BLOOD PRESSURE: 116 MMHG | WEIGHT: 315 LBS | HEART RATE: 65 BPM | HEIGHT: 73 IN | OXYGEN SATURATION: 96 % | BODY MASS INDEX: 41.75 KG/M2

## 2023-12-05 DIAGNOSIS — Z12.11 COLON CANCER SCREENING: ICD-10-CM

## 2023-12-05 DIAGNOSIS — E66.01 SEVERE OBESITY (BMI >= 40): ICD-10-CM

## 2023-12-05 DIAGNOSIS — I10 PRIMARY HYPERTENSION: ICD-10-CM

## 2023-12-05 DIAGNOSIS — Z12.11 SPECIAL SCREENING FOR MALIGNANT NEOPLASMS, COLON: Primary | ICD-10-CM

## 2023-12-05 DIAGNOSIS — E03.9 HYPOTHYROIDISM, UNSPECIFIED TYPE: ICD-10-CM

## 2023-12-05 DIAGNOSIS — Z00.00 ENCOUNTER FOR PREVENTIVE HEALTH EXAMINATION: Primary | ICD-10-CM

## 2023-12-05 DIAGNOSIS — G35 MS (MULTIPLE SCLEROSIS): ICD-10-CM

## 2023-12-05 PROBLEM — L03.90 CELLULITIS: Status: RESOLVED | Noted: 2019-06-12 | Resolved: 2023-12-05

## 2023-12-05 PROBLEM — L03.116 LEFT LEG CELLULITIS: Status: RESOLVED | Noted: 2020-06-25 | Resolved: 2023-12-05

## 2023-12-05 PROBLEM — I99.8 ISCHEMIA OF TOE: Status: RESOLVED | Noted: 2017-04-04 | Resolved: 2023-12-05

## 2023-12-05 PROCEDURE — G0439 PR MEDICARE ANNUAL WELLNESS SUBSEQUENT VISIT: ICD-10-PCS | Mod: S$GLB,,, | Performed by: PHYSICIAN ASSISTANT

## 2023-12-05 PROCEDURE — 1170F FXNL STATUS ASSESSED: CPT | Mod: CPTII,S$GLB,, | Performed by: PHYSICIAN ASSISTANT

## 2023-12-05 PROCEDURE — 1126F AMNT PAIN NOTED NONE PRSNT: CPT | Mod: CPTII,S$GLB,, | Performed by: PHYSICIAN ASSISTANT

## 2023-12-05 PROCEDURE — 4010F PR ACE/ARB THEARPY RXD/TAKEN: ICD-10-PCS | Mod: CPTII,S$GLB,, | Performed by: PHYSICIAN ASSISTANT

## 2023-12-05 PROCEDURE — 1159F MED LIST DOCD IN RCRD: CPT | Mod: CPTII,S$GLB,, | Performed by: PHYSICIAN ASSISTANT

## 2023-12-05 PROCEDURE — 99999 PR PBB SHADOW E&M-EST. PATIENT-LVL V: CPT | Mod: PBBFAC,,, | Performed by: PHYSICIAN ASSISTANT

## 2023-12-05 PROCEDURE — G0439 PPPS, SUBSEQ VISIT: HCPCS | Mod: S$GLB,,, | Performed by: PHYSICIAN ASSISTANT

## 2023-12-05 PROCEDURE — 1160F RVW MEDS BY RX/DR IN RCRD: CPT | Mod: CPTII,S$GLB,, | Performed by: PHYSICIAN ASSISTANT

## 2023-12-05 PROCEDURE — 3074F SYST BP LT 130 MM HG: CPT | Mod: CPTII,S$GLB,, | Performed by: PHYSICIAN ASSISTANT

## 2023-12-05 PROCEDURE — 3078F PR MOST RECENT DIASTOLIC BLOOD PRESSURE < 80 MM HG: ICD-10-PCS | Mod: CPTII,S$GLB,, | Performed by: PHYSICIAN ASSISTANT

## 2023-12-05 PROCEDURE — 99999 PR PBB SHADOW E&M-EST. PATIENT-LVL V: ICD-10-PCS | Mod: PBBFAC,,, | Performed by: PHYSICIAN ASSISTANT

## 2023-12-05 PROCEDURE — 1160F PR REVIEW ALL MEDS BY PRESCRIBER/CLIN PHARMACIST DOCUMENTED: ICD-10-PCS | Mod: CPTII,S$GLB,, | Performed by: PHYSICIAN ASSISTANT

## 2023-12-05 PROCEDURE — 3074F PR MOST RECENT SYSTOLIC BLOOD PRESSURE < 130 MM HG: ICD-10-PCS | Mod: CPTII,S$GLB,, | Performed by: PHYSICIAN ASSISTANT

## 2023-12-05 PROCEDURE — 1126F PR PAIN SEVERITY QUANTIFIED, NO PAIN PRESENT: ICD-10-PCS | Mod: CPTII,S$GLB,, | Performed by: PHYSICIAN ASSISTANT

## 2023-12-05 PROCEDURE — 3078F DIAST BP <80 MM HG: CPT | Mod: CPTII,S$GLB,, | Performed by: PHYSICIAN ASSISTANT

## 2023-12-05 PROCEDURE — 1170F PR FUNCTIONAL STATUS ASSESSED: ICD-10-PCS | Mod: CPTII,S$GLB,, | Performed by: PHYSICIAN ASSISTANT

## 2023-12-05 PROCEDURE — 3044F PR MOST RECENT HEMOGLOBIN A1C LEVEL <7.0%: ICD-10-PCS | Mod: CPTII,S$GLB,, | Performed by: PHYSICIAN ASSISTANT

## 2023-12-05 PROCEDURE — 4010F ACE/ARB THERAPY RXD/TAKEN: CPT | Mod: CPTII,S$GLB,, | Performed by: PHYSICIAN ASSISTANT

## 2023-12-05 PROCEDURE — 3044F HG A1C LEVEL LT 7.0%: CPT | Mod: CPTII,S$GLB,, | Performed by: PHYSICIAN ASSISTANT

## 2023-12-05 PROCEDURE — 1159F PR MEDICATION LIST DOCUMENTED IN MEDICAL RECORD: ICD-10-PCS | Mod: CPTII,S$GLB,, | Performed by: PHYSICIAN ASSISTANT

## 2023-12-05 RX ORDER — SODIUM, POTASSIUM,MAG SULFATES 17.5-3.13G
1 SOLUTION, RECONSTITUTED, ORAL ORAL DAILY
Qty: 1 KIT | Refills: 0 | Status: SHIPPED | OUTPATIENT
Start: 2023-12-05 | End: 2023-12-07

## 2023-12-05 NOTE — PROGRESS NOTES
"  Rajan Cooper presented for a  Medicare AWV and comprehensive Health Risk Assessment today. The following components were reviewed and updated:    Medical history  Family History  Social history  Allergies and Current Medications  Health Risk Assessment  Health Maintenance  Care Team         ** See Completed Assessments for Annual Wellness Visit within the encounter summary.**         The following assessments were completed:  Living Situation  CAGE  Depression Screening  Timed Get Up and Go  Whisper Test  Cognitive Function Screening    Nutrition Screening  ADL Screening  PAQ Screening        Vitals:    12/05/23 0900   BP: 116/70   BP Location: Left arm   Patient Position: Sitting   BP Method: Large (Manual)   Pulse: 65   SpO2: 96%   Weight: (!) 143.3 kg (315 lb 14.7 oz)   Height: 6' 1" (1.854 m)     Body mass index is 41.68 kg/m².  Physical Exam  Vitals reviewed.   Constitutional:       General: He is not in acute distress.     Appearance: He is well-developed. He is not ill-appearing.   HENT:      Head: Normocephalic and atraumatic.      Right Ear: Tympanic membrane, ear canal and external ear normal.      Left Ear: Tympanic membrane, ear canal and external ear normal.   Cardiovascular:      Rate and Rhythm: Normal rate and regular rhythm.      Heart sounds: No murmur heard.  Pulmonary:      Effort: Pulmonary effort is normal.      Breath sounds: Normal breath sounds. No wheezing or rales.   Musculoskeletal:      Right lower leg: Edema present.      Left lower leg: Edema present.      Comments: Ambulating with walker   Skin:     General: Skin is warm and dry.      Findings: No rash.   Neurological:      Mental Status: He is alert and oriented to person, place, and time.   Psychiatric:         Mood and Affect: Mood normal.               Diagnoses and health risks identified today and associated recommendations/orders:    1. Encounter for preventive health examination  Exam and Assessments performed  Chart Review " Complete  Health Maintenance Reviewed and updated      2. MS (multiple sclerosis)  Stable on current meds  Followed by NSGY    3. Primary hypertension  Stable on current meds  Followed by PCP    4. Severe obesity (BMI >= 40)  BMI reviewed  Lifestyle mod discussed  Followed by PCP    5. Hypothyroidism, unspecified type  Stable on current meds  Followed by PCP    6. Colon cancer screening  - Ambulatory referral/consult to Endo Procedure ; Future      Provided Tolentino with a 5-10 year written screening schedule and personal prevention plan. Recommendations were developed using the USPSTF age appropriate recommendations. Education, counseling, and referrals were provided as needed. After Visit Summary printed and given to patient which includes a list of additional screenings\tests needed.    Follow up in about 8 months (around 8/5/2024) for PCP follow up and 1 year for next Medicare AWV.    Ruby Sloan PA-C      Future Appointments   Date Time Provider Department Center   12/19/2023  8:00 AM Stevo Campbell LOTR [facility] OP RHB2 [place] 2 fl   4/15/2024 11:00 AM [facility] OIC-CT1 500 LB LIMIT [facility]   4/17/2024 10:00 AM Angelique Hinojosa PA-C [facility] NEUROSU [place]

## 2023-12-05 NOTE — TELEPHONE ENCOUNTER
Spoke to wife to schedule procedure(s) Colonoscopy       Physician to perform procedure(s) Dr. BETTY Leblanc  Date of Procedure (s) 4/8/24  Arrival Time 11:30 AM  Time of Procedure(s) 12:30 AM   Location of Procedure(s) [place] 4th Floor  Type of Rx Prep sent to patient: Suprep  Instructions provided to patient via [facility]    Patient was informed on the following information and verbalized understanding. Screening questionnaire reviewed with patient and complete. If procedure requires anesthesia, a responsible adult needs to be present to accompany the patient home, patient cannot drive after receiving anesthesia. Appointment details are tentative, especially check-in time. Patient will receive a prep-op call 7 days prior to confirm check-in time for procedure. If applicable the patient should contact their pharmacy to verify Rx for procedure prep is ready for pick-up. Patient was advised to call the scheduling department at [phone] if pharmacy states no Rx is available. Patient was advised to call the endoscopy scheduling department if any questions or concerns arise.      [facility] Scheduling Department

## 2023-12-05 NOTE — PATIENT INSTRUCTIONS
Counseling and Referral of Other Preventative  (Italic type indicates deductible and co-insurance are waived)    Patient Name: Rajan Cooper  Today's Date: 12/5/2023    Health Maintenance       Date Due Completion Date    Pneumococcal Vaccines (Age 65+) (1 - PCV) Never done ---    Shingles Vaccine (1 of 2) Never done ---    Colorectal Cancer Screening Never done ---    RSV Vaccine (Age 60+ and Pregnant patients) (1 - 1-dose 60+ series) Never done ---    PROSTATE-SPECIFIC ANTIGEN 03/02/2024 3/2/2023    Lipid Panel 06/07/2024 6/7/2019    Hemoglobin A1c (Prediabetes) 08/14/2024 8/14/2023    TETANUS VACCINE 01/12/2032 1/12/2022        Orders Placed This Encounter   Procedures    Ambulatory referral/consult to Endo Procedure        The following information is provided to all patients.  This information is to help you find resources for any of the problems found today that may be affecting your health:                Living healthy guide: www.[facility].[place].[facility]      Understanding Diabetes: www.diabetes.org      Eating healthy: www.cdc.gov/healthyweight      CDC home safety checklist: www.cdc.gov/steadi/patient.html      Agency on Aging: www.goea.[place].gov      Alcoholics anonymous (AA): www.aa.org      Physical Activity: www.kala.nih.gov/rs7uxws      Tobacco use: www.quitwithusla.org

## 2023-12-06 ENCOUNTER — PATIENT MESSAGE (OUTPATIENT)
Dept: INTERNAL MEDICINE | Facility: CLINIC | Age: 68
End: 2023-12-06
Payer: COMMERCIAL

## 2023-12-06 RX ORDER — DOXYCYCLINE 100 MG/1
100 CAPSULE ORAL EVERY 12 HOURS
Qty: 20 CAPSULE | Refills: 0 | Status: SHIPPED | OUTPATIENT
Start: 2023-12-06 | End: 2023-12-16

## 2023-12-19 ENCOUNTER — CLINICAL SUPPORT (OUTPATIENT)
Dept: REHABILITATION | Facility: HOSPITAL | Age: 68
End: 2023-12-19
Payer: COMMERCIAL

## 2023-12-19 DIAGNOSIS — G35 MS (MULTIPLE SCLEROSIS): Primary | ICD-10-CM

## 2023-12-19 NOTE — PROGRESS NOTES
"Occupational Therapy   Driving Evaluation    Rajan Cooper III   MRN: [id number]      Referring Physician: Antonella Das MD  Diagnosis: Multiple sclerosis   History: Mr. Cooper is currently a licensed  but has been referred to Occupational Therapy by his MD for clinical and on-road testing to be used for this driving evaluation. Mr. Cooper is active  but has to have a medical review form filled out by his MD every two years. He has a new doctor that is following him for his MS and has requested this driving evaluation be performed to have an accurate review of his driving skills.     LADL [id number]   Exp 12/30/2027  Restrictions: 01 corrective lens, 11 hearing aids, 29 medical exam every two yeas     SUBJECTIVE: " I'll do whatever it takes for me to be able to drive. I don't mind doing this testing."     OBJECTIVE:   Physical Function Testing:    ROM:  Left UE WFL's, R UE limited to 1/2 range for shoulder flexion, but WNL's at elbow and below   Head / Neck ROM: 1/2 range to R, WFL's to L    Pt is R hand dominant   Strength: WFL's B UEs and LE's  Balance:    Static and Dynamic sitting- Normal   Static Standing - Good    Dynamic standing - Good with use of rollator   Transfers and Mobility: Modified Independent for slightly increased time and use of rollator   Rapid Pace Walk: 10.3 seconds which is slower than the average standard of 8 seconds, where rollator was used for balance    Perceptual testing:   Letter cancellation:  33/34, a passing score where testing was completed in 1 minute 12 seconds, average time for completion is 1 minute 15 seconds   Line bisection:  WNL's  Maze Test - 46 seconds, no error (Cut point score is 60 seconds or less with 1 error or less)   Trail Making Test A - 32 seconds (Average 29 seconds, Deficient > 78 seconds)    Trail Making Test B -  104 seconds (Average 75 seconds, Deficient > 273 seconds)   Motor Free Visual Perception Test (MVPT) which assesses figure ground, visual " discrimination, spatial relationships, visual closure and visual memory,  35/36 , a passing score, where testing was competed in 5 minutes 15 seconds, average time for completion is 7 minutes   Right/Left discrimination: 4/4     Auditory discrimination: WFL's as he was not wearing his hearing aids     Vision testing: glasses were worn during testing  Peripheral vision: bilateral nasal vision is intact. Left peripheral vision is intact at 85°, Right is intact at 85°.  Spatial frequency: contrast sensitivity is normal range  Acuity  Acuity Left eye 20/20  Acuity Right eye 20/20  Acuity both eyes 20/20  Color vision: 4/5   Lateral phoria which assesses binocular vision was normal.  Correctly identified 11/12 road signs and was 3/3 for depth perception.  Comment: Visual acuity minimum standards for the [facility] is 20/40 in one eye.    Cognitive testing:   Short term memory:  3/4,    Immediate # recall (Digit span) : 6,  WNL's score    Cognitive sequencing of months of year in reverse:  No error, mildly increased time needed    [facility] Mental Status Exam (UMS) which is used to  detect mild neurocognitive disorder and dementia: Pt scored 28/30 which  scores in the Normal range.     Long term memory:  WNL's     Judgment questions regarding rules of the road: 8/8 correct    Insight:  Very good as he is of understanding of the purpose of testing and is receptive of recommendations.    Communication:   Expressive aphasia:  Not found  Receptive aphasia:  Not found    Reaction time testin/100s or a second (avg of 3 trials) which is slightly slower but close to the average standard of 50/100s of a second.      In-car / on-road assessment:  Mr. Cooper transferred to car Modified Independently needing mildly increased time and after orienting to the test vehicle, He was able to adjust mirrors and seat, brad seat belt and start vehicle. He safely pulled the car out of parking space and then  successfully drove on side streets per instruction stopping correctly at stop signs, checked traffic in all directions before negotiating turns correctly and did well encountering light neighborhood traffic. He was then directed to a divided highway where he correctly obeyed traffic signs and signals, speed limits, followed at appropriate distances, changed lanes appropriately when asked and had no difficulty stopping vehicle at appropriate distances. Mr. Cooper drove at normal speeds keeping up with traffic flow and was comfortable in these traffic conditions. He was then directed to the [place] of [place] and after correctly merging into higher speed traffic, he was able to drive at higher speeds, was given instructions and was able to change lanes safely and correctly using turn signals when appropriate.  Mr. Cooper was then given instructions to get off at a specific exit and was able to exit correctly. Once off of the Interstate, he was asked to find his way back to hospital grounds without given cues for direction. Since he lives and works in the area, he used his own judgement to find a route through traffic situations to return to hospital grounds. Mr. Cooper returned the vehicle the designated parking spot in the parking lot and was able to park with no difficulty.    Although Mr. Cooper uses a rollator to ambulate and has slower times in the rapid pace walk, he had no difficulty with operating the accelerator and brake in all driving situations.     ASSESSMENT:   Mr. Cooper demonstrated the ability to operate an automobile this day and demonstrated good insight as he realized that a driving assessment is necessary to show that he is capable of driving. He has successfully passed this driving evaluation this day to drive his automobile with no limitations.  Findings were notified to the office of Antonella Das MD. Changes in patient's medical status may warrant retesting in the future as the  conclusion reached and the recommendations made reflect findings at the time of this evaluation.      PLAN:   Discharge patient from outpatient Occupational Therapy services as Pt and MD needs being met with completion and reporting of this evaluation.      ANGELINA Forrest, S  Occupational Therapist, Certified  Rehabilitation Specialist  12/19/2023

## 2023-12-20 ENCOUNTER — TELEPHONE (OUTPATIENT)
Dept: INTERNAL MEDICINE | Facility: CLINIC | Age: 68
End: 2023-12-20
Payer: COMMERCIAL

## 2023-12-20 DIAGNOSIS — Z20.828 EXPOSURE TO THE FLU: Primary | ICD-10-CM

## 2023-12-20 RX ORDER — OSELTAMIVIR PHOSPHATE 75 MG/1
75 CAPSULE ORAL 2 TIMES DAILY
Qty: 10 CAPSULE | Refills: 0 | Status: SHIPPED | OUTPATIENT
Start: 2023-12-20 | End: 2023-12-25

## 2023-12-20 NOTE — TELEPHONE ENCOUNTER
Wife Dorothy messaged, she has the flu  Concerned about Mr. Cooper, exposure, currently with similar symptoms  Empiric Tamiflu sent to pharmacy

## 2023-12-21 ENCOUNTER — PATIENT MESSAGE (OUTPATIENT)
Dept: INTERNAL MEDICINE | Facility: CLINIC | Age: 68
End: 2023-12-21
Payer: COMMERCIAL

## 2023-12-21 ENCOUNTER — E-VISIT (OUTPATIENT)
Dept: INTERNAL MEDICINE | Facility: CLINIC | Age: 68
End: 2023-12-21
Payer: COMMERCIAL

## 2023-12-21 ENCOUNTER — PATIENT MESSAGE (OUTPATIENT)
Dept: NEUROLOGY | Facility: CLINIC | Age: 68
End: 2023-12-21
Payer: COMMERCIAL

## 2023-12-21 DIAGNOSIS — R30.0 BURNING WITH URINATION: Primary | ICD-10-CM

## 2023-12-21 DIAGNOSIS — N39.0 RECURRENT UTI: Primary | ICD-10-CM

## 2023-12-21 PROCEDURE — 99421 OL DIG E/M SVC 5-10 MIN: CPT | Mod: ,,, | Performed by: PHYSICIAN ASSISTANT

## 2023-12-21 PROCEDURE — 99421 PR E&M, ONLINE DIGIT, EST, < 7 DAYS, 5-10 MINS: ICD-10-PCS | Mod: ,,, | Performed by: PHYSICIAN ASSISTANT

## 2023-12-21 RX ORDER — SULFAMETHOXAZOLE AND TRIMETHOPRIM 800; 160 MG/1; MG/1
1 TABLET ORAL 2 TIMES DAILY
Qty: 14 TABLET | Refills: 0 | Status: SHIPPED | OUTPATIENT
Start: 2023-12-21 | End: 2023-12-28

## 2023-12-21 NOTE — PROGRESS NOTES
Patient ID: Rajan Cooper III is a 67 y.o. male.    Chief Complaint: Urinary Tract Infection (Entered automatically based on patient selection in Patient Portal.)    The patient initiated a request through [employer] on 12/21/2023 for evaluation and management with a chief complaint of Urinary Tract Infection (Entered automatically based on patient selection in Patient Portal.)     I evaluated the questionnaire submission on 12/21/2023.    Ohs Peq Evisit Uti Questionnaire    12/21/2023  4:28 PM CST - Filed by Patient   Do you agree to participate in an E-Visit? Yes   If you have any of the following problems, please present to your local ER or call 911:  I acknowledge   What is the main issue that you would like for your doctor to address today? Burning well peeing   Are you able to take your vital signs? Yes   Systolic Blood Pressure: 122   Diastolic Blood Pressure: 72   Weight: 320   Height: 72   Pulse: 75   Temperature: 98.4   Respiration rate:    Pulse Oxygen:    What symptoms do you currently have? Pain while passing urine   When did your symptoms first appear? 12/18/2023   List what you have done or taken to help your symptoms. Taking nothing   Please indicate whether you have had the following symptoms during the past 24 hours     Urgent urination (a sudden and uncontrollable urge to urinate) Moderate   Frequent urination of small amounts of urine (going to the toilet very often) Moderate   Burning pain when urinating Moderate   Incomplete bladder emptying (still feel like you need to urinate again after urination) Mild   Pain not associated with urination in the lower abdomen below the belly button) None   What does your urine look like? Clear   Blood seen in the urine None   Flank pain (pain in one or both sides of the lower back) None   Discharge from the urethra (urinary opening) without urination None   High body temperature/fever? None-<99.5   Please rate how much discomfort you have experience because of  the symptoms in the past 24 hours: Mild   Please indicate how the symptoms have interfered with your every day activities/work in the past 24 hours: Mild   Please indicate how these symptoms have interfered with your social activities (visiting people, meeting with friends, etc.) in the past 24 hours? Mild   Are you a diabetic? No   Provide any information you feel is important to your history not asked above Ill get this every couple months I was told from the MS and the anabiotic does work   Please attach any relevant images or files (if you have performed a home test for UTI, please submit a photo of results)          Recent Labs Obtained:  No visits with results within 7 Day(s) from this visit.   Latest known visit with results is:   Lab Visit on 2023   Component Date Value Ref Range Status    Hemoglobin A1C 2023 5.9 (H)  4.0 - 5.6 % Final    Comment: ADA Screening Guidelines:  5.7-6.4%  Consistent with prediabetes  >or=6.5%  Consistent with diabetes    High levels of fetal hemoglobin interfere with the HbA1C  assay. Heterozygous hemoglobin variants (HbS, HgC, etc)do  not significantly interfere with this assay.   However, presence of multiple variants may affect accuracy.      Estimated Avg Glucose 2023 123  68 - 131 mg/dL Final    TSH 2023 7.196 (H)  0.400 - 4.000 uIU/mL Final    Free T4 2023 0.85  0.71 - 1.51 ng/dL Final       Encounter Diagnosis   Name Primary?    Recurrent UTI Yes        Orders Placed This Encounter   Procedures    Urine culture     Standing Status:   Future     Standing Expiration Date:   2025    Urinalysis     Standing Status:   Future     Standing Expiration Date:   2025     Order Specific Question:   Collection Type     Answer:   Urine, Clean Catch            No follow-ups on file.      E-Visit Time Trackinmins    Recurrent uti  Recommend urine testing prior to starting abx  Mychart msg sent.     Ruby Sloan PA-C

## 2023-12-27 NOTE — TELEPHONE ENCOUNTER
Faxed [facility] medical vision form to Medical Unit at [facility] [phone] and to pt, per his request.  Mailed original form to pt's home address.

## 2024-01-04 DIAGNOSIS — M62.838 MUSCLE SPASM: ICD-10-CM

## 2024-01-04 DIAGNOSIS — R39.198 DECREASED URINE STREAM: ICD-10-CM

## 2024-01-04 DIAGNOSIS — G35 MS (MULTIPLE SCLEROSIS): ICD-10-CM

## 2024-01-04 RX ORDER — AMLODIPINE BESYLATE 5 MG/1
TABLET ORAL
Qty: 90 TABLET | Refills: 0 | OUTPATIENT
Start: 2024-01-04

## 2024-01-04 RX ORDER — TAMSULOSIN HYDROCHLORIDE 0.4 MG/1
CAPSULE ORAL
Qty: 90 CAPSULE | Refills: 0 | OUTPATIENT
Start: 2024-01-04

## 2024-01-04 RX ORDER — FUROSEMIDE 40 MG/1
TABLET ORAL
Qty: 180 TABLET | Refills: 0 | OUTPATIENT
Start: 2024-01-04

## 2024-01-04 RX ORDER — BACLOFEN 10 MG/1
TABLET ORAL
Qty: 360 TABLET | Refills: 0 | OUTPATIENT
Start: 2024-01-04

## 2024-01-04 RX ORDER — LOSARTAN POTASSIUM 50 MG/1
TABLET ORAL
Qty: 90 TABLET | Refills: 0 | OUTPATIENT
Start: 2024-01-04

## 2024-01-04 RX ORDER — ERGOCALCIFEROL 1.25 MG/1
CAPSULE ORAL
Qty: 12 CAPSULE | Refills: 0 | OUTPATIENT
Start: 2024-01-04

## 2024-01-04 NOTE — TELEPHONE ENCOUNTER
Refill Decision Note   Rajan Cooper  is requesting a refill authorization.  Brief Assessment and Rationale for Refill:  Quick Discontinue     Medication Therapy Plan:    Pharmacy is requesting new scripts for the following medications without required information, (sig/ frequency/qty/etc)      Medication Reconciliation Completed: No     Comments: Pharmacies have been requesting medications for patients without required information, (sig, frequency, qty, etc.). In addition, requests are sent for medication(s) pt. are currently not taking, and medications patients have never taken.    We have spoken to the pharmacies about these request types and advised their teams previously that we are unable to assess these New Script requests and require all details for these requests. This is a known issue and has been reported.     Note composed:3:59 PM 01/04/2024

## 2024-01-09 ENCOUNTER — PATIENT MESSAGE (OUTPATIENT)
Dept: INTERNAL MEDICINE | Facility: CLINIC | Age: 69
End: 2024-01-09
Payer: COMMERCIAL

## 2024-01-09 DIAGNOSIS — R39.198 DECREASED URINE STREAM: ICD-10-CM

## 2024-01-09 DIAGNOSIS — G35 MS (MULTIPLE SCLEROSIS): ICD-10-CM

## 2024-01-09 DIAGNOSIS — E03.9 HYPOTHYROIDISM, UNSPECIFIED TYPE: ICD-10-CM

## 2024-01-09 DIAGNOSIS — M62.838 MUSCLE SPASM: ICD-10-CM

## 2024-01-09 RX ORDER — LEVOTHYROXINE SODIUM 50 UG/1
50 TABLET ORAL
Qty: 90 TABLET | Refills: 3 | Status: SHIPPED | OUTPATIENT
Start: 2024-01-09 | End: 2025-01-08

## 2024-01-09 RX ORDER — LOSARTAN POTASSIUM 50 MG/1
50 TABLET ORAL DAILY
Qty: 90 TABLET | Refills: 3 | Status: SHIPPED | OUTPATIENT
Start: 2024-01-09

## 2024-01-09 RX ORDER — ERGOCALCIFEROL 1.25 MG/1
50000 CAPSULE ORAL
Qty: 12 CAPSULE | Refills: 3 | Status: SHIPPED | OUTPATIENT
Start: 2024-01-09

## 2024-01-09 RX ORDER — FUROSEMIDE 40 MG/1
40 TABLET ORAL 2 TIMES DAILY
Qty: 180 TABLET | Refills: 1 | Status: SHIPPED | OUTPATIENT
Start: 2024-01-09

## 2024-01-09 RX ORDER — AMLODIPINE BESYLATE 5 MG/1
5 TABLET ORAL DAILY
Qty: 90 TABLET | Refills: 3 | Status: SHIPPED | OUTPATIENT
Start: 2024-01-09

## 2024-01-09 RX ORDER — TAMSULOSIN HYDROCHLORIDE 0.4 MG/1
0.4 CAPSULE ORAL NIGHTLY
Qty: 90 CAPSULE | Refills: 3 | Status: SHIPPED | OUTPATIENT
Start: 2024-01-09 | End: 2025-01-08

## 2024-01-09 RX ORDER — BACLOFEN 10 MG/1
TABLET ORAL
Qty: 360 TABLET | Refills: 3 | Status: SHIPPED | OUTPATIENT
Start: 2024-01-09

## 2024-01-09 NOTE — TELEPHONE ENCOUNTER
Please see the meds that I refilled and make sure those are okay with the patient.  Please see the meds that I did not refill that had other providers names and see if those need to be sent to center [facility].  I believe that may include Viagra, Avonex and modafinil

## 2024-01-10 NOTE — TELEPHONE ENCOUNTER
Called and spoke to pt. Reviewed message from PCP, pt verbalized understanding. No further needs at this time.

## 2024-01-22 ENCOUNTER — PATIENT MESSAGE (OUTPATIENT)
Dept: PSYCHIATRY | Facility: CLINIC | Age: 69
End: 2024-01-22
Payer: COMMERCIAL

## 2024-02-28 NOTE — DISCHARGE SUMMARY
Your Child's Health  Preteen Visit    RUMA Thompson  February 28, 2024    Visit Vitals  /60 (BP Location: RUE - Right upper extremity, Patient Position: Sitting, Cuff Size: Pediatric)   Pulse 96   Temp 98 °F (36.7 °C) (Tympanic)   Resp 20   Ht 4' 9\" (1.448 m)   Wt 41.6 kg (91 lb 11.2 oz)   SpO2 99%   Breastfeeding No   BMI 19.84 kg/m²       NUTRITION:  Children in this age range will be under increasing peer pressure to eat junk food and to emulate celebrity teens pushing soda and other junk foods.  Common problems are excess weight gain, low calcium intake, and high fat intake.  The majority of bone calcium is formed at this age range; if the diet is low in calcium, early osteoporosis may result.  Teens concerned about weight may want to consider low-fat dairy products or calcium-fortified juices.  Non-milk dairy products most often do not contain vitamin D, but soy milk does.  Continue to emphasize fruits and vegetables as snacks.  Try to set a good example yourself.  A multivitamin with 400 units vitamin D should be given to all children who drink less than 32 ounces of milk per day.    SAFETY:  Accidents are the most common cause of death in this age range.  Deaths from automobile, pedestrian, and bike accidents, falls, fire, and drowning are at the top of the list.  Be firm:  No seat belt, no go, no fun.  The seat belt should be across the hips and not across the stomach.  Chhaya should ride in the back seat.  The center seat is the safest if it has a shoulder harness.  Be willing to call off the activity, or Chhaya will call your bluff.    It is important to teach Chhaya about gun safety even if there are no guns in the home.  If you do have guns at home, make sure they are locked, unloaded, and with ammunition stored separately.  Teach bike-riding limits.  Insist on helmets and protective gear for anything with wheels (skateboards, roller blades, roller skates, scooters,     Medication List        START taking these medications      ondansetron 4 MG Tbdl  Commonly known as: ZOFRAN-ODT  Take 1 tablet (4 mg total) by mouth every 6 (six) hours as needed (nausea).            CONTINUE taking these medications      amLODIPine 10 MG tablet  Commonly known as: NORVASC  Take 1 tablet (10 mg total) by mouth once daily.     AVONEX 30 mcg/0.5 mL injection  Generic drug: interferon beta-1a  Inject 0.5 mLs (30 mcg total) into the muscle every 7 days.     baclofen 10 MG tablet  Commonly known as: LIORESAL  Take 2 tabs po AM and 2 tabs po HS     cholecalciferol (vitamin D3) 1,250 mcg (50,000 unit) Tab  Take 1 tablet by mouth once a week.     ergocalciferol 50,000 unit Cap  Commonly known as: ERGOCALCIFEROL  Take 1 capsule (50,000 Units total) by mouth every 7 days.     furosemide 40 MG tablet  Commonly known as: LASIX  Take 1 tablet (40 mg total) by mouth 2 (two) times daily.     losartan 50 MG tablet  Commonly known as: COZAAR  Take 1 tablet (50 mg total) by mouth once daily.     modafiniL 200 MG Tab  Commonly known as: PROVIGIL  Take 1 tablet (200 mg total) by mouth once daily. Take qam     mupirocin 2 % ointment  Commonly known as: BACTROBAN  Apply to affected area 3 times daily     sildenafiL 100 MG tablet  Commonly known as: VIAGRA  Take 1 tablet (100 mg total) by mouth daily as needed for Erectile Dysfunction (take 1 hour prior to intercourse on an empty stomach).     tamsulosin 0.4 mg Cap  Commonly known as: FLOMAX  Take 1 capsule (0.4 mg total) by mouth every evening.     triamcinolone acetonide 0.1% 0.1 % cream  Commonly known as: KENALOG  APPLY TO AFFECTED AREA TWICE A DAY               Where to Get Your Medications        These medications were sent to [facility]/pharmacy #[id number] - [place] - [address]  [address] [address]      Phone: [phone]   ondansetron 4 MG Tbdl       Information about where to get these medications is not yet available    Ask your nurse or doctor  about these medications  sildenafiL 100 MG tablet          DOA: 4/14  DOD: 4/16    Adx: cervical stenosis with myelopathy  Ddx: same    Procedure:   Laminectomy and foraminotomy C3/4 and C4/5  Instrumented posterolateral fusion C3-5 with lateral mass screws and rods    Consults:  Hospitalist medicine service for co-morbidity management    Course:  Medically uncomplicated recovery.  Drain output observed and removed POD#2  Patient cleared for discharge home by PT on POD#2  See above discharge medications    Follow-up in clinic 2 weeks for would check  Wear collar at all times.     etc.).  Wrist fractures are very common, and wrist braces are a good investment.   Children in this age range are fascinated by power equipment but are not able to use it safely.  They may do all right when things are going well, but they will panic easily.  Backpacks and book bags have become an increasing source of injury.  They should be limited to 15 percent of the child's body weight and carried over both shoulders.       DEVELOPMENT:  By the age of eight or nine, a typical child  has an attention span of about one hour.  Is learning to tell time.  Is capable of chores without constant verbal reminders (although a written list is helpful).  Is very concerned about rules and fairness.  He or she will be very angry and upset if another child is excused from chores or other obligations because of other activities, even if the parents perceive those activities as important (for example, sports, music, clubs).  Your child will refuse healthy foods if parents don't eat them.  Can read for enjoyment.  Has career plans, although they are often unrealistic and tend to focus on becoming a professional sports star or an entertainer.  Your children may also imagine themselves having the same job as their parents or other adults they know well.     Watch with interest the improvement in Chhaya's reasoning ability.  At the age of 6 or 7, children will often think that a tall, thin container holds more than a slightly shorter, wider one.  They will think that two quarters are more money than one dollar, and they may not know which numbers are bigger despite being able to count.  By contrast, 8-11 year olds can tell that both height and width are important.  They can follow from three to five commands in a row - for example:  (1) Go to your room, and (2) get your socks and (3) shoes and (4) jacket and (5) back pack.  Twelve-year-olds can tell you how to figure out which of two containers holds more liquid.    All children in  this age range will choose fun activities before chores, homework, or even family activities.  They often have an unrealistic understanding of how long it will take to complete a task - for example, \"I can finish vacuuming in five minutes.\"  They may expect to finish a large homework assignment beginning at 9:00 pm on Jono night. Insist on work before pleasure, but allow breaks.  Try to teach Chhaya to break down chores and homework into smaller pieces consistent with her attention span.  DON'T DO THE CHORES FOR Chhaya in the mistaken belief that she will be grateful.  Instead, she will regard you as her servant and respect you less.  Do not allow distractions.  TV and loud music rarely help children do their homework.  Set a minimum time at the dinner table.    BEHAVIOR PROBLEMS:  The most common causes are problems with friends or schoolmates.  If there has been a major change in Chhaya's behavior, the cause may be:  Rejection by a friend.  Death or separation in the family.  Arguments between parents, especially if one parent has threatened to leave.  Fears - commonly weather, kidnapping, or robbery.  These fears are often set off by events, scary movies, or television.  In general, children are much more upset by what happens to children on TV than by what happens to adults.  Bullies.  Violent TV shows, movies, or video games.  Boys in particular tend to play \"chicken\"  with their friends to see who can watch the scariest movie.    Until age 8 or 9, most children will tell lies to avoid taking responsibility for mistakes or misbehavior.  Correct them privately and make them replace anything that was stolen, broken or gained by cheating.  Be careful about what you do and what you say in your child's presence.  If you want Chhaya to tell the truth and obey laws, then you should too.  Listen carefully to conversations among Chhaya and her friends while you are driving.  They often forget that you are there.  It  is amazing what you can learn.  Try not to interrupt.  You can correct her later.  As one famous man said, \"I never learned a thing while I was talking.\"    Acetaminophen (for example, Tylenol or Tempra) may be given every four hours as needed for pain or fever.    MEDICATION FOR FEVER OR PAIN:   Acetaminophen liquid (e.g., Tylenol or Tempra) may be given every four hours as needed for pain or fever.  Acetaminophen liquid is less concentrated than the infant dropper bottle type.  Be sure to check which product CONCENTRATION you are using.    CHILDREN’S Tylenol/Acetaminophen  (160 MG/5 mL)  Child’s Weight:  Dose:  36 - 47 pounds:    240 mg (7.5 mL (1 1/2 Teaspoons))  48 - 59 pounds:    320 mg (10.0 mL (2 Teaspoons))  60 - 71 pounds:    400 mg (12.5 mL (2 1/2 Teaspoons))  72 - 95 pounds:    480 mg (15.0 mL (3 Teaspoons))  Greater than 96 pounds:   640 mg (20.0 mL (4 Teaspoons))    CHILDREN’S Tylenol/Acetaminophen MELTAWAYS ( 80 MG tablets)    Child’s Weight:  Dose:  36 - 47 pounds:    240 mg (3 meltaway tablets)  48 - 59 pounds:    320 mg (4 meltaway tablets)  60 - 71 pounds:    400 mg (5 meltaway tablets)  72 - 95 pounds:    480 mg (6 meltaway tablets)  Greater than 96 pounds:   640 mg (8 meltaway tablets)    Lukasz () Tylenol/Acetaminophen MELTAWAYS (160 MG tablets)    Child’s Weight:  Dose:  36 - 47 pounds:    240 mg (1 1/2 meltaway tablets)  48 - 59 pounds:    320 mg (2 meltaway tablets)  60 - 71 pounds:    400 mg (2 1/2 meltaway tablets)  72 - 95 pounds:    480 mg (3 meltaway tablets)  Greater than 96 pounds:   640 mg (4 meltaway tablets)    CHILDREN'S Ibuprofen (e.g., Advil or Motrin) may be given every six hours as needed for pain or fever.    48 - 59 pounds:    200 mg (2 Teaspoons)  60 - 71 pounds:    250 mg (2 1/2 Teaspoons)  72 - 95 pounds:    300 mg (3 Teaspoons)        Most Recent Immunizations   Administered Date(s) Administered    DTaP 03/24/2015    DTaP/Hep B/IPV 07/09/2014    DTaP/IPV 03/19/2018     HIB, Unspecified Formulation 03/24/2015    Hep A, ped/adol, 2 dose 10/11/2016    Hib (PRP-T) 04/30/2014    Influenza, injectable, quadrivalent, preservative free, pediatric 10/11/2016    Influenza, quadrivalent, multi-dose 01/02/2015    Influenza, quadrivalent, preserve-free 12/01/2020    MMR 01/02/2015    Measles Mumps Rubella Varicella 03/19/2018    Pneumococcal Conjugate 13 Valent Vacc (Prevnar 13) 03/24/2015    Rotavirus - pentavalent 07/09/2014    Varicella 01/02/2015   Pended Date(s) Pended    Influenza, quadrivalent, preserve-free 02/28/2024         NEXT VISIT:  IN 1 YEAR  Thank you for entrusting your care to [facility].

## 2024-04-08 ENCOUNTER — ANESTHESIA EVENT (OUTPATIENT)
Dept: ENDOSCOPY | Facility: HOSPITAL | Age: 69
End: 2024-04-08
Payer: MEDICARE

## 2024-04-08 ENCOUNTER — ANESTHESIA (OUTPATIENT)
Dept: ENDOSCOPY | Facility: HOSPITAL | Age: 69
End: 2024-04-08
Payer: MEDICARE

## 2024-04-08 ENCOUNTER — HOSPITAL ENCOUNTER (OUTPATIENT)
Facility: HOSPITAL | Age: 69
Discharge: HOME | End: 2024-04-08
Attending: COLON & RECTAL SURGERY | Admitting: COLON & RECTAL SURGERY
Payer: MEDICARE

## 2024-04-08 VITALS
OXYGEN SATURATION: 96 % | HEART RATE: 69 BPM | RESPIRATION RATE: 18 BRPM | BODY MASS INDEX: 39.76 KG/M2 | HEIGHT: 73 IN | TEMPERATURE: 98 F | SYSTOLIC BLOOD PRESSURE: 151 MMHG | DIASTOLIC BLOOD PRESSURE: 90 MMHG | WEIGHT: 300 LBS

## 2024-04-08 DIAGNOSIS — Z12.11 SCREEN FOR COLON CANCER: ICD-10-CM

## 2024-04-08 PROCEDURE — 37000009 HC ANESTHESIA EA ADD 15 MINS: Performed by: COLON & RECTAL SURGERY

## 2024-04-08 PROCEDURE — 88305 TISSUE EXAM BY PATHOLOGIST: CPT | Mod: 26,,, | Performed by: PATHOLOGY

## 2024-04-08 PROCEDURE — 45385 COLONOSCOPY W/LESION REMOVAL: CPT | Mod: PT | Performed by: COLON & RECTAL SURGERY

## 2024-04-08 PROCEDURE — 88305 TISSUE EXAM BY PATHOLOGIST: CPT | Performed by: PATHOLOGY

## 2024-04-08 PROCEDURE — E9220 PRA ENDO ANESTHESIA: HCPCS | Mod: PT,,, | Performed by: NURSE ANESTHETIST, CERTIFIED REGISTERED

## 2024-04-08 PROCEDURE — 45385 COLONOSCOPY W/LESION REMOVAL: CPT | Mod: PT,,, | Performed by: COLON & RECTAL SURGERY

## 2024-04-08 PROCEDURE — 63600175 PHARM REV CODE 636 W HCPCS: Performed by: NURSE ANESTHETIST, CERTIFIED REGISTERED

## 2024-04-08 PROCEDURE — 25000003 PHARM REV CODE 250: Performed by: COLON & RECTAL SURGERY

## 2024-04-08 PROCEDURE — 27201089 HC SNARE, DISP (ANY): Performed by: COLON & RECTAL SURGERY

## 2024-04-08 PROCEDURE — 25000003 PHARM REV CODE 250: Performed by: NURSE ANESTHETIST, CERTIFIED REGISTERED

## 2024-04-08 PROCEDURE — 37000008 HC ANESTHESIA 1ST 15 MINUTES: Performed by: COLON & RECTAL SURGERY

## 2024-04-08 RX ORDER — SODIUM CHLORIDE 9 MG/ML
INJECTION, SOLUTION INTRAVENOUS CONTINUOUS
Status: DISCONTINUED | OUTPATIENT
Start: 2024-04-08 | End: 2024-04-08 | Stop reason: HOSPADM

## 2024-04-08 RX ORDER — LIDOCAINE HYDROCHLORIDE 20 MG/ML
INJECTION INTRAVENOUS
Status: DISCONTINUED | OUTPATIENT
Start: 2024-04-08 | End: 2024-04-08

## 2024-04-08 RX ORDER — PROPOFOL 10 MG/ML
VIAL (ML) INTRAVENOUS CONTINUOUS PRN
Status: DISCONTINUED | OUTPATIENT
Start: 2024-04-08 | End: 2024-04-08

## 2024-04-08 RX ORDER — PROPOFOL 10 MG/ML
VIAL (ML) INTRAVENOUS
Status: DISCONTINUED | OUTPATIENT
Start: 2024-04-08 | End: 2024-04-08

## 2024-04-08 RX ADMIN — Medication 60 MG: at 12:04

## 2024-04-08 RX ADMIN — LIDOCAINE HYDROCHLORIDE 50 MG: 20 INJECTION INTRAVENOUS at 12:04

## 2024-04-08 RX ADMIN — Medication 20 MG: at 12:04

## 2024-04-08 RX ADMIN — PROPOFOL 125 MCG/KG/MIN: 10 INJECTION, EMULSION INTRAVENOUS at 12:04

## 2024-04-08 RX ADMIN — SODIUM CHLORIDE: 0.9 INJECTION, SOLUTION INTRAVENOUS at 12:04

## 2024-04-08 NOTE — TRANSFER OF CARE
"Anesthesia Transfer of Care Note    Patient: Rajan Cooper III    Procedure(s) Performed: Procedure(s) (LRB):  COLONOSCOPY (N/A)    Patient location: GI    Anesthesia Type: general    Transport from OR: Transported from OR on room air with adequate spontaneous ventilation    Post pain: adequate analgesia    Post assessment: no apparent anesthetic complications and tolerated procedure well    Post vital signs: stable    Level of consciousness: awake, alert and oriented    Nausea/Vomiting: no nausea/vomiting    Complications: none    Transfer of care protocol was followed    Last vitals: Visit Vitals  /76 (BP Location: Left arm, Patient Position: Lying)   Pulse 70   Temp 36.7 °C (98.1 °F) (Temporal)   Resp 18   Ht 6' 1" (1.854 m)   Wt 136.1 kg (300 lb)   SpO2 95%   BMI 39.58 kg/m²     "

## 2024-04-08 NOTE — PROVATION PATIENT INSTRUCTIONS
Discharge Summary/Instructions after an Endoscopic Procedure  Patient Name: Rajan Cooper  Patient MRN: [id number]  Patient YOB: 1955 Monday, April 8, 2024  Elva Leblanc MD  Dear patient,  As a result of recent federal legislation (The Federal Cures Act), you may   receive lab or pathology results from your procedure in your [facility]   account before your physician is able to contact you. Your physician or   their representative will relay the results to you with their   recommendations at their soonest availability.  Thank you,  RESTRICTIONS:  During your procedure today, you received medications for sedation.  These   medications may affect your judgment, balance and coordination.  Therefore,   for 24 hours, you have the following restrictions:   - DO NOT drive a car, operate machinery, make legal/financial decisions,   sign important papers or drink alcohol.    ACTIVITY:  Today: no heavy lifting, straining or running due to procedural   sedation/anesthesia.  The following day: return to full activity including work.  DIET:  Eat and drink normally unless instructed otherwise.     TREATMENT FOR COMMON SIDE EFFECTS:  - Mild abdominal pain, nausea, belching, bloating or excessive gas:  rest,   eat lightly and use a heating pad.  - Sore Throat: treat with throat lozenges and/or gargle with warm salt   water.  - Because air was used during the procedure, expelling large amounts of air   from your rectum or belching is normal.  - If a bowel prep was taken, you may not have a bowel movement for 1-3 days.    This is normal.  SYMPTOMS TO WATCH FOR AND REPORT TO YOUR PHYSICIAN:  1. Abdominal pain or bloating, other than gas cramps.  2. Chest pain.  3. Back pain.  4. Signs of infection such as: chills or fever occurring within 24 hours   after the procedure.  5. Rectal bleeding, which would show as bright red, maroon, or black stools.   (A tablespoon of blood from the rectum is not serious, especially if    hemorrhoids are present.)  6. Vomiting.  7. Weakness or dizziness.  GO DIRECTLY TO THE NEAREST EMERGENCY ROOM IF YOU HAVE ANY OF THE FOLLOWING:      Difficulty breathing              Chills and/or fever over 101 F   Persistent vomiting and/or vomiting blood   Severe abdominal pain   Severe chest pain   Black, tarry stools   Bleeding- more than one tablespoon   Any other symptom or condition that you feel may need urgent attention  Your doctor recommends these additional instructions:  If any biopsies were taken, your doctors clinic will contact you in 1 to 2   weeks with any results.  - Discharge patient to home.   - Resume previous diet.   - Continue present medications.   - Await pathology results.   - Repeat colonoscopy date to be determined after pending pathology results   are reviewed for surveillance.   - Written discharge instructions were provided to the patient.   - The signs and symptoms of potential delayed complications were discussed   with the patient.   - Patient has a contact number available for emergencies.   - Return to normal activities tomorrow.  For questions, problems or results please call your physician - Elva Leblanc MD at Work:  [phone].  [facility], EMERGENCY ROOM PHONE NUMBER: [phone]  IF A COMPLICATION OR EMERGENCY SITUATION ARISES AND YOU ARE UNABLE TO REACH   YOUR PHYSICIAN - GO DIRECTLY TO THE EMERGENCY ROOM.  Elva Leblanc MD  4/8/2024 1:14:24 PM  This report has been verified and signed electronically.  Dear patient,  As a result of recent federal legislation (The Federal Cures Act), you may   receive lab or pathology results from your procedure in your [facility]   account before your physician is able to contact you. Your physician or   their representative will relay the results to you with their   recommendations at their soonest availability.  Thank you,  PROVATION

## 2024-04-08 NOTE — H&P
COLONOSCOPY HISTORY AND PHYSICAL EXAM    Procedure : Colonoscopy      INDICATIONS: asymptomatic screening exam    Family Hx of CRC: None    Last Colonoscopy:  Never\       Past Medical History:   Diagnosis Date    Hypertension     Hypothyroidism 12/5/2023    MS (multiple sclerosis)     Staph aureus infection 2017     Sedation Problems: NO  Family History   Problem Relation Age of Onset    Diabetes Mother     Cancer Father     Pancreatic cancer Father     No Known Problems Sister     No Known Problems Brother     No Known Problems Maternal Aunt     No Known Problems Maternal Uncle     No Known Problems Paternal Aunt     No Known Problems Paternal Uncle     No Known Problems Maternal Grandmother     No Known Problems Maternal Grandfather     No Known Problems Paternal Grandmother     No Known Problems Paternal Grandfather     Hypertension Neg Hx     Amblyopia Neg Hx     Blindness Neg Hx     Cataracts Neg Hx     Glaucoma Neg Hx     Macular degeneration Neg Hx     Retinal detachment Neg Hx     Strabismus Neg Hx     Stroke Neg Hx     Thyroid disease Neg Hx      Fam Hx of Sedation Problems: NO  Social History     Socioeconomic History    Marital status:    Tobacco Use    Smoking status: Never    Smokeless tobacco: Never   Substance and Sexual Activity    Alcohol use: No    Drug use: No     Social Determinants of Health     Financial Resource Strain: Low Risk  (4/15/2023)    Overall Financial Resource Strain (CARDIA)     Difficulty of Paying Living Expenses: Not hard at all   Food Insecurity: No Food Insecurity (4/15/2023)    Hunger Vital Sign     Worried About Running Out of Food in the Last Year: Never true     Ran Out of Food in the Last Year: Never true   Transportation Needs: No Transportation Needs (4/15/2023)    PRAPARE - Transportation     Lack of Transportation (Medical): No     Lack of Transportation (Non-Medical): No   Physical Activity: Inactive (4/15/2023)    Exercise Vital Sign     Days of Exercise per  "Week: 0 days     Minutes of Exercise per Session: 0 min   Stress: Stress Concern Present (4/15/2023)    [place] [employer] - Occupational Stress Questionnaire     Feeling of Stress : Rather much   Social Connections: Moderately Isolated (4/15/2023)    Social Connection and Isolation Panel [NHANES]     Frequency of Communication with Friends and Family: More than three times a week     Frequency of Social Gatherings with Friends and Family: More than three times a week     Attends Nondenominational Services: Never     Attends Club or Organization Meetings: Never     Marital Status:    Housing Stability: Unknown (4/15/2023)    Housing Stability Vital Sign     Unable to Pay for Housing in the Last Year: No     Unstable Housing in the Last Year: No       Review of Systems - Negative except   Respiratory ROS: no dyspnea  Cardiovascular ROS: no exertional chest pain  Gastrointestinal ROS: NO abdominal discomfort,  NO rectal bleeding  Musculoskeletal ROS: no muscular pain  Neurological ROS: no recent stroke    Physical Exam:  BP (!) 167/86   Pulse 92   Temp 97.7 °F (36.5 °C)   Resp 18   Ht 6' 1" (1.854 m)   Wt 136.1 kg (300 lb)   SpO2 96%   BMI 39.58 kg/m²   General: no distress  Head: normocephalic  Mallampati Score   Neck: supple, symmetrical, trachea midline  Lungs:  clear to auscultation bilaterally and normal respiratory effort  Heart: regular rate and rhythm and no murmur  Abdomen: soft, non-tender non-distented; bowel sounds normal; no masses,  no organomegaly  Extremities: no cyanosis or edema, or clubbing    ASA:  II    PLAN  COLONOSCOPY.    SedationPlan :MAC    The details of the procedure, the possible need for biopsy or polypectomy and the potential risks including bleeding, perforation, missed polyps were discussed in detail.      "

## 2024-04-08 NOTE — PLAN OF CARE
Assisted patient with clothes, found eye glasses in shoe. Escorted to lobby via walker. Steady gait with walker. Denies any discomfort or distress.Reviewed discharge instructions with patient and wife in lobby, Verbalize understanding. Patient has motor scooter left with wife walking at side.

## 2024-04-08 NOTE — ANESTHESIA PREPROCEDURE EVALUATION
[facility]  Anesthesia Pre-Operative Evaluation         Patient Name: Rajan Cooper III  YOB: 1955  MRN: [id number]    SUBJECTIVE:     Pre-operative evaluation for Procedure(s) (LRB):  COLONOSCOPY (N/A)     04/08/2024    Rajan Cooper III is a 68 y.o. male.    Patient now presents for the above procedure(s).      Drips:    sodium chloride 0.9%         Patient Active Problem List   Diagnosis    MS (multiple sclerosis)    HTN (hypertension)    Gait disorder    Muscle cramps    Leg pain    Dry skin    Infrapatellar bursitis of left knee    S/P cervical spinal fusion    Localized swelling of both lower extremities    Severe obesity (BMI >= 40)    Abnormality of gait and mobility    Decreased shoulder mobility, right    Hypothyroidism       Review of patient's allergies indicates:   Allergen Reactions    Norco [hydrocodone-acetaminophen] Anaphylaxis     Pt states getting shakey, and having halucinations    Cleocin [clindamycin hcl]        Current Inpatient Medications:      No current facility-administered medications on file prior to encounter.     Current Outpatient Medications on File Prior to Encounter   Medication Sig Dispense Refill    AVONEX 30 mcg/0.5 mL injection Inject 0.5 mLs (30 mcg total) into the muscle every 7 days. 4 each 12    cholecalciferol, vitamin D3, 1,250 mcg (50,000 unit) Tab Take 1 tablet by mouth once a week. 12 tablet 3    modafiniL (PROVIGIL) 200 MG Tab Take 1 tablet (200 mg total) by mouth once daily. Take qam 400 tablet 3    sildenafiL (VIAGRA) 100 MG tablet Take 1 tablet (100 mg total) by mouth daily as needed for Erectile Dysfunction (take 1 hour prior to intercourse on an empty stomach). 10 tablet 2       Past Surgical History:   Procedure Laterality Date    POSTERIOR FUSION OF CERVICAL SPINE WITH LAMINECTOMY N/A 4/14/2023    Procedure: LAMINECTOMY, SPINE, CERVICAL, WITH POSTERIOR FUSION;  Surgeon: Dawit Henao MD;  Location: [facility] OR;  Service:  Neurosurgery;  Laterality: N/A;  C3-6 PCF   fluoro  gel rolls  macdonald  pulsavac, hemovac drain  neuromonitoring  [facility]----HAS CLEARANCE  11AM START---NEED CONSENTS, , H/P, ----NEUROMONITORING  DIPAK [phone]  SHUKRI BANGURA [phone]  CALLED WILLEM ON 4/10/       Social History:  Tobacco Use: Low Risk  (4/8/2024)    Patient History     Smoking Tobacco Use: Never     Smokeless Tobacco Use: Never     Passive Exposure: Not on file      Alcohol Use: Not At Risk (4/15/2023)    AUDIT-C     Frequency of Alcohol Consumption: Never     Average Number of Drinks: Patient does not drink     Frequency of Binge Drinking: Not on file        OBJECTIVE:     Vital Signs Range (Last 24H):         Significant Labs:  Lab Results   Component Value Date    WBC 12.06 04/15/2023    HGB 13.5 (L) 04/15/2023    HCT 42.1 04/15/2023     04/15/2023    CHOL 188 06/07/2019    TRIG 78 06/07/2019    HDL 55 06/07/2019    ALT 22 04/15/2023    AST 22 04/15/2023     04/15/2023    K 4.3 04/15/2023     04/15/2023    CREATININE 1.1 04/15/2023    BUN 24 (H) 04/15/2023    CO2 28 04/15/2023    TSH 7.196 (H) 08/14/2023    PSA 0.68 03/02/2023    INR 1.0 04/11/2023    HGBA1C 5.9 (H) 08/14/2023       Diagnostic Studies: No relevant studies.    EKG:   Results for orders placed or performed during the hospital encounter of 04/11/23   EKG 12-lead    Collection Time: 04/11/23  2:13 PM    Narrative    Test Reason : Z01.818,    Vent. Rate : 063 BPM     Atrial Rate : 063 BPM     P-R Int : 186 ms          QRS Dur : 090 ms      QT Int : 390 ms       P-R-T Axes : 052 067 051 degrees     QTc Int : 399 ms    Normal sinus rhythm  Normal ECG  When compared with ECG of 06-MAR-2017 11:49,  Significant changes have occurred  Confirmed by Johnny Kohli MD ([id number]) on 4/12/2023 4:11:55 PM    Referred By:             Confirmed By:Johnny Kohli MD       2D ECHO:  TTE:  Results for orders placed or performed during the hospital encounter of 04/14/23    Echo   Result Value Ref Range    BSA 2.69 m2    TDI SEPTAL 0.07 m/s    LV LATERAL E/E' RATIO 5.91 m/s    LV SEPTAL E/E' RATIO 9.29 m/s    Left Ventricular Outflow Tract Mean Velocity 0.49 cm/s    Left Ventricular Outflow Tract Mean Gradient 1.24 mmHg    TDI LATERAL 0.11 m/s    PV PEAK VELOCITY 1.21 cm/s    LVIDd 4.80 3.5 - 6.0 cm    IVS 1.15 (A) 0.6 - 1.1 cm    Posterior Wall 1.19 (A) 0.6 - 1.1 cm    LVIDs 3.35 2.1 - 4.0 cm    FS 30 28 - 44 %    Sinus 3.33 cm    STJ 2.86 cm    Ascending aorta 3.51 cm    LV mass 211.43 g    LA size 4.45 cm    RVDD 4.04 cm    TAPSE 2.34 cm    Left Ventricle Relative Wall Thickness 0.50 cm    AV mean gradient 4 mmHg    AV valve area 2.07 cm2    AV Velocity Ratio 0.63     AV index (prosthetic) 0.60     MV mean gradient 2 mmHg    MV valve area p 1/2 method 5.06 cm2    MV valve area by continuity eq 2.70 cm2    E/A ratio 0.78     Mean e' 0.09 m/s    E wave deceleration time 150.07 msec    IVRT 95.15 msec    LVOT diameter 2.09 cm    LVOT area 3.4 cm2    LVOT peak michi 0.80 m/s    LVOT peak VTI 15.80 cm    Ao peak michi 1.27 m/s    Ao VTI 26.2 cm    LVOT stroke volume 54.18 cm3    AV peak gradient 6 mmHg    MV peak gradient 4 mmHg    E/E' ratio 7.22 m/s    MV Peak E Michi 0.65 m/s    TR Max Michi 1.48 m/s    MV VTI 20.1 cm    MV stenosis pressure 1/2 time 43.52 ms    MV Peak A Michi 0.83 m/s    LV Systolic Volume 45.76 mL    LV Systolic Volume Index 17.7 mL/m2    LV Diastolic Volume 107.56 mL    LV Diastolic Volume Index 41.69 mL/m2    LV Mass Index 82 g/m2    RA Major Axis 4.50 cm    Triscuspid Valve Regurgitation Peak Gradient 9 mmHg    RA Width 3.92 cm    Left Atrium Major Axis 4.70 cm    EF 60 %    Narrative    · The left ventricle is normal in size with concentric hypertrophy and   normal systolic function.  · The estimated ejection fraction is 60%.  · Normal left ventricular diastolic function.  · Normal right ventricular size with normal right ventricular systolic   function.  · Mild left  atrial enlargement.  · Mild aortic regurgitation.  · TDS          KEIKO:  No results found for this or any previous visit.    ASSESSMENT/PLAN:           Pre-op Assessment    I have reviewed the Patient Summary Reports.     I have reviewed the Nursing Notes. I have reviewed the NPO Status.   I have reviewed the Medications.     Review of Systems  Anesthesia Hx:  No problems with previous Anesthesia               Denies Personal Hx of Anesthesia complications.                    Social:  Non-Smoker       Hematology/Oncology:  Hematology Normal                                     EENT/Dental:  EENT/Dental Normal           Cardiovascular:     Hypertension                                        Pulmonary:  Pulmonary Normal                       Renal/:  Renal/ Normal                 Hepatic/GI:  Hepatic/GI Normal                 Musculoskeletal:  Musculoskeletal Normal                Neurological:           MS                            Endocrine:   Hypothyroidism              Physical Exam  General: Well nourished, Cooperative and Alert    Airway:  Mallampati: III   Tongue: Large        Anesthesia Plan  Type of Anesthesia, risks & benefits discussed:    Anesthesia Type: Gen Natural Airway  Intra-op Monitoring Plan: Standard ASA Monitors  Post Op Pain Control Plan: multimodal analgesia and IV/PO Opioids PRN  Induction:  IV  Informed Consent: Informed consent signed with the Patient and all parties understand the risks and agree with anesthesia plan.  All questions answered.   ASA Score: 3  Day of Surgery Review of History & Physical: H&P Update referred to the surgeon/provider.    Ready For Surgery From Anesthesia Perspective.     .

## 2024-04-08 NOTE — ANESTHESIA POSTPROCEDURE EVALUATION
Anesthesia Post Evaluation    Patient: Rajan Cooper III    Procedure(s) Performed: Procedure(s) (LRB):  COLONOSCOPY (N/A)    Final Anesthesia Type: general      Patient location during evaluation: GI PACU  Patient participation: Yes- Able to Participate  Level of consciousness: awake and alert  Post-procedure vital signs: reviewed and stable  Pain management: adequate  Airway patency: patent    PONV status at discharge: No PONV  Anesthetic complications: no      Cardiovascular status: blood pressure returned to baseline and hemodynamically stable  Respiratory status: spontaneous ventilation  Hydration status: euvolemic  Follow-up not needed.              Vitals Value Taken Time   /90 04/08/24 1347   Temp 36.7 °C (98.1 °F) 04/08/24 1318   Pulse 69 04/08/24 1347   Resp 18 04/08/24 1347   SpO2 96 % 04/08/24 1347         No case tracking events are documented in the log.      Pain/Selene Score: Selene Score: 10 (4/8/2024  1:21 PM)          
none

## 2024-04-09 NOTE — TELEPHONE ENCOUNTER
----- Message from Olaf Berman MD sent at 4/19/2017 10:10 AM CDT -----  Contact: Dorothy / Wife [phone]  What will surgery be?  Will likely be able to release patient 2-4 weeks off of all antibiotics to make sure infection completely gone prior to procedure.  If needs sooner than this please let me know.  ----- Message -----     From: Silva Geronimo MA     Sent: 4/19/2017   9:40 AM       To: Olaf Berman MD        ----- Message -----     From: Jennifer Pastor     Sent: 4/19/2017   9:23 AM       To: Antonella PENA Staff    If pt needs surgery on his shoulder, Dorothy wants to know if Dr. Berman would be able to release the pt due to an infection for the surgery. Dorothy is requesting a call at [phone].    
Called patients wife and she stated they had just been to the [place] and that ortho wanted to do tests now that his picc line is out. They wanted to do an MRI but they didn't want an infection to block the results from the MRI. They don't know what kind of arm/shoulder surgery he needs because they have to do some tests to see what's going on.   
Spoke to  and he said that patient is off antibiotic and doesn't look like his shoulder is infected so he can have the MRI. I called wife and let her know to schedule.  
Initiate Treatment: Isotretinoin- 30mg QD.
Detail Level: Zone
Plan: Reviewed consent in depth. Patient is not sexually active.\\nKsharan called and spoke with mother and reviewed the content of the visit and received verbal consent to send RX for ipledge\\nThey have logged into portal, need to answer questions and  rx within 7 days.\\nNeed to have parent at each visit or written or verbal conversation until she is turns 19
Render In Strict Bullet Format?: No

## 2024-04-12 LAB
FINAL PATHOLOGIC DIAGNOSIS: NORMAL
GROSS: NORMAL
Lab: NORMAL
MICROSCOPIC EXAM: NORMAL

## 2024-04-15 ENCOUNTER — HOSPITAL ENCOUNTER (OUTPATIENT)
Dept: RADIOLOGY | Facility: HOSPITAL | Age: 69
Discharge: HOME | End: 2024-04-15
Attending: PHYSICIAN ASSISTANT
Payer: MEDICARE

## 2024-04-15 DIAGNOSIS — Z98.1 S/P CERVICAL SPINAL FUSION: ICD-10-CM

## 2024-04-15 PROCEDURE — 72125 CT NECK SPINE W/O DYE: CPT | Mod: 26,,, | Performed by: INTERNAL MEDICINE

## 2024-04-15 PROCEDURE — 72125 CT NECK SPINE W/O DYE: CPT | Mod: TC

## 2024-04-17 ENCOUNTER — OFFICE VISIT (OUTPATIENT)
Dept: NEUROSURGERY | Facility: CLINIC | Age: 69
End: 2024-04-17
Payer: MEDICARE

## 2024-04-17 VITALS — HEART RATE: 74 BPM | SYSTOLIC BLOOD PRESSURE: 134 MMHG | DIASTOLIC BLOOD PRESSURE: 78 MMHG

## 2024-04-17 DIAGNOSIS — Z98.1 S/P CERVICAL SPINAL FUSION: Primary | ICD-10-CM

## 2024-04-17 DIAGNOSIS — I10 HTN (HYPERTENSION): ICD-10-CM

## 2024-04-17 PROCEDURE — 1101F PT FALLS ASSESS-DOCD LE1/YR: CPT | Mod: CPTII,S$GLB,, | Performed by: PHYSICIAN ASSISTANT

## 2024-04-17 PROCEDURE — 1160F RVW MEDS BY RX/DR IN RCRD: CPT | Mod: CPTII,S$GLB,, | Performed by: PHYSICIAN ASSISTANT

## 2024-04-17 PROCEDURE — 3288F FALL RISK ASSESSMENT DOCD: CPT | Mod: CPTII,S$GLB,, | Performed by: PHYSICIAN ASSISTANT

## 2024-04-17 PROCEDURE — 99214 OFFICE O/P EST MOD 30 MIN: CPT | Mod: S$GLB,,, | Performed by: PHYSICIAN ASSISTANT

## 2024-04-17 PROCEDURE — 3075F SYST BP GE 130 - 139MM HG: CPT | Mod: CPTII,S$GLB,, | Performed by: PHYSICIAN ASSISTANT

## 2024-04-17 PROCEDURE — 4010F ACE/ARB THERAPY RXD/TAKEN: CPT | Mod: CPTII,S$GLB,, | Performed by: PHYSICIAN ASSISTANT

## 2024-04-17 PROCEDURE — 1159F MED LIST DOCD IN RCRD: CPT | Mod: CPTII,S$GLB,, | Performed by: PHYSICIAN ASSISTANT

## 2024-04-17 PROCEDURE — 99999 PR PBB SHADOW E&M-EST. PATIENT-LVL III: CPT | Mod: PBBFAC,,, | Performed by: PHYSICIAN ASSISTANT

## 2024-04-17 PROCEDURE — 1126F AMNT PAIN NOTED NONE PRSNT: CPT | Mod: CPTII,S$GLB,, | Performed by: PHYSICIAN ASSISTANT

## 2024-04-17 PROCEDURE — 3078F DIAST BP <80 MM HG: CPT | Mod: CPTII,S$GLB,, | Performed by: PHYSICIAN ASSISTANT

## 2024-04-17 NOTE — PROGRESS NOTES
Subjective:     Patient ID:  Rajan Cooper III is a 68 y.o. male.    Terry    Chief Complaint: 1 year po cosme Henao     Date of Procedure: 4/14/2023      Pre-Operative Diagnosis: Cervical myelopathy [G95.9]  Cervical stenosis of spine [M48.02]     Post-Operative Diagnosis: Post-Op Diagnosis Codes:     * Cervical myelopathy [G95.9]     * Cervical stenosis of spine [M48.02]     Anesthesia: General     Procedures performed:  Laminectomy and foraminotomy C3/4 and C4/5  Instrumented posterolateral fusion C3-5 with lateral mass screws and rods  Allograft, [place]      Surgeon: Dawit Henao MD     Assistant: Tuyet Gutierrez PA-C, scrubbed and assisting with all portions, no resident available.       HPI    Rajan Cooper III is a 68 y.o. male who presents for follow up.  Overall patient is doing well.  He denies any neck pain and no arm pain or paresthesias.  He has still has some residual right deltoid weakness but that has great greatly improved over the the year.    Patient denies any recent accidents or trauma, no saddle anesthesias, and no bowel or bladder incontinence.    Review of Systems:  Constitution: Negative for chills, fever, night sweats and weight loss.   Musculoskeletal: Negative for falls.   Gastrointestinal: Negative for bowel incontinence, nausea and vomiting.   Genitourinary: Negative for bladder incontinence.   Neurological: Negative for disturbances in coordination and loss of balance.      Objective:      Vitals:    04/17/24 0943   BP: 134/78   Pulse: 74   PainSc: 0-No pain         Physical Exam:      General:  Rajan Cooper III is well-developed, well-nourished, appears stated age, in no acute distress, alert and oriented to person, place, and time.    Pulmonary/Chest:  Respiratory effort normal  Abdominal: Exhibits no distension  Psychiatric:  Normal mood and affect.  Behavior is normal.  Judgement and thought content normal      Musculoskeletal:      Cervical ROM:   No pain in cervical spine  flexion, extension, left rotation, and right rotation, left lateral bending, and right lateral bending.    Cervical Spine Inspection:  Healed posterior surgical scars with no visible rashes.    Cervical Spine Palpation:  No tenderness to cervical spine palpation.      Neurological:    Muscle strength against resistance:     Right Left   Deltoid  4 / 5 5 / 5   Biceps  5 / 5 5 / 5   Triceps 5 / 5 5 / 5   Wrist flexion  5 / 5 5 / 5   Wrist extension 5 / 5 5 / 5   Finger abduction 5 / 5 5 / 5   Thumb opposition 5 / 5 5 / 5   Handgrip 5 / 5 5 / 5   Hip flexion  5 / 5 5 / 5   Hip extension 5 / 5 5 / 5   Hip abduction 5 / 5 5 / 5   Hip adduction 5/ 5 5 / 5   Knee extension  5 / 5 5 / 5   Knee flexion  5 / 5 5 / 5   Dorsiflexion  5 / 5 5 / 5   EHL  5 / 5 5 / 5   Plantar flexion  5 / 5 5 / 5   Inversion of the feet 5 / 5 5 / 5   Eversion of the feet 5 / 5 5 / 5       Reflexes:     Right Left   Triceps 2+ 2+   Biceps 2+ 2+   Brachioradialis 2+ 2+   Patellar 2+ 2+   Achilles 2+ 2+     Clonus:  Negative bilaterally  Brooke: Negative bilaterally    On gross examination of the bilateral lower extremities, patient has full painfree ROM with no signs of clubbing, cyanosis, edema, and weakness.      CT Interpretation:     CT cervical spine personally reviewed.  Hardware intact.  Fusion present.      Assessment:          1. S/P cervical spinal fusion            Plan:             Patient doing well 1 year postop posterior cervical fusion.  CT shows fusion.  Patient doing well symptomatically.      No further Neurosurgery follow-up needed       Follow-Up:  Follow up if symptoms worsen or fail to improve. If there are any questions prior to this, the patient was instructed to contact the office.       Angelique Hinojosa, [facility], PA-C  Neurosurgery  [facility]  04/17/2024

## 2024-04-30 ENCOUNTER — PATIENT MESSAGE (OUTPATIENT)
Dept: NEUROLOGY | Facility: CLINIC | Age: 69
End: 2024-04-30

## 2024-04-30 ENCOUNTER — OFFICE VISIT (OUTPATIENT)
Dept: NEUROLOGY | Facility: CLINIC | Age: 69
End: 2024-04-30
Payer: MEDICARE

## 2024-04-30 ENCOUNTER — LAB VISIT (OUTPATIENT)
Dept: LAB | Facility: HOSPITAL | Age: 69
End: 2024-04-30
Attending: PSYCHIATRY & NEUROLOGY
Payer: MEDICARE

## 2024-04-30 VITALS
HEART RATE: 63 BPM | HEIGHT: 73 IN | BODY MASS INDEX: 41.75 KG/M2 | SYSTOLIC BLOOD PRESSURE: 144 MMHG | WEIGHT: 315 LBS | DIASTOLIC BLOOD PRESSURE: 82 MMHG

## 2024-04-30 DIAGNOSIS — Z29.89 PROPHYLACTIC IMMUNOTHERAPY: ICD-10-CM

## 2024-04-30 DIAGNOSIS — G35 MS (MULTIPLE SCLEROSIS): Primary | ICD-10-CM

## 2024-04-30 DIAGNOSIS — R26.9 GAIT DISTURBANCE: ICD-10-CM

## 2024-04-30 DIAGNOSIS — Z29.89 IMMUNOTHERAPY: ICD-10-CM

## 2024-04-30 DIAGNOSIS — Z71.89 COUNSELING REGARDING GOALS OF CARE: ICD-10-CM

## 2024-04-30 DIAGNOSIS — G35 MS (MULTIPLE SCLEROSIS): ICD-10-CM

## 2024-04-30 DIAGNOSIS — N31.9 NEUROGENIC BLADDER: ICD-10-CM

## 2024-04-30 PROCEDURE — 3077F SYST BP >= 140 MM HG: CPT | Mod: HCNC,CPTII,S$GLB, | Performed by: PSYCHIATRY & NEUROLOGY

## 2024-04-30 PROCEDURE — 99215 OFFICE O/P EST HI 40 MIN: CPT | Mod: HCNC,S$GLB,, | Performed by: PSYCHIATRY & NEUROLOGY

## 2024-04-30 PROCEDURE — 4010F ACE/ARB THERAPY RXD/TAKEN: CPT | Mod: HCNC,CPTII,S$GLB, | Performed by: PSYCHIATRY & NEUROLOGY

## 2024-04-30 PROCEDURE — 1100F PTFALLS ASSESS-DOCD GE2>/YR: CPT | Mod: HCNC,CPTII,S$GLB, | Performed by: PSYCHIATRY & NEUROLOGY

## 2024-04-30 PROCEDURE — 3008F BODY MASS INDEX DOCD: CPT | Mod: HCNC,CPTII,S$GLB, | Performed by: PSYCHIATRY & NEUROLOGY

## 2024-04-30 PROCEDURE — G2211 COMPLEX E/M VISIT ADD ON: HCPCS | Mod: HCNC,S$GLB,, | Performed by: PSYCHIATRY & NEUROLOGY

## 2024-04-30 PROCEDURE — 1160F RVW MEDS BY RX/DR IN RCRD: CPT | Mod: HCNC,CPTII,S$GLB, | Performed by: PSYCHIATRY & NEUROLOGY

## 2024-04-30 PROCEDURE — 1159F MED LIST DOCD IN RCRD: CPT | Mod: HCNC,CPTII,S$GLB, | Performed by: PSYCHIATRY & NEUROLOGY

## 2024-04-30 PROCEDURE — 3079F DIAST BP 80-89 MM HG: CPT | Mod: HCNC,CPTII,S$GLB, | Performed by: PSYCHIATRY & NEUROLOGY

## 2024-04-30 PROCEDURE — 86480 TB TEST CELL IMMUN MEASURE: CPT | Mod: HCNC | Performed by: PSYCHIATRY & NEUROLOGY

## 2024-04-30 PROCEDURE — 3288F FALL RISK ASSESSMENT DOCD: CPT | Mod: HCNC,CPTII,S$GLB, | Performed by: PSYCHIATRY & NEUROLOGY

## 2024-04-30 PROCEDURE — 99999 PR PBB SHADOW E&M-EST. PATIENT-LVL III: CPT | Mod: PBBFAC,HCNC,, | Performed by: PSYCHIATRY & NEUROLOGY

## 2024-04-30 PROCEDURE — 1126F AMNT PAIN NOTED NONE PRSNT: CPT | Mod: HCNC,CPTII,S$GLB, | Performed by: PSYCHIATRY & NEUROLOGY

## 2024-04-30 NOTE — PROGRESS NOTES
Subjective:          Patient ID: Rajan Cooper III is a 68 y.o. male who presents today for a routine clinic visit for MS.      MS HPI:  DMT: none  Taking vitamin D3 as recommended? Yes - 50,000 IU / week  States he stopped Avonex last year - we had discussed this at last visit, and once he went on Medicare it became more expensive  He overall does have a sense of progressive decline from the MS, and this progressive decline has been ongoing for years.    He did have cervical spine surgery last year;  took a while to recover;   Only walks with walker; also uses a scooter for longer distances;  States he's open to MS DMT if there's a rationale and it's affordable  On baclofen 20/20  Provigil 200mg /day   Bladder - requires depends since surgery last year;      Medications:  Current Outpatient Medications   Medication Sig Dispense Refill    amLODIPine (NORVASC) 5 MG tablet Take 1 tablet (5 mg total) by mouth once daily. 90 tablet 3    baclofen (LIORESAL) 10 MG tablet Take 2 tabs po AM and 2 tabs po  tablet 3    cholecalciferol, vitamin D3, 1,250 mcg (50,000 unit) Tab Take 1 tablet by mouth once a week. 12 tablet 3    furosemide (LASIX) 40 MG tablet Take 1 tablet (40 mg total) by mouth 2 (two) times daily. 180 tablet 1    levothyroxine (SYNTHROID) 50 MCG tablet Take 1 tablet (50 mcg total) by mouth before breakfast. 90 tablet 3    losartan (COZAAR) 50 MG tablet Take 1 tablet (50 mg total) by mouth once daily. 90 tablet 3    modafiniL (PROVIGIL) 200 MG Tab Take 1 tablet (200 mg total) by mouth once daily. Take qam 400 tablet 3     No current facility-administered medications for this visit.       SOCIAL HISTORY  Social History     Tobacco Use    Smoking status: Never    Smokeless tobacco: Never   Substance Use Topics    Alcohol use: No    Drug use: No       Living arrangements - the patient lives with their spouse.  He recently retired          4/29/2024     3:59 PM   REVIEW OF SYMPTOMS   Do you feel abnormally  tired on most days? No   Do you feel you generally sleep well? Yes   Do you have difficulty controlling your bladder?  Yes   Do you have difficulty controlling your bowels?  Yes   Do you have frequent muscle cramps, tightness or spasms in your limbs?  Yes   Do you have new visual symptoms?  No   Do you have worsening difficulty with your memory or thinking? No   Do you have worsening symptoms of anxiety or depression?  No   For patients who walk, Do you have more difficulty walking?  Yes   Have you fallen since your last visit?  No   For patients who use wheelchairs: Do you have any skin wounds or breakdown? No   Do you have difficulty using your hands?  No   Do you have shooting or burning pain? Yes   Do you have difficulty with sexual function?  Yes   If you are sexually active, are you using birth control? Y/N  N/A Not Applicable   Do you often choke when swallowing liquids or solid food?  No   Do you experience worsening symptoms when overheated? Yes   Do you need any new equipment such as a wheelchair, walker or shower chair? No   Do you receive co-pay financial assistance for your principal MS medicine? No   Would you be interested in participating in an MS research trial in the future? Not Applicable   For patients on Gilenya, Tecfidera, Aubagio, Rituxan, Ocrevus, Tysabri, Lemtrada or Methotrexate, are you aware that you should NOT receive live virus vaccines?  Not Applicable   Do you feel you have adequate family/friend support?  Yes   Do you have health insurance?   No   Are you currently employed? No   Do you receive SSDI/SSI?  Yes   Do you use marijuana or cannabis products? No   Have you been diagnosed with a urinary tract infection since your last visit here? Yes   Have you been diagnosed with a respiratory tract infection since your last visit here? No   Have you been to the emergency room since your last visit here? No   Have you been hospitalized since your last visit here?  Yes         4/29/2024      4:07 PM   FSS SCORE & INTERPRETATION   FSS SCORE  39   FSS SCORE INTERPRETATION May be suffering from fatigue         4/29/2024     4:05 PM   MS MARY-D SCORE & INTERPRETATION   MARY-D SCORE  4   MARY-D INTERPRETATION  No indication of Depression         4/29/2024     4:02 PM   MS AMGGY-7 SCORE & INTERPRETATION   MAGGY-7 SCORE  1   MAGGY-7 SCORE INTERPRETATION Normal         4/29/2024     4:09 PM   PEQ MS MOS PAIN EFFECTS SCORE & INTERPRETATION   PES SCORE 17   PES SCORE INTERPRETATION Scores can range from 6-30.  Items are scaled so that higher scores indicate a greater impact of pain on a patients mood and behavior.         4/29/2024     4:12 PM   PEQ MS SEXUAL SATISFACTION SCORE & INTERPRETATION   SSS SCORE  14   SSS SCORE INTERPRETATION Scores can range from 4-24.  Higher scores indicate greater problems with sexual satisfaction.         4/29/2024     4:14 PM   MS BLADDER CONTROL SCORE & INTERPRETATION   BLCS SCORE 12   BLCS SCORE INTERPRETATION  Scores can range from 0-22, with higher scores indicating greater bladder control problems.         4/29/2024     4:18 PM   MS BOWEL CONTROL SCORE & INTERPRETATION   BWCS SCORE 6   BWCS SCORE INTERPRETATION Scores can range from 0-26, with higher scores indicating greater bowel control problems.         4/29/2024     4:15 PM   PEQ MS IMPACT OF VISUAL IMPAIRMENT SCORE & INTERPRETATION   CATRACHO SCALE SCORE  0   CATRACHO SCORE INTERPRETATION Scores can range from 0-15, with higher scores indicating greater impact of visual problems on daily activites.         4/29/2024     4:17 PM   MS PDQ SCORE & INTERPRETATION   PDQ RETROSPECTIVE MEMORY SUBSCALE 3   PDQ ATTENTION/CONCENTRATION SUBSCALE 5   PDQ PROSPECTIVE MEMORY SUBSCALE 5   PDQ PLANNING/ORGANIZATION SUBSCALE 5   PDQ TOTAL SCORE 18   PDQ SCORE INTERPRETATION Scores can range from 0-80, with higher scores indicating greater perceived cognitive impairment.         4/29/2024     4:21 PM   MSSS SCORE & INTERPRETATION   MSSS TANGIBLE  SUPPORT SUBSCALE 100   MSSS EMOTIONAL/INFORMATIONAL SUPPORT SUBSCALE 96.88   MSSS AFFECTIONATE SUPPORT SUBSCALE 91.67   MSSS POSITIVE SOCIAL INTERACTION SUBSCALE 100   MSSS TOTAL SCORE 97.14   MSSS SCORE INTERPRETATION Scores can range from 0-100, with higher scores indicating greater perceived support.                  Objective:            8/24/2023    12:02 AM 4/30/2024    12:01 AM   Timed 25 Foot Walk:   Did patient wear an AFO? No No   Was assistive device used? Yes Yes   Assistive device used (rain one): Bilateral Assistance Bilateral Assistance   Bilateral device used Walker/Rollator Walker/Rollator   Time for 25 Foot Walk (seconds) 17.71 11.1   Time for 25 Foot Walk (seconds) 16.29 10.3       Neurologic Exam  MS: intact  CN: no SALO, no dysarthria  MOTOR: limited ROM prox arms (cervical issues) and 4/5 IO bilaterally; LE: 4/5 in flexors t/o  REF: 3+ patellar sangeetha  SENS: moderate decrease vib LE sangeetha  GAIT: slow, walker, improved  COORD: no dystaxia on FTN but limite prox ROM    Imaging:     No results found for this or any previous visit.    No results found for this or any previous visit.    No results found for this or any previous visit.    Results for orders placed during the hospital encounter of 03/13/23    MRI Brain Demyelinating W W/O Contrast    Impression  Brain appears grossly stable from prior exams again demonstrating findings compatible with the reported history of multiple sclerosis.  No definite new or enhancing lesions to indicate ongoing or active demyelination.    Advanced cervical spondylosis, progressed since the prior exam.  This results in severe spinal stenosis and cord compression at C3-4 with some subtle cord signal changes at this level presumably from compressive myelopathy.    No new focal spinal cord lesions identified elsewhere.    This report was flagged in Epic as abnormal.      Electronically signed by: Sagar Santos MD  Date:    03/14/2023  Time:    08:22    Results for  "orders placed during the hospital encounter of 03/13/23    MRI Cervical Spine Demyelinating W W/O Contrast    Impression  Brain appears grossly stable from prior exams again demonstrating findings compatible with the reported history of multiple sclerosis.  No definite new or enhancing lesions to indicate ongoing or active demyelination.    Advanced cervical spondylosis, progressed since the prior exam.  This results in severe spinal stenosis and cord compression at C3-4 with some subtle cord signal changes at this level presumably from compressive myelopathy.    No new focal spinal cord lesions identified elsewhere.    This report was flagged in Epic as abnormal.      Electronically signed by: Sagar Santos MD  Date:    03/14/2023  Time:    08:22    No results found for this or any previous visit.        Labs:     Lab Results   Component Value Date    NJOFZWBO10FG 13 (L) 01/30/2023     No results found for: "JCVINDEX", "JCVANTIBODY"  No results found for: "IJ0OKDYN", "ABSOLUTECD3", "UT1LIBIN", "ABSOLUTECD8", "CP9WLFFX", "ABSOLUTECD4", "LABCD48"  Lab Results   Component Value Date    WBC 12.06 04/15/2023    RBC 4.51 (L) 04/15/2023    HGB 13.5 (L) 04/15/2023    HCT 42.1 04/15/2023    MCV 93 04/15/2023    MCH 29.9 04/15/2023    MCHC 32.1 04/15/2023    RDW 13.9 04/15/2023     04/15/2023    MPV 11.2 04/15/2023    GRAN 9.8 (H) 04/15/2023    GRAN 81.5 (H) 04/15/2023    LYMPH 0.9 (L) 04/15/2023    LYMPH 7.1 (L) 04/15/2023    MONO 1.3 (H) 04/15/2023    MONO 10.9 04/15/2023    EOS 0.0 04/15/2023    BASO 0.01 04/15/2023    EOSINOPHIL 0.1 04/15/2023    BASOPHIL 0.1 04/15/2023     Sodium   Date Value Ref Range Status   04/15/2023 141 136 - 145 mmol/L Final     Potassium   Date Value Ref Range Status   04/15/2023 4.3 3.5 - 5.1 mmol/L Final     Chloride   Date Value Ref Range Status   04/15/2023 101 95 - 110 mmol/L Final     CO2   Date Value Ref Range Status   04/15/2023 28 23 - 29 mmol/L Final     Glucose   Date Value " "Ref Range Status   04/15/2023 111 (H) 70 - 110 mg/dL Final     BUN   Date Value Ref Range Status   04/15/2023 24 (H) 8 - 23 mg/dL Final     Creatinine   Date Value Ref Range Status   04/15/2023 1.1 0.5 - 1.4 mg/dL Final     Calcium   Date Value Ref Range Status   04/15/2023 9.8 8.7 - 10.5 mg/dL Final     Total Protein   Date Value Ref Range Status   04/15/2023 7.4 6.0 - 8.4 g/dL Final     Albumin   Date Value Ref Range Status   04/15/2023 3.7 3.5 - 5.2 g/dL Final     Total Bilirubin   Date Value Ref Range Status   04/15/2023 0.8 0.1 - 1.0 mg/dL Final     Comment:     For infants and newborns, interpretation of results should be based  on gestational age, weight and in agreement with clinical  observations.    Premature Infant recommended reference ranges:  Up to 24 hours.............<8.0 mg/dL  Up to 48 hours............<12.0 mg/dL  3-5 days..................<15.0 mg/dL  6-29 days.................<15.0 mg/dL       Alkaline Phosphatase   Date Value Ref Range Status   04/15/2023 83 55 - 135 U/L Final     AST   Date Value Ref Range Status   04/15/2023 22 10 - 40 U/L Final     ALT   Date Value Ref Range Status   04/15/2023 22 10 - 44 U/L Final     Anion Gap   Date Value Ref Range Status   04/15/2023 12 8 - 16 mmol/L Final     eGFR if    Date Value Ref Range Status   04/30/2021 >60.0 >60 mL/min/1.73 m^2 Final     eGFR if non    Date Value Ref Range Status   04/30/2021 >60.0 >60 mL/min/1.73 m^2 Final     Comment:     Calculation used to obtain the estimated glomerular filtration  rate (eGFR) is the CKD-EPI equation.        No results found for: "HEPBSAG", "HEPBSAB", "HEPBCAB"        MS Impression and Plan:     NEURO MULTIPLE SCLEROSIS IMPRESSION:   Number of relapses in the past year?:  0  Clinical Progression:  Clinically Stable  MS Classification:  Secondarily Progressive MS  DMT:  Implement Disease Modifying Therapy  Implement Disease Modifying Therapy:  Terifluomide  Symptom Management: "  No change in symptom management (defers PT today but may be open at next visit)   After shared decision making, will start teriflunomide. The rationale, side effects, risks and expectations of this medication were discussed.   Labs per protocol  MS Center Clinical Pharmacist Edwin Garcia will coordinate initiation of this immunotherapy.   F/u 3 mo Tegan Charles NP          Problem List Items Addressed This Visit          Unprioritized    MS (multiple sclerosis) - Primary    Relevant Orders    CBC Auto Differential    Hepatic Function Panel    Quantiferon Gold TB     Other Visit Diagnoses       Immunotherapy        Relevant Orders    CBC Auto Differential    Hepatic Function Panel    Quantiferon Gold TB    Prophylactic immunotherapy        Neurogenic bladder        Counseling regarding goals of care        Gait disturbance                Antonella Das MD    I spent a total of 40 minutes on the day of the visit.This includes face to face time and non-face to face time preparing to see the patient (eg, review of tests), obtaining and/or reviewing separately obtained history, documenting clinical information in the electronic or other health record, independently interpreting results and communicating results to the patient/family/caregiver, or care coordinator.  Visit today included increased complexity associated with the care of the episodic problem : gait disturbance and immunotherapy;  addressed and managing the longitudinal care of the patient due to the serious and/or complex managed problem(s) MS.

## 2024-04-30 NOTE — PATIENT INSTRUCTIONS
Create account at [facility] web site using your e-mail,   We will send in script for teriflunomide once your labs result  MS Center Clinical Pharmacist Edwin Garcia will coordinate initiation of this immunotherapy.

## 2024-04-30 NOTE — Clinical Note
Edwin - starting teriflunomode;  his email is [email or web address] : labs today, and then scheduled monthly x 6 mo

## 2024-05-01 ENCOUNTER — TELEPHONE (OUTPATIENT)
Dept: NEUROLOGY | Facility: CLINIC | Age: 69
End: 2024-05-01
Payer: MEDICARE

## 2024-05-01 DIAGNOSIS — G35 MS (MULTIPLE SCLEROSIS): Primary | ICD-10-CM

## 2024-05-01 LAB
GAMMA INTERFERON BACKGROUND BLD IA-ACNC: 0 IU/ML
M TB IFN-G CD4+ BCKGRND COR BLD-ACNC: 0 IU/ML
M TB IFN-G CD4+ BCKGRND COR BLD-ACNC: 0 IU/ML
MITOGEN IGNF BCKGRD COR BLD-ACNC: 7.44 IU/ML
TB GOLD PLUS: NEGATIVE

## 2024-05-01 NOTE — TELEPHONE ENCOUNTER
----- Message from Antonella Das MD sent at 4/30/2024  9:01 AM CDT -----  Edwin - starting teriflunomode;  his email is [email or web address] : labs today, and then scheduled monthly x 6 mo

## 2024-05-02 ENCOUNTER — PATIENT MESSAGE (OUTPATIENT)
Dept: PSYCHIATRY | Facility: CLINIC | Age: 69
End: 2024-05-02
Payer: MEDICARE

## 2024-05-02 RX ORDER — TERIFLUNOMIDE 14 MG/1
14 TABLET, FILM COATED ORAL DAILY
Qty: 90 TABLET | Refills: 0 | Status: SHIPPED | OUTPATIENT
Start: 2024-05-02 | End: 2024-05-07 | Stop reason: SDUPTHER

## 2024-05-07 RX ORDER — TERIFLUNOMIDE 14 MG/1
14 TABLET, FILM COATED ORAL DAILY
Qty: 90 TABLET | Refills: 0 | Status: ACTIVE | OUTPATIENT
Start: 2024-05-07 | End: 2025-05-07

## 2024-05-23 ENCOUNTER — PATIENT MESSAGE (OUTPATIENT)
Dept: INTERNAL MEDICINE | Facility: CLINIC | Age: 69
End: 2024-05-23
Payer: MEDICARE

## 2024-05-23 DIAGNOSIS — R82.90 URINE ABNORMALITY: Primary | ICD-10-CM

## 2024-05-27 RX ORDER — NITROFURANTOIN 25; 75 MG/1; MG/1
100 CAPSULE ORAL 2 TIMES DAILY
Qty: 14 CAPSULE | Refills: 0 | Status: SHIPPED | OUTPATIENT
Start: 2024-05-27 | End: 2024-05-29 | Stop reason: SDUPTHER

## 2024-05-29 RX ORDER — NITROFURANTOIN 25; 75 MG/1; MG/1
100 CAPSULE ORAL 2 TIMES DAILY
Qty: 14 CAPSULE | Refills: 0 | Status: SHIPPED | OUTPATIENT
Start: 2024-05-29 | End: 2024-06-07 | Stop reason: ALTCHOICE

## 2024-05-29 RX ORDER — FUROSEMIDE 40 MG/1
40 TABLET ORAL 2 TIMES DAILY
Qty: 180 TABLET | Refills: 0 | Status: SHIPPED | OUTPATIENT
Start: 2024-05-29

## 2024-05-29 NOTE — TELEPHONE ENCOUNTER
Refill Routing Note   Medication(s) are not appropriate for processing by [facility] for the following reason(s):        Required labs outdated  Required vitals abnormal    [facility] action(s):  Defer   Requires labs : Yes             Appointments  past 12m or future 3m with PCP    Date Provider   Last Visit   8/14/2023 Tom Garcia MD   Next Visit   Visit date not found Tom Garcia MD   ED visits in past 90 days: 0        Note composed:8:36 AM 05/29/2024

## 2024-05-29 NOTE — TELEPHONE ENCOUNTER
Care Due:                  Date            Visit Type   Department     Provider  --------------------------------------------------------------------------------                                MYCHART                              FOLLOWUP/OF  [facility] INTERNAL  Last Visit: 08-      FICE VISIT   MEDICINE       Tom aGrcia  Next Visit: None Scheduled  None         None Found                                                            Last  Test          Frequency    Reason                     Performed    Due Date  --------------------------------------------------------------------------------    CMP.........  12 months..  furosemide, losartan.....  04-   04-    TSH.........  12 months..  levothyroxine............  08- 08-    [facility] Embedded Care Due Messages. Reference number: [id number].   5/29/2024 2:24:32 AM CDT

## 2024-06-05 ENCOUNTER — OFFICE VISIT (OUTPATIENT)
Dept: INTERNAL MEDICINE | Facility: CLINIC | Age: 69
End: 2024-06-05
Payer: MEDICARE

## 2024-06-05 VITALS
BODY MASS INDEX: 41.75 KG/M2 | HEIGHT: 73 IN | DIASTOLIC BLOOD PRESSURE: 76 MMHG | WEIGHT: 315 LBS | SYSTOLIC BLOOD PRESSURE: 132 MMHG | HEART RATE: 77 BPM | OXYGEN SATURATION: 95 %

## 2024-06-05 DIAGNOSIS — G35 MS (MULTIPLE SCLEROSIS): ICD-10-CM

## 2024-06-05 DIAGNOSIS — I10 PRIMARY HYPERTENSION: ICD-10-CM

## 2024-06-05 DIAGNOSIS — E03.9 HYPOTHYROIDISM, UNSPECIFIED TYPE: ICD-10-CM

## 2024-06-05 DIAGNOSIS — N30.00 ACUTE CYSTITIS WITHOUT HEMATURIA: Primary | ICD-10-CM

## 2024-06-05 DIAGNOSIS — Z12.5 PROSTATE CANCER SCREENING: ICD-10-CM

## 2024-06-05 LAB
BACTERIA #/AREA URNS AUTO: ABNORMAL /HPF
BILIRUB UR QL STRIP: NEGATIVE
CLARITY UR REFRACT.AUTO: CLEAR
COLOR UR AUTO: YELLOW
GLUCOSE UR QL STRIP: NEGATIVE
HGB UR QL STRIP: ABNORMAL
KETONES UR QL STRIP: NEGATIVE
LEUKOCYTE ESTERASE UR QL STRIP: ABNORMAL
MICROSCOPIC COMMENT: ABNORMAL
NITRITE UR QL STRIP: NEGATIVE
PH UR STRIP: 7 [PH] (ref 5–8)
PROT UR QL STRIP: NEGATIVE
RBC #/AREA URNS AUTO: 23 /HPF (ref 0–4)
SP GR UR STRIP: 1.01 (ref 1–1.03)
SQUAMOUS #/AREA URNS AUTO: 0 /HPF
URN SPEC COLLECT METH UR: ABNORMAL
WBC #/AREA URNS AUTO: 59 /HPF (ref 0–5)
WBC CLUMPS UR QL AUTO: ABNORMAL

## 2024-06-05 PROCEDURE — 1126F AMNT PAIN NOTED NONE PRSNT: CPT | Mod: HCNC,CPTII,S$GLB, | Performed by: PHYSICIAN ASSISTANT

## 2024-06-05 PROCEDURE — 99214 OFFICE O/P EST MOD 30 MIN: CPT | Mod: HCNC,S$GLB,, | Performed by: PHYSICIAN ASSISTANT

## 2024-06-05 PROCEDURE — 81001 URINALYSIS AUTO W/SCOPE: CPT | Mod: HCNC | Performed by: PHYSICIAN ASSISTANT

## 2024-06-05 PROCEDURE — 3288F FALL RISK ASSESSMENT DOCD: CPT | Mod: HCNC,CPTII,S$GLB, | Performed by: PHYSICIAN ASSISTANT

## 2024-06-05 PROCEDURE — 87186 SC STD MICRODIL/AGAR DIL: CPT | Mod: HCNC | Performed by: PHYSICIAN ASSISTANT

## 2024-06-05 PROCEDURE — 1160F RVW MEDS BY RX/DR IN RCRD: CPT | Mod: HCNC,CPTII,S$GLB, | Performed by: PHYSICIAN ASSISTANT

## 2024-06-05 PROCEDURE — 87086 URINE CULTURE/COLONY COUNT: CPT | Mod: HCNC | Performed by: PHYSICIAN ASSISTANT

## 2024-06-05 PROCEDURE — 87077 CULTURE AEROBIC IDENTIFY: CPT | Mod: HCNC | Performed by: PHYSICIAN ASSISTANT

## 2024-06-05 PROCEDURE — 1159F MED LIST DOCD IN RCRD: CPT | Mod: HCNC,CPTII,S$GLB, | Performed by: PHYSICIAN ASSISTANT

## 2024-06-05 PROCEDURE — 1101F PT FALLS ASSESS-DOCD LE1/YR: CPT | Mod: HCNC,CPTII,S$GLB, | Performed by: PHYSICIAN ASSISTANT

## 2024-06-05 PROCEDURE — 3078F DIAST BP <80 MM HG: CPT | Mod: HCNC,CPTII,S$GLB, | Performed by: PHYSICIAN ASSISTANT

## 2024-06-05 PROCEDURE — 3008F BODY MASS INDEX DOCD: CPT | Mod: HCNC,CPTII,S$GLB, | Performed by: PHYSICIAN ASSISTANT

## 2024-06-05 PROCEDURE — 4010F ACE/ARB THERAPY RXD/TAKEN: CPT | Mod: HCNC,CPTII,S$GLB, | Performed by: PHYSICIAN ASSISTANT

## 2024-06-05 PROCEDURE — 3075F SYST BP GE 130 - 139MM HG: CPT | Mod: HCNC,CPTII,S$GLB, | Performed by: PHYSICIAN ASSISTANT

## 2024-06-05 PROCEDURE — 99999 PR PBB SHADOW E&M-EST. PATIENT-LVL IV: CPT | Mod: PBBFAC,HCNC,, | Performed by: PHYSICIAN ASSISTANT

## 2024-06-05 PROCEDURE — 87088 URINE BACTERIA CULTURE: CPT | Mod: HCNC | Performed by: PHYSICIAN ASSISTANT

## 2024-06-05 NOTE — PROGRESS NOTES
Subjective     Patient ID: Rajan Cooper III is a 68 y.o. male.    Chief Complaint: Urinary Tract Infection    HPI    Established pt of Tom Garcia MD     Here for follow up UTI  Stared on macrobid 5 days ago, symptoms are improving, told he needed to complete a urine sample, hx of recurrent UTI  He is otherwise feeling well, MS is stable, on a new medication teriflunodmie, LE edema also stable.         Answers submitted by the patient for this visit:  Painful Urination Questionnaire (Submitted on 6/4/2024)  Chief Complaint: Dysuria  Chronicity: recurrent  Onset: 1 to 4 weeks ago  Frequency: intermittently  Progression since onset: gradually improving  Pain quality: burning  Pain - numeric: 2/10  Fever: no fever  Sexually active?: No  History of pyelonephritis?: No  chills: No  discharge: No  flank pain: No  frequency: No  hematuria: No  hesitancy: No  nausea: No  possible pregnancy: No  sweats: No  urgency: Yes  vomiting: No  constipation: No  rash: No  weight loss: No  withholding: No  behavior changes: No  Treatments tried: antibiotics  Improvement on treatment: significant  Pain severity: mild  catheterization: No  diabetes insipidus: No  diabetes mellitus: No  genitourinary reflux: No  hypertension: No  recurrent UTIs: Yes  single kidney: No  STD: No  urinary stasis: No  urological procedure: No  kidney stones: Yes      Past Medical History:   Diagnosis Date    Hypertension     Hypothyroidism 12/5/2023    MS (multiple sclerosis)     Staph aureus infection 2017     Social History     Tobacco Use    Smoking status: Never    Smokeless tobacco: Never   Substance Use Topics    Alcohol use: No    Drug use: No     Review of patient's allergies indicates:   Allergen Reactions    Norco [hydrocodone-acetaminophen] Anaphylaxis     Pt states getting shakey, and having halucinations    Cleocin [clindamycin hcl]          Review of Systems   Constitutional:  Negative for chills and fever.   Cardiovascular:   "Positive for leg swelling (chronic).   Gastrointestinal:  Negative for abdominal pain, constipation, nausea and vomiting.   Genitourinary:  Positive for dysuria and urgency. Negative for flank pain, frequency and hematuria.   Integumentary:  Negative for rash.          Objective  /76 (BP Location: Left arm, Patient Position: Sitting, BP Method: Large (Manual))   Pulse 77   Ht 6' 1" (1.854 m)   Wt (!) 143.2 kg (315 lb 11.2 oz)   SpO2 95%   BMI 41.65 kg/m²       Physical Exam  Constitutional:       General: He is not in acute distress.     Appearance: He is not ill-appearing.   Cardiovascular:      Rate and Rhythm: Normal rate and regular rhythm.   Pulmonary:      Effort: Pulmonary effort is normal. No respiratory distress.   Abdominal:      Tenderness: There is no abdominal tenderness.   Musculoskeletal:      Right lower leg: Edema (1+) present.      Left lower leg: Edema (1+) present.      Comments: Ambulating with rolling walker            Assessment and Plan     1. Acute cystitis without hematuria  Improving, complete macrobid as prescribed  -     Urine culture  -     Urinalysis    2. MS (multiple sclerosis)  Stable  Followed by Neurology    3. Primary hypertension  At goal, continue amlodipine 5mg  -     Lipid Panel; Future; Expected date: 06/05/2024  -     BASIC METABOLIC PANEL; Future; Expected date: 06/05/2024    4. Hypothyroidism, unspecified type  Lab Results   Component Value Date    TSH 7.196 (H) 08/14/2023   Follow up lab for med changes if necessary  -     TSH; Future; Expected date: 06/05/2024    5. Prostate cancer screening  -     PSA, Screening; Future; Expected date: 06/05/2024      Future Appointments   Date Time Provider Department Center   6/11/2024  9:30 AM LAB, APPOINTMENT [facility] NATALI[facility] LAB  Brandon COWAN   7/11/2024  9:30 AM LAB, APPOINTMENT [facility] NATALI[facility] LAB  Brandon COWAN   7/30/2024  8:30 AM Tegan Charles NP [facility] GERALDO Aleman   8/12/2024  9:30 AM LAB, APPOINTMENT " [facility] NAT[facility] LAB IM Brandon Aleman [facility]   9/12/2024  9:30 AM LAB, APPOINTMENT [facility] LAB IM Brandon Aleman [facility]   10/12/2024  9:30 AM LAB, APPOINTMENT [facility] LAB IM Brandon Aleman [facility]   11/12/2024  9:30 AM LAB, APPOINTMENT LOUIE AN [facility] LAB IM Brandon Aleman [facility]

## 2024-06-06 ENCOUNTER — PATIENT MESSAGE (OUTPATIENT)
Dept: INTERNAL MEDICINE | Facility: CLINIC | Age: 69
End: 2024-06-06
Payer: MEDICARE

## 2024-06-07 ENCOUNTER — TELEPHONE (OUTPATIENT)
Dept: INTERNAL MEDICINE | Facility: CLINIC | Age: 69
End: 2024-06-07
Payer: MEDICARE

## 2024-06-07 LAB — BACTERIA UR CULT: ABNORMAL

## 2024-06-07 RX ORDER — SULFAMETHOXAZOLE AND TRIMETHOPRIM 800; 160 MG/1; MG/1
1 TABLET ORAL 2 TIMES DAILY
Qty: 14 TABLET | Refills: 0 | Status: SHIPPED | OUTPATIENT
Start: 2024-06-07 | End: 2024-06-10 | Stop reason: SINTOL

## 2024-06-10 ENCOUNTER — PATIENT MESSAGE (OUTPATIENT)
Dept: INTERNAL MEDICINE | Facility: CLINIC | Age: 69
End: 2024-06-10
Payer: MEDICARE

## 2024-06-10 RX ORDER — CIPROFLOXACIN 500 MG/1
500 TABLET ORAL 2 TIMES DAILY
Qty: 14 TABLET | Refills: 0 | Status: SHIPPED | OUTPATIENT
Start: 2024-06-10 | End: 2024-06-17

## 2024-06-10 NOTE — TELEPHONE ENCOUNTER
Pt started on Bactrim but then states he started to have a reaction. Pt has stopped taking Bactrim and is requesting another antibiotic

## 2024-06-10 NOTE — TELEPHONE ENCOUNTER
Bactrim added to allergy list (reviewed pt had rcvd Bactrim in the past 6/2020, 11/2023)  I will send Cipro instead.

## 2024-06-11 ENCOUNTER — LAB VISIT (OUTPATIENT)
Dept: LAB | Facility: HOSPITAL | Age: 69
End: 2024-06-11
Attending: PSYCHIATRY & NEUROLOGY
Payer: MEDICARE

## 2024-06-11 DIAGNOSIS — N39.0 ACUTE UTI: ICD-10-CM

## 2024-06-11 DIAGNOSIS — Z12.5 PROSTATE CANCER SCREENING: ICD-10-CM

## 2024-06-11 DIAGNOSIS — Z29.89 IMMUNOTHERAPY: ICD-10-CM

## 2024-06-11 DIAGNOSIS — G35 MS (MULTIPLE SCLEROSIS): ICD-10-CM

## 2024-06-11 DIAGNOSIS — E03.9 HYPOTHYROIDISM, UNSPECIFIED TYPE: ICD-10-CM

## 2024-06-11 DIAGNOSIS — I10 PRIMARY HYPERTENSION: ICD-10-CM

## 2024-06-11 DIAGNOSIS — I10 HTN (HYPERTENSION): ICD-10-CM

## 2024-06-11 LAB
ALBUMIN SERPL BCP-MCNC: 3.6 G/DL (ref 3.5–5.2)
ALBUMIN SERPL BCP-MCNC: 3.6 G/DL (ref 3.5–5.2)
ALP SERPL-CCNC: 88 U/L (ref 55–135)
ALP SERPL-CCNC: 88 U/L (ref 55–135)
ALT SERPL W/O P-5'-P-CCNC: 18 U/L (ref 10–44)
ALT SERPL W/O P-5'-P-CCNC: 18 U/L (ref 10–44)
ANION GAP SERPL CALC-SCNC: 10 MMOL/L (ref 8–16)
ANION GAP SERPL CALC-SCNC: 10 MMOL/L (ref 8–16)
AST SERPL-CCNC: 22 U/L (ref 10–40)
AST SERPL-CCNC: 22 U/L (ref 10–40)
BASOPHILS # BLD AUTO: 0.03 K/UL (ref 0–0.2)
BASOPHILS NFR BLD: 0.5 % (ref 0–1.9)
BILIRUB DIRECT SERPL-MCNC: 0.2 MG/DL (ref 0.1–0.3)
BILIRUB SERPL-MCNC: 0.5 MG/DL (ref 0.1–1)
BILIRUB SERPL-MCNC: 0.5 MG/DL (ref 0.1–1)
BILIRUB UR QL STRIP: NEGATIVE
BUN SERPL-MCNC: 25 MG/DL (ref 8–23)
BUN SERPL-MCNC: 25 MG/DL (ref 8–23)
CALCIUM SERPL-MCNC: 9 MG/DL (ref 8.7–10.5)
CALCIUM SERPL-MCNC: 9 MG/DL (ref 8.7–10.5)
CHLORIDE SERPL-SCNC: 103 MMOL/L (ref 95–110)
CHLORIDE SERPL-SCNC: 103 MMOL/L (ref 95–110)
CHOLEST SERPL-MCNC: 214 MG/DL (ref 120–199)
CHOLEST/HDLC SERPL: 4.4 {RATIO} (ref 2–5)
CLARITY UR REFRACT.AUTO: CLEAR
CO2 SERPL-SCNC: 28 MMOL/L (ref 23–29)
CO2 SERPL-SCNC: 28 MMOL/L (ref 23–29)
COLOR UR AUTO: YELLOW
COMPLEXED PSA SERPL-MCNC: 0.72 NG/ML (ref 0–4)
CREAT SERPL-MCNC: 1 MG/DL (ref 0.5–1.4)
CREAT SERPL-MCNC: 1 MG/DL (ref 0.5–1.4)
DIFFERENTIAL METHOD BLD: ABNORMAL
EOSINOPHIL # BLD AUTO: 0.3 K/UL (ref 0–0.5)
EOSINOPHIL NFR BLD: 5.6 % (ref 0–8)
ERYTHROCYTE [DISTWIDTH] IN BLOOD BY AUTOMATED COUNT: 14.7 % (ref 11.5–14.5)
EST. GFR  (NO RACE VARIABLE): >60 ML/MIN/1.73 M^2
EST. GFR  (NO RACE VARIABLE): >60 ML/MIN/1.73 M^2
GLUCOSE SERPL-MCNC: 87 MG/DL (ref 70–110)
GLUCOSE SERPL-MCNC: 87 MG/DL (ref 70–110)
GLUCOSE UR QL STRIP: NEGATIVE
HCT VFR BLD AUTO: 42 % (ref 40–54)
HDLC SERPL-MCNC: 49 MG/DL (ref 40–75)
HDLC SERPL: 22.9 % (ref 20–50)
HGB BLD-MCNC: 13.8 G/DL (ref 14–18)
HGB UR QL STRIP: ABNORMAL
HYALINE CASTS UR QL AUTO: 4 /LPF
IMM GRANULOCYTES # BLD AUTO: 0.03 K/UL (ref 0–0.04)
IMM GRANULOCYTES NFR BLD AUTO: 0.5 % (ref 0–0.5)
KETONES UR QL STRIP: NEGATIVE
LDLC SERPL CALC-MCNC: 138.8 MG/DL (ref 63–159)
LEUKOCYTE ESTERASE UR QL STRIP: ABNORMAL
LYMPHOCYTES # BLD AUTO: 1.2 K/UL (ref 1–4.8)
LYMPHOCYTES NFR BLD: 20.3 % (ref 18–48)
MCH RBC QN AUTO: 31.5 PG (ref 27–31)
MCHC RBC AUTO-ENTMCNC: 32.9 G/DL (ref 32–36)
MCV RBC AUTO: 96 FL (ref 82–98)
MICROSCOPIC COMMENT: ABNORMAL
MONOCYTES # BLD AUTO: 0.6 K/UL (ref 0.3–1)
MONOCYTES NFR BLD: 9.8 % (ref 4–15)
NEUTROPHILS # BLD AUTO: 3.9 K/UL (ref 1.8–7.7)
NEUTROPHILS NFR BLD: 63.3 % (ref 38–73)
NITRITE UR QL STRIP: NEGATIVE
NONHDLC SERPL-MCNC: 165 MG/DL
NRBC BLD-RTO: 0 /100 WBC
PH UR STRIP: 7 [PH] (ref 5–8)
PLATELET # BLD AUTO: 191 K/UL (ref 150–450)
PMV BLD AUTO: 12.3 FL (ref 9.2–12.9)
POTASSIUM SERPL-SCNC: 4 MMOL/L (ref 3.5–5.1)
POTASSIUM SERPL-SCNC: 4 MMOL/L (ref 3.5–5.1)
PROT SERPL-MCNC: 7 G/DL (ref 6–8.4)
PROT SERPL-MCNC: 7 G/DL (ref 6–8.4)
PROT UR QL STRIP: NEGATIVE
RBC # BLD AUTO: 4.38 M/UL (ref 4.6–6.2)
RBC #/AREA URNS AUTO: 32 /HPF (ref 0–4)
SODIUM SERPL-SCNC: 141 MMOL/L (ref 136–145)
SODIUM SERPL-SCNC: 141 MMOL/L (ref 136–145)
SP GR UR STRIP: 1.01 (ref 1–1.03)
SQUAMOUS #/AREA URNS AUTO: 0 /HPF
TRIGL SERPL-MCNC: 131 MG/DL (ref 30–150)
TSH SERPL DL<=0.005 MIU/L-ACNC: 3.58 UIU/ML (ref 0.4–4)
URN SPEC COLLECT METH UR: ABNORMAL
WBC # BLD AUTO: 6.1 K/UL (ref 3.9–12.7)
WBC #/AREA URNS AUTO: 6 /HPF (ref 0–5)

## 2024-06-11 PROCEDURE — 80076 HEPATIC FUNCTION PANEL: CPT | Mod: HCNC | Performed by: PSYCHIATRY & NEUROLOGY

## 2024-06-11 PROCEDURE — 81001 URINALYSIS AUTO W/SCOPE: CPT | Mod: HCNC | Performed by: PHYSICIAN ASSISTANT

## 2024-06-11 PROCEDURE — 85025 COMPLETE CBC W/AUTO DIFF WBC: CPT | Mod: HCNC | Performed by: PSYCHIATRY & NEUROLOGY

## 2024-06-11 PROCEDURE — 84153 ASSAY OF PSA TOTAL: CPT | Mod: HCNC | Performed by: PHYSICIAN ASSISTANT

## 2024-06-11 PROCEDURE — 80053 COMPREHEN METABOLIC PANEL: CPT | Mod: HCNC | Performed by: INTERNAL MEDICINE

## 2024-06-11 PROCEDURE — 84443 ASSAY THYROID STIM HORMONE: CPT | Mod: HCNC | Performed by: PHYSICIAN ASSISTANT

## 2024-06-11 PROCEDURE — 80061 LIPID PANEL: CPT | Mod: HCNC | Performed by: PHYSICIAN ASSISTANT

## 2024-06-11 PROCEDURE — 36415 COLL VENOUS BLD VENIPUNCTURE: CPT | Mod: HCNC | Performed by: PHYSICIAN ASSISTANT

## 2024-06-17 ENCOUNTER — PATIENT MESSAGE (OUTPATIENT)
Dept: INTERNAL MEDICINE | Facility: CLINIC | Age: 69
End: 2024-06-17
Payer: MEDICARE

## 2024-07-11 ENCOUNTER — LAB VISIT (OUTPATIENT)
Dept: LAB | Facility: HOSPITAL | Age: 69
End: 2024-07-11
Attending: PSYCHIATRY & NEUROLOGY
Payer: MEDICARE

## 2024-07-11 DIAGNOSIS — G35 MS (MULTIPLE SCLEROSIS): ICD-10-CM

## 2024-07-11 DIAGNOSIS — Z29.89 IMMUNOTHERAPY: ICD-10-CM

## 2024-07-11 LAB
ALBUMIN SERPL BCP-MCNC: 3.7 G/DL (ref 3.5–5.2)
ALP SERPL-CCNC: 84 U/L (ref 55–135)
ALT SERPL W/O P-5'-P-CCNC: 17 U/L (ref 10–44)
AST SERPL-CCNC: 16 U/L (ref 10–40)
BASOPHILS # BLD AUTO: 0.04 K/UL (ref 0–0.2)
BASOPHILS NFR BLD: 0.7 % (ref 0–1.9)
BILIRUB DIRECT SERPL-MCNC: 0.2 MG/DL (ref 0.1–0.3)
BILIRUB SERPL-MCNC: 0.5 MG/DL (ref 0.1–1)
DIFFERENTIAL METHOD BLD: ABNORMAL
EOSINOPHIL # BLD AUTO: 0.4 K/UL (ref 0–0.5)
EOSINOPHIL NFR BLD: 6.4 % (ref 0–8)
ERYTHROCYTE [DISTWIDTH] IN BLOOD BY AUTOMATED COUNT: 14.1 % (ref 11.5–14.5)
HCT VFR BLD AUTO: 46.7 % (ref 40–54)
HGB BLD-MCNC: 14.3 G/DL (ref 14–18)
IMM GRANULOCYTES # BLD AUTO: 0.01 K/UL (ref 0–0.04)
IMM GRANULOCYTES NFR BLD AUTO: 0.2 % (ref 0–0.5)
LYMPHOCYTES # BLD AUTO: 1.1 K/UL (ref 1–4.8)
LYMPHOCYTES NFR BLD: 18.2 % (ref 18–48)
MCH RBC QN AUTO: 30.6 PG (ref 27–31)
MCHC RBC AUTO-ENTMCNC: 30.6 G/DL (ref 32–36)
MCV RBC AUTO: 100 FL (ref 82–98)
MONOCYTES # BLD AUTO: 0.8 K/UL (ref 0.3–1)
MONOCYTES NFR BLD: 12.6 % (ref 4–15)
NEUTROPHILS # BLD AUTO: 3.7 K/UL (ref 1.8–7.7)
NEUTROPHILS NFR BLD: 61.9 % (ref 38–73)
NRBC BLD-RTO: 0 /100 WBC
PLATELET # BLD AUTO: 186 K/UL (ref 150–450)
PMV BLD AUTO: 12.3 FL (ref 9.2–12.9)
PROT SERPL-MCNC: 7.1 G/DL (ref 6–8.4)
RBC # BLD AUTO: 4.67 M/UL (ref 4.6–6.2)
WBC # BLD AUTO: 5.94 K/UL (ref 3.9–12.7)

## 2024-07-11 PROCEDURE — 85025 COMPLETE CBC W/AUTO DIFF WBC: CPT | Mod: HCNC | Performed by: PSYCHIATRY & NEUROLOGY

## 2024-07-11 PROCEDURE — 80076 HEPATIC FUNCTION PANEL: CPT | Mod: HCNC | Performed by: PSYCHIATRY & NEUROLOGY

## 2024-07-11 PROCEDURE — 36415 COLL VENOUS BLD VENIPUNCTURE: CPT | Mod: HCNC | Performed by: PSYCHIATRY & NEUROLOGY

## 2024-07-25 ENCOUNTER — PATIENT MESSAGE (OUTPATIENT)
Dept: PSYCHIATRY | Facility: CLINIC | Age: 69
End: 2024-07-25
Payer: MEDICARE

## 2024-08-05 ENCOUNTER — PATIENT MESSAGE (OUTPATIENT)
Dept: PSYCHIATRY | Facility: CLINIC | Age: 69
End: 2024-08-05
Payer: MEDICARE

## 2024-08-07 RX ORDER — FUROSEMIDE 40 MG/1
40 TABLET ORAL 2 TIMES DAILY
Qty: 180 TABLET | Refills: 0 | Status: SHIPPED | OUTPATIENT
Start: 2024-08-07

## 2024-08-12 ENCOUNTER — LAB VISIT (OUTPATIENT)
Dept: LAB | Facility: HOSPITAL | Age: 69
End: 2024-08-12
Attending: PSYCHIATRY & NEUROLOGY
Payer: MEDICARE

## 2024-08-12 DIAGNOSIS — G35 MS (MULTIPLE SCLEROSIS): ICD-10-CM

## 2024-08-12 DIAGNOSIS — Z29.89 IMMUNOTHERAPY: ICD-10-CM

## 2024-08-12 LAB
ALBUMIN SERPL BCP-MCNC: 3.5 G/DL (ref 3.5–5.2)
ALP SERPL-CCNC: 82 U/L (ref 55–135)
ALT SERPL W/O P-5'-P-CCNC: 22 U/L (ref 10–44)
AST SERPL-CCNC: 19 U/L (ref 10–40)
BASOPHILS # BLD AUTO: 0.04 K/UL (ref 0–0.2)
BASOPHILS NFR BLD: 0.8 % (ref 0–1.9)
BILIRUB DIRECT SERPL-MCNC: 0.3 MG/DL (ref 0.1–0.3)
BILIRUB SERPL-MCNC: 0.7 MG/DL (ref 0.1–1)
DIFFERENTIAL METHOD BLD: ABNORMAL
EOSINOPHIL # BLD AUTO: 0.4 K/UL (ref 0–0.5)
EOSINOPHIL NFR BLD: 8.1 % (ref 0–8)
ERYTHROCYTE [DISTWIDTH] IN BLOOD BY AUTOMATED COUNT: 14 % (ref 11.5–14.5)
HCT VFR BLD AUTO: 43.7 % (ref 40–54)
HGB BLD-MCNC: 14 G/DL (ref 14–18)
IMM GRANULOCYTES # BLD AUTO: 0.01 K/UL (ref 0–0.04)
IMM GRANULOCYTES NFR BLD AUTO: 0.2 % (ref 0–0.5)
LYMPHOCYTES # BLD AUTO: 1.2 K/UL (ref 1–4.8)
LYMPHOCYTES NFR BLD: 24 % (ref 18–48)
MCH RBC QN AUTO: 30.9 PG (ref 27–31)
MCHC RBC AUTO-ENTMCNC: 32 G/DL (ref 32–36)
MCV RBC AUTO: 97 FL (ref 82–98)
MONOCYTES # BLD AUTO: 0.8 K/UL (ref 0.3–1)
MONOCYTES NFR BLD: 14.9 % (ref 4–15)
NEUTROPHILS # BLD AUTO: 2.6 K/UL (ref 1.8–7.7)
NEUTROPHILS NFR BLD: 52 % (ref 38–73)
NRBC BLD-RTO: 0 /100 WBC
PLATELET # BLD AUTO: 170 K/UL (ref 150–450)
PMV BLD AUTO: 12.2 FL (ref 9.2–12.9)
PROT SERPL-MCNC: 6.9 G/DL (ref 6–8.4)
RBC # BLD AUTO: 4.53 M/UL (ref 4.6–6.2)
WBC # BLD AUTO: 5.04 K/UL (ref 3.9–12.7)

## 2024-08-12 PROCEDURE — 80076 HEPATIC FUNCTION PANEL: CPT | Mod: HCNC | Performed by: PSYCHIATRY & NEUROLOGY

## 2024-08-12 PROCEDURE — 36415 COLL VENOUS BLD VENIPUNCTURE: CPT | Mod: HCNC | Performed by: PSYCHIATRY & NEUROLOGY

## 2024-08-12 PROCEDURE — 85025 COMPLETE CBC W/AUTO DIFF WBC: CPT | Mod: HCNC | Performed by: PSYCHIATRY & NEUROLOGY

## 2024-08-14 DIAGNOSIS — G35 MS (MULTIPLE SCLEROSIS): ICD-10-CM

## 2024-08-15 RX ORDER — TERIFLUNOMIDE 14 MG/1
14 TABLET, FILM COATED ORAL DAILY
Qty: 90 TABLET | Refills: 0 | Status: ACTIVE | OUTPATIENT
Start: 2024-08-15 | End: 2025-08-15

## 2024-09-09 NOTE — PT/OT/SLP RE-EVAL
Physical Therapy  Re-Evaluation/Treatment    Rajan Cooper III   MRN: [id number]   Admitting Diagnosis: Staphylococcus aureus sepsis    PT Received On: 03/11/17  PT Start Time: 1035     PT Stop Time: 1122    PT Total Time (min): 47 min       Billable Minutes:  Re-eval 5, Therapeutic Activity 25 and Therapeutic Exercise 17    Diagnosis: s/p (L) wrist I&D on 3/4, s/p (R) ankle I&D, arthrotomy on 3/10    Past Medical History:   Diagnosis Date    Hypertension     MS (multiple sclerosis)       History reviewed. No pertinent surgical history.    Referring physician: MD Edgardo  Date referred to PT: 3/10/17    General Precautions: Standard, fall  Orthopedic Precautions: LUE weight bearing as tolerated, RLE weight bearing as tolerated (cleared via telephone with Dr. Casale, who states he can WBAT on both)  Braces: None, ACE wrap intact to (R) ankle and (L) wrist    Do you have any cultural, spiritual, Restorationism conflicts, given your current situation?: Patient has no barriers to learning. Patient verbalizes understanding of his/her program and goals and demonstrates them correctly. No cultural, spiritual or educational needs identified.    Patient History:  Lives With: spouse  Living Arrangements: house  Home Accessibility: stairs to enter home  Home Layout: Able to live on 1st floor  Number of Stairs to Enter Home: 1  Stair Railings at Home: none  Transportation Available: family or friend will provide    Living Environment Comment: Pt was (I) PTA using no DME. Pt has a tub/shower combo with a seat. Pt also owns a rollator and SW. Pt has assistance from wife 24/7 upon d/c.    Equipment Currently Used at Home: none  DME owned (not currently used): standard walker and rollator    Previous Level of Function:  Ambulation Skills: independent  Transfer Skills: independent  ADL Skills: independent  Work/Leisure Activity: independent    Subjective:  Communicated with RN prior to session, ok to see for re-evaluation this  "morning.    Pt supine in bed with spouse present upon PT entry to room. I discussed assisting patient with OOB mobility and spouse immediately stating, "Well there seems to be some type of miscommunication because I was told today was a day of bedrest."    I had spoken with Dr. Casale prior to entering room to clarify patient's weightbearing status on (L)UE and (R)LE, cleared for WBAT on each. I informed spouse that I would leave room and call Dr. Casale once more simply to clarify that staff wants patient to work with PT. I left room briefly, called Dr. Casale who did confirm that patient is ok to work with PT using WBAT on (L) wrist and (R) ankle. I returned to room and apologized to pt and spouse regarding the miscommunication but that patient was indeed cleared for therapy. Both seemed agreeable and glad that therapist cleared this up.    Chief Complaint: weakness, (R) ankle pain  Patient goals: "be back to being independent"    Pain Rating: 3/10   Location - Side: Right  Location - Orientation: generalized  Location: ankle  Pain Addressed: Reposition, Distraction  Pain Rating Post-Intervention: 1/10    Objective:   Patient found with: peripheral IV     Cognitive Exam:  Oriented to: Person, Place, Time and Situation    Follows Commands/attention: Follows one-step commands  Communication: clear/fluent  Safety awareness/insight to disability: intact    Physical Exam:  Edema: Moderate at (L) wrist and hand    Sensation:   Impaired in (B)LE from prior history of MS, urinary incontinence    Upper Extremity Range of Motion:  Right Upper Extremity: WFL  Left Upper Extremity: WFL except wrist limited to ~25 deg flex/ext; poor opening/closing of hand due to edema    Upper Extremity Strength:  Right Upper Extremity: WFL  Left Upper Extremity: proximally 4/5, distally 2/5    Lower Extremity Range of Motion:  Right Lower Extremity: WFL except ankle limited to ~30 deg of AROM  Left Lower Extremity: WFL    Lower Extremity " Strength:  Right Lower Extremity: proximally 3-/5, distally 2/5  Left Lower Extremity: WFL    Functional Mobility:    Bed Mobility:  Scooting/Bridging: Contact Guard Assistance (towards EOB in sitting), min (A) towards HOB in supine via drawsheet (cueing to use elbows and left heel)    Supine to Sit: Moderate Assistance from sidelying to upright, mod at trunk  Sit to Supine: Minimum Assistance for (R)LE into bed    Transfers:  Bed <> Chair Technique: Stand Pivot  Bed <> Chair Assistance: Moderate Assistance x 1 trial to/from adjacent BS commode as pt needed to have bowel movement; he attempted to displace some weight on (R) ankle but immediately stating pain and refusing so simply stand pivoted to/from commode with patient accepting weight on (L) leg, pt's hands on therapist's shoulders for support  Bed <> Chair Assistive Device: No Assistive Device    Gait:   Gait Distance: Did not perform today, pt requesting hold due to ankle pain, POD#1. Assisted EOB <> BS commode via mod (A)    Balance:   Static Sit: SBA at EOB    Static Stand: mod (A), pt's hands on therapist's shoulders; difficulty accepting any weight on (R) ankle    Therapeutic Activities and Exercises:  1. Assisted to/from BS commode with mod (A), had bowel movement and urinated on commode. Spouse assisted patient with cleaning.    2. Once back to bed after using commode, patient participated in various therex:   A. Ankle pumps x 15 reps, ~30 deg of (R) ankle AROM   B. Quad sets x 15 reps   C. Glute sets x 15 reps   D. (R) SLR x 10 reps with mod AAROM   E. (L) hand opening/closing hand x 15 reps, mod edema noted   F. (L) wrist flex/ext x 15 reps within pain-free range, ~25 deg of (L) wrist AROM flex/ext within ACE wrap    3. I discussed importance of patient performing the aforementioned therex throughout the day (excluding the SLR which he needs mod assist for). Pt verbalized understanding. PCT present near end for bathing patient in bed.    AM-PAC 6  CLICK MOBILITY  How much help from another person does this patient currently need?   1 = Unable, Total/Dependent Assistance  2 = A lot, Maximum/Moderate Assistance  3 = A little, Minimum/Contact Guard/Supervision  4 = None, Modified Wallace/Independent    Turning over in bed (including adjusting bedclothes, sheets and blankets)?: 3  Sitting down on and standing up from a chair with arms (e.g., wheelchair, bedside commode, etc.): 2  Moving from lying on back to sitting on the side of the bed?: 2  Moving to and from a bed to a chair (including a wheelchair)?: 2  Need to walk in hospital room?: 2  Climbing 3-5 steps with a railing?: 1  Total Score: 12     AM-PAC Raw Score CMS G-Code Modifier Level of Impairment Assistance   6 % Total / Unable   7 - 9 CM 80 - 100% Maximal Assist   10 - 14 CL 60 - 80% Moderate Assist   15 - 19 CK 40 - 60% Moderate Assist   20 - 22 CJ 20 - 40% Minimal Assist   23 CI 1-20% SBA / CGA   24 CH 0% Independent/ Mod I     Patient left supine with all lines intact, call button in reach, RN notified and spouse present.    Assessment:   Rajan Cooper III is a 60 yo male admitted to [facility] on 3/4 for (L) wrist I&D 2* staph. Was being followed by PT but discharged on 3/10 when patient returned to OR for (R) ankle I&D, arthrotomy. Spoke with MD Casale this AM and clarified that patient is WBAT on (L)UE and (R)LE. Pt motivated for therapy session. Requires mod (A) to stand pivot to/from BS commode. Demos urinary incontinence, pt stating due to previous MS diagnosis. Gave HEP working on (R) ankle and (L) wrist AROM as well as additional LE HEP. Will need SNF admit upon discharge. Will progress mobility as tolerated.    Rehab identified problem list/impairments: weakness, impaired endurance, impaired sensation, impaired self care skills, impaired functional mobilty, gait instability, impaired balance, decreased upper extremity function, decreased lower extremity function, pain, decreased ROM,  impaired fine motor, edema, impaired cardiopulmonary response to activity, orthopedic precautions    Rehab potential is good.    Activity tolerance: Good    Discharge recommendations: nursing facility, skilled     Barriers to discharge: None    Equipment recommendations: bedside commode, walker, rolling, wheelchair, bath bench     GOALS:   Physical Therapy Goals        Problem: Physical Therapy Goal    Goal Priority Disciplines Outcome Goal Variances Interventions   Physical Therapy Goal     PT/OT, PT Ongoing (interventions implemented as appropriate)     Description:  Goals to be met by: 3/18/17     Patient will increase functional independence with mobility by performin. Supine to sit with SBA - Not met  2. Sit to supine with SBA - Not met  3. Sit to stand transfer with CGA using appropriate AD - Not met  4. Bed to chair transfer with Minimal Assistance using appropriate AD - Not met  5. Gait  x 20 feet with Minimal Assistance using appropriate AD - Not met  6. Lower extremity exercise program x20-30 reps per handout, with independence - Not met               PLAN:    Patient to be seen daily to address the above listed problems via gait training, therapeutic activities, therapeutic exercises, neuromuscular re-education     Plan of Care expires: 04/10/17  Plan of Care reviewed with: patient, spouse    Venkat Varela, PT  3/11/2017     Pt reports wife and son as reasons for living.

## 2024-09-10 ENCOUNTER — CLINICAL SUPPORT (OUTPATIENT)
Dept: AUDIOLOGY | Facility: CLINIC | Age: 69
End: 2024-09-10
Payer: MEDICARE

## 2024-09-10 ENCOUNTER — OFFICE VISIT (OUTPATIENT)
Dept: OTOLARYNGOLOGY | Facility: CLINIC | Age: 69
End: 2024-09-10
Payer: MEDICARE

## 2024-09-10 VITALS — SYSTOLIC BLOOD PRESSURE: 155 MMHG | DIASTOLIC BLOOD PRESSURE: 75 MMHG | HEART RATE: 66 BPM

## 2024-09-10 DIAGNOSIS — H93.13 TINNITUS OF BOTH EARS: ICD-10-CM

## 2024-09-10 DIAGNOSIS — H90.3 SENSORINEURAL HEARING LOSS, BILATERAL: Primary | ICD-10-CM

## 2024-09-10 DIAGNOSIS — H90.3 SENSORINEURAL HEARING LOSS (SNHL) OF BOTH EARS: Primary | ICD-10-CM

## 2024-09-10 PROCEDURE — 3288F FALL RISK ASSESSMENT DOCD: CPT | Mod: CPTII,S$GLB,, | Performed by: PHYSICIAN ASSISTANT

## 2024-09-10 PROCEDURE — 1101F PT FALLS ASSESS-DOCD LE1/YR: CPT | Mod: CPTII,S$GLB,, | Performed by: PHYSICIAN ASSISTANT

## 2024-09-10 PROCEDURE — 99999 PR PBB SHADOW E&M-EST. PATIENT-LVL II: CPT | Mod: PBBFAC,,, | Performed by: PHYSICIAN ASSISTANT

## 2024-09-10 PROCEDURE — 3078F DIAST BP <80 MM HG: CPT | Mod: CPTII,S$GLB,, | Performed by: PHYSICIAN ASSISTANT

## 2024-09-10 PROCEDURE — 99213 OFFICE O/P EST LOW 20 MIN: CPT | Mod: S$GLB,,, | Performed by: PHYSICIAN ASSISTANT

## 2024-09-10 PROCEDURE — 3077F SYST BP >= 140 MM HG: CPT | Mod: CPTII,S$GLB,, | Performed by: PHYSICIAN ASSISTANT

## 2024-09-10 PROCEDURE — 1159F MED LIST DOCD IN RCRD: CPT | Mod: CPTII,S$GLB,, | Performed by: PHYSICIAN ASSISTANT

## 2024-09-10 PROCEDURE — 92557 COMPREHENSIVE HEARING TEST: CPT | Mod: S$GLB,,, | Performed by: AUDIOLOGIST

## 2024-09-10 PROCEDURE — 4010F ACE/ARB THERAPY RXD/TAKEN: CPT | Mod: CPTII,S$GLB,, | Performed by: PHYSICIAN ASSISTANT

## 2024-09-10 PROCEDURE — 92567 TYMPANOMETRY: CPT | Mod: S$GLB,,, | Performed by: AUDIOLOGIST

## 2024-09-10 PROCEDURE — 1126F AMNT PAIN NOTED NONE PRSNT: CPT | Mod: CPTII,S$GLB,, | Performed by: PHYSICIAN ASSISTANT

## 2024-09-10 NOTE — PROGRESS NOTES
Rajan Cooper III was seen today in the clinic for an audiologic evaluation.  Patient's main complaint was decreased hearing in both ears that has been present for several years.  Mr. Cooper reported he has a history of working in noise for many years with occasional use of hearing protection. He reported that he has non-bothersome tinnitus that has been present for many years as well. He has considered hearing aids in the past but he was deterred due to price. He denied any vertigo but ambulates with the assistance of a walker.    Otoscopy revealed clear EAC's with visible TM's in both ears.    Tympanometry revealed Type A in the right ear and Type Ad in the left ear.     Audiogram results revealed a mild to severe sensorineural hearing loss (SNHL) in the right ear and a mild to profound SNHL in the left ear. An asymmetry is noted at multiple frequencies.    Speech reception thresholds were noted at 30 dB in the right ear and 40 dB in the left ear.    Speech discrimination scores were 48% in the right ear and 32% in the left ear.  Binaural speech discrimination = 68% at 80 dB    Results were reviewed with patient following testing. Binaural hearing aids were recommended upon medical clearance. He is scheduled to see ENT following today's visit. Mr. Cooper was counseled on challenges with poor speech discrimination and hearing aid success as well as implications for cochlear implants. He was advised to verify any insurance benefit; Mr. Cooper elected to schedule a hearing aid consultation with [facility] at the end of today's visit.     Recommendations:  Otologic evaluation with ENT  Annual audiogram  Hearing protection when in noise  Hearing aid consultation upon medical clearance

## 2024-09-10 NOTE — PROGRESS NOTES
Subjective:     Rajan Cooper III is a 68 y.o. male with MS who was self-referred for hearing loss.    Patient reports progressive hearing loss over the last several years with associated tinnitus.  Patient denies any ear pain, ear drainage.  Patient recently retired and was interested in improving his hearing especially with his wife.    There is not a family history of hearing loss at a young age.  There is not a prior history of ear surgery .  There is not a prior history of ear infections .  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  He does not wear hearing aids currently.  He does not have a previous audiogram on file.  He reports a remote hearing test that showed hearing loss.  He completed an audiogram prior to this appointment.     Past Medical/Past Surgical History  Past Medical History:   Diagnosis Date    Hypertension     Hypothyroidism 12/5/2023    MS (multiple sclerosis)     Staph aureus infection 2017     He has a past surgical history that includes Posterior fusion of cervical spine with laminectomy (N/A, 4/14/2023) and Colonoscopy (N/A, 4/8/2024).    Family History/Social History  His family history includes Cancer in his father; Diabetes in his mother; No Known Problems in his brother, maternal aunt, maternal grandfather, maternal grandmother, maternal uncle, paternal aunt, paternal grandfather, paternal grandmother, paternal uncle, and sister; Pancreatic cancer in his father.  He reports that he has never smoked. He has never used smokeless tobacco. He reports that he does not drink alcohol and does not use drugs.    Allergies/Immunizations  He is allergic to norco [hydrocodone-acetaminophen], cleocin [clindamycin hcl], and bactrim [sulfamethoxazole-trimethoprim].  Immunization History   Administered Date(s) Administered    COVID-19, MRNA, LN-S, PF (Pfizer) (Purple Cap) 03/03/2021, 03/26/2021, 10/11/2021    Influenza (FLUAD) - Quadrivalent - Adjuvanted - PF  *Preferred* (65+) 09/03/2023    Influenza - Quadrivalent - MDCK - PF 09/13/2017, 09/23/2018    Influenza - Quadrivalent - PF *Preferred* (6 months and older) 11/18/2019    Influenza Split 11/01/2010    Tdap 11/18/2019, 01/12/2022        Medications   amLODIPine  baclofen  cholecalciferol (vitamin D3) Tab  furosemide  levothyroxine  losartan  modafiniL Tab  naproxen sodium  teriflunomide Tab     Review of Systems     Constitutional: Negative for appetite change, chills, fatigue, fever and unexpected weight loss.      HENT: Positive for hearing loss.      Eyes:  Negative for change in eyesight, eye drainage, eye itching and photophobia.     Respiratory:  Positive for snoring.      Cardiovascular:  Negative for chest pain, foot swelling, irregular heartbeat and swollen veins.     Gastrointestinal:  Negative for abdominal pain, acid reflux, constipation, diarrhea, heartburn and vomiting.     Genitourinary: Negative for difficulty urinating, sexual problems and frequent urination.     Musc: Positive for aching muscles.     Skin: Positive for rash.     Allergy: Negative for food allergies and seasonal allergies.     Endocrine: Negative for cold intolerance and heat intolerance.      Neurological: Negative for dizziness, headaches, light-headedness, seizures and tremors.      Hematologic: Positive for bruises/bleeds easily.     Psychiatric: Negative for decreased concentration, depression, nervous/anxious and sleep disturbance.           Objective:     BP (!) 155/75 (BP Location: Right arm, Patient Position: Sitting, BP Method: Large (Automatic))   Pulse 66      Physical Exam  Vitals reviewed.   Constitutional:       Appearance: Normal appearance.   HENT:      Head: Normocephalic and atraumatic.      Right Ear: Tympanic membrane, ear canal and external ear normal.      Left Ear: Tympanic membrane, ear canal and external ear normal.      Nose: No septal deviation, mucosal edema or rhinorrhea.      Right Nostril: No  epistaxis.      Left Nostril: No epistaxis.      Right Turbinates: Enlarged.      Left Turbinates: Enlarged.      Right Sinus: No maxillary sinus tenderness or frontal sinus tenderness.      Left Sinus: No maxillary sinus tenderness or frontal sinus tenderness.      Mouth/Throat:      Lips: Pink.      Mouth: Mucous membranes are moist.      Dentition: Does not have dentures. No gingival swelling.      Tongue: No lesions. Tongue does not deviate from midline.      Palate: No mass and lesions.      Pharynx: Oropharynx is clear. Uvula midline. No uvula swelling.      Tonsils: No tonsillar exudate or tonsillar abscesses.   Eyes:      Extraocular Movements: Extraocular movements intact.      Conjunctiva/sclera: Conjunctivae normal.   Musculoskeletal:      Cervical back: No tenderness.   Lymphadenopathy:      Cervical: No cervical adenopathy.   Neurological:      Mental Status: He is alert.   Psychiatric:         Mood and Affect: Mood normal.         Behavior: Behavior normal.           Procedure    None    Data Reviewed    I reviewed the following notes Internal Medicine     WBC (K/uL)   Date Value   08/12/2024 5.04     Platelets (K/uL)   Date Value   08/12/2024 170      Creatinine (mg/dL)   Date Value   06/11/2024 1.0   06/11/2024 1.0     TSH (uIU/mL)   Date Value   06/11/2024 3.582     Glucose (mg/dL)   Date Value   06/11/2024 87   06/11/2024 87     Hemoglobin A1C (%)   Date Value   08/14/2023 5.9 (H)       I independently reviewed the tracings of the complete audiometric evaluation performed today.  I reviewed the audiogram with the patient as well.  Pertinent findings include AD mild SNHL sloping to severe and AS mild SNHL sloping to profound severe..     Assessment & Plan:     1. Sensorineural hearing loss (SNHL) of both ears    Audiometric testing interpretation consistent with sensorineural hearing loss.  Discussed the etiology of SNHL.  Medically cleared for hearing amplification, and will follow-up with  Audiology if interested.  Hearing conservation in noisy environments.  Return to clinic every year for audiometric testing.     He will follow up as needed  I had a discussion with the patient regarding his condition and the further workup and management options.    All questions were answered, and the patient is in agreement with the above.

## 2024-09-17 ENCOUNTER — CLINICAL SUPPORT (OUTPATIENT)
Dept: AUDIOLOGY | Facility: CLINIC | Age: 69
End: 2024-09-17
Payer: MEDICARE

## 2024-09-17 DIAGNOSIS — H90.3 SENSORINEURAL HEARING LOSS, BILATERAL: Primary | ICD-10-CM

## 2024-09-17 NOTE — PROGRESS NOTES
HEARING AID CONSULTATION    Rajan Cooper III was seen today for a hearing aid consultation. We reviewed his hearing test results and discussed different levels of technology of hearing aids. He was accompanied by his wife, Dorothy. They discussed how often they travel, and Mr. Cooper noted he loves to work outside and constantly is moving. Mr. Cooper was informed of all pricing and service options available through [facility] ([facility]). He was also advised to verify what, if any, discounts or benefits are available with his insurance. Mr. Cooper understood that [employer] is not in network with any insurance payor.     Mr. Cooper was informed of the non-refundable $250.00 deposit required to order and that the remaining balance is due the day of .     Phonak TrackingPointeo K90-Vpyiyn hearing aids were selected in graphite gray. Impressions will be taken tomorrow due to time constraints.

## 2024-09-18 ENCOUNTER — CLINICAL SUPPORT (OUTPATIENT)
Dept: AUDIOLOGY | Facility: CLINIC | Age: 69
End: 2024-09-18
Payer: MEDICARE

## 2024-09-18 DIAGNOSIS — H90.3 SENSORINEURAL HEARING LOSS, BILATERAL: Primary | ICD-10-CM

## 2024-09-18 PROCEDURE — 99499 UNLISTED E&M SERVICE: CPT | Mod: S$GLB,,, | Performed by: AUDIOLOGIST

## 2024-09-18 NOTE — PROGRESS NOTES
Patient arrived in clinic to have impressions taken for custom molds on his new hearing aids. Impressions were taken without incident in both ears. Mr. Cooper measured for a length 1  in both ears. He will be contacted upon arrival of the hearing aids and molds from the .

## 2024-09-30 ENCOUNTER — DOCUMENTATION ONLY (OUTPATIENT)
Dept: AUDIOLOGY | Facility: CLINIC | Age: 69
End: 2024-09-30
Payer: MEDICARE

## 2024-09-30 NOTE — PROGRESS NOTES
Hearing aids arrived from ; devices were assembled and charged in preparation for fitting. Patient is scheduled for a fitting on 10/1/2024.    Hearing Aid Information  Service Type: Itemized  Service End Date: 12/30/24  Trial Period End Date: 10/31/24   and Model: [employer] Telesofia Medicaleo [id number]  Color: Graphite Gray  Right SN: [id number]  Left SN: [id number]  Battery: Li-Ion Rechargeable 312  Right  and Dome: 1M; cShell clear, [id number] ACC: 366 304  Left  and Dome: 1M; cShell clear, [id number] ACC: 366 304  Repair Warranty Expiration Date: 10/19/27  L&D Warranty Expiration Date: 10/19/27   SN: [id number]  Additional Information: cShell warranty exp: 01/20/2025, TV Streamer: [id number] (exp: 10/19/2025)

## 2024-10-01 ENCOUNTER — CLINICAL SUPPORT (OUTPATIENT)
Dept: AUDIOLOGY | Facility: CLINIC | Age: 69
End: 2024-10-01
Payer: MEDICARE

## 2024-10-01 DIAGNOSIS — H90.3 SENSORINEURAL HEARING LOSS, BILATERAL: Primary | ICD-10-CM

## 2024-10-01 NOTE — PROGRESS NOTES
HEARING AID FITTING    Rajan Cooper III was seen today for a hearing aid fitting.  All parts of the hearing aid, including battery, domes, wax filters, and microphones, were discussed with the patient.  Battery charging was also reviewed and practiced with the patient.  Feedback measures were taken and hearing aid was fit to patient's satisfaction. Real ear was attempted but equipment was malfunctioning. Counseled patient on daily wear and maintenance of the hearing aid.  Mr. Cooper acknowledged that he understood.  The hearing aids were connected to the patient's phone.  Bluetooth settings were tested successfully in office.  The patient's accessories were paired to the hearing aids successfully and reviewed use in office.  The purchase agreement, 30-day trial period, and warranties were also reviewed with patient.  It is recommended Mr. Cooper return to clinic in 2 weeks or as needed.    Hearing Aid Information  Service Type: Itemized  Service End Date: 12/30/24  Trial Period End Date: 10/31/24   and Model: Bonafideeo [id number]  Color: Graphite Gray  Right SN: [id number]  Left SN: [id number]  Battery: Li-Ion Rechargeable 312  Right  and Dome: 1M; cShell clear, [id number] ACC: 366 304  Left  and Dome: 1M; cShell clear, [id number] ACC: 366 304  Repair Warranty Expiration Date: 10/19/27  L&D Warranty Expiration Date: 10/19/27   SN: [id number]  Additional Information: cShell warranty exp: 01/20/2025, TV Streamer: [id number] (exp: 10/19/2025)

## 2024-10-16 ENCOUNTER — PATIENT MESSAGE (OUTPATIENT)
Dept: ADMINISTRATIVE | Facility: HOSPITAL | Age: 69
End: 2024-10-16
Payer: MEDICARE

## 2024-10-16 DIAGNOSIS — G35 MS (MULTIPLE SCLEROSIS): ICD-10-CM

## 2024-10-16 DIAGNOSIS — M62.838 MUSCLE SPASM: ICD-10-CM

## 2024-10-16 RX ORDER — LOSARTAN POTASSIUM 50 MG/1
50 TABLET ORAL
Qty: 90 TABLET | Refills: 0 | Status: SHIPPED | OUTPATIENT
Start: 2024-10-16

## 2024-10-16 RX ORDER — BACLOFEN 10 MG/1
TABLET ORAL
Qty: 360 TABLET | Refills: 3 | Status: SHIPPED | OUTPATIENT
Start: 2024-10-16

## 2024-10-16 RX ORDER — FUROSEMIDE 40 MG/1
40 TABLET ORAL 2 TIMES DAILY
Qty: 180 TABLET | Refills: 0 | Status: SHIPPED | OUTPATIENT
Start: 2024-10-16

## 2024-10-16 RX ORDER — AMLODIPINE BESYLATE 5 MG/1
5 TABLET ORAL
Qty: 90 TABLET | Refills: 0 | Status: SHIPPED | OUTPATIENT
Start: 2024-10-16

## 2024-10-16 RX ORDER — ERGOCALCIFEROL 1.25 MG/1
CAPSULE ORAL
Qty: 12 CAPSULE | Refills: 3 | Status: SHIPPED | OUTPATIENT
Start: 2024-10-16

## 2024-10-16 RX ORDER — TAMSULOSIN HYDROCHLORIDE 0.4 MG/1
1 CAPSULE ORAL NIGHTLY
Qty: 90 CAPSULE | Refills: 0 | OUTPATIENT
Start: 2024-10-16

## 2024-10-16 NOTE — TELEPHONE ENCOUNTER
Care Due:                  Date            Visit Type   Department     Provider  --------------------------------------------------------------------------------                                MYCHART                              FOLLOWUP/OF  [facility] INTERNAL  Last Visit: 08-      [facility]ICE VISIT   MEDICINE       Tom Garcia  Next Visit: None Scheduled  None         None Found                                                            Last  Test          Frequency    Reason                     Performed    Due Date  --------------------------------------------------------------------------------    Office Visit  15 months..  amLODIPine, furosemide,    08- 11-                             levothyroxine, losartan..    [facility] Embedded Care Due Messages. Reference number: [id number].   10/16/2024 2:26:37 AM CDT

## 2024-10-16 NOTE — TELEPHONE ENCOUNTER
Refill Routing Note   Medication(s) are not appropriate for processing by [facility] for the following reason(s):        Required vitals abnormal  Outside of protocol    [facility] action(s):  Defer  Quick Discontinue  Route   Requires appointment : Yes      Medication Therapy Plan: TAMSULOSIN 0.4MG WAS DISCONTINUED ON 04/30/24      Appointments  past 12m or future 3m with PCP    Date Provider   Last Visit   8/14/2023 Tom Garcia MD   Next Visit   Visit date not found Tom Garcia MD   ED visits in past 90 days: 0        Note composed:11:10 AM 10/16/2024

## 2024-10-22 ENCOUNTER — TELEPHONE (OUTPATIENT)
Dept: INTERNAL MEDICINE | Facility: CLINIC | Age: 69
End: 2024-10-22
Payer: MEDICARE

## 2024-10-22 NOTE — TELEPHONE ENCOUNTER
----- Message from Aleena sent at 10/22/2024 10:55 AM CDT -----  Contact: [phone]  Patient called, requested a call back ASAP about why his medication were cancel/denied. Will need to be order again.

## 2024-10-24 ENCOUNTER — CLINICAL SUPPORT (OUTPATIENT)
Dept: AUDIOLOGY | Facility: CLINIC | Age: 69
End: 2024-10-24
Payer: MEDICARE

## 2024-10-24 DIAGNOSIS — H90.3 SENSORINEURAL HEARING LOSS, BILATERAL: Primary | ICD-10-CM

## 2024-10-24 PROCEDURE — 99499 UNLISTED E&M SERVICE: CPT | Mod: S$GLB,,, | Performed by: AUDIOLOGIST

## 2024-10-24 NOTE — PROGRESS NOTES
HEARING AID FOLLOW-UP    Rajan Cooper III was seen today for a hearing aid follow-up visit.     Patient was fit with hearing aids on 10/1/2024. Data logging revealed 10+ hours of daily wear time. Automatic programs were all used a decent amount, including Spheric Loud Noise. Mr. Cooper reported that he has very few complaints and has been able to wear his hearing aids comfortably. He noted that he turns the volume down slightly when things get too loud; his adaptations were applied to the fitting.     Real ear was attempted again but targets would not appear on the screen. No obvious peaks for valleys were visualized in the recordings. Will try again at his next follow-up upon troubleshooting with  of equipment.     Mr. Cooper is scheduled in December prior to the end of his included service. He will call if adjustments are needed sooner.

## 2024-11-12 ENCOUNTER — LAB VISIT (OUTPATIENT)
Dept: LAB | Facility: HOSPITAL | Age: 69
End: 2024-11-12
Attending: PSYCHIATRY & NEUROLOGY
Payer: MEDICARE

## 2024-11-12 DIAGNOSIS — Z29.89 IMMUNOTHERAPY: ICD-10-CM

## 2024-11-12 DIAGNOSIS — G35 MS (MULTIPLE SCLEROSIS): ICD-10-CM

## 2024-11-12 LAB
ALBUMIN SERPL BCP-MCNC: 3.4 G/DL (ref 3.5–5.2)
ALP SERPL-CCNC: 69 U/L (ref 40–150)
ALT SERPL W/O P-5'-P-CCNC: 25 U/L (ref 10–44)
AST SERPL-CCNC: 22 U/L (ref 10–40)
BASOPHILS # BLD AUTO: 0.04 K/UL (ref 0–0.2)
BASOPHILS NFR BLD: 0.6 % (ref 0–1.9)
BILIRUB DIRECT SERPL-MCNC: 0.2 MG/DL (ref 0.1–0.3)
BILIRUB SERPL-MCNC: 0.4 MG/DL (ref 0.1–1)
DIFFERENTIAL METHOD BLD: ABNORMAL
EOSINOPHIL # BLD AUTO: 0.5 K/UL (ref 0–0.5)
EOSINOPHIL NFR BLD: 7.7 % (ref 0–8)
ERYTHROCYTE [DISTWIDTH] IN BLOOD BY AUTOMATED COUNT: 14.6 % (ref 11.5–14.5)
HCT VFR BLD AUTO: 43 % (ref 40–54)
HGB BLD-MCNC: 13.4 G/DL (ref 14–18)
IMM GRANULOCYTES # BLD AUTO: 0.02 K/UL (ref 0–0.04)
IMM GRANULOCYTES NFR BLD AUTO: 0.3 % (ref 0–0.5)
LYMPHOCYTES # BLD AUTO: 1.3 K/UL (ref 1–4.8)
LYMPHOCYTES NFR BLD: 19.1 % (ref 18–48)
MCH RBC QN AUTO: 30.2 PG (ref 27–31)
MCHC RBC AUTO-ENTMCNC: 31.2 G/DL (ref 32–36)
MCV RBC AUTO: 97 FL (ref 82–98)
MONOCYTES # BLD AUTO: 1 K/UL (ref 0.3–1)
MONOCYTES NFR BLD: 14.6 % (ref 4–15)
NEUTROPHILS # BLD AUTO: 3.8 K/UL (ref 1.8–7.7)
NEUTROPHILS NFR BLD: 57.7 % (ref 38–73)
NRBC BLD-RTO: 0 /100 WBC
PLATELET # BLD AUTO: 167 K/UL (ref 150–450)
PMV BLD AUTO: 12 FL (ref 9.2–12.9)
PROT SERPL-MCNC: 6.8 G/DL (ref 6–8.4)
RBC # BLD AUTO: 4.43 M/UL (ref 4.6–6.2)
WBC # BLD AUTO: 6.65 K/UL (ref 3.9–12.7)

## 2024-11-12 PROCEDURE — 85025 COMPLETE CBC W/AUTO DIFF WBC: CPT | Mod: HCNC | Performed by: PSYCHIATRY & NEUROLOGY

## 2024-11-12 PROCEDURE — 36415 COLL VENOUS BLD VENIPUNCTURE: CPT | Mod: HCNC | Performed by: PSYCHIATRY & NEUROLOGY

## 2024-11-12 PROCEDURE — 80076 HEPATIC FUNCTION PANEL: CPT | Mod: HCNC | Performed by: PSYCHIATRY & NEUROLOGY

## 2024-11-21 DIAGNOSIS — G35 MS (MULTIPLE SCLEROSIS): ICD-10-CM

## 2024-11-22 RX ORDER — TERIFLUNOMIDE 14 MG/1
14 TABLET, FILM COATED ORAL DAILY
Qty: 90 TABLET | Refills: 0 | Status: ACTIVE | OUTPATIENT
Start: 2024-11-22 | End: 2025-11-22

## 2024-12-16 ENCOUNTER — PATIENT MESSAGE (OUTPATIENT)
Dept: PSYCHIATRY | Facility: CLINIC | Age: 69
End: 2024-12-16
Payer: MEDICARE

## 2024-12-17 ENCOUNTER — CLINICAL SUPPORT (OUTPATIENT)
Dept: AUDIOLOGY | Facility: CLINIC | Age: 69
End: 2024-12-17
Payer: MEDICARE

## 2024-12-17 DIAGNOSIS — H90.3 SENSORINEURAL HEARING LOSS, BILATERAL: Primary | ICD-10-CM

## 2024-12-17 PROCEDURE — 99499 UNLISTED E&M SERVICE: CPT | Mod: S$GLB,,, | Performed by: AUDIOLOGIST

## 2024-12-17 NOTE — PROGRESS NOTES
"HEARING AID FOLLOW-UP    Rajan Cooper III was seen today for a hearing aid follow-up visit.     Otoscopy revealed clear EAC's with visible TM's. Real ear was completed; it was determined that the "no response" bone scores were preventing real ear targets from generating. Left tubing was replaced and re-inserted multiple times without any success. All gain was increased two clicks in both ears. Mr. Cooper reported that when he is in noisy environments (with a lot of family in the house specifically), the noise is too much. Noise suppression was increased slightly; Spheric noise program kneepoint was lowered. The strength of the spheric speech in noise was increased from a 5 to a 6.     Mr. Cooper noted that he is overall happy with his hearing aids and wears them all the time. Data logging revealed long wear times with 30% plus of his time in noise.     It was recommended Mr. Cooper return for adjustments as needed.           "

## 2024-12-29 DIAGNOSIS — E03.9 HYPOTHYROIDISM, UNSPECIFIED TYPE: ICD-10-CM

## 2024-12-29 NOTE — TELEPHONE ENCOUNTER
Care Due:                  Date            Visit Type   Department     Provider  --------------------------------------------------------------------------------                                MYCHART                              FOLLOWUP/OF  [facility] INTERNAL  Last Visit: 08-      FICE VISIT   MEDICINE       Tom Garcia  Next Visit: None Scheduled  None         None Found                                                            Last  Test          Frequency    Reason                     Performed    Due Date  --------------------------------------------------------------------------------    Office Visit  12 months..  amLODIPine,                08- 08-                             ergocalciferol,                             furosemide,                             levothyroxine, losartan..    Vitamin D...  12 months..  ergocalciferol...........  01- 01-    [employer] Embedded Care Due Messages. Reference number: [id number].   12/29/2024 12:53:10 PM CST

## 2024-12-30 RX ORDER — AMLODIPINE BESYLATE 5 MG/1
5 TABLET ORAL
Qty: 90 TABLET | Refills: 0 | Status: SHIPPED | OUTPATIENT
Start: 2024-12-30

## 2024-12-30 RX ORDER — LOSARTAN POTASSIUM 50 MG/1
50 TABLET ORAL
Qty: 90 TABLET | Refills: 0 | Status: SHIPPED | OUTPATIENT
Start: 2024-12-30

## 2024-12-30 RX ORDER — LEVOTHYROXINE SODIUM 50 UG/1
50 TABLET ORAL
Qty: 90 TABLET | Refills: 0 | Status: SHIPPED | OUTPATIENT
Start: 2024-12-30

## 2024-12-30 RX ORDER — FUROSEMIDE 40 MG/1
40 TABLET ORAL 2 TIMES DAILY
Qty: 180 TABLET | Refills: 0 | Status: SHIPPED | OUTPATIENT
Start: 2024-12-30

## 2024-12-30 NOTE — TELEPHONE ENCOUNTER
Refill Routing Note   Medication(s) are not appropriate for processing by [facility] for the following reason(s):      Patient not seen by provider within 15 months  Required vitals abnormal    [facility] action(s):  Defer Care Due:  Appointment due  Labs due            Appointments  past 12m or future 3m with PCP    Date Provider   Last Visit   8/14/2023 Tom Garcia MD   Next Visit   Visit date not found Tom Garcia MD   ED visits in past 90 days: 0        Note composed:12:50 PM 12/30/2024

## 2025-01-13 DIAGNOSIS — Z00.00 ENCOUNTER FOR MEDICARE ANNUAL WELLNESS EXAM: ICD-10-CM

## 2025-01-16 ENCOUNTER — OFFICE VISIT (OUTPATIENT)
Dept: NEUROLOGY | Facility: CLINIC | Age: 70
End: 2025-01-16
Payer: MEDICARE

## 2025-01-16 VITALS
SYSTOLIC BLOOD PRESSURE: 137 MMHG | HEIGHT: 73 IN | WEIGHT: 307.44 LBS | DIASTOLIC BLOOD PRESSURE: 82 MMHG | BODY MASS INDEX: 40.75 KG/M2 | HEART RATE: 76 BPM

## 2025-01-16 DIAGNOSIS — E53.8 B12 DEFICIENCY: ICD-10-CM

## 2025-01-16 DIAGNOSIS — E66.01 SEVERE OBESITY (BMI >= 40): ICD-10-CM

## 2025-01-16 DIAGNOSIS — G95.9 CERVICAL MYELOPATHY: ICD-10-CM

## 2025-01-16 DIAGNOSIS — G35 MS (MULTIPLE SCLEROSIS): Primary | ICD-10-CM

## 2025-01-16 PROCEDURE — G2211 COMPLEX E/M VISIT ADD ON: HCPCS | Mod: HCNC,S$GLB,,

## 2025-01-16 PROCEDURE — 1101F PT FALLS ASSESS-DOCD LE1/YR: CPT | Mod: HCNC,CPTII,S$GLB,

## 2025-01-16 PROCEDURE — 99999 PR PBB SHADOW E&M-EST. PATIENT-LVL IV: CPT | Mod: PBBFAC,HCNC,,

## 2025-01-16 PROCEDURE — 3008F BODY MASS INDEX DOCD: CPT | Mod: HCNC,CPTII,S$GLB,

## 2025-01-16 PROCEDURE — 3075F SYST BP GE 130 - 139MM HG: CPT | Mod: HCNC,CPTII,S$GLB,

## 2025-01-16 PROCEDURE — 1160F RVW MEDS BY RX/DR IN RCRD: CPT | Mod: HCNC,CPTII,S$GLB,

## 2025-01-16 PROCEDURE — 3079F DIAST BP 80-89 MM HG: CPT | Mod: HCNC,CPTII,S$GLB,

## 2025-01-16 PROCEDURE — 1159F MED LIST DOCD IN RCRD: CPT | Mod: HCNC,CPTII,S$GLB,

## 2025-01-16 PROCEDURE — 3288F FALL RISK ASSESSMENT DOCD: CPT | Mod: HCNC,CPTII,S$GLB,

## 2025-01-16 PROCEDURE — 99215 OFFICE O/P EST HI 40 MIN: CPT | Mod: HCNC,S$GLB,,

## 2025-01-16 NOTE — PROGRESS NOTES
"Patient ID: Rajan Cooper III is a 69 y.o. male who presents today for a routine clinic visit for MS.  He was last seen by Dr. Das on 4/30/2024.  The history was provided by the patient.     Principle neurological diagnosis: MS   Date of symptom onset: 1993  Date of diagnosis: 1993  Disease type at diagnosis: RR  Disease type currently: SPMS  Previous therapy: Avonex [id number] (cost)  Current therapy: teriflunomide 5/2024 - present    Vitamin D Dose: 50,000 IU weekly   Last MRI Brain: 3/13/2023 - stable  Last MRI C-spine: 3/13/2023 - stable   Last MRI T-spine: NA  CSF: NA  JCV status: NA  Other relevant labs and tests: 1/30/2023: vitamin D - 12     Subjective:     He is having a consistent nerve pain down his lower legs.  It is happening at night mainly.  He does have fluid in his legs at night that he takes Lasix for.     He did ask about infusion medication because he has heard some great things about it.     He is now interested in restarting PT.     He is feeling stable for the most and is tolerating teriflunomide well.     ROS:      1/14/2025    11:18 AM   REVIEW OF SYMPTOMS   Do you feel abnormally tired on most days? No    Do you feel you generally sleep well? No  Legs are hurting    Do you have difficulty controlling your bladder?  Yes     Do you have difficulty controlling your bowels?  No    Do you have frequent muscle cramps, tightness or spasms in your limbs?  Yes  Baclofen helps    Do you have new visual symptoms?  No    Do you have worsening difficulty with your memory or thinking? No    Do you have worsening symptoms of anxiety or depression?  No    For patients who walk, Do you have more difficulty walking?  Yes  "Goes and comes"    Have you fallen since your last visit?  No     For patients who use wheelchairs: Do you have any skin wounds or breakdown? No    Do you have difficulty using your hands?  No    Do you have shooting or burning pain? Yes    Do you have difficulty with sexual function?  " Yes    If you are sexually active, are you using birth control? Y/N  N/A Not Applicable    Do you often choke when swallowing liquids or solid food?  No    Do you experience worsening symptoms when overheated? Yes    Do you need any new equipment such as a wheelchair, walker or shower chair? No    Do you receive co-pay financial assistance for your principal MS medicine? No    Would you be interested in participating in an MS research trial in the future? No    For patients on Gilenya, Tecfidera, Aubagio, Rituxan, Ocrevus, Tysabri, Lemtrada or Methotrexate, are you aware that you should NOT receive live virus vaccines?  Discussed with patient    Do you feel you have adequate family/friend support?  Yes    Do you have health insurance?   Yes    Are you currently employed? No    Do you receive SSDI/SSI?  Yes    Do you use marijuana or cannabis products? No    Have you been diagnosed with a urinary tract infection since your last visit here? No    Have you been diagnosed with a respiratory tract infection since your last visit here? No    Have you been to the emergency room since your last visit here? No    Have you been hospitalized since your last visit here?  No        Patient-reported            4/29/2024     4:07 PM   FSS SCORE & INTERPRETATION   FSS SCORE  39   FSS SCORE INTERPRETATION May be suffering from fatigue         4/29/2024     4:05 PM   MS MARY-D SCORE & INTERPRETATION   MARY-D SCORE  4   MARY-D INTERPRETATION  No indication of Depression         4/29/2024     4:02 PM   MS MAGGY-7 SCORE & INTERPRETATION   MAGGY-7 SCORE  1   MAGGY-7 SCORE INTERPRETATION Normal         4/29/2024     4:09 PM   PEQ MS MOS PAIN EFFECTS SCORE & INTERPRETATION   PES SCORE 17   PES SCORE INTERPRETATION Scores can range from 6-30.  Items are scaled so that higher scores indicate a greater impact of pain on a patients mood and behavior.         4/29/2024     4:12 PM   PEQ MS SEXUAL SATISFACTION SCORE & INTERPRETATION   SSS SCORE  14    SSS SCORE INTERPRETATION Scores can range from 4-24.  Higher scores indicate greater problems with sexual satisfaction.         4/29/2024     4:14 PM   MS BLADDER CONTROL SCORE & INTERPRETATION   BLCS SCORE 12   BLCS SCORE INTERPRETATION  Scores can range from 0-22, with higher scores indicating greater bladder control problems.         4/29/2024     4:18 PM   MS BOWEL CONTROL SCORE & INTERPRETATION   BWCS SCORE 6   BWCS SCORE INTERPRETATION Scores can range from 0-26, with higher scores indicating greater bowel control problems.         4/29/2024     4:15 PM   PEQ MS IMPACT OF VISUAL IMPAIRMENT SCORE & INTERPRETATION   CATRACHO SCALE SCORE  0   CATRACHO SCORE INTERPRETATION Scores can range from 0-15, with higher scores indicating greater impact of visual problems on daily activites.         4/29/2024     4:17 PM   MS PDQ SCORE & INTERPRETATION   PDQ RETROSPECTIVE MEMORY SUBSCALE 3   PDQ ATTENTION/CONCENTRATION SUBSCALE 5   PDQ PROSPECTIVE MEMORY SUBSCALE 5   PDQ PLANNING/ORGANIZATION SUBSCALE 5   PDQ TOTAL SCORE 18   PDQ SCORE INTERPRETATION Scores can range from 0-80, with higher scores indicating greater perceived cognitive impairment.         4/29/2024     4:21 PM   MSSS SCORE & INTERPRETATION   MSSS TANGIBLE SUPPORT SUBSCALE 100   MSSS EMOTIONAL/INFORMATIONAL SUPPORT SUBSCALE 96.88   MSSS AFFECTIONATE SUPPORT SUBSCALE 91.67   MSSS POSITIVE SOCIAL INTERACTION SUBSCALE 100   MSSS TOTAL SCORE 97.14   MSSS SCORE INTERPRETATION Scores can range from 0-100, with higher scores indicating greater perceived support.        SOCIAL HISTORY  Living arrangements - the patient lives with their family.  Social History     Socioeconomic History    Marital status:    Tobacco Use    Smoking status: Never    Smokeless tobacco: Never   Substance and Sexual Activity    Alcohol use: No    Drug use: No     Social Drivers of Health     Financial Resource Strain: Low Risk  (4/29/2024)    Overall Financial Resource Strain (CARDIA)      Difficulty of Paying Living Expenses: Not hard at all   Food Insecurity: No Food Insecurity (4/29/2024)    Hunger Vital Sign     Worried About Running Out of Food in the Last Year: Never true     Ran Out of Food in the Last Year: Never true   Transportation Needs: No Transportation Needs (4/29/2024)    PRAPARE - Transportation     Lack of Transportation (Medical): No     Lack of Transportation (Non-Medical): No   Physical Activity: Unknown (4/29/2024)    Exercise Vital Sign     Days of Exercise per Week: 0 days   Stress: No Stress Concern Present (4/29/2024)    [employer] - Occupational Stress Questionnaire     Feeling of Stress : Not at all   Housing Stability: Unknown (4/15/2023)    Housing Stability Vital Sign     Unable to Pay for Housing in the Last Year: No     Unstable Housing in the Last Year: No       Current Outpatient Medications on File Prior to Visit   Medication Sig Dispense Refill    amLODIPine (NORVASC) 5 MG tablet TAKE 1 TABLET EVERY DAY 90 tablet 0    baclofen (LIORESAL) 10 MG tablet TAKE 2 TABLETS EVERY MORNING AND TAKE 2 TABLETS AT BEDTIME 360 tablet 3    cholecalciferol, vitamin D3, 1,250 mcg (50,000 unit) Tab Take 1 tablet by mouth once a week. 12 tablet 3    ergocalciferol (ERGOCALCIFEROL) 50,000 unit Cap TAKE 1 CAPSULE EVERY 7 DAYS 12 capsule 3    furosemide (LASIX) 40 MG tablet TAKE 1 TABLET TWICE DAILY 180 tablet 0    levothyroxine (SYNTHROID) 50 MCG tablet TAKE 1 TABLET BEFORE BREAKFAST 90 tablet 0    losartan (COZAAR) 50 MG tablet TAKE 1 TABLET EVERY DAY 90 tablet 0    modafiniL (PROVIGIL) 200 MG Tab Take 1 tablet (200 mg total) by mouth once daily. Take qam 400 tablet 3    naproxen sodium (ANAPROX) 220 MG tablet Take 220 mg by mouth 2 (two) times daily with meals.      teriflunomide (AUBAGIO) 14 mg Tab Take 1 tablet (14 mg) by mouth once daily. 90 tablet 0     No current facility-administered medications on file prior to visit.       Objective:     1. 25  foot timed walk:      4/30/2024     8:20 AM 1/16/2025     8:30 AM   Timed 25 Foot Walk:   Did patient wear an AFO? No No   Was assistive device used? Yes Yes   Assistive device used (rain one): Bilateral Assistance Bilateral Assistance   Bilateral device used Walker/Rollator Walker/Rollator   Time for 25 Foot Walk (seconds) 11.1 8.3   Time for 25 Foot Walk (seconds) 10.3 8.04           NEURO EXAM    In general, the patient is well nourished and appears to be in no acute distress.    MENTAL STATUS: language is fluent, normal verbal comprehension, short-term and remote memory is intact, attention is normal, patient is alert and oriented x 3, fund of knowlege is appropriate by vocabulary.     CRANIAL NERVE EXAM:  There is no internuclear ophthalmoplegia.  Extraocular muscles are intact. Pupils are equal, round, and reactive to light. No facial asymmetry. Facial sensation is intact bilaterally. There is no dysarthria. Uvula is midline, and palate moves symmetrically. Shoulder shrug intact bilaterally. Tongue protrusion is midline. Hearing is intact to finger rub bilaterally. Neck is supple with full ROM    MOTOR EXAM: Normal bulk and tone throughout UE and LE bilaterally.  Rapid sequential movements are normal with finger tap;    Strength:  R deltoid 5/5, L deltoid 5/5  R biceps 5/5, L biceps 5/5  R triceps 5/5, L triceps 5/5  R wrist flexors 5/5, L wrist flexors 5/5  R wrist extensors 5/5, L wrist extensors 5/5  R finger abductors 5/5, L finger abductors 5/5  R hip flexors 3/5, L hip flexors 4/5  R knee extensors 5/5, L knee extensors 5/5  R knee flexors 3/5, L knee flexors 4/5  R ankle dorsiflexors 4+/5, L ankle dorsiflexors 5/5  R ankle plantarflexors 3/5, L ankle plantarflexors 5/5    SENSORY EXAM: Normal to light touch throughout, except decreased sensation at bilateral lower legs     COORDINATION: Normal finger-to-nose exam    GAIT: Wide based and slight right hip circumduction rollator dependent     Imaging:  "    Personally reviewed.     Results for orders placed during the hospital encounter of 03/13/23    MRI Brain Demyelinating W W/O Contrast    Impression  Brain appears grossly stable from prior exams again demonstrating findings compatible with the reported history of multiple sclerosis.  No definite new or enhancing lesions to indicate ongoing or active demyelination.    Advanced cervical spondylosis, progressed since the prior exam.  This results in severe spinal stenosis and cord compression at C3-4 with some subtle cord signal changes at this level presumably from compressive myelopathy.    No new focal spinal cord lesions identified elsewhere.    This report was flagged in Epic as abnormal.      Electronically signed by: Sagar Santos MD  Date:    03/14/2023  Time:    08:22    Results for orders placed during the hospital encounter of 03/13/23    MRI Cervical Spine Demyelinating W W/O Contrast    Impression  Brain appears grossly stable from prior exams again demonstrating findings compatible with the reported history of multiple sclerosis.  No definite new or enhancing lesions to indicate ongoing or active demyelination.    Advanced cervical spondylosis, progressed since the prior exam.  This results in severe spinal stenosis and cord compression at C3-4 with some subtle cord signal changes at this level presumably from compressive myelopathy.    No new focal spinal cord lesions identified elsewhere.    This report was flagged in Epic as abnormal.      Electronically signed by: Sagar Santos MD  Date:    03/14/2023  Time:    08:22    No results found for this or any previous visit.        Labs:     Lab Results   Component Value Date    [id number] 13 (L) 01/30/2023     No results found for: "JCVINDEX", "JCVANTIBODY"  No results found for: "DS3GCQWE", "ABSOLUTECD3", "RO4IYQRP", "ABSOLUTECD8", "WA0EYIRE", "ABSOLUTECD4", "LABCD48"  Lab Results   Component Value Date    WBC 6.65 11/12/2024    RBC 4.43 (L) " 11/12/2024    HGB 13.4 (L) 11/12/2024    HCT 43.0 11/12/2024    MCV 97 11/12/2024    MCH 30.2 11/12/2024    MCHC 31.2 (L) 11/12/2024    RDW 14.6 (H) 11/12/2024     11/12/2024    MPV 12.0 11/12/2024    GRAN 3.8 11/12/2024    GRAN 57.7 11/12/2024    LYMPH 1.3 11/12/2024    LYMPH 19.1 11/12/2024    MONO 1.0 11/12/2024    MONO 14.6 11/12/2024    EOS 0.5 11/12/2024    BASO 0.04 11/12/2024    EOSINOPHIL 7.7 11/12/2024    BASOPHIL 0.6 11/12/2024     Sodium   Date Value Ref Range Status   06/11/2024 141 136 - 145 mmol/L Final   06/11/2024 141 136 - 145 mmol/L Final     Potassium   Date Value Ref Range Status   06/11/2024 4.0 3.5 - 5.1 mmol/L Final   06/11/2024 4.0 3.5 - 5.1 mmol/L Final     Chloride   Date Value Ref Range Status   06/11/2024 103 95 - 110 mmol/L Final   06/11/2024 103 95 - 110 mmol/L Final     CO2   Date Value Ref Range Status   06/11/2024 28 23 - 29 mmol/L Final   06/11/2024 28 23 - 29 mmol/L Final     Glucose   Date Value Ref Range Status   06/11/2024 87 70 - 110 mg/dL Final   06/11/2024 87 70 - 110 mg/dL Final     BUN   Date Value Ref Range Status   06/11/2024 25 (H) 8 - 23 mg/dL Final   06/11/2024 25 (H) 8 - 23 mg/dL Final     Creatinine   Date Value Ref Range Status   06/11/2024 1.0 0.5 - 1.4 mg/dL Final   06/11/2024 1.0 0.5 - 1.4 mg/dL Final     Calcium   Date Value Ref Range Status   06/11/2024 9.0 8.7 - 10.5 mg/dL Final   06/11/2024 9.0 8.7 - 10.5 mg/dL Final     Total Protein   Date Value Ref Range Status   11/12/2024 6.8 6.0 - 8.4 g/dL Final     Albumin   Date Value Ref Range Status   11/12/2024 3.4 (L) 3.5 - 5.2 g/dL Final     Total Bilirubin   Date Value Ref Range Status   11/12/2024 0.4 0.1 - 1.0 mg/dL Final     Comment:     For infants and newborns, interpretation of results should be based  on gestational age, weight and in agreement with clinical  observations.    Premature Infant recommended reference ranges:  Up to 24 hours.............<8.0 mg/dL  Up to 48 hours............<12.0  "mg/dL  3-5 days..................<15.0 mg/dL  6-29 days.................<15.0 mg/dL       Alkaline Phosphatase   Date Value Ref Range Status   11/12/2024 69 40 - 150 U/L Final     AST   Date Value Ref Range Status   11/12/2024 22 10 - 40 U/L Final     ALT   Date Value Ref Range Status   11/12/2024 25 10 - 44 U/L Final     Anion Gap   Date Value Ref Range Status   06/11/2024 10 8 - 16 mmol/L Final   06/11/2024 10 8 - 16 mmol/L Final     eGFR if    Date Value Ref Range Status   04/30/2021 >60.0 >60 mL/min/1.73 m^2 Final     eGFR if non    Date Value Ref Range Status   04/30/2021 >60.0 >60 mL/min/1.73 m^2 Final     Comment:     Calculation used to obtain the estimated glomerular filtration  rate (eGFR) is the CKD-EPI equation.        No results found for: "HEPBSAG", "HEPBSAB", "HEPBCAB"        MS Impression and Plan:     NEURO MULTIPLE SCLEROSIS IMPRESSION:   Number of relapses in the past year?:  0  Clinical Progression:  Improved  Clinical Progression comment:  Stable, but improved [id number]  MS Classification:  Secondarily Progressive MS  Current DMT: terifluomide  DMT:  No change in management  DMT comment:  He is aware of the risks associated with immunomodulating therapy, including increased risk of infection.   Symptom Management:  Implement change in symptom management  Implement Change in Symptom Management:  Pain and Gait  Implement Change in Symptom Management comment:   Nerve pain: try ALA - will consider nightly low dose gabapentin if needed - considering he has had this nerve pain in the past and he does have edema that could also be playing a factor in the pain, will monitor as teriflunomide may cause neuropathy as well.  Gait/overall conditioning: will refer to PT to help with maintenance/some improvement    Additional Impressions:   Patient is remaining clinically stable on teriflunomide, labs are stable.   Labs in February - will check B12 due to increased nerve " pain  Updated brain and c-spine MRIs w/o contrast.   Follow up as scheduled with Dr. Das in April.   Plan discussed and questions were answered to satisfaction.     Problem List Items Addressed This Visit       MS (multiple sclerosis) - Primary    Relevant Orders    Vitamin B12 Deficiency Panel    CBC Auto Differential    Hepatic Function Panel    Ambulatory referral/consult to Physical/Occupational Therapy    MRI Brain Demyelinating Without Contrast    MRI Cervical Spine Demyelinating Without Contrast    Severe obesity (BMI >= 40)     Managed by PCP         Cervical myelopathy     Other Visit Diagnoses       B12 deficiency        Relevant Orders    Vitamin B12 Deficiency Panel            I spent a total of 60 minutes on the day of the visit.This includes face to face time and non-face to face time preparing to see the patient (eg, review of tests), obtaining and/or reviewing separately obtained history, documenting clinical information in the electronic or other health record, independently interpreting results and communicating results to the patient/family/caregiver, or care coordinator.       Tegan Charles, LUKAS-C

## 2025-01-31 ENCOUNTER — CLINICAL SUPPORT (OUTPATIENT)
Dept: REHABILITATION | Facility: HOSPITAL | Age: 70
End: 2025-01-31
Payer: MEDICARE

## 2025-01-31 DIAGNOSIS — Z74.09 IMPAIRED FUNCTIONAL MOBILITY, BALANCE, GAIT, AND ENDURANCE: Primary | ICD-10-CM

## 2025-01-31 DIAGNOSIS — G35 MS (MULTIPLE SCLEROSIS): ICD-10-CM

## 2025-01-31 PROCEDURE — 97161 PT EVAL LOW COMPLEX 20 MIN: CPT | Mod: PO

## 2025-01-31 NOTE — PROGRESS NOTES
Outpatient Rehab    Physical Therapy Evaluation (only)    Patient Name: Rajan Cooper III  MRN: [id number]  YOB: 1955  Today's Date: 1/31/2025    Therapy Diagnosis:   Encounter Diagnoses   Name Primary?    MS (multiple sclerosis)     Impaired functional mobility, balance, gait, and endurance Yes     Physician: Tegan Charles NP    Physician Orders: Eval and Treat  Medical Diagnosis:   G35 (ICD-10-CM) - MS (multiple sclerosis)       Visit # / Visits Authorized:  01 / 01   Date of Evaluation:  1/31/2025   Insurance Authorization Period: 1/16/2025 to 1/16/2026  Plan of Care Certification:  1/31/2025 to 3/28/2025      Time In: 0800   Time Out: 0845  Total Time: 45   Total Billable Time: 45         Subjective   History of Present Illness  Rajan is a 69 y.o. male  According to the patient's chart, Rajan has a past medical history of Hypertension, Hypothyroidism, MS (multiple sclerosis), and Staph aureus infection. Rajan has a past surgical history that includes Posterior fusion of cervical spine with laminectomy (N/A, 4/14/2023) and Colonoscopy (N/A, 4/8/2024).    The patient reports a medical diagnosis of Multiple Sclerosis.            History of Present Condition/Illness: Pt has had multiple sclerosis for about 15-20 years. Said he first realized had MS when both of his lower legs went numb. He reports that he usually has patches of months where he is doing pretty good and then has an event that takes him out and takes him a while to recover his mobility. He wants to keep his mobility up. He uses his walker around the house because his balance is the worst. He is a big fan of working on cars and reports that it has been tough getting up and getting around the garage. Has had no falls in last 6 months    Pain     Patient reports a current pain level of 0/10. Pain at best is reported as 0/10. Pain at worst is reported as 8/10.   Location: BL calves on down  Clinical Progression (since onset):  Stable         Review of Systems  Patient reports: Lower Extremity Neurological Deficits        Living Arrangements  Living Situation  Housing: Home independently  Living Arrangements: Spouse/significant other        Employment  Employment Status: Retired          Past Medical History/Physical Systems Review:   Rajan Cooper III  has a past medical history of Hypertension, Hypothyroidism, MS (multiple sclerosis), and Staph aureus infection.    Rajan Cooper III  has a past surgical history that includes Posterior fusion of cervical spine with laminectomy (N/A, 4/14/2023) and Colonoscopy (N/A, 4/8/2024).    Rajan has a current medication list which includes the following prescription(s): amlodipine, baclofen, cholecalciferol (vitamin d3), ergocalciferol, furosemide, levothyroxine, losartan, modafinil, naproxen sodium, and teriflunomide.    Review of patient's allergies indicates:   Allergen Reactions    Norco [hydrocodone-acetaminophen] Anaphylaxis     Pt states getting shakey, and having halucinations    Cleocin [clindamycin hcl]     Bactrim [sulfamethoxazole-trimethoprim] Rash        Objective   Vision  Right Eye Functional Acuity: Intact  Reports one day about 10 years ago, he suddenly lost his color vision but recovered after two days              Hip Strength - Planes of Motion   Right Strength Right Pain Left Strength Left  Pain   Flexion (L2) 4-   4-     Extension 4-   4-     ABduction 4-   4-     ADduction 4-   4-     Internal Rotation 4-   4-     External Rotation 4-   4-         Knee Strength   Right Strength Right Pain Left Strength Left  Pain   Flexion (S2) 4-   4-     Prone Flexion           Extension (L3) 4+   4+            Ankle/Foot Strength - Planes of Motion   Right Strength Right Pain Left Strength Left  Pain   Dorsiflexion (L4)           Plantar Flexion (S1) 3+   4-     Inversion           Eversion           Great Toe Flexion           Great Toe Extension (L5)           Lesser Toes Flexion            Lesser Toes Extension                  Coordination  Coordination Tests  Intact: Left Finger to Nose and Left Rapid Alternating Movements (Digit Opposition)  Impaired: Right Finger to Nose and Right Rapid Alternating Movements (Digit Opposition)                   Fall Risk  Functional mobility test results suggest the patient is: At Risk for Falls  Williamson Balance  Williamson Balance Scale  1. Sitting to Standing: Able to stand independently using hands  2. Standing Unsupported: Unable to stand 30 seconds unsupported  3. Sitting with Back Unsupported but Feet Supported on Floor or on a Stool: Able to sit safely and securely for 2 minutes  4. Standing to Sitting: Controls descent by using hands  5.  Transfers: Able to transfer safely definite need of hands  6. Standing Unsupported with Eyes Closed: Able to stand 3 seconds  7. Standing Unsupported with Feet Together: Needs help to attain position and unable to hold for 15 seconds  8. Reach Forward with Outstretched Arm While Standing: Loses balance while trying/requires external support  9.  Object from Floor from a Standing Position: Unable to try/needs assist to keep from losing balance or falling  10. Turning to Look Behind Over Left and Right Shoulders While Standing: Turns sideways only but maintains balance  11. Turn 360 Degrees: Able to turn 360 degrees safely but slowly  12. Place Alternate Foot on Step or Stool While Standing Unsupported: Needs assistance to keep from falling/unable to try  13. Standing Unsupported One Foot in Front: Loses balance while stepping or standing  14. Standing on One Leg: Unable to try needs assist to prevent fall  Williamson Balance Score: 19    Interpretation:  41-56 = Low Fall Risk  21-40 = Medium Fall Risk  0-20 = High Fall Risk    Timed Up & Go (TUG)  Time: 17 seconds  Observations: Slow tentative pace  An older adult who takes >=12 seconds to complete the TUG is at risk for falling.       Sit to Stand Testing      The patient  completed 7 repetitions of a sit to stand transfer in 30 seconds. unsuccessful attempts              Intake Outcome Measure for FOTO Survey    Therapist reviewed FOTO scores for Rajan Cooper III on 1/31/2025.   FOTO report - see Media section or FOTO account episode details.     Intake Score: 56%    Patient's spiritual, cultural, and educational needs considered and patient agreeable to plan of care and goals.     Assessment & Plan   Assessment  Rajan presents with a condition of Low complexity.   Presentation of Symptoms: Stable       Functional Limitations: Activity tolerance, Ambulating on uneven surfaces, Decreased ambulation distance/endurance, Getting off the floor, Functional mobility, Fine motor coordination, Gross motor coordination, Transfers  Impairments: Abnormal coordination, Abnormal gait, Activity intolerance, Impaired physical strength, Impaired balance    Patient Goal for Therapy (PT): Want to start working on my balance agai  Prognosis: Good  Assessment Details: Pt is a 69 year old man who presents to the clinic with Multiple Sclerosis. Pt demonstrates impaired strength globally throughout his LE. Pt is an increased risk of fall due to his score on the TUG which also demonstrates his poor functional mobility. Pt also has impaired functional strength, and impaired static/dynamic balance and impaired gait. Pt is a good candidate for OPPT to improve on the deficits listed above    Plan  From a physical therapy perspective, the patient would benefit from: Skilled Rehab Services    Planned therapy interventions include: Therapeutic exercise, Therapeutic activities, Neuromuscular re-education, and Gait training.            Visit Frequency: 2 times Per Week for 6 Weeks.       This plan was discussed with Patient.   Discussion participants: Agreed Upon Plan of Care             Goals:   Active       Long term goals       Patient to improve 30 sec sit to stand scoe to 13 to demonstrate improved functional  strength       Start:  01/31/25    Expected End:  03/14/25            Patient to improve hip strength grades by 1/2 grade on MMT to demonstrate improved LE strength        Start:  01/31/25    Expected End:  03/14/25            Patient to imprvoe Williamson Balance Scale score tp </=22/56 to demonstrate improved balance       Start:  01/31/25            PT to perform 6MWT to determine appropriate goals       Start:  01/31/25               Short term goals       Patient to improve hip strength grades by 1/2 grade on MMT to demonstrate improved LE strength        Start:  01/31/25    Expected End:  02/28/25            Patient to improve TUG time to </=14/16 to demonstrate improved functional mobility       Start:  01/31/25    Expected End:  02/28/25            Patient to imprvoe Williamson Balance Scale score tp </=22/56 to demonstrate improved balance       Start:  01/31/25            PT to perform 6MWT to determine appropriate goals       Start:  01/31/25

## 2025-02-20 ENCOUNTER — CLINICAL SUPPORT (OUTPATIENT)
Dept: REHABILITATION | Facility: HOSPITAL | Age: 70
End: 2025-02-20
Payer: MEDICARE

## 2025-02-20 DIAGNOSIS — Z74.09 IMPAIRED FUNCTIONAL MOBILITY, BALANCE, GAIT, AND ENDURANCE: Primary | ICD-10-CM

## 2025-02-20 PROCEDURE — 97530 THERAPEUTIC ACTIVITIES: CPT | Mod: HCNC,PO

## 2025-02-20 PROCEDURE — 97110 THERAPEUTIC EXERCISES: CPT | Mod: HCNC,PO

## 2025-02-20 NOTE — PROGRESS NOTES
Outpatient Rehab    Physical Therapy Visit    Patient Name: Rajan Cooper III  MRN: [id number]  YOB: 1955  Today's Date: 2/20/2025    Therapy Diagnosis:   Encounter Diagnosis   Name Primary?    Impaired functional mobility, balance, gait, and endurance Yes     Physician: Tegan Charles NP    Physician Orders: Eval and Treat  Medical Diagnosis: G35 (ICD-10-CM) - MS (multiple sclerosis)    Visit # / Visits Authorized:  1 / 20   Date of Evaluation:  1/31/2025   Insurance Authorization Period: 01/31/2025 to 12/31/2025  Plan of Care Certification:  1/31/2025 to 3/28/2025      Time In: 0715   Time Out: 0800  Total Time: 45   Total Billable Time: 40 (5 minute restroom break)         Subjective   Rajan reports no complaints upon arrival. No pain or soreness following initial evaluation..         Objective         Ambulation Details    6 Minute Walk Test: 567 feet with Rollator (RPE 13-14)         Treatment:  Therapeutic Exercise  Therapeutic Exercise Activity 1: Scifit Stepper L1 x 8 minutes  Therapeutic Exercise Activity 2: HEP education: Standing Hip abduction 2 x 8 reps bilaterally - Standing Marches (non-reciprocal) 2 x 10 reps bilaterally - Seated heel raises with green theraband - 2 x 10 reps    Therapeutic Activity  Therapeutic Activity 1: Includes time for 6MWT.    Assessment & Plan   Assessment: Rajan performed today's session fairly. Impaired activity tolerance evident with 6 Minute Walk Test. Seated rest breaks required throughout HEP education due to fatigue. HEP was reviewed and modified for proper execution.       Patient will continue to benefit from skilled outpatient physical therapy to address the deficits listed in the problem list box on initial evaluation, provide pt/family education and to maximize pt's level of independence in the home and community environment.     Patient's spiritual, cultural, and educational needs considered and patient agreeable to plan of care and goals.            Plan: Activity tolerance training, LE strengthening.    Goals:   Active       Long term goals       Patient to improve 30 sec sit to stand scoe to 13 to demonstrate improved functional strength       Start:  01/31/25    Expected End:  03/14/25            Patient to improve hip strength grades by 1/2 grade on MMT to demonstrate improved LE strength        Start:  01/31/25    Expected End:  03/14/25            Patient to imprvoe Williamson Balance Scale score tp </=22/56 to demonstrate improved balance       Start:  01/31/25            PT to perform 6MWT to determine appropriate goals       Start:  01/31/25               Short term goals       Patient to improve hip strength grades by 1/2 grade on MMT to demonstrate improved LE strength        Start:  01/31/25    Expected End:  02/28/25            Patient to improve TUG time to </=14/16 to demonstrate improved functional mobility       Start:  01/31/25    Expected End:  02/28/25            Patient to imprvoe Williamson Balance Scale score tp </=22/56 to demonstrate improved balance       Start:  01/31/25            PT to perform 6MWT to determine appropriate goals       Start:  01/31/25                Olivia Garcia PT

## 2025-02-24 ENCOUNTER — CLINICAL SUPPORT (OUTPATIENT)
Dept: REHABILITATION | Facility: HOSPITAL | Age: 70
End: 2025-02-24
Payer: MEDICARE

## 2025-02-24 DIAGNOSIS — Z74.09 IMPAIRED FUNCTIONAL MOBILITY, BALANCE, GAIT, AND ENDURANCE: Primary | ICD-10-CM

## 2025-02-24 PROCEDURE — 97110 THERAPEUTIC EXERCISES: CPT | Mod: HCNC,PO

## 2025-02-24 PROCEDURE — 97530 THERAPEUTIC ACTIVITIES: CPT | Mod: HCNC,PO

## 2025-02-24 PROCEDURE — 97112 NEUROMUSCULAR REEDUCATION: CPT | Mod: HCNC,PO

## 2025-02-24 NOTE — PROGRESS NOTES
Outpatient Rehab    Physical Therapy Visit    Patient Name: Rajan Cooper III  MRN: [id number]  YOB: 1955  Encounter Date: 2/24/2025    Therapy Diagnosis:   Encounter Diagnosis   Name Primary?    Impaired functional mobility, balance, gait, and endurance Yes     Physician: Tegan Charles NP    Physician Orders: Eval and Treat  Medical Diagnosis: G35 (ICD-10-CM) - MS (multiple sclerosis)    Visit # / Visits Authorized:  2 / 20   Date of Evaluation:  1/31/2025   Insurance Authorization Period: 01/31/2025 to 12/31/2025  Plan of Care Certification:  1/31/2025 to 3/28/2025      Time In: 0845   Time Out: 0930  Total Time: 45   Total Billable Time: 45 minutes    Precautions     Standard, Fall, Quick onset of fatigue, LE numbness      Subjective   Rajan reports doing fine upon arrival. He had some soreness following last session, but nothing too bad..         Objective            Treatment:  Therapeutic Exercise  Therapeutic Exercise Activity 1: Scifit Stepper L1 x 8 minutes  Therapeutic Exercise Activity 2: Standing hip extension with BUE support, 3 x 8 reps bilaterally  Therapeutic Exercise Activity 3: Leg Press Double Limb x 10 reps at 80#    Balance/Neuromuscular Re-Education  Balance/Neuromuscular Re-Education Activity 1: 2 x 3 laps each way, Fwd<>bwd ambulation, BUE support, SBA - 2 x 3 laps each way, Sidestepping along ballet bar, BUE support SBA    Therapeutic Activity  Therapeutic Activity 1: Sit to stand from elevated plinth 3 x10 reps (use of back of legs against mat, SBA due to instability)  Therapeutic Activity 2: Ambulation tolerance: Trial 1 = 1 lap around neuro gym using Rollator (seated rest beak between each lap) - Trial 2 = x 2 laps around neuro gym with Rollator, SBA    Assessment & Plan   Assessment: Rajan performed today's session well. PT facilitated rest breaks to avoid over-fatigue. No excessive difficulty noted with today's session; appropriate challenge and effort  demonstrated throughout session.       Patient will continue to benefit from skilled outpatient physical therapy to address the deficits listed in the problem list box on initial evaluation, provide pt/family education and to maximize pt's level of independence in the home and community environment.     Patient's spiritual, cultural, and educational needs considered and patient agreeable to plan of care and goals.           Plan: Activity tolerance training, LE strengthening, and balance training.    Goals:   Active       Long term goals       Patient to improve 30 sec sit to stand scoe to 13 to demonstrate improved functional strength       Start:  01/31/25    Expected End:  03/14/25            Patient to improve hip strength grades by 1/2 grade on MMT to demonstrate improved LE strength        Start:  01/31/25    Expected End:  03/14/25            Patient to imprvoe Williamson Balance Scale score tp </=22/56 to demonstrate improved balance       Start:  01/31/25            PT to perform 6MWT to determine appropriate goals       Start:  01/31/25               Short term goals       Patient to improve hip strength grades by 1/2 grade on MMT to demonstrate improved LE strength        Start:  01/31/25    Expected End:  02/28/25            Patient to improve TUG time to </=14/16 to demonstrate improved functional mobility       Start:  01/31/25    Expected End:  02/28/25            Patient to imprvoe Williamson Balance Scale score tp </=22/56 to demonstrate improved balance       Start:  01/31/25            PT to perform 6MWT to determine appropriate goals       Start:  01/31/25                Olivia Garcia PT

## 2025-02-27 ENCOUNTER — CLINICAL SUPPORT (OUTPATIENT)
Dept: REHABILITATION | Facility: HOSPITAL | Age: 70
End: 2025-02-27
Payer: MEDICARE

## 2025-02-27 DIAGNOSIS — Z74.09 IMPAIRED FUNCTIONAL MOBILITY, BALANCE, GAIT, AND ENDURANCE: Primary | ICD-10-CM

## 2025-02-27 PROCEDURE — 97110 THERAPEUTIC EXERCISES: CPT | Mod: HCNC,PO

## 2025-02-27 PROCEDURE — 97112 NEUROMUSCULAR REEDUCATION: CPT | Mod: HCNC,PO

## 2025-02-27 PROCEDURE — 97530 THERAPEUTIC ACTIVITIES: CPT | Mod: HCNC,PO

## 2025-02-27 NOTE — PROGRESS NOTES
"  Outpatient Rehab    Physical Therapy Visit    Patient Name: Rajan Cooper III  MRN: [id number]  YOB: 1955  Encounter Date: 2/27/2025    Therapy Diagnosis:   Encounter Diagnosis   Name Primary?    Impaired functional mobility, balance, gait, and endurance Yes     Physician: Tegan Charles NP    Physician Orders: Eval and Treat  Medical Diagnosis: G35 (ICD-10-CM) - MS (multiple sclerosis)    Visit # / Visits Authorized:  3 / 20   Date of Evaluation:  1/31/2025   Insurance Authorization Period: 01/31/2025 to 12/31/2025  Plan of Care Certification:  1/31/2025 to 3/28/2025      Time In: 0715   Time Out: 0754  Total Time: 39   Total Billable Time: 39 minutes      Precautions     Standard, Fall, Quick onset of fatigue, LE numbness      Subjective   Rajan reports no changes or complaints upon arrival..  Pain reported as 0/10.      Objective            Treatment:  Therapeutic Exercise  Therapeutic Exercise Activity 1: Scifit Stepper L2 x 8 minutes  Therapeutic Exercise Activity 3: Leg Press Double Limb x 10 reps at 80# then 2 x 10 reps at 90#    Balance/Neuromuscular Re-Education  Balance/Neuromuscular Re-Education Activity 1: 3 x 30s, Static balance on wooden 4pt fitter, Ovidio    Therapeutic Activity  Therapeutic Activity 1: Sit to stand from elevated plinth 3 x10 reps, no UE support (occasional use of back of legs against mat, occasional CGA due to instability)  Therapeutic Activity 2: Ambulation tolerance: 2 x 2 laps around neuro gym using Rollator  Therapeutic Activity 3: 3 x 5 reps, modified squats with lifting 10# kettlebell from 14" surface, CGA    Assessment & Plan   Assessment: Rajan performed today's session well. He was very challenged with sit to stands and modified squats due to imbalance and LE weakness. He was also very challenged with static balance intervention of the fitter board; tendency to displace him center of gravity posteriorly noted. Crouching compensation noted with static " sveta training.       Patient will continue to benefit from skilled outpatient physical therapy to address the deficits listed in the problem list box on initial evaluation, provide pt/family education and to maximize pt's level of independence in the home and community environment.     Patient's spiritual, cultural, and educational needs considered and patient agreeable to plan of care and goals.           Plan: Activity tolerance training, LE strengthening, and balance training.    Goals:   Active       Long term goals       Patient to improve 30 sec sit to stand scoe to 13 to demonstrate improved functional strength       Start:  01/31/25    Expected End:  03/14/25            Patient to improve hip strength grades by 1/2 grade on MMT to demonstrate improved LE strength        Start:  01/31/25    Expected End:  03/14/25            Patient to imprvoe Williamson Balance Scale score tp </=22/56 to demonstrate improved balance       Start:  01/31/25            PT to perform 6MWT to determine appropriate goals       Start:  01/31/25               Short term goals       Patient to improve hip strength grades by 1/2 grade on MMT to demonstrate improved LE strength        Start:  01/31/25    Expected End:  02/28/25            Patient to improve TUG time to </=14/16 to demonstrate improved functional mobility       Start:  01/31/25    Expected End:  02/28/25            Patient to imprvoe Williamson Balance Scale score tp </=22/56 to demonstrate improved balance       Start:  01/31/25            PT to perform 6MWT to determine appropriate goals       Start:  01/31/25                Olivia Garcia PT

## 2025-02-28 DIAGNOSIS — G35 MS (MULTIPLE SCLEROSIS): ICD-10-CM

## 2025-03-03 ENCOUNTER — RESULTS FOLLOW-UP (OUTPATIENT)
Dept: NEUROLOGY | Facility: CLINIC | Age: 70
End: 2025-03-03
Payer: MEDICARE

## 2025-03-03 RX ORDER — TERIFLUNOMIDE 14 MG/1
14 TABLET, FILM COATED ORAL DAILY
Qty: 90 TABLET | Refills: 0 | Status: ACTIVE | OUTPATIENT
Start: 2025-03-03 | End: 2026-03-03

## 2025-03-05 ENCOUNTER — CLINICAL SUPPORT (OUTPATIENT)
Dept: REHABILITATION | Facility: HOSPITAL | Age: 70
End: 2025-03-05
Payer: MEDICARE

## 2025-03-05 DIAGNOSIS — R26.9 ABNORMALITY OF GAIT AND MOBILITY: ICD-10-CM

## 2025-03-05 DIAGNOSIS — Z74.09 IMPAIRED FUNCTIONAL MOBILITY, BALANCE, GAIT, AND ENDURANCE: Primary | ICD-10-CM

## 2025-03-05 DIAGNOSIS — G35 MS (MULTIPLE SCLEROSIS): ICD-10-CM

## 2025-03-05 PROCEDURE — 97110 THERAPEUTIC EXERCISES: CPT | Mod: HCNC,PO,CQ

## 2025-03-05 PROCEDURE — 97530 THERAPEUTIC ACTIVITIES: CPT | Mod: HCNC,PO

## 2025-03-05 PROCEDURE — 97112 NEUROMUSCULAR REEDUCATION: CPT | Mod: HCNC,PO

## 2025-03-05 NOTE — PROGRESS NOTES
"  Outpatient Rehab    Physical Therapy Visit    Patient Name: Rajan Cooper III  MRN: [id number]  YOB: 1955  Encounter Date: 3/5/2025    Therapy Diagnosis:   Encounter Diagnoses   Name Primary?    Impaired functional mobility, balance, gait, and endurance Yes    MS (multiple sclerosis)     Abnormality of gait and mobility        Physician: Tegan Charles NP    Physician Orders: Eval and Treat  Medical Diagnosis: G35 (ICD-10-CM) - MS (multiple sclerosis)     Visit # / Visits Authorized:  3 / 20   Date of Evaluation:  1/31/2025   Insurance Authorization Period: 01/31/2025 to 12/31/2025  Plan of Care Certification:  1/31/2025 to 3/28/2025                 Time In: 0800   Time Out: 0845  Total Time: 45   Total Billable Time: 45 minutes    FOTO:  Intake Score:  %  Survey Score 1:  %  Survey Score 2:  %         Subjective   Rajan reports no changes or complaints upon arrival. Tolerating treatment interventions well and not excessively fatigued following..         Objective            Treatment:  Therapeutic Exercise  Therapeutic Exercise Activity 1: Scifit Stepper L3 x 8 minutes  Therapeutic Exercise Activity 2: Standing hip extension with BUE support, 3 x 8 reps bilaterally  Therapeutic Exercise Activity 3: Leg Press Double Limb 1x10,  2x15 reps 100#  Therapeutic Exercise Activity 4: Prone ventral body positional postural stretch 5 minutes  Therapeutic Exercise Activity 5: Prone quad stretch 2x30" with strap    Balance/Neuromuscular Re-Education  Balance/Neuromuscular Re-Education Activity 1: 3 x 30s, Static balance on wooden 4pt fitter, Min A  Balance/Neuromuscular Re-Education Activity 2: Lateral stepping 4 laps on parellel bars w/graded minimal  UE support    Therapeutic Activity  Therapeutic Activity 1: Sit to stand from elevated plinth 3 x10 reps, no UE support  Therapeutic Activity 2: Ambulation tolerance: 2 x 2 laps around neuro gym using Rollator  Therapeutic Activity 3: 3 x 5 reps, modified " "squats with lifting 10# kettlebell from 14" surface, CGA Not perfomed         Assessment & Plan   Assessment: Rajan performed today's session well. He was very challenged with sit to stands and modified squats due to imbalance and LE weakness. He was also very challenged with static balance intervention of the fitter board; tendency to displace him center of gravity posteriorly noted. Crouching compensation noted with static balacne training. Added some prone ventral body positional stretching to attempt postural correction. Tolerated well. Godd subjective feedback regarding benifits.       Patient will continue to benefit from skilled outpatient physical therapy to address the deficits listed in the problem list box on initial evaluation, provide pt/family education and to maximize pt's level of independence in the home and community environment.     Patient's spiritual, cultural, and educational needs considered and patient agreeable to plan of care and goals.           Plan: Activity tolerance training, LE strengthening, and balance training.    Goals:   Active       Long term goals       Patient to improve 30 sec sit to stand scoe to 13 to demonstrate improved functional strength       Start:  01/31/25    Expected End:  03/14/25            Patient to improve hip strength grades by 1/2 grade on MMT to demonstrate improved LE strength        Start:  01/31/25    Expected End:  03/14/25            Patient to imprvoe Williamson Balance Scale score tp </=22/56 to demonstrate improved balance       Start:  01/31/25            PT to perform 6MWT to determine appropriate goals       Start:  01/31/25               Short term goals       Patient to improve hip strength grades by 1/2 grade on MMT to demonstrate improved LE strength        Start:  01/31/25    Expected End:  02/28/25            Patient to improve TUG time to </=14/16 to demonstrate improved functional mobility       Start:  01/31/25    Expected End:  02/28/25    "         Patient to imprvoe Williamson Balance Scale score tp </=22/56 to demonstrate improved balance       Start:  01/31/25            PT to perform 6MWT to determine appropriate goals       Start:  01/31/25                Denny Ann PTA

## 2025-03-07 ENCOUNTER — PATIENT MESSAGE (OUTPATIENT)
Dept: PSYCHIATRY | Facility: CLINIC | Age: 70
End: 2025-03-07
Payer: MEDICARE

## 2025-03-07 ENCOUNTER — CLINICAL SUPPORT (OUTPATIENT)
Dept: REHABILITATION | Facility: HOSPITAL | Age: 70
End: 2025-03-07
Payer: MEDICARE

## 2025-03-07 DIAGNOSIS — Z74.09 IMPAIRED FUNCTIONAL MOBILITY, BALANCE, GAIT, AND ENDURANCE: Primary | ICD-10-CM

## 2025-03-07 PROCEDURE — 97112 NEUROMUSCULAR REEDUCATION: CPT | Mod: HCNC,PO

## 2025-03-07 PROCEDURE — 97530 THERAPEUTIC ACTIVITIES: CPT | Mod: HCNC,PO

## 2025-03-07 PROCEDURE — 97110 THERAPEUTIC EXERCISES: CPT | Mod: HCNC,PO

## 2025-03-07 NOTE — PROGRESS NOTES
"  Outpatient Rehab    Physical Therapy Visit    Patient Name: Rajan Cooper III  MRN: [id number]  YOB: 1955  Encounter Date: 3/7/2025    Therapy Diagnosis:   Encounter Diagnosis   Name Primary?    Impaired functional mobility, balance, gait, and endurance Yes     Physician: Tegan Charles NP    Physician Orders: Eval and Treat  Medical Diagnosis: G35 (ICD-10-CM) - MS (multiple sclerosis)    Visit # / Visits Authorized:  5 / 20   Date of Evaluation:  1/31/2025   Insurance Authorization Period: 01/31/2025 to 12/31/2025  Plan of Care Certification:  1/31/2025 to 3/28/2025      Time In: 0800   Time Out: 0845  Total Time: 45   Total Billable Time: 45 minutes         Subjective   Rajan reports doing well today..         Objective            Treatment:  Therapeutic Exercise  Therapeutic Exercise Activity 1: Forgamefit Stepper [place] x 8 minutes  Therapeutic Exercise Activity 2: Standing hip extension with BUE support, 2 x 10 reps bilaterally  Therapeutic Exercise Activity 3: Leg Press Double Limb 1x10 at 100# then 2 x 10 at 110#  Therapeutic Exercise Activity 4: Prone ventral body positional postural stretch total of 5 minutes (quad stretching while in the position)  Therapeutic Exercise Activity 5: Prone quad stretch 2x30" with strap         Therapeutic Activity  Therapeutic Activity 1: Sit to stand from elevated plinth 3 x 8 reps, no UE support - CGA/SBA  Therapeutic Activity 2: Ambulation tolerance: 2 x 2 laps around neuro gym using Rollator  Therapeutic Activity 3: 3 x 5 reps, modified squats with lifting 10# kettlebell from 14" surface, CGA Not perfomed    Assessment & Plan   Assessment: Patient performed today's session fairly well. He is very challenged with simple balance interventions. Continued difficulty stabilizing himself upon stance with sit to stand remains a limiting factor. Impaired balance with sit to stand is due to sensory issues of bilateral feet. Next session to " reassessment monthly progress.       Patient will continue to benefit from skilled outpatient physical therapy to address the deficits listed in the problem list box on initial evaluation, provide pt/family education and to maximize pt's level of independence in the home and community environment.     Patient's spiritual, cultural, and educational needs considered and patient agreeable to plan of care and goals.           Plan: Activity tolerance training, LE strengthening, and balance training. Reassess monthly progress.    Goals:   Active       Long term goals       Patient to improve 30 sec sit to stand scoe to 13 to demonstrate improved functional strength       Start:  01/31/25    Expected End:  03/14/25            Patient to improve hip strength grades by 1/2 grade on MMT to demonstrate improved LE strength        Start:  01/31/25    Expected End:  03/14/25            Patient to imprvoe Williamson Balance Scale score tp </=22/56 to demonstrate improved balance       Start:  01/31/25            PT to perform 6MWT to determine appropriate goals       Start:  01/31/25               Short term goals       Patient to improve hip strength grades by 1/2 grade on MMT to demonstrate improved LE strength        Start:  01/31/25    Expected End:  02/28/25            Patient to improve TUG time to </=14/16 to demonstrate improved functional mobility       Start:  01/31/25    Expected End:  02/28/25            Patient to imprvoe Williamson Balance Scale score tp </=22/56 to demonstrate improved balance       Start:  01/31/25            PT to perform 6MWT to determine appropriate goals       Start:  01/31/25                Olivia Garcia PT

## 2025-03-09 DIAGNOSIS — E03.9 HYPOTHYROIDISM, UNSPECIFIED TYPE: ICD-10-CM

## 2025-03-09 NOTE — TELEPHONE ENCOUNTER
Care Due:                  Date            Visit Type   Department     Provider  --------------------------------------------------------------------------------                                MYCHART                              FOLLOWUP/OF  [facility] INTERNAL  Last Visit: 08-      FICE VISIT   MEDICINE       Tom Garcia  Next Visit: None Scheduled  None         None Found                                                            Last  Test          Frequency    Reason                     Performed    Due Date  --------------------------------------------------------------------------------    Office Visit  12 months..  amLODIPine,                08- 08-                             ergocalciferol,                             furosemide, losartan.....    CMP.........  12 months..  ergocalciferol,            06- 06-                             furosemide, losartan.....    Vitamin D...  12 months..  ergocalciferol...........  Not Found    Overdue    [employer] Embedded Care Due Messages. Reference number: [id number].   3/09/2025 1:03:07 PM CDT

## 2025-03-10 ENCOUNTER — CLINICAL SUPPORT (OUTPATIENT)
Dept: REHABILITATION | Facility: HOSPITAL | Age: 70
End: 2025-03-10
Payer: MEDICARE

## 2025-03-10 DIAGNOSIS — Z74.09 IMPAIRED FUNCTIONAL MOBILITY, BALANCE, GAIT, AND ENDURANCE: Primary | ICD-10-CM

## 2025-03-10 PROCEDURE — 97530 THERAPEUTIC ACTIVITIES: CPT | Mod: HCNC,PO

## 2025-03-10 RX ORDER — FUROSEMIDE 40 MG/1
40 TABLET ORAL 2 TIMES DAILY
Qty: 180 TABLET | Refills: 0 | Status: SHIPPED | OUTPATIENT
Start: 2025-03-10

## 2025-03-10 RX ORDER — LOSARTAN POTASSIUM 50 MG/1
50 TABLET ORAL
Qty: 90 TABLET | Refills: 0 | Status: SHIPPED | OUTPATIENT
Start: 2025-03-10

## 2025-03-10 RX ORDER — LEVOTHYROXINE SODIUM 50 UG/1
50 TABLET ORAL
Qty: 90 TABLET | Refills: 0 | Status: SHIPPED | OUTPATIENT
Start: 2025-03-10

## 2025-03-10 RX ORDER — AMLODIPINE BESYLATE 5 MG/1
5 TABLET ORAL
Qty: 90 TABLET | Refills: 0 | Status: SHIPPED | OUTPATIENT
Start: 2025-03-10

## 2025-03-10 NOTE — PROGRESS NOTES
Outpatient Rehab    Physical Therapy Visit    Patient Name: Rajan Cooper III  MRN: [id number]  YOB: 1955  Encounter Date: 3/10/2025    Therapy Diagnosis:   No diagnosis found.    Physician: Tegan Charles NP    Physician Orders: Eval and Treat  Medical Diagnosis: G35 (ICD-10-CM) - MS (multiple sclerosis)    Visit # / Visits Authorized:  6 / 20   Date of Evaluation:  1/31/2025   Insurance Authorization Period: 01/31/2025 to 12/31/2025  Plan of Care Certification:  1/31/2025 to 3/28/2025      Time In: 0845   Time Out: 0930  Total Time: 45   Total Billable Time: 45 minutes         Subjective   He reports he is doing well today.  Pain reported as 0/10.      Objective            Hip Strength - Planes of Motion   Right Strength Right Pain Left Strength Left  Pain   Flexion (L2) 4   4     Extension           ABduction 5   4+     ADduction     4+     Internal Rotation           External Rotation               Knee Strength   Right Strength Right Pain Left Strength Left  Pain   Flexion (S2) 4-   4     Prone Flexion           Extension (L3) 4+   4+            Ankle/Foot Strength - Planes of Motion   Right Strength Right Pain Left Strength Left  Pain   Dorsiflexion (L4) 4-   4-     Plantar Flexion (S1) 3+   3+     Inversion           Eversion           Great Toe Flexion           Great Toe Extension (L5)           Lesser Toes Flexion           Lesser Toes Extension                     Fall Risk  Functional mobility test results suggest the patient is: At Risk for Falls  Four Stage Balance Test     Tandem Stand - Right Foot in Front: 4 sec  Tandem Stand - Left Foot in Front: 2 sec                   Williamson Balance  Williamson Balance Scale  1. Sitting to Standing: Able to stand independently using hands  2. Standing Unsupported: Needs several tries to stand 30 seconds unsupported  3. Sitting with Back Unsupported but Feet Supported on Floor or on a Stool: Able to sit safely and securely for 2 minutes  4. Standing to  Sitting: Controls descent by using hands  5.  Transfers: Able to transfer safely definite need of hands  6. Standing Unsupported with Eyes Closed: Able to stand 3 seconds  7. Standing Unsupported with Feet Together: Needs help to attain position but able to stand 15 seconds feet together  8. Reach Forward with Outstretched Arm While Standing: Loses balance while trying/requires external support  9.  Object from Floor from a Standing Position: Unable to try/needs assist to keep from losing balance or falling  10. Turning to Look Behind Over Left and Right Shoulders While Standing: Needs assist to keep from losing balance or falling  11. Turn 360 Degrees: Needs assistance while turning  12. Place Alternate Foot on Step or Stool While Standing Unsupported: Needs assistance to keep from falling/unable to try  13. Standing Unsupported One Foot in Front: Loses balance while stepping or standing  14. Standing on One Leg: Unable to try needs assist to prevent fall  Williamson Balance Score: 17  Williamson Balance Fall Risk: High    Interpretation:  41-56 = Low Fall Risk  21-40 = Medium Fall Risk  0-20 = High Fall Risk    Timed Up & Go (TUG)  Time: 14 seconds     An older adult who takes >=12 seconds to complete the TUG is at risk for falling.       Sit to Stand Testing      The patient completed 7 repetitions of a sit to stand transfer in 30 seconds. UE support         Ambulation Details    6MWT: 575 feet, RPE 13-14        Treatment:            Therapeutic Activity  Therapeutic Activity 1: Time used to perform obective measurement testing    Assessment & Plan   Assessment: Pt tolerated todays' reassessment session well this morning. Pt demonstrated improved LE strentgth and improved functional mobility which was observed per the patient's Timed Up and Go. The patients score on the TUG still places the patient at an increased risk for falls. Pt did not show significant improvement with 6MWT or 30 second sit to stand. Due to the  patient's reliance of an assistive device for safety. A mCTSIB will be performed next visit to use as an appropriate balance scoring measure. Pt does report that he feels he is improving and that he feels his balance continues to improve. Pt continues to be a good candidate for OPPT to maximize his functional mobility  Evaluation/Treatment Tolerance: Patient tolerated treatment well    Patient will continue to benefit from skilled outpatient physical therapy to address the deficits listed in the problem list box on initial evaluation, provide pt/family education and to maximize pt's level of independence in the home and community environment.     Patient's spiritual, cultural, and educational needs considered and patient agreeable to plan of care and goals.           Plan:      Goals:   Active       Long term goals       Patient to improve 30 sec sit to stand score to 9 to demonstrate improved functional strength       Start:  01/31/25    Expected End:  03/14/25            Patient to improve hip strength grades by 1/2 grade on MMT to demonstrate improved LE strength        Start:  01/31/25    Expected End:  03/14/25            Patient to improve TUG time to </=13 seconds to demonstrate improved functional mobility       Start:  01/31/25    Expected End:  03/14/25               Short term goals       Patient to improve hip strength grades by 1/2 grade on MMT to demonstrate improved LE strength  (Met)       Start:  01/31/25    Expected End:  02/28/25    Resolved:  03/10/25         Patient to improve TUG time to </=14/16 to demonstrate improved functional mobility (Met)       Start:  01/31/25    Expected End:  02/28/25    Resolved:  03/10/25         Patient to imprvoe Williamson Balance Scale score tp </=22/56 to demonstrate improved balance (Progressing)       Start:  01/31/25    Expected End:  02/28/25            PT to ambulate >/= 635 during 6MWT to demonstrate improved aerobic capacity       Start:  01/31/25    Expected  End:  02/28/25                Ramone Delaney, PT

## 2025-03-10 NOTE — TELEPHONE ENCOUNTER
Refill Routing Note   Medication(s) are not appropriate for processing by [facility] for the following reason(s):        Patient not seen by provider within 15 months    [facility] action(s):  Defer   Requires appointment : Yes   Requires labs : Yes             Appointments  past 12m or future 3m with PCP    Date Provider   Last Visit   8/14/2023 Tom Garcia MD   Next Visit   Visit date not found Tom Garcia MD   ED visits in past 90 days: 0        Note composed:12:38 PM 03/10/2025

## 2025-03-13 ENCOUNTER — CLINICAL SUPPORT (OUTPATIENT)
Dept: REHABILITATION | Facility: HOSPITAL | Age: 70
End: 2025-03-13
Payer: MEDICARE

## 2025-03-13 DIAGNOSIS — Z74.09 IMPAIRED FUNCTIONAL MOBILITY, BALANCE, GAIT, AND ENDURANCE: Primary | ICD-10-CM

## 2025-03-13 PROCEDURE — 97112 NEUROMUSCULAR REEDUCATION: CPT | Mod: HCNC,PO

## 2025-03-13 PROCEDURE — 97110 THERAPEUTIC EXERCISES: CPT | Mod: HCNC,PO

## 2025-03-13 PROCEDURE — 97530 THERAPEUTIC ACTIVITIES: CPT | Mod: HCNC,PO

## 2025-03-13 NOTE — PROGRESS NOTES
Outpatient Rehab    Physical Therapy Visit    Patient Name: Rajan Cooper III  MRN: [id number]  YOB: 1955  Encounter Date: 3/13/2025    Therapy Diagnosis:   Encounter Diagnosis   Name Primary?    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  Pain reported as 0/10.      Objective           Treatment:  Therapeutic Exercise  Therapeutic Exercise Activity 1: Scifit Stepper L3 x 8 minutes  for CV endurance and neural priming         Therapeutic Activity  Therapeutic Activity 1: x4 laps, ambulating over 4 rows of small cones, using alternate leg as leading leg when stepping over every other lap  Therapeutic Activity 2: 2 x 5 Sit<>stand while holding yellow kettlebell  Therapeutic Activity 3: sitting EOM<>supine<>prone<>quadruped, quadruped tolerance  Therapeutic Activity 4: 2 x 5 quadruped, bringing single LE to flexed position to practice first step of getting off the ground    Assessment & Plan   Assessment: Pt tolerated treatment well this session. Pt reports he has been prettyb busy at home but is doing well modifying environment to benefit him. Mr Tolentino did well with quadruped activities and taking his time to get into position. Pt continues to demonstrat balance difficultyies 2/2 polyneuropathy. Pt continues to be a good candidate for OPPT to maximize his functional mobility  Evaluation/Treatment Tolerance: Patient tolerated treatment well    Patient will continue to benefit from  skilled outpatient physical therapy to address the deficits listed in the problem list box on initial evaluation, provide pt/family education and to maximize pt's level of independence in the home and community environment.     Patient's spiritual, cultural, and educational needs considered and patient agreeable to plan of care and goals.           Plan: Cont balance training    Goals:   Active       Long term goals       Patient to improve 30 sec sit to stand score to 9 to demonstrate improved functional strength (Progressing)       Start:  01/31/25    Expected End:  03/14/25            Patient to improve hip strength grades by 1/2 grade on MMT to demonstrate improved LE strength  (Progressing)       Start:  01/31/25    Expected End:  03/14/25            Patient to improve TUG time to </=13 seconds to demonstrate improved functional mobility (Progressing)       Start:  01/31/25    Expected End:  03/14/25               Short term goals       Patient to improve hip strength grades by 1/2 grade on MMT to demonstrate improved LE strength  (Met)       Start:  01/31/25    Expected End:  02/28/25    Resolved:  03/10/25         Patient to improve TUG time to </=14/16 to demonstrate improved functional mobility (Met)       Start:  01/31/25    Expected End:  02/28/25    Resolved:  03/10/25         Patient to imprvoe Williamson Balance Scale score tp </=22/56 to demonstrate improved balance (Progressing)       Start:  01/31/25    Expected End:  02/28/25            PT to ambulate >/= 635 during 6MWT to demonstrate improved aerobic capacity (Progressing)       Start:  01/31/25    Expected End:  02/28/25                Ramone Delaney, PT

## 2025-03-17 ENCOUNTER — CLINICAL SUPPORT (OUTPATIENT)
Dept: REHABILITATION | Facility: HOSPITAL | Age: 70
End: 2025-03-17
Payer: MEDICARE

## 2025-03-17 DIAGNOSIS — Z74.09 IMPAIRED FUNCTIONAL MOBILITY, BALANCE, GAIT, AND ENDURANCE: Primary | ICD-10-CM

## 2025-03-17 PROCEDURE — 97110 THERAPEUTIC EXERCISES: CPT | Mod: HCNC,PO

## 2025-03-17 PROCEDURE — 97530 THERAPEUTIC ACTIVITIES: CPT | Mod: HCNC,PO

## 2025-03-17 NOTE — PROGRESS NOTES
Outpatient Rehab    Physical Therapy Visit    Patient Name: Rajan Cooper III  MRN: [id number]  YOB: 1955  Encounter Date: 3/17/2025    Therapy Diagnosis:   Encounter Diagnosis   Name Primary?    Impaired functional mobility, balance, gait, and endurance Yes         Physician: Tegan Charles NP    Physician Orders: Eval and Treat  Medical Diagnosis: G35 (ICD-10-CM) - MS (multiple sclerosis)    Visit # / Visits Authorized:  8 / 20   Date of Evaluation:  1/31/2025   Insurance Authorization Period: 01/31/2025 to 12/31/2025  Plan of Care Certification:  1/31/2025 to 3/28/2025      Time In: 0845   Time Out: 0930  Total Time: 45   Total Billable Time: 45 minutes         Subjective   He is doing fine and reports of no complaints.  Pain reported as 0/10.      Objective           Treatment:  Therapeutic Exercise  TE 1: Scifit Stepper L3.5 x 8 minutes for CV endurance and neural priming         Therapeutic Activity  TA 1: 3 x 5 sit<>stand from hi/low mat, cueing to extend hips once in standing position, decreased height of mat each set, mild retropulsion, increased WBOS  TA 2: x 3 rounds, picking coness up from 8 inch step (6 inch step during last round)<>quarter turning and handing it to PT, UE support, perfomed with each UE  TA 3: x 2 rounds grabbing sueegie from ergotron<>placing it on basketball goal  TA 4: x 3 laps backward ambulations with rollator, cueing to increase step length  TA 5: x 2 laps ambualting with left and right head nods with rollator, CGA  TA 6: x 2 laps ambualting with vertical head turns, with rollator, CGA    Assessment & Plan   Assessment: Pt tolerated treatment well today which had an emphasis on dynamic balance with functional tasks. Pt continues to demonstrate retropulsion during sitting and flexed forward posture 2/2 impaired sensation in BL feet. Pt cued throughout treatment to try to extend hips while standing. Pt with good compensatory efforts for impaired balance. Pt to  continue to benefit from balanace training.  Evaluation/Treatment Tolerance: Patient tolerated treatment well    Patient will continue to benefit from skilled outpatient physical therapy to address the deficits listed in the problem list box on initial evaluation, provide pt/family education and to maximize pt's level of independence in the home and community environment.     Patient's spiritual, cultural, and educational needs considered and patient agreeable to plan of care and goals.           Plan: Cont balance training    Goals:   Active       Long term goals       Patient to improve 30 sec sit to stand score to 9 to demonstrate improved functional strength (Progressing)       Start:  01/31/25    Expected End:  03/14/25            Patient to improve hip strength grades by 1/2 grade on MMT to demonstrate improved LE strength  (Progressing)       Start:  01/31/25    Expected End:  03/14/25            Patient to improve TUG time to </=13 seconds to demonstrate improved functional mobility (Progressing)       Start:  01/31/25    Expected End:  03/14/25               Short term goals       Patient to improve hip strength grades by 1/2 grade on MMT to demonstrate improved LE strength  (Met)       Start:  01/31/25    Expected End:  02/28/25    Resolved:  03/10/25         Patient to improve TUG time to </=14/16 to demonstrate improved functional mobility (Met)       Start:  01/31/25    Expected End:  02/28/25    Resolved:  03/10/25         Patient to imprvoe Williamson Balance Scale score tp </=22/56 to demonstrate improved balance (Progressing)       Start:  01/31/25    Expected End:  02/28/25            PT to ambulate >/= 635 during 6MWT to demonstrate improved aerobic capacity (Progressing)       Start:  01/31/25    Expected End:  02/28/25                Ramone Delaney, PT

## 2025-03-26 ENCOUNTER — HOSPITAL ENCOUNTER (OUTPATIENT)
Dept: RADIOLOGY | Facility: HOSPITAL | Age: 70
Discharge: HOME | End: 2025-03-26
Payer: MEDICARE

## 2025-03-26 DIAGNOSIS — G35 MS (MULTIPLE SCLEROSIS): ICD-10-CM

## 2025-03-26 PROCEDURE — 70551 MRI BRAIN STEM W/O DYE: CPT | Mod: TC,HCNC

## 2025-03-26 PROCEDURE — 72141 MRI NECK SPINE W/O DYE: CPT | Mod: 26,HCNC,, | Performed by: RADIOLOGY

## 2025-03-26 PROCEDURE — 70551 MRI BRAIN STEM W/O DYE: CPT | Mod: 26,HCNC,, | Performed by: RADIOLOGY

## 2025-03-26 PROCEDURE — 72141 MRI NECK SPINE W/O DYE: CPT | Mod: TC,HCNC

## 2025-03-27 ENCOUNTER — CLINICAL SUPPORT (OUTPATIENT)
Dept: REHABILITATION | Facility: HOSPITAL | Age: 70
End: 2025-03-27
Payer: MEDICARE

## 2025-03-27 DIAGNOSIS — Z74.09 IMPAIRED FUNCTIONAL MOBILITY, BALANCE, GAIT, AND ENDURANCE: Primary | ICD-10-CM

## 2025-03-27 PROCEDURE — 97110 THERAPEUTIC EXERCISES: CPT | Mod: HCNC,PO

## 2025-03-27 PROCEDURE — 97530 THERAPEUTIC ACTIVITIES: CPT | Mod: HCNC,PO

## 2025-03-27 NOTE — PROGRESS NOTES
"  Outpatient Rehab    Physical Therapy Visit    Patient Name: Rajan Cooper III  MRN: [id number]  YOB: 1955  Encounter Date: 3/27/2025    Therapy Diagnosis:   Encounter Diagnosis   Name Primary?    Impaired functional mobility, balance, gait, and endurance Yes           Physician: Tegan Charles NP    Physician Orders: Eval and Treat  Medical Diagnosis: G35 (ICD-10-CM) - MS (multiple sclerosis)    Visit # / Visits Authorized:  9 / 20   Date of Evaluation:  1/31/2025   Insurance Authorization Period: 01/31/2025 to 12/31/2025  Plan of Care Certification:  3/28/2025 to 4/10/2025     Time In: 0845   Time Out: 0930  Total Time: 45   Total Billable Time: 45 minutes         Subjective   "I haven't been to therapy in 10 days and I can feel it".         Objective         Fall Risk  Functional mobility test results suggest the patient is: At Risk for Falls  Williamson Balance  Williamson Balance Scale  1. Sitting to Standing: Able to stand independently using hands  2. Standing Unsupported: Able to stand 30 seconds unsupported  3. Sitting with Back Unsupported but Feet Supported on Floor or on a Stool: Able to sit safely and securely for 2 minutes  4. Standing to Sitting: Controls descent by using hands  5.  Transfers: Able to transfer safely definite need of hands  6. Standing Unsupported with Eyes Closed: Unable to keep eyes closed 3 seconds but stays safely  7. Standing Unsupported with Feet Together: Needs help to attain position but able to stand 15 seconds feet together  8. Reach Forward with Outstretched Arm While Standing: Reaches forward but needs supervision  9.  Object from Floor from a Standing Position: Unable to try/needs assist to keep from losing balance or falling  10. Turning to Look Behind Over Left and Right Shoulders While Standing: Needs assist to keep from losing balance or falling  11. Turn 360 Degrees: Needs assistance while turning  12. Place Alternate Foot on Step or Stool While " "Standing Unsupported: Needs assistance to keep from falling/unable to try  13. Standing Unsupported One Foot in Front: Loses balance while stepping or standing  14. Standing on One Leg: Unable to try needs assist to prevent fall  Williamson Balance Score: 18  Williamson Balance Fall Risk: High    Interpretation:  41-56 = Low Fall Risk  21-40 = Medium Fall Risk  0-20 = High Fall Risk    Timed Up & Go (TUG)  Time: 15 seconds     An older adult who takes >=12 seconds to complete the TUG is at risk for falling.       Sit to Stand Testing      The patient completed 7 repetitions of a sit to stand transfer in 30 seconds. UE support               Treatment:  Therapeutic Exercise  TE 1: 3 X10 leg press, #80>> #120 >> #140  TE 2: x 6 sci fit recumbent stepper for CV endurance and neural priming         Therapeutic Activity  TA 1: Time used for objective measurement testing    Assessment & Plan   Assessment: Patient tolerated treatment fair this morning. Pt showing consistent scores with objective measurement testing but did perform slightly worse on Timed Up and Go. Due to the patient's diagnosis of a progressive disease and neurological symptoms, pt is performing well with maintenance of functional mobility, static balance, and LE strength. PT communicated with patient about the importance of coming to physical therapy 2/2 to patient feeling "not as strong due to him not being in PT for 10 days." Pt continues to be a good candidate for PT to improve deficits. PT to continue providing pt updated HEP in preparation for discharge. POC extension 3/28/2025-4/10/2025.  Evaluation/Treatment Tolerance: Patient tolerated treatment well    Patient will continue to benefit from skilled outpatient physical therapy to address the deficits listed in the problem list box on initial evaluation, provide pt/family education and to maximize pt's level of independence in the home and community environment.     Patient's spiritual, cultural, and " educational needs considered and patient agreeable to plan of care and goals.           Plan: POC extension 3/28/2025-4/10/2025    Goals:   Active       Long term goals       Patient to improve 30 sec sit to stand score to 9 to demonstrate improved functional strength (Progressing)       Start:  01/31/25    Expected End:  04/10/25            Patient to improve hip strength grades by 1/2 grade on MMT to demonstrate improved LE strength  (Progressing)       Start:  01/31/25    Expected End:  04/10/25            Patient to improve TUG time to </=13 seconds to demonstrate improved functional mobility (Progressing)       Start:  01/31/25    Expected End:  04/10/25               Short term goals       Patient to improve hip strength grades by 1/2 grade on MMT to demonstrate improved LE strength  (Met)       Start:  01/31/25    Expected End:  02/28/25    Resolved:  03/10/25         Patient to improve TUG time to </=14/16 to demonstrate improved functional mobility (Met)       Start:  01/31/25    Expected End:  02/28/25    Resolved:  03/10/25         Patient to imprvoe Williamson Balance Scale score tp </=22/56 to demonstrate improved balance (Unable to Meet)       Start:  01/31/25    Expected End:  04/10/25            PT to ambulate >/= 635 during 6MWT to demonstrate improved aerobic capacity (Progressing)       Start:  01/31/25    Expected End:  04/10/25                Ramone Delaney, PT

## 2025-04-03 ENCOUNTER — CLINICAL SUPPORT (OUTPATIENT)
Dept: REHABILITATION | Facility: HOSPITAL | Age: 70
End: 2025-04-03
Payer: MEDICARE

## 2025-04-03 DIAGNOSIS — Z74.09 IMPAIRED FUNCTIONAL MOBILITY, BALANCE, GAIT, AND ENDURANCE: Primary | ICD-10-CM

## 2025-04-03 PROCEDURE — 97530 THERAPEUTIC ACTIVITIES: CPT | Mod: HCNC,PO

## 2025-04-03 PROCEDURE — 97110 THERAPEUTIC EXERCISES: CPT | Mod: HCNC,PO

## 2025-04-03 NOTE — PROGRESS NOTES
"  Outpatient Rehab    Physical Therapy Visit    Patient Name: Rajan Cooper III  MRN: [id number]  YOB: 1955  Encounter Date: 4/3/2025    Therapy Diagnosis:   Encounter Diagnosis   Name Primary?    Impaired functional mobility, balance, gait, and endurance Yes     Physician: Tegan Charles NP    Physician Orders: Eval and Treat  Medical Diagnosis: MS (multiple sclerosis)    Visit # / Visits Authorized:  10 / 20  Insurance Authorization Period: 1/31/2025 to 12/31/2025  Date of Evaluation: 01/31/2025  Plan of Care Certification: 04/10/2025     PT/PTA: PT   Number of PTA visits since last PT visit:0  Time In: 0800   Time Out: 0843  Total Time: 43   Total Billable Time: 43      Precautions     Standard, Fall, Quick onset of fatigue, LE numbness      Subjective   No complaint or changes upon arrival..  Pain reported as 0/10. n/a    Objective            Treatment:  Therapeutic Exercise  TE 1: 3 X10 leg press, #80>> #120 >> #140  TE 2: x 8 sci fit recumbent stepper for CV endurance and neural priming L3.5  Balance/Neuromuscular Re-Education  NMR 1: Standing balance: feet apart 3 x 45s balloon taps with PT (in //bars)  Therapeutic Activity  TA 1: Sidestepping in //bars with BUE support x 4 laps each way  TA 2: //bars: Fwd ambulation with 1 UE support (occasional BUE touchdown) then bwd ambulation with 2 UE x 3 laps each way  TA 3: 2 x 10 reps bilaterally, Step ups using 4" step BUE support  TA 4: Sidestepping over low cones in //bars BUE support x 3 laps each way - Fwd stepping over low cones in //bars BUE support, non-reciprocal  TA 5: x 3 laps around neuro gym, Ambulation with 20# kettlebell on rollator seat    Time Entry(in minutes):  Neuromuscular Re-Education Time Entry: 5  Therapeutic Activity Time Entry: 30  Therapeutic Exercise Time Entry: 8    Assessment & Plan   Assessment: Rajan performed today's session well. He remains very challenged with balance interventions without UE support due to " proprioception impairment and LE weakness. Good effort noted throughout session.  Evaluation/Treatment Tolerance: Patient tolerated treatment well    Patient will continue to benefit from skilled outpatient physical therapy to address the deficits listed in the problem list box on initial evaluation, provide pt/family education and to maximize pt's level of independence in the home and community environment.     Patient's spiritual, cultural, and educational needs considered and patient agreeable to plan of care and goals.           Plan: Cont balance training    Goals:   Active       Long term goals       Patient to improve 30 sec sit to stand score to 9 to demonstrate improved functional strength (Progressing)       Start:  01/31/25    Expected End:  04/10/25            Patient to improve hip strength grades by 1/2 grade on MMT to demonstrate improved LE strength  (Progressing)       Start:  01/31/25    Expected End:  04/10/25            Patient to improve TUG time to </=13 seconds to demonstrate improved functional mobility (Progressing)       Start:  01/31/25    Expected End:  04/10/25               Short term goals       Patient to improve hip strength grades by 1/2 grade on MMT to demonstrate improved LE strength  (Met)       Start:  01/31/25    Expected End:  02/28/25    Resolved:  03/10/25         Patient to improve TUG time to </=14/16 to demonstrate improved functional mobility (Met)       Start:  01/31/25    Expected End:  02/28/25    Resolved:  03/10/25         Patient to imprvoe Williamson Balance Scale score tp </=22/56 to demonstrate improved balance (Unable to Meet)       Start:  01/31/25    Expected End:  04/10/25            PT to ambulate >/= 635 during 6MWT to demonstrate improved aerobic capacity (Progressing)       Start:  01/31/25    Expected End:  04/10/25                Olivia Garcia PT

## 2025-04-07 ENCOUNTER — CLINICAL SUPPORT (OUTPATIENT)
Dept: REHABILITATION | Facility: HOSPITAL | Age: 70
End: 2025-04-07
Payer: MEDICARE

## 2025-04-07 DIAGNOSIS — Z74.09 IMPAIRED FUNCTIONAL MOBILITY, BALANCE, GAIT, AND ENDURANCE: Primary | ICD-10-CM

## 2025-04-07 PROCEDURE — 97110 THERAPEUTIC EXERCISES: CPT | Mod: HCNC,PO

## 2025-04-07 PROCEDURE — 97112 NEUROMUSCULAR REEDUCATION: CPT | Mod: HCNC,PO

## 2025-04-09 NOTE — PROGRESS NOTES
"  Outpatient Rehab    Physical Therapy Visit    Patient Name: Rajan Cooper III  MRN: [id number]  YOB: 1955  Encounter Date: 4/7/2025    Therapy Diagnosis:   Encounter Diagnosis   Name Primary?    Impaired functional mobility, balance, gait, and endurance Yes       Physician: Tegan Charles NP    Physician Orders: Eval and Treat  Medical Diagnosis: MS (multiple sclerosis)    Visit # / Visits Authorized:  11 / 20  Insurance Authorization Period: 1/31/2025 to 12/31/2025  Date of Evaluation: 01/31/2025  Plan of Care Certification: 04/10/2025     PT/PTA:     Number of PTA visits since last PT visit:   Time In: 0830   Time Out: 0915  Total Time: 45   Total Billable Time: 45           Subjective             Objective            Treatment:  Therapeutic Exercise  TE 1: x 8 sci fit recumbent stepper for CV endurance and neural priming L3.5  TE 2: 3 X10 leg press, #140>>160  Balance/Neuromuscular Re-Education  NMR 1: 2 x 30 " split stanc, Contralateral LE on 4 point foam fitter  Therapeutic Activity  TA 1: 2 x 10 sit<>stand, cueing on getting balance before taking 2 steps fwd<>2 steps back  TA 2: 4 x 6 sit<>stand, feet on 4 point foam fitter<>placing squeegie on mirror  TA 3: =    Time Entry(in minutes):  Neuromuscular Re-Education Time Entry: 5  Therapeutic Activity Time Entry: 25  Therapeutic Exercise Time Entry: 15    Assessment & Plan   Assessment: Mr Tolentino did well with todays activity with emphasis being more towards functional activity, repeated repetitions and cueing to patient to either find surface to stabilia with and making sure feet are in well positition to help with ascension. Pt continues to be a good candidate for OPPT to further maximize his functional mobility.  Evaluation/Treatment Tolerance: Patient tolerated treatment well    Patient will continue to benefit from skilled outpatient physical therapy to address the deficits listed in the problem list box on initial evaluation, provide " pt/family education and to maximize pt's level of independence in the home and community environment.     Patient's spiritual, cultural, and educational needs considered and patient agreeable to plan of care and goals.           Plan: Cont POC    Goals:   Active       Long term goals       Patient to improve 30 sec sit to stand score to 9 to demonstrate improved functional strength (Progressing)       Start:  01/31/25    Expected End:  04/10/25            Patient to improve hip strength grades by 1/2 grade on MMT to demonstrate improved LE strength  (Progressing)       Start:  01/31/25    Expected End:  04/10/25            Patient to improve TUG time to </=13 seconds to demonstrate improved functional mobility (Progressing)       Start:  01/31/25    Expected End:  04/10/25               Short term goals       Patient to improve hip strength grades by 1/2 grade on MMT to demonstrate improved LE strength  (Met)       Start:  01/31/25    Expected End:  02/28/25    Resolved:  03/10/25         Patient to improve TUG time to </=14/16 to demonstrate improved functional mobility (Met)       Start:  01/31/25    Expected End:  02/28/25    Resolved:  03/10/25         Patient to imprvoe Williamson Balance Scale score tp </=22/56 to demonstrate improved balance (Unable to Meet)       Start:  01/31/25    Expected End:  04/10/25            PT to ambulate >/= 635 during 6MWT to demonstrate improved aerobic capacity (Progressing)       Start:  01/31/25    Expected End:  04/10/25                Ramone Delaney, PT

## 2025-04-10 ENCOUNTER — CLINICAL SUPPORT (OUTPATIENT)
Dept: REHABILITATION | Facility: HOSPITAL | Age: 70
End: 2025-04-10
Payer: MEDICARE

## 2025-04-10 DIAGNOSIS — Z74.09 IMPAIRED FUNCTIONAL MOBILITY, BALANCE, GAIT, AND ENDURANCE: Primary | ICD-10-CM

## 2025-04-10 PROCEDURE — 97530 THERAPEUTIC ACTIVITIES: CPT | Mod: HCNC,PO

## 2025-04-10 NOTE — PROGRESS NOTES
Outpatient Rehab    Physical Therapy Progress Note : Updated Plan of Care    Patient Name: Rajan Cooper III  MRN: [id number]  YOB: 1955  Encounter Date: 4/10/2025    Therapy Diagnosis:   Encounter Diagnosis   Name Primary?    Impaired functional mobility, balance, gait, and endurance Yes     Physician: Tegan Charles NP    Physician Orders: Eval and Treat  Medical Diagnosis: MS (multiple sclerosis)    Visit # / Visits Authorized:  12 / 20  Insurance Authorization Period: 1/31/2025 to 12/31/2025  Date of Evaluation: 01/31/2025  Plan of Care Certification: 04/10/2025- 5/15/2025     PT/PTA:     Number of PTA visits since last PT visit:   Time In: 0845   Time Out: 0930  Total Time: 45   Total Billable Time: 45    FOTO:  Intake Score:  %  Survey Score 1:  %  Survey Score 2:  %         Subjective   Im very tired today and dont feel the best.  Pain reported as 0/10.      Objective         Fall Risk  Functional mobility test results suggest the patient is: At Risk for Falls  Williamson Balance  Williamson Balance Scale  1. Sitting to Standing: Able to stand independently using hands  2. Standing Unsupported: Able to stand safely for 2 minutes  3. Sitting with Back Unsupported but Feet Supported on Floor or on a Stool: Able to sit safely and securely for 2 minutes  4. Standing to Sitting: Controls descent by using hands  5.  Transfers: Able to transfer safely definite need of hands  6. Standing Unsupported with Eyes Closed: Able to stand 3 seconds  7. Standing Unsupported with Feet Together: Needs help to attain position but able to stand 15 seconds feet together  8. Reach Forward with Outstretched Arm While Standing: Loses balance while trying/requires external support  9.  Object from Floor from a Standing Position: Unable to try/needs assist to keep from losing balance or falling  10. Turning to Look Behind Over Left and Right Shoulders While Standing: Needs supervision when turning  11. Turn 360 Degrees:  Needs assistance while turning  12. Place Alternate Foot on Step or Stool While Standing Unsupported: Needs assistance to keep from falling/unable to try  13. Standing Unsupported One Foot in Front: Loses balance while stepping or standing  14. Standing on One Leg: Unable to try needs assist to prevent fall  Williamson Balance Score: 21  Williamson Balance Fall Risk: Medium    Interpretation:  41-56 = Low Fall Risk  21-40 = Medium Fall Risk  0-20 = High Fall Risk    Timed Up & Go (TUG)  Time: 17 seconds  Observations: Slow tentative pace and Short strides  An older adult who takes >=12 seconds to complete the TUG is at risk for falling.       Sit to Stand Testing      The patient completed 7 repetitions of a sit to stand transfer in 30 seconds.                Treatment:  Therapeutic Activity  TA 1: Time used for objective measurement testing and discussion with pt about future discharge    Time Entry(in minutes):  Therapeutic Activity Time Entry: 45    Assessment & Plan        Patient will continue to benefit from skilled outpatient physical therapy to address the deficits listed in the problem list box on initial evaluation, provide pt/family education and to maximize pt's level of independence in the home and community environment.     Patient's spiritual, cultural, and educational needs considered and patient agreeable to plan of care and goals.           Goals:   Active       Long term goals       Patient to improve 30 sec sit to stand score to 9 to demonstrate improved functional strength (Progressing)       Start:  01/31/25    Expected End:  05/15/25            Patient to improve hip strength grades by 1/2 grade on MMT to demonstrate improved LE strength  (Progressing)       Start:  01/31/25    Expected End:  05/15/25            Patient to improve TUG time to </=13 seconds to demonstrate improved functional mobility (Progressing)       Start:  01/31/25    Expected End:  05/15/25               Short term goals        Patient to improve hip strength grades by 1/2 grade on MMT to demonstrate improved LE strength  (Met)       Start:  01/31/25    Expected End:  02/28/25    Resolved:  03/10/25         Patient to improve TUG time to </=14/16 to demonstrate improved functional mobility (Met)       Start:  01/31/25    Expected End:  02/28/25    Resolved:  03/10/25         Patient to imprvoe Williamson Balance Scale score tp </=22/56 to demonstrate improved balance (Progressing)       Start:  01/31/25    Expected End:  05/15/25            PT to ambulate >/= 635 during 6MWT to demonstrate improved aerobic capacity (Progressing)       Start:  01/31/25    Expected End:  05/25/25                Ramone Delaney, PT

## 2025-04-14 ENCOUNTER — OFFICE VISIT (OUTPATIENT)
Dept: NEUROLOGY | Facility: CLINIC | Age: 70
End: 2025-04-14
Payer: MEDICARE

## 2025-04-14 ENCOUNTER — LAB VISIT (OUTPATIENT)
Dept: LAB | Facility: HOSPITAL | Age: 70
End: 2025-04-14
Attending: PSYCHIATRY & NEUROLOGY
Payer: MEDICARE

## 2025-04-14 VITALS
DIASTOLIC BLOOD PRESSURE: 77 MMHG | BODY MASS INDEX: 39.63 KG/M2 | WEIGHT: 299.06 LBS | SYSTOLIC BLOOD PRESSURE: 141 MMHG | HEIGHT: 73 IN | HEART RATE: 64 BPM

## 2025-04-14 DIAGNOSIS — E53.8 B12 DEFICIENCY: ICD-10-CM

## 2025-04-14 DIAGNOSIS — R26.9 GAIT DISTURBANCE: ICD-10-CM

## 2025-04-14 DIAGNOSIS — G25.81 RESTLESS LEG: Primary | ICD-10-CM

## 2025-04-14 DIAGNOSIS — N31.9 NEUROGENIC BLADDER: ICD-10-CM

## 2025-04-14 DIAGNOSIS — G35 MS (MULTIPLE SCLEROSIS): ICD-10-CM

## 2025-04-14 DIAGNOSIS — E61.1 IRON DEFICIENCY: ICD-10-CM

## 2025-04-14 DIAGNOSIS — G25.81 RESTLESS LEG: ICD-10-CM

## 2025-04-14 LAB
FERRITIN SERPL-MCNC: 198 NG/ML (ref 20–300)
VIT B12 SERPL-MCNC: 935 PG/ML (ref 210–950)

## 2025-04-14 PROCEDURE — 36415 COLL VENOUS BLD VENIPUNCTURE: CPT | Mod: HCNC

## 2025-04-14 PROCEDURE — G2211 COMPLEX E/M VISIT ADD ON: HCPCS | Mod: HCNC,S$GLB,, | Performed by: PSYCHIATRY & NEUROLOGY

## 2025-04-14 PROCEDURE — 3077F SYST BP >= 140 MM HG: CPT | Mod: HCNC,CPTII,S$GLB, | Performed by: PSYCHIATRY & NEUROLOGY

## 2025-04-14 PROCEDURE — 3078F DIAST BP <80 MM HG: CPT | Mod: HCNC,CPTII,S$GLB, | Performed by: PSYCHIATRY & NEUROLOGY

## 2025-04-14 PROCEDURE — 1160F RVW MEDS BY RX/DR IN RCRD: CPT | Mod: HCNC,CPTII,S$GLB, | Performed by: PSYCHIATRY & NEUROLOGY

## 2025-04-14 PROCEDURE — 3008F BODY MASS INDEX DOCD: CPT | Mod: HCNC,CPTII,S$GLB, | Performed by: PSYCHIATRY & NEUROLOGY

## 2025-04-14 PROCEDURE — 99999 PR PBB SHADOW E&M-EST. PATIENT-LVL III: CPT | Mod: PBBFAC,HCNC,, | Performed by: PSYCHIATRY & NEUROLOGY

## 2025-04-14 PROCEDURE — 1101F PT FALLS ASSESS-DOCD LE1/YR: CPT | Mod: HCNC,CPTII,S$GLB, | Performed by: PSYCHIATRY & NEUROLOGY

## 2025-04-14 PROCEDURE — 3288F FALL RISK ASSESSMENT DOCD: CPT | Mod: HCNC,CPTII,S$GLB, | Performed by: PSYCHIATRY & NEUROLOGY

## 2025-04-14 PROCEDURE — 99215 OFFICE O/P EST HI 40 MIN: CPT | Mod: HCNC,S$GLB,, | Performed by: PSYCHIATRY & NEUROLOGY

## 2025-04-14 PROCEDURE — 82607 VITAMIN B-12: CPT | Mod: HCNC

## 2025-04-14 PROCEDURE — 4010F ACE/ARB THERAPY RXD/TAKEN: CPT | Mod: HCNC,CPTII,S$GLB, | Performed by: PSYCHIATRY & NEUROLOGY

## 2025-04-14 PROCEDURE — 82728 ASSAY OF FERRITIN: CPT | Mod: HCNC

## 2025-04-14 PROCEDURE — 1159F MED LIST DOCD IN RCRD: CPT | Mod: HCNC,CPTII,S$GLB, | Performed by: PSYCHIATRY & NEUROLOGY

## 2025-04-14 RX ORDER — MIRABEGRON 25 MG/1
25 TABLET, FILM COATED, EXTENDED RELEASE ORAL DAILY
Qty: 30 TABLET | Refills: 11 | Status: SHIPPED | OUTPATIENT
Start: 2025-04-14 | End: 2026-04-14

## 2025-04-14 NOTE — PROGRESS NOTES
"  Subjective:          Patient ID: Rajan Cooper III is a 69 y.o. male who presents today for a routine clinic visit for MS.      MS HPI:  DMT: teriflunomide  Side effects from DMT? No  Taking vitamin D3 as recommended? Yes - Dose: 50,000 units weekly  MRI of brain / spine stable in [place]  ALA?  Pain ("pins and needles") form the knee down in a circumferential manner: worse when lying down at night--takes Aleve which "takes the edge off," also gets better if he gets up and walks around  B12 was 184: taking oral supplementation OTC (1,000mcg)  Enjoys physical therapy, helps "keeps me going"  Takes baclofen in the morning and evening: legs are stiffer than arms, baclofen helpful  Taking modafinil, helpful for fatigue: if he misses it he's sleepy  Bladder: sometimes incontinent of urine, says that this is mostly due to inability to get to bathroom quickly enough, wears an adult brief as a back-up  No bowel incontinence  Still fixing up old cars/trucks  Uses walker, does well with it  No UTIs    Medications:  Current Outpatient Medications   Medication Sig    amLODIPine (NORVASC) 5 MG tablet TAKE 1 TABLET EVERY DAY    baclofen (LIORESAL) 10 MG tablet TAKE 2 TABLETS EVERY MORNING AND TAKE 2 TABLETS AT BEDTIME    cholecalciferol, vitamin D3, 1,250 mcg (50,000 unit) Tab Take 1 tablet by mouth once a week.    ergocalciferol (ERGOCALCIFEROL) 50,000 unit Cap TAKE 1 CAPSULE EVERY 7 DAYS    furosemide (LASIX) 40 MG tablet TAKE 1 TABLET TWICE DAILY    levothyroxine (SYNTHROID) 50 MCG tablet TAKE 1 TABLET BEFORE BREAKFAST    losartan (COZAAR) 50 MG tablet TAKE 1 TABLET EVERY DAY    modafiniL (PROVIGIL) 200 MG Tab Take 1 tablet (200 mg total) by mouth once daily. Take qam    naproxen sodium (ANAPROX) 220 MG tablet Take 220 mg by mouth 2 (two) times daily with meals.    teriflunomide (AUBAGIO) 14 mg Tab Take 1 tablet (14 mg) by mouth once daily.    mirabegron (MYRBETRIQ) 25 mg Tb24 ER tablet Take 1 tablet (25 mg total) by mouth once " daily.     No current facility-administered medications for this visit.       SOCIAL HISTORY  Social History[1]    Living arrangements - the patient lives with their spouse.    ROS:        4/13/2025     9:37 AM   REVIEW OF SYMPTOMS   Do you feel abnormally tired on most days? No   Do you feel you generally sleep well? Yes   Do you have difficulty controlling your bladder?  Yes   Do you have difficulty controlling your bowels?  No   Do you have frequent muscle cramps, tightness or spasms in your limbs?  Yes   Do you have new visual symptoms?  No   Do you have worsening difficulty with your memory or thinking? No   Do you have worsening symptoms of anxiety or depression?  No   For patients who walk, Do you have more difficulty walking?  Yes   Have you fallen since your last visit?  No   For patients who use wheelchairs: Do you have any skin wounds or breakdown? Not Applicable   Do you have difficulty using your hands?  No   Do you have shooting or burning pain? Yes   Do you have difficulty with sexual function?  Yes   If you are sexually active, are you using birth control? Y/N  N/A Not Applicable   Do you often choke when swallowing liquids or solid food?  No   Do you experience worsening symptoms when overheated? Yes   Do you need any new equipment such as a wheelchair, walker or shower chair? No   Do you receive co-pay financial assistance for your principal MS medicine? No   Would you be interested in participating in an MS research trial in the future? Not Applicable   For patients on Gilenya, Tecfidera, Aubagio, Rituxan, Ocrevus, Tysabri, Lemtrada or Methotrexate, are you aware that you should NOT receive live virus vaccines?  No   Do you feel you have adequate family/friend support?  Yes   Do you have health insurance?   No   Are you currently employed? No   Do you receive SSDI/SSI?  Yes   Do you use marijuana or cannabis products? No   Have you been diagnosed with a urinary tract infection since your last  visit here? No   Have you been diagnosed with a respiratory tract infection since your last visit here? No   Have you been to the emergency room since your last visit here? No   Have you been hospitalized since your last visit here?  No                Objective:        1. 25 foot timed walk:      1/16/2025     8:30 AM 4/14/2025     9:00 AM   Timed 25 Foot Walk:   Did patient wear an AFO? No No   Was assistive device used? Yes Yes   Assistive device used (rain one): Bilateral Assistance Bilateral Assistance   Bilateral device used Walker/Rollator Walker/Rollator   Time for 25 Foot Walk (seconds) 8.3 10.5   Time for 25 Foot Walk (seconds) 8.04 9.5       Neurological Exam  MS: intact  CN: no SALO, no dysarthria  MOTOR: LUE 5-/5 throughout, RUE 4/5 throughout aside from 4-/5 wrist extension ; LE: 4+/5 in flexors t/o  REF: 3+ patellar sangeetha, R Brooke's  SENS: moderate decrease vib LE sangeetha  GAIT: slow, walker, improved  COORD: no dystaxia on FTN but limite prox ROM    Imaging:     Results for orders placed during the hospital encounter of 03/26/25    MRI Brain Demyelinating Without Contrast    Impression  Brain appears stable from prior exam, again demonstrating findings compatible with the reported history of multiple sclerosis.  No new discrete lesions to indicate ongoing demyelination.    No new focal spinal cord lesions.    Postoperative change of C3-C5 posterior decompression and instrumented fusion, with satisfactory decompression of spinal canal.    Multilevel cervical spondylosis elsewhere similar to prior, without evidence of new large disc herniation or site of spinal canal stenosis.    Electronically signed by resident: Daniel Fleming  Date:    03/26/2025  Time:    10:24    Electronically signed by: Sagar Santos MD  Date:    03/26/2025  Time:    16:09    Results for orders placed during the hospital encounter of 03/26/25    MRI Cervical Spine Demyelinating Without Contrast    Impression  Brain appears stable from  prior exam, again demonstrating findings compatible with the reported history of multiple sclerosis.  No new discrete lesions to indicate ongoing demyelination.    No new focal spinal cord lesions.    Postoperative change of C3-C5 posterior decompression and instrumented fusion, with satisfactory decompression of spinal canal.    Multilevel cervical spondylosis elsewhere similar to prior, without evidence of new large disc herniation or site of spinal canal stenosis.    Electronically signed by resident: Daniel Fleming  Date:    03/26/2025  Time:    10:24    Electronically signed by: Sagar Santos MD  Date:    03/26/2025  Time:    16:09    No results found for this or any previous visit.    Results for orders placed during the hospital encounter of 03/13/23    MRI Brain Demyelinating W W/O Contrast    Impression  Brain appears grossly stable from prior exams again demonstrating findings compatible with the reported history of multiple sclerosis.  No definite new or enhancing lesions to indicate ongoing or active demyelination.    Advanced cervical spondylosis, progressed since the prior exam.  This results in severe spinal stenosis and cord compression at C3-4 with some subtle cord signal changes at this level presumably from compressive myelopathy.    No new focal spinal cord lesions identified elsewhere.    This report was flagged in Epic as abnormal.      Electronically signed by: Sagar Santos MD  Date:    03/14/2023  Time:    08:22    Results for orders placed during the hospital encounter of 03/13/23    MRI Cervical Spine Demyelinating W W/O Contrast    Impression  Brain appears grossly stable from prior exams again demonstrating findings compatible with the reported history of multiple sclerosis.  No definite new or enhancing lesions to indicate ongoing or active demyelination.    Advanced cervical spondylosis, progressed since the prior exam.  This results in severe spinal stenosis and cord compression at  "C3-4 with some subtle cord signal changes at this level presumably from compressive myelopathy.    No new focal spinal cord lesions identified elsewhere.    This report was flagged in Epic as abnormal.      Electronically signed by: Sagar Santos MD  Date:    03/14/2023  Time:    08:22    No results found for this or any previous visit.        Labs:     Lab Results   Component Value Date    PNUHGFBL24HJ 13 (L) 01/30/2023     No results found for: "JCVINDEX", "JCVANTIBODY"  No results found for: "IF3OTAGM", "ABSOLUTECD3", "BW5OCENZ", "ABSOLUTECD8", "IU4LGUDM", "ABSOLUTECD4", "LABCD48"  Lab Results   Component Value Date    WBC 6.29 02/27/2025    RBC 4.23 (L) 02/27/2025    HGB 13.0 (L) 02/27/2025    HCT 42.2 02/27/2025     (H) 02/27/2025    MCH 30.7 02/27/2025    MCHC 30.8 (L) 02/27/2025    RDW 14.5 02/27/2025     02/27/2025    MPV 12.2 02/27/2025    GRAN 3.6 02/27/2025    GRAN 57.0 02/27/2025    LYMPH 1.3 02/27/2025    LYMPH 20.7 02/27/2025    MONO 0.8 02/27/2025    MONO 12.7 02/27/2025    EOS 0.5 02/27/2025    BASO 0.05 02/27/2025    EOSINOPHIL 8.6 (H) 02/27/2025    BASOPHIL 0.8 02/27/2025     Sodium   Date Value Ref Range Status   06/11/2024 141 136 - 145 mmol/L Final   06/11/2024 141 136 - 145 mmol/L Final     Potassium   Date Value Ref Range Status   06/11/2024 4.0 3.5 - 5.1 mmol/L Final   06/11/2024 4.0 3.5 - 5.1 mmol/L Final     Chloride   Date Value Ref Range Status   06/11/2024 103 95 - 110 mmol/L Final   06/11/2024 103 95 - 110 mmol/L Final     CO2   Date Value Ref Range Status   06/11/2024 28 23 - 29 mmol/L Final   06/11/2024 28 23 - 29 mmol/L Final     Glucose   Date Value Ref Range Status   06/11/2024 87 70 - 110 mg/dL Final   06/11/2024 87 70 - 110 mg/dL Final     BUN   Date Value Ref Range Status   06/11/2024 25 (H) 8 - 23 mg/dL Final   06/11/2024 25 (H) 8 - 23 mg/dL Final     Creatinine   Date Value Ref Range Status   06/11/2024 1.0 0.5 - 1.4 mg/dL Final   06/11/2024 1.0 0.5 - 1.4 mg/dL " "Final     Calcium   Date Value Ref Range Status   06/11/2024 9.0 8.7 - 10.5 mg/dL Final   06/11/2024 9.0 8.7 - 10.5 mg/dL Final     Total Protein   Date Value Ref Range Status   02/27/2025 7.0 6.0 - 8.4 g/dL Final     Albumin   Date Value Ref Range Status   02/27/2025 3.5 3.5 - 5.2 g/dL Final     Total Bilirubin   Date Value Ref Range Status   02/27/2025 0.4 0.1 - 1.0 mg/dL Final     Comment:     For infants and newborns, interpretation of results should be based  on gestational age, weight and in agreement with clinical  observations.    Premature Infant recommended reference ranges:  Up to 24 hours.............<8.0 mg/dL  Up to 48 hours............<12.0 mg/dL  3-5 days..................<15.0 mg/dL  6-29 days.................<15.0 mg/dL       Alkaline Phosphatase   Date Value Ref Range Status   02/27/2025 80 40 - 150 U/L Final     AST   Date Value Ref Range Status   02/27/2025 36 10 - 40 U/L Final     ALT   Date Value Ref Range Status   02/27/2025 24 10 - 44 U/L Final     Anion Gap   Date Value Ref Range Status   06/11/2024 10 8 - 16 mmol/L Final   06/11/2024 10 8 - 16 mmol/L Final     eGFR if    Date Value Ref Range Status   04/30/2021 >60.0 >60 mL/min/1.73 m^2 Final     eGFR if non    Date Value Ref Range Status   04/30/2021 >60.0 >60 mL/min/1.73 m^2 Final     Comment:     Calculation used to obtain the estimated glomerular filtration  rate (eGFR) is the CKD-EPI equation.        No results found for: "HEPBSAG", "HEPBSAB", "HEPBCAB"        MS Impression and Plan:     NEURO MULTIPLE SCLEROSIS IMPRESSION:   Number of relapses in the past year?:  0  Clinical Progression:  Clinically Stable  MRI Progression:  Stable  MS Classification:  Secondarily Progressive MS  Current DMT: terifluomide  DMT:  No change in management  Symptom Management:  Implement change in symptom management  Implement Change in Symptom Management:  Bladder  Implement Change in Symptom Management comment: Start prn " myrbetriq which he'll only take if going out  Additional Impressions: Stable in genera  Continue PT   Check ferritin for suspected RLS;   Check B12 (supplementing orally at moment, was low)   Consider tolebrutinib in future  F/u 6 mo Tegan Charles NP            Problem List Items Addressed This Visit          Unprioritized    MS (multiple sclerosis)     Other Visit Diagnoses         Restless leg    -  Primary    Relevant Orders    Ferritin      Iron deficiency        Relevant Orders    Ferritin      B12 deficiency        Relevant Orders    Vitamin B12      Neurogenic bladder        Relevant Medications    mirabegron (MYRBETRIQ) 25 mg Tb24 ER tablet      Gait disturbance                Antonella Das MD    I spent a total of 40 minutes on the day of the visit.This includes face to face time and non-face to face time preparing to see the patient (eg, review of tests), obtaining and/or reviewing separately obtained history, documenting clinical information in the electronic or other health record, independently interpreting results and communicating results to the patient/family/caregiver, or care coordinator.  Visit today included increased complexity associated with the care of the episodic problem : chronic immunotherapy; addressed and managing the longitudinal care of the patient due to the serious and/or complex managed problem(s) MS.         [1]   Social History  Tobacco Use    Smoking status: Never    Smokeless tobacco: Never   Substance Use Topics    Alcohol use: No    Drug use: No

## 2025-05-01 ENCOUNTER — CLINICAL SUPPORT (OUTPATIENT)
Dept: REHABILITATION | Facility: HOSPITAL | Age: 70
End: 2025-05-01
Payer: MEDICARE

## 2025-05-01 DIAGNOSIS — Z74.09 IMPAIRED FUNCTIONAL MOBILITY, BALANCE, GAIT, AND ENDURANCE: Primary | ICD-10-CM

## 2025-05-01 PROCEDURE — 97112 NEUROMUSCULAR REEDUCATION: CPT | Mod: HCNC,PO

## 2025-05-01 PROCEDURE — 97110 THERAPEUTIC EXERCISES: CPT | Mod: HCNC,PO

## 2025-05-01 NOTE — PROGRESS NOTES
"  Outpatient Rehab    Physical Therapy Visit    Patient Name: Rajan Cooper III  MRN: [id number]  YOB: 1955  Encounter Date: 5/1/2025    Therapy Diagnosis:   Encounter Diagnosis   Name Primary?    Impaired functional mobility, balance, gait, and endurance Yes     Physician: Tegan Charles NP    Physician Orders: Eval and Treat  Medical Diagnosis: MS (multiple sclerosis)    Visit # / Visits Authorized:  13 / 20  Insurance Authorization Period: 1/31/2025 to 12/31/2025  Date of Evaluation: 01/31/2025  Plan of Care Certification: 04/10/2025- 5/15/2025     PT/PTA:     Number of PTA visits since last PT visit:   Time In: 0715   Time Out: 0759  Total Time: 44   Total Billable Time: 44    FOTO:  Intake Score:  %  Survey Score 1:  %  Survey Score 2:  %         Subjective   He's doing okay this morning with no complaints at the start of his session. States that he had a gap in his visits due to scheduling issues but he's remained active. Has been doing his HEP but not as regularly as prescribed..  Pain reported as 0/10. n/a    Objective            Treatment:  Therapeutic Exercise  TE 1: x 8 mins, SciFit recumbent stepper, hills, twin peaks, level 3.5, B UE/LE for endurance and neural priming  Balance/Neuromuscular Re-Education  NMR 1: 2 x 6 reps, Sit <> stands + placing/taking squigs on/off of mirror  NMR 2: 1 x 10 reps B, Standing overhead UE raises with alternating 1 UE support  NMR 3: 3 x 30", Static standing, neutral ONEIDA  NMR 4: // bars: 2 x 10 reps B, Toe taps to 4" box, 1 UE support; 2 x 30" B, Modified SLS with CL limb on 4" box, light B UE support  NMR 5: // bars: 2 x length of bar each direction, forward walking with 1 UE support > light B UE support <> backwards walking with B UE support    Time Entry(in minutes):  Neuromuscular Re-Education Time Entry: 36  Therapeutic Exercise Time Entry: 8    Assessment & Plan   Assessment: Mr. Tolentino performed fairly well during today's session. He showed more " difficulty with sit to stands on today as he required Min A at times to steady his balance once standing. With static standing balance tasks, he performed very well but required VC to manage shifting his COG appropriately to prevent posterior or lateral LOB. He showed more difficulty with dynamic balance tasks in the // bars with limited UE support but overall performed well. He remains appropriate for skilled physical therapy services to further address his functional deficits.  Evaluation/Treatment Tolerance: Patient tolerated treatment well    Patient will continue to benefit from skilled outpatient physical therapy to address the deficits listed in the problem list box on initial evaluation, provide pt/family education and to maximize pt's level of independence in the home and community environment.     Patient's spiritual, cultural, and educational needs considered and patient agreeable to plan of care and goals.           Plan: continue functional task training and balance training    Goals:   Active       Long term goals       Patient to improve 30 sec sit to stand score to 9 to demonstrate improved functional strength (Progressing)       Start:  01/31/25    Expected End:  05/15/25            Patient to improve hip strength grades by 1/2 grade on MMT to demonstrate improved LE strength  (Progressing)       Start:  01/31/25    Expected End:  05/15/25            Patient to improve TUG time to </=13 seconds to demonstrate improved functional mobility (Progressing)       Start:  01/31/25    Expected End:  05/15/25               Short term goals       Patient to improve hip strength grades by 1/2 grade on MMT to demonstrate improved LE strength  (Met)       Start:  01/31/25    Expected End:  02/28/25    Resolved:  03/10/25         Patient to improve TUG time to </=14/16 to demonstrate improved functional mobility (Met)       Start:  01/31/25    Expected End:  02/28/25    Resolved:  03/10/25         Patient to  imprvoe Williamson Balance Scale score tp </=22/56 to demonstrate improved balance (Progressing)       Start:  01/31/25    Expected End:  05/15/25            PT to ambulate >/= 635 during 6MWT to demonstrate improved aerobic capacity (Progressing)       Start:  01/31/25    Expected End:  05/25/25                Rose Marie Maguire, PT

## 2025-05-12 ENCOUNTER — CLINICAL SUPPORT (OUTPATIENT)
Dept: REHABILITATION | Facility: HOSPITAL | Age: 70
End: 2025-05-12
Payer: MEDICARE

## 2025-05-12 DIAGNOSIS — Z74.09 IMPAIRED FUNCTIONAL MOBILITY, BALANCE, GAIT, AND ENDURANCE: Primary | ICD-10-CM

## 2025-05-12 PROCEDURE — 97112 NEUROMUSCULAR REEDUCATION: CPT | Mod: HCNC,PO,CQ

## 2025-05-12 PROCEDURE — 97530 THERAPEUTIC ACTIVITIES: CPT | Mod: HCNC,PO,CQ

## 2025-05-12 PROCEDURE — 97110 THERAPEUTIC EXERCISES: CPT | Mod: HCNC,PO,CQ

## 2025-05-12 NOTE — PROGRESS NOTES
"  Outpatient Rehab    Physical Therapy Visit    Patient Name: Rajan Cooper III  MRN: [id number]  YOB: 1955  Encounter Date: 5/12/2025    Therapy Diagnosis:   Encounter Diagnosis   Name Primary?    Impaired functional mobility, balance, gait, and endurance Yes     Physician: Tegan Charles NP    Physician Orders: Eval and Treat  Medical Diagnosis: MS (multiple sclerosis)    Visit # / Visits Authorized:  14 / 20  Insurance Authorization Period: 1/31/2025 to 12/31/2025  Date of Evaluation: 1/31/2025  Plan of Care Certification: 3/28/2025 to 4/10/2025      PT/PTA: PTA   Number of PTA visits since last PT visit:1  Time In: 0800   Time Out: 0853  Total Time (in minutes): 53   Total Billable Time (in minutes): 53      Precautions:       Subjective   " I had to miss a few appointments last week because he pulled a muscle in his left leg.  It is better now.".  Pain reported as 0/10.      Objective            Treatment:  Therapeutic Exercise  TE 1: x 10 min on Sci Fit recumbent stepper.  B UE/B LE on level 3. 5  TE 2: Leg press: 3 x 10 reps of B LE squats with #140lbs applied.  Balance/Neuromuscular Re-Education  NMR 1: in // bars: 4 point foam fitter board---> 4 x 30 sec of static standing with WBOS and no UE support, 2 x 30 sec each of B LE single leg stance with unilateral support, occ 1 finger support and 1 x 10 reps of B LE single leg step ups with 1 UE support.  All with CGA  NMR 2: in // bars: 2 point wooden fitter board: 2 x 60 sec of medial/lateral weight shifting with 2 finger support and CGA, 2 x 60 sec of avoiding moving medial/lateral with 2 finger support, 2 x 60 sec of anterior/posterior weight shifting with 2 finger support, 2 x 60 sec of avoiding moving anterior/posterior weight shifting with 2 finger support  Therapeutic Activity  TA 1: 1 x 10 reps of sit to stand from Gold chair with no UE support and walker nearby for safety if needed    Time Entry(in minutes):  Neuromuscular " Re-Education Time Entry: 18  Therapeutic Activity Time Entry: 10  Therapeutic Exercise Time Entry: 25    Assessment & Plan   Assessment: Rajan tolerates his tx session well and did not have any problems noted.  Rajan cont to focus on strengthening, endurance and balance and requires 1-2 UE support for balance mostly, but was able to perform occasional balance with no UE support.  Rajan was able to increase his time on the stepper today.  Evaluation/Treatment Tolerance: Patient tolerated treatment well    Patient will continue to benefit from skilled outpatient physical therapy to address the deficits listed in the problem list box on initial evaluation, provide pt/family education and to maximize pt's level of independence in the home and community environment.     Patient's spiritual, cultural, and educational needs considered and patient agreeable to plan of care and goals.           Plan: Cont with strengthening, endurance, balance and functional mobility    Goals:   Active       Long term goals       Patient to improve 30 sec sit to stand score to 9 to demonstrate improved functional strength (Progressing)       Start:  01/31/25    Expected End:  05/15/25            Patient to improve hip strength grades by 1/2 grade on MMT to demonstrate improved LE strength  (Progressing)       Start:  01/31/25    Expected End:  05/15/25            Patient to improve TUG time to </=13 seconds to demonstrate improved functional mobility (Progressing)       Start:  01/31/25    Expected End:  05/15/25               Short term goals       Patient to improve hip strength grades by 1/2 grade on MMT to demonstrate improved LE strength  (Met)       Start:  01/31/25    Expected End:  02/28/25    Resolved:  03/10/25         Patient to improve TUG time to </=14/16 to demonstrate improved functional mobility (Met)       Start:  01/31/25    Expected End:  02/28/25    Resolved:  03/10/25         Patient to imprvoe Williamson Balance Scale  score tp </=22/56 to demonstrate improved balance (Progressing)       Start:  01/31/25    Expected End:  05/15/25            PT to ambulate >/= 635 during 6MWT to demonstrate improved aerobic capacity (Progressing)       Start:  01/31/25    Expected End:  05/25/25                Sarah Duncan, PTA

## 2025-05-13 ENCOUNTER — PATIENT MESSAGE (OUTPATIENT)
Dept: PSYCHIATRY | Facility: CLINIC | Age: 70
End: 2025-05-13
Payer: MEDICARE

## 2025-05-15 ENCOUNTER — CLINICAL SUPPORT (OUTPATIENT)
Dept: REHABILITATION | Facility: HOSPITAL | Age: 70
End: 2025-05-15
Payer: MEDICARE

## 2025-05-15 DIAGNOSIS — Z74.09 IMPAIRED FUNCTIONAL MOBILITY, BALANCE, GAIT, AND ENDURANCE: Primary | ICD-10-CM

## 2025-05-15 PROCEDURE — 97110 THERAPEUTIC EXERCISES: CPT

## 2025-05-15 PROCEDURE — 97112 NEUROMUSCULAR REEDUCATION: CPT

## 2025-05-15 PROCEDURE — 97530 THERAPEUTIC ACTIVITIES: CPT

## 2025-05-15 NOTE — PROGRESS NOTES
"  Outpatient Rehab    Physical Therapy Progress Note : Updated Plan of Care    Patient Name: Rajan Cooper III  MRN: [id number]  YOB: 1955  Encounter Date: 5/15/2025    Therapy Diagnosis:   Encounter Diagnosis   Name Primary?    Impaired functional mobility, balance, gait, and endurance Yes     Physician: Tegan Charles NP    Physician Orders: Eval and Treat  Medical Diagnosis: MS (multiple sclerosis)    Visit # / Visits Authorized:  15 / 20  Insurance Authorization Period: 1/31/2025 to 12/31/2025  Date of Evaluation: 1/31/2025  Plan of Care Certification: 5/15/2025 to 5/29/2025     PT/PTA: PT   Number of PTA visits since last PT visit:0  Time In: 0800   Time Out: 0845  Total Time (in minutes): 45   Total Billable Time (in minutes): 45    FOTO: next foto at DC  \  Precautions:     Standard, Fall, Quick onset of fatigue, LE numbness      Subjective   "I had to miss a few appointments and haven't been able to do my exercises at home as muchs as I should because things are hectic.".  Pain reported as 0/10. n/a    Objective         Fall Risk  Functional mobility test results suggest the patient is: At Risk for Falls  Timed Up & Go (TUG)  Time: 22.27 seconds  Observations: Slow tentative pace and Short strides  An older adult who takes >=12 seconds to complete the TUG is at risk for falling.       Sit to Stand Testing      The patient completed 7 repetitions of a sit to stand transfer in 30 seconds. UE Support         Ambulation Details    6MWT: 600ft, RPE 13-14 with rollator         Treatment:  Therapeutic Exercise  TE 1: x 10 min on Sci Fit recumbent stepper.  B UE/B LE on level 3. 5  Balance/Neuromuscular Re-Education  NMR 6: at stairs alternating toe taps to the bottom step 2x30 seconds  Therapeutic Activity  TA 1: Time for objective testing      Time Entry(in minutes):  Neuromuscular Re-Education Time Entry: 10  Therapeutic Activity Time Entry: 20  Therapeutic Exercise Time Entry: 15    Assessment " & Plan   Assessment  Rajan            Functional Limitations: Activity tolerance, Ambulating on uneven surfaces, Decreased ambulation distance/endurance, Getting off the floor, Functional mobility, Fine motor coordination, Gross motor coordination, Transfers  Impairments: Abnormal coordination, Abnormal gait, Activity intolerance, Impaired physical strength, Impaired balance    Assessment Details: Rajan has made fair progress towards his goals. He has maintained his score on 30 second sit to stand and improved his gait endurance with 6MWT but has decreased his time during TUG. Some barriers to his progress may have been scheduling challenges and pulled muscle in his leg. He remains appropriate for skilled therapy to improve balance and work on transitioning care to [facility] and community based setting to maintain current functional level in the presence of progressive disease state. He would benefit from continued therapy to update HEP and increase activity tolerance.    Plan  From a physical therapy perspective, the patient would benefit from: Skilled Rehab Services    Planned therapy interventions include: Therapeutic exercise, Therapeutic activities, Neuromuscular re-education, and Gait training.            Visit Frequency: 2 times Per Week for 2 Weeks.       This plan was discussed with Patient.   Discussion participants have not: Agreed Upon Plan of Care             Patient will continue to benefit from skilled outpatient physical therapy to address the deficits listed in the problem list box on initial evaluation, provide pt/family education and to maximize pt's level of independence in the home and community environment.     Patient's spiritual, cultural, and educational needs considered and patient agreeable to plan of care and goals.     Education  Education was done with Patient. The patient's learning style includes Reading. The patient Verbalizes understanding.         MS community classes in the [facility]        Goals:   Active       Long term goals       Patient to improve 30 sec sit to stand score to 9 to demonstrate improved functional strength (Not Progressing)       Start:  01/31/25    Expected End:  05/15/25            Patient to improve hip strength grades by 1/2 grade on MMT to demonstrate improved LE strength  (Progressing)       Start:  01/31/25    Expected End:  05/15/25            Patient to improve TUG time to </=13 seconds to demonstrate improved functional mobility (Not Progressing)       Start:  01/31/25    Expected End:  05/15/25               Short term goals       Patient to improve hip strength grades by 1/2 grade on MMT to demonstrate improved LE strength  (Met)       Start:  01/31/25    Expected End:  02/28/25    Resolved:  03/10/25         Patient to improve TUG time to </=14/16 to demonstrate improved functional mobility (Met)       Start:  01/31/25    Expected End:  02/28/25    Resolved:  03/10/25         Patient to imprvoe Williamson Balance Scale score tp </=22/56 to demonstrate improved balance (Progressing)       Start:  01/31/25    Expected End:  05/15/25            PT to ambulate >/= 635 during 6MWT to demonstrate improved aerobic capacity (Progressing)       Start:  01/31/25    Expected End:  05/25/25                Trini Amado, PT

## 2025-05-16 DIAGNOSIS — G35 MS (MULTIPLE SCLEROSIS): ICD-10-CM

## 2025-05-16 RX ORDER — TERIFLUNOMIDE 14 MG/1
14 TABLET, FILM COATED ORAL DAILY
Qty: 90 TABLET | Refills: 0 | Status: SHIPPED | OUTPATIENT
Start: 2025-05-16 | End: 2026-05-16

## 2025-05-19 ENCOUNTER — CLINICAL SUPPORT (OUTPATIENT)
Dept: REHABILITATION | Facility: HOSPITAL | Age: 70
End: 2025-05-19
Payer: MEDICARE

## 2025-05-19 DIAGNOSIS — Z74.09 IMPAIRED FUNCTIONAL MOBILITY, BALANCE, GAIT, AND ENDURANCE: Primary | ICD-10-CM

## 2025-05-19 DIAGNOSIS — E03.9 HYPOTHYROIDISM, UNSPECIFIED TYPE: ICD-10-CM

## 2025-05-19 PROCEDURE — 97530 THERAPEUTIC ACTIVITIES: CPT

## 2025-05-19 PROCEDURE — 97110 THERAPEUTIC EXERCISES: CPT

## 2025-05-19 PROCEDURE — 97112 NEUROMUSCULAR REEDUCATION: CPT

## 2025-05-19 RX ORDER — LEVOTHYROXINE SODIUM 50 UG/1
50 TABLET ORAL
Qty: 90 TABLET | Refills: 0 | Status: SHIPPED | OUTPATIENT
Start: 2025-05-19

## 2025-05-19 RX ORDER — FUROSEMIDE 40 MG/1
40 TABLET ORAL 2 TIMES DAILY
Qty: 180 TABLET | Refills: 0 | Status: SHIPPED | OUTPATIENT
Start: 2025-05-19

## 2025-05-19 RX ORDER — LOSARTAN POTASSIUM 50 MG/1
50 TABLET ORAL
Qty: 90 TABLET | Refills: 0 | Status: SHIPPED | OUTPATIENT
Start: 2025-05-19

## 2025-05-19 RX ORDER — AMLODIPINE BESYLATE 5 MG/1
5 TABLET ORAL
Qty: 90 TABLET | Refills: 0 | Status: SHIPPED | OUTPATIENT
Start: 2025-05-19

## 2025-05-19 NOTE — TELEPHONE ENCOUNTER
Care Due:                  Date            Visit Type   Department     Provider  --------------------------------------------------------------------------------                                MYCHART                              FOLLOWUP/OF  [facility] INTERNAL  Last Visit: 08-      FICE VISIT   MEDICINE       Tom Garcia  Next Visit: None Scheduled  None         None Found                                                            Last  Test          Frequency    Reason                     Performed    Due Date  --------------------------------------------------------------------------------    Office Visit  12 months..  amLODIPine,                08- 08-                             ergocalciferol,                             furosemide, losartan.....    CMP.........  12 months..  ergocalciferol,            06- 06-                             furosemide, losartan.....    Vitamin D...  12 months..  ergocalciferol...........  Not Found    Overdue    [employer] Embedded Care Due Messages. Reference number: [id number].   5/19/2025 2:42:16 AM CDT

## 2025-05-19 NOTE — TELEPHONE ENCOUNTER
Refill Routing Note   Medication(s) are not appropriate for processing by [facility] for the following reason(s):        Patient not seen by provider within 15 months  Required vitals abnormal    [facility] action(s):  Defer   Requires appointment : Yes     Requires labs : Yes             Appointments  past 12m or future 3m with PCP    Date Provider   Last Visit   8/14/2023 Tom Garcia MD   Next Visit   Visit date not found Tom Garcia MD   ED visits in past 90 days: 0        Note composed:3:03 PM 05/19/2025

## 2025-05-19 NOTE — PROGRESS NOTES
"  Outpatient Rehab    Physical Therapy Visit    Patient Name: Rajan Cooper III  MRN: [id number]  YOB: 1955  Encounter Date: 5/19/2025    Therapy Diagnosis:   Encounter Diagnosis   Name Primary?    Impaired functional mobility, balance, gait, and endurance Yes     Physician: Tegan Charles NP    Physician Orders: Eval and Treat  Medical Diagnosis: MS (multiple sclerosis)    Visit # / Visits Authorized:  16 / 20  Insurance Authorization Period: 1/31/2025 to 12/31/2025  Date of Evaluation: 1/31/2025  Plan of Care Certification: 5/15/2025 to 5/30/2025      PT/PTA: PT   Number of PTA visits since last PT visit:0  Time In: 0845   Time Out: 0930  Total Time (in minutes): 45   Total Billable Time (in minutes): 45      Precautions:     Standard, Fall, Quick onset of fatigue, LE numbness      Subjective      Pain reported as 0/10. n/a    Objective            Treatment:  Therapeutic Exercise  TE 1: x 10 min on Sci Fit recumbent stepper.  B UE/B LE on level 3. 5  Balance/Neuromuscular Re-Education  NMR 1: at ballet bar: 4 point fitter board 2x30 seconds static standing WBOS no UE support; 3x30 seconds lateral weight shifting 1 finger support  NMR 3: 3 x 30", Static standing, neutral ONEIDA on airex pad  NMR 4: at ballet bar: 2x30 seconds modified SLS with CL limb on 4 inch step  Therapeutic Activity  TA 1: Sit to stands from gold chair with verbal and tactile cueing for anterior weight shift and trunk extension    Time Entry(in minutes):  Neuromuscular Re-Education Time Entry: 20  Therapeutic Activity Time Entry: 10  Therapeutic Exercise Time Entry: 15    Assessment & Plan   Assessment: Rajan tolerated session well today. He continues to demonstrate difficulty maintaining trunk extension and requires more UE support with single leg activities. Improved weigth shift anteriorly with sit to stands. His HEP exercises were reviewed and will continued to be added onto. He continues to benefit from skilled therapy to " improve balance and prevent functional decline  Evaluation/Treatment Tolerance: Patient tolerated treatment well    Patient will continue to benefit from skilled outpatient physical therapy to address the deficits listed in the problem list box on initial evaluation, provide pt/family education and to maximize pt's level of independence in the home and community environment.     Patient's spiritual, cultural, and educational needs considered and patient agreeable to plan of care and goals.           Plan: Cont with strengthening, endurance, balance and functional mobility    Goals:   Active       Long term goals       Patient to improve 30 sec sit to stand score to 9 to demonstrate improved functional strength (Not Progressing)       Start:  01/31/25    Expected End:  05/15/25            Patient to improve hip strength grades by 1/2 grade on MMT to demonstrate improved LE strength  (Progressing)       Start:  01/31/25    Expected End:  05/15/25            Patient to improve TUG time to </=13 seconds to demonstrate improved functional mobility (Not Progressing)       Start:  01/31/25    Expected End:  05/15/25               Short term goals       Patient to improve hip strength grades by 1/2 grade on MMT to demonstrate improved LE strength  (Met)       Start:  01/31/25    Expected End:  02/28/25    Resolved:  03/10/25         Patient to improve TUG time to </=14/16 to demonstrate improved functional mobility (Met)       Start:  01/31/25    Expected End:  02/28/25    Resolved:  03/10/25         Patient to imprvoe Williamson Balance Scale score tp </=22/56 to demonstrate improved balance (Progressing)       Start:  01/31/25    Expected End:  05/15/25            PT to ambulate >/= 635 during 6MWT to demonstrate improved aerobic capacity (Progressing)       Start:  01/31/25    Expected End:  05/25/25                Trini Amado, PT

## 2025-05-22 ENCOUNTER — CLINICAL SUPPORT (OUTPATIENT)
Dept: REHABILITATION | Facility: HOSPITAL | Age: 70
End: 2025-05-22
Payer: MEDICARE

## 2025-05-22 DIAGNOSIS — Z74.09 IMPAIRED FUNCTIONAL MOBILITY, BALANCE, GAIT, AND ENDURANCE: Primary | ICD-10-CM

## 2025-05-22 PROCEDURE — 97110 THERAPEUTIC EXERCISES: CPT

## 2025-05-22 PROCEDURE — 97112 NEUROMUSCULAR REEDUCATION: CPT

## 2025-05-22 PROCEDURE — 97530 THERAPEUTIC ACTIVITIES: CPT

## 2025-05-22 NOTE — PROGRESS NOTES
"  Outpatient Rehab    Physical Therapy Visit    Patient Name: Rajan Cooper III  MRN: [id number]  YOB: 1955  Encounter Date: 5/22/2025    Therapy Diagnosis:   Encounter Diagnosis   Name Primary?    Impaired functional mobility, balance, gait, and endurance Yes     Physician: Tegan Charles NP    Physician Orders: Eval and Treat  Medical Diagnosis: MS (multiple sclerosis)    Visit # / Visits Authorized:  17 / 20  Insurance Authorization Period: 1/31/2025 to 12/31/2025  Date of Evaluation: 1/31/2025  Plan of Care Certification: 5/15/2025 to 5/30/2025      PT/PTA: PT   Number of PTA visits since last PT visit:0  Time In: 0800   Time Out: 0845  Total Time (in minutes): 45   Total Billable Time (in minutes): 45      Precautions:     Standard, Fall, Quick onset of fatigue, LE numbness      Subjective   no complaints.  Pain reported as 0/10. n/a    Objective            Treatment:  Therapeutic Exercise  TE 1: x 10 min on Sci Fit recumbent stepper.  B UE/B LE on level 3. 5  TE 2: Gastroc slant board stretch 3x30 seconds  Balance/Neuromuscular Re-Education  NMR 3: 3 x 30", Static standing, neutral ONEIDA on airex pad  NMR 4: at ballet bar: 2x30 seconds modified SLS with CL limb on 4 inch step  Therapeutic Activity  TA 1: 2 x10 Sit to stands from gold chair with verbal and tactile cueing for anterior weight shift and trunk extension    Time Entry(in minutes):  Neuromuscular Re-Education Time Entry: 15  Therapeutic Activity Time Entry: 15  Therapeutic Exercise Time Entry: 15    Assessment & Plan   Assessment: Rajan tolerated session well. Attempted calf raises but was unable to perform in a weightbearing position. He continues to tolerate balance exercises well.  Demonstrates appropriate fatigue responses with exercises. Will benefit from updated HEP prior to discharge. Remains appropriate for outpatient skilled therapy interventions  Evaluation/Treatment Tolerance: Patient tolerated treatment well    Patient " will continue to benefit from skilled outpatient physical therapy to address the deficits listed in the problem list box on initial evaluation, provide pt/family education and to maximize pt's level of independence in the home and community environment.     Patient's spiritual, cultural, and educational needs considered and patient agreeable to plan of care and goals.           Plan: Cont with strengthening, endurance, balance and functional mobility; Update HEP    Goals:   Active       Long term goals       Patient to improve 30 sec sit to stand score to 9 to demonstrate improved functional strength (Not Progressing)       Start:  01/31/25    Expected End:  05/15/25            Patient to improve hip strength grades by 1/2 grade on MMT to demonstrate improved LE strength  (Progressing)       Start:  01/31/25    Expected End:  05/15/25            Patient to improve TUG time to </=13 seconds to demonstrate improved functional mobility (Not Progressing)       Start:  01/31/25    Expected End:  05/15/25               Short term goals       Patient to improve hip strength grades by 1/2 grade on MMT to demonstrate improved LE strength  (Met)       Start:  01/31/25    Expected End:  02/28/25    Resolved:  03/10/25         Patient to improve TUG time to </=14/16 to demonstrate improved functional mobility (Met)       Start:  01/31/25    Expected End:  02/28/25    Resolved:  03/10/25         Patient to imprvoe Williamson Balance Scale score tp </=22/56 to demonstrate improved balance (Progressing)       Start:  01/31/25    Expected End:  05/15/25            PT to ambulate >/= 635 during 6MWT to demonstrate improved aerobic capacity (Progressing)       Start:  01/31/25    Expected End:  05/25/25                Trini Amado, PT

## 2025-06-19 ENCOUNTER — PATIENT MESSAGE (OUTPATIENT)
Dept: PSYCHIATRY | Facility: CLINIC | Age: 70
End: 2025-06-19
Payer: MEDICARE

## 2025-06-24 ENCOUNTER — CLINICAL SUPPORT (OUTPATIENT)
Dept: AUDIOLOGY | Facility: CLINIC | Age: 70
End: 2025-06-24
Payer: MEDICARE

## 2025-06-24 DIAGNOSIS — H90.3 SENSORINEURAL HEARING LOSS, BILATERAL: Primary | ICD-10-CM

## 2025-06-24 NOTE — PROGRESS NOTES
HEARING AID FOLLOW-UP    Rajan Cooper III was seen today for a hearing aid follow-up visit.     Mr. Cooper reported intermittent function of his hearing aids. He also noted that he has not been able to get his hearing aids to connect correctly to his new iPhone. Both vents were cleared of wax and debris and wax filters were changed. Listening check by myself and the patient revealed a significant improvement. Bluetooth connection to patient's iPhone was erased and re-paired. Mr. Cooper inquired about connecting his hearing aids to his iPad to watch videos. He was given a brochure with step by step instructions. He was advised to turn off the bluetooth of his phone for the initial pairing to his iPad.     Mr. Cooper is due for an annual audiogram in September. He will receive a reminder in the mail when it is time to schedule. Otherwise, he is to call our office as needed for hearing aid adjustments.

## 2025-07-11 NOTE — TELEPHONE ENCOUNTER
----- Message from Gilbert Mack sent at 3/15/2017 11:52 AM CDT -----  Contact: Dorothy ( spouse ) [phone]  Caller is requesting a return call regarding picking up a prescription, pls call   
I called to speak with this patient, there was no answer so I left a message for him to call me back.  
I spoke to this patient's wife who has stated that she needs a paper script for her husbands medication and that she was also calling to inform us that her  is in the hospital and has been there the past couple of weeks.  
Yes

## 2025-07-27 DIAGNOSIS — M62.838 MUSCLE SPASM: ICD-10-CM

## 2025-07-27 DIAGNOSIS — G35 MS (MULTIPLE SCLEROSIS): ICD-10-CM

## 2025-07-27 DIAGNOSIS — E03.9 HYPOTHYROIDISM, UNSPECIFIED TYPE: ICD-10-CM

## 2025-07-27 NOTE — TELEPHONE ENCOUNTER
Care Due:                  Date            Visit Type   Department     Provider  --------------------------------------------------------------------------------                                MYCHART                              FOLLOWUP/OF  [facility] INTERNAL  Last Visit: 08-      FICE VISIT   MEDICINE       Tom Garcia  Next Visit: None Scheduled  None         None Found                                                            Last  Test          Frequency    Reason                     Performed    Due Date  --------------------------------------------------------------------------------    Office Visit  12 months..  amLODIPine,                08- 08-                             ergocalciferol,                             furosemide, losartan.....    CMP.........  12 months..  ergocalciferol,            06- 06-                             furosemide, losartan.....    Vitamin D...  12 months..  ergocalciferol...........  Not Found    Overdue    [employer] Embedded Care Due Messages. Reference number: [id number].   7/27/2025 12:52:34 PM CDT

## 2025-07-28 ENCOUNTER — DOCUMENTATION ONLY (OUTPATIENT)
Dept: REHABILITATION | Facility: HOSPITAL | Age: 70
End: 2025-07-28
Payer: MEDICARE

## 2025-07-28 RX ORDER — BACLOFEN 10 MG/1
TABLET ORAL
Qty: 360 TABLET | Refills: 3 | Status: SHIPPED | OUTPATIENT
Start: 2025-07-28

## 2025-07-28 RX ORDER — ERGOCALCIFEROL 1.25 MG/1
CAPSULE ORAL
Qty: 12 CAPSULE | Refills: 3 | Status: SHIPPED | OUTPATIENT
Start: 2025-07-28

## 2025-07-28 RX ORDER — FUROSEMIDE 40 MG/1
40 TABLET ORAL 2 TIMES DAILY
Qty: 180 TABLET | Refills: 3 | Status: SHIPPED | OUTPATIENT
Start: 2025-07-28

## 2025-07-28 RX ORDER — LEVOTHYROXINE SODIUM 50 UG/1
50 TABLET ORAL
Qty: 90 TABLET | Refills: 3 | Status: SHIPPED | OUTPATIENT
Start: 2025-07-28

## 2025-07-28 RX ORDER — AMLODIPINE BESYLATE 5 MG/1
5 TABLET ORAL
Qty: 90 TABLET | Refills: 3 | Status: SHIPPED | OUTPATIENT
Start: 2025-07-28

## 2025-07-28 RX ORDER — LOSARTAN POTASSIUM 50 MG/1
50 TABLET ORAL
Qty: 90 TABLET | Refills: 3 | Status: SHIPPED | OUTPATIENT
Start: 2025-07-28

## 2025-07-28 NOTE — PROGRESS NOTES
Patient was evaluated on 2025 and was seen 17 times for physical therapy. Patient has not attended physical therapy since 2025. Patient given home exercise program. Plan of care and/or authorization . Current status is unknown. Patient to be discharged at this time.     Trini Amado, PT, DPT

## 2025-07-30 ENCOUNTER — PATIENT MESSAGE (OUTPATIENT)
Dept: PSYCHIATRY | Facility: CLINIC | Age: 70
End: 2025-07-30
Payer: MEDICARE

## 2025-08-01 ENCOUNTER — PATIENT MESSAGE (OUTPATIENT)
Dept: PSYCHIATRY | Facility: CLINIC | Age: 70
End: 2025-08-01
Payer: MEDICARE

## (undated) DEVICE — CORD FOR BIPOLAR FORCEPS 12

## (undated) DEVICE — NDL SAFETY 22G X 1.5 ECLIPSE

## (undated) DEVICE — CARTRIDGE OIL

## (undated) DEVICE — DRESSING TRANS 4X4 TEGADERM

## (undated) DEVICE — PADDING CAST 4IN DELTA ROLL

## (undated) DEVICE — KIT SURGIFLO HEMOSTATIC MATRIX

## (undated) DEVICE — SEE MEDLINE ITEM 107746

## (undated) DEVICE — TAPE CURAD SILK ADH 3INX10YD

## (undated) DEVICE — BLADE SURG CARBON STEEL #10

## (undated) DEVICE — SEE MEDLINE ITEM 146298

## (undated) DEVICE — PINS SKULL ADULT MAYFIELD
Type: IMPLANTABLE DEVICE | Site: SCALP | Status: NON-FUNCTIONAL
Removed: 2023-04-14

## (undated) DEVICE — SPONGE LAP 18X18 PREWASHED

## (undated) DEVICE — TOWEL OR NONABSORB ADH 17X26

## (undated) DEVICE — SUPPORT ULNA NERVE PROTECTOR

## (undated) DEVICE — DRAPE STERI-DRAPE 1000 17X11IN

## (undated) DEVICE — APPLICATOR CHLORAPREP ORN 26ML

## (undated) DEVICE — BANDAGE ACE NON LATEX 4IN

## (undated) DEVICE — SHEET DRAPE FAN-FOLDED 3/4

## (undated) DEVICE — DRAPE STERI U-SHAPED 47X51IN

## (undated) DEVICE — DRAPE STERI INSTRUMENT 1018

## (undated) DEVICE — BLANKET LOWER BODY 55.9X40.2IN

## (undated) DEVICE — STAPLER SKIN ROTATING HEAD

## (undated) DEVICE — SYR BULB 60CC IRRIGATION

## (undated) DEVICE — SEE MEDLINE ITEM 157150

## (undated) DEVICE — SEE MEDLINE ITEM 157173

## (undated) DEVICE — SEE MEDLINE ITEM 146347

## (undated) DEVICE — DRESSING SURGICAL 1/2X1/2

## (undated) DEVICE — GAUZE SPONGE 4X4 12PLY

## (undated) DEVICE — SEE MEDLINE ITEM 146292

## (undated) DEVICE — SEE MEDLINE ITEM 152622

## (undated) DEVICE — DRAPE C-ARM FOR MOBILE XRAY

## (undated) DEVICE — DRESSING TELFA N ADH 3X8

## (undated) DEVICE — SYR IRRIGATION BULB STER 60ML

## (undated) DEVICE — DIFFUSER

## (undated) DEVICE — SYS LABLNG CORECT MED 4 FLG

## (undated) DEVICE — SPONGE PATTY SURGICAL .5X3IN

## (undated) DEVICE — SEE MEDLINE ITEM 146345

## (undated) DEVICE — UNDERGLOVES BIOGEL PI SIZE 8

## (undated) DEVICE — BANDAGE ACE ELASTIC 6"

## (undated) DEVICE — SOL NACL IRR 1000ML BTL

## (undated) DEVICE — SUT VICRYL+ 1 CT1 18IN

## (undated) DEVICE — SEE MEDLINE ITEM 157110

## (undated) DEVICE — UNDERGLOVE BIOGEL PI SZ 6.5 LF

## (undated) DEVICE — SUT VICRYL 2 0 CT 2

## (undated) DEVICE — ELECTRODE REM PLYHSV RETURN 9

## (undated) DEVICE — GLOVE BIOGEL PI MICRO SZ 7.5

## (undated) DEVICE — PADDING CAST 6IN

## (undated) DEVICE — TRAY MINOR ORTHO

## (undated) DEVICE — SET TUR BLADDER IRRIG Y TYPE

## (undated) DEVICE — GAUZE SPONGE 4'X4 12 PLY

## (undated) DEVICE — 3.5 TAP

## (undated) DEVICE — GLOVE BIOGEL PI MICRO SZ 6.5

## (undated) DEVICE — SHEET THYROID W/ISO-BAC

## (undated) DEVICE — SEE MEDLINE ITEM 146417

## (undated) DEVICE — SEE MEDLINE ITEM 157194

## (undated) DEVICE — BACITRACIN ZINC OINT 0.9GM

## (undated) DEVICE — DRAPE PLASTIC U 60X72

## (undated) DEVICE — TUBE FRAZIER 5MM 2FT SOFT TIP